# Patient Record
Sex: FEMALE | Race: WHITE | NOT HISPANIC OR LATINO | Employment: FULL TIME | URBAN - METROPOLITAN AREA
[De-identification: names, ages, dates, MRNs, and addresses within clinical notes are randomized per-mention and may not be internally consistent; named-entity substitution may affect disease eponyms.]

---

## 2021-07-22 ENCOUNTER — HOSPITAL ENCOUNTER (INPATIENT)
Facility: HOSPITAL | Age: 31
LOS: 33 days | Discharge: NURSING, REHAB OR LONG-TERM CARE | End: 2021-08-24
Attending: EMERGENCY MEDICINE | Admitting: INTERNAL MEDICINE

## 2021-07-22 ENCOUNTER — APPOINTMENT (OUTPATIENT)
Dept: GENERAL RADIOLOGY | Facility: HOSPITAL | Age: 31
End: 2021-07-22

## 2021-07-22 DIAGNOSIS — I95.2 HYPOTENSION DUE TO DRUGS: ICD-10-CM

## 2021-07-22 DIAGNOSIS — J96.01 ACUTE RESPIRATORY FAILURE WITH HYPOXIA (HCC): ICD-10-CM

## 2021-07-22 DIAGNOSIS — U07.1 ACUTE RESPIRATORY FAILURE DUE TO COVID-19 (HCC): ICD-10-CM

## 2021-07-22 DIAGNOSIS — J12.82 PNEUMONIA DUE TO COVID-19 VIRUS: ICD-10-CM

## 2021-07-22 DIAGNOSIS — R06.00 DYSPNEA, UNSPECIFIED TYPE: Primary | ICD-10-CM

## 2021-07-22 DIAGNOSIS — U07.1 PNEUMONIA DUE TO COVID-19 VIRUS: ICD-10-CM

## 2021-07-22 DIAGNOSIS — J96.00 ACUTE RESPIRATORY FAILURE DUE TO COVID-19 (HCC): ICD-10-CM

## 2021-07-22 PROBLEM — E66.813 CLASS 3 SEVERE OBESITY WITHOUT SERIOUS COMORBIDITY WITH BODY MASS INDEX (BMI) OF 50.0 TO 59.9 IN ADULT: Status: ACTIVE | Noted: 2021-07-22

## 2021-07-22 PROBLEM — E66.01 CLASS 3 SEVERE OBESITY WITHOUT SERIOUS COMORBIDITY WITH BODY MASS INDEX (BMI) OF 50.0 TO 59.9 IN ADULT (HCC): Status: ACTIVE | Noted: 2021-07-22

## 2021-07-22 LAB
ALBUMIN SERPL-MCNC: 3.6 G/DL (ref 3.5–5.2)
ALP SERPL-CCNC: 98 U/L (ref 39–117)
ALT SERPL W P-5'-P-CCNC: 51 U/L (ref 1–33)
ANION GAP SERPL CALCULATED.3IONS-SCNC: 13 MMOL/L (ref 5–15)
ARTERIAL PATENCY WRIST A: POSITIVE
AST SERPL-CCNC: 73 U/L (ref 1–32)
ATMOSPHERIC PRESS: ABNORMAL MM[HG]
B PARAPERT DNA SPEC QL NAA+PROBE: NOT DETECTED
B PERT DNA SPEC QL NAA+PROBE: NOT DETECTED
BASE EXCESS BLDA CALC-SCNC: 1.6 MMOL/L (ref 0–3)
BASOPHILS # BLD AUTO: 0 10*3/MM3 (ref 0–0.2)
BASOPHILS NFR BLD AUTO: 0.2 % (ref 0–1.5)
BDY SITE: ABNORMAL
BILIRUB CONJ SERPL-MCNC: 0.2 MG/DL (ref 0–0.3)
BILIRUB INDIRECT SERPL-MCNC: 0.3 MG/DL
BILIRUB SERPL-MCNC: 0.5 MG/DL (ref 0–1.2)
BUN SERPL-MCNC: 17 MG/DL (ref 6–20)
BUN/CREAT SERPL: 19.5 (ref 7–25)
C PNEUM DNA NPH QL NAA+NON-PROBE: NOT DETECTED
CALCIUM SPEC-SCNC: 8.5 MG/DL (ref 8.6–10.5)
CHLORIDE SERPL-SCNC: 100 MMOL/L (ref 98–107)
CO2 BLDA-SCNC: 27.2 MMOL/L (ref 22–29)
CO2 SERPL-SCNC: 23 MMOL/L (ref 22–29)
CREAT SERPL-MCNC: 0.87 MG/DL (ref 0.57–1)
CRP SERPL-MCNC: 6.33 MG/DL (ref 0–0.5)
D DIMER PPP FEU-MCNC: 1.48 MG/L (FEU) (ref 0–0.59)
D-LACTATE SERPL-SCNC: 1.3 MMOL/L (ref 0.5–2)
DEPRECATED RDW RBC AUTO: 42.4 FL (ref 37–54)
EOSINOPHIL # BLD AUTO: 0 10*3/MM3 (ref 0–0.4)
EOSINOPHIL NFR BLD AUTO: 0 % (ref 0.3–6.2)
ERYTHROCYTE [DISTWIDTH] IN BLOOD BY AUTOMATED COUNT: 14.3 % (ref 12.3–15.4)
FERRITIN SERPL-MCNC: 545.6 NG/ML (ref 13–150)
FLUAV SUBTYP SPEC NAA+PROBE: NOT DETECTED
FLUBV RNA ISLT QL NAA+PROBE: NOT DETECTED
GFR SERPL CREATININE-BSD FRML MDRD: 76 ML/MIN/1.73
GFR SERPL CREATININE-BSD FRML MDRD: 92 ML/MIN/1.73
GLUCOSE BLDC GLUCOMTR-MCNC: 131 MG/DL (ref 70–105)
GLUCOSE BLDC GLUCOMTR-MCNC: 262 MG/DL (ref 70–105)
GLUCOSE SERPL-MCNC: 157 MG/DL (ref 65–99)
HADV DNA SPEC NAA+PROBE: NOT DETECTED
HCO3 BLDA-SCNC: 26 MMOL/L (ref 21–28)
HCOV 229E RNA SPEC QL NAA+PROBE: NOT DETECTED
HCOV HKU1 RNA SPEC QL NAA+PROBE: NOT DETECTED
HCOV NL63 RNA SPEC QL NAA+PROBE: NOT DETECTED
HCOV OC43 RNA SPEC QL NAA+PROBE: NOT DETECTED
HCT VFR BLD AUTO: 41.1 % (ref 34–46.6)
HEMODILUTION: NO
HGB BLD-MCNC: 13.6 G/DL (ref 12–15.9)
HMPV RNA NPH QL NAA+NON-PROBE: NOT DETECTED
HOLD SPECIMEN: NORMAL
HPIV1 RNA SPEC QL NAA+PROBE: NOT DETECTED
HPIV2 RNA SPEC QL NAA+PROBE: NOT DETECTED
HPIV3 RNA NPH QL NAA+PROBE: NOT DETECTED
HPIV4 P GENE NPH QL NAA+PROBE: NOT DETECTED
INHALED O2 CONCENTRATION: 100 %
LDH SERPL-CCNC: 625 U/L (ref 135–214)
LYMPHOCYTES # BLD AUTO: 0.7 10*3/MM3 (ref 0.7–3.1)
LYMPHOCYTES NFR BLD AUTO: 10.7 % (ref 19.6–45.3)
M PNEUMO IGG SER IA-ACNC: NOT DETECTED
MCH RBC QN AUTO: 28.2 PG (ref 26.6–33)
MCHC RBC AUTO-ENTMCNC: 33.2 G/DL (ref 31.5–35.7)
MCV RBC AUTO: 85 FL (ref 79–97)
MODALITY: ABNORMAL
MONOCYTES # BLD AUTO: 0.1 10*3/MM3 (ref 0.1–0.9)
MONOCYTES NFR BLD AUTO: 2 % (ref 5–12)
NEUTROPHILS NFR BLD AUTO: 5.8 10*3/MM3 (ref 1.7–7)
NEUTROPHILS NFR BLD AUTO: 87.1 % (ref 42.7–76)
NRBC BLD AUTO-RTO: 0.1 /100 WBC (ref 0–0.2)
NT-PROBNP SERPL-MCNC: 40.1 PG/ML (ref 0–450)
PCO2 BLDA: 39.5 MM HG (ref 35–48)
PH BLDA: 7.43 PH UNITS (ref 7.35–7.45)
PLATELET # BLD AUTO: 172 10*3/MM3 (ref 140–450)
PMV BLD AUTO: 8.6 FL (ref 6–12)
PO2 BLDA: 44.9 MM HG (ref 83–108)
POTASSIUM SERPL-SCNC: 4 MMOL/L (ref 3.5–5.2)
PROCALCITONIN SERPL-MCNC: 0.3 NG/ML (ref 0–0.25)
PROT SERPL-MCNC: 7.1 G/DL (ref 6–8.5)
RBC # BLD AUTO: 4.84 10*6/MM3 (ref 3.77–5.28)
RHINOVIRUS RNA SPEC NAA+PROBE: NOT DETECTED
RSV RNA NPH QL NAA+NON-PROBE: NOT DETECTED
SAO2 % BLDCOA: 81.8 % (ref 94–98)
SARS-COV-2 RNA NPH QL NAA+NON-PROBE: DETECTED
SODIUM SERPL-SCNC: 136 MMOL/L (ref 136–145)
TROPONIN T SERPL-MCNC: <0.01 NG/ML (ref 0–0.03)
WBC # BLD AUTO: 6.6 10*3/MM3 (ref 3.4–10.8)

## 2021-07-22 PROCEDURE — 94660 CPAP INITIATION&MGMT: CPT

## 2021-07-22 PROCEDURE — 82803 BLOOD GASES ANY COMBINATION: CPT

## 2021-07-22 PROCEDURE — 94799 UNLISTED PULMONARY SVC/PX: CPT

## 2021-07-22 PROCEDURE — 99284 EMERGENCY DEPT VISIT MOD MDM: CPT

## 2021-07-22 PROCEDURE — 63710000001 INSULIN LISPRO (HUMAN) PER 5 UNITS: Performed by: NURSE PRACTITIONER

## 2021-07-22 PROCEDURE — 25010000002 DEXAMETHASONE PER 1 MG: Performed by: NURSE PRACTITIONER

## 2021-07-22 PROCEDURE — 85379 FIBRIN DEGRADATION QUANT: CPT | Performed by: NURSE PRACTITIONER

## 2021-07-22 PROCEDURE — 36600 WITHDRAWAL OF ARTERIAL BLOOD: CPT

## 2021-07-22 PROCEDURE — 83615 LACTATE (LD) (LDH) ENZYME: CPT | Performed by: NURSE PRACTITIONER

## 2021-07-22 PROCEDURE — 71045 X-RAY EXAM CHEST 1 VIEW: CPT

## 2021-07-22 PROCEDURE — 86140 C-REACTIVE PROTEIN: CPT | Performed by: NURSE PRACTITIONER

## 2021-07-22 PROCEDURE — 83605 ASSAY OF LACTIC ACID: CPT

## 2021-07-22 PROCEDURE — 80048 BASIC METABOLIC PNL TOTAL CA: CPT | Performed by: EMERGENCY MEDICINE

## 2021-07-22 PROCEDURE — 83880 ASSAY OF NATRIURETIC PEPTIDE: CPT | Performed by: NURSE PRACTITIONER

## 2021-07-22 PROCEDURE — 85025 COMPLETE CBC W/AUTO DIFF WBC: CPT | Performed by: EMERGENCY MEDICINE

## 2021-07-22 PROCEDURE — 94640 AIRWAY INHALATION TREATMENT: CPT

## 2021-07-22 PROCEDURE — 84484 ASSAY OF TROPONIN QUANT: CPT | Performed by: NURSE PRACTITIONER

## 2021-07-22 PROCEDURE — 0202U NFCT DS 22 TRGT SARS-COV-2: CPT | Performed by: NURSE PRACTITIONER

## 2021-07-22 PROCEDURE — 25010000002 DEXAMETHASONE SODIUM PHOSPHATE 10 MG/ML SOLUTION: Performed by: EMERGENCY MEDICINE

## 2021-07-22 PROCEDURE — 80076 HEPATIC FUNCTION PANEL: CPT | Performed by: NURSE PRACTITIONER

## 2021-07-22 PROCEDURE — 36415 COLL VENOUS BLD VENIPUNCTURE: CPT | Performed by: NURSE PRACTITIONER

## 2021-07-22 PROCEDURE — 25010000002 ENOXAPARIN PER 10 MG: Performed by: NURSE PRACTITIONER

## 2021-07-22 PROCEDURE — 82728 ASSAY OF FERRITIN: CPT | Performed by: NURSE PRACTITIONER

## 2021-07-22 PROCEDURE — 25010000002 MORPHINE PER 10 MG: Performed by: NURSE PRACTITIONER

## 2021-07-22 PROCEDURE — 84145 PROCALCITONIN (PCT): CPT | Performed by: NURSE PRACTITIONER

## 2021-07-22 PROCEDURE — 93005 ELECTROCARDIOGRAM TRACING: CPT | Performed by: EMERGENCY MEDICINE

## 2021-07-22 PROCEDURE — 82962 GLUCOSE BLOOD TEST: CPT

## 2021-07-22 RX ORDER — BUDESONIDE AND FORMOTEROL FUMARATE DIHYDRATE 160; 4.5 UG/1; UG/1
2 AEROSOL RESPIRATORY (INHALATION)
Status: DISCONTINUED | OUTPATIENT
Start: 2021-07-22 | End: 2021-07-24

## 2021-07-22 RX ORDER — ALUMINA, MAGNESIA, AND SIMETHICONE 2400; 2400; 240 MG/30ML; MG/30ML; MG/30ML
15 SUSPENSION ORAL EVERY 6 HOURS PRN
Status: DISCONTINUED | OUTPATIENT
Start: 2021-07-22 | End: 2021-08-24 | Stop reason: HOSPADM

## 2021-07-22 RX ORDER — POTASSIUM CHLORIDE 20 MEQ/1
40 TABLET, EXTENDED RELEASE ORAL AS NEEDED
Status: DISCONTINUED | OUTPATIENT
Start: 2021-07-22 | End: 2021-08-24 | Stop reason: HOSPADM

## 2021-07-22 RX ORDER — POTASSIUM CHLORIDE 7.45 MG/ML
10 INJECTION INTRAVENOUS
Status: DISCONTINUED | OUTPATIENT
Start: 2021-07-22 | End: 2021-08-24 | Stop reason: HOSPADM

## 2021-07-22 RX ORDER — INSULIN LISPRO 100 [IU]/ML
0-7 INJECTION, SOLUTION INTRAVENOUS; SUBCUTANEOUS AS NEEDED
Status: DISCONTINUED | OUTPATIENT
Start: 2021-07-22 | End: 2021-07-23

## 2021-07-22 RX ORDER — SODIUM CHLORIDE 0.9 % (FLUSH) 0.9 %
10 SYRINGE (ML) INJECTION EVERY 12 HOURS SCHEDULED
Status: DISCONTINUED | OUTPATIENT
Start: 2021-07-22 | End: 2021-08-24 | Stop reason: HOSPADM

## 2021-07-22 RX ORDER — INSULIN LISPRO 100 [IU]/ML
0-7 INJECTION, SOLUTION INTRAVENOUS; SUBCUTANEOUS EVERY 6 HOURS SCHEDULED
Status: DISCONTINUED | OUTPATIENT
Start: 2021-07-22 | End: 2021-07-23

## 2021-07-22 RX ORDER — PROMETHAZINE HYDROCHLORIDE 12.5 MG/1
12.5 TABLET ORAL EVERY 6 HOURS PRN
COMMUNITY
End: 2021-10-20

## 2021-07-22 RX ORDER — NITROGLYCERIN 0.4 MG/1
0.4 TABLET SUBLINGUAL
Status: DISCONTINUED | OUTPATIENT
Start: 2021-07-22 | End: 2021-08-24 | Stop reason: HOSPADM

## 2021-07-22 RX ORDER — ONDANSETRON 4 MG/1
4 TABLET, ORALLY DISINTEGRATING ORAL EVERY 6 HOURS PRN
COMMUNITY
End: 2021-10-20

## 2021-07-22 RX ORDER — SODIUM CHLORIDE 0.9 % (FLUSH) 0.9 %
10 SYRINGE (ML) INJECTION AS NEEDED
Status: DISCONTINUED | OUTPATIENT
Start: 2021-07-22 | End: 2021-08-24 | Stop reason: HOSPADM

## 2021-07-22 RX ORDER — MAGNESIUM SULFATE HEPTAHYDRATE 40 MG/ML
2 INJECTION, SOLUTION INTRAVENOUS AS NEEDED
Status: DISCONTINUED | OUTPATIENT
Start: 2021-07-22 | End: 2021-08-24 | Stop reason: HOSPADM

## 2021-07-22 RX ORDER — BENZONATATE 100 MG/1
100 CAPSULE ORAL 3 TIMES DAILY PRN
Status: DISCONTINUED | OUTPATIENT
Start: 2021-07-22 | End: 2021-08-24 | Stop reason: HOSPADM

## 2021-07-22 RX ORDER — DOCUSATE SODIUM 100 MG/1
100 CAPSULE, LIQUID FILLED ORAL 2 TIMES DAILY
Status: DISCONTINUED | OUTPATIENT
Start: 2021-07-22 | End: 2021-07-23 | Stop reason: SDUPTHER

## 2021-07-22 RX ORDER — DEXAMETHASONE SODIUM PHOSPHATE 10 MG/ML
10 INJECTION, SOLUTION INTRAMUSCULAR; INTRAVENOUS ONCE
Status: COMPLETED | OUTPATIENT
Start: 2021-07-22 | End: 2021-07-22

## 2021-07-22 RX ORDER — FAMOTIDINE 20 MG/1
20 TABLET, FILM COATED ORAL DAILY PRN
COMMUNITY
End: 2021-09-29

## 2021-07-22 RX ORDER — ASPIRIN 325 MG
325 TABLET, DELAYED RELEASE (ENTERIC COATED) ORAL DAILY
Status: DISCONTINUED | OUTPATIENT
Start: 2021-07-22 | End: 2021-07-23

## 2021-07-22 RX ORDER — ASCORBIC ACID 500 MG
500 TABLET ORAL DAILY
Status: DISCONTINUED | OUTPATIENT
Start: 2021-07-22 | End: 2021-07-24

## 2021-07-22 RX ORDER — FENTANYL/ROPIVACAINE/NS/PF 2-625MCG/1
15 PLASTIC BAG, INJECTION (ML) EPIDURAL AS NEEDED
Status: DISCONTINUED | OUTPATIENT
Start: 2021-07-22 | End: 2021-08-24 | Stop reason: HOSPADM

## 2021-07-22 RX ORDER — DEXMEDETOMIDINE HYDROCHLORIDE 4 UG/ML
.2-1.5 INJECTION, SOLUTION INTRAVENOUS
Status: DISCONTINUED | OUTPATIENT
Start: 2021-07-22 | End: 2021-07-23

## 2021-07-22 RX ORDER — MAGNESIUM SULFATE 1 G/100ML
1 INJECTION INTRAVENOUS AS NEEDED
Status: DISCONTINUED | OUTPATIENT
Start: 2021-07-22 | End: 2021-08-24 | Stop reason: HOSPADM

## 2021-07-22 RX ORDER — ONDANSETRON 4 MG/1
4 TABLET, FILM COATED ORAL EVERY 6 HOURS PRN
Status: DISCONTINUED | OUTPATIENT
Start: 2021-07-22 | End: 2021-08-11

## 2021-07-22 RX ORDER — ACETAMINOPHEN 500 MG
500 TABLET ORAL EVERY 6 HOURS PRN
COMMUNITY
End: 2021-10-20

## 2021-07-22 RX ORDER — DEXAMETHASONE SODIUM PHOSPHATE 4 MG/ML
6 INJECTION, SOLUTION INTRA-ARTICULAR; INTRALESIONAL; INTRAMUSCULAR; INTRAVENOUS; SOFT TISSUE EVERY 12 HOURS
Status: DISCONTINUED | OUTPATIENT
Start: 2021-07-22 | End: 2021-07-23

## 2021-07-22 RX ORDER — IBUPROFEN 200 MG
200 TABLET ORAL EVERY 6 HOURS PRN
COMMUNITY
End: 2021-08-24 | Stop reason: HOSPADM

## 2021-07-22 RX ORDER — POTASSIUM CHLORIDE 1.5 G/1.77G
40 POWDER, FOR SOLUTION ORAL AS NEEDED
Status: DISCONTINUED | OUTPATIENT
Start: 2021-07-22 | End: 2021-08-24 | Stop reason: HOSPADM

## 2021-07-22 RX ORDER — ONDANSETRON 2 MG/ML
4 INJECTION INTRAMUSCULAR; INTRAVENOUS EVERY 6 HOURS PRN
Status: DISCONTINUED | OUTPATIENT
Start: 2021-07-22 | End: 2021-08-11

## 2021-07-22 RX ORDER — ALBUTEROL SULFATE 90 UG/1
2 AEROSOL, METERED RESPIRATORY (INHALATION)
Status: DISCONTINUED | OUTPATIENT
Start: 2021-07-22 | End: 2021-07-23

## 2021-07-22 RX ORDER — MORPHINE SULFATE 4 MG/ML
2 INJECTION, SOLUTION INTRAMUSCULAR; INTRAVENOUS
Status: DISPENSED | OUTPATIENT
Start: 2021-07-22 | End: 2021-07-29

## 2021-07-22 RX ORDER — NICOTINE POLACRILEX 4 MG
15 LOZENGE BUCCAL
Status: DISCONTINUED | OUTPATIENT
Start: 2021-07-22 | End: 2021-08-24 | Stop reason: HOSPADM

## 2021-07-22 RX ORDER — ZINC SULFATE 50(220)MG
220 CAPSULE ORAL DAILY
Status: DISCONTINUED | OUTPATIENT
Start: 2021-07-22 | End: 2021-07-23

## 2021-07-22 RX ORDER — DEXTROSE MONOHYDRATE 25 G/50ML
25 INJECTION, SOLUTION INTRAVENOUS
Status: DISCONTINUED | OUTPATIENT
Start: 2021-07-22 | End: 2021-08-24 | Stop reason: HOSPADM

## 2021-07-22 RX ORDER — METHOCARBAMOL 500 MG/1
500 TABLET, FILM COATED ORAL 4 TIMES DAILY PRN
COMMUNITY
End: 2021-10-20

## 2021-07-22 RX ORDER — ACETAMINOPHEN 650 MG/1
650 SUPPOSITORY RECTAL EVERY 4 HOURS PRN
Status: DISCONTINUED | OUTPATIENT
Start: 2021-07-22 | End: 2021-08-24 | Stop reason: HOSPADM

## 2021-07-22 RX ORDER — ACETAMINOPHEN 325 MG/1
650 TABLET ORAL EVERY 4 HOURS PRN
Status: DISCONTINUED | OUTPATIENT
Start: 2021-07-22 | End: 2021-08-24 | Stop reason: HOSPADM

## 2021-07-22 RX ORDER — MELATONIN
2000 DAILY
Status: DISCONTINUED | OUTPATIENT
Start: 2021-07-22 | End: 2021-07-23

## 2021-07-22 RX ORDER — GUAIFENESIN 600 MG/1
1200 TABLET, EXTENDED RELEASE ORAL EVERY 12 HOURS SCHEDULED
Status: DISCONTINUED | OUTPATIENT
Start: 2021-07-22 | End: 2021-07-23

## 2021-07-22 RX ADMIN — Medication 10 ML: at 20:27

## 2021-07-22 RX ADMIN — GUAIFENESIN 1200 MG: 600 TABLET, EXTENDED RELEASE ORAL at 20:26

## 2021-07-22 RX ADMIN — REMDESIVIR 200 MG: 100 INJECTION, POWDER, LYOPHILIZED, FOR SOLUTION INTRAVENOUS at 17:10

## 2021-07-22 RX ADMIN — DEXAMETHASONE SODIUM PHOSPHATE 6 MG: 4 INJECTION, SOLUTION INTRAMUSCULAR; INTRAVENOUS at 20:26

## 2021-07-22 RX ADMIN — ALBUTEROL SULFATE 2 PUFF: 108 INHALANT RESPIRATORY (INHALATION) at 19:50

## 2021-07-22 RX ADMIN — Medication 600 MG: at 20:26

## 2021-07-22 RX ADMIN — ZINC SULFATE 220 MG (50 MG) CAPSULE 220 MG: CAPSULE at 20:26

## 2021-07-22 RX ADMIN — INSULIN LISPRO 4 UNITS: 100 INJECTION, SOLUTION INTRAVENOUS; SUBCUTANEOUS at 23:26

## 2021-07-22 RX ADMIN — MORPHINE SULFATE 2 MG: 4 INJECTION INTRAVENOUS at 23:35

## 2021-07-22 RX ADMIN — DEXAMETHASONE SODIUM PHOSPHATE 10 MG: 10 INJECTION, SOLUTION INTRAMUSCULAR; INTRAVENOUS at 13:26

## 2021-07-22 RX ADMIN — Medication 2000 UNITS: at 20:26

## 2021-07-22 RX ADMIN — ENOXAPARIN SODIUM 160 MG: 80 INJECTION SUBCUTANEOUS at 17:10

## 2021-07-22 RX ADMIN — BUDESONIDE AND FORMOTEROL FUMARATE DIHYDRATE 2 PUFF: 160; 4.5 AEROSOL RESPIRATORY (INHALATION) at 19:54

## 2021-07-22 RX ADMIN — DEXMEDETOMIDINE HYDROCHLORIDE 0.2 MCG/KG/HR: 100 INJECTION, SOLUTION INTRAVENOUS at 21:48

## 2021-07-22 RX ADMIN — ASPIRIN 325 MG: 325 TABLET, COATED ORAL at 23:27

## 2021-07-22 RX ADMIN — OXYCODONE HYDROCHLORIDE AND ACETAMINOPHEN 500 MG: 500 TABLET ORAL at 20:26

## 2021-07-22 RX ADMIN — MORPHINE SULFATE 2 MG: 4 INJECTION INTRAVENOUS at 20:22

## 2021-07-22 RX ADMIN — HYDROCODONE POLISTIREX AND CHLORPHENIRAMINE POLISTIREX 5 ML: 10; 8 SUSPENSION, EXTENDED RELEASE ORAL at 20:22

## 2021-07-23 ENCOUNTER — APPOINTMENT (OUTPATIENT)
Dept: GENERAL RADIOLOGY | Facility: HOSPITAL | Age: 31
End: 2021-07-23

## 2021-07-23 ENCOUNTER — APPOINTMENT (OUTPATIENT)
Dept: CARDIOLOGY | Facility: HOSPITAL | Age: 31
End: 2021-07-23

## 2021-07-23 PROBLEM — J12.82 PNEUMONIA DUE TO COVID-19 VIRUS: Status: ACTIVE | Noted: 2021-07-22

## 2021-07-23 PROBLEM — R73.9 HYPERGLYCEMIA: Status: ACTIVE | Noted: 2021-07-22

## 2021-07-23 PROBLEM — U07.1 PNEUMONIA DUE TO COVID-19 VIRUS: Status: ACTIVE | Noted: 2021-07-22

## 2021-07-23 LAB
ALBUMIN SERPL-MCNC: 3.5 G/DL (ref 3.5–5.2)
ALBUMIN/GLOB SERPL: 1 G/DL
ALP SERPL-CCNC: 93 U/L (ref 39–117)
ALT SERPL W P-5'-P-CCNC: 42 U/L (ref 1–33)
ANION GAP SERPL CALCULATED.3IONS-SCNC: 12 MMOL/L (ref 5–15)
APTT PPP: 30.2 SECONDS (ref 24–31)
APTT PPP: 67.2 SECONDS (ref 61–76.5)
ARTERIAL PATENCY WRIST A: ABNORMAL
AST SERPL-CCNC: 52 U/L (ref 1–32)
ATMOSPHERIC PRESS: ABNORMAL MM[HG]
BASE EXCESS BLDA CALC-SCNC: -1.7 MMOL/L (ref 0–3)
BASOPHILS # BLD AUTO: 0 10*3/MM3 (ref 0–0.2)
BASOPHILS NFR BLD AUTO: 0.1 % (ref 0–1.5)
BDY SITE: ABNORMAL
BILIRUB CONJ SERPL-MCNC: <0.2 MG/DL (ref 0–0.3)
BILIRUB SERPL-MCNC: 0.4 MG/DL (ref 0–1.2)
BUN SERPL-MCNC: 22 MG/DL (ref 6–20)
BUN/CREAT SERPL: 27.8 (ref 7–25)
CALCIUM SPEC-SCNC: 8.6 MG/DL (ref 8.6–10.5)
CHLORIDE SERPL-SCNC: 98 MMOL/L (ref 98–107)
CHOLEST SERPL-MCNC: 87 MG/DL (ref 0–200)
CK SERPL-CCNC: 101 U/L (ref 20–180)
CO2 BLDA-SCNC: 24.8 MMOL/L (ref 22–29)
CO2 SERPL-SCNC: 25 MMOL/L (ref 22–29)
CREAT SERPL-MCNC: 0.79 MG/DL (ref 0.57–1)
CRP SERPL-MCNC: 4.99 MG/DL (ref 0–0.5)
CRP SERPL-MCNC: 6.72 MG/DL (ref 0–0.5)
D DIMER PPP FEU-MCNC: 0.98 MG/L (FEU) (ref 0–0.59)
DEPRECATED RDW RBC AUTO: 41.6 FL (ref 37–54)
EOSINOPHIL # BLD AUTO: 0 10*3/MM3 (ref 0–0.4)
EOSINOPHIL NFR BLD AUTO: 0 % (ref 0.3–6.2)
ERYTHROCYTE [DISTWIDTH] IN BLOOD BY AUTOMATED COUNT: 14.3 % (ref 12.3–15.4)
FERRITIN SERPL-MCNC: 421.3 NG/ML (ref 13–150)
FIBRINOGEN PPP-MCNC: 650 MG/DL (ref 210–450)
GFR SERPL CREATININE-BSD FRML MDRD: 85 ML/MIN/1.73
GLOBULIN UR ELPH-MCNC: 3.4 GM/DL
GLUCOSE BLDC GLUCOMTR-MCNC: 163 MG/DL (ref 70–105)
GLUCOSE BLDC GLUCOMTR-MCNC: 199 MG/DL (ref 70–105)
GLUCOSE BLDC GLUCOMTR-MCNC: 204 MG/DL (ref 70–105)
GLUCOSE BLDC GLUCOMTR-MCNC: 205 MG/DL (ref 70–105)
GLUCOSE BLDC GLUCOMTR-MCNC: 262 MG/DL (ref 70–105)
GLUCOSE SERPL-MCNC: 197 MG/DL (ref 65–99)
HBA1C MFR BLD: 6.9 % (ref 3.5–5.6)
HCO3 BLDA-SCNC: 23.6 MMOL/L (ref 21–28)
HCT VFR BLD AUTO: 38.4 % (ref 34–46.6)
HDLC SERPL-MCNC: 19 MG/DL (ref 40–60)
HEMODILUTION: NO
HGB BLD-MCNC: 12.8 G/DL (ref 12–15.9)
INHALED O2 CONCENTRATION: 100 %
INR PPP: 1.06 (ref 0.93–1.1)
LDH SERPL-CCNC: 636 U/L (ref 135–214)
LDLC SERPL CALC-MCNC: 41 MG/DL (ref 0–100)
LDLC/HDLC SERPL: 1.89 {RATIO}
LYMPHOCYTES # BLD AUTO: 1 10*3/MM3 (ref 0.7–3.1)
LYMPHOCYTES NFR BLD AUTO: 16.2 % (ref 19.6–45.3)
MAGNESIUM SERPL-MCNC: 1.9 MG/DL (ref 1.6–2.6)
MCH RBC QN AUTO: 28 PG (ref 26.6–33)
MCHC RBC AUTO-ENTMCNC: 33.4 G/DL (ref 31.5–35.7)
MCV RBC AUTO: 83.8 FL (ref 79–97)
MODALITY: ABNORMAL
MONOCYTES # BLD AUTO: 0.2 10*3/MM3 (ref 0.1–0.9)
MONOCYTES NFR BLD AUTO: 3.2 % (ref 5–12)
NEUTROPHILS NFR BLD AUTO: 4.9 10*3/MM3 (ref 1.7–7)
NEUTROPHILS NFR BLD AUTO: 80.5 % (ref 42.7–76)
NRBC BLD AUTO-RTO: 0.1 /100 WBC (ref 0–0.2)
NT-PROBNP SERPL-MCNC: 110.3 PG/ML (ref 0–450)
PCO2 BLDA: 41 MM HG (ref 35–48)
PEEP RESPIRATORY: 13 CM[H2O]
PH BLDA: 7.37 PH UNITS (ref 7.35–7.45)
PHOSPHATE SERPL-MCNC: 2.6 MG/DL (ref 2.5–4.5)
PLATELET # BLD AUTO: 192 10*3/MM3 (ref 140–450)
PMV BLD AUTO: 9.1 FL (ref 6–12)
PO2 BLDA: 94.2 MM HG (ref 83–108)
POTASSIUM SERPL-SCNC: 4.1 MMOL/L (ref 3.5–5.2)
PROT SERPL-MCNC: 6.9 G/DL (ref 6–8.5)
PROTHROMBIN TIME: 11.7 SECONDS (ref 9.6–11.7)
QT INTERVAL: 300 MS
RBC # BLD AUTO: 4.58 10*6/MM3 (ref 3.77–5.28)
RESPIRATORY RATE: 28
SAO2 % BLDCOA: 97.1 % (ref 94–98)
SODIUM SERPL-SCNC: 135 MMOL/L (ref 136–145)
TRIGL SERPL-MCNC: 160 MG/DL (ref 0–150)
TROPONIN T SERPL-MCNC: <0.01 NG/ML (ref 0–0.03)
VENTILATOR MODE: ABNORMAL
VLDLC SERPL-MCNC: 27 MG/DL (ref 5–40)
VT ON VENT VENT: 450 ML
WBC # BLD AUTO: 6.1 10*3/MM3 (ref 3.4–10.8)

## 2021-07-23 PROCEDURE — 94799 UNLISTED PULMONARY SVC/PX: CPT

## 2021-07-23 PROCEDURE — 85025 COMPLETE CBC W/AUTO DIFF WBC: CPT | Performed by: NURSE PRACTITIONER

## 2021-07-23 PROCEDURE — 25010000002 DEXAMETHASONE PER 1 MG: Performed by: NURSE PRACTITIONER

## 2021-07-23 PROCEDURE — 85610 PROTHROMBIN TIME: CPT | Performed by: NURSE PRACTITIONER

## 2021-07-23 PROCEDURE — 71045 X-RAY EXAM CHEST 1 VIEW: CPT

## 2021-07-23 PROCEDURE — 82962 GLUCOSE BLOOD TEST: CPT

## 2021-07-23 PROCEDURE — 85730 THROMBOPLASTIN TIME PARTIAL: CPT | Performed by: NURSE PRACTITIONER

## 2021-07-23 PROCEDURE — 74018 RADEX ABDOMEN 1 VIEW: CPT

## 2021-07-23 PROCEDURE — 85384 FIBRINOGEN ACTIVITY: CPT | Performed by: NURSE PRACTITIONER

## 2021-07-23 PROCEDURE — 25010000002 MIDAZOLAM 50 MG/10ML SOLUTION: Performed by: NURSE PRACTITIONER

## 2021-07-23 PROCEDURE — 82803 BLOOD GASES ANY COMBINATION: CPT

## 2021-07-23 PROCEDURE — 03HC33Z INSERTION OF INFUSION DEVICE INTO LEFT RADIAL ARTERY, PERCUTANEOUS APPROACH: ICD-10-PCS | Performed by: INTERNAL MEDICINE

## 2021-07-23 PROCEDURE — 0BH18EZ INSERTION OF ENDOTRACHEAL AIRWAY INTO TRACHEA, VIA NATURAL OR ARTIFICIAL OPENING ENDOSCOPIC: ICD-10-PCS | Performed by: INTERNAL MEDICINE

## 2021-07-23 PROCEDURE — 25010000002 EPOPROSTENOL PER 0.5 MG: Performed by: NURSE PRACTITIONER

## 2021-07-23 PROCEDURE — C1751 CATH, INF, PER/CENT/MIDLINE: HCPCS

## 2021-07-23 PROCEDURE — 25010000002 FENTANYL CITRATE (PF) 50 MCG/ML SOLUTION: Performed by: NURSE PRACTITIONER

## 2021-07-23 PROCEDURE — 87070 CULTURE OTHR SPECIMN AEROBIC: CPT | Performed by: NURSE PRACTITIONER

## 2021-07-23 PROCEDURE — 25010000002 MIDAZOLAM PER 1 MG

## 2021-07-23 PROCEDURE — 31500 INSERT EMERGENCY AIRWAY: CPT | Performed by: NURSE PRACTITIONER

## 2021-07-23 PROCEDURE — 25010000002 MORPHINE PER 10 MG: Performed by: NURSE PRACTITIONER

## 2021-07-23 PROCEDURE — 25010000002 PROPOFOL 10 MG/ML EMULSION

## 2021-07-23 PROCEDURE — 82248 BILIRUBIN DIRECT: CPT | Performed by: NURSE PRACTITIONER

## 2021-07-23 PROCEDURE — 94002 VENT MGMT INPAT INIT DAY: CPT

## 2021-07-23 PROCEDURE — 25010000002 CEFTRIAXONE PER 250 MG: Performed by: NURSE PRACTITIONER

## 2021-07-23 PROCEDURE — 02HV33Z INSERTION OF INFUSION DEVICE INTO SUPERIOR VENA CAVA, PERCUTANEOUS APPROACH: ICD-10-PCS | Performed by: INTERNAL MEDICINE

## 2021-07-23 PROCEDURE — 25010000002 HEPARIN (PORCINE) 25000-0.45 UT/250ML-% SOLUTION: Performed by: INTERNAL MEDICINE

## 2021-07-23 PROCEDURE — 80053 COMPREHEN METABOLIC PANEL: CPT | Performed by: NURSE PRACTITIONER

## 2021-07-23 PROCEDURE — 63710000001 INSULIN LISPRO (HUMAN) PER 5 UNITS: Performed by: NURSE PRACTITIONER

## 2021-07-23 PROCEDURE — 83615 LACTATE (LD) (LDH) ENZYME: CPT | Performed by: NURSE PRACTITIONER

## 2021-07-23 PROCEDURE — 63710000001 INSULIN LISPRO (HUMAN) PER 5 UNITS: Performed by: INTERNAL MEDICINE

## 2021-07-23 PROCEDURE — 82728 ASSAY OF FERRITIN: CPT | Performed by: NURSE PRACTITIONER

## 2021-07-23 PROCEDURE — 94760 N-INVAS EAR/PLS OXIMETRY 1: CPT

## 2021-07-23 PROCEDURE — 94640 AIRWAY INHALATION TREATMENT: CPT

## 2021-07-23 PROCEDURE — 86140 C-REACTIVE PROTEIN: CPT | Performed by: NURSE PRACTITIONER

## 2021-07-23 PROCEDURE — 25010000002 AZITHROMYCIN PER 500 MG: Performed by: NURSE PRACTITIONER

## 2021-07-23 PROCEDURE — 25010000002 FENTANYL CITRATE (PF) 50 MCG/ML SOLUTION

## 2021-07-23 PROCEDURE — 25010000002 FENTANYL CITRATE (PF) 2500 MCG/50ML SOLUTION: Performed by: NURSE PRACTITIONER

## 2021-07-23 PROCEDURE — 25010000002 ENOXAPARIN PER 10 MG: Performed by: NURSE PRACTITIONER

## 2021-07-23 PROCEDURE — 82550 ASSAY OF CK (CPK): CPT | Performed by: NURSE PRACTITIONER

## 2021-07-23 PROCEDURE — 5A1955Z RESPIRATORY VENTILATION, GREATER THAN 96 CONSECUTIVE HOURS: ICD-10-PCS | Performed by: INTERNAL MEDICINE

## 2021-07-23 PROCEDURE — 83036 HEMOGLOBIN GLYCOSYLATED A1C: CPT | Performed by: NURSE PRACTITIONER

## 2021-07-23 PROCEDURE — 83880 ASSAY OF NATRIURETIC PEPTIDE: CPT | Performed by: NURSE PRACTITIONER

## 2021-07-23 PROCEDURE — 84100 ASSAY OF PHOSPHORUS: CPT | Performed by: NURSE PRACTITIONER

## 2021-07-23 PROCEDURE — 85379 FIBRIN DEGRADATION QUANT: CPT | Performed by: NURSE PRACTITIONER

## 2021-07-23 PROCEDURE — 83735 ASSAY OF MAGNESIUM: CPT | Performed by: NURSE PRACTITIONER

## 2021-07-23 PROCEDURE — 85730 THROMBOPLASTIN TIME PARTIAL: CPT | Performed by: INTERNAL MEDICINE

## 2021-07-23 PROCEDURE — 84484 ASSAY OF TROPONIN QUANT: CPT | Performed by: NURSE PRACTITIONER

## 2021-07-23 PROCEDURE — 80061 LIPID PANEL: CPT | Performed by: NURSE PRACTITIONER

## 2021-07-23 PROCEDURE — 87205 SMEAR GRAM STAIN: CPT | Performed by: NURSE PRACTITIONER

## 2021-07-23 RX ORDER — HEPARIN SODIUM 10000 [USP'U]/100ML
9.26 INJECTION, SOLUTION INTRAVENOUS
Status: DISCONTINUED | OUTPATIENT
Start: 2021-07-23 | End: 2021-07-26

## 2021-07-23 RX ORDER — VECURONIUM BROMIDE 1 MG/ML
10 INJECTION, POWDER, LYOPHILIZED, FOR SOLUTION INTRAVENOUS ONCE
Status: COMPLETED | OUTPATIENT
Start: 2021-07-23 | End: 2021-07-23

## 2021-07-23 RX ORDER — INSULIN LISPRO 100 [IU]/ML
0-14 INJECTION, SOLUTION INTRAVENOUS; SUBCUTANEOUS AS NEEDED
Status: DISCONTINUED | OUTPATIENT
Start: 2021-07-23 | End: 2021-07-25 | Stop reason: DRUGHIGH

## 2021-07-23 RX ORDER — ZINC SULFATE 50(220)MG
220 CAPSULE ORAL DAILY
Status: COMPLETED | OUTPATIENT
Start: 2021-07-24 | End: 2021-08-05

## 2021-07-23 RX ORDER — EPOPROSTENOL SODIUM 1.5 MG/1
50 INJECTION, POWDER, FOR SOLUTION INTRAVENOUS CONTINUOUS
Status: DISCONTINUED | OUTPATIENT
Start: 2021-07-23 | End: 2021-08-05

## 2021-07-23 RX ORDER — MIDAZOLAM HYDROCHLORIDE 1 MG/ML
1 INJECTION, SOLUTION INTRAVENOUS
Status: DISCONTINUED | OUTPATIENT
Start: 2021-07-23 | End: 2021-07-23

## 2021-07-23 RX ORDER — NOREPINEPHRINE BIT/0.9 % NACL 8 MG/250ML
.02-.3 INFUSION BOTTLE (ML) INTRAVENOUS
Status: DISCONTINUED | OUTPATIENT
Start: 2021-07-23 | End: 2021-07-25

## 2021-07-23 RX ORDER — ALBUTEROL SULFATE 90 UG/1
6 AEROSOL, METERED RESPIRATORY (INHALATION)
Status: DISCONTINUED | OUTPATIENT
Start: 2021-07-23 | End: 2021-07-26

## 2021-07-23 RX ORDER — ETOMIDATE 2 MG/ML
20 INJECTION INTRAVENOUS ONCE
Status: COMPLETED | OUTPATIENT
Start: 2021-07-23 | End: 2021-07-23

## 2021-07-23 RX ORDER — FAMOTIDINE 20 MG/1
20 TABLET, FILM COATED ORAL
Status: DISCONTINUED | OUTPATIENT
Start: 2021-07-23 | End: 2021-07-24

## 2021-07-23 RX ORDER — MIDAZOLAM HYDROCHLORIDE 1 MG/ML
INJECTION INTRAMUSCULAR; INTRAVENOUS
Status: COMPLETED
Start: 2021-07-23 | End: 2021-07-23

## 2021-07-23 RX ORDER — INSULIN LISPRO 100 [IU]/ML
0-14 INJECTION, SOLUTION INTRAVENOUS; SUBCUTANEOUS EVERY 6 HOURS SCHEDULED
Status: DISCONTINUED | OUTPATIENT
Start: 2021-07-23 | End: 2021-07-25 | Stop reason: DRUGHIGH

## 2021-07-23 RX ORDER — SODIUM CHLORIDE 9 MG/ML
20 INJECTION, SOLUTION INTRAVENOUS CONTINUOUS
Status: DISCONTINUED | OUTPATIENT
Start: 2021-07-23 | End: 2021-08-01

## 2021-07-23 RX ORDER — FENTANYL CITRATE 50 UG/ML
50 INJECTION, SOLUTION INTRAMUSCULAR; INTRAVENOUS ONCE
Status: COMPLETED | OUTPATIENT
Start: 2021-07-23 | End: 2021-07-23

## 2021-07-23 RX ORDER — VECURONIUM BROMIDE 1 MG/ML
INJECTION, POWDER, LYOPHILIZED, FOR SOLUTION INTRAVENOUS
Status: COMPLETED
Start: 2021-07-23 | End: 2021-07-23

## 2021-07-23 RX ORDER — MIDAZOLAM HYDROCHLORIDE 1 MG/ML
5 INJECTION INTRAMUSCULAR; INTRAVENOUS ONCE
Status: COMPLETED | OUTPATIENT
Start: 2021-07-23 | End: 2021-07-23

## 2021-07-23 RX ORDER — NOREPINEPHRINE BIT/0.9 % NACL 8 MG/250ML
INFUSION BOTTLE (ML) INTRAVENOUS
Status: COMPLETED
Start: 2021-07-23 | End: 2021-07-23

## 2021-07-23 RX ORDER — PROPOFOL 10 MG/ML
VIAL (ML) INTRAVENOUS
Status: COMPLETED
Start: 2021-07-23 | End: 2021-07-23

## 2021-07-23 RX ORDER — VECURONIUM BROMIDE 1 MG/ML
5 INJECTION, POWDER, LYOPHILIZED, FOR SOLUTION INTRAVENOUS ONCE
Status: COMPLETED | OUTPATIENT
Start: 2021-07-23 | End: 2021-07-23

## 2021-07-23 RX ORDER — ASPIRIN 81 MG/1
324 TABLET, CHEWABLE ORAL DAILY
Status: DISCONTINUED | OUTPATIENT
Start: 2021-07-24 | End: 2021-07-24

## 2021-07-23 RX ORDER — VECURONIUM BROMIDE 1 MG/ML
INJECTION, POWDER, LYOPHILIZED, FOR SOLUTION INTRAVENOUS
Status: DISPENSED
Start: 2021-07-23 | End: 2021-07-24

## 2021-07-23 RX ORDER — FENTANYL CITRATE 50 UG/ML
25 INJECTION, SOLUTION INTRAMUSCULAR; INTRAVENOUS
Status: DISCONTINUED | OUTPATIENT
Start: 2021-07-23 | End: 2021-07-23

## 2021-07-23 RX ORDER — MELATONIN
2000 DAILY
Status: DISCONTINUED | OUTPATIENT
Start: 2021-07-24 | End: 2021-08-15

## 2021-07-23 RX ORDER — FENTANYL CITRATE-0.9 % NACL/PF 10 MCG/ML
50-300 PLASTIC BAG, INJECTION (ML) INTRAVENOUS
Status: DISPENSED | OUTPATIENT
Start: 2021-07-23 | End: 2021-07-30

## 2021-07-23 RX ORDER — FENTANYL CITRATE 50 UG/ML
INJECTION, SOLUTION INTRAMUSCULAR; INTRAVENOUS
Status: COMPLETED
Start: 2021-07-23 | End: 2021-07-23

## 2021-07-23 RX ORDER — DEXAMETHASONE SODIUM PHOSPHATE 4 MG/ML
6 INJECTION, SOLUTION INTRA-ARTICULAR; INTRALESIONAL; INTRAMUSCULAR; INTRAVENOUS; SOFT TISSUE EVERY 8 HOURS
Status: DISCONTINUED | OUTPATIENT
Start: 2021-07-23 | End: 2021-07-26

## 2021-07-23 RX ORDER — MIDAZOLAM HYDROCHLORIDE 1 MG/ML
1-10 INJECTION, SOLUTION INTRAVENOUS
Status: DISCONTINUED | OUTPATIENT
Start: 2021-07-23 | End: 2021-08-09

## 2021-07-23 RX ORDER — ETOMIDATE 2 MG/ML
INJECTION INTRAVENOUS
Status: COMPLETED
Start: 2021-07-23 | End: 2021-07-23

## 2021-07-23 RX ADMIN — FAMOTIDINE 20 MG: 20 TABLET ORAL at 08:25

## 2021-07-23 RX ADMIN — INSULIN LISPRO 3 UNITS: 100 INJECTION, SOLUTION INTRAVENOUS; SUBCUTANEOUS at 17:38

## 2021-07-23 RX ADMIN — Medication 0.02 MCG/KG/MIN: at 12:25

## 2021-07-23 RX ADMIN — GUAIFENESIN 1200 MG: 600 TABLET, EXTENDED RELEASE ORAL at 21:46

## 2021-07-23 RX ADMIN — AZITHROMYCIN 500 MG: 500 INJECTION, POWDER, LYOPHILIZED, FOR SOLUTION INTRAVENOUS at 16:45

## 2021-07-23 RX ADMIN — DEXMEDETOMIDINE HYDROCHLORIDE 0.6 MCG/KG/HR: 100 INJECTION, SOLUTION INTRAVENOUS at 05:54

## 2021-07-23 RX ADMIN — REMDESIVIR 100 MG: 100 INJECTION, POWDER, LYOPHILIZED, FOR SOLUTION INTRAVENOUS at 19:50

## 2021-07-23 RX ADMIN — DEXAMETHASONE SODIUM PHOSPHATE 6 MG: 4 INJECTION, SOLUTION INTRAMUSCULAR; INTRAVENOUS at 23:47

## 2021-07-23 RX ADMIN — VECURONIUM BROMIDE 5 MG: 1 INJECTION, POWDER, LYOPHILIZED, FOR SOLUTION INTRAVENOUS at 11:25

## 2021-07-23 RX ADMIN — VECURONIUM BROMIDE 0.6 MCG/KG/MIN: 1 INJECTION, POWDER, LYOPHILIZED, FOR SOLUTION INTRAVENOUS at 12:23

## 2021-07-23 RX ADMIN — GUAIFENESIN 400 MG: 100 SOLUTION ORAL at 23:47

## 2021-07-23 RX ADMIN — GUAIFENESIN 1200 MG: 600 TABLET, EXTENDED RELEASE ORAL at 08:23

## 2021-07-23 RX ADMIN — DOCUSATE SODIUM 100 MG: 50 LIQUID ORAL at 23:47

## 2021-07-23 RX ADMIN — VECURONIUM BROMIDE 0.6 MCG/KG/MIN: 1 INJECTION, POWDER, LYOPHILIZED, FOR SOLUTION INTRAVENOUS at 20:15

## 2021-07-23 RX ADMIN — Medication 10 ML: at 22:26

## 2021-07-23 RX ADMIN — ALBUTEROL SULFATE 2 PUFF: 108 INHALANT RESPIRATORY (INHALATION) at 12:43

## 2021-07-23 RX ADMIN — ALBUTEROL SULFATE 2 PUFF: 108 INHALANT RESPIRATORY (INHALATION) at 18:29

## 2021-07-23 RX ADMIN — ALBUTEROL SULFATE 2 PUFF: 108 INHALANT RESPIRATORY (INHALATION) at 16:09

## 2021-07-23 RX ADMIN — MIDAZOLAM 10 MG/HR: 5 INJECTION INTRAMUSCULAR; INTRAVENOUS at 19:59

## 2021-07-23 RX ADMIN — Medication 600 MG: at 21:46

## 2021-07-23 RX ADMIN — HEPARIN SODIUM 13 UNITS/KG/HR: 10000 INJECTION, SOLUTION INTRAVENOUS at 18:35

## 2021-07-23 RX ADMIN — ENOXAPARIN SODIUM 160 MG: 80 INJECTION SUBCUTANEOUS at 05:55

## 2021-07-23 RX ADMIN — ALBUTEROL SULFATE 2 PUFF: 108 INHALANT RESPIRATORY (INHALATION) at 18:32

## 2021-07-23 RX ADMIN — VECURONIUM BROMIDE 5 MG: 1 INJECTION, POWDER, LYOPHILIZED, FOR SOLUTION INTRAVENOUS at 11:20

## 2021-07-23 RX ADMIN — DEXAMETHASONE SODIUM PHOSPHATE 6 MG: 4 INJECTION, SOLUTION INTRAMUSCULAR; INTRAVENOUS at 08:25

## 2021-07-23 RX ADMIN — DEXAMETHASONE SODIUM PHOSPHATE 6 MG: 4 INJECTION, SOLUTION INTRAMUSCULAR; INTRAVENOUS at 16:45

## 2021-07-23 RX ADMIN — FENTANYL CITRATE 50 MCG: 50 INJECTION, SOLUTION INTRAMUSCULAR; INTRAVENOUS at 11:10

## 2021-07-23 RX ADMIN — ETOMIDATE 20 MG: 40 INJECTION, SOLUTION INTRAVENOUS at 11:00

## 2021-07-23 RX ADMIN — BUDESONIDE AND FORMOTEROL FUMARATE DIHYDRATE 2 PUFF: 160; 4.5 AEROSOL RESPIRATORY (INHALATION) at 18:31

## 2021-07-23 RX ADMIN — EPOPROSTENOL 50 NG/KG/MIN: 1.5 INJECTION, POWDER, LYOPHILIZED, FOR SOLUTION INTRAVENOUS at 12:34

## 2021-07-23 RX ADMIN — MORPHINE SULFATE 2 MG: 4 INJECTION INTRAVENOUS at 04:25

## 2021-07-23 RX ADMIN — MIDAZOLAM 1 MG/HR: 5 INJECTION INTRAMUSCULAR; INTRAVENOUS at 11:51

## 2021-07-23 RX ADMIN — VECURONIUM BROMIDE 10 MG: 1 INJECTION, POWDER, LYOPHILIZED, FOR SOLUTION INTRAVENOUS at 12:24

## 2021-07-23 RX ADMIN — Medication 10 ML: at 08:26

## 2021-07-23 RX ADMIN — MORPHINE SULFATE 2 MG: 4 INJECTION INTRAVENOUS at 09:03

## 2021-07-23 RX ADMIN — FENTANYL CITRATE 50 MCG: 50 INJECTION, SOLUTION INTRAMUSCULAR; INTRAVENOUS at 11:15

## 2021-07-23 RX ADMIN — Medication 600 MG: at 08:22

## 2021-07-23 RX ADMIN — BUDESONIDE AND FORMOTEROL FUMARATE DIHYDRATE 2 PUFF: 160; 4.5 AEROSOL RESPIRATORY (INHALATION) at 07:55

## 2021-07-23 RX ADMIN — MINERAL OIL AND WHITE PETROLATUM: 150; 830 OINTMENT OPHTHALMIC at 16:46

## 2021-07-23 RX ADMIN — DOCUSATE SODIUM 100 MG: 100 CAPSULE ORAL at 21:46

## 2021-07-23 RX ADMIN — MIDAZOLAM 10 MG/HR: 5 INJECTION INTRAMUSCULAR; INTRAVENOUS at 16:09

## 2021-07-23 RX ADMIN — MIDAZOLAM 5 MG: 1 INJECTION INTRAMUSCULAR; INTRAVENOUS at 11:00

## 2021-07-23 RX ADMIN — MINERAL OIL AND WHITE PETROLATUM: 150; 830 OINTMENT OPHTHALMIC at 14:00

## 2021-07-23 RX ADMIN — FENTANYL CITRATE 200 MCG/HR: 0.05 INJECTION, SOLUTION INTRAMUSCULAR; INTRAVENOUS at 15:55

## 2021-07-23 RX ADMIN — FENTANYL CITRATE 200 MCG/HR: 0.05 INJECTION, SOLUTION INTRAMUSCULAR; INTRAVENOUS at 19:53

## 2021-07-23 RX ADMIN — MINERAL OIL AND WHITE PETROLATUM: 150; 830 OINTMENT OPHTHALMIC at 19:51

## 2021-07-23 RX ADMIN — MIDAZOLAM HYDROCHLORIDE 5 MG: 1 INJECTION INTRAMUSCULAR; INTRAVENOUS at 11:00

## 2021-07-23 RX ADMIN — DEXMEDETOMIDINE HYDROCHLORIDE 0.6 MCG/KG/HR: 100 INJECTION, SOLUTION INTRAVENOUS at 01:59

## 2021-07-23 RX ADMIN — ETOMIDATE 20 MG: 2 INJECTION INTRAVENOUS at 11:00

## 2021-07-23 RX ADMIN — BUDESONIDE AND FORMOTEROL FUMARATE DIHYDRATE 2 PUFF: 160; 4.5 AEROSOL RESPIRATORY (INHALATION) at 18:29

## 2021-07-23 RX ADMIN — ALBUTEROL SULFATE 2 PUFF: 108 INHALANT RESPIRATORY (INHALATION) at 07:50

## 2021-07-23 RX ADMIN — Medication 2000 UNITS: at 08:23

## 2021-07-23 RX ADMIN — PROPOFOL 50 MCG/KG/MIN: 10 INJECTION, EMULSION INTRAVENOUS at 11:00

## 2021-07-23 RX ADMIN — INSULIN LISPRO 3 UNITS: 100 INJECTION, SOLUTION INTRAVENOUS; SUBCUTANEOUS at 05:57

## 2021-07-23 RX ADMIN — DOCUSATE SODIUM 100 MG: 100 CAPSULE ORAL at 08:25

## 2021-07-23 RX ADMIN — CEFTRIAXONE SODIUM 1 G: 1 INJECTION, POWDER, FOR SOLUTION INTRAMUSCULAR; INTRAVENOUS at 16:45

## 2021-07-23 RX ADMIN — EPOPROSTENOL 50 NG/KG/MIN: 1.5 INJECTION, POWDER, LYOPHILIZED, FOR SOLUTION INTRAVENOUS at 18:29

## 2021-07-23 RX ADMIN — HEPARIN SODIUM 13 UNITS/KG/HR: 10000 INJECTION, SOLUTION INTRAVENOUS at 09:25

## 2021-07-23 RX ADMIN — INSULIN LISPRO 5 UNITS: 100 INJECTION, SOLUTION INTRAVENOUS; SUBCUTANEOUS at 23:51

## 2021-07-23 RX ADMIN — FENTANYL CITRATE 50 MCG/HR: 0.05 INJECTION, SOLUTION INTRAMUSCULAR; INTRAVENOUS at 11:50

## 2021-07-24 ENCOUNTER — APPOINTMENT (OUTPATIENT)
Dept: GENERAL RADIOLOGY | Facility: HOSPITAL | Age: 31
End: 2021-07-24

## 2021-07-24 LAB
ALBUMIN SERPL-MCNC: 3.1 G/DL (ref 3.5–5.2)
ALBUMIN/GLOB SERPL: 1 G/DL
ALP SERPL-CCNC: 77 U/L (ref 39–117)
ALT SERPL W P-5'-P-CCNC: 36 U/L (ref 1–33)
ANION GAP SERPL CALCULATED.3IONS-SCNC: 12 MMOL/L (ref 5–15)
APTT PPP: 115.7 SECONDS (ref 61–76.5)
APTT PPP: 62.3 SECONDS (ref 61–76.5)
APTT PPP: 62.9 SECONDS (ref 61–76.5)
ARTERIAL PATENCY WRIST A: ABNORMAL
AST SERPL-CCNC: 42 U/L (ref 1–32)
ATMOSPHERIC PRESS: ABNORMAL MM[HG]
BASE EXCESS BLDA CALC-SCNC: -0.5 MMOL/L (ref 0–3)
BASOPHILS # BLD AUTO: 0 10*3/MM3 (ref 0–0.2)
BASOPHILS NFR BLD AUTO: 0.1 % (ref 0–1.5)
BDY SITE: ABNORMAL
BILIRUB SERPL-MCNC: 0.4 MG/DL (ref 0–1.2)
BUN SERPL-MCNC: 21 MG/DL (ref 6–20)
BUN/CREAT SERPL: 32.3 (ref 7–25)
CALCIUM SPEC-SCNC: 8.1 MG/DL (ref 8.6–10.5)
CHLORIDE SERPL-SCNC: 102 MMOL/L (ref 98–107)
CK SERPL-CCNC: 455 U/L (ref 20–180)
CO2 BLDA-SCNC: 26.3 MMOL/L (ref 22–29)
CO2 SERPL-SCNC: 23 MMOL/L (ref 22–29)
CREAT SERPL-MCNC: 0.65 MG/DL (ref 0.57–1)
CRP SERPL-MCNC: 2.3 MG/DL (ref 0–0.5)
DEPRECATED RDW RBC AUTO: 41.6 FL (ref 37–54)
EOSINOPHIL # BLD AUTO: 0 10*3/MM3 (ref 0–0.4)
EOSINOPHIL NFR BLD AUTO: 0 % (ref 0.3–6.2)
ERYTHROCYTE [DISTWIDTH] IN BLOOD BY AUTOMATED COUNT: 14.2 % (ref 12.3–15.4)
FERRITIN SERPL-MCNC: 462.3 NG/ML (ref 13–150)
GFR SERPL CREATININE-BSD FRML MDRD: 106 ML/MIN/1.73
GLOBULIN UR ELPH-MCNC: 3.1 GM/DL
GLUCOSE BLDC GLUCOMTR-MCNC: 165 MG/DL (ref 70–105)
GLUCOSE BLDC GLUCOMTR-MCNC: 175 MG/DL (ref 70–105)
GLUCOSE BLDC GLUCOMTR-MCNC: 216 MG/DL (ref 70–105)
GLUCOSE SERPL-MCNC: 216 MG/DL (ref 65–99)
HCO3 BLDA-SCNC: 25 MMOL/L (ref 21–28)
HCT VFR BLD AUTO: 37.6 % (ref 34–46.6)
HEMODILUTION: NO
HGB BLD-MCNC: 12.5 G/DL (ref 12–15.9)
INHALED O2 CONCENTRATION: 100 %
LYMPHOCYTES # BLD AUTO: 0.9 10*3/MM3 (ref 0.7–3.1)
LYMPHOCYTES NFR BLD AUTO: 10 % (ref 19.6–45.3)
MAGNESIUM SERPL-MCNC: 2 MG/DL (ref 1.6–2.6)
MCH RBC QN AUTO: 27.6 PG (ref 26.6–33)
MCHC RBC AUTO-ENTMCNC: 33.3 G/DL (ref 31.5–35.7)
MCV RBC AUTO: 83 FL (ref 79–97)
MODALITY: ABNORMAL
MONOCYTES # BLD AUTO: 0.5 10*3/MM3 (ref 0.1–0.9)
MONOCYTES NFR BLD AUTO: 5.6 % (ref 5–12)
NEUTROPHILS NFR BLD AUTO: 7.7 10*3/MM3 (ref 1.7–7)
NEUTROPHILS NFR BLD AUTO: 84.3 % (ref 42.7–76)
NRBC BLD AUTO-RTO: 0.1 /100 WBC (ref 0–0.2)
PCO2 BLDA: 43.3 MM HG (ref 35–48)
PEEP RESPIRATORY: 13 CM[H2O]
PH BLDA: 7.37 PH UNITS (ref 7.35–7.45)
PHOSPHATE SERPL-MCNC: 2.4 MG/DL (ref 2.5–4.5)
PLATELET # BLD AUTO: 252 10*3/MM3 (ref 140–450)
PMV BLD AUTO: 8.2 FL (ref 6–12)
PO2 BLDA: 239.6 MM HG (ref 83–108)
POTASSIUM SERPL-SCNC: 4.1 MMOL/L (ref 3.5–5.2)
PROT SERPL-MCNC: 6.2 G/DL (ref 6–8.5)
RBC # BLD AUTO: 4.52 10*6/MM3 (ref 3.77–5.28)
RESPIRATORY RATE: 28
SAO2 % BLDCOA: 99.8 % (ref 94–98)
SODIUM SERPL-SCNC: 137 MMOL/L (ref 136–145)
VENTILATOR MODE: ABNORMAL
VT ON VENT VENT: 450 ML
WBC # BLD AUTO: 9.1 10*3/MM3 (ref 3.4–10.8)

## 2021-07-24 PROCEDURE — 83735 ASSAY OF MAGNESIUM: CPT | Performed by: NURSE PRACTITIONER

## 2021-07-24 PROCEDURE — 94003 VENT MGMT INPAT SUBQ DAY: CPT

## 2021-07-24 PROCEDURE — 82962 GLUCOSE BLOOD TEST: CPT

## 2021-07-24 PROCEDURE — 82728 ASSAY OF FERRITIN: CPT | Performed by: NURSE PRACTITIONER

## 2021-07-24 PROCEDURE — 85730 THROMBOPLASTIN TIME PARTIAL: CPT | Performed by: NURSE PRACTITIONER

## 2021-07-24 PROCEDURE — 25010000002 MIDAZOLAM 50 MG/10ML SOLUTION: Performed by: NURSE PRACTITIONER

## 2021-07-24 PROCEDURE — 85025 COMPLETE CBC W/AUTO DIFF WBC: CPT | Performed by: NURSE PRACTITIONER

## 2021-07-24 PROCEDURE — 94799 UNLISTED PULMONARY SVC/PX: CPT

## 2021-07-24 PROCEDURE — 86140 C-REACTIVE PROTEIN: CPT | Performed by: NURSE PRACTITIONER

## 2021-07-24 PROCEDURE — 80053 COMPREHEN METABOLIC PANEL: CPT | Performed by: NURSE PRACTITIONER

## 2021-07-24 PROCEDURE — 25010000002 HEPARIN (PORCINE) 25000-0.45 UT/250ML-% SOLUTION: Performed by: INTERNAL MEDICINE

## 2021-07-24 PROCEDURE — 25010000002 FENTANYL CITRATE (PF) 2500 MCG/50ML SOLUTION: Performed by: NURSE PRACTITIONER

## 2021-07-24 PROCEDURE — 82803 BLOOD GASES ANY COMBINATION: CPT

## 2021-07-24 PROCEDURE — 84100 ASSAY OF PHOSPHORUS: CPT | Performed by: NURSE PRACTITIONER

## 2021-07-24 PROCEDURE — 85730 THROMBOPLASTIN TIME PARTIAL: CPT | Performed by: INTERNAL MEDICINE

## 2021-07-24 PROCEDURE — 63710000001 INSULIN LISPRO (HUMAN) PER 5 UNITS: Performed by: INTERNAL MEDICINE

## 2021-07-24 PROCEDURE — 25010000002 EPOPROSTENOL PER 0.5 MG: Performed by: NURSE PRACTITIONER

## 2021-07-24 PROCEDURE — 25010000002 CEFTRIAXONE PER 250 MG: Performed by: NURSE PRACTITIONER

## 2021-07-24 PROCEDURE — 82550 ASSAY OF CK (CPK): CPT | Performed by: NURSE PRACTITIONER

## 2021-07-24 PROCEDURE — 25010000002 DEXAMETHASONE PER 1 MG: Performed by: NURSE PRACTITIONER

## 2021-07-24 RX ORDER — FAMOTIDINE 20 MG/1
20 TABLET, FILM COATED ORAL DAILY
Status: DISCONTINUED | OUTPATIENT
Start: 2021-07-25 | End: 2021-08-03

## 2021-07-24 RX ADMIN — Medication 2000 UNITS: at 11:01

## 2021-07-24 RX ADMIN — FAMOTIDINE 20 MG: 20 TABLET ORAL at 11:01

## 2021-07-24 RX ADMIN — MIDAZOLAM 10 MG/HR: 5 INJECTION INTRAMUSCULAR; INTRAVENOUS at 04:36

## 2021-07-24 RX ADMIN — MINERAL OIL AND WHITE PETROLATUM 1 EACH: 150; 830 OINTMENT OPHTHALMIC at 11:02

## 2021-07-24 RX ADMIN — INSULIN LISPRO 5 UNITS: 100 INJECTION, SOLUTION INTRAVENOUS; SUBCUTANEOUS at 05:35

## 2021-07-24 RX ADMIN — VECURONIUM BROMIDE 0.6 MCG/KG/MIN: 1 INJECTION, POWDER, LYOPHILIZED, FOR SOLUTION INTRAVENOUS at 18:22

## 2021-07-24 RX ADMIN — ALBUTEROL SULFATE 6 PUFF: 90 AEROSOL, METERED RESPIRATORY (INHALATION) at 00:17

## 2021-07-24 RX ADMIN — Medication 10 ML: at 11:02

## 2021-07-24 RX ADMIN — MIDAZOLAM 10 MG/HR: 5 INJECTION INTRAMUSCULAR; INTRAVENOUS at 11:11

## 2021-07-24 RX ADMIN — ALBUTEROL SULFATE 6 PUFF: 90 AEROSOL, METERED RESPIRATORY (INHALATION) at 23:37

## 2021-07-24 RX ADMIN — MINERAL OIL AND WHITE PETROLATUM: 150; 830 OINTMENT OPHTHALMIC at 00:36

## 2021-07-24 RX ADMIN — INSULIN LISPRO 3 UNITS: 100 INJECTION, SOLUTION INTRAVENOUS; SUBCUTANEOUS at 11:21

## 2021-07-24 RX ADMIN — ALBUTEROL SULFATE 6 PUFF: 90 AEROSOL, METERED RESPIRATORY (INHALATION) at 10:53

## 2021-07-24 RX ADMIN — EPOPROSTENOL 50 NG/KG/MIN: 1.5 INJECTION, POWDER, LYOPHILIZED, FOR SOLUTION INTRAVENOUS at 12:05

## 2021-07-24 RX ADMIN — HEPARIN SODIUM 10 UNITS/KG/HR: 10000 INJECTION, SOLUTION INTRAVENOUS at 11:02

## 2021-07-24 RX ADMIN — GUAIFENESIN 400 MG: 100 SOLUTION ORAL at 18:21

## 2021-07-24 RX ADMIN — Medication 600 MG: at 11:01

## 2021-07-24 RX ADMIN — FENTANYL CITRATE 180 MCG/HR: 0.05 INJECTION, SOLUTION INTRAMUSCULAR; INTRAVENOUS at 13:13

## 2021-07-24 RX ADMIN — ALBUTEROL SULFATE 6 PUFF: 90 AEROSOL, METERED RESPIRATORY (INHALATION) at 15:15

## 2021-07-24 RX ADMIN — MIDAZOLAM 10 MG/HR: 5 INJECTION INTRAMUSCULAR; INTRAVENOUS at 00:09

## 2021-07-24 RX ADMIN — FENTANYL CITRATE 200 MCG/HR: 0.05 INJECTION, SOLUTION INTRAMUSCULAR; INTRAVENOUS at 02:25

## 2021-07-24 RX ADMIN — DEXAMETHASONE SODIUM PHOSPHATE 6 MG: 4 INJECTION, SOLUTION INTRAMUSCULAR; INTRAVENOUS at 16:05

## 2021-07-24 RX ADMIN — BUDESONIDE AND FORMOTEROL FUMARATE DIHYDRATE 2 PUFF: 160; 4.5 AEROSOL RESPIRATORY (INHALATION) at 07:09

## 2021-07-24 RX ADMIN — OXYCODONE HYDROCHLORIDE AND ACETAMINOPHEN 500 MG: 500 TABLET ORAL at 11:01

## 2021-07-24 RX ADMIN — ALBUTEROL SULFATE 6 PUFF: 90 AEROSOL, METERED RESPIRATORY (INHALATION) at 02:20

## 2021-07-24 RX ADMIN — WATER 1 G: 1000 INJECTION, SOLUTION INTRAVENOUS at 17:29

## 2021-07-24 RX ADMIN — REMDESIVIR 100 MG: 100 INJECTION, POWDER, LYOPHILIZED, FOR SOLUTION INTRAVENOUS at 16:05

## 2021-07-24 RX ADMIN — ALBUTEROL SULFATE 6 PUFF: 90 AEROSOL, METERED RESPIRATORY (INHALATION) at 19:48

## 2021-07-24 RX ADMIN — GUAIFENESIN 400 MG: 100 SOLUTION ORAL at 11:01

## 2021-07-24 RX ADMIN — GUAIFENESIN 400 MG: 100 SOLUTION ORAL at 23:28

## 2021-07-24 RX ADMIN — MINERAL OIL AND WHITE PETROLATUM: 150; 830 OINTMENT OPHTHALMIC at 04:21

## 2021-07-24 RX ADMIN — DOCUSATE SODIUM 100 MG: 50 LIQUID ORAL at 11:01

## 2021-07-24 RX ADMIN — INSULIN LISPRO 5 UNITS: 100 INJECTION, SOLUTION INTRAVENOUS; SUBCUTANEOUS at 23:29

## 2021-07-24 RX ADMIN — MINERAL OIL AND WHITE PETROLATUM: 150; 830 OINTMENT OPHTHALMIC at 23:43

## 2021-07-24 RX ADMIN — SODIUM CHLORIDE 100 ML/HR: 9 INJECTION, SOLUTION INTRAVENOUS at 07:39

## 2021-07-24 RX ADMIN — VECURONIUM BROMIDE 0.6 MCG/KG/MIN: 1 INJECTION, POWDER, LYOPHILIZED, FOR SOLUTION INTRAVENOUS at 07:17

## 2021-07-24 RX ADMIN — ALBUTEROL SULFATE 6 PUFF: 90 AEROSOL, METERED RESPIRATORY (INHALATION) at 07:09

## 2021-07-24 RX ADMIN — FENTANYL CITRATE 200 MCG/HR: 0.05 INJECTION, SOLUTION INTRAMUSCULAR; INTRAVENOUS at 07:18

## 2021-07-24 RX ADMIN — FENTANYL CITRATE 160 MCG/HR: 0.05 INJECTION, SOLUTION INTRAMUSCULAR; INTRAVENOUS at 18:22

## 2021-07-24 RX ADMIN — INSULIN LISPRO 3 UNITS: 100 INJECTION, SOLUTION INTRAVENOUS; SUBCUTANEOUS at 17:29

## 2021-07-24 RX ADMIN — DEXAMETHASONE SODIUM PHOSPHATE 6 MG: 4 INJECTION, SOLUTION INTRAMUSCULAR; INTRAVENOUS at 11:02

## 2021-07-24 RX ADMIN — DEXAMETHASONE SODIUM PHOSPHATE 6 MG: 4 INJECTION, SOLUTION INTRAMUSCULAR; INTRAVENOUS at 23:28

## 2021-07-24 RX ADMIN — ASPIRIN 324 MG: 81 TABLET, CHEWABLE ORAL at 11:02

## 2021-07-24 RX ADMIN — EPOPROSTENOL 50 NG/KG/MIN: 1.5 INJECTION, POWDER, LYOPHILIZED, FOR SOLUTION INTRAVENOUS at 21:40

## 2021-07-24 RX ADMIN — MINERAL OIL AND WHITE PETROLATUM: 150; 830 OINTMENT OPHTHALMIC at 19:54

## 2021-07-24 RX ADMIN — ZINC SULFATE 220 MG (50 MG) CAPSULE 220 MG: CAPSULE at 11:02

## 2021-07-24 RX ADMIN — DOCUSATE SODIUM 100 MG: 50 LIQUID ORAL at 21:33

## 2021-07-24 RX ADMIN — GUAIFENESIN 400 MG: 100 SOLUTION ORAL at 05:29

## 2021-07-24 RX ADMIN — Medication 600 MG: at 21:33

## 2021-07-24 RX ADMIN — HEPARIN SODIUM 10 UNITS/KG/HR: 10000 INJECTION, SOLUTION INTRAVENOUS at 23:43

## 2021-07-24 RX ADMIN — Medication 10 ML: at 21:33

## 2021-07-24 RX ADMIN — MIDAZOLAM 8 MG/HR: 5 INJECTION INTRAMUSCULAR; INTRAVENOUS at 16:04

## 2021-07-25 ENCOUNTER — APPOINTMENT (OUTPATIENT)
Dept: GENERAL RADIOLOGY | Facility: HOSPITAL | Age: 31
End: 2021-07-25

## 2021-07-25 LAB
ALBUMIN SERPL-MCNC: 3 G/DL (ref 3.5–5.2)
ALBUMIN/GLOB SERPL: 1 G/DL
ALP SERPL-CCNC: 64 U/L (ref 39–117)
ALT SERPL W P-5'-P-CCNC: 32 U/L (ref 1–33)
ANION GAP SERPL CALCULATED.3IONS-SCNC: 10 MMOL/L (ref 5–15)
APTT PPP: 66.6 SECONDS (ref 61–76.5)
ARTERIAL PATENCY WRIST A: ABNORMAL
AST SERPL-CCNC: 46 U/L (ref 1–32)
ATMOSPHERIC PRESS: ABNORMAL MM[HG]
BACTERIA SPEC RESP CULT: NORMAL
BASE EXCESS BLDA CALC-SCNC: -0.6 MMOL/L (ref 0–3)
BASOPHILS # BLD AUTO: 0 10*3/MM3 (ref 0–0.2)
BASOPHILS NFR BLD AUTO: 0.1 % (ref 0–1.5)
BDY SITE: ABNORMAL
BILIRUB SERPL-MCNC: 0.3 MG/DL (ref 0–1.2)
BUN SERPL-MCNC: 20 MG/DL (ref 6–20)
BUN/CREAT SERPL: 29.4 (ref 7–25)
CALCIUM SPEC-SCNC: 8.4 MG/DL (ref 8.6–10.5)
CHLORIDE SERPL-SCNC: 107 MMOL/L (ref 98–107)
CK SERPL-CCNC: 1504 U/L (ref 20–180)
CO2 BLDA-SCNC: 26.9 MMOL/L (ref 22–29)
CO2 SERPL-SCNC: 24 MMOL/L (ref 22–29)
CREAT SERPL-MCNC: 0.68 MG/DL (ref 0.57–1)
CRP SERPL-MCNC: 1.27 MG/DL (ref 0–0.5)
D DIMER PPP FEU-MCNC: 0.86 MG/L (FEU) (ref 0–0.59)
DEPRECATED RDW RBC AUTO: 42.9 FL (ref 37–54)
EOSINOPHIL # BLD AUTO: 0 10*3/MM3 (ref 0–0.4)
EOSINOPHIL NFR BLD AUTO: 0 % (ref 0.3–6.2)
ERYTHROCYTE [DISTWIDTH] IN BLOOD BY AUTOMATED COUNT: 14.3 % (ref 12.3–15.4)
FERRITIN SERPL-MCNC: 321.9 NG/ML (ref 13–150)
FIBRINOGEN PPP-MCNC: 570 MG/DL (ref 210–450)
GFR SERPL CREATININE-BSD FRML MDRD: 101 ML/MIN/1.73
GLOBULIN UR ELPH-MCNC: 2.9 GM/DL
GLUCOSE BLDC GLUCOMTR-MCNC: 178 MG/DL (ref 70–105)
GLUCOSE BLDC GLUCOMTR-MCNC: 201 MG/DL (ref 70–105)
GLUCOSE BLDC GLUCOMTR-MCNC: 212 MG/DL (ref 70–105)
GLUCOSE SERPL-MCNC: 221 MG/DL (ref 65–99)
GRAM STN SPEC: NORMAL
GRAM STN SPEC: NORMAL
HCO3 BLDA-SCNC: 25.5 MMOL/L (ref 21–28)
HCT VFR BLD AUTO: 35.3 % (ref 34–46.6)
HEMODILUTION: NO
HGB BLD-MCNC: 11.7 G/DL (ref 12–15.9)
INHALED O2 CONCENTRATION: 100 %
INR PPP: 1.11 (ref 0.93–1.1)
LDH SERPL-CCNC: 426 U/L (ref 135–214)
LYMPHOCYTES # BLD AUTO: 0.5 10*3/MM3 (ref 0.7–3.1)
LYMPHOCYTES NFR BLD AUTO: 6.2 % (ref 19.6–45.3)
MAGNESIUM SERPL-MCNC: 2.1 MG/DL (ref 1.6–2.6)
MCH RBC QN AUTO: 27.8 PG (ref 26.6–33)
MCHC RBC AUTO-ENTMCNC: 33.1 G/DL (ref 31.5–35.7)
MCV RBC AUTO: 84.1 FL (ref 79–97)
MODALITY: ABNORMAL
MONOCYTES # BLD AUTO: 0.5 10*3/MM3 (ref 0.1–0.9)
MONOCYTES NFR BLD AUTO: 6.3 % (ref 5–12)
NEUTROPHILS NFR BLD AUTO: 7 10*3/MM3 (ref 1.7–7)
NEUTROPHILS NFR BLD AUTO: 87.4 % (ref 42.7–76)
NRBC BLD AUTO-RTO: 0 /100 WBC (ref 0–0.2)
NT-PROBNP SERPL-MCNC: 143.6 PG/ML (ref 0–450)
PCO2 BLDA: 46.6 MM HG (ref 35–48)
PEEP RESPIRATORY: 13 CM[H2O]
PH BLDA: 7.35 PH UNITS (ref 7.35–7.45)
PHOSPHATE SERPL-MCNC: 2.7 MG/DL (ref 2.5–4.5)
PLATELET # BLD AUTO: 247 10*3/MM3 (ref 140–450)
PMV BLD AUTO: 8.1 FL (ref 6–12)
PO2 BLDA: 85.6 MM HG (ref 83–108)
POTASSIUM SERPL-SCNC: 4.5 MMOL/L (ref 3.5–5.2)
PROT SERPL-MCNC: 5.9 G/DL (ref 6–8.5)
PROTHROMBIN TIME: 12.2 SECONDS (ref 9.6–11.7)
RBC # BLD AUTO: 4.2 10*6/MM3 (ref 3.77–5.28)
RESPIRATORY RATE: 28
SAO2 % BLDCOA: 95.8 % (ref 94–98)
SODIUM SERPL-SCNC: 141 MMOL/L (ref 136–145)
TROPONIN T SERPL-MCNC: <0.01 NG/ML (ref 0–0.03)
VENTILATOR MODE: ABNORMAL
VT ON VENT VENT: 450 ML
WBC # BLD AUTO: 8.1 10*3/MM3 (ref 3.4–10.8)

## 2021-07-25 PROCEDURE — 85730 THROMBOPLASTIN TIME PARTIAL: CPT | Performed by: INTERNAL MEDICINE

## 2021-07-25 PROCEDURE — 80053 COMPREHEN METABOLIC PANEL: CPT | Performed by: NURSE PRACTITIONER

## 2021-07-25 PROCEDURE — 83880 ASSAY OF NATRIURETIC PEPTIDE: CPT | Performed by: NURSE PRACTITIONER

## 2021-07-25 PROCEDURE — 86140 C-REACTIVE PROTEIN: CPT | Performed by: NURSE PRACTITIONER

## 2021-07-25 PROCEDURE — 94799 UNLISTED PULMONARY SVC/PX: CPT

## 2021-07-25 PROCEDURE — 83615 LACTATE (LD) (LDH) ENZYME: CPT | Performed by: NURSE PRACTITIONER

## 2021-07-25 PROCEDURE — 85384 FIBRINOGEN ACTIVITY: CPT | Performed by: NURSE PRACTITIONER

## 2021-07-25 PROCEDURE — 25010000002 DEXAMETHASONE PER 1 MG: Performed by: NURSE PRACTITIONER

## 2021-07-25 PROCEDURE — 82803 BLOOD GASES ANY COMBINATION: CPT

## 2021-07-25 PROCEDURE — 25010000002 EPOPROSTENOL PER 0.5 MG: Performed by: NURSE PRACTITIONER

## 2021-07-25 PROCEDURE — 25010000002 FENTANYL CITRATE (PF) 2500 MCG/50ML SOLUTION: Performed by: NURSE PRACTITIONER

## 2021-07-25 PROCEDURE — 25010000002 MIDAZOLAM 50 MG/10ML SOLUTION: Performed by: NURSE PRACTITIONER

## 2021-07-25 PROCEDURE — 94003 VENT MGMT INPAT SUBQ DAY: CPT

## 2021-07-25 PROCEDURE — 84484 ASSAY OF TROPONIN QUANT: CPT | Performed by: NURSE PRACTITIONER

## 2021-07-25 PROCEDURE — 82550 ASSAY OF CK (CPK): CPT | Performed by: NURSE PRACTITIONER

## 2021-07-25 PROCEDURE — 25010000002 HEPARIN (PORCINE) 25000-0.45 UT/250ML-% SOLUTION: Performed by: INTERNAL MEDICINE

## 2021-07-25 PROCEDURE — 82962 GLUCOSE BLOOD TEST: CPT

## 2021-07-25 PROCEDURE — 63710000001 INSULIN LISPRO (HUMAN) PER 5 UNITS: Performed by: INTERNAL MEDICINE

## 2021-07-25 PROCEDURE — 84100 ASSAY OF PHOSPHORUS: CPT | Performed by: NURSE PRACTITIONER

## 2021-07-25 PROCEDURE — 25010000002 PIPERACILLIN SOD-TAZOBACTAM PER 1 G: Performed by: INTERNAL MEDICINE

## 2021-07-25 PROCEDURE — 63710000001 INSULIN GLARGINE PER 5 UNITS: Performed by: NURSE PRACTITIONER

## 2021-07-25 PROCEDURE — 85610 PROTHROMBIN TIME: CPT | Performed by: NURSE PRACTITIONER

## 2021-07-25 PROCEDURE — 83735 ASSAY OF MAGNESIUM: CPT | Performed by: NURSE PRACTITIONER

## 2021-07-25 PROCEDURE — 85379 FIBRIN DEGRADATION QUANT: CPT | Performed by: NURSE PRACTITIONER

## 2021-07-25 PROCEDURE — 82728 ASSAY OF FERRITIN: CPT | Performed by: NURSE PRACTITIONER

## 2021-07-25 PROCEDURE — 85025 COMPLETE CBC W/AUTO DIFF WBC: CPT | Performed by: NURSE PRACTITIONER

## 2021-07-25 PROCEDURE — 71045 X-RAY EXAM CHEST 1 VIEW: CPT

## 2021-07-25 RX ORDER — GLYCOPYRROLATE 0.2 MG/ML
0.1 INJECTION INTRAMUSCULAR; INTRAVENOUS EVERY 8 HOURS PRN
Status: DISCONTINUED | OUTPATIENT
Start: 2021-07-25 | End: 2021-07-25

## 2021-07-25 RX ORDER — INSULIN GLARGINE 100 [IU]/ML
10 INJECTION, SOLUTION SUBCUTANEOUS EVERY 12 HOURS SCHEDULED
Status: DISCONTINUED | OUTPATIENT
Start: 2021-07-25 | End: 2021-07-26

## 2021-07-25 RX ORDER — INSULIN LISPRO 100 [IU]/ML
0-24 INJECTION, SOLUTION INTRAVENOUS; SUBCUTANEOUS EVERY 6 HOURS SCHEDULED
Status: DISCONTINUED | OUTPATIENT
Start: 2021-07-25 | End: 2021-08-12

## 2021-07-25 RX ORDER — INSULIN LISPRO 100 [IU]/ML
0-24 INJECTION, SOLUTION INTRAVENOUS; SUBCUTANEOUS AS NEEDED
Status: DISCONTINUED | OUTPATIENT
Start: 2021-07-25 | End: 2021-08-12

## 2021-07-25 RX ORDER — LABETALOL HYDROCHLORIDE 5 MG/ML
10 INJECTION, SOLUTION INTRAVENOUS EVERY 4 HOURS PRN
Status: DISCONTINUED | OUTPATIENT
Start: 2021-07-25 | End: 2021-07-29

## 2021-07-25 RX ADMIN — EPOPROSTENOL 50 NG/KG/MIN: 1.5 INJECTION, POWDER, LYOPHILIZED, FOR SOLUTION INTRAVENOUS at 14:50

## 2021-07-25 RX ADMIN — REMDESIVIR 100 MG: 100 INJECTION, POWDER, LYOPHILIZED, FOR SOLUTION INTRAVENOUS at 16:29

## 2021-07-25 RX ADMIN — INSULIN LISPRO 8 UNITS: 100 INJECTION, SOLUTION INTRAVENOUS; SUBCUTANEOUS at 12:45

## 2021-07-25 RX ADMIN — GUAIFENESIN 400 MG: 100 SOLUTION ORAL at 05:03

## 2021-07-25 RX ADMIN — VECURONIUM BROMIDE 0.5 MCG/KG/MIN: 1 INJECTION, POWDER, LYOPHILIZED, FOR SOLUTION INTRAVENOUS at 12:23

## 2021-07-25 RX ADMIN — Medication 10 ML: at 08:19

## 2021-07-25 RX ADMIN — LABETALOL 20 MG/4 ML (5 MG/ML) INTRAVENOUS SYRINGE 10 MG: at 02:19

## 2021-07-25 RX ADMIN — Medication 600 MG: at 20:45

## 2021-07-25 RX ADMIN — DEXAMETHASONE SODIUM PHOSPHATE 6 MG: 4 INJECTION, SOLUTION INTRAMUSCULAR; INTRAVENOUS at 08:19

## 2021-07-25 RX ADMIN — FENTANYL CITRATE 200 MCG/HR: 0.05 INJECTION, SOLUTION INTRAMUSCULAR; INTRAVENOUS at 15:17

## 2021-07-25 RX ADMIN — DEXAMETHASONE SODIUM PHOSPHATE 6 MG: 4 INJECTION, SOLUTION INTRAMUSCULAR; INTRAVENOUS at 23:27

## 2021-07-25 RX ADMIN — ALBUTEROL SULFATE 6 PUFF: 90 AEROSOL, METERED RESPIRATORY (INHALATION) at 11:31

## 2021-07-25 RX ADMIN — DEXAMETHASONE SODIUM PHOSPHATE 6 MG: 4 INJECTION, SOLUTION INTRAMUSCULAR; INTRAVENOUS at 15:34

## 2021-07-25 RX ADMIN — FAMOTIDINE 20 MG: 20 TABLET ORAL at 08:18

## 2021-07-25 RX ADMIN — MINERAL OIL AND WHITE PETROLATUM: 150; 830 OINTMENT OPHTHALMIC at 03:04

## 2021-07-25 RX ADMIN — MINERAL OIL AND WHITE PETROLATUM: 150; 830 OINTMENT OPHTHALMIC at 23:29

## 2021-07-25 RX ADMIN — GLYCOPYRROLATE 0.1 MG: 0.2 INJECTION, SOLUTION INTRAMUSCULAR; INTRAVITREAL at 12:27

## 2021-07-25 RX ADMIN — MIDAZOLAM 8 MG/HR: 5 INJECTION INTRAMUSCULAR; INTRAVENOUS at 15:17

## 2021-07-25 RX ADMIN — PIPERACILLIN AND TAZOBACTAM 4.5 G: 4; .5 INJECTION, POWDER, LYOPHILIZED, FOR SOLUTION INTRAVENOUS; PARENTERAL at 12:23

## 2021-07-25 RX ADMIN — INSULIN GLARGINE 10 UNITS: 100 INJECTION, SOLUTION SUBCUTANEOUS at 20:45

## 2021-07-25 RX ADMIN — Medication 2000 UNITS: at 08:18

## 2021-07-25 RX ADMIN — HEPARIN SODIUM 10 UNITS/KG/HR: 10000 INJECTION, SOLUTION INTRAVENOUS at 15:18

## 2021-07-25 RX ADMIN — INSULIN LISPRO 8 UNITS: 100 INJECTION, SOLUTION INTRAVENOUS; SUBCUTANEOUS at 18:18

## 2021-07-25 RX ADMIN — INSULIN LISPRO 5 UNITS: 100 INJECTION, SOLUTION INTRAVENOUS; SUBCUTANEOUS at 05:16

## 2021-07-25 RX ADMIN — INSULIN LISPRO 4 UNITS: 100 INJECTION, SOLUTION INTRAVENOUS; SUBCUTANEOUS at 23:27

## 2021-07-25 RX ADMIN — ZINC SULFATE 220 MG (50 MG) CAPSULE 220 MG: CAPSULE at 08:18

## 2021-07-25 RX ADMIN — MIDAZOLAM 6 MG/HR: 5 INJECTION INTRAMUSCULAR; INTRAVENOUS at 05:05

## 2021-07-25 RX ADMIN — FENTANYL CITRATE 150 MCG/HR: 0.05 INJECTION, SOLUTION INTRAMUSCULAR; INTRAVENOUS at 07:53

## 2021-07-25 RX ADMIN — LABETALOL 20 MG/4 ML (5 MG/ML) INTRAVENOUS SYRINGE 10 MG: at 12:49

## 2021-07-25 RX ADMIN — Medication 10 ML: at 20:48

## 2021-07-25 RX ADMIN — GUAIFENESIN 400 MG: 100 SOLUTION ORAL at 23:27

## 2021-07-25 RX ADMIN — ALBUTEROL SULFATE 6 PUFF: 90 AEROSOL, METERED RESPIRATORY (INHALATION) at 14:45

## 2021-07-25 RX ADMIN — ALBUTEROL SULFATE 6 PUFF: 90 AEROSOL, METERED RESPIRATORY (INHALATION) at 23:34

## 2021-07-25 RX ADMIN — FENTANYL CITRATE 160 MCG/HR: 0.05 INJECTION, SOLUTION INTRAMUSCULAR; INTRAVENOUS at 00:41

## 2021-07-25 RX ADMIN — EPOPROSTENOL 50 NG/KG/MIN: 1.5 INJECTION, POWDER, LYOPHILIZED, FOR SOLUTION INTRAVENOUS at 07:32

## 2021-07-25 RX ADMIN — FENTANYL CITRATE 200 MCG/HR: 0.05 INJECTION, SOLUTION INTRAMUSCULAR; INTRAVENOUS at 20:45

## 2021-07-25 RX ADMIN — VECURONIUM BROMIDE 0.9 MCG/KG/MIN: 1 INJECTION, POWDER, LYOPHILIZED, FOR SOLUTION INTRAVENOUS at 23:20

## 2021-07-25 RX ADMIN — INSULIN GLARGINE 10 UNITS: 100 INJECTION, SOLUTION SUBCUTANEOUS at 12:23

## 2021-07-25 RX ADMIN — METOPROLOL TARTRATE 25 MG: 25 TABLET, FILM COATED ORAL at 20:51

## 2021-07-25 RX ADMIN — ALBUTEROL SULFATE 6 PUFF: 90 AEROSOL, METERED RESPIRATORY (INHALATION) at 02:48

## 2021-07-25 RX ADMIN — DOCUSATE SODIUM 100 MG: 50 LIQUID ORAL at 20:45

## 2021-07-25 RX ADMIN — ACETAMINOPHEN 650 MG: 650 SUPPOSITORY RECTAL at 20:45

## 2021-07-25 RX ADMIN — PIPERACILLIN AND TAZOBACTAM 4.5 G: 4; .5 INJECTION, POWDER, LYOPHILIZED, FOR SOLUTION INTRAVENOUS; PARENTERAL at 18:17

## 2021-07-25 RX ADMIN — VECURONIUM BROMIDE 0.6 MCG/KG/MIN: 1 INJECTION, POWDER, LYOPHILIZED, FOR SOLUTION INTRAVENOUS at 03:08

## 2021-07-25 RX ADMIN — MINERAL OIL AND WHITE PETROLATUM: 150; 830 OINTMENT OPHTHALMIC at 20:45

## 2021-07-25 RX ADMIN — LABETALOL 20 MG/4 ML (5 MG/ML) INTRAVENOUS SYRINGE 10 MG: at 08:20

## 2021-07-25 RX ADMIN — METOPROLOL TARTRATE 25 MG: 25 TABLET, FILM COATED ORAL at 15:20

## 2021-07-25 RX ADMIN — ALBUTEROL SULFATE 6 PUFF: 90 AEROSOL, METERED RESPIRATORY (INHALATION) at 19:52

## 2021-07-25 RX ADMIN — GUAIFENESIN 400 MG: 100 SOLUTION ORAL at 12:22

## 2021-07-25 RX ADMIN — Medication 600 MG: at 08:19

## 2021-07-25 RX ADMIN — ALBUTEROL SULFATE 6 PUFF: 90 AEROSOL, METERED RESPIRATORY (INHALATION) at 07:28

## 2021-07-25 RX ADMIN — DOCUSATE SODIUM 100 MG: 50 LIQUID ORAL at 08:18

## 2021-07-25 RX ADMIN — GUAIFENESIN 400 MG: 100 SOLUTION ORAL at 18:17

## 2021-07-25 RX ADMIN — EPOPROSTENOL 50 NG/KG/MIN: 1.5 INJECTION, POWDER, LYOPHILIZED, FOR SOLUTION INTRAVENOUS at 23:50

## 2021-07-26 LAB
ALBUMIN SERPL-MCNC: 3 G/DL (ref 3.5–5.2)
ALBUMIN/GLOB SERPL: 1 G/DL
ALP SERPL-CCNC: 64 U/L (ref 39–117)
ALT SERPL W P-5'-P-CCNC: 39 U/L (ref 1–33)
ANION GAP SERPL CALCULATED.3IONS-SCNC: 6 MMOL/L (ref 5–15)
APTT PPP: 58.1 SECONDS (ref 61–76.5)
ARTERIAL PATENCY WRIST A: ABNORMAL
ARTERIAL PATENCY WRIST A: ABNORMAL
AST SERPL-CCNC: 65 U/L (ref 1–32)
ATMOSPHERIC PRESS: ABNORMAL MM[HG]
ATMOSPHERIC PRESS: ABNORMAL MM[HG]
BASE EXCESS BLDA CALC-SCNC: 2.6 MMOL/L (ref 0–3)
BASE EXCESS BLDA CALC-SCNC: 3.7 MMOL/L (ref 0–3)
BASOPHILS # BLD AUTO: 0 10*3/MM3 (ref 0–0.2)
BASOPHILS NFR BLD AUTO: 0.1 % (ref 0–1.5)
BDY SITE: ABNORMAL
BDY SITE: ABNORMAL
BILIRUB SERPL-MCNC: 0.4 MG/DL (ref 0–1.2)
BUN SERPL-MCNC: 19 MG/DL (ref 6–20)
BUN/CREAT SERPL: 30.2 (ref 7–25)
CALCIUM SPEC-SCNC: 8.5 MG/DL (ref 8.6–10.5)
CHLORIDE SERPL-SCNC: 109 MMOL/L (ref 98–107)
CK SERPL-CCNC: 1877 U/L (ref 20–180)
CO2 BLDA-SCNC: 31.6 MMOL/L (ref 22–29)
CO2 BLDA-SCNC: 32.9 MMOL/L (ref 22–29)
CO2 SERPL-SCNC: 28 MMOL/L (ref 22–29)
CREAT SERPL-MCNC: 0.63 MG/DL (ref 0.57–1)
CRP SERPL-MCNC: 0.81 MG/DL (ref 0–0.5)
DEPRECATED RDW RBC AUTO: 44.6 FL (ref 37–54)
EOSINOPHIL # BLD AUTO: 0 10*3/MM3 (ref 0–0.4)
EOSINOPHIL NFR BLD AUTO: 0 % (ref 0.3–6.2)
ERYTHROCYTE [DISTWIDTH] IN BLOOD BY AUTOMATED COUNT: 14.8 % (ref 12.3–15.4)
FERRITIN SERPL-MCNC: 298.5 NG/ML (ref 13–150)
GFR SERPL CREATININE-BSD FRML MDRD: 110 ML/MIN/1.73
GLOBULIN UR ELPH-MCNC: 3 GM/DL
GLUCOSE BLDC GLUCOMTR-MCNC: 175 MG/DL (ref 70–105)
GLUCOSE BLDC GLUCOMTR-MCNC: 188 MG/DL (ref 70–105)
GLUCOSE BLDC GLUCOMTR-MCNC: 207 MG/DL (ref 70–105)
GLUCOSE BLDC GLUCOMTR-MCNC: 219 MG/DL (ref 70–105)
GLUCOSE SERPL-MCNC: 221 MG/DL (ref 65–99)
HCO3 BLDA-SCNC: 29.8 MMOL/L (ref 21–28)
HCO3 BLDA-SCNC: 31.1 MMOL/L (ref 21–28)
HCT VFR BLD AUTO: 34.9 % (ref 34–46.6)
HEMODILUTION: NO
HEMODILUTION: NO
HGB BLD-MCNC: 11.3 G/DL (ref 12–15.9)
INHALED O2 CONCENTRATION: 70 %
INHALED O2 CONCENTRATION: 80 %
LYMPHOCYTES # BLD AUTO: 0.4 10*3/MM3 (ref 0.7–3.1)
LYMPHOCYTES NFR BLD AUTO: 4.4 % (ref 19.6–45.3)
MAGNESIUM SERPL-MCNC: 2.2 MG/DL (ref 1.6–2.6)
MCH RBC QN AUTO: 28 PG (ref 26.6–33)
MCHC RBC AUTO-ENTMCNC: 32.5 G/DL (ref 31.5–35.7)
MCV RBC AUTO: 86 FL (ref 79–97)
MODALITY: ABNORMAL
MODALITY: ABNORMAL
MONOCYTES # BLD AUTO: 0.6 10*3/MM3 (ref 0.1–0.9)
MONOCYTES NFR BLD AUTO: 6.7 % (ref 5–12)
NEUTROPHILS NFR BLD AUTO: 8 10*3/MM3 (ref 1.7–7)
NEUTROPHILS NFR BLD AUTO: 88.8 % (ref 42.7–76)
NRBC BLD AUTO-RTO: 0 /100 WBC (ref 0–0.2)
PCO2 BLDA: 57.4 MM HG (ref 35–48)
PCO2 BLDA: 60.5 MM HG (ref 35–48)
PEEP RESPIRATORY: 16 CM[H2O]
PEEP RESPIRATORY: 16 CM[H2O]
PH BLDA: 7.32 PH UNITS (ref 7.35–7.45)
PH BLDA: 7.32 PH UNITS (ref 7.35–7.45)
PHOSPHATE SERPL-MCNC: 2.8 MG/DL (ref 2.5–4.5)
PLATELET # BLD AUTO: 263 10*3/MM3 (ref 140–450)
PMV BLD AUTO: 7.9 FL (ref 6–12)
PO2 BLDA: 137.4 MM HG (ref 83–108)
PO2 BLDA: 74.7 MM HG (ref 83–108)
POTASSIUM SERPL-SCNC: 5 MMOL/L (ref 3.5–5.2)
PROT SERPL-MCNC: 6 G/DL (ref 6–8.5)
RBC # BLD AUTO: 4.06 10*6/MM3 (ref 3.77–5.28)
RESPIRATORY RATE: 28
RESPIRATORY RATE: 30
SAO2 % BLDCOA: 93 % (ref 94–98)
SAO2 % BLDCOA: 98.8 % (ref 94–98)
SODIUM SERPL-SCNC: 143 MMOL/L (ref 136–145)
VENTILATOR MODE: ABNORMAL
VENTILATOR MODE: ABNORMAL
VT ON VENT VENT: 450 ML
VT ON VENT VENT: 450 ML
WBC # BLD AUTO: 9 10*3/MM3 (ref 3.4–10.8)

## 2021-07-26 PROCEDURE — 85730 THROMBOPLASTIN TIME PARTIAL: CPT | Performed by: INTERNAL MEDICINE

## 2021-07-26 PROCEDURE — 63710000001 INSULIN GLARGINE PER 5 UNITS: Performed by: INTERNAL MEDICINE

## 2021-07-26 PROCEDURE — 94799 UNLISTED PULMONARY SVC/PX: CPT

## 2021-07-26 PROCEDURE — 82803 BLOOD GASES ANY COMBINATION: CPT

## 2021-07-26 PROCEDURE — 25010000002 PIPERACILLIN SOD-TAZOBACTAM PER 1 G: Performed by: INTERNAL MEDICINE

## 2021-07-26 PROCEDURE — 25010000002 MIDAZOLAM 50 MG/10ML SOLUTION: Performed by: NURSE PRACTITIONER

## 2021-07-26 PROCEDURE — 94003 VENT MGMT INPAT SUBQ DAY: CPT

## 2021-07-26 PROCEDURE — 25010000002 HEPARIN (PORCINE) 25000-0.45 UT/250ML-% SOLUTION: Performed by: INTERNAL MEDICINE

## 2021-07-26 PROCEDURE — 84100 ASSAY OF PHOSPHORUS: CPT | Performed by: NURSE PRACTITIONER

## 2021-07-26 PROCEDURE — 63710000001 INSULIN LISPRO (HUMAN) PER 5 UNITS: Performed by: INTERNAL MEDICINE

## 2021-07-26 PROCEDURE — 82728 ASSAY OF FERRITIN: CPT | Performed by: NURSE PRACTITIONER

## 2021-07-26 PROCEDURE — 25010000002 DEXAMETHASONE PER 1 MG: Performed by: NURSE PRACTITIONER

## 2021-07-26 PROCEDURE — 83735 ASSAY OF MAGNESIUM: CPT | Performed by: NURSE PRACTITIONER

## 2021-07-26 PROCEDURE — 86140 C-REACTIVE PROTEIN: CPT | Performed by: NURSE PRACTITIONER

## 2021-07-26 PROCEDURE — 63710000001 INSULIN GLARGINE PER 5 UNITS: Performed by: NURSE PRACTITIONER

## 2021-07-26 PROCEDURE — 25010000002 DEXAMETHASONE PER 1 MG: Performed by: INTERNAL MEDICINE

## 2021-07-26 PROCEDURE — 85025 COMPLETE CBC W/AUTO DIFF WBC: CPT | Performed by: NURSE PRACTITIONER

## 2021-07-26 PROCEDURE — 25010000002 ENOXAPARIN PER 10 MG: Performed by: INTERNAL MEDICINE

## 2021-07-26 PROCEDURE — 82962 GLUCOSE BLOOD TEST: CPT

## 2021-07-26 PROCEDURE — 80053 COMPREHEN METABOLIC PANEL: CPT | Performed by: NURSE PRACTITIONER

## 2021-07-26 PROCEDURE — 25010000002 EPOPROSTENOL PER 0.5 MG: Performed by: NURSE PRACTITIONER

## 2021-07-26 PROCEDURE — 82550 ASSAY OF CK (CPK): CPT | Performed by: NURSE PRACTITIONER

## 2021-07-26 PROCEDURE — 25010000002 FENTANYL CITRATE (PF) 2500 MCG/50ML SOLUTION: Performed by: NURSE PRACTITIONER

## 2021-07-26 RX ORDER — ACETAMINOPHEN 160 MG/5ML
650 SOLUTION ORAL EVERY 6 HOURS PRN
Status: DISCONTINUED | OUTPATIENT
Start: 2021-07-26 | End: 2021-08-24 | Stop reason: HOSPADM

## 2021-07-26 RX ORDER — DEXAMETHASONE SODIUM PHOSPHATE 4 MG/ML
6 INJECTION, SOLUTION INTRA-ARTICULAR; INTRALESIONAL; INTRAMUSCULAR; INTRAVENOUS; SOFT TISSUE EVERY 12 HOURS
Status: DISCONTINUED | OUTPATIENT
Start: 2021-07-26 | End: 2021-08-08

## 2021-07-26 RX ORDER — INSULIN GLARGINE 100 [IU]/ML
5 INJECTION, SOLUTION SUBCUTANEOUS ONCE
Status: COMPLETED | OUTPATIENT
Start: 2021-07-26 | End: 2021-07-26

## 2021-07-26 RX ORDER — POLYETHYLENE GLYCOL 3350 17 G/17G
17 POWDER, FOR SOLUTION ORAL DAILY
Status: DISCONTINUED | OUTPATIENT
Start: 2021-07-26 | End: 2021-07-29

## 2021-07-26 RX ORDER — INSULIN GLARGINE 100 [IU]/ML
15 INJECTION, SOLUTION SUBCUTANEOUS EVERY 12 HOURS SCHEDULED
Status: DISCONTINUED | OUTPATIENT
Start: 2021-07-26 | End: 2021-07-28

## 2021-07-26 RX ADMIN — INSULIN LISPRO 8 UNITS: 100 INJECTION, SOLUTION INTRAVENOUS; SUBCUTANEOUS at 05:46

## 2021-07-26 RX ADMIN — INSULIN LISPRO 8 UNITS: 100 INJECTION, SOLUTION INTRAVENOUS; SUBCUTANEOUS at 18:21

## 2021-07-26 RX ADMIN — Medication 10 ML: at 20:27

## 2021-07-26 RX ADMIN — INSULIN GLARGINE 10 UNITS: 100 INJECTION, SOLUTION SUBCUTANEOUS at 08:05

## 2021-07-26 RX ADMIN — Medication 2000 UNITS: at 08:06

## 2021-07-26 RX ADMIN — MINERAL OIL AND WHITE PETROLATUM: 150; 830 OINTMENT OPHTHALMIC at 23:47

## 2021-07-26 RX ADMIN — ALBUTEROL SULFATE 6 PUFF: 90 AEROSOL, METERED RESPIRATORY (INHALATION) at 06:40

## 2021-07-26 RX ADMIN — HEPARIN SODIUM 12 UNITS/KG/HR: 10000 INJECTION, SOLUTION INTRAVENOUS at 06:13

## 2021-07-26 RX ADMIN — INSULIN GLARGINE 5 UNITS: 100 INJECTION, SOLUTION SUBCUTANEOUS at 11:29

## 2021-07-26 RX ADMIN — REMDESIVIR 100 MG: 100 INJECTION, POWDER, LYOPHILIZED, FOR SOLUTION INTRAVENOUS at 18:51

## 2021-07-26 RX ADMIN — ZINC SULFATE 220 MG (50 MG) CAPSULE 220 MG: CAPSULE at 08:06

## 2021-07-26 RX ADMIN — MINERAL OIL AND WHITE PETROLATUM: 150; 830 OINTMENT OPHTHALMIC at 16:14

## 2021-07-26 RX ADMIN — Medication 600 MG: at 08:06

## 2021-07-26 RX ADMIN — GUAIFENESIN 400 MG: 100 SOLUTION ORAL at 18:14

## 2021-07-26 RX ADMIN — MINERAL OIL AND WHITE PETROLATUM: 150; 830 OINTMENT OPHTHALMIC at 20:27

## 2021-07-26 RX ADMIN — METOPROLOL TARTRATE 25 MG: 25 TABLET, FILM COATED ORAL at 08:06

## 2021-07-26 RX ADMIN — GLYCOPYRROLATE 0.1 MG: 0.2 INJECTION, SOLUTION INTRAMUSCULAR; INTRAVITREAL at 01:50

## 2021-07-26 RX ADMIN — VECURONIUM BROMIDE 0.9 MCG/KG/MIN: 1 INJECTION, POWDER, LYOPHILIZED, FOR SOLUTION INTRAVENOUS at 13:55

## 2021-07-26 RX ADMIN — GLYCOPYRROLATE 0.1 MG: 0.2 INJECTION, SOLUTION INTRAMUSCULAR; INTRAVITREAL at 14:09

## 2021-07-26 RX ADMIN — MINERAL OIL AND WHITE PETROLATUM: 150; 830 OINTMENT OPHTHALMIC at 03:57

## 2021-07-26 RX ADMIN — MIDAZOLAM 8 MG/HR: 5 INJECTION INTRAMUSCULAR; INTRAVENOUS at 14:10

## 2021-07-26 RX ADMIN — DOCUSATE SODIUM 100 MG: 50 LIQUID ORAL at 08:06

## 2021-07-26 RX ADMIN — MIDAZOLAM 10 MG/HR: 5 INJECTION INTRAMUSCULAR; INTRAVENOUS at 22:06

## 2021-07-26 RX ADMIN — PIPERACILLIN AND TAZOBACTAM 4.5 G: 4; .5 INJECTION, POWDER, LYOPHILIZED, FOR SOLUTION INTRAVENOUS; PARENTERAL at 18:15

## 2021-07-26 RX ADMIN — FENTANYL CITRATE 200 MCG/HR: 0.05 INJECTION, SOLUTION INTRAMUSCULAR; INTRAVENOUS at 01:49

## 2021-07-26 RX ADMIN — DOCUSATE SODIUM 100 MG: 50 LIQUID ORAL at 20:05

## 2021-07-26 RX ADMIN — EPOPROSTENOL 50 NG/KG/MIN: 1.5 INJECTION, POWDER, LYOPHILIZED, FOR SOLUTION INTRAVENOUS at 09:03

## 2021-07-26 RX ADMIN — ACETAMINOPHEN 650 MG: 650 SUPPOSITORY RECTAL at 01:53

## 2021-07-26 RX ADMIN — FENTANYL CITRATE 200 MCG/HR: 0.05 INJECTION, SOLUTION INTRAMUSCULAR; INTRAVENOUS at 20:05

## 2021-07-26 RX ADMIN — DEXAMETHASONE SODIUM PHOSPHATE 6 MG: 4 INJECTION, SOLUTION INTRAMUSCULAR; INTRAVENOUS at 20:05

## 2021-07-26 RX ADMIN — INSULIN LISPRO 4 UNITS: 100 INJECTION, SOLUTION INTRAVENOUS; SUBCUTANEOUS at 23:47

## 2021-07-26 RX ADMIN — METOPROLOL TARTRATE 25 MG: 25 TABLET, FILM COATED ORAL at 20:05

## 2021-07-26 RX ADMIN — DEXAMETHASONE SODIUM PHOSPHATE 6 MG: 4 INJECTION, SOLUTION INTRAMUSCULAR; INTRAVENOUS at 08:06

## 2021-07-26 RX ADMIN — POLYETHYLENE GLYCOL 3350 17 G: 17 POWDER, FOR SOLUTION ORAL at 11:30

## 2021-07-26 RX ADMIN — PIPERACILLIN AND TAZOBACTAM 4.5 G: 4; .5 INJECTION, POWDER, LYOPHILIZED, FOR SOLUTION INTRAVENOUS; PARENTERAL at 11:29

## 2021-07-26 RX ADMIN — FAMOTIDINE 20 MG: 20 TABLET ORAL at 08:06

## 2021-07-26 RX ADMIN — GUAIFENESIN 400 MG: 100 SOLUTION ORAL at 11:29

## 2021-07-26 RX ADMIN — MINERAL OIL AND WHITE PETROLATUM: 150; 830 OINTMENT OPHTHALMIC at 08:08

## 2021-07-26 RX ADMIN — MINERAL OIL AND WHITE PETROLATUM: 150; 830 OINTMENT OPHTHALMIC at 12:32

## 2021-07-26 RX ADMIN — ALBUTEROL SULFATE 6 PUFF: 90 AEROSOL, METERED RESPIRATORY (INHALATION) at 03:30

## 2021-07-26 RX ADMIN — FENTANYL CITRATE 200 MCG/HR: 0.05 INJECTION, SOLUTION INTRAMUSCULAR; INTRAVENOUS at 14:10

## 2021-07-26 RX ADMIN — GUAIFENESIN 400 MG: 100 SOLUTION ORAL at 23:47

## 2021-07-26 RX ADMIN — FENTANYL CITRATE 200 MCG/HR: 0.05 INJECTION, SOLUTION INTRAMUSCULAR; INTRAVENOUS at 08:07

## 2021-07-26 RX ADMIN — MIDAZOLAM 8 MG/HR: 5 INJECTION INTRAMUSCULAR; INTRAVENOUS at 08:06

## 2021-07-26 RX ADMIN — ENOXAPARIN SODIUM 40 MG: 40 INJECTION SUBCUTANEOUS at 18:14

## 2021-07-26 RX ADMIN — MIDAZOLAM 8 MG/HR: 5 INJECTION INTRAMUSCULAR; INTRAVENOUS at 01:48

## 2021-07-26 RX ADMIN — LABETALOL 20 MG/4 ML (5 MG/ML) INTRAVENOUS SYRINGE 10 MG: at 12:29

## 2021-07-26 RX ADMIN — PIPERACILLIN AND TAZOBACTAM 4.5 G: 4; .5 INJECTION, POWDER, LYOPHILIZED, FOR SOLUTION INTRAVENOUS; PARENTERAL at 01:49

## 2021-07-26 RX ADMIN — Medication 600 MG: at 20:05

## 2021-07-26 RX ADMIN — INSULIN LISPRO 4 UNITS: 100 INJECTION, SOLUTION INTRAVENOUS; SUBCUTANEOUS at 12:36

## 2021-07-26 RX ADMIN — INSULIN GLARGINE 15 UNITS: 100 INJECTION, SOLUTION SUBCUTANEOUS at 20:06

## 2021-07-26 RX ADMIN — Medication 10 ML: at 08:08

## 2021-07-26 RX ADMIN — VECURONIUM BROMIDE 0.9 MCG/KG/MIN: 1 INJECTION, POWDER, LYOPHILIZED, FOR SOLUTION INTRAVENOUS at 05:46

## 2021-07-26 RX ADMIN — GUAIFENESIN 400 MG: 100 SOLUTION ORAL at 05:46

## 2021-07-26 RX ADMIN — ACETAMINOPHEN ORAL SOLUTION 650 MG: 650 SOLUTION ORAL at 12:36

## 2021-07-26 RX ADMIN — EPOPROSTENOL 50 NG/KG/MIN: 1.5 INJECTION, POWDER, LYOPHILIZED, FOR SOLUTION INTRAVENOUS at 19:16

## 2021-07-27 ENCOUNTER — APPOINTMENT (OUTPATIENT)
Dept: CARDIOLOGY | Facility: HOSPITAL | Age: 31
End: 2021-07-27

## 2021-07-27 ENCOUNTER — APPOINTMENT (OUTPATIENT)
Dept: GENERAL RADIOLOGY | Facility: HOSPITAL | Age: 31
End: 2021-07-27

## 2021-07-27 LAB
ALBUMIN SERPL-MCNC: 2.8 G/DL (ref 3.5–5.2)
ALBUMIN/GLOB SERPL: 0.9 G/DL
ALP SERPL-CCNC: 56 U/L (ref 39–117)
ALT SERPL W P-5'-P-CCNC: 55 U/L (ref 1–33)
ANION GAP SERPL CALCULATED.3IONS-SCNC: 8 MMOL/L (ref 5–15)
APTT PPP: 22.5 SECONDS (ref 24–31)
ARTERIAL PATENCY WRIST A: ABNORMAL
AST SERPL-CCNC: 76 U/L (ref 1–32)
ATMOSPHERIC PRESS: ABNORMAL MM[HG]
BASE EXCESS BLDA CALC-SCNC: 3.6 MMOL/L (ref 0–3)
BASE EXCESS BLDA CALC-SCNC: 3.8 MMOL/L (ref 0–3)
BASE EXCESS BLDA CALC-SCNC: 3.9 MMOL/L (ref 0–3)
BASOPHILS # BLD AUTO: 0 10*3/MM3 (ref 0–0.2)
BASOPHILS NFR BLD AUTO: 0.1 % (ref 0–1.5)
BDY SITE: ABNORMAL
BILIRUB SERPL-MCNC: 0.5 MG/DL (ref 0–1.2)
BUN SERPL-MCNC: 20 MG/DL (ref 6–20)
BUN/CREAT SERPL: 37 (ref 7–25)
CA-I SERPL ISE-MCNC: 1.22 MMOL/L (ref 1.2–1.3)
CALCIUM SPEC-SCNC: 8.5 MG/DL (ref 8.6–10.5)
CHLORIDE SERPL-SCNC: 108 MMOL/L (ref 98–107)
CK SERPL-CCNC: 1242 U/L (ref 20–180)
CO2 BLDA-SCNC: 30.2 MMOL/L (ref 22–29)
CO2 BLDA-SCNC: 31.1 MMOL/L (ref 22–29)
CO2 BLDA-SCNC: 32 MMOL/L (ref 22–29)
CO2 SERPL-SCNC: 28 MMOL/L (ref 22–29)
CREAT SERPL-MCNC: 0.54 MG/DL (ref 0.57–1)
CRP SERPL-MCNC: 0.45 MG/DL (ref 0–0.5)
D DIMER PPP FEU-MCNC: 0.92 MG/L (FEU) (ref 0–0.59)
DEPRECATED RDW RBC AUTO: 44.6 FL (ref 37–54)
EOSINOPHIL # BLD AUTO: 0 10*3/MM3 (ref 0–0.4)
EOSINOPHIL NFR BLD AUTO: 0 % (ref 0.3–6.2)
ERYTHROCYTE [DISTWIDTH] IN BLOOD BY AUTOMATED COUNT: 14.7 % (ref 12.3–15.4)
FERRITIN SERPL-MCNC: 338.3 NG/ML (ref 13–150)
FIBRINOGEN PPP-MCNC: 530 MG/DL (ref 210–450)
GFR SERPL CREATININE-BSD FRML MDRD: 132 ML/MIN/1.73
GLOBULIN UR ELPH-MCNC: 3.1 GM/DL
GLUCOSE BLDC GLUCOMTR-MCNC: 189 MG/DL (ref 70–105)
GLUCOSE BLDC GLUCOMTR-MCNC: 191 MG/DL (ref 70–105)
GLUCOSE BLDC GLUCOMTR-MCNC: 202 MG/DL (ref 70–105)
GLUCOSE BLDC GLUCOMTR-MCNC: 227 MG/DL (ref 70–105)
GLUCOSE SERPL-MCNC: 215 MG/DL (ref 65–99)
HCO3 BLDA-SCNC: 28.9 MMOL/L (ref 21–28)
HCO3 BLDA-SCNC: 29.6 MMOL/L (ref 21–28)
HCO3 BLDA-SCNC: 30.3 MMOL/L (ref 21–28)
HCT VFR BLD AUTO: 33.1 % (ref 34–46.6)
HEMODILUTION: NO
HGB BLD-MCNC: 11 G/DL (ref 12–15.9)
INHALED O2 CONCENTRATION: 100 %
INHALED O2 CONCENTRATION: 70 %
INHALED O2 CONCENTRATION: 80 %
INR PPP: 1.1 (ref 0.93–1.1)
LDH SERPL-CCNC: 419 U/L (ref 135–214)
LYMPHOCYTES # BLD AUTO: 0.7 10*3/MM3 (ref 0.7–3.1)
LYMPHOCYTES NFR BLD AUTO: 8.8 % (ref 19.6–45.3)
MAGNESIUM SERPL-MCNC: 2.1 MG/DL (ref 1.6–2.6)
MCH RBC QN AUTO: 28.3 PG (ref 26.6–33)
MCHC RBC AUTO-ENTMCNC: 33.3 G/DL (ref 31.5–35.7)
MCV RBC AUTO: 85.1 FL (ref 79–97)
MODALITY: ABNORMAL
MONOCYTES # BLD AUTO: 0.4 10*3/MM3 (ref 0.1–0.9)
MONOCYTES NFR BLD AUTO: 5.8 % (ref 5–12)
NEUTROPHILS NFR BLD AUTO: 6.5 10*3/MM3 (ref 1.7–7)
NEUTROPHILS NFR BLD AUTO: 85.3 % (ref 42.7–76)
NRBC BLD AUTO-RTO: 0 /100 WBC (ref 0–0.2)
NT-PROBNP SERPL-MCNC: 147.4 PG/ML (ref 0–450)
PCO2 BLDA: 44.3 MM HG (ref 35–48)
PCO2 BLDA: 49.2 MM HG (ref 35–48)
PCO2 BLDA: 55.2 MM HG (ref 35–48)
PEEP RESPIRATORY: 16 CM[H2O]
PH BLDA: 7.35 PH UNITS (ref 7.35–7.45)
PH BLDA: 7.39 PH UNITS (ref 7.35–7.45)
PH BLDA: 7.42 PH UNITS (ref 7.35–7.45)
PHOSPHATE SERPL-MCNC: 2.6 MG/DL (ref 2.5–4.5)
PLATELET # BLD AUTO: 249 10*3/MM3 (ref 140–450)
PMV BLD AUTO: 8 FL (ref 6–12)
PO2 BLDA: 156.9 MM HG (ref 83–108)
PO2 BLDA: 57 MM HG (ref 83–108)
PO2 BLDA: 71.5 MM HG (ref 83–108)
POTASSIUM SERPL-SCNC: 4.8 MMOL/L (ref 3.5–5.2)
PROT SERPL-MCNC: 5.9 G/DL (ref 6–8.5)
PROTHROMBIN TIME: 12.1 SECONDS (ref 9.6–11.7)
RBC # BLD AUTO: 3.89 10*6/MM3 (ref 3.77–5.28)
RESPIRATORY RATE: 32
RESPIRATORY RATE: 34
RESPIRATORY RATE: 34
SAO2 % BLDCOA: 89.6 % (ref 94–98)
SAO2 % BLDCOA: 92.8 % (ref 94–98)
SAO2 % BLDCOA: 99.4 % (ref 94–98)
SODIUM SERPL-SCNC: 144 MMOL/L (ref 136–145)
TROPONIN T SERPL-MCNC: <0.01 NG/ML (ref 0–0.03)
VENTILATOR MODE: ABNORMAL
VT ON VENT VENT: 420 ML
VT ON VENT VENT: 450 ML
VT ON VENT VENT: 450 ML
WBC # BLD AUTO: 7.7 10*3/MM3 (ref 3.4–10.8)

## 2021-07-27 PROCEDURE — 84484 ASSAY OF TROPONIN QUANT: CPT | Performed by: NURSE PRACTITIONER

## 2021-07-27 PROCEDURE — 85384 FIBRINOGEN ACTIVITY: CPT | Performed by: NURSE PRACTITIONER

## 2021-07-27 PROCEDURE — 63710000001 INSULIN LISPRO (HUMAN) PER 5 UNITS: Performed by: INTERNAL MEDICINE

## 2021-07-27 PROCEDURE — 84100 ASSAY OF PHOSPHORUS: CPT | Performed by: NURSE PRACTITIONER

## 2021-07-27 PROCEDURE — 25010000002 PIPERACILLIN SOD-TAZOBACTAM PER 1 G: Performed by: INTERNAL MEDICINE

## 2021-07-27 PROCEDURE — 85379 FIBRIN DEGRADATION QUANT: CPT | Performed by: NURSE PRACTITIONER

## 2021-07-27 PROCEDURE — 86140 C-REACTIVE PROTEIN: CPT | Performed by: NURSE PRACTITIONER

## 2021-07-27 PROCEDURE — 93308 TTE F-UP OR LMTD: CPT

## 2021-07-27 PROCEDURE — 82962 GLUCOSE BLOOD TEST: CPT

## 2021-07-27 PROCEDURE — 82728 ASSAY OF FERRITIN: CPT | Performed by: NURSE PRACTITIONER

## 2021-07-27 PROCEDURE — 85025 COMPLETE CBC W/AUTO DIFF WBC: CPT | Performed by: NURSE PRACTITIONER

## 2021-07-27 PROCEDURE — 25010000002 DEXAMETHASONE PER 1 MG: Performed by: INTERNAL MEDICINE

## 2021-07-27 PROCEDURE — 83735 ASSAY OF MAGNESIUM: CPT | Performed by: NURSE PRACTITIONER

## 2021-07-27 PROCEDURE — 94799 UNLISTED PULMONARY SVC/PX: CPT

## 2021-07-27 PROCEDURE — 82330 ASSAY OF CALCIUM: CPT | Performed by: NURSE PRACTITIONER

## 2021-07-27 PROCEDURE — 25010000002 EPOPROSTENOL PER 0.5 MG: Performed by: NURSE PRACTITIONER

## 2021-07-27 PROCEDURE — 85610 PROTHROMBIN TIME: CPT | Performed by: NURSE PRACTITIONER

## 2021-07-27 PROCEDURE — 25010000002 METHYLNALTREXONE 12 MG/0.6ML SOLUTION: Performed by: INTERNAL MEDICINE

## 2021-07-27 PROCEDURE — 25010000002 MIDAZOLAM 50 MG/10ML SOLUTION: Performed by: NURSE PRACTITIONER

## 2021-07-27 PROCEDURE — 82803 BLOOD GASES ANY COMBINATION: CPT

## 2021-07-27 PROCEDURE — 83880 ASSAY OF NATRIURETIC PEPTIDE: CPT | Performed by: NURSE PRACTITIONER

## 2021-07-27 PROCEDURE — 93325 DOPPLER ECHO COLOR FLOW MAPG: CPT | Performed by: INTERNAL MEDICINE

## 2021-07-27 PROCEDURE — 63710000001 INSULIN GLARGINE PER 5 UNITS: Performed by: INTERNAL MEDICINE

## 2021-07-27 PROCEDURE — 85730 THROMBOPLASTIN TIME PARTIAL: CPT | Performed by: NURSE PRACTITIONER

## 2021-07-27 PROCEDURE — 93308 TTE F-UP OR LMTD: CPT | Performed by: INTERNAL MEDICINE

## 2021-07-27 PROCEDURE — 83615 LACTATE (LD) (LDH) ENZYME: CPT | Performed by: NURSE PRACTITIONER

## 2021-07-27 PROCEDURE — 80053 COMPREHEN METABOLIC PANEL: CPT | Performed by: NURSE PRACTITIONER

## 2021-07-27 PROCEDURE — 93325 DOPPLER ECHO COLOR FLOW MAPG: CPT

## 2021-07-27 PROCEDURE — 71045 X-RAY EXAM CHEST 1 VIEW: CPT

## 2021-07-27 PROCEDURE — 25010000002 ENOXAPARIN PER 10 MG: Performed by: INTERNAL MEDICINE

## 2021-07-27 PROCEDURE — 82550 ASSAY OF CK (CPK): CPT | Performed by: NURSE PRACTITIONER

## 2021-07-27 PROCEDURE — 25010000002 FENTANYL CITRATE (PF) 2500 MCG/50ML SOLUTION: Performed by: NURSE PRACTITIONER

## 2021-07-27 PROCEDURE — 94003 VENT MGMT INPAT SUBQ DAY: CPT

## 2021-07-27 RX ADMIN — VECURONIUM BROMIDE 0.7 MCG/KG/MIN: 1 INJECTION, POWDER, LYOPHILIZED, FOR SOLUTION INTRAVENOUS at 00:15

## 2021-07-27 RX ADMIN — INSULIN LISPRO 4 UNITS: 100 INJECTION, SOLUTION INTRAVENOUS; SUBCUTANEOUS at 17:10

## 2021-07-27 RX ADMIN — Medication 10 ML: at 08:24

## 2021-07-27 RX ADMIN — FENTANYL CITRATE 200 MCG/HR: 0.05 INJECTION, SOLUTION INTRAMUSCULAR; INTRAVENOUS at 13:56

## 2021-07-27 RX ADMIN — METOPROLOL TARTRATE 25 MG: 25 TABLET, FILM COATED ORAL at 08:08

## 2021-07-27 RX ADMIN — PIPERACILLIN AND TAZOBACTAM 4.5 G: 4; .5 INJECTION, POWDER, LYOPHILIZED, FOR SOLUTION INTRAVENOUS; PARENTERAL at 17:01

## 2021-07-27 RX ADMIN — VECURONIUM BROMIDE 0.7 MCG/KG/MIN: 1 INJECTION, POWDER, LYOPHILIZED, FOR SOLUTION INTRAVENOUS at 07:02

## 2021-07-27 RX ADMIN — ENOXAPARIN SODIUM 40 MG: 40 INJECTION SUBCUTANEOUS at 06:19

## 2021-07-27 RX ADMIN — INSULIN GLARGINE 15 UNITS: 100 INJECTION, SOLUTION SUBCUTANEOUS at 20:05

## 2021-07-27 RX ADMIN — PIPERACILLIN AND TAZOBACTAM 4.5 G: 4; .5 INJECTION, POWDER, LYOPHILIZED, FOR SOLUTION INTRAVENOUS; PARENTERAL at 01:39

## 2021-07-27 RX ADMIN — MINERAL OIL AND WHITE PETROLATUM: 150; 830 OINTMENT OPHTHALMIC at 08:25

## 2021-07-27 RX ADMIN — INSULIN LISPRO 8 UNITS: 100 INJECTION, SOLUTION INTRAVENOUS; SUBCUTANEOUS at 23:14

## 2021-07-27 RX ADMIN — INSULIN LISPRO 8 UNITS: 100 INJECTION, SOLUTION INTRAVENOUS; SUBCUTANEOUS at 12:00

## 2021-07-27 RX ADMIN — FENTANYL CITRATE 200 MCG/HR: 0.05 INJECTION, SOLUTION INTRAMUSCULAR; INTRAVENOUS at 07:02

## 2021-07-27 RX ADMIN — FENTANYL CITRATE 200 MCG/HR: 0.05 INJECTION, SOLUTION INTRAMUSCULAR; INTRAVENOUS at 01:39

## 2021-07-27 RX ADMIN — Medication 10 ML: at 20:05

## 2021-07-27 RX ADMIN — FENTANYL CITRATE 200 MCG/HR: 0.05 INJECTION, SOLUTION INTRAMUSCULAR; INTRAVENOUS at 23:05

## 2021-07-27 RX ADMIN — DEXAMETHASONE SODIUM PHOSPHATE 6 MG: 4 INJECTION, SOLUTION INTRAMUSCULAR; INTRAVENOUS at 08:08

## 2021-07-27 RX ADMIN — INSULIN GLARGINE 15 UNITS: 100 INJECTION, SOLUTION SUBCUTANEOUS at 08:08

## 2021-07-27 RX ADMIN — DEXAMETHASONE SODIUM PHOSPHATE 6 MG: 4 INJECTION, SOLUTION INTRAMUSCULAR; INTRAVENOUS at 20:05

## 2021-07-27 RX ADMIN — VECURONIUM BROMIDE 0.4 MCG/KG/MIN: 1 INJECTION, POWDER, LYOPHILIZED, FOR SOLUTION INTRAVENOUS at 13:56

## 2021-07-27 RX ADMIN — INSULIN LISPRO 4 UNITS: 100 INJECTION, SOLUTION INTRAVENOUS; SUBCUTANEOUS at 06:18

## 2021-07-27 RX ADMIN — Medication 600 MG: at 20:04

## 2021-07-27 RX ADMIN — Medication 2000 UNITS: at 08:08

## 2021-07-27 RX ADMIN — DOCUSATE SODIUM 100 MG: 50 LIQUID ORAL at 08:09

## 2021-07-27 RX ADMIN — MIDAZOLAM 8 MG/HR: 5 INJECTION INTRAMUSCULAR; INTRAVENOUS at 10:47

## 2021-07-27 RX ADMIN — EPOPROSTENOL 50 NG/KG/MIN: 1.5 INJECTION, POWDER, LYOPHILIZED, FOR SOLUTION INTRAVENOUS at 14:21

## 2021-07-27 RX ADMIN — Medication 600 MG: at 08:08

## 2021-07-27 RX ADMIN — MIDAZOLAM 8 MG/HR: 5 INJECTION INTRAMUSCULAR; INTRAVENOUS at 21:42

## 2021-07-27 RX ADMIN — FAMOTIDINE 20 MG: 20 TABLET ORAL at 08:08

## 2021-07-27 RX ADMIN — ENOXAPARIN SODIUM 40 MG: 40 INJECTION SUBCUTANEOUS at 17:01

## 2021-07-27 RX ADMIN — MINERAL OIL AND WHITE PETROLATUM: 150; 830 OINTMENT OPHTHALMIC at 12:01

## 2021-07-27 RX ADMIN — EPOPROSTENOL 50 NG/KG/MIN: 1.5 INJECTION, POWDER, LYOPHILIZED, FOR SOLUTION INTRAVENOUS at 03:49

## 2021-07-27 RX ADMIN — DOCUSATE SODIUM 100 MG: 50 LIQUID ORAL at 20:04

## 2021-07-27 RX ADMIN — ACETAMINOPHEN 650 MG: 325 TABLET, FILM COATED ORAL at 00:12

## 2021-07-27 RX ADMIN — MINERAL OIL AND WHITE PETROLATUM: 150; 830 OINTMENT OPHTHALMIC at 03:06

## 2021-07-27 RX ADMIN — VECURONIUM BROMIDE 0.8 MCG/KG/MIN: 1 INJECTION, POWDER, LYOPHILIZED, FOR SOLUTION INTRAVENOUS at 23:05

## 2021-07-27 RX ADMIN — METOPROLOL TARTRATE 25 MG: 25 TABLET, FILM COATED ORAL at 20:04

## 2021-07-27 RX ADMIN — MINERAL OIL AND WHITE PETROLATUM: 150; 830 OINTMENT OPHTHALMIC at 23:15

## 2021-07-27 RX ADMIN — ZINC SULFATE 220 MG (50 MG) CAPSULE 220 MG: CAPSULE at 08:08

## 2021-07-27 RX ADMIN — MINERAL OIL AND WHITE PETROLATUM: 150; 830 OINTMENT OPHTHALMIC at 17:02

## 2021-07-27 RX ADMIN — MINERAL OIL AND WHITE PETROLATUM: 150; 830 OINTMENT OPHTHALMIC at 20:05

## 2021-07-27 RX ADMIN — GUAIFENESIN 400 MG: 100 SOLUTION ORAL at 23:15

## 2021-07-27 RX ADMIN — PIPERACILLIN AND TAZOBACTAM 4.5 G: 4; .5 INJECTION, POWDER, LYOPHILIZED, FOR SOLUTION INTRAVENOUS; PARENTERAL at 10:34

## 2021-07-27 RX ADMIN — MIDAZOLAM 8 MG/HR: 5 INJECTION INTRAMUSCULAR; INTRAVENOUS at 15:38

## 2021-07-27 RX ADMIN — MIDAZOLAM 8 MG/HR: 5 INJECTION INTRAMUSCULAR; INTRAVENOUS at 03:05

## 2021-07-27 RX ADMIN — GUAIFENESIN 400 MG: 100 SOLUTION ORAL at 06:18

## 2021-07-27 RX ADMIN — GUAIFENESIN 400 MG: 100 SOLUTION ORAL at 12:01

## 2021-07-27 RX ADMIN — FENTANYL CITRATE 250 MCG/HR: 0.05 INJECTION, SOLUTION INTRAMUSCULAR; INTRAVENOUS at 17:13

## 2021-07-27 RX ADMIN — GUAIFENESIN 400 MG: 100 SOLUTION ORAL at 17:10

## 2021-07-27 RX ADMIN — METHYLNALTREXONE BROMIDE 12 MG: 12 INJECTION, SOLUTION SUBCUTANEOUS at 10:34

## 2021-07-27 RX ADMIN — POLYETHYLENE GLYCOL 3350 17 G: 17 POWDER, FOR SOLUTION ORAL at 08:08

## 2021-07-28 ENCOUNTER — APPOINTMENT (OUTPATIENT)
Dept: GENERAL RADIOLOGY | Facility: HOSPITAL | Age: 31
End: 2021-07-28

## 2021-07-28 LAB
ALBUMIN SERPL-MCNC: 2.8 G/DL (ref 3.5–5.2)
ALBUMIN/GLOB SERPL: 0.9 G/DL
ALP SERPL-CCNC: 55 U/L (ref 39–117)
ALT SERPL W P-5'-P-CCNC: 45 U/L (ref 1–33)
ANION GAP SERPL CALCULATED.3IONS-SCNC: 8 MMOL/L (ref 5–15)
ARTERIAL PATENCY WRIST A: ABNORMAL
AST SERPL-CCNC: 44 U/L (ref 1–32)
ATMOSPHERIC PRESS: ABNORMAL MM[HG]
BASE EXCESS BLDA CALC-SCNC: 3.9 MMOL/L (ref 0–3)
BASOPHILS # BLD AUTO: 0.1 10*3/MM3 (ref 0–0.2)
BASOPHILS NFR BLD AUTO: 0.8 % (ref 0–1.5)
BDY SITE: ABNORMAL
BILIRUB SERPL-MCNC: 0.5 MG/DL (ref 0–1.2)
BUN SERPL-MCNC: 20 MG/DL (ref 6–20)
BUN/CREAT SERPL: 54.1 (ref 7–25)
CA-I SERPL ISE-MCNC: 1.22 MMOL/L (ref 1.2–1.3)
CALCIUM SPEC-SCNC: 8.4 MG/DL (ref 8.6–10.5)
CHLORIDE SERPL-SCNC: 106 MMOL/L (ref 98–107)
CO2 BLDA-SCNC: 30 MMOL/L (ref 22–29)
CO2 SERPL-SCNC: 29 MMOL/L (ref 22–29)
CREAT SERPL-MCNC: 0.37 MG/DL (ref 0.57–1)
DEPRECATED RDW RBC AUTO: 43.3 FL (ref 37–54)
EOSINOPHIL # BLD AUTO: 0 10*3/MM3 (ref 0–0.4)
EOSINOPHIL NFR BLD AUTO: 0.2 % (ref 0.3–6.2)
ERYTHROCYTE [DISTWIDTH] IN BLOOD BY AUTOMATED COUNT: 14.4 % (ref 12.3–15.4)
GFR SERPL CREATININE-BSD FRML MDRD: >150 ML/MIN/1.73
GLOBULIN UR ELPH-MCNC: 3.2 GM/DL
GLUCOSE BLDC GLUCOMTR-MCNC: 193 MG/DL (ref 70–105)
GLUCOSE BLDC GLUCOMTR-MCNC: 195 MG/DL (ref 70–105)
GLUCOSE BLDC GLUCOMTR-MCNC: 213 MG/DL (ref 70–105)
GLUCOSE SERPL-MCNC: 229 MG/DL (ref 65–99)
HCO3 BLDA-SCNC: 28.6 MMOL/L (ref 21–28)
HCT VFR BLD AUTO: 34 % (ref 34–46.6)
HEMODILUTION: NO
HGB BLD-MCNC: 11.4 G/DL (ref 12–15.9)
INHALED O2 CONCENTRATION: 100 %
LYMPHOCYTES # BLD AUTO: 0.6 10*3/MM3 (ref 0.7–3.1)
LYMPHOCYTES NFR BLD AUTO: 7.9 % (ref 19.6–45.3)
MAGNESIUM SERPL-MCNC: 2.1 MG/DL (ref 1.6–2.6)
MCH RBC QN AUTO: 28.4 PG (ref 26.6–33)
MCHC RBC AUTO-ENTMCNC: 33.6 G/DL (ref 31.5–35.7)
MCV RBC AUTO: 84.6 FL (ref 79–97)
MODALITY: ABNORMAL
MONOCYTES # BLD AUTO: 0.3 10*3/MM3 (ref 0.1–0.9)
MONOCYTES NFR BLD AUTO: 3.9 % (ref 5–12)
NEUTROPHILS NFR BLD AUTO: 7 10*3/MM3 (ref 1.7–7)
NEUTROPHILS NFR BLD AUTO: 87.2 % (ref 42.7–76)
NRBC BLD AUTO-RTO: 0 /100 WBC (ref 0–0.2)
PCO2 BLDA: 42.9 MM HG (ref 35–48)
PEEP RESPIRATORY: 16 CM[H2O]
PH BLDA: 7.43 PH UNITS (ref 7.35–7.45)
PHOSPHATE SERPL-MCNC: 3.7 MG/DL (ref 2.5–4.5)
PLATELET # BLD AUTO: 246 10*3/MM3 (ref 140–450)
PMV BLD AUTO: 8 FL (ref 6–12)
PO2 BLDA: 84.3 MM HG (ref 83–108)
POTASSIUM SERPL-SCNC: 4.7 MMOL/L (ref 3.5–5.2)
PROT SERPL-MCNC: 6 G/DL (ref 6–8.5)
RBC # BLD AUTO: 4.02 10*6/MM3 (ref 3.77–5.28)
RESPIRATORY RATE: 34
SAO2 % BLDCOA: 96.6 % (ref 94–98)
SODIUM SERPL-SCNC: 143 MMOL/L (ref 136–145)
VENTILATOR MODE: ABNORMAL
VT ON VENT VENT: 420 ML
WBC # BLD AUTO: 8 10*3/MM3 (ref 3.4–10.8)

## 2021-07-28 PROCEDURE — 25010000002 ENOXAPARIN PER 10 MG: Performed by: INTERNAL MEDICINE

## 2021-07-28 PROCEDURE — 94799 UNLISTED PULMONARY SVC/PX: CPT

## 2021-07-28 PROCEDURE — 82962 GLUCOSE BLOOD TEST: CPT

## 2021-07-28 PROCEDURE — 82803 BLOOD GASES ANY COMBINATION: CPT

## 2021-07-28 PROCEDURE — 94003 VENT MGMT INPAT SUBQ DAY: CPT

## 2021-07-28 PROCEDURE — 25010000002 EPOPROSTENOL PER 0.5 MG: Performed by: NURSE PRACTITIONER

## 2021-07-28 PROCEDURE — 63710000001 INSULIN LISPRO (HUMAN) PER 5 UNITS: Performed by: INTERNAL MEDICINE

## 2021-07-28 PROCEDURE — 80053 COMPREHEN METABOLIC PANEL: CPT | Performed by: NURSE PRACTITIONER

## 2021-07-28 PROCEDURE — 83735 ASSAY OF MAGNESIUM: CPT | Performed by: NURSE PRACTITIONER

## 2021-07-28 PROCEDURE — 82330 ASSAY OF CALCIUM: CPT | Performed by: NURSE PRACTITIONER

## 2021-07-28 PROCEDURE — 25010000002 PIPERACILLIN SOD-TAZOBACTAM PER 1 G: Performed by: INTERNAL MEDICINE

## 2021-07-28 PROCEDURE — 25010000002 FENTANYL CITRATE (PF) 2500 MCG/50ML SOLUTION: Performed by: NURSE PRACTITIONER

## 2021-07-28 PROCEDURE — 71045 X-RAY EXAM CHEST 1 VIEW: CPT

## 2021-07-28 PROCEDURE — 63710000001 INSULIN GLARGINE PER 5 UNITS: Performed by: INTERNAL MEDICINE

## 2021-07-28 PROCEDURE — 25010000002 MIDAZOLAM 50 MG/10ML SOLUTION: Performed by: NURSE PRACTITIONER

## 2021-07-28 PROCEDURE — 84100 ASSAY OF PHOSPHORUS: CPT | Performed by: NURSE PRACTITIONER

## 2021-07-28 PROCEDURE — 25010000002 DEXAMETHASONE PER 1 MG: Performed by: INTERNAL MEDICINE

## 2021-07-28 PROCEDURE — 85025 COMPLETE CBC W/AUTO DIFF WBC: CPT | Performed by: NURSE PRACTITIONER

## 2021-07-28 RX ORDER — INSULIN GLARGINE 100 [IU]/ML
20 INJECTION, SOLUTION SUBCUTANEOUS EVERY 12 HOURS SCHEDULED
Status: DISCONTINUED | OUTPATIENT
Start: 2021-07-28 | End: 2021-07-30

## 2021-07-28 RX ADMIN — MIDAZOLAM 4 MG/HR: 5 INJECTION INTRAMUSCULAR; INTRAVENOUS at 21:34

## 2021-07-28 RX ADMIN — INSULIN LISPRO 4 UNITS: 100 INJECTION, SOLUTION INTRAVENOUS; SUBCUTANEOUS at 11:22

## 2021-07-28 RX ADMIN — PIPERACILLIN AND TAZOBACTAM 4.5 G: 4; .5 INJECTION, POWDER, LYOPHILIZED, FOR SOLUTION INTRAVENOUS; PARENTERAL at 11:13

## 2021-07-28 RX ADMIN — Medication 2000 UNITS: at 08:25

## 2021-07-28 RX ADMIN — INSULIN LISPRO 8 UNITS: 100 INJECTION, SOLUTION INTRAVENOUS; SUBCUTANEOUS at 05:51

## 2021-07-28 RX ADMIN — METOPROLOL TARTRATE 25 MG: 25 TABLET, FILM COATED ORAL at 08:25

## 2021-07-28 RX ADMIN — FENTANYL CITRATE 150 MCG/HR: 0.05 INJECTION, SOLUTION INTRAMUSCULAR; INTRAVENOUS at 06:12

## 2021-07-28 RX ADMIN — METOPROLOL TARTRATE 25 MG: 25 TABLET, FILM COATED ORAL at 21:08

## 2021-07-28 RX ADMIN — DOCUSATE SODIUM 100 MG: 50 LIQUID ORAL at 08:25

## 2021-07-28 RX ADMIN — Medication 600 MG: at 21:08

## 2021-07-28 RX ADMIN — INSULIN LISPRO 4 UNITS: 100 INJECTION, SOLUTION INTRAVENOUS; SUBCUTANEOUS at 18:01

## 2021-07-28 RX ADMIN — VECURONIUM BROMIDE 1 MCG/KG/MIN: 1 INJECTION, POWDER, LYOPHILIZED, FOR SOLUTION INTRAVENOUS at 05:47

## 2021-07-28 RX ADMIN — Medication 10 ML: at 21:09

## 2021-07-28 RX ADMIN — EPOPROSTENOL 50 NG/KG/MIN: 1.5 INJECTION, POWDER, LYOPHILIZED, FOR SOLUTION INTRAVENOUS at 19:24

## 2021-07-28 RX ADMIN — POLYETHYLENE GLYCOL 3350 17 G: 17 POWDER, FOR SOLUTION ORAL at 08:25

## 2021-07-28 RX ADMIN — Medication 10 ML: at 08:26

## 2021-07-28 RX ADMIN — LABETALOL 20 MG/4 ML (5 MG/ML) INTRAVENOUS SYRINGE 10 MG: at 20:06

## 2021-07-28 RX ADMIN — EPOPROSTENOL 50 NG/KG/MIN: 1.5 INJECTION, POWDER, LYOPHILIZED, FOR SOLUTION INTRAVENOUS at 00:02

## 2021-07-28 RX ADMIN — PIPERACILLIN AND TAZOBACTAM 4.5 G: 4; .5 INJECTION, POWDER, LYOPHILIZED, FOR SOLUTION INTRAVENOUS; PARENTERAL at 01:07

## 2021-07-28 RX ADMIN — DOCUSATE SODIUM 100 MG: 50 LIQUID ORAL at 21:08

## 2021-07-28 RX ADMIN — MINERAL OIL AND WHITE PETROLATUM: 150; 830 OINTMENT OPHTHALMIC at 21:10

## 2021-07-28 RX ADMIN — FENTANYL CITRATE 150 MCG/HR: 0.05 INJECTION, SOLUTION INTRAMUSCULAR; INTRAVENOUS at 13:25

## 2021-07-28 RX ADMIN — MINERAL OIL AND WHITE PETROLATUM: 150; 830 OINTMENT OPHTHALMIC at 14:48

## 2021-07-28 RX ADMIN — GUAIFENESIN 400 MG: 100 SOLUTION ORAL at 17:56

## 2021-07-28 RX ADMIN — MINERAL OIL AND WHITE PETROLATUM: 150; 830 OINTMENT OPHTHALMIC at 08:26

## 2021-07-28 RX ADMIN — INSULIN GLARGINE 15 UNITS: 100 INJECTION, SOLUTION SUBCUTANEOUS at 08:25

## 2021-07-28 RX ADMIN — FAMOTIDINE 20 MG: 20 TABLET ORAL at 08:25

## 2021-07-28 RX ADMIN — INSULIN GLARGINE 20 UNITS: 100 INJECTION, SOLUTION SUBCUTANEOUS at 21:08

## 2021-07-28 RX ADMIN — LABETALOL 20 MG/4 ML (5 MG/ML) INTRAVENOUS SYRINGE 10 MG: at 15:39

## 2021-07-28 RX ADMIN — MINERAL OIL AND WHITE PETROLATUM: 150; 830 OINTMENT OPHTHALMIC at 03:55

## 2021-07-28 RX ADMIN — VECURONIUM BROMIDE 0.8 MCG/KG/MIN: 1 INJECTION, POWDER, LYOPHILIZED, FOR SOLUTION INTRAVENOUS at 18:09

## 2021-07-28 RX ADMIN — GUAIFENESIN 400 MG: 100 SOLUTION ORAL at 05:50

## 2021-07-28 RX ADMIN — INSULIN LISPRO 4 UNITS: 100 INJECTION, SOLUTION INTRAVENOUS; SUBCUTANEOUS at 18:03

## 2021-07-28 RX ADMIN — DEXAMETHASONE SODIUM PHOSPHATE 6 MG: 4 INJECTION, SOLUTION INTRAMUSCULAR; INTRAVENOUS at 21:08

## 2021-07-28 RX ADMIN — MIDAZOLAM 6 MG/HR: 5 INJECTION INTRAMUSCULAR; INTRAVENOUS at 05:47

## 2021-07-28 RX ADMIN — ZINC SULFATE 220 MG (50 MG) CAPSULE 220 MG: CAPSULE at 08:25

## 2021-07-28 RX ADMIN — ENOXAPARIN SODIUM 40 MG: 40 INJECTION SUBCUTANEOUS at 05:50

## 2021-07-28 RX ADMIN — FENTANYL CITRATE 150 MCG/HR: 0.05 INJECTION, SOLUTION INTRAMUSCULAR; INTRAVENOUS at 21:08

## 2021-07-28 RX ADMIN — PIPERACILLIN AND TAZOBACTAM 4.5 G: 4; .5 INJECTION, POWDER, LYOPHILIZED, FOR SOLUTION INTRAVENOUS; PARENTERAL at 17:56

## 2021-07-28 RX ADMIN — MINERAL OIL AND WHITE PETROLATUM: 150; 830 OINTMENT OPHTHALMIC at 15:41

## 2021-07-28 RX ADMIN — DEXAMETHASONE SODIUM PHOSPHATE 6 MG: 4 INJECTION, SOLUTION INTRAMUSCULAR; INTRAVENOUS at 08:25

## 2021-07-28 RX ADMIN — GUAIFENESIN 400 MG: 100 SOLUTION ORAL at 11:22

## 2021-07-28 RX ADMIN — Medication 600 MG: at 08:25

## 2021-07-28 RX ADMIN — ENOXAPARIN SODIUM 40 MG: 40 INJECTION SUBCUTANEOUS at 17:56

## 2021-07-28 RX ADMIN — MIDAZOLAM 6 MG/HR: 5 INJECTION INTRAMUSCULAR; INTRAVENOUS at 14:47

## 2021-07-29 ENCOUNTER — APPOINTMENT (OUTPATIENT)
Dept: GENERAL RADIOLOGY | Facility: HOSPITAL | Age: 31
End: 2021-07-29

## 2021-07-29 LAB
ALBUMIN SERPL-MCNC: 2.9 G/DL (ref 3.5–5.2)
ALBUMIN/GLOB SERPL: 0.8 G/DL
ALP SERPL-CCNC: 63 U/L (ref 39–117)
ALT SERPL W P-5'-P-CCNC: 56 U/L (ref 1–33)
ANION GAP SERPL CALCULATED.3IONS-SCNC: 8 MMOL/L (ref 5–15)
APTT PPP: 23 SECONDS (ref 24–31)
ARTERIAL PATENCY WRIST A: ABNORMAL
AST SERPL-CCNC: 47 U/L (ref 1–32)
ATMOSPHERIC PRESS: ABNORMAL MM[HG]
BASE EXCESS BLDA CALC-SCNC: 7.3 MMOL/L (ref 0–3)
BASOPHILS # BLD AUTO: 0 10*3/MM3 (ref 0–0.2)
BASOPHILS NFR BLD AUTO: 0.3 % (ref 0–1.5)
BDY SITE: ABNORMAL
BILIRUB SERPL-MCNC: 0.6 MG/DL (ref 0–1.2)
BUN SERPL-MCNC: 18 MG/DL (ref 6–20)
BUN/CREAT SERPL: 43.9 (ref 7–25)
CA-I SERPL ISE-MCNC: 1.21 MMOL/L (ref 1.2–1.3)
CALCIUM SPEC-SCNC: 8.7 MG/DL (ref 8.6–10.5)
CHLORIDE SERPL-SCNC: 99 MMOL/L (ref 98–107)
CO2 BLDA-SCNC: 36.5 MMOL/L (ref 22–29)
CO2 SERPL-SCNC: 32 MMOL/L (ref 22–29)
CREAT SERPL-MCNC: 0.41 MG/DL (ref 0.57–1)
D DIMER PPP FEU-MCNC: 1.82 MG/L (FEU) (ref 0–0.59)
DEPRECATED RDW RBC AUTO: 43.8 FL (ref 37–54)
EOSINOPHIL # BLD AUTO: 0 10*3/MM3 (ref 0–0.4)
EOSINOPHIL NFR BLD AUTO: 0.3 % (ref 0.3–6.2)
ERYTHROCYTE [DISTWIDTH] IN BLOOD BY AUTOMATED COUNT: 14.4 % (ref 12.3–15.4)
FIBRINOGEN PPP-MCNC: 742 MG/DL (ref 210–450)
GFR SERPL CREATININE-BSD FRML MDRD: >150 ML/MIN/1.73
GLOBULIN UR ELPH-MCNC: 3.5 GM/DL
GLUCOSE BLDC GLUCOMTR-MCNC: 201 MG/DL (ref 70–105)
GLUCOSE BLDC GLUCOMTR-MCNC: 218 MG/DL (ref 70–105)
GLUCOSE BLDC GLUCOMTR-MCNC: 237 MG/DL (ref 70–105)
GLUCOSE BLDC GLUCOMTR-MCNC: 242 MG/DL (ref 70–105)
GLUCOSE BLDC GLUCOMTR-MCNC: 268 MG/DL (ref 70–105)
GLUCOSE BLDC GLUCOMTR-MCNC: 276 MG/DL (ref 70–105)
GLUCOSE SERPL-MCNC: 281 MG/DL (ref 65–99)
HCO3 BLDA-SCNC: 34.6 MMOL/L (ref 21–28)
HCT VFR BLD AUTO: 34 % (ref 34–46.6)
HEMODILUTION: NO
HGB BLD-MCNC: 11.2 G/DL (ref 12–15.9)
INHALED O2 CONCENTRATION: 90 %
INR PPP: 1.09 (ref 0.93–1.1)
LDH SERPL-CCNC: 367 U/L (ref 135–214)
LYMPHOCYTES # BLD AUTO: 0.4 10*3/MM3 (ref 0.7–3.1)
LYMPHOCYTES NFR BLD AUTO: 4.9 % (ref 19.6–45.3)
MAGNESIUM SERPL-MCNC: 1.9 MG/DL (ref 1.6–2.6)
MCH RBC QN AUTO: 28 PG (ref 26.6–33)
MCHC RBC AUTO-ENTMCNC: 32.9 G/DL (ref 31.5–35.7)
MCV RBC AUTO: 85.1 FL (ref 79–97)
MODALITY: ABNORMAL
MONOCYTES # BLD AUTO: 0.3 10*3/MM3 (ref 0.1–0.9)
MONOCYTES NFR BLD AUTO: 2.9 % (ref 5–12)
NEUTROPHILS NFR BLD AUTO: 8.4 10*3/MM3 (ref 1.7–7)
NEUTROPHILS NFR BLD AUTO: 91.6 % (ref 42.7–76)
NRBC BLD AUTO-RTO: 0.1 /100 WBC (ref 0–0.2)
NT-PROBNP SERPL-MCNC: 195.5 PG/ML (ref 0–450)
PCO2 BLDA: 61.4 MM HG (ref 35–48)
PEEP RESPIRATORY: 16 CM[H2O]
PH BLDA: 7.36 PH UNITS (ref 7.35–7.45)
PHOSPHATE SERPL-MCNC: 4.2 MG/DL (ref 2.5–4.5)
PLATELET # BLD AUTO: 250 10*3/MM3 (ref 140–450)
PMV BLD AUTO: 7.9 FL (ref 6–12)
PO2 BLDA: 52.4 MM HG (ref 83–108)
POTASSIUM SERPL-SCNC: 4.9 MMOL/L (ref 3.5–5.2)
PROT SERPL-MCNC: 6.4 G/DL (ref 6–8.5)
PROTHROMBIN TIME: 12 SECONDS (ref 9.6–11.7)
RBC # BLD AUTO: 3.99 10*6/MM3 (ref 3.77–5.28)
RESPIRATORY RATE: 34
SAO2 % BLDCOA: 84 % (ref 94–98)
SODIUM SERPL-SCNC: 139 MMOL/L (ref 136–145)
TROPONIN T SERPL-MCNC: <0.01 NG/ML (ref 0–0.03)
VENTILATOR MODE: ABNORMAL
VT ON VENT VENT: 420 ML
WBC # BLD AUTO: 9.1 10*3/MM3 (ref 3.4–10.8)

## 2021-07-29 PROCEDURE — 87205 SMEAR GRAM STAIN: CPT | Performed by: NURSE PRACTITIONER

## 2021-07-29 PROCEDURE — 25010000002 METHYLNALTREXONE 12 MG/0.6ML SOLUTION: Performed by: INTERNAL MEDICINE

## 2021-07-29 PROCEDURE — 83615 LACTATE (LD) (LDH) ENZYME: CPT | Performed by: NURSE PRACTITIONER

## 2021-07-29 PROCEDURE — 85379 FIBRIN DEGRADATION QUANT: CPT | Performed by: NURSE PRACTITIONER

## 2021-07-29 PROCEDURE — 71045 X-RAY EXAM CHEST 1 VIEW: CPT

## 2021-07-29 PROCEDURE — 84100 ASSAY OF PHOSPHORUS: CPT | Performed by: NURSE PRACTITIONER

## 2021-07-29 PROCEDURE — 25010000002 MIDAZOLAM 50 MG/10ML SOLUTION: Performed by: NURSE PRACTITIONER

## 2021-07-29 PROCEDURE — 25010000002 EPOPROSTENOL PER 0.5 MG: Performed by: NURSE PRACTITIONER

## 2021-07-29 PROCEDURE — 85025 COMPLETE CBC W/AUTO DIFF WBC: CPT | Performed by: NURSE PRACTITIONER

## 2021-07-29 PROCEDURE — 82803 BLOOD GASES ANY COMBINATION: CPT

## 2021-07-29 PROCEDURE — 63710000001 INSULIN GLARGINE PER 5 UNITS: Performed by: INTERNAL MEDICINE

## 2021-07-29 PROCEDURE — 25010000002 FENTANYL CITRATE (PF) 2500 MCG/50ML SOLUTION: Performed by: NURSE PRACTITIONER

## 2021-07-29 PROCEDURE — 63710000001 INSULIN LISPRO (HUMAN) PER 5 UNITS: Performed by: INTERNAL MEDICINE

## 2021-07-29 PROCEDURE — 25010000002 PIPERACILLIN SOD-TAZOBACTAM PER 1 G: Performed by: INTERNAL MEDICINE

## 2021-07-29 PROCEDURE — 83880 ASSAY OF NATRIURETIC PEPTIDE: CPT | Performed by: NURSE PRACTITIONER

## 2021-07-29 PROCEDURE — 84484 ASSAY OF TROPONIN QUANT: CPT | Performed by: NURSE PRACTITIONER

## 2021-07-29 PROCEDURE — 87040 BLOOD CULTURE FOR BACTERIA: CPT | Performed by: INTERNAL MEDICINE

## 2021-07-29 PROCEDURE — 94799 UNLISTED PULMONARY SVC/PX: CPT

## 2021-07-29 PROCEDURE — 82330 ASSAY OF CALCIUM: CPT | Performed by: NURSE PRACTITIONER

## 2021-07-29 PROCEDURE — 25010000002 DEXAMETHASONE PER 1 MG: Performed by: INTERNAL MEDICINE

## 2021-07-29 PROCEDURE — 82962 GLUCOSE BLOOD TEST: CPT

## 2021-07-29 PROCEDURE — 85384 FIBRINOGEN ACTIVITY: CPT | Performed by: NURSE PRACTITIONER

## 2021-07-29 PROCEDURE — 83735 ASSAY OF MAGNESIUM: CPT | Performed by: NURSE PRACTITIONER

## 2021-07-29 PROCEDURE — 85610 PROTHROMBIN TIME: CPT | Performed by: NURSE PRACTITIONER

## 2021-07-29 PROCEDURE — 87186 SC STD MICRODIL/AGAR DIL: CPT | Performed by: NURSE PRACTITIONER

## 2021-07-29 PROCEDURE — 85730 THROMBOPLASTIN TIME PARTIAL: CPT | Performed by: NURSE PRACTITIONER

## 2021-07-29 PROCEDURE — 25010000002 ENOXAPARIN PER 10 MG: Performed by: INTERNAL MEDICINE

## 2021-07-29 PROCEDURE — 87070 CULTURE OTHR SPECIMN AEROBIC: CPT | Performed by: NURSE PRACTITIONER

## 2021-07-29 PROCEDURE — 94003 VENT MGMT INPAT SUBQ DAY: CPT

## 2021-07-29 PROCEDURE — 80053 COMPREHEN METABOLIC PANEL: CPT | Performed by: NURSE PRACTITIONER

## 2021-07-29 PROCEDURE — 87147 CULTURE TYPE IMMUNOLOGIC: CPT | Performed by: NURSE PRACTITIONER

## 2021-07-29 RX ORDER — INSULIN LISPRO 100 [IU]/ML
5 INJECTION, SOLUTION INTRAVENOUS; SUBCUTANEOUS EVERY 6 HOURS
Status: DISCONTINUED | OUTPATIENT
Start: 2021-07-29 | End: 2021-08-02

## 2021-07-29 RX ORDER — INSULIN LISPRO 100 [IU]/ML
5 INJECTION, SOLUTION INTRAVENOUS; SUBCUTANEOUS EVERY 6 HOURS
Status: DISCONTINUED | OUTPATIENT
Start: 2021-07-29 | End: 2021-07-29

## 2021-07-29 RX ORDER — HYDRALAZINE HYDROCHLORIDE 20 MG/ML
10 INJECTION INTRAMUSCULAR; INTRAVENOUS EVERY 4 HOURS PRN
Status: DISCONTINUED | OUTPATIENT
Start: 2021-07-29 | End: 2021-08-24 | Stop reason: HOSPADM

## 2021-07-29 RX ORDER — POLYETHYLENE GLYCOL 3350 17 G/17G
17 POWDER, FOR SOLUTION ORAL EVERY 8 HOURS
Status: DISCONTINUED | OUTPATIENT
Start: 2021-07-29 | End: 2021-08-01

## 2021-07-29 RX ADMIN — VECURONIUM BROMIDE 1 MCG/KG/MIN: 1 INJECTION, POWDER, LYOPHILIZED, FOR SOLUTION INTRAVENOUS at 07:02

## 2021-07-29 RX ADMIN — MIDAZOLAM 5 MG/HR: 5 INJECTION INTRAMUSCULAR; INTRAVENOUS at 23:30

## 2021-07-29 RX ADMIN — MINERAL OIL AND WHITE PETROLATUM: 150; 830 OINTMENT OPHTHALMIC at 05:05

## 2021-07-29 RX ADMIN — Medication 10 ML: at 20:55

## 2021-07-29 RX ADMIN — INSULIN LISPRO 12 UNITS: 100 INJECTION, SOLUTION INTRAVENOUS; SUBCUTANEOUS at 01:59

## 2021-07-29 RX ADMIN — Medication 600 MG: at 08:41

## 2021-07-29 RX ADMIN — MINERAL OIL AND WHITE PETROLATUM: 150; 830 OINTMENT OPHTHALMIC at 12:15

## 2021-07-29 RX ADMIN — VECURONIUM BROMIDE 1.2 MCG/KG/MIN: 1 INJECTION, POWDER, LYOPHILIZED, FOR SOLUTION INTRAVENOUS at 23:30

## 2021-07-29 RX ADMIN — METOPROLOL TARTRATE 25 MG: 25 TABLET, FILM COATED ORAL at 14:17

## 2021-07-29 RX ADMIN — GUAIFENESIN 400 MG: 100 SOLUTION ORAL at 23:43

## 2021-07-29 RX ADMIN — INSULIN LISPRO 8 UNITS: 100 INJECTION, SOLUTION INTRAVENOUS; SUBCUTANEOUS at 23:14

## 2021-07-29 RX ADMIN — FENTANYL CITRATE 200 MCG/HR: 0.05 INJECTION, SOLUTION INTRAMUSCULAR; INTRAVENOUS at 14:01

## 2021-07-29 RX ADMIN — MIDAZOLAM 5 MG/HR: 5 INJECTION INTRAMUSCULAR; INTRAVENOUS at 16:01

## 2021-07-29 RX ADMIN — LABETALOL 20 MG/4 ML (5 MG/ML) INTRAVENOUS SYRINGE 10 MG: at 05:27

## 2021-07-29 RX ADMIN — MINERAL OIL AND WHITE PETROLATUM: 150; 830 OINTMENT OPHTHALMIC at 08:41

## 2021-07-29 RX ADMIN — ZINC SULFATE 220 MG (50 MG) CAPSULE 220 MG: CAPSULE at 08:41

## 2021-07-29 RX ADMIN — INSULIN GLARGINE 20 UNITS: 100 INJECTION, SOLUTION SUBCUTANEOUS at 20:54

## 2021-07-29 RX ADMIN — Medication 600 MG: at 20:55

## 2021-07-29 RX ADMIN — ENOXAPARIN SODIUM 40 MG: 40 INJECTION SUBCUTANEOUS at 05:05

## 2021-07-29 RX ADMIN — PIPERACILLIN AND TAZOBACTAM 4.5 G: 4; .5 INJECTION, POWDER, LYOPHILIZED, FOR SOLUTION INTRAVENOUS; PARENTERAL at 09:03

## 2021-07-29 RX ADMIN — DOCUSATE SODIUM 100 MG: 50 LIQUID ORAL at 08:41

## 2021-07-29 RX ADMIN — FENTANYL CITRATE 250 MCG/HR: 0.05 INJECTION, SOLUTION INTRAMUSCULAR; INTRAVENOUS at 23:30

## 2021-07-29 RX ADMIN — DEXAMETHASONE SODIUM PHOSPHATE 6 MG: 4 INJECTION, SOLUTION INTRAMUSCULAR; INTRAVENOUS at 08:41

## 2021-07-29 RX ADMIN — INSULIN LISPRO 8 UNITS: 100 INJECTION, SOLUTION INTRAVENOUS; SUBCUTANEOUS at 17:05

## 2021-07-29 RX ADMIN — FENTANYL CITRATE 175 MCG/HR: 0.05 INJECTION, SOLUTION INTRAMUSCULAR; INTRAVENOUS at 07:29

## 2021-07-29 RX ADMIN — ENOXAPARIN SODIUM 40 MG: 40 INJECTION SUBCUTANEOUS at 17:05

## 2021-07-29 RX ADMIN — INSULIN GLARGINE 20 UNITS: 100 INJECTION, SOLUTION SUBCUTANEOUS at 08:41

## 2021-07-29 RX ADMIN — PIPERACILLIN AND TAZOBACTAM 4.5 G: 4; .5 INJECTION, POWDER, LYOPHILIZED, FOR SOLUTION INTRAVENOUS; PARENTERAL at 01:52

## 2021-07-29 RX ADMIN — GUAIFENESIN 400 MG: 100 SOLUTION ORAL at 05:05

## 2021-07-29 RX ADMIN — INSULIN LISPRO 8 UNITS: 100 INJECTION, SOLUTION INTRAVENOUS; SUBCUTANEOUS at 11:47

## 2021-07-29 RX ADMIN — POLYETHYLENE GLYCOL 3350 17 G: 17 POWDER, FOR SOLUTION ORAL at 08:41

## 2021-07-29 RX ADMIN — INSULIN LISPRO 5 UNITS: 100 INJECTION, SOLUTION INTRAVENOUS; SUBCUTANEOUS at 23:15

## 2021-07-29 RX ADMIN — FENTANYL CITRATE 250 MCG/HR: 0.05 INJECTION, SOLUTION INTRAMUSCULAR; INTRAVENOUS at 18:59

## 2021-07-29 RX ADMIN — MINERAL OIL AND WHITE PETROLATUM: 150; 830 OINTMENT OPHTHALMIC at 20:55

## 2021-07-29 RX ADMIN — GUAIFENESIN 400 MG: 100 SOLUTION ORAL at 11:41

## 2021-07-29 RX ADMIN — INSULIN LISPRO 12 UNITS: 100 INJECTION, SOLUTION INTRAVENOUS; SUBCUTANEOUS at 05:16

## 2021-07-29 RX ADMIN — DOCUSATE SODIUM 100 MG: 50 LIQUID ORAL at 20:55

## 2021-07-29 RX ADMIN — FENTANYL CITRATE 175 MCG/HR: 0.05 INJECTION, SOLUTION INTRAMUSCULAR; INTRAVENOUS at 02:19

## 2021-07-29 RX ADMIN — MINERAL OIL AND WHITE PETROLATUM: 150; 830 OINTMENT OPHTHALMIC at 01:56

## 2021-07-29 RX ADMIN — VECURONIUM BROMIDE 1 MCG/KG/MIN: 1 INJECTION, POWDER, LYOPHILIZED, FOR SOLUTION INTRAVENOUS at 12:13

## 2021-07-29 RX ADMIN — DEXAMETHASONE SODIUM PHOSPHATE 6 MG: 4 INJECTION, SOLUTION INTRAMUSCULAR; INTRAVENOUS at 20:55

## 2021-07-29 RX ADMIN — GUAIFENESIN 400 MG: 100 SOLUTION ORAL at 01:51

## 2021-07-29 RX ADMIN — EPOPROSTENOL 50 NG/KG/MIN: 1.5 INJECTION, POWDER, LYOPHILIZED, FOR SOLUTION INTRAVENOUS at 23:31

## 2021-07-29 RX ADMIN — FAMOTIDINE 20 MG: 20 TABLET ORAL at 08:41

## 2021-07-29 RX ADMIN — MIDAZOLAM 5 MG/HR: 5 INJECTION INTRAMUSCULAR; INTRAVENOUS at 07:02

## 2021-07-29 RX ADMIN — METOPROLOL TARTRATE 25 MG: 25 TABLET, FILM COATED ORAL at 23:43

## 2021-07-29 RX ADMIN — Medication 2000 UNITS: at 08:41

## 2021-07-29 RX ADMIN — Medication 10 ML: at 08:42

## 2021-07-29 RX ADMIN — METOPROLOL TARTRATE 25 MG: 25 TABLET, FILM COATED ORAL at 08:41

## 2021-07-29 RX ADMIN — POLYETHYLENE GLYCOL 3350 17 G: 17 POWDER, FOR SOLUTION ORAL at 15:59

## 2021-07-29 RX ADMIN — EPOPROSTENOL 50 NG/KG/MIN: 1.5 INJECTION, POWDER, LYOPHILIZED, FOR SOLUTION INTRAVENOUS at 05:13

## 2021-07-29 RX ADMIN — POLYETHYLENE GLYCOL 3350 17 G: 17 POWDER, FOR SOLUTION ORAL at 23:43

## 2021-07-29 RX ADMIN — VECURONIUM BROMIDE 1 MCG/KG/MIN: 1 INJECTION, POWDER, LYOPHILIZED, FOR SOLUTION INTRAVENOUS at 01:02

## 2021-07-29 RX ADMIN — METHYLNALTREXONE BROMIDE 12 MG: 12 INJECTION, SOLUTION SUBCUTANEOUS at 11:42

## 2021-07-29 RX ADMIN — INSULIN LISPRO 5 UNITS: 100 INJECTION, SOLUTION INTRAVENOUS; SUBCUTANEOUS at 17:05

## 2021-07-29 RX ADMIN — MINERAL OIL AND WHITE PETROLATUM: 150; 830 OINTMENT OPHTHALMIC at 23:37

## 2021-07-29 RX ADMIN — EPOPROSTENOL 50 NG/KG/MIN: 1.5 INJECTION, POWDER, LYOPHILIZED, FOR SOLUTION INTRAVENOUS at 14:05

## 2021-07-29 RX ADMIN — MINERAL OIL AND WHITE PETROLATUM: 150; 830 OINTMENT OPHTHALMIC at 16:00

## 2021-07-29 RX ADMIN — PIPERACILLIN AND TAZOBACTAM 4.5 G: 4; .5 INJECTION, POWDER, LYOPHILIZED, FOR SOLUTION INTRAVENOUS; PARENTERAL at 17:05

## 2021-07-29 RX ADMIN — VECURONIUM BROMIDE 1 MCG/KG/MIN: 1 INJECTION, POWDER, LYOPHILIZED, FOR SOLUTION INTRAVENOUS at 18:58

## 2021-07-29 RX ADMIN — GUAIFENESIN 400 MG: 100 SOLUTION ORAL at 17:05

## 2021-07-29 RX ADMIN — INSULIN LISPRO 5 UNITS: 100 INJECTION, SOLUTION INTRAVENOUS; SUBCUTANEOUS at 11:42

## 2021-07-30 PROBLEM — E11.69 DIABETES MELLITUS TYPE 2 IN OBESE: Chronic | Status: ACTIVE | Noted: 2021-07-30

## 2021-07-30 PROBLEM — E66.9 DIABETES MELLITUS TYPE 2 IN OBESE: Chronic | Status: ACTIVE | Noted: 2021-07-30

## 2021-07-30 LAB
ALBUMIN SERPL-MCNC: 2.9 G/DL (ref 3.5–5.2)
ALBUMIN/GLOB SERPL: 0.7 G/DL
ALP SERPL-CCNC: 70 U/L (ref 39–117)
ALT SERPL W P-5'-P-CCNC: 70 U/L (ref 1–33)
ANION GAP SERPL CALCULATED.3IONS-SCNC: 7 MMOL/L (ref 5–15)
APTT PPP: 23.3 SECONDS (ref 61–76.5)
APTT PPP: 51.2 SECONDS (ref 61–76.5)
APTT PPP: 55.9 SECONDS (ref 61–76.5)
ARTERIAL PATENCY WRIST A: POSITIVE
AST SERPL-CCNC: 53 U/L (ref 1–32)
ATMOSPHERIC PRESS: ABNORMAL MM[HG]
BASE EXCESS BLDA CALC-SCNC: 12.7 MMOL/L (ref 0–3)
BASOPHILS # BLD AUTO: 0.1 10*3/MM3 (ref 0–0.2)
BASOPHILS NFR BLD AUTO: 0.6 % (ref 0–1.5)
BDY SITE: ABNORMAL
BILIRUB SERPL-MCNC: 0.7 MG/DL (ref 0–1.2)
BUN SERPL-MCNC: 17 MG/DL (ref 6–20)
BUN/CREAT SERPL: 50 (ref 7–25)
CA-I SERPL ISE-MCNC: 1.22 MMOL/L (ref 1.2–1.3)
CALCIUM SPEC-SCNC: 8.7 MG/DL (ref 8.6–10.5)
CHLORIDE SERPL-SCNC: 95 MMOL/L (ref 98–107)
CO2 BLDA-SCNC: 43.5 MMOL/L (ref 22–29)
CO2 SERPL-SCNC: 35 MMOL/L (ref 22–29)
CREAT SERPL-MCNC: 0.34 MG/DL (ref 0.57–1)
DEPRECATED RDW RBC AUTO: 44.2 FL (ref 37–54)
EOSINOPHIL # BLD AUTO: 0.1 10*3/MM3 (ref 0–0.4)
EOSINOPHIL NFR BLD AUTO: 0.5 % (ref 0.3–6.2)
ERYTHROCYTE [DISTWIDTH] IN BLOOD BY AUTOMATED COUNT: 14.6 % (ref 12.3–15.4)
GFR SERPL CREATININE-BSD FRML MDRD: >150 ML/MIN/1.73
GLOBULIN UR ELPH-MCNC: 4 GM/DL
GLUCOSE BLDC GLUCOMTR-MCNC: 169 MG/DL (ref 70–105)
GLUCOSE BLDC GLUCOMTR-MCNC: 179 MG/DL (ref 70–105)
GLUCOSE BLDC GLUCOMTR-MCNC: 232 MG/DL (ref 70–105)
GLUCOSE SERPL-MCNC: 278 MG/DL (ref 65–99)
HCO3 BLDA-SCNC: 41.2 MMOL/L (ref 21–28)
HCT VFR BLD AUTO: 35 % (ref 34–46.6)
HEMODILUTION: NO
HGB BLD-MCNC: 11.3 G/DL (ref 12–15.9)
INHALED O2 CONCENTRATION: 100 %
INR PPP: 1.08 (ref 0.93–1.1)
LYMPHOCYTES # BLD AUTO: 0.4 10*3/MM3 (ref 0.7–3.1)
LYMPHOCYTES NFR BLD AUTO: 3.4 % (ref 19.6–45.3)
MAGNESIUM SERPL-MCNC: 2 MG/DL (ref 1.6–2.6)
MCH RBC QN AUTO: 27.9 PG (ref 26.6–33)
MCHC RBC AUTO-ENTMCNC: 32.2 G/DL (ref 31.5–35.7)
MCV RBC AUTO: 86.5 FL (ref 79–97)
MODALITY: ABNORMAL
MONOCYTES # BLD AUTO: 0.3 10*3/MM3 (ref 0.1–0.9)
MONOCYTES NFR BLD AUTO: 3 % (ref 5–12)
NEUTROPHILS NFR BLD AUTO: 10.4 10*3/MM3 (ref 1.7–7)
NEUTROPHILS NFR BLD AUTO: 92.5 % (ref 42.7–76)
NRBC BLD AUTO-RTO: 0 /100 WBC (ref 0–0.2)
PCO2 BLDA: 74.1 MM HG (ref 35–48)
PEEP RESPIRATORY: 16 CM[H2O]
PH BLDA: 7.35 PH UNITS (ref 7.35–7.45)
PHOSPHATE SERPL-MCNC: 4.2 MG/DL (ref 2.5–4.5)
PLATELET # BLD AUTO: 283 10*3/MM3 (ref 140–450)
PMV BLD AUTO: 8.9 FL (ref 6–12)
PO2 BLDA: 70.9 MM HG (ref 83–108)
POTASSIUM SERPL-SCNC: 5.1 MMOL/L (ref 3.5–5.2)
PROT SERPL-MCNC: 6.9 G/DL (ref 6–8.5)
PROTHROMBIN TIME: 11.9 SECONDS (ref 9.6–11.7)
RBC # BLD AUTO: 4.05 10*6/MM3 (ref 3.77–5.28)
RESPIRATORY RATE: 34
SAO2 % BLDCOA: 92.1 % (ref 94–98)
SODIUM SERPL-SCNC: 137 MMOL/L (ref 136–145)
VENTILATOR MODE: ABNORMAL
VT ON VENT VENT: 420 ML
WBC # BLD AUTO: 11.3 10*3/MM3 (ref 3.4–10.8)

## 2021-07-30 PROCEDURE — 85730 THROMBOPLASTIN TIME PARTIAL: CPT | Performed by: NURSE PRACTITIONER

## 2021-07-30 PROCEDURE — 94003 VENT MGMT INPAT SUBQ DAY: CPT

## 2021-07-30 PROCEDURE — 25010000002 EPOPROSTENOL PER 0.5 MG: Performed by: NURSE PRACTITIONER

## 2021-07-30 PROCEDURE — 82803 BLOOD GASES ANY COMBINATION: CPT

## 2021-07-30 PROCEDURE — 25010000002 FENTANYL CITRATE (PF) 2500 MCG/50ML SOLUTION: Performed by: NURSE PRACTITIONER

## 2021-07-30 PROCEDURE — 25010000002 ENOXAPARIN PER 10 MG: Performed by: INTERNAL MEDICINE

## 2021-07-30 PROCEDURE — 84100 ASSAY OF PHOSPHORUS: CPT | Performed by: NURSE PRACTITIONER

## 2021-07-30 PROCEDURE — 85025 COMPLETE CBC W/AUTO DIFF WBC: CPT | Performed by: NURSE PRACTITIONER

## 2021-07-30 PROCEDURE — 94799 UNLISTED PULMONARY SVC/PX: CPT

## 2021-07-30 PROCEDURE — 25010000002 DEXAMETHASONE PER 1 MG: Performed by: INTERNAL MEDICINE

## 2021-07-30 PROCEDURE — 83735 ASSAY OF MAGNESIUM: CPT | Performed by: NURSE PRACTITIONER

## 2021-07-30 PROCEDURE — 36600 WITHDRAWAL OF ARTERIAL BLOOD: CPT

## 2021-07-30 PROCEDURE — 25010000002 FUROSEMIDE PER 20 MG: Performed by: INTERNAL MEDICINE

## 2021-07-30 PROCEDURE — 82962 GLUCOSE BLOOD TEST: CPT

## 2021-07-30 PROCEDURE — 63710000001 INSULIN GLARGINE PER 5 UNITS: Performed by: INTERNAL MEDICINE

## 2021-07-30 PROCEDURE — 80053 COMPREHEN METABOLIC PANEL: CPT | Performed by: NURSE PRACTITIONER

## 2021-07-30 PROCEDURE — 82330 ASSAY OF CALCIUM: CPT | Performed by: NURSE PRACTITIONER

## 2021-07-30 PROCEDURE — 25010000002 HEPARIN (PORCINE) 25000-0.45 UT/250ML-% SOLUTION: Performed by: INTERNAL MEDICINE

## 2021-07-30 PROCEDURE — 85730 THROMBOPLASTIN TIME PARTIAL: CPT | Performed by: INTERNAL MEDICINE

## 2021-07-30 PROCEDURE — 25010000002 MIDAZOLAM 50 MG/10ML SOLUTION: Performed by: NURSE PRACTITIONER

## 2021-07-30 PROCEDURE — 63710000001 INSULIN LISPRO (HUMAN) PER 5 UNITS: Performed by: INTERNAL MEDICINE

## 2021-07-30 PROCEDURE — 85610 PROTHROMBIN TIME: CPT | Performed by: INTERNAL MEDICINE

## 2021-07-30 PROCEDURE — 25010000002 PIPERACILLIN SOD-TAZOBACTAM PER 1 G: Performed by: INTERNAL MEDICINE

## 2021-07-30 RX ORDER — INSULIN GLARGINE 100 [IU]/ML
10 INJECTION, SOLUTION SUBCUTANEOUS ONCE
Status: COMPLETED | OUTPATIENT
Start: 2021-07-30 | End: 2021-07-30

## 2021-07-30 RX ORDER — BUDESONIDE AND FORMOTEROL FUMARATE DIHYDRATE 160; 4.5 UG/1; UG/1
2 AEROSOL RESPIRATORY (INHALATION)
Status: DISCONTINUED | OUTPATIENT
Start: 2021-07-30 | End: 2021-08-08

## 2021-07-30 RX ORDER — ASPIRIN 81 MG/1
324 TABLET, CHEWABLE ORAL DAILY
Status: DISCONTINUED | OUTPATIENT
Start: 2021-07-31 | End: 2021-08-15

## 2021-07-30 RX ORDER — INSULIN GLARGINE 100 [IU]/ML
30 INJECTION, SOLUTION SUBCUTANEOUS EVERY 12 HOURS SCHEDULED
Status: DISCONTINUED | OUTPATIENT
Start: 2021-07-30 | End: 2021-08-04

## 2021-07-30 RX ORDER — FENTANYL CITRATE-0.9 % NACL/PF 10 MCG/ML
50-300 PLASTIC BAG, INJECTION (ML) INTRAVENOUS
Status: DISCONTINUED | OUTPATIENT
Start: 2021-07-30 | End: 2021-08-01

## 2021-07-30 RX ORDER — HEPARIN SODIUM 10000 [USP'U]/100ML
8.98 INJECTION, SOLUTION INTRAVENOUS
Status: DISCONTINUED | OUTPATIENT
Start: 2021-07-30 | End: 2021-08-03

## 2021-07-30 RX ORDER — ALBUTEROL SULFATE 90 UG/1
6 AEROSOL, METERED RESPIRATORY (INHALATION)
Status: DISCONTINUED | OUTPATIENT
Start: 2021-07-30 | End: 2021-08-08

## 2021-07-30 RX ORDER — FUROSEMIDE 10 MG/ML
20 INJECTION INTRAMUSCULAR; INTRAVENOUS
Status: DISCONTINUED | OUTPATIENT
Start: 2021-07-30 | End: 2021-08-03

## 2021-07-30 RX ADMIN — ALBUTEROL SULFATE 6 PUFF: 108 INHALANT RESPIRATORY (INHALATION) at 15:26

## 2021-07-30 RX ADMIN — GUAIFENESIN 400 MG: 100 SOLUTION ORAL at 11:16

## 2021-07-30 RX ADMIN — ZINC SULFATE 220 MG (50 MG) CAPSULE 220 MG: CAPSULE at 08:23

## 2021-07-30 RX ADMIN — FENTANYL CITRATE 250 MCG/HR: 50 INJECTION, SOLUTION INTRAMUSCULAR; INTRAVENOUS at 13:41

## 2021-07-30 RX ADMIN — EPOPROSTENOL 50 NG/KG/MIN: 1.5 INJECTION, POWDER, LYOPHILIZED, FOR SOLUTION INTRAVENOUS at 09:30

## 2021-07-30 RX ADMIN — DEXAMETHASONE SODIUM PHOSPHATE 6 MG: 4 INJECTION, SOLUTION INTRAMUSCULAR; INTRAVENOUS at 08:23

## 2021-07-30 RX ADMIN — Medication 10 ML: at 21:10

## 2021-07-30 RX ADMIN — MINERAL OIL AND WHITE PETROLATUM: 150; 830 OINTMENT OPHTHALMIC at 11:24

## 2021-07-30 RX ADMIN — FENTANYL CITRATE 250 MCG/HR: 50 INJECTION, SOLUTION INTRAMUSCULAR; INTRAVENOUS at 21:23

## 2021-07-30 RX ADMIN — MINERAL OIL AND WHITE PETROLATUM: 150; 830 OINTMENT OPHTHALMIC at 15:45

## 2021-07-30 RX ADMIN — INSULIN LISPRO 4 UNITS: 100 INJECTION, SOLUTION INTRAVENOUS; SUBCUTANEOUS at 17:08

## 2021-07-30 RX ADMIN — MINERAL OIL AND WHITE PETROLATUM: 150; 830 OINTMENT OPHTHALMIC at 07:52

## 2021-07-30 RX ADMIN — POLYETHYLENE GLYCOL 3350 17 G: 17 POWDER, FOR SOLUTION ORAL at 23:56

## 2021-07-30 RX ADMIN — HEPARIN SODIUM 10.17 UNITS/KG/HR: 10000 INJECTION, SOLUTION INTRAVENOUS at 17:08

## 2021-07-30 RX ADMIN — INSULIN LISPRO 5 UNITS: 100 INJECTION, SOLUTION INTRAVENOUS; SUBCUTANEOUS at 07:22

## 2021-07-30 RX ADMIN — FENTANYL CITRATE 250 MCG/HR: 0.05 INJECTION, SOLUTION INTRAMUSCULAR; INTRAVENOUS at 03:39

## 2021-07-30 RX ADMIN — POLYETHYLENE GLYCOL 3350 17 G: 17 POWDER, FOR SOLUTION ORAL at 15:44

## 2021-07-30 RX ADMIN — FUROSEMIDE 20 MG: 10 INJECTION, SOLUTION INTRAMUSCULAR; INTRAVENOUS at 11:16

## 2021-07-30 RX ADMIN — VECURONIUM BROMIDE 1.2 MCG/KG/MIN: 1 INJECTION, POWDER, LYOPHILIZED, FOR SOLUTION INTRAVENOUS at 16:22

## 2021-07-30 RX ADMIN — Medication 2000 UNITS: at 08:23

## 2021-07-30 RX ADMIN — INSULIN LISPRO 4 UNITS: 100 INJECTION, SOLUTION INTRAVENOUS; SUBCUTANEOUS at 11:18

## 2021-07-30 RX ADMIN — METOPROLOL TARTRATE 25 MG: 25 TABLET, FILM COATED ORAL at 07:52

## 2021-07-30 RX ADMIN — FENTANYL CITRATE 250 MCG/HR: 0.05 INJECTION, SOLUTION INTRAMUSCULAR; INTRAVENOUS at 07:50

## 2021-07-30 RX ADMIN — VECURONIUM BROMIDE 1.2 MCG/KG/MIN: 1 INJECTION, POWDER, LYOPHILIZED, FOR SOLUTION INTRAVENOUS at 03:39

## 2021-07-30 RX ADMIN — INSULIN LISPRO 8 UNITS: 100 INJECTION, SOLUTION INTRAVENOUS; SUBCUTANEOUS at 07:22

## 2021-07-30 RX ADMIN — Medication 10 ML: at 08:24

## 2021-07-30 RX ADMIN — METOPROLOL TARTRATE 25 MG: 25 TABLET, FILM COATED ORAL at 23:55

## 2021-07-30 RX ADMIN — DOCUSATE SODIUM 100 MG: 50 LIQUID ORAL at 08:23

## 2021-07-30 RX ADMIN — ENOXAPARIN SODIUM 40 MG: 40 INJECTION SUBCUTANEOUS at 07:19

## 2021-07-30 RX ADMIN — VECURONIUM BROMIDE 1.2 MCG/KG/MIN: 1 INJECTION, POWDER, LYOPHILIZED, FOR SOLUTION INTRAVENOUS at 09:12

## 2021-07-30 RX ADMIN — GUAIFENESIN 400 MG: 100 SOLUTION ORAL at 17:08

## 2021-07-30 RX ADMIN — HEPARIN SODIUM 16.99 UNITS/KG/HR: 10000 INJECTION, SOLUTION INTRAVENOUS at 23:14

## 2021-07-30 RX ADMIN — PIPERACILLIN AND TAZOBACTAM 4.5 G: 4; .5 INJECTION, POWDER, LYOPHILIZED, FOR SOLUTION INTRAVENOUS; PARENTERAL at 08:23

## 2021-07-30 RX ADMIN — MIDAZOLAM 6 MG/HR: 5 INJECTION INTRAMUSCULAR; INTRAVENOUS at 07:50

## 2021-07-30 RX ADMIN — MINERAL OIL AND WHITE PETROLATUM: 150; 830 OINTMENT OPHTHALMIC at 21:26

## 2021-07-30 RX ADMIN — DEXAMETHASONE SODIUM PHOSPHATE 6 MG: 4 INJECTION, SOLUTION INTRAMUSCULAR; INTRAVENOUS at 21:11

## 2021-07-30 RX ADMIN — EPOPROSTENOL 50 NG/KG/MIN: 1.5 INJECTION, POWDER, LYOPHILIZED, FOR SOLUTION INTRAVENOUS at 17:27

## 2021-07-30 RX ADMIN — INSULIN LISPRO 5 UNITS: 100 INJECTION, SOLUTION INTRAVENOUS; SUBCUTANEOUS at 11:17

## 2021-07-30 RX ADMIN — HEPARIN SODIUM 8.98 UNITS/KG/HR: 10000 INJECTION, SOLUTION INTRAVENOUS at 11:13

## 2021-07-30 RX ADMIN — POLYETHYLENE GLYCOL 3350 17 G: 17 POWDER, FOR SOLUTION ORAL at 07:52

## 2021-07-30 RX ADMIN — ALBUTEROL SULFATE 6 PUFF: 108 INHALANT RESPIRATORY (INHALATION) at 20:06

## 2021-07-30 RX ADMIN — METOPROLOL TARTRATE 25 MG: 25 TABLET, FILM COATED ORAL at 15:44

## 2021-07-30 RX ADMIN — FENTANYL CITRATE 250 MCG/HR: 50 INJECTION, SOLUTION INTRAMUSCULAR; INTRAVENOUS at 17:29

## 2021-07-30 RX ADMIN — Medication 600 MG: at 21:15

## 2021-07-30 RX ADMIN — PIPERACILLIN AND TAZOBACTAM 4.5 G: 4; .5 INJECTION, POWDER, LYOPHILIZED, FOR SOLUTION INTRAVENOUS; PARENTERAL at 02:49

## 2021-07-30 RX ADMIN — VECURONIUM BROMIDE 1.2 MCG/KG/MIN: 1 INJECTION, POWDER, LYOPHILIZED, FOR SOLUTION INTRAVENOUS at 21:39

## 2021-07-30 RX ADMIN — INSULIN LISPRO 5 UNITS: 100 INJECTION, SOLUTION INTRAVENOUS; SUBCUTANEOUS at 17:09

## 2021-07-30 RX ADMIN — FUROSEMIDE 20 MG: 10 INJECTION, SOLUTION INTRAMUSCULAR; INTRAVENOUS at 17:11

## 2021-07-30 RX ADMIN — BUDESONIDE AND FORMOTEROL FUMARATE DIHYDRATE 2 PUFF: 160; 4.5 AEROSOL RESPIRATORY (INHALATION) at 20:06

## 2021-07-30 RX ADMIN — GUAIFENESIN 400 MG: 100 SOLUTION ORAL at 07:20

## 2021-07-30 RX ADMIN — Medication 600 MG: at 08:23

## 2021-07-30 RX ADMIN — INSULIN GLARGINE 30 UNITS: 100 INJECTION, SOLUTION SUBCUTANEOUS at 21:15

## 2021-07-30 RX ADMIN — INSULIN GLARGINE 10 UNITS: 100 INJECTION, SOLUTION SUBCUTANEOUS at 11:16

## 2021-07-30 RX ADMIN — MINERAL OIL AND WHITE PETROLATUM: 150; 830 OINTMENT OPHTHALMIC at 03:36

## 2021-07-30 RX ADMIN — GUAIFENESIN 400 MG: 100 SOLUTION ORAL at 23:55

## 2021-07-30 RX ADMIN — PIPERACILLIN AND TAZOBACTAM 4.5 G: 4; .5 INJECTION, POWDER, LYOPHILIZED, FOR SOLUTION INTRAVENOUS; PARENTERAL at 17:08

## 2021-07-30 RX ADMIN — FAMOTIDINE 20 MG: 20 TABLET ORAL at 08:23

## 2021-07-30 RX ADMIN — DOCUSATE SODIUM 100 MG: 50 LIQUID ORAL at 21:11

## 2021-07-30 RX ADMIN — MINERAL OIL AND WHITE PETROLATUM: 150; 830 OINTMENT OPHTHALMIC at 23:55

## 2021-07-30 RX ADMIN — MIDAZOLAM 6 MG/HR: 5 INJECTION INTRAMUSCULAR; INTRAVENOUS at 17:29

## 2021-07-30 RX ADMIN — INSULIN GLARGINE 20 UNITS: 100 INJECTION, SOLUTION SUBCUTANEOUS at 07:50

## 2021-07-31 LAB
ALBUMIN SERPL-MCNC: 2.8 G/DL (ref 3.5–5.2)
ALBUMIN/GLOB SERPL: 0.7 G/DL
ALP SERPL-CCNC: 70 U/L (ref 39–117)
ALT SERPL W P-5'-P-CCNC: 70 U/L (ref 1–33)
ANION GAP SERPL CALCULATED.3IONS-SCNC: 5 MMOL/L (ref 5–15)
APTT PPP: 66.5 SECONDS (ref 61–76.5)
APTT PPP: 69.5 SECONDS (ref 61–76.5)
ARTERIAL PATENCY WRIST A: POSITIVE
AST SERPL-CCNC: 49 U/L (ref 1–32)
ATMOSPHERIC PRESS: ABNORMAL MM[HG]
BASE EXCESS BLDA CALC-SCNC: -12.5 MMOL/L (ref 0–3)
BASOPHILS # BLD AUTO: 0 10*3/MM3 (ref 0–0.2)
BASOPHILS NFR BLD AUTO: 0.4 % (ref 0–1.5)
BDY SITE: ABNORMAL
BILIRUB SERPL-MCNC: 0.7 MG/DL (ref 0–1.2)
BUN SERPL-MCNC: 21 MG/DL (ref 6–20)
BUN/CREAT SERPL: 58.3 (ref 7–25)
CA-I SERPL ISE-MCNC: 1.21 MMOL/L (ref 1.2–1.3)
CALCIUM SPEC-SCNC: 8.9 MG/DL (ref 8.6–10.5)
CHLORIDE SERPL-SCNC: 95 MMOL/L (ref 98–107)
CO2 BLDA-SCNC: 13.2 MMOL/L (ref 22–29)
CO2 SERPL-SCNC: 37 MMOL/L (ref 22–29)
CREAT SERPL-MCNC: 0.36 MG/DL (ref 0.57–1)
D DIMER PPP FEU-MCNC: 1.71 MG/L (FEU) (ref 0–0.59)
DEPRECATED RDW RBC AUTO: 44.2 FL (ref 37–54)
EOSINOPHIL # BLD AUTO: 0.1 10*3/MM3 (ref 0–0.4)
EOSINOPHIL NFR BLD AUTO: 1.2 % (ref 0.3–6.2)
ERYTHROCYTE [DISTWIDTH] IN BLOOD BY AUTOMATED COUNT: 14.6 % (ref 12.3–15.4)
FIBRINOGEN PPP-MCNC: >860 MG/DL (ref 210–450)
GFR SERPL CREATININE-BSD FRML MDRD: >150 ML/MIN/1.73
GLOBULIN UR ELPH-MCNC: 3.9 GM/DL
GLUCOSE BLDC GLUCOMTR-MCNC: 201 MG/DL (ref 70–105)
GLUCOSE BLDC GLUCOMTR-MCNC: 205 MG/DL (ref 70–105)
GLUCOSE BLDC GLUCOMTR-MCNC: 208 MG/DL (ref 70–105)
GLUCOSE BLDC GLUCOMTR-MCNC: 219 MG/DL (ref 70–105)
GLUCOSE BLDC GLUCOMTR-MCNC: 221 MG/DL (ref 70–105)
GLUCOSE SERPL-MCNC: 226 MG/DL (ref 65–99)
HCO3 BLDA-SCNC: 12.5 MMOL/L (ref 21–28)
HCT VFR BLD AUTO: 33 % (ref 34–46.6)
HEMODILUTION: NO
HGB BLD-MCNC: 10.9 G/DL (ref 12–15.9)
INHALED O2 CONCENTRATION: 80 %
INR PPP: 1.12 (ref 0.93–1.1)
LDH SERPL-CCNC: 325 U/L (ref 135–214)
LYMPHOCYTES # BLD AUTO: 0.7 10*3/MM3 (ref 0.7–3.1)
LYMPHOCYTES NFR BLD AUTO: 6.4 % (ref 19.6–45.3)
MAGNESIUM SERPL-MCNC: 2.2 MG/DL (ref 1.6–2.6)
MCH RBC QN AUTO: 28.5 PG (ref 26.6–33)
MCHC RBC AUTO-ENTMCNC: 33 G/DL (ref 31.5–35.7)
MCV RBC AUTO: 86.3 FL (ref 79–97)
MODALITY: ABNORMAL
MONOCYTES # BLD AUTO: 0.4 10*3/MM3 (ref 0.1–0.9)
MONOCYTES NFR BLD AUTO: 3.4 % (ref 5–12)
NEUTROPHILS NFR BLD AUTO: 88.6 % (ref 42.7–76)
NEUTROPHILS NFR BLD AUTO: 9.2 10*3/MM3 (ref 1.7–7)
NRBC BLD AUTO-RTO: 0 /100 WBC (ref 0–0.2)
NT-PROBNP SERPL-MCNC: 221 PG/ML (ref 0–450)
PCO2 BLDA: 23.7 MM HG (ref 35–48)
PEEP RESPIRATORY: 16 CM[H2O]
PH BLDA: 7.33 PH UNITS (ref 7.35–7.45)
PHOSPHATE SERPL-MCNC: 4.2 MG/DL (ref 2.5–4.5)
PLATELET # BLD AUTO: 276 10*3/MM3 (ref 140–450)
PMV BLD AUTO: 8.5 FL (ref 6–12)
PO2 BLDA: 151.4 MM HG (ref 83–108)
POTASSIUM SERPL-SCNC: 5 MMOL/L (ref 3.5–5.2)
PROT SERPL-MCNC: 6.7 G/DL (ref 6–8.5)
PROTHROMBIN TIME: 12.3 SECONDS (ref 9.6–11.7)
RBC # BLD AUTO: 3.83 10*6/MM3 (ref 3.77–5.28)
RESPIRATORY RATE: 34
SAO2 % BLDCOA: 99.3 % (ref 94–98)
SODIUM SERPL-SCNC: 137 MMOL/L (ref 136–145)
TROPONIN T SERPL-MCNC: <0.01 NG/ML (ref 0–0.03)
VENTILATOR MODE: ABNORMAL
VT ON VENT VENT: 420 ML
WBC # BLD AUTO: 10.4 10*3/MM3 (ref 3.4–10.8)

## 2021-07-31 PROCEDURE — 25010000002 DEXAMETHASONE PER 1 MG: Performed by: INTERNAL MEDICINE

## 2021-07-31 PROCEDURE — 85025 COMPLETE CBC W/AUTO DIFF WBC: CPT | Performed by: NURSE PRACTITIONER

## 2021-07-31 PROCEDURE — 82962 GLUCOSE BLOOD TEST: CPT

## 2021-07-31 PROCEDURE — 63710000001 INSULIN GLARGINE PER 5 UNITS: Performed by: INTERNAL MEDICINE

## 2021-07-31 PROCEDURE — 84484 ASSAY OF TROPONIN QUANT: CPT | Performed by: NURSE PRACTITIONER

## 2021-07-31 PROCEDURE — 82803 BLOOD GASES ANY COMBINATION: CPT

## 2021-07-31 PROCEDURE — 63710000001 INSULIN LISPRO (HUMAN) PER 5 UNITS: Performed by: INTERNAL MEDICINE

## 2021-07-31 PROCEDURE — 94799 UNLISTED PULMONARY SVC/PX: CPT

## 2021-07-31 PROCEDURE — 25010000002 FENTANYL CITRATE (PF) 2500 MCG/50ML SOLUTION: Performed by: NURSE PRACTITIONER

## 2021-07-31 PROCEDURE — 85610 PROTHROMBIN TIME: CPT | Performed by: NURSE PRACTITIONER

## 2021-07-31 PROCEDURE — 84100 ASSAY OF PHOSPHORUS: CPT | Performed by: NURSE PRACTITIONER

## 2021-07-31 PROCEDURE — 80053 COMPREHEN METABOLIC PANEL: CPT | Performed by: NURSE PRACTITIONER

## 2021-07-31 PROCEDURE — 82330 ASSAY OF CALCIUM: CPT | Performed by: NURSE PRACTITIONER

## 2021-07-31 PROCEDURE — 86341 ISLET CELL ANTIBODY: CPT | Performed by: NURSE PRACTITIONER

## 2021-07-31 PROCEDURE — 85379 FIBRIN DEGRADATION QUANT: CPT | Performed by: NURSE PRACTITIONER

## 2021-07-31 PROCEDURE — 83735 ASSAY OF MAGNESIUM: CPT | Performed by: NURSE PRACTITIONER

## 2021-07-31 PROCEDURE — 25010000002 FUROSEMIDE PER 20 MG: Performed by: INTERNAL MEDICINE

## 2021-07-31 PROCEDURE — 83615 LACTATE (LD) (LDH) ENZYME: CPT | Performed by: NURSE PRACTITIONER

## 2021-07-31 PROCEDURE — 25010000002 PIPERACILLIN SOD-TAZOBACTAM PER 1 G: Performed by: INTERNAL MEDICINE

## 2021-07-31 PROCEDURE — 84681 ASSAY OF C-PEPTIDE: CPT | Performed by: NURSE PRACTITIONER

## 2021-07-31 PROCEDURE — 36600 WITHDRAWAL OF ARTERIAL BLOOD: CPT

## 2021-07-31 PROCEDURE — 25010000002 MIDAZOLAM 50 MG/10ML SOLUTION: Performed by: NURSE PRACTITIONER

## 2021-07-31 PROCEDURE — 85730 THROMBOPLASTIN TIME PARTIAL: CPT | Performed by: NURSE PRACTITIONER

## 2021-07-31 PROCEDURE — 85384 FIBRINOGEN ACTIVITY: CPT | Performed by: NURSE PRACTITIONER

## 2021-07-31 PROCEDURE — 25010000002 EPOPROSTENOL PER 0.5 MG: Performed by: NURSE PRACTITIONER

## 2021-07-31 PROCEDURE — 63710000001 INSULIN REGULAR HUMAN PER 5 UNITS: Performed by: INTERNAL MEDICINE

## 2021-07-31 PROCEDURE — 25010000002 HEPARIN (PORCINE) 25000-0.45 UT/250ML-% SOLUTION: Performed by: INTERNAL MEDICINE

## 2021-07-31 PROCEDURE — 83880 ASSAY OF NATRIURETIC PEPTIDE: CPT | Performed by: NURSE PRACTITIONER

## 2021-07-31 RX ORDER — METOPROLOL TARTRATE 5 MG/5ML
5 INJECTION INTRAVENOUS EVERY 6 HOURS PRN
Status: DISCONTINUED | OUTPATIENT
Start: 2021-07-31 | End: 2021-08-01

## 2021-07-31 RX ADMIN — METOPROLOL TARTRATE 25 MG: 25 TABLET, FILM COATED ORAL at 15:14

## 2021-07-31 RX ADMIN — FAMOTIDINE 20 MG: 20 TABLET ORAL at 08:21

## 2021-07-31 RX ADMIN — Medication 600 MG: at 20:47

## 2021-07-31 RX ADMIN — METOPROLOL TARTRATE 5 MG: 5 INJECTION INTRAVENOUS at 13:11

## 2021-07-31 RX ADMIN — MINERAL OIL AND WHITE PETROLATUM: 150; 830 OINTMENT OPHTHALMIC at 03:36

## 2021-07-31 RX ADMIN — POLYETHYLENE GLYCOL 3350 17 G: 17 POWDER, FOR SOLUTION ORAL at 15:14

## 2021-07-31 RX ADMIN — ALBUTEROL SULFATE 6 PUFF: 108 INHALANT RESPIRATORY (INHALATION) at 20:00

## 2021-07-31 RX ADMIN — VECURONIUM BROMIDE 1.2 MCG/KG/MIN: 1 INJECTION, POWDER, LYOPHILIZED, FOR SOLUTION INTRAVENOUS at 23:09

## 2021-07-31 RX ADMIN — Medication 2000 UNITS: at 08:20

## 2021-07-31 RX ADMIN — INSULIN LISPRO 5 UNITS: 100 INJECTION, SOLUTION INTRAVENOUS; SUBCUTANEOUS at 05:57

## 2021-07-31 RX ADMIN — Medication 10 ML: at 08:21

## 2021-07-31 RX ADMIN — EPOPROSTENOL 50 NG/KG/MIN: 1.5 INJECTION, POWDER, LYOPHILIZED, FOR SOLUTION INTRAVENOUS at 20:01

## 2021-07-31 RX ADMIN — INSULIN GLARGINE 30 UNITS: 100 INJECTION, SOLUTION SUBCUTANEOUS at 08:20

## 2021-07-31 RX ADMIN — PIPERACILLIN AND TAZOBACTAM 4.5 G: 4; .5 INJECTION, POWDER, LYOPHILIZED, FOR SOLUTION INTRAVENOUS; PARENTERAL at 09:10

## 2021-07-31 RX ADMIN — FENTANYL CITRATE 300 MCG/HR: 50 INJECTION, SOLUTION INTRAMUSCULAR; INTRAVENOUS at 12:57

## 2021-07-31 RX ADMIN — INSULIN LISPRO 8 UNITS: 100 INJECTION, SOLUTION INTRAVENOUS; SUBCUTANEOUS at 11:32

## 2021-07-31 RX ADMIN — FUROSEMIDE 20 MG: 10 INJECTION, SOLUTION INTRAMUSCULAR; INTRAVENOUS at 08:20

## 2021-07-31 RX ADMIN — INSULIN LISPRO 5 UNITS: 100 INJECTION, SOLUTION INTRAVENOUS; SUBCUTANEOUS at 11:32

## 2021-07-31 RX ADMIN — PIPERACILLIN AND TAZOBACTAM 4.5 G: 4; .5 INJECTION, POWDER, LYOPHILIZED, FOR SOLUTION INTRAVENOUS; PARENTERAL at 02:33

## 2021-07-31 RX ADMIN — HEPARIN SODIUM 19 UNITS/KG/HR: 10000 INJECTION, SOLUTION INTRAVENOUS at 05:57

## 2021-07-31 RX ADMIN — MINERAL OIL AND WHITE PETROLATUM: 150; 830 OINTMENT OPHTHALMIC at 15:23

## 2021-07-31 RX ADMIN — BUDESONIDE AND FORMOTEROL FUMARATE DIHYDRATE 2 PUFF: 160; 4.5 AEROSOL RESPIRATORY (INHALATION) at 20:00

## 2021-07-31 RX ADMIN — INSULIN LISPRO 8 UNITS: 100 INJECTION, SOLUTION INTRAVENOUS; SUBCUTANEOUS at 00:20

## 2021-07-31 RX ADMIN — GUAIFENESIN 400 MG: 100 SOLUTION ORAL at 05:03

## 2021-07-31 RX ADMIN — DOCUSATE SODIUM 100 MG: 50 LIQUID ORAL at 08:20

## 2021-07-31 RX ADMIN — FENTANYL CITRATE 300 MCG/HR: 50 INJECTION, SOLUTION INTRAMUSCULAR; INTRAVENOUS at 21:08

## 2021-07-31 RX ADMIN — MIDAZOLAM 5 MG/HR: 5 INJECTION INTRAMUSCULAR; INTRAVENOUS at 17:34

## 2021-07-31 RX ADMIN — Medication 10 ML: at 20:47

## 2021-07-31 RX ADMIN — INSULIN LISPRO 8 UNITS: 100 INJECTION, SOLUTION INTRAVENOUS; SUBCUTANEOUS at 05:57

## 2021-07-31 RX ADMIN — GUAIFENESIN 400 MG: 100 SOLUTION ORAL at 11:30

## 2021-07-31 RX ADMIN — FENTANYL CITRATE 300 MCG/HR: 50 INJECTION, SOLUTION INTRAMUSCULAR; INTRAVENOUS at 08:18

## 2021-07-31 RX ADMIN — FUROSEMIDE 20 MG: 10 INJECTION, SOLUTION INTRAMUSCULAR; INTRAVENOUS at 17:33

## 2021-07-31 RX ADMIN — INSULIN LISPRO 8 UNITS: 100 INJECTION, SOLUTION INTRAVENOUS; SUBCUTANEOUS at 23:20

## 2021-07-31 RX ADMIN — METOPROLOL TARTRATE 25 MG: 25 TABLET, FILM COATED ORAL at 08:21

## 2021-07-31 RX ADMIN — VECURONIUM BROMIDE 0.8 MCG/KG/MIN: 1 INJECTION, POWDER, LYOPHILIZED, FOR SOLUTION INTRAVENOUS at 17:41

## 2021-07-31 RX ADMIN — ALBUTEROL SULFATE 6 PUFF: 108 INHALANT RESPIRATORY (INHALATION) at 14:48

## 2021-07-31 RX ADMIN — FENTANYL CITRATE 200 MCG/HR: 50 INJECTION, SOLUTION INTRAMUSCULAR; INTRAVENOUS at 02:24

## 2021-07-31 RX ADMIN — GUAIFENESIN 400 MG: 100 SOLUTION ORAL at 17:33

## 2021-07-31 RX ADMIN — MIDAZOLAM 5 MG/HR: 5 INJECTION INTRAMUSCULAR; INTRAVENOUS at 00:30

## 2021-07-31 RX ADMIN — FENTANYL CITRATE 300 MCG/HR: 50 INJECTION, SOLUTION INTRAMUSCULAR; INTRAVENOUS at 05:57

## 2021-07-31 RX ADMIN — MIDAZOLAM 5 MG/HR: 5 INJECTION INTRAMUSCULAR; INTRAVENOUS at 08:18

## 2021-07-31 RX ADMIN — METOPROLOL TARTRATE 25 MG: 25 TABLET, FILM COATED ORAL at 23:20

## 2021-07-31 RX ADMIN — MINERAL OIL AND WHITE PETROLATUM: 150; 830 OINTMENT OPHTHALMIC at 08:21

## 2021-07-31 RX ADMIN — FENTANYL CITRATE 300 MCG/HR: 50 INJECTION, SOLUTION INTRAMUSCULAR; INTRAVENOUS at 17:42

## 2021-07-31 RX ADMIN — VECURONIUM BROMIDE 1.2 MCG/KG/MIN: 1 INJECTION, POWDER, LYOPHILIZED, FOR SOLUTION INTRAVENOUS at 03:52

## 2021-07-31 RX ADMIN — ALBUTEROL SULFATE 6 PUFF: 108 INHALANT RESPIRATORY (INHALATION) at 10:50

## 2021-07-31 RX ADMIN — PIPERACILLIN AND TAZOBACTAM 4.5 G: 4; .5 INJECTION, POWDER, LYOPHILIZED, FOR SOLUTION INTRAVENOUS; PARENTERAL at 17:33

## 2021-07-31 RX ADMIN — MINERAL OIL AND WHITE PETROLATUM: 150; 830 OINTMENT OPHTHALMIC at 11:30

## 2021-07-31 RX ADMIN — ASPIRIN 324 MG: 81 TABLET, CHEWABLE ORAL at 08:20

## 2021-07-31 RX ADMIN — VECURONIUM BROMIDE 1.2 MCG/KG/MIN: 1 INJECTION, POWDER, LYOPHILIZED, FOR SOLUTION INTRAVENOUS at 00:30

## 2021-07-31 RX ADMIN — MINERAL OIL AND WHITE PETROLATUM: 150; 830 OINTMENT OPHTHALMIC at 20:48

## 2021-07-31 RX ADMIN — ZINC SULFATE 220 MG (50 MG) CAPSULE 220 MG: CAPSULE at 08:20

## 2021-07-31 RX ADMIN — VECURONIUM BROMIDE 1.2 MCG/KG/MIN: 1 INJECTION, POWDER, LYOPHILIZED, FOR SOLUTION INTRAVENOUS at 08:44

## 2021-07-31 RX ADMIN — INSULIN GLARGINE 30 UNITS: 100 INJECTION, SOLUTION SUBCUTANEOUS at 20:47

## 2021-07-31 RX ADMIN — DEXAMETHASONE SODIUM PHOSPHATE 6 MG: 4 INJECTION, SOLUTION INTRAMUSCULAR; INTRAVENOUS at 20:47

## 2021-07-31 RX ADMIN — INSULIN LISPRO 8 UNITS: 100 INJECTION, SOLUTION INTRAVENOUS; SUBCUTANEOUS at 17:36

## 2021-07-31 RX ADMIN — HEPARIN SODIUM 19 UNITS/KG/HR: 10000 INJECTION, SOLUTION INTRAVENOUS at 11:56

## 2021-07-31 RX ADMIN — INSULIN LISPRO 5 UNITS: 100 INJECTION, SOLUTION INTRAVENOUS; SUBCUTANEOUS at 00:21

## 2021-07-31 RX ADMIN — INSULIN LISPRO 5 UNITS: 100 INJECTION, SOLUTION INTRAVENOUS; SUBCUTANEOUS at 23:20

## 2021-07-31 RX ADMIN — INSULIN HUMAN 6 UNITS: 100 INJECTION, SOLUTION PARENTERAL at 20:47

## 2021-07-31 RX ADMIN — POLYETHYLENE GLYCOL 3350 17 G: 17 POWDER, FOR SOLUTION ORAL at 08:20

## 2021-07-31 RX ADMIN — Medication 600 MG: at 08:21

## 2021-07-31 RX ADMIN — DEXAMETHASONE SODIUM PHOSPHATE 6 MG: 4 INJECTION, SOLUTION INTRAMUSCULAR; INTRAVENOUS at 08:20

## 2021-07-31 RX ADMIN — INSULIN LISPRO 5 UNITS: 100 INJECTION, SOLUTION INTRAVENOUS; SUBCUTANEOUS at 17:36

## 2021-07-31 RX ADMIN — GUAIFENESIN 400 MG: 100 SOLUTION ORAL at 23:20

## 2021-07-31 RX ADMIN — HEPARIN SODIUM 19 UNITS/KG/HR: 10000 INJECTION, SOLUTION INTRAVENOUS at 20:47

## 2021-08-01 ENCOUNTER — APPOINTMENT (OUTPATIENT)
Dept: GENERAL RADIOLOGY | Facility: HOSPITAL | Age: 31
End: 2021-08-01

## 2021-08-01 LAB
ALBUMIN SERPL-MCNC: 3 G/DL (ref 3.5–5.2)
ALBUMIN/GLOB SERPL: 0.8 G/DL
ALP SERPL-CCNC: 74 U/L (ref 39–117)
ALT SERPL W P-5'-P-CCNC: 96 U/L (ref 1–33)
ANION GAP SERPL CALCULATED.3IONS-SCNC: 5 MMOL/L (ref 5–15)
APTT PPP: 106.5 SECONDS (ref 61–76.5)
APTT PPP: 52.2 SECONDS (ref 24–31)
APTT PPP: 59 SECONDS (ref 61–76.5)
ARTERIAL PATENCY WRIST A: POSITIVE
AST SERPL-CCNC: 59 U/L (ref 1–32)
ATMOSPHERIC PRESS: ABNORMAL MM[HG]
BASE EXCESS BLDA CALC-SCNC: 13.7 MMOL/L (ref 0–3)
BASOPHILS # BLD AUTO: 0.1 10*3/MM3 (ref 0–0.2)
BASOPHILS NFR BLD AUTO: 0.4 % (ref 0–1.5)
BDY SITE: ABNORMAL
BILIRUB SERPL-MCNC: 0.5 MG/DL (ref 0–1.2)
BUN SERPL-MCNC: 22 MG/DL (ref 6–20)
BUN/CREAT SERPL: 56.4 (ref 7–25)
CA-I SERPL ISE-MCNC: 1.24 MMOL/L (ref 1.2–1.3)
CALCIUM SPEC-SCNC: 9.2 MG/DL (ref 8.6–10.5)
CHLORIDE SERPL-SCNC: 94 MMOL/L (ref 98–107)
CO2 BLDA-SCNC: 48.4 MMOL/L (ref 22–29)
CO2 SERPL-SCNC: 38 MMOL/L (ref 22–29)
CREAT SERPL-MCNC: 0.39 MG/DL (ref 0.57–1)
DEPRECATED RDW RBC AUTO: 46.4 FL (ref 37–54)
EOSINOPHIL # BLD AUTO: 0.1 10*3/MM3 (ref 0–0.4)
EOSINOPHIL NFR BLD AUTO: 0.4 % (ref 0.3–6.2)
ERYTHROCYTE [DISTWIDTH] IN BLOOD BY AUTOMATED COUNT: 15 % (ref 12.3–15.4)
GFR SERPL CREATININE-BSD FRML MDRD: >150 ML/MIN/1.73
GLOBULIN UR ELPH-MCNC: 4 GM/DL
GLUCOSE BLDC GLUCOMTR-MCNC: 197 MG/DL (ref 70–105)
GLUCOSE BLDC GLUCOMTR-MCNC: 199 MG/DL (ref 70–105)
GLUCOSE BLDC GLUCOMTR-MCNC: 201 MG/DL (ref 70–105)
GLUCOSE BLDC GLUCOMTR-MCNC: 258 MG/DL (ref 70–105)
GLUCOSE BLDC GLUCOMTR-MCNC: 271 MG/DL (ref 70–105)
GLUCOSE BLDC GLUCOMTR-MCNC: 281 MG/DL (ref 74–100)
GLUCOSE SERPL-MCNC: 248 MG/DL (ref 65–99)
HCO3 BLDA-SCNC: 45.2 MMOL/L (ref 21–28)
HCT VFR BLD AUTO: 33.8 % (ref 34–46.6)
HEMODILUTION: NO
HGB BLD-MCNC: 11 G/DL (ref 12–15.9)
INHALED O2 CONCENTRATION: 80 %
INR PPP: 1.12 (ref 0.93–1.1)
LYMPHOCYTES # BLD AUTO: 1 10*3/MM3 (ref 0.7–3.1)
LYMPHOCYTES NFR BLD AUTO: 6.5 % (ref 19.6–45.3)
MAGNESIUM SERPL-MCNC: 2.2 MG/DL (ref 1.6–2.6)
MCH RBC QN AUTO: 28.3 PG (ref 26.6–33)
MCHC RBC AUTO-ENTMCNC: 32.5 G/DL (ref 31.5–35.7)
MCV RBC AUTO: 87.1 FL (ref 79–97)
MODALITY: ABNORMAL
MONOCYTES # BLD AUTO: 0.7 10*3/MM3 (ref 0.1–0.9)
MONOCYTES NFR BLD AUTO: 4.5 % (ref 5–12)
NEUTROPHILS NFR BLD AUTO: 13 10*3/MM3 (ref 1.7–7)
NEUTROPHILS NFR BLD AUTO: 88.2 % (ref 42.7–76)
NRBC BLD AUTO-RTO: 0.2 /100 WBC (ref 0–0.2)
PCO2 BLDA: 103.6 MM HG (ref 35–48)
PEEP RESPIRATORY: 16 CM[H2O]
PH BLDA: 7.25 PH UNITS (ref 7.35–7.45)
PHOSPHATE SERPL-MCNC: 4.8 MG/DL (ref 2.5–4.5)
PLATELET # BLD AUTO: 338 10*3/MM3 (ref 140–450)
PMV BLD AUTO: 8.3 FL (ref 6–12)
PO2 BLDA: 178.1 MM HG (ref 83–108)
POTASSIUM SERPL-SCNC: 5.7 MMOL/L (ref 3.5–5.2)
PROT SERPL-MCNC: 7 G/DL (ref 6–8.5)
PROTHROMBIN TIME: 12.3 SECONDS (ref 9.6–11.7)
RBC # BLD AUTO: 3.88 10*6/MM3 (ref 3.77–5.28)
RESPIRATORY RATE: 34
SAO2 % BLDCOA: 99.2 % (ref 94–98)
SODIUM SERPL-SCNC: 137 MMOL/L (ref 136–145)
VENTILATOR MODE: ABNORMAL
VT ON VENT VENT: 420 ML
WBC # BLD AUTO: 14.7 10*3/MM3 (ref 3.4–10.8)

## 2021-08-01 PROCEDURE — 25010000002 FENTANYL CITRATE (PF) 2500 MCG/50ML SOLUTION: Performed by: NURSE PRACTITIONER

## 2021-08-01 PROCEDURE — 85730 THROMBOPLASTIN TIME PARTIAL: CPT | Performed by: INTERNAL MEDICINE

## 2021-08-01 PROCEDURE — 85025 COMPLETE CBC W/AUTO DIFF WBC: CPT | Performed by: INTERNAL MEDICINE

## 2021-08-01 PROCEDURE — 25010000002 DEXAMETHASONE PER 1 MG: Performed by: INTERNAL MEDICINE

## 2021-08-01 PROCEDURE — 94799 UNLISTED PULMONARY SVC/PX: CPT

## 2021-08-01 PROCEDURE — 84100 ASSAY OF PHOSPHORUS: CPT | Performed by: NURSE PRACTITIONER

## 2021-08-01 PROCEDURE — 82962 GLUCOSE BLOOD TEST: CPT

## 2021-08-01 PROCEDURE — 94003 VENT MGMT INPAT SUBQ DAY: CPT

## 2021-08-01 PROCEDURE — 63710000001 INSULIN LISPRO (HUMAN) PER 5 UNITS: Performed by: INTERNAL MEDICINE

## 2021-08-01 PROCEDURE — 25010000002 FUROSEMIDE PER 20 MG: Performed by: INTERNAL MEDICINE

## 2021-08-01 PROCEDURE — 36600 WITHDRAWAL OF ARTERIAL BLOOD: CPT

## 2021-08-01 PROCEDURE — 85730 THROMBOPLASTIN TIME PARTIAL: CPT | Performed by: NURSE PRACTITIONER

## 2021-08-01 PROCEDURE — 82803 BLOOD GASES ANY COMBINATION: CPT

## 2021-08-01 PROCEDURE — 25010000002 HEPARIN (PORCINE) 25000-0.45 UT/250ML-% SOLUTION: Performed by: INTERNAL MEDICINE

## 2021-08-01 PROCEDURE — 83735 ASSAY OF MAGNESIUM: CPT | Performed by: NURSE PRACTITIONER

## 2021-08-01 PROCEDURE — 25010000002 MIDAZOLAM 50 MG/10ML SOLUTION: Performed by: NURSE PRACTITIONER

## 2021-08-01 PROCEDURE — 25010000002 HYDRALAZINE PER 20 MG: Performed by: NURSE PRACTITIONER

## 2021-08-01 PROCEDURE — 85610 PROTHROMBIN TIME: CPT | Performed by: NURSE PRACTITIONER

## 2021-08-01 PROCEDURE — 25010000003 HYDROMORPHONE HCL PF 50 MG/5ML SOLUTION: Performed by: NURSE PRACTITIONER

## 2021-08-01 PROCEDURE — 25010000002 EPOPROSTENOL PER 0.5 MG: Performed by: NURSE PRACTITIONER

## 2021-08-01 PROCEDURE — 25010000002 PIPERACILLIN SOD-TAZOBACTAM PER 1 G: Performed by: INTERNAL MEDICINE

## 2021-08-01 PROCEDURE — 63710000001 INSULIN REGULAR HUMAN PER 5 UNITS: Performed by: INTERNAL MEDICINE

## 2021-08-01 PROCEDURE — 63710000001 INSULIN GLARGINE PER 5 UNITS: Performed by: INTERNAL MEDICINE

## 2021-08-01 PROCEDURE — 82330 ASSAY OF CALCIUM: CPT | Performed by: NURSE PRACTITIONER

## 2021-08-01 PROCEDURE — 71045 X-RAY EXAM CHEST 1 VIEW: CPT

## 2021-08-01 PROCEDURE — 80053 COMPREHEN METABOLIC PANEL: CPT | Performed by: NURSE PRACTITIONER

## 2021-08-01 RX ORDER — NICARDIPINE HYDROCHLORIDE 2.5 MG/ML
INJECTION INTRAVENOUS
Status: COMPLETED
Start: 2021-08-01 | End: 2021-08-01

## 2021-08-01 RX ORDER — CISATRACURIUM BESYLATE 2 MG/ML
100 INJECTION, SOLUTION INTRAVENOUS ONCE
Status: COMPLETED | OUTPATIENT
Start: 2021-08-01 | End: 2021-08-01

## 2021-08-01 RX ORDER — METOPROLOL TARTRATE 5 MG/5ML
5 INJECTION INTRAVENOUS EVERY 6 HOURS PRN
Status: DISCONTINUED | OUTPATIENT
Start: 2021-08-01 | End: 2021-08-11

## 2021-08-01 RX ORDER — METOPROLOL TARTRATE 5 MG/5ML
5 INJECTION INTRAVENOUS ONCE
Status: COMPLETED | OUTPATIENT
Start: 2021-08-01 | End: 2021-08-01

## 2021-08-01 RX ORDER — DILTIAZEM HYDROCHLORIDE 5 MG/ML
20 INJECTION INTRAVENOUS ONCE
Status: COMPLETED | OUTPATIENT
Start: 2021-08-01 | End: 2021-08-01

## 2021-08-01 RX ORDER — LINEZOLID 600 MG/1
600 TABLET, FILM COATED ORAL EVERY 12 HOURS SCHEDULED
Status: COMPLETED | OUTPATIENT
Start: 2021-08-01 | End: 2021-08-14

## 2021-08-01 RX ORDER — FENTANYL CITRATE-0.9 % NACL/PF 10 MCG/ML
50-300 PLASTIC BAG, INJECTION (ML) INTRAVENOUS
Status: DISCONTINUED | OUTPATIENT
Start: 2021-08-01 | End: 2021-08-10

## 2021-08-01 RX ORDER — NOREPINEPHRINE BIT/0.9 % NACL 8 MG/250ML
.02-.3 INFUSION BOTTLE (ML) INTRAVENOUS
Status: DISCONTINUED | OUTPATIENT
Start: 2021-08-01 | End: 2021-08-06

## 2021-08-01 RX ORDER — SODIUM CHLORIDE 9 MG/ML
INJECTION, SOLUTION INTRAVENOUS
Status: COMPLETED
Start: 2021-08-01 | End: 2021-08-01

## 2021-08-01 RX ORDER — FLUCONAZOLE 40 MG/ML
400 POWDER, FOR SUSPENSION ORAL NIGHTLY
Status: COMPLETED | OUTPATIENT
Start: 2021-08-01 | End: 2021-08-07

## 2021-08-01 RX ADMIN — ALBUTEROL SULFATE 6 PUFF: 108 INHALANT RESPIRATORY (INHALATION) at 15:55

## 2021-08-01 RX ADMIN — Medication 600 MG: at 08:10

## 2021-08-01 RX ADMIN — FLUCONAZOLE 400 MG: 40 POWDER, FOR SUSPENSION ORAL at 23:50

## 2021-08-01 RX ADMIN — GUAIFENESIN 400 MG: 100 SOLUTION ORAL at 23:50

## 2021-08-01 RX ADMIN — DILTIAZEM HYDROCHLORIDE 20 MG: 5 INJECTION INTRAVENOUS at 15:08

## 2021-08-01 RX ADMIN — MIDAZOLAM 7 MG/HR: 5 INJECTION INTRAMUSCULAR; INTRAVENOUS at 13:22

## 2021-08-01 RX ADMIN — INSULIN LISPRO 5 UNITS: 100 INJECTION, SOLUTION INTRAVENOUS; SUBCUTANEOUS at 17:05

## 2021-08-01 RX ADMIN — METOPROLOL TARTRATE 5 MG: 5 INJECTION INTRAVENOUS at 10:33

## 2021-08-01 RX ADMIN — INSULIN LISPRO 5 UNITS: 100 INJECTION, SOLUTION INTRAVENOUS; SUBCUTANEOUS at 05:45

## 2021-08-01 RX ADMIN — FENTANYL CITRATE 300 MCG/HR: 50 INJECTION, SOLUTION INTRAMUSCULAR; INTRAVENOUS at 02:48

## 2021-08-01 RX ADMIN — INSULIN HUMAN 12 UNITS: 100 INJECTION, SOLUTION PARENTERAL at 21:52

## 2021-08-01 RX ADMIN — PIPERACILLIN AND TAZOBACTAM 4.5 G: 4; .5 INJECTION, POWDER, LYOPHILIZED, FOR SOLUTION INTRAVENOUS; PARENTERAL at 02:47

## 2021-08-01 RX ADMIN — EPOPROSTENOL 50 NG/KG/MIN: 1.5 INJECTION, POWDER, LYOPHILIZED, FOR SOLUTION INTRAVENOUS at 23:16

## 2021-08-01 RX ADMIN — HYDRALAZINE HYDROCHLORIDE 10 MG: 20 INJECTION INTRAMUSCULAR; INTRAVENOUS at 10:04

## 2021-08-01 RX ADMIN — MINERAL OIL AND WHITE PETROLATUM: 150; 830 OINTMENT OPHTHALMIC at 07:36

## 2021-08-01 RX ADMIN — HEPARIN SODIUM 21 UNITS/KG/HR: 10000 INJECTION, SOLUTION INTRAVENOUS at 09:31

## 2021-08-01 RX ADMIN — VECURONIUM BROMIDE 1.2 MCG/KG/MIN: 1 INJECTION, POWDER, LYOPHILIZED, FOR SOLUTION INTRAVENOUS at 08:00

## 2021-08-01 RX ADMIN — PIPERACILLIN AND TAZOBACTAM 4.5 G: 4; .5 INJECTION, POWDER, LYOPHILIZED, FOR SOLUTION INTRAVENOUS; PARENTERAL at 09:31

## 2021-08-01 RX ADMIN — METOPROLOL TARTRATE 25 MG: 25 TABLET, FILM COATED ORAL at 23:50

## 2021-08-01 RX ADMIN — CISATRACURIUM BESYLATE 0.5 MCG/KG/MIN: 10 INJECTION INTRAVENOUS at 11:40

## 2021-08-01 RX ADMIN — DEXAMETHASONE SODIUM PHOSPHATE 6 MG: 4 INJECTION, SOLUTION INTRAMUSCULAR; INTRAVENOUS at 21:53

## 2021-08-01 RX ADMIN — MIDAZOLAM 5 MG/HR: 5 INJECTION INTRAMUSCULAR; INTRAVENOUS at 04:36

## 2021-08-01 RX ADMIN — METOPROLOL TARTRATE 25 MG: 25 TABLET, FILM COATED ORAL at 07:43

## 2021-08-01 RX ADMIN — FUROSEMIDE 20 MG: 10 INJECTION, SOLUTION INTRAMUSCULAR; INTRAVENOUS at 17:05

## 2021-08-01 RX ADMIN — SODIUM CHLORIDE 10 MG/HR: 9 INJECTION, SOLUTION INTRAVENOUS at 13:14

## 2021-08-01 RX ADMIN — INSULIN GLARGINE 30 UNITS: 100 INJECTION, SOLUTION SUBCUTANEOUS at 21:52

## 2021-08-01 RX ADMIN — Medication 10 ML: at 08:10

## 2021-08-01 RX ADMIN — LINEZOLID 600 MG: 600 TABLET ORAL at 21:55

## 2021-08-01 RX ADMIN — INSULIN LISPRO 12 UNITS: 100 INJECTION, SOLUTION INTRAVENOUS; SUBCUTANEOUS at 05:44

## 2021-08-01 RX ADMIN — ALBUTEROL SULFATE 6 PUFF: 108 INHALANT RESPIRATORY (INHALATION) at 08:22

## 2021-08-01 RX ADMIN — Medication 600 MG: at 21:53

## 2021-08-01 RX ADMIN — FENTANYL CITRATE 300 MCG/HR: 50 INJECTION, SOLUTION INTRAMUSCULAR; INTRAVENOUS at 11:20

## 2021-08-01 RX ADMIN — Medication 0.04 MCG/KG/MIN: at 19:54

## 2021-08-01 RX ADMIN — METOPROLOL TARTRATE 5 MG: 1 INJECTION, SOLUTION INTRAVENOUS at 10:49

## 2021-08-01 RX ADMIN — LINEZOLID 600 MG: 600 TABLET ORAL at 15:07

## 2021-08-01 RX ADMIN — SODIUM CHLORIDE 20 ML/HR: 9 INJECTION, SOLUTION INTRAVENOUS at 11:07

## 2021-08-01 RX ADMIN — SODIUM CHLORIDE 5 MG/HR: 9 INJECTION, SOLUTION INTRAVENOUS at 11:07

## 2021-08-01 RX ADMIN — EPOPROSTENOL 50 NG/KG/MIN: 1.5 INJECTION, POWDER, LYOPHILIZED, FOR SOLUTION INTRAVENOUS at 14:37

## 2021-08-01 RX ADMIN — HEPARIN SODIUM 18 UNITS/KG/HR: 10000 INJECTION, SOLUTION INTRAVENOUS at 19:48

## 2021-08-01 RX ADMIN — Medication 10 ML: at 21:53

## 2021-08-01 RX ADMIN — MINERAL OIL AND WHITE PETROLATUM: 150; 830 OINTMENT OPHTHALMIC at 04:00

## 2021-08-01 RX ADMIN — MINERAL OIL AND WHITE PETROLATUM: 150; 830 OINTMENT OPHTHALMIC at 23:51

## 2021-08-01 RX ADMIN — VECURONIUM BROMIDE 1.2 MCG/KG/MIN: 1 INJECTION, POWDER, LYOPHILIZED, FOR SOLUTION INTRAVENOUS at 03:57

## 2021-08-01 RX ADMIN — MIDAZOLAM 5 MG/HR: 5 INJECTION INTRAMUSCULAR; INTRAVENOUS at 09:58

## 2021-08-01 RX ADMIN — INSULIN LISPRO 5 UNITS: 100 INJECTION, SOLUTION INTRAVENOUS; SUBCUTANEOUS at 23:51

## 2021-08-01 RX ADMIN — INSULIN LISPRO 8 UNITS: 100 INJECTION, SOLUTION INTRAVENOUS; SUBCUTANEOUS at 11:20

## 2021-08-01 RX ADMIN — FENTANYL CITRATE 300 MCG/HR: 50 INJECTION, SOLUTION INTRAMUSCULAR; INTRAVENOUS at 08:00

## 2021-08-01 RX ADMIN — METOPROLOL TARTRATE 25 MG: 25 TABLET, FILM COATED ORAL at 15:07

## 2021-08-01 RX ADMIN — HEPARIN SODIUM 19 UNITS/KG/HR: 10000 INJECTION, SOLUTION INTRAVENOUS at 04:37

## 2021-08-01 RX ADMIN — METOPROLOL TARTRATE 5 MG: 5 INJECTION INTRAVENOUS at 04:45

## 2021-08-01 RX ADMIN — ALBUTEROL SULFATE 6 PUFF: 108 INHALANT RESPIRATORY (INHALATION) at 20:17

## 2021-08-01 RX ADMIN — NICARDIPINE HYDROCHLORIDE 25 MG: 25 INJECTION INTRAVENOUS at 11:06

## 2021-08-01 RX ADMIN — INSULIN GLARGINE 30 UNITS: 100 INJECTION, SOLUTION SUBCUTANEOUS at 07:42

## 2021-08-01 RX ADMIN — FUROSEMIDE 20 MG: 10 INJECTION, SOLUTION INTRAMUSCULAR; INTRAVENOUS at 08:09

## 2021-08-01 RX ADMIN — FENTANYL CITRATE 50 MCG/HR: 50 INJECTION, SOLUTION INTRAMUSCULAR; INTRAVENOUS at 21:56

## 2021-08-01 RX ADMIN — BUDESONIDE AND FORMOTEROL FUMARATE DIHYDRATE 2 PUFF: 160; 4.5 AEROSOL RESPIRATORY (INHALATION) at 20:17

## 2021-08-01 RX ADMIN — FAMOTIDINE 20 MG: 20 TABLET ORAL at 08:10

## 2021-08-01 RX ADMIN — SODIUM CHLORIDE 5 MG/HR: 900 INJECTION, SOLUTION INTRAVENOUS at 15:31

## 2021-08-01 RX ADMIN — HYDROMORPHONE HYDROCHLORIDE 1 MG/HR: 10 INJECTION, SOLUTION INTRAMUSCULAR; INTRAVENOUS; SUBCUTANEOUS at 15:08

## 2021-08-01 RX ADMIN — INSULIN HUMAN 6 UNITS: 100 INJECTION, SOLUTION PARENTERAL at 07:42

## 2021-08-01 RX ADMIN — INSULIN LISPRO 12 UNITS: 100 INJECTION, SOLUTION INTRAVENOUS; SUBCUTANEOUS at 17:05

## 2021-08-01 RX ADMIN — DEXAMETHASONE SODIUM PHOSPHATE 6 MG: 4 INJECTION, SOLUTION INTRAMUSCULAR; INTRAVENOUS at 08:09

## 2021-08-01 RX ADMIN — ASPIRIN 324 MG: 81 TABLET, CHEWABLE ORAL at 08:10

## 2021-08-01 RX ADMIN — GUAIFENESIN 400 MG: 100 SOLUTION ORAL at 17:05

## 2021-08-01 RX ADMIN — MINERAL OIL AND WHITE PETROLATUM: 150; 830 OINTMENT OPHTHALMIC at 15:30

## 2021-08-01 RX ADMIN — GUAIFENESIN 400 MG: 100 SOLUTION ORAL at 11:31

## 2021-08-01 RX ADMIN — MINERAL OIL AND WHITE PETROLATUM: 150; 830 OINTMENT OPHTHALMIC at 00:08

## 2021-08-01 RX ADMIN — BUDESONIDE AND FORMOTEROL FUMARATE DIHYDRATE 2 PUFF: 160; 4.5 AEROSOL RESPIRATORY (INHALATION) at 08:22

## 2021-08-01 RX ADMIN — ALBUTEROL SULFATE 6 PUFF: 108 INHALANT RESPIRATORY (INHALATION) at 13:00

## 2021-08-01 RX ADMIN — METOPROLOL TARTRATE 5 MG: 5 INJECTION INTRAVENOUS at 18:34

## 2021-08-01 RX ADMIN — MINERAL OIL AND WHITE PETROLATUM: 150; 830 OINTMENT OPHTHALMIC at 11:31

## 2021-08-01 RX ADMIN — FENTANYL CITRATE 300 MCG/HR: 50 INJECTION, SOLUTION INTRAMUSCULAR; INTRAVENOUS at 00:07

## 2021-08-01 RX ADMIN — CISATRACURIUM BESYLATE 16700 MCG: 2 INJECTION INTRAVENOUS at 11:36

## 2021-08-01 RX ADMIN — FENTANYL CITRATE 300 MCG/HR: 50 INJECTION, SOLUTION INTRAMUSCULAR; INTRAVENOUS at 13:23

## 2021-08-01 RX ADMIN — INSULIN LISPRO 5 UNITS: 100 INJECTION, SOLUTION INTRAVENOUS; SUBCUTANEOUS at 11:20

## 2021-08-01 RX ADMIN — MINERAL OIL AND WHITE PETROLATUM: 150; 830 OINTMENT OPHTHALMIC at 19:48

## 2021-08-01 RX ADMIN — GUAIFENESIN 400 MG: 100 SOLUTION ORAL at 05:44

## 2021-08-01 RX ADMIN — INSULIN LISPRO 8 UNITS: 100 INJECTION, SOLUTION INTRAVENOUS; SUBCUTANEOUS at 23:51

## 2021-08-01 RX ADMIN — Medication 2000 UNITS: at 08:10

## 2021-08-01 RX ADMIN — SODIUM CHLORIDE 10 MG/HR: 900 INJECTION, SOLUTION INTRAVENOUS at 19:49

## 2021-08-01 RX ADMIN — ZINC SULFATE 220 MG (50 MG) CAPSULE 220 MG: CAPSULE at 08:10

## 2021-08-01 NOTE — PLAN OF CARE
Goal Outcome Evaluation:               Patient remains on ventilator at 70%/15+. On versed, fentanyl, Norcuron, heparin gtts. On flolan. TF through NG tolerated well. FC producing well at over 1800cc all night. Fecal management system produced 300cc.     Patient still prone, and producing copious amount of secretions - blood tinged and thick/clear. Patient very swollen in face. NP aware.     Will continue to monitor.

## 2021-08-01 NOTE — PLAN OF CARE
Patient on 70%/15 peep on ventilator. On dilaudid, versed, heparin, nimbex and cardizem gtts. Flolan running. Tolerating tube feeds well. Norcuron changed to nimbex; Fentanyl changed to dilaudid; Cardene changed to cardizem to help with rate control and hypertension. Patient was turned supine at 0930 and sats have tolerated it well all day. Ptt increased significantly from 59 to 106 at 1200. Followed order protocol and held gtt for 1 hour then resumed gtt at 18u/hr. Will recheck ptt at 2100. Mother has been updated x2 over the phone. Will continue to monitor.

## 2021-08-01 NOTE — PROGRESS NOTES
PULMONARY/CRITICAL CARE PROGRESS NOTE       NAME:     Leslie Harris   AGE:     30 y.o.   SEX:  female   : 1990       MRN:       [id number]          RM: [facility] ICU      ASSESSMENT & PLAN     F/U: Acute respiratory failure secondary to COVID-19 pna    Principal Problem:    Pneumonia due to COVID-19 virus  Active Problems:    Acute respiratory failure due to COVID-19 (CMS/[facility])    Class 3 severe obesity without serious comorbidity with body mass index (BMI) of 50.0 to 59.9 in adult    Hyperglycemia, likely stress induced    Diabetes mellitus type 2 in obese (CMS/[facility])     Multidisciplinary Rounds:  -Supine  -tolerating TF  -now on Nimbex, the pt was overbreathing vent on Norcuron  -diuresing well  -dc bowel regimen, frequent loose stool  -Sputum culture+ added Xyvox  -Add Cardizem drip  -Switch to dilaudid from fentanyl  -Still on heparin drip    PLAN:  Pneumonia due to COVID-19 virus  Acute respiratory failure due to COVID-19  -Failed NIPPV and required intubation   -Retest RVP/COVID-19 to eval for other respiratory viruses. +COVID-19 only  -Monitor disease progression labs as ordered  -Vitamin D, Acetylcysteine, Zinc  -CT PE protocol could not be obtained due to severe hypoxia  -Empiric A/C with heparin drip  -Remdesivir x5 days, completed on   -Decardron, decreased dose   -Does not qualify for Actemra  -Symbicort, Albuterol HFA.  -Sputum culture-Candida albicans, Staph aureus  -discontinued Ceftriaxone, changed to Zosyn  due to fever, completed   -Add Zyvox and Diflucan today due to positive sputum culture    Diabetes mellitus, type 2, new diagnosis  -strict glycemic control recommended  -Accuchecks every 6 hours with sliding scale insulin  -Added Lantus  -Hemoglobin A1c 6.9.    Hypertension  -added beta blocker with improved control     Class 3 severe obesity without serious comorbidity with body mass index (BMI) of 50.0 to 59.9 in adult  -BMI 59.32  -Complicating aspects of  care  -Counseled on lifestyle modifications to improve overall health prior to intubation    AST/ALT remain elevated  -Significance unclear  -Continue to monitor and trend     Code Status: CPR, full interventions  VTE Prophylaxis:  Lovenox  PUD Prophylaxis: Pepcid  Nutrition: dietitian following for tube feed recommendations.Tolerating TF 40ml-will advance to goal 50ml/hr  +Bowel regimen    Right IJ central line 7/23  Left radial A-line 7/23, discontinued 8/1  Right brachial A-line placed 8/1    The patient's mother reported that the patient had latent autoimmune diabetes mellitus diagnosed recently and requested C-peptide and MAX.  It was explained to her that these tests would not accurately reflect the patient's condition as her current high acuity and metabolic state would render these numbers unpredictable and not reliable.  Recommend that this is followed up after the patient's current acuity has improved      [place] & SUBJECTIVE     Leslie Harris is a 30 y.o. female  has a past medical history of Class 3 severe obesity without serious comorbidity with body mass index (BMI) of 50.0 to 59.9 in adult (7/22/2021).   Patient presented to [facility] ED on 7/22/2021 with complaint of being recently diagnosed with COVID-19 at the [facility] 6 days prior to admission.  Patient reports that she has been having increased shortness of breath over the past couple days, and has not lost her sense of smell, but has lost her sense of taste, reports frequent, nonproductive cough, myalgias, fevers, and chills.  She denies any abdominal symptoms including nausea, vomiting, constipation, diarrhea.  Patient reports that she actually called EMS as her oxygen saturation had been lower, reading in ED upper 70s, but she did not feel that it was right, as she stated that her fingers were cold.  EMS came and reported to her that her oxygen was actually in the 80s at that time, on the lower side of the 80s,  "but for some reason patient was not transported to the ED.  Patient then presented today, and she additionally reported some generalized sore throat, with trouble swallowing some things, due to just the general pain associated with it.  She denies any actual problems with physically swallowing this food.  She reports a decreased appetite and decreased oral intake.  Patient states that there was a coworker at her facility, in which they questioned if she had been tested, as she generally did not look like she felt well, but that individual stated that they had been tested and were fine, but at her place of work, which is the [employer], there has been a small outbreak of COVID-19 infected individuals, in which a coworker of hers, is additionally hospitalized, due to this exposure as well.  Patient was not vaccinated for COVID-19.  Patient reported that she is a non-smoker, denies routine alcohol use or recreational drug use.  She reports that her only other previous medical history involved a cholecystectomy that resulted in a perforation of her small bowel, in which she was discharged home and became septic, requiring readmission and underwent an ex lap with lysis of adhesions.  She stated that this occurred in 2018.     In the ED, labs were obtained with abnormalities as follows: , calcium 8.5, ALT 51, AST 73, ferritin 545.6, CRP 6.33, procalcitonin 0.30, lactate 1.3, D-dimer 1.48.  ABG was obtained and as follows: pH 7.427, PCO2 39.5, PO2 44.9, HCO3 26.0, O2 saturation 81.8%.  This ABG was obtained while patient was on a nonrebreather.  Chest x-ray was obtained and with the following impression: \"Left greater than right interstitial and alveolar disease.  Given the patient's Covid positive status, the findings may reflect changes of pneumonia.  Patient required oxygen titration up to maximal Precision flow at 40 L/min with FiO2 of 100%.  Patient was considered to be very high risk for further " decompensation, and it was recommended for patient to be admitted to the ICU for close observation.     Pulmonary/Intensivist service was contacted for admission to ICU and further evaluation and treatment.    7/23: Patient is drowsy, but easy to arouse, proned overnight, continues on AVAPS in combination with Precision flow 40 L/min and FiO2 100%.  She denies chest pain, but does report shortness of breath and anxiety, cough.  7/24: Intubated, sedated, chemically paralyzed  7/25: intubated, sedated, chemically paralyzed. Fever overnight  7/26: Intubated, sedated, chemically paralyzed.  Proned.  Still having fevers  7/27: Intubated, sedated, chemically paralyzed.  Tolerating supine positioning.  Still febrile  7/28: Intubated, sedated, chemically paralyzed.  Supine.  7/29: Intubated, sedated, chemically paralyzed.  Still remains supine  7/30: Intubated, sedated, chemically paralyzed, proned.  7/31: Intubated, sedated.  Remains chemically paralyzed and proned.  Nebulized Flolan  8/1: Intubated, sedated, paralyzed.  Tolerating supine positioning.  Still on nebulized Flolan    REVIEW OF SYSTEMS:  Review of systems could not be obtained due to   patient sedation status. patient intubated.      MEDICATIONS     SCHEDULED MEDICATIONS:   Acetylcysteine, 600 mg, Oral, BID  albuterol sulfate HFA, 6 puff, Inhalation, 4x Daily - RT  artificial tears, , Both Eyes, Q4H  aspirin, 324 mg, Per G Tube, Daily  budesonide-formoterol, 2 puff, Inhalation, BID - RT  cholecalciferol, 2,000 Units, Nasogastric, Daily  dexamethasone, 6 mg, Intravenous, Q12H  docusate, 100 mg, Nasogastric, BID  famotidine, 20 mg, Nasogastric, Daily  furosemide, 20 mg, Intravenous, BID  guaiFENesin, 400 mg, Nasogastric, Q6H  insulin glargine, 30 Units, Subcutaneous, Q12H  insulin lispro, 0-24 Units, Subcutaneous, Q6H  insulin lispro, 5 Units, Subcutaneous, Q6H  insulin regular, 12 Units, Subcutaneous, Q12H  metoprolol tartrate, 25 mg, Oral, Q8H  polyethylene  "glycol, 17 g, Oral, Q8H  sodium chloride, 10 mL, Intravenous, Q12H  zinc sulfate, 220 mg, Nasogastric, Daily        CONTINUOUS INFUSIONS:   cisatracurium (NIMBEX) infusion, 0.5-10.2 mcg/kg/min, Last Rate: 0.5 mcg/kg/min (08/01/21 1140)  epoprostenol, 50 ng/kg/min (Ideal), Last Rate: 50 ng/kg/min (07/31/21 2001)  fentanyl 10 mcg/mL,  mcg/hr, Last Rate: 300 mcg/hr (08/01/21 1323)  heparin, 8.98 Units/kg/hr, Last Rate: 21 Units/kg/hr (08/01/21 0931)  midazolam, 1-10 mg/hr, Last Rate: 7 mg/hr (08/01/21 1322)  niCARdipine, 5-15 mg/hr, Last Rate: 10 mg/hr (08/01/21 1314)  sodium chloride, 20 mL/hr, Last Rate: 20 mL/hr (08/01/21 1107)        PRN MEDS:  •  acetaminophen  •  acetaminophen **OR** acetaminophen  •  aluminum-magnesium hydroxide-simethicone  •  benzonatate  •  cisatracurium  •  dextrose  •  dextrose  •  glucagon (human recombinant)  •  glycopyrrolate PF  •  heparin  •  heparin  •  hydrALAZINE  •  insulin lispro **AND** insulin lispro  •  magnesium sulfate **OR** magnesium sulfate in D5W 1g/100mL (PREMIX)  •  metoprolol tartrate  •  nitroglycerin  •  ondansetron **OR** ondansetron  •  potassium chloride **OR** potassium chloride **OR** potassium chloride  •  potassium phosphate infusion greater than 15 mMoles **OR** potassium phosphate infusion greater than 15 mMoles **OR** potassium phosphate **OR** sodium phosphate IVPB **OR** sodium phosphate IVPB **OR** sodium phosphate IVPB  •  [COMPLETED] Insert peripheral IV **AND** sodium chloride  •  sodium chloride    OBJECTIVE     VITAL SIGNS:  /80   Pulse (!) 122   Temp 100.2 °F (37.9 °C)   Resp (!) 36   Ht 165.1 cm (65\")   Wt (!) 167 kg (367 lb 4.6 oz)   LMP 07/22/2021   SpO2 91%   BMI 61.12 kg/m²     I/O FROM PREVIOUS 3 SHIFTS:  I/O last 3 completed shifts:  In: 5789 [I.V.:3125; Other:502; NG/GT:2162]  Out: 4900 [Urine:4600; Other:300]      I/O PREVIOUS 24 HOURS:   Intake/Output                             07/30/21 0701 - 07/31/21 0700 07/31/21 " 0701 - 08/01/21 0700 08/01/21 0701 - 08/02/21 0700     0166-6380 9943-7818 Total 2931-3779 8197-4480 Total 1311-6760 4744-8841 Total                    Intake    P.O.  --  0 0  --  0 0  --  -- --    I.V.  922  998 1920  835  1292 2127  1127  -- 1127    Other  141  190 331  81  231 312  100  -- 100    Flush/ Irrigation Intake (mL) (NG/OG Tube Nasogastric 16 Fr Left nostril) 141 190 331 81 231 312 100 -- 100    NG/GT  520  628 1148  639  895 1534  539  -- 539    Total Intake 1583 1816 3399 1555 2418 3973 1766 -- 1766       Output    Urine  1650  1250 2900  1200  2150 3350  1900  -- 1900    Other  --  -- --  --  300 300  --  -- --    Other (mL) -- -- -- -- 300 300 -- -- --    Stool  --  -- --  --  -- --  500  -- 500    Total Output 1650 1250 2900 1200 2450 3650 2400 -- 2400           NET BALANCE SINCE ADMISSION:  Net IO Since Admission: 7,777 mL [08/01/21 1426]     BOWEL MOVEMENTS:  Stool Output  Stool (mL): 500 mL (08/01/21 1314)  Stool Unmeasured Occurrence: 1 (07/31/21 1900)  Bowel Incontinence: Yes (07/31/21 1900)  Stool Amount: moderate (07/31/21 2100)       PHYSICAL EXAM:  Constitutional:  Well developed, well nourished, intubated, sedated, paralyzed  Eyes:   Pupils sluggish and equal, subconjunctival edema, sclera anicteric.  EOM not assessed  HENT:  Atraumatic, external ears normal, nose normal with left NG tube, oral ET tube. Neck-rachea midline, right IJ central line  Respiratory:  Coarse breath sounds with scattered rhonchi, bibasilar crackles, mild diffuse wheezing  Cardiovascular: Sinus tachycardia, no murmurs, no gallops, no rubs   GI:  Morbid body habitus, soft, nondistended, normal bowel sounds  :   Deferred  Musculoskeletal:   Generalized edema, no clubbing or cyanosis  Integument:  Well hydrated, no rash   Neurologic:   Intubated, sedated, paralyzed  Psychiatric:   Unable to assess      RESULTS     LABS:  Lab Results (last 24 hours)     Procedure Component Value Units Date/Time    Respiratory  Culture - Sputum, ET Suction [[id number]]  (Abnormal) Collected: 07/29/21 2038    Specimen: Sputum from ET Suction Updated: 08/01/21 1345     Respiratory Culture Scant growth (1+) Candida albicans      Scant growth (1+) Staphylococcus aureus      No Normal Respiratory Sondra     Gram Stain Few (2+) WBCs per low power field      Rare (1+) Epithelial cells per low power field      Few (2+) Budding yeast    aPTT [[id number]]  (Abnormal) Collected: 08/01/21 1222    Specimen: Blood Updated: 08/01/21 1253     .5 seconds     Blood Culture - Blood, Blood, Arterial Line [[id number]] Collected: 07/29/21 1136    Specimen: Blood, Arterial Line Updated: 08/01/21 1231     Blood Culture No growth at 3 days    Blood Culture - Blood, Blood, Central Line [[id number]] Collected: 07/29/21 1153    Specimen: Blood, Central Line Updated: 08/01/21 1231     Blood Culture No growth at 3 days    POC Glucose Once [[id number]]  (Abnormal) Collected: 08/01/21 1043    Specimen: Blood Updated: 08/01/21 1044     Glucose 199 mg/dL      Comment: Serial Number: [id number]:  [id number]       POC Glucose Once [[id number]]  (Abnormal) Collected: 08/01/21 0749    Specimen: Blood Updated: 08/01/21 0750     Glucose 201 mg/dL      Comment: Serial Number: [id number]:  [id number]       POC Glucose Once [[id number]]  (Abnormal) Collected: 08/01/21 0538    Specimen: Blood Updated: 08/01/21 0539     Glucose 258 mg/dL      Comment: Serial Number: [id number]:  [id number]       Calcium, Ionized [[id number]]  (Normal) Collected: 08/01/21 0359    Specimen: Blood Updated: 08/01/21 0512     Ionized Calcium 1.24 mmol/L     Comprehensive Metabolic Panel [[id number]]  (Abnormal) Collected: 08/01/21 0359    Specimen: Blood Updated: 08/01/21 0454     Glucose 248 mg/dL      BUN 22 mg/dL      Creatinine 0.39 mg/dL      Sodium 137 mmol/L      Potassium 5.7 mmol/L      Chloride 94 mmol/L      CO2 38.0 mmol/L      Calcium 9.2 mg/dL      Total Protein 7.0 g/dL       Albumin 3.00 g/dL      ALT (SGPT) 96 U/L      AST (SGOT) 59 U/L      Alkaline Phosphatase 74 U/L      Total Bilirubin 0.5 mg/dL      eGFR Non African Amer >150 mL/min/1.73      Globulin 4.0 gm/dL      A/G Ratio 0.8 g/dL      BUN/Creatinine Ratio 56.4     Anion Gap 5.0 mmol/L     Narrative:      GFR Normal >60  Chronic Kidney Disease <60  Kidney Failure <15      Phosphorus [[id number]]  (Abnormal) Collected: 08/01/21 0359    Specimen: Blood Updated: 08/01/21 0454     Phosphorus 4.8 mg/dL     Magnesium [[id number]]  (Normal) Collected: 08/01/21 0359    Specimen: Blood Updated: 08/01/21 0454     Magnesium 2.2 mg/dL     aPTT [[id number]]  (Abnormal) Collected: 08/01/21 0359    Specimen: Blood Updated: 08/01/21 0439     PTT 59.0 seconds     CBC & Differential [[id number]]  (Abnormal) Collected: 08/01/21 0359    Specimen: Blood Updated: 08/01/21 0429    Narrative:      The following orders were created for panel order CBC & Differential.  Procedure                               Abnormality         Status                     ---------                               -----------         ------                     CBC Auto Differential[[id number]]        Abnormal            Final result                 Please view results for these tests on the individual orders.    CBC Auto Differential [[id number]]  (Abnormal) Collected: 08/01/21 0359    Specimen: Blood Updated: 08/01/21 0429     WBC 14.70 10*3/mm3      RBC 3.88 10*6/mm3      Hemoglobin 11.0 g/dL      Hematocrit 33.8 %      MCV 87.1 fL      MCH 28.3 pg      MCHC 32.5 g/dL      RDW 15.0 %      RDW-SD 46.4 fl      MPV 8.3 fL      Platelets 338 10*3/mm3      Neutrophil % 88.2 %      Lymphocyte % 6.5 %      Monocyte % 4.5 %      Eosinophil % 0.4 %      Basophil % 0.4 %      Neutrophils, Absolute 13.00 10*3/mm3      Lymphocytes, Absolute 1.00 10*3/mm3      Monocytes, Absolute 0.70 10*3/mm3      Eosinophils, Absolute 0.10 10*3/mm3      Basophils, Absolute 0.10 10*3/mm3      nRBC 0.2  /100 WBC     Blood Gas, Arterial - [[id number]]  (Abnormal) Collected: 08/01/21 0400    Specimen: Arterial Blood Updated: 08/01/21 0409     Site Right Radial     Ovi's Test Positive     pH, Arterial 7.248 pH units      pCO2, Arterial 103.6 mm Hg      pO2, Arterial 178.1 mm Hg      HCO3, Arterial 45.2 mmol/L      Base Excess, Arterial 13.7 mmol/L      Comment: Serial Number: [id number]:  [id number]        O2 Saturation, Arterial 99.2 %      CO2 Content 48.4 mmol/L      Barometric Pressure for Blood Gas --     Comment: N/A        Modality Adult Vent     FIO2 80 %      Ventilator Mode ;AC     Set Tidal Volume 420     PEEP 16     Hemodilution No     Respiratory Rate 34    POC Glucose Once [[id number]]  (Abnormal) Collected: 08/01/21 0400    Specimen: Blood Updated: 08/01/21 0404     Glucose 281 mg/dL      Comment: Serial Number: [id number]:  [id number]       POC Glucose Once [[id number]]  (Abnormal) Collected: 07/31/21 2312    Specimen: Blood Updated: 07/31/21 2313     Glucose 205 mg/dL      Comment: Serial Number: [id number]:  [id number]              RADIOLOGY:  XR Chest 1 View    Result Date: 8/1/2021  DATE OF EXAM: 8/1/2021 11:42 AM  PROCEDURE: XR CHEST 1 VW-  INDICATIONS: Shortness of breath, Covid 19 infection, bloody pulmonary secretions.  COMPARISON: 07/29/2021.  TECHNIQUE: Single radiographic view of the chest was obtained.  FINDINGS: The heart is borderline enlarged. There is a stable endotracheal tube and right internal jugular line in place. The nasogastric tube tip projects out of the field-of-view, but below the hemidiaphragms. Aeration of both lungs has improved. There is still bilateral perihilar and some patchy basilar consolidation present felt to represent residual pneumonia from the patient's Covid 19 infection.  There is no pneumothorax or pleural effusion.       1. Improved aeration of both lungs as described. 2. Lines and support tubes appear stable. 3. Borderline cardiomegaly.  Electronically  Signed By-Adebayo Watson MD On:8/1/2021 11:56 AM This report was finalized on [id number] by  Adebayo Watson MD.      ECHOCARDIOGRAM:  None previous for comparison.     I reviewed the patient's new clinical results.    This note has been scribed by me for pulmonary attending physician.    Electronically signed by NATY Valencia, 08/01/21 at 14:26 EDT.

## 2021-08-01 NOTE — PROGRESS NOTES
"Nutrition Services    Patient Name: Leslie Harris  YOB: 1990  MRN: [id number]  Admission date: 7/22/2021      PPE Documentation        PPE Worn By Provider Did not enter room for progress note    PPE Worn By Patient  N/A     PROGRESS NOTE      Encounter Information: 8/1: Pt remains critically ill on vent support with EN. Formula is running at goal rate with good tolerance. Of note, K+ and phosphorus are higher today than on previous days - will monitor; may be isolated labs. Could transition to lower K+/phosphorus formula tomorrow if problem persists.       Labs (reviewed below): Hyperkalemia and hyperphosphatemia - new; monitor   Hyperglycemia - ongoing, TF is carbohydrate-controlled       GI Function:  FMS with good output (300cc)       Nutrition Intervention: Continue TF as ordered.        PO Diet/Supplements: NPO Diet   EN Prescription: Peptamen Intense VHP at 70 mL/hr + 20 mL/hr water flush       Intake/Output:   Intake/Output Summary (Last 24 hours) at 8/1/2021 1631  Last data filed at 8/1/2021 1622  Gross per 24 hour   Intake 4209 ml   Output 4850 ml   Net -641 ml          Height: Height: 165.1 cm (65\")   Weight: Weight: (!) 167 kg (367 lb 4.6 oz) (07/28/21 0400)   BMI: Body mass index is 61.12 kg/m².     Results from last 7 days   Lab Units 08/01/21 0359 07/31/21  0556 07/30/21  0252   SODIUM mmol/L 137 137 137   POTASSIUM mmol/L 5.7* 5.0 5.1   CHLORIDE mmol/L 94* 95* 95*   CO2 mmol/L 38.0* 37.0* 35.0*   BUN mg/dL 22* 21* 17   CREATININE mg/dL 0.39* 0.36* 0.34*   CALCIUM mg/dL 9.2 8.9 8.7   BILIRUBIN mg/dL 0.5 0.7 0.7   ALK PHOS U/L 74 70 70   ALT (SGPT) U/L 96* 70* 70*   AST (SGOT) U/L 59* 49* 53*   GLUCOSE mg/dL 248* 226* 278*     Results from last 7 days   Lab Units 08/01/21  0359 07/31/21  0556 07/31/21  0556 07/30/21  0252 07/30/21  0252   MAGNESIUM mg/dL 2.2  --  2.2  --  2.0   PHOSPHORUS mg/dL 4.8*   < > 4.2   < > 4.2   HEMOGLOBIN g/dL 11.0*   < > 10.9*   < > 11.3*   HEMATOCRIT % " 33.8*   < > 33.0*   < > 35.0    < > = values in this interval not displayed.     COVID19   Date Value Ref Range Status   07/22/2021 Detected (C) Not Detected - Ref. Range Final     Lab Results   Component Value Date    HGBA1C 6.9 (H) 07/23/2021     Vitals:    08/01/21 1622   BP:    Pulse:    Resp:    Temp: 99.8 °F (37.7 °C)   SpO2:      RD to follow up per protocol.    Electronically signed by:  Traci Schulz RD  08/01/21 16:31 EDT

## 2021-08-01 NOTE — PROCEDURES
Arterial catheter Placement  Date: 8/1/2021  Time: 1215     Indication: Hemodynamic monitoring, frequent abgs        A time-out was completed verifying correct patient, procedure, site, positioning.  The patient’s right radial artery was identified via ultrasound and the right wrist was prepped and draped in sterile fashion.  An 18-gauge needle was then introduced into the artery under ultrasound guidance at which time bright red pulsatile blood was visualized.  A guidewire was then passed smoothly through the needle however the wire would not thread into the vessel.  The needle and wire were removed intact and the procedure repeated above the initial site.  Again the wire would not thread.  After 3 unsuccessful attempts, the radial artery access was aborted.  The right brachial artery was identified via ultrasound and the right upper arm/AC space were prepped and draped in sterile fashion and an 18-gauge needle was then introduced into the artery under ultrasound guidance at which time bright red pulsatile blood was visualized. A guidewire was then passed smoothly through the needle and the needle removed.  An 18 arterial catheter was threaded over the wire smoothly using the Seldinger technique. The wire was removed and visualized to be intact.  Immediate return of bright red pulsatile blood was obtained from the catheter which was then connected to pressure tubing and a dicrotic notched waveform was viewed on the bedside monitor. The catheter was then sutured in place to the skin and a sterile dressing applied.      Estimated Blood Loss: 3 mL  The patient tolerated the procedure well and there were no complications.     Attending physician: Dr. Fajardo

## 2021-08-01 NOTE — NURSING NOTE
Patient was turned supine from prone position. Tolerated well. Sats after turning are at 95%. No new skin issues or pressure injuries noted. Face is very swollen. Will have resp therapy change trach ties to anchorfast if pt tolerates being on back. Will continue to monitor.

## 2021-08-02 LAB
ALBUMIN SERPL-MCNC: 2.9 G/DL (ref 3.5–5.2)
ALBUMIN/GLOB SERPL: 0.8 G/DL
ALP SERPL-CCNC: 66 U/L (ref 39–117)
ALT SERPL W P-5'-P-CCNC: 98 U/L (ref 1–33)
ANION GAP SERPL CALCULATED.3IONS-SCNC: 9 MMOL/L (ref 5–15)
APTT PPP: 61.9 SECONDS (ref 61–76.5)
APTT PPP: 79.3 SECONDS (ref 61–76.5)
APTT PPP: 86.7 SECONDS (ref 61–76.5)
ARTERIAL PATENCY WRIST A: ABNORMAL
ARTERIAL PATENCY WRIST A: ABNORMAL
AST SERPL-CCNC: 52 U/L (ref 1–32)
ATMOSPHERIC PRESS: ABNORMAL MM[HG]
ATMOSPHERIC PRESS: ABNORMAL MM[HG]
BACTERIA SPEC RESP CULT: ABNORMAL
BASE EXCESS BLDA CALC-SCNC: 12.3 MMOL/L (ref 0–3)
BASE EXCESS BLDA CALC-SCNC: 9.8 MMOL/L (ref 0–3)
BDY SITE: ABNORMAL
BDY SITE: ABNORMAL
BILIRUB SERPL-MCNC: 0.6 MG/DL (ref 0–1.2)
BUN SERPL-MCNC: 28 MG/DL (ref 6–20)
BUN/CREAT SERPL: 71.8 (ref 7–25)
C PEPTIDE SERPL-MCNC: 14.3 NG/ML (ref 1.1–4.4)
CA-I SERPL ISE-MCNC: 1.21 MMOL/L (ref 1.2–1.3)
CALCIUM SPEC-SCNC: 9.1 MG/DL (ref 8.6–10.5)
CHLORIDE SERPL-SCNC: 94 MMOL/L (ref 98–107)
CO2 BLDA-SCNC: 37 MMOL/L (ref 22–29)
CO2 BLDA-SCNC: 38.9 MMOL/L (ref 22–29)
CO2 SERPL-SCNC: 34 MMOL/L (ref 22–29)
CREAT SERPL-MCNC: 0.39 MG/DL (ref 0.57–1)
D DIMER PPP FEU-MCNC: 1.05 MG/L (FEU) (ref 0–0.59)
DEPRECATED RDW RBC AUTO: 44.6 FL (ref 37–54)
ERYTHROCYTE [DISTWIDTH] IN BLOOD BY AUTOMATED COUNT: 14.7 % (ref 12.3–15.4)
FIBRINOGEN PPP-MCNC: 711 MG/DL (ref 210–450)
GFR SERPL CREATININE-BSD FRML MDRD: >150 ML/MIN/1.73
GLOBULIN UR ELPH-MCNC: 3.5 GM/DL
GLUCOSE BLDC GLUCOMTR-MCNC: 189 MG/DL (ref 70–105)
GLUCOSE BLDC GLUCOMTR-MCNC: 207 MG/DL (ref 70–105)
GLUCOSE BLDC GLUCOMTR-MCNC: 222 MG/DL (ref 70–105)
GLUCOSE SERPL-MCNC: 286 MG/DL (ref 65–99)
GRAM STN SPEC: ABNORMAL
HCO3 BLDA-SCNC: 35.4 MMOL/L (ref 21–28)
HCO3 BLDA-SCNC: 37.4 MMOL/L (ref 21–28)
HCT VFR BLD AUTO: 30.5 % (ref 34–46.6)
HEMODILUTION: NO
HEMODILUTION: NO
HGB BLD-MCNC: 10 G/DL (ref 12–15.9)
INHALED O2 CONCENTRATION: 100 %
INHALED O2 CONCENTRATION: 100 %
INR PPP: 1.13 (ref 0.93–1.1)
LDH SERPL-CCNC: 248 U/L (ref 135–214)
LYMPHOCYTES # BLD MANUAL: 1.2 10*3/MM3 (ref 0.7–3.1)
LYMPHOCYTES NFR BLD MANUAL: 7 % (ref 19.6–45.3)
LYMPHOCYTES NFR BLD MANUAL: 7 % (ref 5–12)
MAGNESIUM SERPL-MCNC: 1.9 MG/DL (ref 1.6–2.6)
MCH RBC QN AUTO: 28.3 PG (ref 26.6–33)
MCHC RBC AUTO-ENTMCNC: 32.9 G/DL (ref 31.5–35.7)
MCV RBC AUTO: 86.1 FL (ref 79–97)
MODALITY: ABNORMAL
MODALITY: ABNORMAL
MONOCYTES # BLD AUTO: 1.2 10*3/MM3 (ref 0.1–0.9)
NEUTROPHILS # BLD AUTO: 14.79 10*3/MM3 (ref 1.7–7)
NEUTROPHILS NFR BLD MANUAL: 86 % (ref 42.7–76)
NT-PROBNP SERPL-MCNC: 203.5 PG/ML (ref 0–450)
PCO2 BLDA: 50.4 MM HG (ref 35–48)
PCO2 BLDA: 52.3 MM HG (ref 35–48)
PEEP RESPIRATORY: 16 CM[H2O]
PEEP RESPIRATORY: 16 CM[H2O]
PH BLDA: 7.44 PH UNITS (ref 7.35–7.45)
PH BLDA: 7.48 PH UNITS (ref 7.35–7.45)
PHOSPHATE SERPL-MCNC: 2.9 MG/DL (ref 2.5–4.5)
PLAT MORPH BLD: NORMAL
PLATELET # BLD AUTO: 302 10*3/MM3 (ref 140–450)
PMV BLD AUTO: 8.3 FL (ref 6–12)
PO2 BLDA: 112.9 MM HG (ref 83–108)
PO2 BLDA: 54.4 MM HG (ref 83–108)
POTASSIUM SERPL-SCNC: 4.7 MMOL/L (ref 3.5–5.2)
PROT SERPL-MCNC: 6.4 G/DL (ref 6–8.5)
PROTHROMBIN TIME: 12.4 SECONDS (ref 9.6–11.7)
RBC # BLD AUTO: 3.54 10*6/MM3 (ref 3.77–5.28)
RBC MORPH BLD: NORMAL
RESPIRATORY RATE: 36
RESPIRATORY RATE: 36
SAO2 % BLDCOA: 89.3 % (ref 94–98)
SAO2 % BLDCOA: 98.5 % (ref 94–98)
SCAN SLIDE: NORMAL
SODIUM SERPL-SCNC: 137 MMOL/L (ref 136–145)
TROPONIN T SERPL-MCNC: <0.01 NG/ML (ref 0–0.03)
VENTILATOR MODE: ABNORMAL
VENTILATOR MODE: ABNORMAL
VT ON VENT VENT: 420 ML
VT ON VENT VENT: 420 ML
WBC # BLD AUTO: 17.2 10*3/MM3 (ref 3.4–10.8)
WBC MORPH BLD: NORMAL

## 2021-08-02 PROCEDURE — 85730 THROMBOPLASTIN TIME PARTIAL: CPT | Performed by: INTERNAL MEDICINE

## 2021-08-02 PROCEDURE — 83735 ASSAY OF MAGNESIUM: CPT | Performed by: NURSE PRACTITIONER

## 2021-08-02 PROCEDURE — 25010000002 EPOPROSTENOL PER 0.5 MG: Performed by: NURSE PRACTITIONER

## 2021-08-02 PROCEDURE — 80053 COMPREHEN METABOLIC PANEL: CPT | Performed by: NURSE PRACTITIONER

## 2021-08-02 PROCEDURE — 25010000002 HEPARIN (PORCINE) 25000-0.45 UT/250ML-% SOLUTION: Performed by: INTERNAL MEDICINE

## 2021-08-02 PROCEDURE — 94799 UNLISTED PULMONARY SVC/PX: CPT

## 2021-08-02 PROCEDURE — 63710000001 INSULIN REGULAR HUMAN PER 5 UNITS: Performed by: INTERNAL MEDICINE

## 2021-08-02 PROCEDURE — 85384 FIBRINOGEN ACTIVITY: CPT | Performed by: NURSE PRACTITIONER

## 2021-08-02 PROCEDURE — 63710000001 INSULIN LISPRO (HUMAN) PER 5 UNITS: Performed by: INTERNAL MEDICINE

## 2021-08-02 PROCEDURE — 84100 ASSAY OF PHOSPHORUS: CPT | Performed by: NURSE PRACTITIONER

## 2021-08-02 PROCEDURE — 82803 BLOOD GASES ANY COMBINATION: CPT

## 2021-08-02 PROCEDURE — 85610 PROTHROMBIN TIME: CPT | Performed by: NURSE PRACTITIONER

## 2021-08-02 PROCEDURE — 25010000002 MAGNESIUM SULFATE IN D5W 1G/100ML (PREMIX) 1-5 GM/100ML-% SOLUTION: Performed by: NURSE PRACTITIONER

## 2021-08-02 PROCEDURE — 82330 ASSAY OF CALCIUM: CPT | Performed by: NURSE PRACTITIONER

## 2021-08-02 PROCEDURE — 25010000002 FENTANYL CITRATE (PF) 2500 MCG/50ML SOLUTION: Performed by: NURSE PRACTITIONER

## 2021-08-02 PROCEDURE — 83615 LACTATE (LD) (LDH) ENZYME: CPT | Performed by: NURSE PRACTITIONER

## 2021-08-02 PROCEDURE — 83880 ASSAY OF NATRIURETIC PEPTIDE: CPT | Performed by: NURSE PRACTITIONER

## 2021-08-02 PROCEDURE — 25010000002 MIDAZOLAM 50 MG/10ML SOLUTION: Performed by: NURSE PRACTITIONER

## 2021-08-02 PROCEDURE — 85007 BL SMEAR W/DIFF WBC COUNT: CPT | Performed by: NURSE PRACTITIONER

## 2021-08-02 PROCEDURE — 84484 ASSAY OF TROPONIN QUANT: CPT | Performed by: NURSE PRACTITIONER

## 2021-08-02 PROCEDURE — 85379 FIBRIN DEGRADATION QUANT: CPT | Performed by: NURSE PRACTITIONER

## 2021-08-02 PROCEDURE — 25010000002 FUROSEMIDE PER 20 MG: Performed by: INTERNAL MEDICINE

## 2021-08-02 PROCEDURE — 85025 COMPLETE CBC W/AUTO DIFF WBC: CPT | Performed by: NURSE PRACTITIONER

## 2021-08-02 PROCEDURE — 25010000002 DEXAMETHASONE PER 1 MG: Performed by: INTERNAL MEDICINE

## 2021-08-02 PROCEDURE — 94003 VENT MGMT INPAT SUBQ DAY: CPT

## 2021-08-02 PROCEDURE — 63710000001 INSULIN GLARGINE PER 5 UNITS: Performed by: INTERNAL MEDICINE

## 2021-08-02 PROCEDURE — 82962 GLUCOSE BLOOD TEST: CPT

## 2021-08-02 RX ORDER — INSULIN LISPRO 100 [IU]/ML
10 INJECTION, SOLUTION INTRAVENOUS; SUBCUTANEOUS EVERY 6 HOURS
Status: DISCONTINUED | OUTPATIENT
Start: 2021-08-02 | End: 2021-08-13

## 2021-08-02 RX ORDER — VECURONIUM BROMIDE 1 MG/ML
10 INJECTION, POWDER, LYOPHILIZED, FOR SOLUTION INTRAVENOUS ONCE
Status: COMPLETED | OUTPATIENT
Start: 2021-08-02 | End: 2021-08-02

## 2021-08-02 RX ADMIN — CISATRACURIUM BESYLATE 3.5 MCG/KG/MIN: 10 INJECTION INTRAVENOUS at 23:50

## 2021-08-02 RX ADMIN — LINEZOLID 600 MG: 600 TABLET ORAL at 21:15

## 2021-08-02 RX ADMIN — METOPROLOL TARTRATE 25 MG: 25 TABLET, FILM COATED ORAL at 08:44

## 2021-08-02 RX ADMIN — INSULIN HUMAN 12 UNITS: 100 INJECTION, SOLUTION PARENTERAL at 08:44

## 2021-08-02 RX ADMIN — FUROSEMIDE 20 MG: 10 INJECTION, SOLUTION INTRAMUSCULAR; INTRAVENOUS at 08:44

## 2021-08-02 RX ADMIN — FUROSEMIDE 20 MG: 10 INJECTION, SOLUTION INTRAMUSCULAR; INTRAVENOUS at 17:02

## 2021-08-02 RX ADMIN — INSULIN HUMAN 12 UNITS: 100 INJECTION, SOLUTION PARENTERAL at 21:15

## 2021-08-02 RX ADMIN — EPOPROSTENOL 50 NG/KG/MIN: 1.5 INJECTION, POWDER, LYOPHILIZED, FOR SOLUTION INTRAVENOUS at 09:00

## 2021-08-02 RX ADMIN — MINERAL OIL AND WHITE PETROLATUM: 150; 830 OINTMENT OPHTHALMIC at 15:18

## 2021-08-02 RX ADMIN — MIDAZOLAM 8 MG/HR: 5 INJECTION INTRAMUSCULAR; INTRAVENOUS at 00:33

## 2021-08-02 RX ADMIN — INSULIN LISPRO 12 UNITS: 100 INJECTION, SOLUTION INTRAVENOUS; SUBCUTANEOUS at 05:59

## 2021-08-02 RX ADMIN — FENTANYL CITRATE 200 MCG/HR: 50 INJECTION, SOLUTION INTRAMUSCULAR; INTRAVENOUS at 13:54

## 2021-08-02 RX ADMIN — INSULIN GLARGINE 30 UNITS: 100 INJECTION, SOLUTION SUBCUTANEOUS at 21:15

## 2021-08-02 RX ADMIN — FAMOTIDINE 20 MG: 20 TABLET ORAL at 08:44

## 2021-08-02 RX ADMIN — HEPARIN SODIUM 18 UNITS/KG/HR: 10000 INJECTION, SOLUTION INTRAVENOUS at 09:18

## 2021-08-02 RX ADMIN — ALBUTEROL SULFATE 6 PUFF: 108 INHALANT RESPIRATORY (INHALATION) at 18:37

## 2021-08-02 RX ADMIN — ALBUTEROL SULFATE 6 PUFF: 108 INHALANT RESPIRATORY (INHALATION) at 06:30

## 2021-08-02 RX ADMIN — MINERAL OIL AND WHITE PETROLATUM: 150; 830 OINTMENT OPHTHALMIC at 19:32

## 2021-08-02 RX ADMIN — FENTANYL CITRATE 100 MCG/HR: 50 INJECTION, SOLUTION INTRAMUSCULAR; INTRAVENOUS at 02:30

## 2021-08-02 RX ADMIN — EPOPROSTENOL 50 NG/KG/MIN: 1.5 INJECTION, POWDER, LYOPHILIZED, FOR SOLUTION INTRAVENOUS at 09:04

## 2021-08-02 RX ADMIN — INSULIN GLARGINE 30 UNITS: 100 INJECTION, SOLUTION SUBCUTANEOUS at 08:43

## 2021-08-02 RX ADMIN — BUDESONIDE AND FORMOTEROL FUMARATE DIHYDRATE 2 PUFF: 160; 4.5 AEROSOL RESPIRATORY (INHALATION) at 18:32

## 2021-08-02 RX ADMIN — BUDESONIDE AND FORMOTEROL FUMARATE DIHYDRATE 2 PUFF: 160; 4.5 AEROSOL RESPIRATORY (INHALATION) at 06:30

## 2021-08-02 RX ADMIN — Medication 600 MG: at 21:15

## 2021-08-02 RX ADMIN — INSULIN LISPRO 4 UNITS: 100 INJECTION, SOLUTION INTRAVENOUS; SUBCUTANEOUS at 17:06

## 2021-08-02 RX ADMIN — FLUCONAZOLE 400 MG: 40 POWDER, FOR SUSPENSION ORAL at 21:15

## 2021-08-02 RX ADMIN — GUAIFENESIN 400 MG: 100 SOLUTION ORAL at 12:33

## 2021-08-02 RX ADMIN — DEXAMETHASONE SODIUM PHOSPHATE 6 MG: 4 INJECTION, SOLUTION INTRAMUSCULAR; INTRAVENOUS at 08:44

## 2021-08-02 RX ADMIN — GUAIFENESIN 400 MG: 100 SOLUTION ORAL at 23:37

## 2021-08-02 RX ADMIN — SODIUM CHLORIDE 7.5 MG/HR: 900 INJECTION, SOLUTION INTRAVENOUS at 17:01

## 2021-08-02 RX ADMIN — Medication 10 ML: at 21:16

## 2021-08-02 RX ADMIN — CISATRACURIUM BESYLATE 3.5 MCG/KG/MIN: 10 INJECTION INTRAVENOUS at 09:21

## 2021-08-02 RX ADMIN — MINERAL OIL AND WHITE PETROLATUM: 150; 830 OINTMENT OPHTHALMIC at 04:30

## 2021-08-02 RX ADMIN — INSULIN LISPRO 5 UNITS: 100 INJECTION, SOLUTION INTRAVENOUS; SUBCUTANEOUS at 05:59

## 2021-08-02 RX ADMIN — Medication 600 MG: at 08:44

## 2021-08-02 RX ADMIN — INSULIN LISPRO 8 UNITS: 100 INJECTION, SOLUTION INTRAVENOUS; SUBCUTANEOUS at 12:33

## 2021-08-02 RX ADMIN — CISATRACURIUM BESYLATE 4.5 MCG/KG/MIN: 10 INJECTION INTRAVENOUS at 00:29

## 2021-08-02 RX ADMIN — CISATRACURIUM BESYLATE 4.5 MCG/KG/MIN: 10 INJECTION INTRAVENOUS at 05:01

## 2021-08-02 RX ADMIN — HEPARIN SODIUM 16 UNITS/KG/HR: 10000 INJECTION, SOLUTION INTRAVENOUS at 17:35

## 2021-08-02 RX ADMIN — ALBUTEROL SULFATE 6 PUFF: 108 INHALANT RESPIRATORY (INHALATION) at 15:25

## 2021-08-02 RX ADMIN — INSULIN LISPRO 10 UNITS: 100 INJECTION, SOLUTION INTRAVENOUS; SUBCUTANEOUS at 23:35

## 2021-08-02 RX ADMIN — Medication 2000 UNITS: at 08:44

## 2021-08-02 RX ADMIN — ZINC SULFATE 220 MG (50 MG) CAPSULE 220 MG: CAPSULE at 08:44

## 2021-08-02 RX ADMIN — INSULIN LISPRO 10 UNITS: 100 INJECTION, SOLUTION INTRAVENOUS; SUBCUTANEOUS at 12:33

## 2021-08-02 RX ADMIN — INSULIN LISPRO 7 UNITS: 100 INJECTION, SOLUTION INTRAVENOUS; SUBCUTANEOUS at 23:35

## 2021-08-02 RX ADMIN — FENTANYL CITRATE 200 MCG/HR: 50 INJECTION, SOLUTION INTRAMUSCULAR; INTRAVENOUS at 09:22

## 2021-08-02 RX ADMIN — MINERAL OIL AND WHITE PETROLATUM: 150; 830 OINTMENT OPHTHALMIC at 12:33

## 2021-08-02 RX ADMIN — MINERAL OIL AND WHITE PETROLATUM: 150; 830 OINTMENT OPHTHALMIC at 08:42

## 2021-08-02 RX ADMIN — MAGNESIUM SULFATE HEPTAHYDRATE 1 G: 1 INJECTION, SOLUTION INTRAVENOUS at 08:42

## 2021-08-02 RX ADMIN — INSULIN LISPRO 10 UNITS: 100 INJECTION, SOLUTION INTRAVENOUS; SUBCUTANEOUS at 17:06

## 2021-08-02 RX ADMIN — HEPARIN SODIUM 20 UNITS/KG/HR: 10000 INJECTION, SOLUTION INTRAVENOUS at 02:30

## 2021-08-02 RX ADMIN — EPOPROSTENOL 50 NG/KG/MIN: 1.5 INJECTION, POWDER, LYOPHILIZED, FOR SOLUTION INTRAVENOUS at 18:43

## 2021-08-02 RX ADMIN — Medication 10 ML: at 08:44

## 2021-08-02 RX ADMIN — ALBUTEROL SULFATE 6 PUFF: 108 INHALANT RESPIRATORY (INHALATION) at 11:54

## 2021-08-02 RX ADMIN — DEXAMETHASONE SODIUM PHOSPHATE 6 MG: 4 INJECTION, SOLUTION INTRAMUSCULAR; INTRAVENOUS at 21:15

## 2021-08-02 RX ADMIN — CISATRACURIUM BESYLATE 3.5 MCG/KG/MIN: 10 INJECTION INTRAVENOUS at 17:36

## 2021-08-02 RX ADMIN — FENTANYL CITRATE 200 MCG/HR: 50 INJECTION, SOLUTION INTRAMUSCULAR; INTRAVENOUS at 17:35

## 2021-08-02 RX ADMIN — LINEZOLID 600 MG: 600 TABLET ORAL at 08:44

## 2021-08-02 RX ADMIN — GUAIFENESIN 400 MG: 100 SOLUTION ORAL at 05:58

## 2021-08-02 RX ADMIN — MIDAZOLAM 8 MG/HR: 5 INJECTION INTRAMUSCULAR; INTRAVENOUS at 08:49

## 2021-08-02 RX ADMIN — ASPIRIN 324 MG: 81 TABLET, CHEWABLE ORAL at 08:43

## 2021-08-02 RX ADMIN — METOPROLOL TARTRATE 25 MG: 25 TABLET, FILM COATED ORAL at 15:18

## 2021-08-02 RX ADMIN — VECURONIUM BROMIDE 10 MG: 1 INJECTION, POWDER, LYOPHILIZED, FOR SOLUTION INTRAVENOUS at 00:25

## 2021-08-02 RX ADMIN — GUAIFENESIN 400 MG: 100 SOLUTION ORAL at 17:02

## 2021-08-02 RX ADMIN — MIDAZOLAM 8 MG/HR: 5 INJECTION INTRAMUSCULAR; INTRAVENOUS at 17:35

## 2021-08-02 NOTE — PROGRESS NOTES
PULMONARY/CRITICAL CARE PROGRESS NOTE       NAME:     Leslie Harris   AGE:     30 y.o.   SEX:  female   : 1990       MRN:       [id number]          RM: [facility] ICU      ASSESSMENT & PLAN     F/U: Acute respiratory failure secondary to COVID-19 pna    Principal Problem:    Pneumonia due to COVID-19 virus  Active Problems:    Acute respiratory failure due to COVID-19 (CMS/[facility])    Class 3 severe obesity without serious comorbidity with body mass index (BMI) of 50.0 to 59.9 in adult    Hyperglycemia, likely stress induced    Diabetes mellitus type 2 in obese (CMS/[facility])     Multidisciplinary Rounds:  -Tolerating supine position  -Intubated and sedated with fentanyl and Versed  -now on Nimbex for paralytic, the pt was overbreathing vent on Norcuron  -Labile BP, alternating cardizem and levo  -Nebulized flolan  -Remains on heparin gtt  -FMS placed due to high volume liquid stool  -Tolerating tube feeds    PLAN:  Pneumonia due to COVID-19 virus  Acute respiratory failure due to COVID-19  -Failed NIPPV and required intubation   -Retest RVP/COVID-19 to eval for other respiratory viruses. +COVID-19 only  -Monitor disease progression labs as ordered  -Vitamin D, Acetylcysteine, Zinc  -CT PE protocol could not be obtained due to severe hypoxia  -Empiric A/C with heparin drip  -Remdesivir x5 days, completed on   -Decardron, decreased dose   -Does not qualify for Actemra  -Symbicort, Albuterol HFA.  -Sputum culture-Candida albicans, Staph aureus  -discontinued Ceftriaxone, changed to Zosyn  due to fever, completed   -Add Zyvox and Diflucan added 2021 due to positive sputum culture    Diabetes mellitus, type 2, new diagnosis  -strict glycemic control recommended  -Accuchecks every 6 hours with sliding scale insulin  -Added Lantus  -Hemoglobin A1c 6.9.    Hypertension  -added beta blocker with improved control     Class 3 severe obesity without serious comorbidity with body mass index (BMI) of  50.0 to 59.9 in adult  -BMI 59.32  -Complicating aspects of care  -Counseled on lifestyle modifications to improve overall health prior to intubation    AST/ALT remain elevated  -Significance unclear  -Continue to monitor and trend     Code Status: CPR, full interventions  VTE Prophylaxis:  Lovenox  PUD Prophylaxis: Pepcid  Nutrition: dietitian following for tube feed recommendations.Tolerating TF at goal  +Bowel regimen    Right IJ central line 7/23  Left radial A-line 7/23, discontinued 8/1  Right brachial A-line placed 8/1    The patient's mother reported that the patient had latent autoimmune diabetes mellitus diagnosed recently and requested C-peptide and MAX.  It was explained to her that these tests would not accurately reflect the patient's condition as her current high acuity and metabolic state would render these numbers unpredictable and not reliable.  Recommend that this is followed up after the patient's current acuity has improved      [place] & SUBJECTIVE     Leslie Harris is a 30 y.o. female  has a past medical history of Class 3 severe obesity without serious comorbidity with body mass index (BMI) of 50.0 to 59.9 in adult (7/22/2021).   Patient presented to [facility] ED on 7/22/2021 with complaint of being recently diagnosed with COVID-19 at the [facility] department 6 days prior to admission.  Patient reports that she has been having increased shortness of breath over the past couple days, and has not lost her sense of smell, but has lost her sense of taste, reports frequent, nonproductive cough, myalgias, fevers, and chills.  She denies any abdominal symptoms including nausea, vomiting, constipation, diarrhea.  Patient reports that she actually called EMS as her oxygen saturation had been lower, reading in ED upper 70s, but she did not feel that it was right, as she stated that her fingers were cold.  EMS came and reported to her that her oxygen was actually in the 80s at that  "time, on the lower side of the 80s, but for some reason patient was not transported to the ED.  Patient then presented today, and she additionally reported some generalized sore throat, with trouble swallowing some things, due to just the general pain associated with it.  She denies any actual problems with physically swallowing this food.  She reports a decreased appetite and decreased oral intake.  Patient states that there was a coworker at her facility, in which they questioned if she had been tested, as she generally did not look like she felt well, but that individual stated that they had been tested and were fine, but at her place of work, which is the [facility], there has been a small outbreak of COVID-19 infected individuals, in which a coworker of hers, is additionally hospitalized, due to this exposure as well.  Patient was not vaccinated for COVID-19.  Patient reported that she is a non-smoker, denies routine alcohol use or recreational drug use.  She reports that her only other previous medical history involved a cholecystectomy that resulted in a perforation of her small bowel, in which she was discharged home and became septic, requiring readmission and underwent an ex lap with lysis of adhesions.  She stated that this occurred in 2018.     In the ED, labs were obtained with abnormalities as follows: , calcium 8.5, ALT 51, AST 73, ferritin 545.6, CRP 6.33, procalcitonin 0.30, lactate 1.3, D-dimer 1.48.  ABG was obtained and as follows: pH 7.427, PCO2 39.5, PO2 44.9, HCO3 26.0, O2 saturation 81.8%.  This ABG was obtained while patient was on a nonrebreather.  Chest x-ray was obtained and with the following impression: \"Left greater than right interstitial and alveolar disease.  Given the patient's Covid positive status, the findings may reflect changes of pneumonia.  Patient required oxygen titration up to maximal Precision flow at 40 L/min with FiO2 of 100%.  Patient was considered to " be very high risk for further decompensation, and it was recommended for patient to be admitted to the ICU for close observation.     Pulmonary/Intensivist service was contacted for admission to ICU and further evaluation and treatment.    7/23: Patient is drowsy, but easy to arouse, proned overnight, continues on AVAPS in combination with Precision flow 40 L/min and FiO2 100%.  She denies chest pain, but does report shortness of breath and anxiety, cough.  7/24: Intubated, sedated, chemically paralyzed  7/25: intubated, sedated, chemically paralyzed. Fever overnight  7/26: Intubated, sedated, chemically paralyzed.  Proned.  Still having fevers  7/27: Intubated, sedated, chemically paralyzed.  Tolerating supine positioning.  Still febrile  7/28: Intubated, sedated, chemically paralyzed.  Supine.  7/29: Intubated, sedated, chemically paralyzed.  Still remains supine  7/30: Intubated, sedated, chemically paralyzed, proned.  7/31: Intubated, sedated.  Remains chemically paralyzed and proned.  Nebulized Flolan  8/1: Intubated, sedated, paralyzed.  Tolerating supine positioning.  Still on nebulized Flolan  8/2: Intubated, sedated, paralyzed.  Tolerating supine positioning    REVIEW OF SYSTEMS:  Review of systems could not be obtained due to   patient sedation status. patient intubated.      MEDICATIONS     SCHEDULED MEDICATIONS:   Acetylcysteine, 600 mg, Oral, BID  albuterol sulfate HFA, 6 puff, Inhalation, 4x Daily - RT  artificial tears, , Both Eyes, Q4H  aspirin, 324 mg, Per G Tube, Daily  budesonide-formoterol, 2 puff, Inhalation, BID - RT  cholecalciferol, 2,000 Units, Nasogastric, Daily  dexamethasone, 6 mg, Intravenous, Q12H  famotidine, 20 mg, Nasogastric, Daily  fluconazole, 400 mg, Oral, Nightly  furosemide, 20 mg, Intravenous, BID  guaiFENesin, 400 mg, Nasogastric, Q6H  insulin glargine, 30 Units, Subcutaneous, Q12H  insulin lispro, 0-24 Units, Subcutaneous, Q6H  insulin lispro, 5 Units, Subcutaneous,  "Q6H  insulin regular, 12 Units, Subcutaneous, Q12H  linezolid, 600 mg, Oral, Q12H  metoprolol tartrate, 25 mg, Oral, Q8H  sodium chloride, 10 mL, Intravenous, Q12H  zinc sulfate, 220 mg, Nasogastric, Daily        CONTINUOUS INFUSIONS:   cisatracurium (NIMBEX) infusion, 0.5-10.2 mcg/kg/min, Last Rate: 3.5 mcg/kg/min (08/02/21 0921)  dilTIAZem, 5-15 mg/hr, Last Rate: 2.5 mg/hr (08/02/21 0916)  epoprostenol, 50 ng/kg/min (Ideal), Last Rate: 50 ng/kg/min (08/02/21 0904)  fentanyl 10 mcg/mL,  mcg/hr, Last Rate: 200 mcg/hr (08/02/21 0922)  heparin, 8.98 Units/kg/hr, Last Rate: 18 Units/kg/hr (08/02/21 0918)  HYDROmorphone, 0.2-3 mg/hr, Last Rate: Stopped (08/01/21 1941)  midazolam, 1-10 mg/hr, Last Rate: 8 mg/hr (08/02/21 0849)  norepinephrine, 0.02-0.3 mcg/kg/min, Last Rate: Stopped (08/02/21 0852)        PRN MEDS:    acetaminophen    acetaminophen **OR** acetaminophen    aluminum-magnesium hydroxide-simethicone    benzonatate    cisatracurium    dextrose    dextrose    [COMPLETED] dilTIAZem **FOLLOWED BY** dilTIAZem **FOLLOWED BY** [START ON 8/3/2021] dilTIAZem    glucagon (human recombinant)    glycopyrrolate PF    heparin    heparin    hydrALAZINE    insulin lispro **AND** insulin lispro    magnesium sulfate **OR** magnesium sulfate in D5W 1g/100mL (PREMIX)    metoprolol tartrate    nitroglycerin    ondansetron **OR** ondansetron    potassium chloride **OR** potassium chloride **OR** potassium chloride    potassium phosphate infusion greater than 15 mMoles **OR** potassium phosphate infusion greater than 15 mMoles **OR** potassium phosphate **OR** sodium phosphate IVPB **OR** sodium phosphate IVPB **OR** sodium phosphate IVPB    [COMPLETED] Insert peripheral IV **AND** sodium chloride    sodium chloride    OBJECTIVE     VITAL SIGNS:  /93   Pulse 105   Temp 98.6 °F (37 °C)   Resp (!) 36   Ht 165.1 cm (65\")   Wt (!) 167 kg (367 lb 4.6 oz)   LMP 07/22/2021   SpO2 100%   BMI 61.12 kg/m²     I/O FROM " PREVIOUS 3 SHIFTS:  I/O last 3 completed shifts:  In: 7076 [I.V.:3941; Other:582; NG/GT:2553]  Out: 7000 [Urine:6150; Other:300; Stool:550]      I/O PREVIOUS 24 HOURS:   Intake/Output                               07/31/21 0701 - 08/01/21 0700 08/01/21 0701 - 08/02/21 0700 08/02/21 0701 - 08/03/21 0700 0701-1900 1901-0700 Total 0186-5216 3139-7677 Total 6600-7597 7250-4784 Total                    Intake    P.O.  --  0 0  --  -- --  --  -- --    I.V.  835  1292 2127  1127  1522 2649  --  -- --    Other  81  231 312  100  251 351  --  -- --    Flush/ Irrigation Intake (mL) (NG/OG Tube Nasogastric 16 Fr Left nostril) 81 231 312 100 251 351 -- -- --    NG/GT  639  895 1534  564  1094 1658  30  -- 30    Total Intake 1555 2418 3973 1791 2867 4658 30 -- 30       Output    Urine  1200  2150 3350  1900  2100 4000  --  -- --    Other  --  300 300  --  -- --  --  -- --    Other (mL) -- 300 300 -- -- -- -- -- --    Stool  --  -- --  500  50 550  --  -- --    Total Output 1200 2450 3650 2400 2150 4550 -- -- --             NET BALANCE SINCE ADMISSION:  Net IO Since Admission: 8,549 mL [08/02/21 1056]     BOWEL MOVEMENTS:  Stool Output  Stool (mL): 0 mL (08/02/21 0623)  Stool Unmeasured Occurrence: 1 (07/31/21 1900)  Bowel Incontinence: Yes (07/31/21 1900)  Stool Amount: small (08/01/21 2000)       PHYSICAL EXAM:  Constitutional:  Well developed, well nourished, intubated, sedated, paralyzed  Eyes:   Pupils sluggish and equal, subconjunctival edema, sclera anicteric.  EOM not assessed  HENT:  Atraumatic, external ears normal, nose normal with left NG tube, oral ET tube. Neck-rachea midline, right IJ central line  Respiratory:  Coarse breath sounds with scattered rhonchi, bibasilar crackles, mild diffuse wheezing  Cardiovascular: Sinus tachycardia, no murmurs, no gallops, no rubs   GI:  Morbid body habitus, soft, nondistended, normal bowel sounds  :   Deferred  Musculoskeletal:   Generalized edema, no clubbing or  cyanosis  Integument:  Well hydrated, no rash   Neurologic:   Intubated, sedated, paralyzed  Psychiatric:   Unable to assess      RESULTS     LABS:  Lab Results (last 24 hours)       Procedure Component Value Units Date/Time    Manual Differential [[id number]]  (Abnormal) Collected: 08/02/21 0408    Specimen: Blood Updated: 08/02/21 0450     Neutrophil % 86.0 %      Lymphocyte % 7.0 %      Monocyte % 7.0 %      Neutrophils Absolute 14.79 10*3/mm3      Lymphocytes Absolute 1.20 10*3/mm3      Monocytes Absolute 1.20 10*3/mm3      RBC Morphology Normal     WBC Morphology Normal     Platelet Morphology Normal    CBC & Differential [[id number]]  (Abnormal) Collected: 08/02/21 0408    Specimen: Blood Updated: 08/02/21 0450    Narrative:      The following orders were created for panel order CBC & Differential.  Procedure                               Abnormality         Status                     ---------                               -----------         ------                     Scan Slide[[id number]]                                       Final result               CBC Auto Differential[[id number]]        Abnormal            Final result                 Please view results for these tests on the individual orders.    Scan Slide [[id number]] Collected: 08/02/21 0408    Specimen: Blood Updated: 08/02/21 0450     Scan Slide --     Comment: See Manual Differential Results       CBC Auto Differential [[id number]]  (Abnormal) Collected: 08/02/21 0408    Specimen: Blood Updated: 08/02/21 0450     WBC 17.20 10*3/mm3      RBC 3.54 10*6/mm3      Hemoglobin 10.0 g/dL      Hematocrit 30.5 %      MCV 86.1 fL      MCH 28.3 pg      MCHC 32.9 g/dL      RDW 14.7 %      RDW-SD 44.6 fl      MPV 8.3 fL      Platelets 302 10*3/mm3     Narrative:      The previously reported component NRBC is no longer being reported. Previous result was 0.1 /100 WBC (Reference Range: 0.0-0.2 /100 WBC) on 8/2/2021 at 0422 EDT.    Phosphorus [[id number]]  (Normal)  Collected: 08/02/21 0408    Specimen: Blood Updated: 08/02/21 0447     Phosphorus 2.9 mg/dL      Comment: Result checked        Comprehensive Metabolic Panel [[id number]]  (Abnormal) Collected: 08/02/21 0408    Specimen: Blood Updated: 08/02/21 0443     Glucose 286 mg/dL      BUN 28 mg/dL      Creatinine 0.39 mg/dL      Sodium 137 mmol/L      Potassium 4.7 mmol/L      Chloride 94 mmol/L      CO2 34.0 mmol/L      Calcium 9.1 mg/dL      Total Protein 6.4 g/dL      Albumin 2.90 g/dL      ALT (SGPT) 98 U/L      AST (SGOT) 52 U/L      Alkaline Phosphatase 66 U/L      Total Bilirubin 0.6 mg/dL      eGFR Non African Amer >150 mL/min/1.73      Globulin 3.5 gm/dL      A/G Ratio 0.8 g/dL      BUN/Creatinine Ratio 71.8     Anion Gap 9.0 mmol/L     Narrative:      GFR Normal >60  Chronic Kidney Disease <60  Kidney Failure <15      Lactate Dehydrogenase [[id number]]  (Abnormal) Collected: 08/02/21 0408    Specimen: Blood Updated: 08/02/21 0443      U/L     Troponin [[id number]]  (Normal) Collected: 08/02/21 0408    Specimen: Blood Updated: 08/02/21 0443     Troponin T <0.010 ng/mL     Narrative:      Troponin T Reference Range:  <= 0.03 ng/mL-   Negative for AMI  >0.03 ng/mL-     Abnormal for myocardial necrosis.  Clinicians would have to utilize clinical acumen, EKG, Troponin and serial changes to determine if it is an Acute Myocardial Infarction or myocardial injury due to an underlying chronic condition.       Results may be falsely decreased if patient taking Biotin.      Magnesium [[id number]]  (Normal) Collected: 08/02/21 0408    Specimen: Blood Updated: 08/02/21 0442     Magnesium 1.9 mg/dL     Protime-INR [[id number]]  (Abnormal) Collected: 08/02/21 0408    Specimen: Blood Updated: 08/02/21 0442     Protime 12.4 Seconds      INR 1.13    Fibrinogen [[id number]]  (Abnormal) Collected: 08/02/21 0408    Specimen: Blood Updated: 08/02/21 0442     Fibrinogen 711 mg/dL     D-dimer, Quantitative [[id number]]  (Abnormal)  Collected: 08/02/21 0408    Specimen: Blood Updated: 08/02/21 0442     D-Dimer, Quantitative 1.05 mg/L (FEU)     Narrative:      Reference Range  --------------------------------------------------------------------     < 0.50   Negative Predictive Value  0.50-0.59   Indeterminate    >= 0.60   Probable VTE             A very low percentage of patients with DVT may yield D-Dimer results   below the cut-off of 0.50 mg/L FEU.  This is known to be more   prevalent in patients with distal DVT.             Results of this test should always be interpreted in conjunction with   the patient's medical history, clinical presentation and other   findings.  Clinical diagnosis should not be based on the result of   INNOVANCE D-Dimer alone.    proBNP [[id number]]  (Normal) Collected: 08/02/21 0408    Specimen: Blood Updated: 08/02/21 0439     proBNP 203.5 pg/mL     Narrative:      Among patients with dyspnea, NT-proBNP is highly sensitive for the detection of acute congestive heart failure. In addition NT-proBNP of <300 pg/ml effectively rules out acute congestive heart failure with 99% negative predictive value.    Results may be falsely decreased if patient taking Biotin.      Calcium, Ionized [[id number]]  (Normal) Collected: 08/02/21 0408    Specimen: Blood Updated: 08/02/21 0434     Ionized Calcium 1.21 mmol/L     Blood Gas, Arterial - [[id number]]  (Abnormal) Collected: 08/02/21 0410    Specimen: Arterial Blood Updated: 08/02/21 0412     Site Arterial Line     Ovi's Test N/A     pH, Arterial 7.439 pH units      pCO2, Arterial 52.3 mm Hg      pO2, Arterial 112.9 mm Hg      HCO3, Arterial 35.4 mmol/L      Base Excess, Arterial 9.8 mmol/L      Comment: Serial Number: [id number]:  [id number]        O2 Saturation, Arterial 98.5 %      CO2 Content 37.0 mmol/L      Barometric Pressure for Blood Gas --     Comment: N/A        Modality Adult Vent     FIO2 100 %      Ventilator Mode ;AC     Set Tidal Volume 420     PEEP 16      Hemodilution No     Respiratory Rate 36    aPTT [[id number]]  (Abnormal) Collected: 08/02/21 0234    Specimen: Blood Updated: 08/02/21 0320     PTT 79.3 seconds      Comment: Result checked        Blood Gas, Arterial - [[id number]]  (Abnormal) Collected: 08/02/21 0027    Specimen: Arterial Blood Updated: 08/02/21 0030     Site Arterial Line     Ovi's Test N/A     pH, Arterial 7.478 pH units      pCO2, Arterial 50.4 mm Hg      pO2, Arterial 54.4 mm Hg      HCO3, Arterial 37.4 mmol/L      Base Excess, Arterial 12.3 mmol/L      Comment: Serial Number: [id number]:  [id number]        O2 Saturation, Arterial 89.3 %      CO2 Content 38.9 mmol/L      Barometric Pressure for Blood Gas --     Comment: N/A        Modality Adult Vent     FIO2 100 %      Ventilator Mode ;AC     Set Tidal Volume 420     PEEP 16     Hemodilution No     Respiratory Rate 36    POC Glucose Once [[id number]]  (Abnormal) Collected: 08/01/21 2347    Specimen: Blood Updated: 08/01/21 2348     Glucose 197 mg/dL      Comment: Serial Number: [id number]:  [id number]       aPTT [[id number]]  (Abnormal) Collected: 08/01/21 1946    Specimen: Blood Updated: 08/01/21 2006     PTT 52.2 seconds     Protime-INR [[id number]]  (Abnormal) Collected: 08/01/21 1946    Specimen: Blood Updated: 08/01/21 2005     Protime 12.3 Seconds      INR 1.12    POC Glucose Once [[id number]]  (Abnormal) Collected: 08/01/21 1648    Specimen: Blood Updated: 08/01/21 1649     Glucose 271 mg/dL      Comment: Serial Number: [id number]:  [id number]                RADIOLOGY:  XR Chest 1 View    Result Date: 8/1/2021  DATE OF EXAM: 8/1/2021 11:42 AM  PROCEDURE: XR CHEST 1 VW-  INDICATIONS: Shortness of breath, Covid 19 infection, bloody pulmonary secretions.  COMPARISON: 07/29/2021.  TECHNIQUE: Single radiographic view of the chest was obtained.  FINDINGS: The heart is borderline enlarged. There is a stable endotracheal tube and right internal jugular line in place. The nasogastric tube  tip projects out of the field-of-view, but below the hemidiaphragms. Aeration of both lungs has improved. There is still bilateral perihilar and some patchy basilar consolidation present felt to represent residual pneumonia from the patient's Covid 19 infection.  There is no pneumothorax or pleural effusion.       1. Improved aeration of both lungs as described. 2. Lines and support tubes appear stable. 3. Borderline cardiomegaly.  Electronically Signed By-Adebayo Watson MD On:8/1/2021 11:56 AM This report was finalized on [id number] by  Adebayo Watson MD.      ECHOCARDIOGRAM:   None previous for comparison.     I reviewed the patient's new clinical results.    This note has been scribed by me for pulmonary attending physician.    Electronically signed by NATY Valencia, 08/02/21 at 10:56 EDT.     I personally have examined  and interviewed the patient. I have reviewed the history, data, problems, assessment and plan with our NP.  Critical care time in direct medical management ( 32  ) minutes  Electronically signed by Jeff Feng MD, D,ABS, 08/02/21, 10:04 PM EDT.

## 2021-08-02 NOTE — PLAN OF CARE
Ventilator at 80%/16 peep. On nebulized flolan. On heparin, fentanyl, versed, nimbex and diltiazem gtts (Intermittently requires levophed when pressure is labile). Adequate UOP and stool output. + MRSA sputum. Will continue to monitor.

## 2021-08-02 NOTE — PROGRESS NOTES
"Nutrition Services    Patient Name: Leslie Harris  YOB: 1990  MRN: [id number]  Admission date: 7/22/2021      PPE Documentation        PPE Worn By Provider RD did not enter room for this encounter   PPE Worn By Patient  N/A     PROGRESS NOTE      Encounter Information: Tube feed check on. Peptamen Intense VHP documented at current goal 70 mL/hr. Remains intubated, on paralytic, now in supine position.       Labs (reviewed below): -Hyperkalemia improved  -elevated BUN  -low Cr  -elevated LFTs  -hyperglycemia->formula is CHO controlled, insulin regimen adjusted today       GI Function:  -residuals  -FMS in place after prolonged constipation. 550 mL stool o/p documented on 8/1       Nutrition Intervention: Continue current TF as ordered       PO Diet/Supplements: NPO Diet   EN Prescription: Peptamen Intense VHP at 70 mL/hr + 20 mL/hr water flush       Intake/Output:   Intake/Output Summary (Last 24 hours) at 8/2/2021 1016  Last data filed at 8/2/2021 0800  Gross per 24 hour   Intake 4688 ml   Output 4550 ml   Net 138 ml          Height: Height: 165.1 cm (65\")   Weight: Weight: (!) 167 kg (367 lb 4.6 oz) (07/28/21 0400)   BMI: Body mass index is 61.12 kg/m².     Results from last 7 days   Lab Units 08/02/21 0408 08/01/21 0359 07/31/21  0556   SODIUM mmol/L 137 137 137   POTASSIUM mmol/L 4.7 5.7* 5.0   CHLORIDE mmol/L 94* 94* 95*   CO2 mmol/L 34.0* 38.0* 37.0*   BUN mg/dL 28* 22* 21*   CREATININE mg/dL 0.39* 0.39* 0.36*   CALCIUM mg/dL 9.1 9.2 8.9   BILIRUBIN mg/dL 0.6 0.5 0.7   ALK PHOS U/L 66 74 70   ALT (SGPT) U/L 98* 96* 70*   AST (SGOT) U/L 52* 59* 49*   GLUCOSE mg/dL 286* 248* 226*     Results from last 7 days   Lab Units 08/02/21  0408 08/01/21 0359 08/01/21 0359 07/31/21  0556 07/31/21  0556   MAGNESIUM mg/dL 1.9  --  2.2  --  2.2   PHOSPHORUS mg/dL 2.9   < > 4.8*   < > 4.2   HEMOGLOBIN g/dL 10.0*   < > 11.0*   < > 10.9*   HEMATOCRIT % 30.5*   < > 33.8*   < > 33.0*    < > = values in this " interval not displayed.     COVID19   Date Value Ref Range Status   07/22/2021 Detected (C) Not Detected - Ref. Range Final     Lab Results   Component Value Date    HGBA1C 6.9 (H) 07/23/2021     Vitals:    08/02/21 0900   BP: 159/93   Pulse: 105   Resp:    Temp:    SpO2: 100%     RD to follow up per protocol.    Electronically signed by:  Leslie Hugo RD  08/02/21 10:16 EDT

## 2021-08-03 LAB
ALBUMIN SERPL-MCNC: 3.1 G/DL (ref 3.5–5.2)
ALBUMIN/GLOB SERPL: 0.9 G/DL
ALP SERPL-CCNC: 68 U/L (ref 39–117)
ALT SERPL W P-5'-P-CCNC: 96 U/L (ref 1–33)
AMORPH URATE CRY URNS QL MICRO: ABNORMAL /HPF
ANION GAP SERPL CALCULATED.3IONS-SCNC: 7 MMOL/L (ref 5–15)
APTT PPP: 52.3 SECONDS (ref 61–76.5)
ARTERIAL PATENCY WRIST A: ABNORMAL
AST SERPL-CCNC: 53 U/L (ref 1–32)
ATMOSPHERIC PRESS: ABNORMAL MM[HG]
BACTERIA SPEC AEROBE CULT: NORMAL
BACTERIA SPEC AEROBE CULT: NORMAL
BACTERIA UR QL AUTO: ABNORMAL /HPF
BASE EXCESS BLDA CALC-SCNC: 8.1 MMOL/L (ref 0–3)
BDY SITE: ABNORMAL
BILIRUB SERPL-MCNC: 0.6 MG/DL (ref 0–1.2)
BILIRUB UR QL STRIP: NEGATIVE
BUN SERPL-MCNC: 28 MG/DL (ref 6–20)
BUN/CREAT SERPL: 71.8 (ref 7–25)
CA-I SERPL ISE-MCNC: 1.24 MMOL/L (ref 1.2–1.3)
CALCIUM SPEC-SCNC: 9.2 MG/DL (ref 8.6–10.5)
CHLORIDE SERPL-SCNC: 96 MMOL/L (ref 98–107)
CLARITY UR: ABNORMAL
CO2 BLDA-SCNC: 36.4 MMOL/L (ref 22–29)
CO2 SERPL-SCNC: 33 MMOL/L (ref 22–29)
COLOR UR: YELLOW
CREAT SERPL-MCNC: 0.39 MG/DL (ref 0.57–1)
DEPRECATED RDW RBC AUTO: 45.1 FL (ref 37–54)
EOSINOPHIL # BLD MANUAL: 0.22 10*3/MM3 (ref 0–0.4)
EOSINOPHIL NFR BLD MANUAL: 1 % (ref 0.3–6.2)
ERYTHROCYTE [DISTWIDTH] IN BLOOD BY AUTOMATED COUNT: 14.8 % (ref 12.3–15.4)
GFR SERPL CREATININE-BSD FRML MDRD: >150 ML/MIN/1.73
GLOBULIN UR ELPH-MCNC: 3.4 GM/DL
GLUCOSE BLDC GLUCOMTR-MCNC: 232 MG/DL (ref 70–105)
GLUCOSE BLDC GLUCOMTR-MCNC: 253 MG/DL (ref 70–105)
GLUCOSE BLDC GLUCOMTR-MCNC: 261 MG/DL (ref 70–105)
GLUCOSE SERPL-MCNC: 247 MG/DL (ref 65–99)
GLUCOSE UR STRIP-MCNC: NEGATIVE MG/DL
HCO3 BLDA-SCNC: 34.6 MMOL/L (ref 21–28)
HCT VFR BLD AUTO: 28.2 % (ref 34–46.6)
HCT VFR BLD AUTO: 29.4 % (ref 34–46.6)
HEMODILUTION: NO
HGB BLD-MCNC: 9.2 G/DL (ref 12–15.9)
HGB BLD-MCNC: 9.8 G/DL (ref 12–15.9)
HGB UR QL STRIP.AUTO: ABNORMAL
HYALINE CASTS UR QL AUTO: ABNORMAL /LPF
INHALED O2 CONCENTRATION: 80 %
KETONES UR QL STRIP: NEGATIVE
LEUKOCYTE ESTERASE UR QL STRIP.AUTO: NEGATIVE
LYMPHOCYTES # BLD MANUAL: 1.96 10*3/MM3 (ref 0.7–3.1)
LYMPHOCYTES NFR BLD MANUAL: 3 % (ref 5–12)
LYMPHOCYTES NFR BLD MANUAL: 9 % (ref 19.6–45.3)
MAGNESIUM SERPL-MCNC: 2 MG/DL (ref 1.6–2.6)
MCH RBC QN AUTO: 28.9 PG (ref 26.6–33)
MCHC RBC AUTO-ENTMCNC: 33.3 G/DL (ref 31.5–35.7)
MCV RBC AUTO: 87 FL (ref 79–97)
METAMYELOCYTES NFR BLD MANUAL: 1 % (ref 0–0)
MODALITY: ABNORMAL
MONOCYTES # BLD AUTO: 0.65 10*3/MM3 (ref 0.1–0.9)
NEUTROPHILS # BLD AUTO: 18.75 10*3/MM3 (ref 1.7–7)
NEUTROPHILS NFR BLD MANUAL: 84 % (ref 42.7–76)
NEUTS BAND NFR BLD MANUAL: 2 % (ref 0–5)
NITRITE UR QL STRIP: NEGATIVE
PCO2 BLDA: 58.5 MM HG (ref 35–48)
PEEP RESPIRATORY: 16 CM[H2O]
PH BLDA: 7.38 PH UNITS (ref 7.35–7.45)
PH UR STRIP.AUTO: 7.5 [PH] (ref 5–8)
PHOSPHATE SERPL-MCNC: 4.4 MG/DL (ref 2.5–4.5)
PLAT MORPH BLD: NORMAL
PLATELET # BLD AUTO: 374 10*3/MM3 (ref 140–450)
PMV BLD AUTO: 8.6 FL (ref 6–12)
PO2 BLDA: 123.9 MM HG (ref 83–108)
POTASSIUM SERPL-SCNC: 5 MMOL/L (ref 3.5–5.2)
PROT SERPL-MCNC: 6.5 G/DL (ref 6–8.5)
PROT UR QL STRIP: NEGATIVE
RBC # BLD AUTO: 3.38 10*6/MM3 (ref 3.77–5.28)
RBC # UR: ABNORMAL /HPF
RBC MORPH BLD: NORMAL
REF LAB TEST METHOD: ABNORMAL
RESPIRATORY RATE: 36
SAO2 % BLDCOA: 98.6 % (ref 94–98)
SCAN SLIDE: NORMAL
SODIUM SERPL-SCNC: 136 MMOL/L (ref 136–145)
SP GR UR STRIP: 1.01 (ref 1–1.03)
SQUAMOUS #/AREA URNS HPF: ABNORMAL /HPF
UROBILINOGEN UR QL STRIP: ABNORMAL
VENTILATOR MODE: ABNORMAL
VT ON VENT VENT: 420 ML
WBC # BLD AUTO: 21.8 10*3/MM3 (ref 3.4–10.8)
WBC MORPH BLD: NORMAL
WBC UR QL AUTO: ABNORMAL /HPF

## 2021-08-03 PROCEDURE — 25010000002 EPOPROSTENOL PER 0.5 MG: Performed by: NURSE PRACTITIONER

## 2021-08-03 PROCEDURE — 85730 THROMBOPLASTIN TIME PARTIAL: CPT | Performed by: INTERNAL MEDICINE

## 2021-08-03 PROCEDURE — 25010000002 FUROSEMIDE PER 20 MG: Performed by: INTERNAL MEDICINE

## 2021-08-03 PROCEDURE — 82330 ASSAY OF CALCIUM: CPT | Performed by: NURSE PRACTITIONER

## 2021-08-03 PROCEDURE — 63710000001 INSULIN REGULAR HUMAN PER 5 UNITS: Performed by: INTERNAL MEDICINE

## 2021-08-03 PROCEDURE — 25010000002 FUROSEMIDE PER 20 MG: Performed by: NURSE PRACTITIONER

## 2021-08-03 PROCEDURE — P9047 ALBUMIN (HUMAN), 25%, 50ML: HCPCS | Performed by: NURSE PRACTITIONER

## 2021-08-03 PROCEDURE — 94799 UNLISTED PULMONARY SVC/PX: CPT

## 2021-08-03 PROCEDURE — 63710000001 INSULIN LISPRO (HUMAN) PER 5 UNITS: Performed by: INTERNAL MEDICINE

## 2021-08-03 PROCEDURE — 84100 ASSAY OF PHOSPHORUS: CPT | Performed by: NURSE PRACTITIONER

## 2021-08-03 PROCEDURE — 80053 COMPREHEN METABOLIC PANEL: CPT | Performed by: NURSE PRACTITIONER

## 2021-08-03 PROCEDURE — 25010000002 ALBUMIN HUMAN 25% PER 50 ML: Performed by: NURSE PRACTITIONER

## 2021-08-03 PROCEDURE — 25010000002 HEPARIN (PORCINE) 25000-0.45 UT/250ML-% SOLUTION: Performed by: INTERNAL MEDICINE

## 2021-08-03 PROCEDURE — 83735 ASSAY OF MAGNESIUM: CPT | Performed by: NURSE PRACTITIONER

## 2021-08-03 PROCEDURE — 87086 URINE CULTURE/COLONY COUNT: CPT | Performed by: NURSE PRACTITIONER

## 2021-08-03 PROCEDURE — 82962 GLUCOSE BLOOD TEST: CPT

## 2021-08-03 PROCEDURE — 85014 HEMATOCRIT: CPT | Performed by: NURSE PRACTITIONER

## 2021-08-03 PROCEDURE — 85007 BL SMEAR W/DIFF WBC COUNT: CPT | Performed by: INTERNAL MEDICINE

## 2021-08-03 PROCEDURE — 63710000001 INSULIN GLARGINE PER 5 UNITS: Performed by: INTERNAL MEDICINE

## 2021-08-03 PROCEDURE — 82803 BLOOD GASES ANY COMBINATION: CPT

## 2021-08-03 PROCEDURE — 25010000002 DEXAMETHASONE PER 1 MG: Performed by: INTERNAL MEDICINE

## 2021-08-03 PROCEDURE — 25010000002 ENOXAPARIN PER 10 MG: Performed by: INTERNAL MEDICINE

## 2021-08-03 PROCEDURE — 81001 URINALYSIS AUTO W/SCOPE: CPT | Performed by: NURSE PRACTITIONER

## 2021-08-03 PROCEDURE — 25010000002 FENTANYL CITRATE (PF) 2500 MCG/50ML SOLUTION: Performed by: NURSE PRACTITIONER

## 2021-08-03 PROCEDURE — 25010000002 MIDAZOLAM 50 MG/10ML SOLUTION: Performed by: NURSE PRACTITIONER

## 2021-08-03 PROCEDURE — 94003 VENT MGMT INPAT SUBQ DAY: CPT

## 2021-08-03 PROCEDURE — 87324 CLOSTRIDIUM AG IA: CPT | Performed by: INTERNAL MEDICINE

## 2021-08-03 PROCEDURE — 85018 HEMOGLOBIN: CPT | Performed by: NURSE PRACTITIONER

## 2021-08-03 PROCEDURE — 87449 NOS EACH ORGANISM AG IA: CPT | Performed by: INTERNAL MEDICINE

## 2021-08-03 PROCEDURE — 85025 COMPLETE CBC W/AUTO DIFF WBC: CPT | Performed by: INTERNAL MEDICINE

## 2021-08-03 RX ORDER — PANTOPRAZOLE SODIUM 40 MG/10ML
40 INJECTION, POWDER, LYOPHILIZED, FOR SOLUTION INTRAVENOUS
Status: DISCONTINUED | OUTPATIENT
Start: 2021-08-03 | End: 2021-08-12

## 2021-08-03 RX ORDER — FUROSEMIDE 10 MG/ML
20 INJECTION INTRAMUSCULAR; INTRAVENOUS EVERY 12 HOURS
Status: DISCONTINUED | OUTPATIENT
Start: 2021-08-03 | End: 2021-08-11

## 2021-08-03 RX ORDER — METOPROLOL TARTRATE 50 MG/1
50 TABLET, FILM COATED ORAL EVERY 12 HOURS SCHEDULED
Status: DISCONTINUED | OUTPATIENT
Start: 2021-08-03 | End: 2021-08-05

## 2021-08-03 RX ORDER — ALBUMIN (HUMAN) 12.5 G/50ML
25 SOLUTION INTRAVENOUS EVERY 12 HOURS
Status: DISCONTINUED | OUTPATIENT
Start: 2021-08-03 | End: 2021-08-06

## 2021-08-03 RX ADMIN — MIDAZOLAM 8 MG/HR: 5 INJECTION INTRAMUSCULAR; INTRAVENOUS at 15:50

## 2021-08-03 RX ADMIN — HEPARIN SODIUM 16 UNITS/KG/HR: 10000 INJECTION, SOLUTION INTRAVENOUS at 00:42

## 2021-08-03 RX ADMIN — MINERAL OIL AND WHITE PETROLATUM: 150; 830 OINTMENT OPHTHALMIC at 15:32

## 2021-08-03 RX ADMIN — ALBUTEROL SULFATE 6 PUFF: 108 INHALANT RESPIRATORY (INHALATION) at 15:15

## 2021-08-03 RX ADMIN — FENTANYL CITRATE 250 MCG/HR: 50 INJECTION, SOLUTION INTRAMUSCULAR; INTRAVENOUS at 09:31

## 2021-08-03 RX ADMIN — SODIUM CHLORIDE 15 MG/HR: 900 INJECTION, SOLUTION INTRAVENOUS at 02:56

## 2021-08-03 RX ADMIN — FAMOTIDINE 20 MG: 20 TABLET ORAL at 08:42

## 2021-08-03 RX ADMIN — INSULIN LISPRO 12 UNITS: 100 INJECTION, SOLUTION INTRAVENOUS; SUBCUTANEOUS at 05:21

## 2021-08-03 RX ADMIN — BUDESONIDE AND FORMOTEROL FUMARATE DIHYDRATE 2 PUFF: 160; 4.5 AEROSOL RESPIRATORY (INHALATION) at 08:02

## 2021-08-03 RX ADMIN — ZINC SULFATE 220 MG (50 MG) CAPSULE 220 MG: CAPSULE at 08:43

## 2021-08-03 RX ADMIN — ENOXAPARIN SODIUM 40 MG: 40 INJECTION SUBCUTANEOUS at 21:57

## 2021-08-03 RX ADMIN — FUROSEMIDE 20 MG: 10 INJECTION, SOLUTION INTRAMUSCULAR; INTRAVENOUS at 21:58

## 2021-08-03 RX ADMIN — ALBUTEROL SULFATE 6 PUFF: 108 INHALANT RESPIRATORY (INHALATION) at 20:06

## 2021-08-03 RX ADMIN — METOPROLOL TARTRATE 25 MG: 25 TABLET, FILM COATED ORAL at 08:42

## 2021-08-03 RX ADMIN — MINERAL OIL AND WHITE PETROLATUM: 150; 830 OINTMENT OPHTHALMIC at 00:05

## 2021-08-03 RX ADMIN — FENTANYL CITRATE 250 MCG/HR: 50 INJECTION, SOLUTION INTRAMUSCULAR; INTRAVENOUS at 05:20

## 2021-08-03 RX ADMIN — MINERAL OIL AND WHITE PETROLATUM: 150; 830 OINTMENT OPHTHALMIC at 05:20

## 2021-08-03 RX ADMIN — INSULIN GLARGINE 30 UNITS: 100 INJECTION, SOLUTION SUBCUTANEOUS at 08:52

## 2021-08-03 RX ADMIN — MIDAZOLAM 8 MG/HR: 5 INJECTION INTRAMUSCULAR; INTRAVENOUS at 08:53

## 2021-08-03 RX ADMIN — ALBUTEROL SULFATE 6 PUFF: 108 INHALANT RESPIRATORY (INHALATION) at 12:07

## 2021-08-03 RX ADMIN — ALBUMIN HUMAN 25 G: 0.25 SOLUTION INTRAVENOUS at 10:21

## 2021-08-03 RX ADMIN — Medication 10 ML: at 08:46

## 2021-08-03 RX ADMIN — ALBUTEROL SULFATE 6 PUFF: 108 INHALANT RESPIRATORY (INHALATION) at 08:02

## 2021-08-03 RX ADMIN — INSULIN LISPRO 10 UNITS: 100 INJECTION, SOLUTION INTRAVENOUS; SUBCUTANEOUS at 17:21

## 2021-08-03 RX ADMIN — CISATRACURIUM BESYLATE 3.5 MCG/KG/MIN: 10 INJECTION INTRAVENOUS at 06:24

## 2021-08-03 RX ADMIN — MINERAL OIL AND WHITE PETROLATUM: 150; 830 OINTMENT OPHTHALMIC at 20:00

## 2021-08-03 RX ADMIN — ASPIRIN 324 MG: 81 TABLET, CHEWABLE ORAL at 08:41

## 2021-08-03 RX ADMIN — ALBUMIN HUMAN 25 G: 0.25 SOLUTION INTRAVENOUS at 21:58

## 2021-08-03 RX ADMIN — BUDESONIDE AND FORMOTEROL FUMARATE DIHYDRATE 2 PUFF: 160; 4.5 AEROSOL RESPIRATORY (INHALATION) at 20:02

## 2021-08-03 RX ADMIN — FENTANYL CITRATE 250 MCG/HR: 50 INJECTION, SOLUTION INTRAMUSCULAR; INTRAVENOUS at 19:36

## 2021-08-03 RX ADMIN — FUROSEMIDE 20 MG: 10 INJECTION, SOLUTION INTRAMUSCULAR; INTRAVENOUS at 10:21

## 2021-08-03 RX ADMIN — Medication 600 MG: at 08:42

## 2021-08-03 RX ADMIN — FLUCONAZOLE 400 MG: 40 POWDER, FOR SUSPENSION ORAL at 21:57

## 2021-08-03 RX ADMIN — MIDAZOLAM 9 MG/HR: 5 INJECTION INTRAMUSCULAR; INTRAVENOUS at 00:03

## 2021-08-03 RX ADMIN — Medication 10 ML: at 21:59

## 2021-08-03 RX ADMIN — Medication 0.04 MCG/KG/MIN: at 00:42

## 2021-08-03 RX ADMIN — INSULIN GLARGINE 30 UNITS: 100 INJECTION, SOLUTION SUBCUTANEOUS at 21:58

## 2021-08-03 RX ADMIN — GUAIFENESIN 400 MG: 100 SOLUTION ORAL at 17:21

## 2021-08-03 RX ADMIN — LINEZOLID 600 MG: 600 TABLET ORAL at 21:58

## 2021-08-03 RX ADMIN — GUAIFENESIN 400 MG: 100 SOLUTION ORAL at 12:49

## 2021-08-03 RX ADMIN — FENTANYL CITRATE 250 MCG/HR: 50 INJECTION, SOLUTION INTRAMUSCULAR; INTRAVENOUS at 15:50

## 2021-08-03 RX ADMIN — GUAIFENESIN 400 MG: 100 SOLUTION ORAL at 05:20

## 2021-08-03 RX ADMIN — HEPARIN SODIUM 18 UNITS/KG/HR: 10000 INJECTION, SOLUTION INTRAVENOUS at 08:53

## 2021-08-03 RX ADMIN — MINERAL OIL AND WHITE PETROLATUM: 150; 830 OINTMENT OPHTHALMIC at 08:46

## 2021-08-03 RX ADMIN — INSULIN LISPRO 10 UNITS: 100 INJECTION, SOLUTION INTRAVENOUS; SUBCUTANEOUS at 05:20

## 2021-08-03 RX ADMIN — INSULIN LISPRO 8 UNITS: 100 INJECTION, SOLUTION INTRAVENOUS; SUBCUTANEOUS at 12:49

## 2021-08-03 RX ADMIN — INSULIN LISPRO 10 UNITS: 100 INJECTION, SOLUTION INTRAVENOUS; SUBCUTANEOUS at 12:48

## 2021-08-03 RX ADMIN — METOPROLOL TARTRATE 50 MG: 50 TABLET, FILM COATED ORAL at 21:58

## 2021-08-03 RX ADMIN — EPOPROSTENOL 50 NG/KG/MIN: 1.5 INJECTION, POWDER, LYOPHILIZED, FOR SOLUTION INTRAVENOUS at 04:31

## 2021-08-03 RX ADMIN — MIDAZOLAM 8 MG/HR: 5 INJECTION INTRAMUSCULAR; INTRAVENOUS at 20:06

## 2021-08-03 RX ADMIN — Medication 600 MG: at 21:58

## 2021-08-03 RX ADMIN — DEXAMETHASONE SODIUM PHOSPHATE 6 MG: 4 INJECTION, SOLUTION INTRAMUSCULAR; INTRAVENOUS at 08:43

## 2021-08-03 RX ADMIN — MINERAL OIL AND WHITE PETROLATUM: 150; 830 OINTMENT OPHTHALMIC at 12:49

## 2021-08-03 RX ADMIN — INSULIN HUMAN 12 UNITS: 100 INJECTION, SOLUTION PARENTERAL at 08:52

## 2021-08-03 RX ADMIN — FENTANYL CITRATE 250 MCG/HR: 50 INJECTION, SOLUTION INTRAMUSCULAR; INTRAVENOUS at 00:08

## 2021-08-03 RX ADMIN — CISATRACURIUM BESYLATE 3.5 MCG/KG/MIN: 10 INJECTION INTRAVENOUS at 19:36

## 2021-08-03 RX ADMIN — Medication 2000 UNITS: at 08:42

## 2021-08-03 RX ADMIN — INSULIN HUMAN 18 UNITS: 100 INJECTION, SOLUTION PARENTERAL at 21:58

## 2021-08-03 RX ADMIN — FUROSEMIDE 20 MG: 10 INJECTION, SOLUTION INTRAMUSCULAR; INTRAVENOUS at 08:42

## 2021-08-03 RX ADMIN — DEXAMETHASONE SODIUM PHOSPHATE 6 MG: 4 INJECTION, SOLUTION INTRAMUSCULAR; INTRAVENOUS at 21:58

## 2021-08-03 RX ADMIN — PANTOPRAZOLE SODIUM 40 MG: 40 INJECTION, POWDER, FOR SOLUTION INTRAVENOUS at 17:22

## 2021-08-03 RX ADMIN — INSULIN LISPRO 12 UNITS: 100 INJECTION, SOLUTION INTRAVENOUS; SUBCUTANEOUS at 17:21

## 2021-08-03 RX ADMIN — EPOPROSTENOL 50 NG/KG/MIN: 1.5 INJECTION, POWDER, LYOPHILIZED, FOR SOLUTION INTRAVENOUS at 14:00

## 2021-08-03 RX ADMIN — LINEZOLID 600 MG: 600 TABLET ORAL at 08:42

## 2021-08-03 NOTE — PLAN OF CARE
Goal Outcome Evaluation:  Plan of Care Reviewed With: mother           Outcome Summary: pt is tolerating vent; vec, fent, versed, are going. Twitches are 2/4 at 4. Levo is off. VSS, pt is resting comfortably, will continue to monitor

## 2021-08-03 NOTE — PROGRESS NOTES
PULMONARY/CRITICAL CARE PROGRESS NOTE       NAME:     Leslie Harris   AGE:     30 y.o.   SEX:  female   : 1990       MRN:       [id number]          RM: [facility] ICU      ASSESSMENT & PLAN     F/U: Acute respiratory failure secondary to COVID-19 pna    Principal Problem:    Pneumonia due to COVID-19 virus  Active Problems:    Acute respiratory failure due to COVID-19 (CMS/[facility])    Class 3 severe obesity without serious comorbidity with body mass index (BMI) of 50.0 to 59.9 in adult    Hyperglycemia, likely stress induced    Diabetes mellitus type 2 in obese (CMS/[facility])     Multidisciplinary Rounds:  -Blood tinged sputum  -Discontinue heparin due to blood tinged sputum  -Increase metoprolol to 50 mg Q12H  -Wean off the cardizem gtt.  -Albumin ordered.  -Currently supine.  -Continue to wean PEEP first, FiO2 at 70%  -Continues on Flolan  -Continues on Nimbex, will attempt to wean this as well.      PLAN:  Pneumonia due to COVID-19 virus  Acute respiratory failure due to COVID-19  -Failed NIPPV and required intubation   -Retest RVP/COVID-19 to eval for other respiratory viruses. +COVID-19 only  -Monitor disease progression labs as ordered  -Vitamin D, Acetylcysteine, Zinc  -CT PE protocol could not be obtained due to severe hypoxia  -Empiric A/C with heparin drip  -Remdesivir x5 days, completed on   -Decardron, decreased dose   -Does not qualify for Actemra  -Symbicort, Albuterol HFA.  -Sputum culture-Candida albicans, Staph aureus  -discontinued Ceftriaxone, changed to Zosyn  due to fever, completed   -Add Zyvox and Diflucan added 2021 due to positive sputum culture    Diabetes mellitus, type 2, new diagnosis  -strict glycemic control recommended  -Accuchecks every 6 hours with sliding scale insulin  -Added Lantus  -Hemoglobin A1c 6.9.  -The patient's mother reported that the patient had latent autoimmune diabetes mellitus diagnosed recently and requested C-peptide and MAX.  It was  explained to her that these tests would not accurately reflect the patient's condition as her current high acuity and metabolic state would render these numbers unpredictable and not reliable.  Recommend that this is followed up after the patient's current acuity has improved    Hypertension  -added beta blocker with improved control  -Adjusted on 8/3, 50 mg twice daily of metoprolol     Class 3 severe obesity without serious comorbidity with body mass index (BMI) of 50.0 to 59.9 in adult  -BMI 59.32  -Complicating aspects of care  -Counseled on lifestyle modifications to improve overall health prior to intubation    AST/ALT remain elevated  -Significance unclear  -Continue to monitor and trend     Code Status: CPR, full interventions  VTE Prophylaxis:  Lovenox  PUD Prophylaxis: Pepcid  Nutrition: dietitian following for tube feed recommendations.Tolerating TF at goal  +Bowel regimen    Right IJ central line 7/23  Right brachial A-line placed 8/1    ANNEMARIE & YONY     Leslie Harris is a 30 y.o. female  has a past medical history of Class 3 severe obesity without serious comorbidity with body mass index (BMI) of 50.0 to 59.9 in adult (7/22/2021).   Patient presented to [facility] ED on 7/22/2021 with complaint of being recently diagnosed with COVID-19 at the [facility] department 6 days prior to admission.  Patient reports that she has been having increased shortness of breath over the past couple days, and has not lost her sense of smell, but has lost her sense of taste, reports frequent, nonproductive cough, myalgias, fevers, and chills.  She denies any abdominal symptoms including nausea, vomiting, constipation, diarrhea.  Patient reports that she actually called EMS as her oxygen saturation had been lower, reading in ED upper 70s, but she did not feel that it was right, as she stated that her fingers were cold.  EMS came and reported to her that her oxygen was actually in the 80s at that  "time, on the lower side of the 80s, but for some reason patient was not transported to the ED.  Patient then presented today, and she additionally reported some generalized sore throat, with trouble swallowing some things, due to just the general pain associated with it.  She denies any actual problems with physically swallowing this food.  She reports a decreased appetite and decreased oral intake.  Patient states that there was a coworker at her facility, in which they questioned if she had been tested, as she generally did not look like she felt well, but that individual stated that they had been tested and were fine, but at her place of work, which is the [facility], there has been a small outbreak of COVID-19 infected individuals, in which a coworker of hers, is additionally hospitalized, due to this exposure as well.  Patient was not vaccinated for COVID-19.  Patient reported that she is a non-smoker, denies routine alcohol use or recreational drug use.  She reports that her only other previous medical history involved a cholecystectomy that resulted in a perforation of her small bowel, in which she was discharged home and became septic, requiring readmission and underwent an ex lap with lysis of adhesions.  She stated that this occurred in 2018.     In the ED, labs were obtained with abnormalities as follows: , calcium 8.5, ALT 51, AST 73, ferritin 545.6, CRP 6.33, procalcitonin 0.30, lactate 1.3, D-dimer 1.48.  ABG was obtained and as follows: pH 7.427, PCO2 39.5, PO2 44.9, HCO3 26.0, O2 saturation 81.8%.  This ABG was obtained while patient was on a nonrebreather.  Chest x-ray was obtained and with the following impression: \"Left greater than right interstitial and alveolar disease.  Given the patient's Covid positive status, the findings may reflect changes of pneumonia.  Patient required oxygen titration up to maximal Precision flow at 40 L/min with FiO2 of 100%.  Patient was considered to " be very high risk for further decompensation, and it was recommended for patient to be admitted to the ICU for close observation.     Pulmonary/Intensivist service was contacted for admission to ICU and further evaluation and treatment.    7/23: Patient is drowsy, but easy to arouse, proned overnight, continues on AVAPS in combination with Precision flow 40 L/min and FiO2 100%.  She denies chest pain, but does report shortness of breath and anxiety, cough.  7/24: Intubated, sedated, chemically paralyzed  7/25: intubated, sedated, chemically paralyzed. Fever overnight  7/26: Intubated, sedated, chemically paralyzed.  Proned.  Still having fevers  7/27: Intubated, sedated, chemically paralyzed.  Tolerating supine positioning.  Still febrile  7/28: Intubated, sedated, chemically paralyzed.  Supine.  7/29: Intubated, sedated, chemically paralyzed.  Still remains supine  7/30: Intubated, sedated, chemically paralyzed, proned.  7/31: Intubated, sedated.  Remains chemically paralyzed and proned.  Nebulized Flolan  8/1: Intubated, sedated, paralyzed.  Tolerating supine positioning.  Still on nebulized Flolan  8/2: Intubated, sedated, paralyzed.  Tolerating supine positioning  8/3: Supine, intubated, sedated, paralyzed.    REVIEW OF SYSTEMS:  Review of systems could not be obtained due to   patient sedation status. patient intubated.      MEDICATIONS     SCHEDULED MEDICATIONS:   Acetylcysteine, 600 mg, Oral, BID  albuterol sulfate HFA, 6 puff, Inhalation, 4x Daily - RT  artificial tears, , Both Eyes, Q4H  aspirin, 324 mg, Per G Tube, Daily  budesonide-formoterol, 2 puff, Inhalation, BID - RT  cholecalciferol, 2,000 Units, Nasogastric, Daily  dexamethasone, 6 mg, Intravenous, Q12H  famotidine, 20 mg, Nasogastric, Daily  fluconazole, 400 mg, Oral, Nightly  furosemide, 20 mg, Intravenous, BID  guaiFENesin, 400 mg, Nasogastric, Q6H  insulin glargine, 30 Units, Subcutaneous, Q12H  insulin lispro, 0-24 Units, Subcutaneous,  "Q6H  insulin lispro, 10 Units, Subcutaneous, Q6H  insulin regular, 12 Units, Subcutaneous, Q12H  linezolid, 600 mg, Oral, Q12H  metoprolol tartrate, 25 mg, Oral, Q8H  sodium chloride, 10 mL, Intravenous, Q12H  zinc sulfate, 220 mg, Nasogastric, Daily        CONTINUOUS INFUSIONS:   cisatracurium (NIMBEX) infusion, 0.5-10.2 mcg/kg/min, Last Rate: 3.5 mcg/kg/min (08/03/21 0624)  dilTIAZem, 5-15 mg/hr, Last Rate: 10 mg/hr (08/03/21 0926)  epoprostenol, 50 ng/kg/min (Ideal), Last Rate: 50 ng/kg/min (08/03/21 0431)  fentanyl 10 mcg/mL,  mcg/hr, Last Rate: 250 mcg/hr (08/03/21 0931)  midazolam, 1-10 mg/hr, Last Rate: 8 mg/hr (08/03/21 0853)  norepinephrine, 0.02-0.3 mcg/kg/min, Last Rate: 0.02 mcg/kg/min (08/03/21 0922)        PRN MEDS:    acetaminophen    acetaminophen **OR** acetaminophen    aluminum-magnesium hydroxide-simethicone    benzonatate    cisatracurium    dextrose    dextrose    [COMPLETED] dilTIAZem **FOLLOWED BY** dilTIAZem **FOLLOWED BY** dilTIAZem    glucagon (human recombinant)    glycopyrrolate PF    hydrALAZINE    insulin lispro **AND** insulin lispro    magnesium sulfate **OR** magnesium sulfate in D5W 1g/100mL (PREMIX)    metoprolol tartrate    nitroglycerin    ondansetron **OR** ondansetron    potassium chloride **OR** potassium chloride **OR** potassium chloride    potassium phosphate infusion greater than 15 mMoles **OR** potassium phosphate infusion greater than 15 mMoles **OR** potassium phosphate **OR** sodium phosphate IVPB **OR** sodium phosphate IVPB **OR** sodium phosphate IVPB    [COMPLETED] Insert peripheral IV **AND** sodium chloride    sodium chloride    OBJECTIVE     VITAL SIGNS:  /80   Pulse 92   Temp 98.9 °F (37.2 °C) (Rectal)   Resp (!) 36   Ht 165.1 cm (65\")   Wt (!) 163 kg (359 lb 12.7 oz)   LMP 07/22/2021   SpO2 96%   BMI 59.87 kg/m²     I/O FROM PREVIOUS 3 SHIFTS:  I/O last 3 completed shifts:  In: 6449 [I.V.:3690; Other:476; NG/GT:2283]  Out: 5500 " [Urine:5275; Stool:225]      I/O PREVIOUS 24 HOURS:   Intake/Output                         08/01/21 0701 - 08/02/21 0700 08/02/21 0701 - 08/03/21 0700     1038-4375 3451-2385 Total 3762-1703 5497-5798 Total                 Intake    I.V.  1127  1522 2649  741  1427 2168    Other  100  251 351  105  120 225    Flush/ Irrigation Intake (mL) (NG/OG Tube Nasogastric 16 Fr Left nostril) 100 251 351 105 120 225    NG/GT  564  1094 1658  375  814 1189    Total Intake 1791 2867 4658 1221 2361 3582       Output    Urine  1900  2100 4000  975  2200 3175    Stool  500  50 550  150  25 175    Total Output 2400 2150 4550 1125 2225 3350             NET BALANCE SINCE ADMISSION:  Net IO Since Admission: 8,751 mL [08/03/21 0936]     BOWEL MOVEMENTS:  Stool Output  Stool (mL): 0 mL (08/03/21 0539)  Stool Unmeasured Occurrence: 1 (07/31/21 1900)  Bowel Incontinence: Yes (07/31/21 1900)  Stool Amount: small (08/01/21 2000)       PHYSICAL EXAM:  Constitutional:  Well developed, well nourished, intubated, sedated, paralyzed  Eyes:   Pupils sluggish and equal, subconjunctival edema, sclera anicteric.  EOM not assessed  HENT:  Atraumatic, external ears normal, nose normal with left NG tube, oral ET tube. Neck-rachea midline, right IJ central line  Respiratory:  Coarse breath sounds with scattered rhonchi, bibasilar crackles, mild diffuse wheezing  Cardiovascular: Sinus tachycardia, no murmurs, no gallops, no rubs   GI:  Morbid body habitus, soft, nondistended, normal bowel sounds  :   Deferred, Ma catheter in place  Musculoskeletal:   Generalized edema, no clubbing or cyanosis  Integument:  Well hydrated, no rash   Neurologic:   Intubated, sedated, paralyzed  Psychiatric:   Unable to assess      RESULTS     LABS:  Lab Results (last 24 hours)       Procedure Component Value Units Date/Time    POC Glucose Once [[id number]]  (Abnormal) Collected: 08/03/21 0519    Specimen: Blood Updated: 08/03/21 0520     Glucose 253 mg/dL      Comment:  Serial Number: [id number]:  [id number]       Blood Gas, Arterial - [[id number]]  (Abnormal) Collected: 08/03/21 0424    Specimen: Arterial Blood Updated: 08/03/21 0428     Site Arterial Line     Ovi's Test N/A     pH, Arterial 7.380 pH units      pCO2, Arterial 58.5 mm Hg      pO2, Arterial 123.9 mm Hg      HCO3, Arterial 34.6 mmol/L      Base Excess, Arterial 8.1 mmol/L      Comment: Serial Number: [id number]:  [id number]        O2 Saturation, Arterial 98.6 %      CO2 Content 36.4 mmol/L      Barometric Pressure for Blood Gas --     Comment: N/A        Modality Adult Vent     FIO2 80 %      Ventilator Mode ;AC     Set Tidal Volume 420     PEEP 16     Hemodilution No     Respiratory Rate 36    Calcium, Ionized [[id number]]  (Normal) Collected: 08/03/21 0258    Specimen: Blood Updated: 08/03/21 0405     Ionized Calcium 1.24 mmol/L     Manual Differential [[id number]]  (Abnormal) Collected: 08/03/21 0258    Specimen: Blood Updated: 08/03/21 0403     Neutrophil % 84.0 %      Lymphocyte % 9.0 %      Monocyte % 3.0 %      Eosinophil % 1.0 %      Bands %  2.0 %      Metamyelocyte % 1.0 %      Neutrophils Absolute 18.75 10*3/mm3      Lymphocytes Absolute 1.96 10*3/mm3      Monocytes Absolute 0.65 10*3/mm3      Eosinophils Absolute 0.22 10*3/mm3      RBC Morphology Normal     WBC Morphology Normal     Platelet Morphology Normal    CBC & Differential [[id number]]  (Abnormal) Collected: 08/03/21 0258    Specimen: Blood Updated: 08/03/21 0403    Narrative:      The following orders were created for panel order CBC & Differential.  Procedure                               Abnormality         Status                     ---------                               -----------         ------                     Scan Slide[[id number]]                                       Final result               CBC Auto Differential[[id number]]        Abnormal            Final result                 Please view results for these tests on the  individual orders.    Scan Slide [[id number]] Collected: 08/03/21 0258    Specimen: Blood Updated: 08/03/21 0403     Scan Slide --     Comment: See Manual Differential Results       CBC Auto Differential [[id number]]  (Abnormal) Collected: 08/03/21 0258    Specimen: Blood Updated: 08/03/21 0403     WBC 21.80 10*3/mm3      RBC 3.38 10*6/mm3      Hemoglobin 9.8 g/dL      Hematocrit 29.4 %      MCV 87.0 fL      MCH 28.9 pg      MCHC 33.3 g/dL      RDW 14.8 %      RDW-SD 45.1 fl      MPV 8.6 fL      Platelets 374 10*3/mm3     Narrative:      The previously reported component NRBC is no longer being reported. Previous result was 0.0 /100 WBC (Reference Range: 0.0-0.2 /100 WBC) on 8/3/2021 at 0334 EDT.    Phosphorus [[id number]]  (Normal) Collected: 08/03/21 0258    Specimen: Blood Updated: 08/03/21 0351     Phosphorus 4.4 mg/dL     Comprehensive Metabolic Panel [[id number]]  (Abnormal) Collected: 08/03/21 0258    Specimen: Blood Updated: 08/03/21 0344     Glucose 247 mg/dL      BUN 28 mg/dL      Creatinine 0.39 mg/dL      Sodium 136 mmol/L      Potassium 5.0 mmol/L      Chloride 96 mmol/L      CO2 33.0 mmol/L      Calcium 9.2 mg/dL      Total Protein 6.5 g/dL      Albumin 3.10 g/dL      ALT (SGPT) 96 U/L      AST (SGOT) 53 U/L      Alkaline Phosphatase 68 U/L      Total Bilirubin 0.6 mg/dL      eGFR Non African Amer >150 mL/min/1.73      Globulin 3.4 gm/dL      A/G Ratio 0.9 g/dL      BUN/Creatinine Ratio 71.8     Anion Gap 7.0 mmol/L     Narrative:      GFR Normal >60  Chronic Kidney Disease <60  Kidney Failure <15      Magnesium [[id number]]  (Normal) Collected: 08/03/21 0258    Specimen: Blood Updated: 08/03/21 0344     Magnesium 2.0 mg/dL     aPTT [[id number]]  (Abnormal) Collected: 08/03/21 0258    Specimen: Blood Updated: 08/03/21 0330     PTT 52.3 seconds     POC Glucose Once [[id number]]  (Abnormal) Collected: 08/02/21 2331    Specimen: Blood Updated: 08/02/21 2332     Glucose 207 mg/dL      Comment: Serial Number:  [id number]:  [id number]       aPTT [[id number]]  (Normal) Collected: 08/02/21 2116    Specimen: Blood Updated: 08/02/21 2154     PTT 61.9 seconds     POC Glucose Once [[id number]]  (Abnormal) Collected: 08/02/21 1706    Specimen: Blood Updated: 08/02/21 1707     Glucose 189 mg/dL      Comment: Serial Number: [id number]:  [id number]       aPTT [[id number]]  (Abnormal) Collected: 08/02/21 1356    Specimen: Blood Updated: 08/02/21 1437     PTT 86.7 seconds              RADIOLOGY:  No Radiology Exams Resulted Within Past 24 Hours    ECHOCARDIOGRAM:  Results for orders placed during the hospital encounter of 07/22/21    Adult Transthoracic Echo Limited W/ Cont if Necessary Per Protocol    Interpretation Summary  · Left ventricular systolic function is normal.  · Left ventricular ejection fraction is 55 to 60%  None previous for comparison.     I reviewed the patient's new clinical results.    This note has been scribed by me for pulmonary attending physician.    Electronically signed by NATY Arroyo, 08/03/21 at 09:36 EDT.     I personally have examined  and interviewed the patient. I have reviewed the history, data, problems, assessment and plan with our NP.  Critical care time in direct medical management (  34 ) minutes  Electronically signed by Jeff Feng MD, D,ABS, 08/04/21, 8:45 AM EDT.

## 2021-08-03 NOTE — PROGRESS NOTES
"Nutrition Services    Patient Name: Leslie Harris  YOB: 1990  MRN: [id number]  Admission date: 7/22/2021    Comments:    EN Prescription: Peptamen AF at 80 mL/hr + 20 mL/hr water flush     PPE Documentation        PPE Worn By Provider RD did not enter room for this encounter   PPE Worn By Patient  -      CLINICAL NUTRITION ASSESSMENT       Reason for Assessment Follow up protocol    7/23: MST 3/TF consult      H&P:  Per H&P \"Leslie Harris is a 30 y.o. female who presented to [facility] ED on 7/22/2021 with complaint of being recently diagnosed with COVID-19 at the [facility] 6 days prior to admission.  Patient reports that she has been having increased shortness of breath over the past couple days, and has not lost her sense of smell, but has lost her sense of taste, reports frequent, nonproductive cough, myalgias, fevers, and chills\"     Past Medical History:   Diagnosis Date   • Class 3 severe obesity without serious comorbidity with body mass index (BMI) of 50.0 to 59.9 in adult 7/22/2021   • Diabetes mellitus type 2 in obese (CMS/HCC) 7/30/2021       Past Surgical History:   Procedure Laterality Date   • CHOLECYSTECTOMY     • EXPLORATORY LAPAROTOMY W/ BOWEL RESECTION  2018    Due to complication of cholecystectomy in which patient had an accidental perforation of small bowel intraoperatively.        Current Problems:   Pneumonia due to COVID-19 virus  Acute respiratory failure due to COVID-19  -Previously on Continuous AVAPs  -Intubated 7/23     Hyperglycemia, with new diagnosis of T2DM  -Hgb A1c 6.9%    HTN     Nutrition/Diet History         Narrative     8/3: Patient discussed at critical care AM rounds. Remains intubated, sedated, and paralyzed. No longer in prone position. RN did report patient with blood tinged residuals this AM, TFs were briefly held. MD okayed to resume TFs.     7/30: Patient discussed at critical care AM rounds, pt is now in the prone " "position. Continues on TF, no BM x8 days. Have been conservatively advancing d/t continuous paralytic, constipation, and now prone.    7/27: Patient discussed at critical care AM rounds. Okay to advance TF rate per discussion in rounds, still no BM yet while on bowel regimen.    7/23: Patient discussed at critical care AM rounds. Patient requiring continuous AVAPs and not able to come off to consume adequate nutrition. Tube feed consult received. At visit this AM, visually observed patient sleeping on AVAPs through glass window. At second visit patient receiving complex care from several healthcare workers. At time of documentation appears patient is now intubated, sedated on propofol, paralyzed, and on 1 pressor.      Functional Status Likely Independent of ADLs PTA given young age.      Food Allergies NKFA        Anthropometrics        Current Height, Weight Height: 165.1 cm (65\")  Weight: (!) 163 kg (359 lb 12.7 oz) (08/03/21 0542)        Admit Height, Weight     Flowsheet Rows      First Filed Value   Admission Height  165.1 cm (65\") Documented at 07/22/2021 1218   Admission Weight  (!) 145 kg (320 lb) Documented at 07/22/2021 1218             Ideal Body Weight (IBW) 56.8 kg (125 lb)    % Ideal Body Weight 287%        Usual Body Weight UTD   % Usual Body Weight UTD   Wt Change Observation Current Admission:  8/3: CBW is down slightly from last review, up still up overall. + 8.2 L per I/Os.   7/30: CBW is from 7/28, c/w recent wt trend  7/27: CBW trending up, 6.2 Liters positive  7/23: Noted two admit weights of 320 & 356 lb. ? [employer].     PTA:  No weight hx available PTA    Weight Hx    Wt Readings from Last 30 Encounters:   08/03/21 0542 (!) 163 kg (359 lb 12.7 oz)   07/28/21 0400 (!) 167 kg (367 lb 4.6 oz)   07/27/21 1215 (!) 167 kg (368 lb)   07/27/21 0600 (!) 167 kg (368 lb 2.7 oz)   07/26/21 0548 (!) 165 kg (363 lb 12.1 oz)   07/25/21 0500 (!) 167 kg (367 lb 8.1 oz)   07/24/21 0600 (!) 165 kg (363 lb " 12.1 oz)   07/22/21 1449 (!) 162 kg (356 lb 7.7 oz)   07/22/21 1218 (!) 145 kg (320 lb)        BMI kg/m2 Body mass index is 59.87 kg/m².       Labs/Medications         Pertinent Labs    Results from last 7 days   Lab Units 08/03/21  0258 08/02/21  0408 08/01/21  0359   SODIUM mmol/L 136 137 137   POTASSIUM mmol/L 5.0 4.7 5.7*   CHLORIDE mmol/L 96* 94* 94*   CO2 mmol/L 33.0* 34.0* 38.0*   BUN mg/dL 28* 28* 22*   CREATININE mg/dL 0.39* 0.39* 0.39*   CALCIUM mg/dL 9.2 9.1 9.2   BILIRUBIN mg/dL 0.6 0.6 0.5   ALK PHOS U/L 68 66 74   ALT (SGPT) U/L 96* 98* 96*   AST (SGOT) U/L 53* 52* 59*   GLUCOSE mg/dL 247* 286* 248*     Results from last 7 days   Lab Units 08/03/21  0258 08/02/21  0408 08/02/21  0408 08/01/21  0359 08/01/21  0359   MAGNESIUM mg/dL 2.0  --  1.9  --  2.2   PHOSPHORUS mg/dL 4.4   < > 2.9   < > 4.8*   HEMOGLOBIN g/dL 9.8*   < > 10.0*   < > 11.0*   HEMATOCRIT % 29.4*   < > 30.5*   < > 33.8*    < > = values in this interval not displayed.     COVID19   Date Value Ref Range Status   07/22/2021 Detected (C) Not Detected - Ref. Range Final     Lab Results   Component Value Date    HGBA1C 6.9 (H) 07/23/2021         Pertinent Medications Acetylcysteine, aspirin, Vitamin D3, Decadron, Mucinex, lantus, Admelog, zyvox, protonix, zincate, nimbex, cardizem, fentanyl, versed, levophed      Physical Findings        Overall Physical   Appearance, MSA 8/3: Patient remains very close to window/doorway, visually continues to appear well-nourished.   7/30: observed via glass window/doorway, visually appears well-nourished  7/27: observed via glass window/doorway, visually appears well-nourished  7/23: Patient laying outside of blankets/sheets. Able to clearly visualize patient. Appears well nourished.    -  Edema  2+ generalized edema  2+ face edema, periorbital  2+ tongue     Gastrointestinal Noted previous issues with constipation  Now with FMS present (+ 175 mL on 8/2)      Tubes NG     Oral/Mouth Cavity No known chewing  "or swallowing issues      Skin DTI on (L) distal nose  DTI on midline tongue  Stage II on sacral spine     Estimated/Assessed Needs    Estimated 8/3/21    Energy Requirements    Height for Calculation  Height: 165.1 cm (65\")   Weight for Calculation 56.8 kg (IBW)    Method for Estimation  30-35 kcals/kg    EST Needs (kcal/day) 8260-0700 kcals     For Comparison 24-29 kcals/kg of 83 kg (AdjIBW) per COVID LEEP study= 0532-1671 kcals        Protein Requirements    Weight for Calculation 56.8 kg (125 lb)    EST Protein Needs (g/kg) 2.5 g/kg    EST Daily Needs (g/day) 142 g /day       Fluid Requirements     Estimated Needs (mL/day) 4534-4344 mL/day  (1 mL/kcal-adjust based on hydration status)        Fluid Deficit    Current Na Level (mEq/L) -   Desired Na Level (mEq/L) -   Estimated Fluid Deficit (L)  -     Current Nutrition Orders & Evaluation of Received Nutrient/Fluid Intake       Oral Nutrition     Current PO Diet NPO Diet   Supplement None    PO Evaluation     % PO Intake 7/27 to present (8/3): 0% - NPO, on TF    7/23: 0% x 1 meal documented    -  Enteral Nutrition    Enteral Route NGT   TF Modular -   TF Delivery Method Continuous   Current Ordered TF  Peptamen Intense VHP at 70 mL/hr   Current Ordered H2O flush  20 mL/hr water flush    TF Observation  Peptamen Intense VHP documented at current goal 70 mL/hr, able to visualize correct TF/water flush through window as well.    EN Evaluation     TF Changes Changing EN regimen to better meet estimated needs     TF Residual WNL    TF Tolerance Constipation    Average EN Delivered -       Parenteral Nutrition     TPN Route -   Current Ordered TPN VOL  -   Dextrose (g/kcals)  -   Amino Acid (g/kcals) -   Lipids (mL/Concentration/FREQ)  -   MVI Frequency  -   Trace Element Frequency  -   TPN Observation  -    TPN Evaluation    Total # Days on TPN -   -  Clinical Course    Nutrition Course Details PO Diet:    7/22-7/23 Regular  7/23  to present (8/3) NPO    Nutrition " Support:  7/23: TF to be started today   Conservatively advanced d/t paralytic/instability  7/30 TF at goal rate and continues at present (8/3)      Nutritional Risk Screening        NRS-2002 Score   -       Nutrition Diagnosis         Nutrition Dx Problem 1 Inadequate oral intake r/t acute respiratory failure due to COVID-19 requiring mechanical ventilation AEB NPO diet order.       Nutrition Dx Problem 2 -       Intervention Goal         Intervention Goal(s) Patient will tolerate EN      Nutrition Intervention        RD/Tech Action Changing EN regimen given prolonged COVID-19 infection & likely entering into more catabolic phase of infection        Nutrition Prescription          Diet Prescription NPO    Supplement Prescription -   -  Enteral Prescription End Goal:     Peptamen AF at 80 mL/hr x22 hrs (assumes interruptions for ADLs) provides 2112 kcal (106% of upper end), 132 g protein (93%), and 1426 mL free H2O + 20 mL/hr water flush (x 22 hrs=440 mL)= 1866 mL total water + IVF. Free water flushes adjusted per clinical picture.         TPN Prescription -     Monitor/Evaluation        Monitor EN and tolerance, I/Os, Labs, BM, weight, skin and medication changes      Electronically signed by:  Ynes Warner RD  08/03/21 13:40 EDT

## 2021-08-03 NOTE — CONSULTS
Infectious Diseases Consult Note    Referring Provider: Sarah Fajardo MD    Reason for Consultation:     Pneumonia post Covid infection    Patient Care Team:  Provider, No Known as PCP - General    Chief complaint     Respiratory failure on the ventilator    Subjective     History of present illness:      This is a 30-year-old white female who was hospitalized [facility] on July 22, 2021. The patient was apparently sick for 8 days prior to admission and was diagnosed with COVID-19 infection 6 days prior to admission. Was reported to me that the patient did not receive COVID-19 vaccination. The patient was treated with remdesivir and received the last dose on July 26, 2021. The patient was very hypoxic and required to be intubated and currently on the ventilator on 70% FiO2 with 16 PEEP. Sputum culture from July 29, 2021 is growing MRSA. The patient was started on Zyvox 2 days ago. The patient is currently on low-dose of vasopressors. The patient had constipation was giving laxative and currently has loose bowel movements and required to have fecal management system.    Review of Systems   Review of Systems   Unable to perform ROS: Intubated       Medications  Medications Prior to Admission   Medication Sig Dispense Refill Last Dose   • acetaminophen (TYLENOL) 500 MG tablet Take 500 mg by mouth Every 6 (Six) Hours As Needed for Mild Pain .   7/22/2021 at 1000   • famotidine (PEPCID) 20 MG tablet Take 20 mg by mouth Daily As Needed for Heartburn.      • ibuprofen (ADVIL,MOTRIN) 200 MG tablet Take 200 mg by mouth Every 6 (Six) Hours As Needed for Mild Pain .   7/22/2021 at 1000   • methocarbamol (ROBAXIN) 500 MG tablet Take 500 mg by mouth 4 (Four) Times a Day As Needed for Muscle Spasms.      • ondansetron ODT (ZOFRAN-ODT) 4 MG disintegrating tablet Place 4 mg on the tongue Every 6 (Six) Hours As Needed for Nausea or Vomiting.      • promethazine (PHENERGAN) 12.5 MG tablet Take 12.5 mg by mouth Every 6 (Six)  Hours As Needed for Nausea or Vomiting.          History  Past Medical History:   Diagnosis Date   • Class 3 severe obesity without serious comorbidity with body mass index (BMI) of 50.0 to 59.9 in adult 7/22/2021   • Diabetes mellitus type 2 in obese (CMS/[facility]) 7/30/2021     Past Surgical History:   Procedure Laterality Date   • CHOLECYSTECTOMY     • EXPLORATORY LAPAROTOMY W/ BOWEL RESECTION  2018    Due to complication of cholecystectomy in which patient had an accidental perforation of small bowel intraoperatively.       Family History  Family History   Problem Relation Age of Onset   • Diabetes Mother    • No Known Problems Father        Social History   reports that she has never smoked. She has never used smokeless tobacco. Alcohol use questions deferred to the physician. Drug use questions deferred to the physician.    Allergies  Sulfa antibiotics    Objective     Vital Signs   Vital Signs (last 24 hours)       08/02 0700  -  08/03 0659 08/03 0700  -  08/03 1053   Most Recent    Temp (°F) 98 -  98.6      98.9     98.9 (37.2)    Heart Rate 71 -  127    76 -  92     92    Resp   36      36     36    BP 97/43 -  159/93      150/80     150/80    SpO2 (%) 92 -  100      96     96          Physical Exam:  Physical Exam  Constitutional:       Appearance: She is obese.      Comments: Intubated and sedated   HENT:      Head: Normocephalic and atraumatic.   Eyes:      Pupils: Pupils are equal, round, and reactive to light.   Cardiovascular:      Rate and Rhythm: Normal rate and regular rhythm.   Pulmonary:      Breath sounds: Rales present.      Comments: Diminished breathing sounds secondary to body habitus  Abdominal:      General: Abdomen is flat. Bowel sounds are normal.      Palpations: Abdomen is soft.   Musculoskeletal:      Cervical back: Neck supple.      Right lower leg: No edema.      Left lower leg: No edema.   Neurological:      Comments: Intubated and sedated         Microbiology  Microbiology Results  (last 10 days)     Procedure Component Value - Date/Time    Respiratory Culture - Sputum, ET Suction [[id number]]  (Abnormal)  (Susceptibility) Collected: 07/29/21 2038    Lab Status: Final result Specimen: Sputum from ET Suction Updated: 08/02/21 1315     Respiratory Culture Scant growth (1+) Candida albicans      Scant growth (1+) Staphylococcus aureus, MRSA     Comment: Methicillin resistant Staphylococcus aureus, Patient may be an isolation risk.         No Normal Respiratory Sondra     Gram Stain Few (2+) WBCs per low power field      Rare (1+) Epithelial cells per low power field      Few (2+) Budding yeast    Susceptibility      Staphylococcus aureus, MRSA      FITZ      Clindamycin Susceptible      Linezolid Susceptible      Oxacillin Resistant      Penicillin G Resistant      Tetracycline Susceptible      Trimethoprim + Sulfamethoxazole Susceptible      Vancomycin Susceptible               Linear View                   Blood Culture - Blood, Blood, Central Line [[id number]] Collected: 07/29/21 1153    Lab Status: Preliminary result Specimen: Blood, Central Line Updated: 08/02/21 1231     Blood Culture No growth at 4 days    Blood Culture - Blood, Blood, Arterial Line [[id number]] Collected: 07/29/21 1136    Lab Status: Preliminary result Specimen: Blood, Arterial Line Updated: 08/02/21 1231     Blood Culture No growth at 4 days          Laboratory  Results from last 7 days   Lab Units 08/03/21  0258   WBC 10*3/mm3 21.80*   HEMOGLOBIN g/dL 9.8*   HEMATOCRIT % 29.4*   PLATELETS 10*3/mm3 374     Results from last 7 days   Lab Units 08/03/21  0258   SODIUM mmol/L 136   POTASSIUM mmol/L 5.0   CHLORIDE mmol/L 96*   CO2 mmol/L 33.0*   BUN mg/dL 28*   CREATININE mg/dL 0.39*   GLUCOSE mg/dL 247*   CALCIUM mg/dL 9.2     Results from last 7 days   Lab Units 08/03/21  0258   SODIUM mmol/L 136   POTASSIUM mmol/L 5.0   CHLORIDE mmol/L 96*   CO2 mmol/L 33.0*   BUN mg/dL 28*   CREATININE mg/dL 0.39*   GLUCOSE mg/dL 247*    CALCIUM mg/dL 9.2                   Radiology  Imaging Results (Last 72 Hours)     Procedure Component Value Units Date/Time    XR Chest 1 View [[id number]] Collected: 08/01/21 1149     Updated: 08/01/21 1158    Narrative:      DATE OF EXAM:  8/1/2021 11:42 AM     PROCEDURE:  XR CHEST 1 VW-     INDICATIONS:  Shortness of breath, Covid 19 infection, bloody pulmonary secretions.      COMPARISON:  07/29/2021.     TECHNIQUE:   Single radiographic view of the chest was obtained.     FINDINGS:  The heart is borderline enlarged. There is a stable endotracheal tube  and right internal jugular line in place. The nasogastric tube tip  projects out of the field-of-view, but below the hemidiaphragms.  Aeration of both lungs has improved. There is still bilateral perihilar  and some patchy basilar consolidation present felt to represent residual  pneumonia from the patient's Covid 19 infection.  There is no  pneumothorax or pleural effusion.       Impression:         1. Improved aeration of both lungs as described.  2. Lines and support tubes appear stable.  3. Borderline cardiomegaly.     Electronically Signed By-Adebayo Watson MD On:8/1/2021 11:56 AM  This report was finalized on [id number] by  Aedbayo Watson MD.          Cardiology      Results Review:  I have reviewed all clinical data, test, lab, and imaging results.       Schedule Meds  Acetylcysteine, 600 mg, Oral, BID  albumin human, 25 g, Intravenous, Q12H   And  furosemide, 20 mg, Intravenous, Q12H  albuterol sulfate HFA, 6 puff, Inhalation, 4x Daily - RT  artificial tears, , Both Eyes, Q4H  aspirin, 324 mg, Per G Tube, Daily  budesonide-formoterol, 2 puff, Inhalation, BID - RT  cholecalciferol, 2,000 Units, Nasogastric, Daily  dexamethasone, 6 mg, Intravenous, Q12H  enoxaparin, 40 mg, Subcutaneous, Q12H  famotidine, 20 mg, Nasogastric, Daily  fluconazole, 400 mg, Oral, Nightly  guaiFENesin, 400 mg, Nasogastric, Q6H  insulin glargine, 30 Units, Subcutaneous,  Q12H  insulin lispro, 0-24 Units, Subcutaneous, Q6H  insulin lispro, 10 Units, Subcutaneous, Q6H  insulin regular, 12 Units, Subcutaneous, Q12H  linezolid, 600 mg, Oral, Q12H  metoprolol tartrate, 25 mg, Oral, Q8H  sodium chloride, 10 mL, Intravenous, Q12H  zinc sulfate, 220 mg, Nasogastric, Daily        Infusion Meds  cisatracurium (NIMBEX) infusion, 0.5-10.2 mcg/kg/min, Last Rate: 3.5 mcg/kg/min (08/03/21 0624)  dilTIAZem, 5-15 mg/hr, Last Rate: 10 mg/hr (08/03/21 0926)  epoprostenol, 50 ng/kg/min (Ideal), Last Rate: 50 ng/kg/min (08/03/21 0431)  fentanyl 10 mcg/mL,  mcg/hr, Last Rate: 250 mcg/hr (08/03/21 0931)  midazolam, 1-10 mg/hr, Last Rate: 8 mg/hr (08/03/21 0853)  norepinephrine, 0.02-0.3 mcg/kg/min, Last Rate: 0.02 mcg/kg/min (08/03/21 0922)  Pharmacy to Dose enoxaparin (LOVENOX),         PRN Meds  •  acetaminophen  •  acetaminophen **OR** acetaminophen  •  aluminum-magnesium hydroxide-simethicone  •  benzonatate  •  cisatracurium  •  dextrose  •  dextrose  •  [COMPLETED] dilTIAZem **FOLLOWED BY** dilTIAZem **FOLLOWED BY** dilTIAZem  •  glucagon (human recombinant)  •  glycopyrrolate PF  •  hydrALAZINE  •  insulin lispro **AND** insulin lispro  •  magnesium sulfate **OR** magnesium sulfate in D5W 1g/100mL (PREMIX)  •  metoprolol tartrate  •  nitroglycerin  •  ondansetron **OR** ondansetron  •  Pharmacy to Dose enoxaparin (LOVENOX)  •  potassium chloride **OR** potassium chloride **OR** potassium chloride  •  potassium phosphate infusion greater than 15 mMoles **OR** potassium phosphate infusion greater than 15 mMoles **OR** potassium phosphate **OR** sodium phosphate IVPB **OR** sodium phosphate IVPB **OR** sodium phosphate IVPB  •  [COMPLETED] Insert peripheral IV **AND** sodium chloride  •  sodium chloride      Assessment/Plan       Assessment    Severe COVID-19 infection and patient with significant comorbidities including diabetes mellitus and morbid obesity. Apparently did not receive  vaccination for COVID-19  COVID-19 screen on admission on July 22, 2021 was positive. Was reported that patient had positive COVID-19 diagnosis 6 days prior to admission  The patient had received 5 days of IV remdesivir    Severe respiratory failure on the ventilator currently on 70% FiO2 and 16 PEEP.  Chest x-ray consistent with severe COVID-19 infection and ARDS    Probable ventilator associated pneumonia versus hospitalist acquired pneumonia. Sputum culture from July 29, 2021 grew MRSA and the organism is susceptible to linezolid and vancomycin  The patient was started on linezolid on August 1, 2021    Morbid obesity    Diabetes mellitus    Reactive leukocytosis. Most likely secondary to steroids    Elevated CK, last CK was checked 3 days ago    Plan    Continue Zyvox 600 mg every 12 hours for 10 to 14 days  Monitor platelets carefully  Supportive care  Prognosis is very guarded and might be poor secondary to severe COVID-19 infection  Continue steroids for now  Send stool for C. difficile toxin and antigen    Continue COVID-19 isolation for total of 20 days from the first diagnosis  Labs in a.m. including : CBC with differential, CMP,  d-dimer, ferritin and CRP and CK    Appropriate PPE was placed on before entering the room    Neela Alvarado MD  08/03/21  10:53 EDT      Note is dictated utilizing voice recognition software/Dragon

## 2021-08-04 ENCOUNTER — APPOINTMENT (OUTPATIENT)
Dept: GENERAL RADIOLOGY | Facility: HOSPITAL | Age: 31
End: 2021-08-04

## 2021-08-04 LAB
ALBUMIN SERPL-MCNC: 3.9 G/DL (ref 3.5–5.2)
ALBUMIN/GLOB SERPL: 1.3 G/DL
ALP SERPL-CCNC: 73 U/L (ref 39–117)
ALT SERPL W P-5'-P-CCNC: 139 U/L (ref 1–33)
ANION GAP SERPL CALCULATED.3IONS-SCNC: 7 MMOL/L (ref 5–15)
APTT PPP: <20 SECONDS (ref 24–31)
ARTERIAL PATENCY WRIST A: ABNORMAL
AST SERPL-CCNC: 78 U/L (ref 1–32)
ATMOSPHERIC PRESS: ABNORMAL MM[HG]
BACTERIA SPEC AEROBE CULT: NO GROWTH
BASE EXCESS BLDA CALC-SCNC: 9.5 MMOL/L (ref 0–3)
BDY SITE: ABNORMAL
BILIRUB SERPL-MCNC: 0.7 MG/DL (ref 0–1.2)
BUN SERPL-MCNC: 28 MG/DL (ref 6–20)
BUN/CREAT SERPL: 62.2 (ref 7–25)
C DIFF GDH STL QL: NEGATIVE
CA-I SERPL ISE-MCNC: 1.27 MMOL/L (ref 1.2–1.3)
CALCIUM SPEC-SCNC: 9.3 MG/DL (ref 8.6–10.5)
CHLORIDE SERPL-SCNC: 93 MMOL/L (ref 98–107)
CK SERPL-CCNC: 84 U/L (ref 20–180)
CO2 BLDA-SCNC: 39.3 MMOL/L (ref 22–29)
CO2 SERPL-SCNC: 33 MMOL/L (ref 22–29)
CREAT SERPL-MCNC: 0.45 MG/DL (ref 0.57–1)
CRP SERPL-MCNC: 0.33 MG/DL (ref 0–0.5)
D DIMER PPP FEU-MCNC: 1.03 MG/L (FEU) (ref 0–0.59)
DEPRECATED RDW RBC AUTO: 45.9 FL (ref 37–54)
ERYTHROCYTE [DISTWIDTH] IN BLOOD BY AUTOMATED COUNT: 15 % (ref 12.3–15.4)
FERRITIN SERPL-MCNC: 234.3 NG/ML (ref 13–150)
FIBRINOGEN PPP-MCNC: 498 MG/DL (ref 210–450)
GFR SERPL CREATININE-BSD FRML MDRD: >150 ML/MIN/1.73
GLOBULIN UR ELPH-MCNC: 3 GM/DL
GLUCOSE BLDC GLUCOMTR-MCNC: 191 MG/DL (ref 70–105)
GLUCOSE BLDC GLUCOMTR-MCNC: 205 MG/DL (ref 70–105)
GLUCOSE BLDC GLUCOMTR-MCNC: 205 MG/DL (ref 70–105)
GLUCOSE BLDC GLUCOMTR-MCNC: 211 MG/DL (ref 70–105)
GLUCOSE BLDC GLUCOMTR-MCNC: 235 MG/DL (ref 70–105)
GLUCOSE SERPL-MCNC: 237 MG/DL (ref 65–99)
HCO3 BLDA-SCNC: 37.1 MMOL/L (ref 21–28)
HCT VFR BLD AUTO: 28.2 % (ref 34–46.6)
HEMODILUTION: NO
HGB BLD-MCNC: 9.2 G/DL (ref 12–15.9)
INHALED O2 CONCENTRATION: 75 %
INR PPP: 1.04 (ref 0.93–1.1)
LDH SERPL-CCNC: 286 U/L (ref 135–214)
LYMPHOCYTES # BLD MANUAL: 1.5 10*3/MM3 (ref 0.7–3.1)
LYMPHOCYTES NFR BLD MANUAL: 2 % (ref 5–12)
LYMPHOCYTES NFR BLD MANUAL: 9 % (ref 19.6–45.3)
MAGNESIUM SERPL-MCNC: 2 MG/DL (ref 1.6–2.6)
MCH RBC QN AUTO: 28.5 PG (ref 26.6–33)
MCHC RBC AUTO-ENTMCNC: 32.7 G/DL (ref 31.5–35.7)
MCV RBC AUTO: 87.1 FL (ref 79–97)
METAMYELOCYTES NFR BLD MANUAL: 1 % (ref 0–0)
MODALITY: ABNORMAL
MONOCYTES # BLD AUTO: 0.33 10*3/MM3 (ref 0.1–0.9)
NEUTROPHILS # BLD AUTO: 14.7 10*3/MM3 (ref 1.7–7)
NEUTROPHILS NFR BLD MANUAL: 60 % (ref 42.7–76)
NEUTS BAND NFR BLD MANUAL: 28 % (ref 0–5)
NT-PROBNP SERPL-MCNC: 151.4 PG/ML (ref 0–450)
PCO2 BLDA: 71.1 MM HG (ref 35–48)
PEEP RESPIRATORY: 14 CM[H2O]
PH BLDA: 7.33 PH UNITS (ref 7.35–7.45)
PHOSPHATE SERPL-MCNC: 4.9 MG/DL (ref 2.5–4.5)
PLATELET # BLD AUTO: 274 10*3/MM3 (ref 140–450)
PMV BLD AUTO: 8.2 FL (ref 6–12)
PO2 BLDA: 65.2 MM HG (ref 83–108)
POTASSIUM SERPL-SCNC: 5.3 MMOL/L (ref 3.5–5.2)
PROT SERPL-MCNC: 6.9 G/DL (ref 6–8.5)
PROTHROMBIN TIME: 11.5 SECONDS (ref 9.6–11.7)
RBC # BLD AUTO: 3.24 10*6/MM3 (ref 3.77–5.28)
RESPIRATORY RATE: 36
SAO2 % BLDCOA: 89.7 % (ref 94–98)
SCAN SLIDE: NORMAL
SMALL PLATELETS BLD QL SMEAR: ADEQUATE
SODIUM SERPL-SCNC: 133 MMOL/L (ref 136–145)
STOMATOCYTES BLD QL SMEAR: ABNORMAL
TROPONIN T SERPL-MCNC: <0.01 NG/ML (ref 0–0.03)
VENTILATOR MODE: ABNORMAL
VT ON VENT VENT: 420 ML
WBC # BLD AUTO: 16.7 10*3/MM3 (ref 3.4–10.8)
WBC MORPH BLD: NORMAL

## 2021-08-04 PROCEDURE — 25010000002 FENTANYL CITRATE (PF) 2500 MCG/50ML SOLUTION: Performed by: NURSE PRACTITIONER

## 2021-08-04 PROCEDURE — 82728 ASSAY OF FERRITIN: CPT | Performed by: INTERNAL MEDICINE

## 2021-08-04 PROCEDURE — 83880 ASSAY OF NATRIURETIC PEPTIDE: CPT | Performed by: NURSE PRACTITIONER

## 2021-08-04 PROCEDURE — 85384 FIBRINOGEN ACTIVITY: CPT | Performed by: NURSE PRACTITIONER

## 2021-08-04 PROCEDURE — 80053 COMPREHEN METABOLIC PANEL: CPT | Performed by: NURSE PRACTITIONER

## 2021-08-04 PROCEDURE — 87077 CULTURE AEROBIC IDENTIFY: CPT | Performed by: INTERNAL MEDICINE

## 2021-08-04 PROCEDURE — 94799 UNLISTED PULMONARY SVC/PX: CPT

## 2021-08-04 PROCEDURE — 84100 ASSAY OF PHOSPHORUS: CPT | Performed by: NURSE PRACTITIONER

## 2021-08-04 PROCEDURE — 94003 VENT MGMT INPAT SUBQ DAY: CPT

## 2021-08-04 PROCEDURE — 87186 SC STD MICRODIL/AGAR DIL: CPT | Performed by: INTERNAL MEDICINE

## 2021-08-04 PROCEDURE — 83735 ASSAY OF MAGNESIUM: CPT | Performed by: NURSE PRACTITIONER

## 2021-08-04 PROCEDURE — 85007 BL SMEAR W/DIFF WBC COUNT: CPT | Performed by: NURSE PRACTITIONER

## 2021-08-04 PROCEDURE — 85379 FIBRIN DEGRADATION QUANT: CPT | Performed by: NURSE PRACTITIONER

## 2021-08-04 PROCEDURE — 86140 C-REACTIVE PROTEIN: CPT | Performed by: INTERNAL MEDICINE

## 2021-08-04 PROCEDURE — 63710000001 INSULIN GLARGINE PER 5 UNITS: Performed by: INTERNAL MEDICINE

## 2021-08-04 PROCEDURE — 82550 ASSAY OF CK (CPK): CPT | Performed by: INTERNAL MEDICINE

## 2021-08-04 PROCEDURE — 82330 ASSAY OF CALCIUM: CPT | Performed by: NURSE PRACTITIONER

## 2021-08-04 PROCEDURE — 85025 COMPLETE CBC W/AUTO DIFF WBC: CPT | Performed by: NURSE PRACTITIONER

## 2021-08-04 PROCEDURE — 82962 GLUCOSE BLOOD TEST: CPT

## 2021-08-04 PROCEDURE — 71045 X-RAY EXAM CHEST 1 VIEW: CPT

## 2021-08-04 PROCEDURE — 63710000001 INSULIN REGULAR HUMAN PER 5 UNITS: Performed by: INTERNAL MEDICINE

## 2021-08-04 PROCEDURE — 63710000001 INSULIN LISPRO (HUMAN) PER 5 UNITS: Performed by: INTERNAL MEDICINE

## 2021-08-04 PROCEDURE — 87205 SMEAR GRAM STAIN: CPT | Performed by: INTERNAL MEDICINE

## 2021-08-04 PROCEDURE — 84484 ASSAY OF TROPONIN QUANT: CPT | Performed by: NURSE PRACTITIONER

## 2021-08-04 PROCEDURE — 87070 CULTURE OTHR SPECIMN AEROBIC: CPT | Performed by: INTERNAL MEDICINE

## 2021-08-04 PROCEDURE — 25010000002 DEXAMETHASONE PER 1 MG: Performed by: INTERNAL MEDICINE

## 2021-08-04 PROCEDURE — 85730 THROMBOPLASTIN TIME PARTIAL: CPT | Performed by: NURSE PRACTITIONER

## 2021-08-04 PROCEDURE — P9047 ALBUMIN (HUMAN), 25%, 50ML: HCPCS | Performed by: NURSE PRACTITIONER

## 2021-08-04 PROCEDURE — 85610 PROTHROMBIN TIME: CPT | Performed by: NURSE PRACTITIONER

## 2021-08-04 PROCEDURE — 25010000002 HYDRALAZINE PER 20 MG: Performed by: NURSE PRACTITIONER

## 2021-08-04 PROCEDURE — 83615 LACTATE (LD) (LDH) ENZYME: CPT | Performed by: NURSE PRACTITIONER

## 2021-08-04 PROCEDURE — 25010000002 ENOXAPARIN PER 10 MG: Performed by: INTERNAL MEDICINE

## 2021-08-04 PROCEDURE — 25010000002 ALBUMIN HUMAN 25% PER 50 ML: Performed by: NURSE PRACTITIONER

## 2021-08-04 PROCEDURE — 25010000002 FUROSEMIDE PER 20 MG: Performed by: NURSE PRACTITIONER

## 2021-08-04 PROCEDURE — 25010000002 MIDAZOLAM 50 MG/10ML SOLUTION: Performed by: NURSE PRACTITIONER

## 2021-08-04 PROCEDURE — 82803 BLOOD GASES ANY COMBINATION: CPT

## 2021-08-04 RX ORDER — WHEY PROTEIN ISOLATE 6 G-25/7 G
2 POWDER (GRAM) ORAL 2 TIMES DAILY
Status: DISCONTINUED | OUTPATIENT
Start: 2021-08-05 | End: 2021-08-10

## 2021-08-04 RX ORDER — INSULIN GLARGINE 100 [IU]/ML
40 INJECTION, SOLUTION SUBCUTANEOUS EVERY 12 HOURS SCHEDULED
Status: DISCONTINUED | OUTPATIENT
Start: 2021-08-04 | End: 2021-08-05

## 2021-08-04 RX ORDER — INSULIN GLARGINE 100 [IU]/ML
10 INJECTION, SOLUTION SUBCUTANEOUS ONCE
Status: COMPLETED | OUTPATIENT
Start: 2021-08-04 | End: 2021-08-04

## 2021-08-04 RX ADMIN — INSULIN GLARGINE 40 UNITS: 100 INJECTION, SOLUTION SUBCUTANEOUS at 22:15

## 2021-08-04 RX ADMIN — INSULIN HUMAN 18 UNITS: 100 INJECTION, SOLUTION PARENTERAL at 08:53

## 2021-08-04 RX ADMIN — MIDAZOLAM 8 MG/HR: 5 INJECTION INTRAMUSCULAR; INTRAVENOUS at 07:43

## 2021-08-04 RX ADMIN — INSULIN LISPRO 10 UNITS: 100 INJECTION, SOLUTION INTRAVENOUS; SUBCUTANEOUS at 05:16

## 2021-08-04 RX ADMIN — INSULIN LISPRO 8 UNITS: 100 INJECTION, SOLUTION INTRAVENOUS; SUBCUTANEOUS at 17:50

## 2021-08-04 RX ADMIN — METOPROLOL TARTRATE 50 MG: 50 TABLET, FILM COATED ORAL at 22:14

## 2021-08-04 RX ADMIN — INSULIN LISPRO 8 UNITS: 100 INJECTION, SOLUTION INTRAVENOUS; SUBCUTANEOUS at 12:10

## 2021-08-04 RX ADMIN — GLYCOPYRROLATE 0.2 MG: 0.2 INJECTION, SOLUTION INTRAMUSCULAR; INTRAVITREAL at 22:14

## 2021-08-04 RX ADMIN — MINERAL OIL AND WHITE PETROLATUM: 150; 830 OINTMENT OPHTHALMIC at 15:42

## 2021-08-04 RX ADMIN — FENTANYL CITRATE 200 MCG/HR: 50 INJECTION, SOLUTION INTRAMUSCULAR; INTRAVENOUS at 17:50

## 2021-08-04 RX ADMIN — INSULIN GLARGINE 30 UNITS: 100 INJECTION, SOLUTION SUBCUTANEOUS at 08:53

## 2021-08-04 RX ADMIN — FENTANYL CITRATE 200 MCG/HR: 50 INJECTION, SOLUTION INTRAMUSCULAR; INTRAVENOUS at 02:18

## 2021-08-04 RX ADMIN — MINERAL OIL AND WHITE PETROLATUM: 150; 830 OINTMENT OPHTHALMIC at 11:42

## 2021-08-04 RX ADMIN — Medication 2000 UNITS: at 08:52

## 2021-08-04 RX ADMIN — LINEZOLID 600 MG: 600 TABLET ORAL at 22:14

## 2021-08-04 RX ADMIN — FENTANYL CITRATE 200 MCG/HR: 50 INJECTION, SOLUTION INTRAMUSCULAR; INTRAVENOUS at 12:06

## 2021-08-04 RX ADMIN — MINERAL OIL AND WHITE PETROLATUM: 150; 830 OINTMENT OPHTHALMIC at 18:08

## 2021-08-04 RX ADMIN — ALBUTEROL SULFATE 6 PUFF: 108 INHALANT RESPIRATORY (INHALATION) at 11:45

## 2021-08-04 RX ADMIN — ENOXAPARIN SODIUM 40 MG: 40 INJECTION SUBCUTANEOUS at 08:52

## 2021-08-04 RX ADMIN — ALBUTEROL SULFATE 6 PUFF: 108 INHALANT RESPIRATORY (INHALATION) at 19:40

## 2021-08-04 RX ADMIN — MIDAZOLAM 8 MG/HR: 5 INJECTION INTRAMUSCULAR; INTRAVENOUS at 02:18

## 2021-08-04 RX ADMIN — INSULIN LISPRO 10 UNITS: 100 INJECTION, SOLUTION INTRAVENOUS; SUBCUTANEOUS at 00:27

## 2021-08-04 RX ADMIN — GUAIFENESIN 400 MG: 100 SOLUTION ORAL at 23:58

## 2021-08-04 RX ADMIN — INSULIN LISPRO 10 UNITS: 100 INJECTION, SOLUTION INTRAVENOUS; SUBCUTANEOUS at 12:10

## 2021-08-04 RX ADMIN — ALBUTEROL SULFATE 6 PUFF: 108 INHALANT RESPIRATORY (INHALATION) at 15:50

## 2021-08-04 RX ADMIN — BUDESONIDE AND FORMOTEROL FUMARATE DIHYDRATE 2 PUFF: 160; 4.5 AEROSOL RESPIRATORY (INHALATION) at 06:52

## 2021-08-04 RX ADMIN — Medication 600 MG: at 22:14

## 2021-08-04 RX ADMIN — GUAIFENESIN 400 MG: 100 SOLUTION ORAL at 05:15

## 2021-08-04 RX ADMIN — Medication 600 MG: at 08:53

## 2021-08-04 RX ADMIN — INSULIN LISPRO 10 UNITS: 100 INJECTION, SOLUTION INTRAVENOUS; SUBCUTANEOUS at 17:51

## 2021-08-04 RX ADMIN — Medication 10 ML: at 22:16

## 2021-08-04 RX ADMIN — ENOXAPARIN SODIUM 40 MG: 40 INJECTION SUBCUTANEOUS at 22:15

## 2021-08-04 RX ADMIN — GUAIFENESIN 400 MG: 100 SOLUTION ORAL at 00:27

## 2021-08-04 RX ADMIN — DEXAMETHASONE SODIUM PHOSPHATE 6 MG: 4 INJECTION, SOLUTION INTRAMUSCULAR; INTRAVENOUS at 22:14

## 2021-08-04 RX ADMIN — INSULIN LISPRO 10 UNITS: 100 INJECTION, SOLUTION INTRAVENOUS; SUBCUTANEOUS at 23:59

## 2021-08-04 RX ADMIN — INSULIN LISPRO 4 UNITS: 100 INJECTION, SOLUTION INTRAVENOUS; SUBCUTANEOUS at 00:31

## 2021-08-04 RX ADMIN — ALBUTEROL SULFATE 6 PUFF: 108 INHALANT RESPIRATORY (INHALATION) at 06:52

## 2021-08-04 RX ADMIN — INSULIN LISPRO 8 UNITS: 100 INJECTION, SOLUTION INTRAVENOUS; SUBCUTANEOUS at 23:58

## 2021-08-04 RX ADMIN — FENTANYL CITRATE 200 MCG/HR: 50 INJECTION, SOLUTION INTRAMUSCULAR; INTRAVENOUS at 22:13

## 2021-08-04 RX ADMIN — CISATRACURIUM BESYLATE 3.5 MCG/KG/MIN: 10 INJECTION INTRAVENOUS at 02:18

## 2021-08-04 RX ADMIN — ASPIRIN 324 MG: 81 TABLET, CHEWABLE ORAL at 08:52

## 2021-08-04 RX ADMIN — DEXAMETHASONE SODIUM PHOSPHATE 6 MG: 4 INJECTION, SOLUTION INTRAMUSCULAR; INTRAVENOUS at 08:53

## 2021-08-04 RX ADMIN — PANTOPRAZOLE SODIUM 40 MG: 40 INJECTION, POWDER, FOR SOLUTION INTRAVENOUS at 17:50

## 2021-08-04 RX ADMIN — FLUCONAZOLE 400 MG: 40 POWDER, FOR SUSPENSION ORAL at 22:15

## 2021-08-04 RX ADMIN — LINEZOLID 600 MG: 600 TABLET ORAL at 08:53

## 2021-08-04 RX ADMIN — Medication 10 ML: at 08:56

## 2021-08-04 RX ADMIN — FENTANYL CITRATE 200 MCG/HR: 50 INJECTION, SOLUTION INTRAMUSCULAR; INTRAVENOUS at 07:42

## 2021-08-04 RX ADMIN — MINERAL OIL AND WHITE PETROLATUM: 150; 830 OINTMENT OPHTHALMIC at 23:58

## 2021-08-04 RX ADMIN — MIDAZOLAM 8 MG/HR: 5 INJECTION INTRAMUSCULAR; INTRAVENOUS at 15:40

## 2021-08-04 RX ADMIN — INSULIN LISPRO 8 UNITS: 100 INJECTION, SOLUTION INTRAVENOUS; SUBCUTANEOUS at 05:18

## 2021-08-04 RX ADMIN — INSULIN GLARGINE 10 UNITS: 100 INJECTION, SOLUTION SUBCUTANEOUS at 12:06

## 2021-08-04 RX ADMIN — BUDESONIDE AND FORMOTEROL FUMARATE DIHYDRATE 2 PUFF: 160; 4.5 AEROSOL RESPIRATORY (INHALATION) at 19:42

## 2021-08-04 RX ADMIN — MIDAZOLAM 9 MG/HR: 5 INJECTION INTRAMUSCULAR; INTRAVENOUS at 22:13

## 2021-08-04 RX ADMIN — GUAIFENESIN 400 MG: 100 SOLUTION ORAL at 17:50

## 2021-08-04 RX ADMIN — INSULIN HUMAN 18 UNITS: 100 INJECTION, SOLUTION PARENTERAL at 22:15

## 2021-08-04 RX ADMIN — ALBUMIN HUMAN 25 G: 0.25 SOLUTION INTRAVENOUS at 22:14

## 2021-08-04 RX ADMIN — MINERAL OIL AND WHITE PETROLATUM: 150; 830 OINTMENT OPHTHALMIC at 08:56

## 2021-08-04 RX ADMIN — METOPROLOL TARTRATE 50 MG: 50 TABLET, FILM COATED ORAL at 08:53

## 2021-08-04 RX ADMIN — CISATRACURIUM BESYLATE 2.5 MCG/KG/MIN: 10 INJECTION INTRAVENOUS at 19:52

## 2021-08-04 RX ADMIN — FUROSEMIDE 20 MG: 10 INJECTION, SOLUTION INTRAMUSCULAR; INTRAVENOUS at 11:41

## 2021-08-04 RX ADMIN — FUROSEMIDE 20 MG: 10 INJECTION, SOLUTION INTRAMUSCULAR; INTRAVENOUS at 22:14

## 2021-08-04 RX ADMIN — GUAIFENESIN 400 MG: 100 SOLUTION ORAL at 11:41

## 2021-08-04 RX ADMIN — ZINC SULFATE 220 MG (50 MG) CAPSULE 220 MG: CAPSULE at 08:53

## 2021-08-04 RX ADMIN — MINERAL OIL AND WHITE PETROLATUM: 150; 830 OINTMENT OPHTHALMIC at 00:27

## 2021-08-04 RX ADMIN — MINERAL OIL AND WHITE PETROLATUM: 150; 830 OINTMENT OPHTHALMIC at 05:16

## 2021-08-04 RX ADMIN — CISATRACURIUM BESYLATE 2.5 MCG/KG/MIN: 10 INJECTION INTRAVENOUS at 09:39

## 2021-08-04 RX ADMIN — HYDRALAZINE HYDROCHLORIDE 10 MG: 20 INJECTION INTRAMUSCULAR; INTRAVENOUS at 18:02

## 2021-08-04 RX ADMIN — PANTOPRAZOLE SODIUM 40 MG: 40 INJECTION, POWDER, FOR SOLUTION INTRAVENOUS at 08:52

## 2021-08-04 RX ADMIN — ALBUMIN HUMAN 25 G: 0.25 SOLUTION INTRAVENOUS at 11:41

## 2021-08-04 NOTE — PLAN OF CARE
Problem: Adult Inpatient Plan of Care  Goal: Plan of Care Review  Outcome: Ongoing, Not Progressing  Flowsheets (Taken 8/4/2021 0600)  Progress: declining  Plan of Care Reviewed With: patient  Outcome Summary: Patient remains on high ventilator support settings. Patient tolerating sedation and parlytic well this shift. Levophed and CArdizem drips have remained off. Patient turned prone at 0100 to facility decrease in FiO2 demand. Vitals otherwise stable. Will continue to monitor closley. Patient mother inquiring about when pt. will be released from isolation and when a tracheostomy will be discussed.   Goal Outcome Evaluation:  Plan of Care Reviewed With: patient        Progress: declining  Outcome Summary: Patient remains on high ventilator support settings. Patient tolerating sedation and parlytic well this shift. Levophed and CArdizem drips have remained off. Patient turned prone at 0100 to facility decrease in FiO2 demand. Vitals otherwise stable. Will continue to monitor closley. Patient mother inquiring about when pt. will be released from isolation and when a tracheostomy will be discussed.

## 2021-08-04 NOTE — PLAN OF CARE
Goal Outcome Evaluation:      Patient remains on the ventilator now at 70% FiO2 and 13 of PEEP. Patient was placed prone this morning around 0100. Patient has remained prone the entire shift. Patient remains paralyzed and on fentanyl and versed for sedation. Keeley remains in place with good wave form. No BP support needed. Tube feeds were switched to Novas[employer] renal. Tube feeds remain running at a new goal rate of 45mL/hr with  flush every hour. Residuals have been good. Patient has remained NSR and ST with stimulation. Paralytic has been weaned down slightly while still maintaining a 4/4 TOF. Patient's FMS was removed. Ma catheter remains in place with good urine output. Patient continues to have oral and nasal secretions. Patient remains on cooling blanket to manage temperature.

## 2021-08-04 NOTE — PROGRESS NOTES
PULMONARY/CRITICAL CARE PROGRESS NOTE       NAME:     Leslie Harris   AGE:     30 y.o.   SEX:  female   : 1990       MRN:       [id number]          RM: [facility]      ASSESSMENT & PLAN     F/U: Acute respiratory failure secondary to COVID-19 pna    Principal Problem:    Pneumonia due to COVID-19 virus  Active Problems:    Acute respiratory failure due to COVID-19 (CMS/Formerly [facility])    Class 3 severe obesity without serious comorbidity with body mass index (BMI) of 50.0 to 59.9 in adult    Hyperglycemia, likely stress induced    Diabetes mellitus type 2 in obese (CMS/Formerly [facility])     Multidisciplinary Rounds:  -Remove FMS  -Lantus increased.  -Changing tube food to Renal formula due to mild hyperkalemia  -Recheck Sputum studies.  -Blood tinged sputum  -Discontinue heparin due to blood tinged sputum, restarted on Lovenox  -Wean off the cardizem gtt.  -Albumin ordered.  -Proned on 8/3/2020 1 in the evening.  -Flolan attempted to be weaned, but required reinitiation.  -Continues on Nimbex, will attempt to wean this as well.  At risk for worsening delirium with paralytic combined with steroids    PLAN:  Pneumonia due to COVID-19 virus  Acute respiratory failure due to COVID-19  MRSA Pneumonia  -Failed NIPPV and required intubation   -Retest RVP/COVID-19 to eval for other respiratory viruses. +COVID-19 only  -Monitor disease progression labs as ordered  -Vitamin D, Acetylcysteine, Zinc  -CT PE protocol could not be obtained due to severe hypoxia  -Empiric A/C with heparin drip  -Remdesivir x5 days, completed on   -Decardron, decreased dose   -Does not qualify for Actemra  -Symbicort, Albuterol HFA.  -Sputum culture-Candida albicans, Staph aureus  -discontinued Ceftriaxone, changed to Zosyn  due to fever, completed   -Add Zyvox and Diflucan added 2021 due to positive sputum culture    Diabetes mellitus, type 2, new diagnosis  -strict glycemic control recommended  -Accuchecks every 6 hours with  sliding scale insulin  -Added Lantus  -Hemoglobin A1c 6.9.  -The patient's mother reported that the patient had latent autoimmune diabetes mellitus diagnosed recently and requested C-peptide and MAX.  It was explained to her that these tests would not accurately reflect the patient's condition as her current high acuity and metabolic state would render these numbers unpredictable and not reliable.  Recommend that this is followed up after the patient's current acuity has improved    Hypertension  -added beta blocker with improved control  -Adjusted on 8/3, 50 mg twice daily of metoprolol     Class 3 severe obesity without serious comorbidity with body mass index (BMI) of 50.0 to 59.9 in adult  -BMI 59.32  -Complicating aspects of care  -Counseled on lifestyle modifications to improve overall health prior to intubation    AST/ALT remain elevated  -Significance unclear  -Continue to monitor and trend    Distal nose (left sided) pressure injury  Midline tongue pressure injury  Medial sacral pressure injury stage 2  -Nursing interventions, continue to reposition position as often as possible.  -Monitor devices to prevent additional injuries.     Code Status: CPR, full interventions  VTE Prophylaxis:  Lovenox  PUD Prophylaxis: Pepcid  Nutrition: dietitian following for tube feed recommendations.Tolerating TF at goal  +Bowel regimen    Right IJ central line 7/23  Right brachial A-line placed 8/1    [place] & YONY     Leslie Harris is a 30 y.o. female  has a past medical history of Class 3 severe obesity without serious comorbidity with body mass index (BMI) of 50.0 to 59.9 in adult (7/22/2021).   Patient presented to [facility] ED on 7/22/2021 with complaint of being recently diagnosed with COVID-19 at the [facility] department 6 days prior to admission.  Patient reports that she has been having increased shortness of breath over the past couple days, and has not lost her sense of smell, but has  lost her sense of taste, reports frequent, nonproductive cough, myalgias, fevers, and chills.  She denies any abdominal symptoms including nausea, vomiting, constipation, diarrhea.  Patient reports that she actually called EMS as her oxygen saturation had been lower, reading in ED upper 70s, but she did not feel that it was right, as she stated that her fingers were cold.  EMS came and reported to her that her oxygen was actually in the 80s at that time, on the lower side of the 80s, but for some reason patient was not transported to the ED.  Patient then presented today, and she additionally reported some generalized sore throat, with trouble swallowing some things, due to just the general pain associated with it.  She denies any actual problems with physically swallowing this food.  She reports a decreased appetite and decreased oral intake.  Patient states that there was a coworker at her facility, in which they questioned if she had been tested, as she generally did not look like she felt well, but that individual stated that they had been tested and were fine, but at her place of work, which is the [facility] [employer], there has been a small outbreak of COVID-19 infected individuals, in which a coworker of hers, is additionally hospitalized, due to this exposure as well.  Patient was not vaccinated for COVID-19.  Patient reported that she is a non-smoker, denies routine alcohol use or recreational drug use.  She reports that her only other previous medical history involved a cholecystectomy that resulted in a perforation of her small bowel, in which she was discharged home and became septic, requiring readmission and underwent an ex lap with lysis of adhesions.  She stated that this occurred in 2018.     In the ED, labs were obtained with abnormalities as follows: , calcium 8.5, ALT 51, AST 73, ferritin 545.6, CRP 6.33, procalcitonin 0.30, lactate 1.3, D-dimer 1.48.  ABG was obtained and as follows: pH  "7.427, PCO2 39.5, PO2 44.9, HCO3 26.0, O2 saturation 81.8%.  This ABG was obtained while patient was on a nonrebreather.  Chest x-ray was obtained and with the following impression: \"Left greater than right interstitial and alveolar disease.  Given the patient's Covid positive status, the findings may reflect changes of pneumonia.  Patient required oxygen titration up to maximal Precision flow at 40 L/min with FiO2 of 100%.  Patient was considered to be very high risk for further decompensation, and it was recommended for patient to be admitted to the ICU for close observation.     Pulmonary/Intensivist service was contacted for admission to ICU and further evaluation and treatment.    7/23: Patient is drowsy, but easy to arouse, proned overnight, continues on AVAPS in combination with Precision flow 40 L/min and FiO2 100%.  She denies chest pain, but does report shortness of breath and anxiety, cough.  7/24: Intubated, sedated, chemically paralyzed  7/25: intubated, sedated, chemically paralyzed. Fever overnight  7/26: Intubated, sedated, chemically paralyzed.  Proned.  Still having fevers  7/27: Intubated, sedated, chemically paralyzed.  Tolerating supine positioning.  Still febrile  7/28: Intubated, sedated, chemically paralyzed.  Supine.  7/29: Intubated, sedated, chemically paralyzed.  Still remains supine  7/30: Intubated, sedated, chemically paralyzed, proned.  7/31: Intubated, sedated.  Remains chemically paralyzed and proned.  Nebulized Flolan  8/1: Intubated, sedated, paralyzed.  Tolerating supine positioning.  Still on nebulized Flolan  8/2: Intubated, sedated, paralyzed.  Tolerating supine positioning  8/3: Supine, intubated, sedated, paralyzed.  8/4: Pronated, intubated, sedated, paralyzed.    REVIEW OF SYSTEMS:  Review of systems could not be obtained due to   patient sedation status. patient intubated.      MEDICATIONS     SCHEDULED MEDICATIONS:   Acetylcysteine, 600 mg, Oral, BID  albumin human, 25 " g, Intravenous, Q12H   And  furosemide, 20 mg, Intravenous, Q12H  albuterol sulfate HFA, 6 puff, Inhalation, 4x Daily - RT  artificial tears, , Both Eyes, Q4H  aspirin, 324 mg, Per G Tube, Daily  budesonide-formoterol, 2 puff, Inhalation, BID - RT  cholecalciferol, 2,000 Units, Nasogastric, Daily  dexamethasone, 6 mg, Intravenous, Q12H  enoxaparin, 40 mg, Subcutaneous, Q12H  fluconazole, 400 mg, Oral, Nightly  guaiFENesin, 400 mg, Nasogastric, Q6H  insulin glargine, 30 Units, Subcutaneous, Q12H  insulin lispro, 0-24 Units, Subcutaneous, Q6H  insulin lispro, 10 Units, Subcutaneous, Q6H  insulin regular, 18 Units, Subcutaneous, Q12H  linezolid, 600 mg, Oral, Q12H  metoprolol tartrate, 50 mg, Oral, Q12H  pantoprazole, 40 mg, Intravenous, BID AC  sodium chloride, 10 mL, Intravenous, Q12H  zinc sulfate, 220 mg, Nasogastric, Daily        CONTINUOUS INFUSIONS:   cisatracurium (NIMBEX) infusion, 0.5-10.2 mcg/kg/min, Last Rate: 3.5 mcg/kg/min (21)  epoprostenol, 50 ng/kg/min (Ideal), Last Rate: 50 ng/kg/min (21 1400)  fentanyl 10 mcg/mL,  mcg/hr, Last Rate: 200 mcg/hr (21 0742)  midazolam, 1-10 mg/hr, Last Rate: 8 mg/hr (21)  norepinephrine, 0.02-0.3 mcg/kg/min, Last Rate: Stopped (21 1745)  Pharmacy to Dose enoxaparin (LOVENOX),         PRN MEDS:    acetaminophen    acetaminophen **OR** acetaminophen    aluminum-magnesium hydroxide-simethicone    benzonatate    cisatracurium    dextrose    dextrose    [COMPLETED] dilTIAZem **FOLLOWED BY** [] dilTIAZem **FOLLOWED BY** dilTIAZem    glucagon (human recombinant)    glycopyrrolate PF    hydrALAZINE    insulin lispro **AND** insulin lispro    magnesium sulfate **OR** magnesium sulfate in D5W 1g/100mL (PREMIX)    metoprolol tartrate    nitroglycerin    ondansetron **OR** ondansetron    Pharmacy to Dose enoxaparin (LOVENOX)    potassium chloride **OR** potassium chloride **OR** potassium chloride    potassium phosphate infusion  "greater than 15 mMoles **OR** potassium phosphate infusion greater than 15 mMoles **OR** potassium phosphate **OR** sodium phosphate IVPB **OR** sodium phosphate IVPB **OR** sodium phosphate IVPB    [COMPLETED] Insert peripheral IV **AND** sodium chloride    sodium chloride    OBJECTIVE     VITAL SIGNS:  /67 (BP Location: Right arm, Patient Position: Lying)   Pulse 83   Temp 98.4 °F (36.9 °C) (Oral)   Resp (!) 36   Ht 165.1 cm (65\")   Wt (!) 162 kg (356 lb 0.7 oz)   LMP 07/22/2021   SpO2 96%   BMI 59.25 kg/m²     I/O FROM PREVIOUS 3 SHIFTS:  I/O last 3 completed shifts:  In: 5277 [I.V.:2681; Other:307; NG/GT:2189; IV Piggyback:100]  Out: 6225 [Urine:6100; Stool:125]      I/O PREVIOUS 24 HOURS:   Intake/Output                         08/02/21 0701 - 08/03/21 0700 08/03/21 0701 - 08/04/21 0700     8159-6177 0868-5687 Total 5600-1888 4995-9196 Total                 Intake    I.V.  741  1427 2168  741  513 1254    Other  105  120 225  86  101 187    Flush/ Irrigation Intake (mL) (NG/OG Tube Nasogastric 16 Fr Left nostril) 105 120 225 86 101 187    NG/GT  375  814 1189  450  925 1375    IV Piggyback  --  -- --  --  100 100    Total Intake 1221 2361 3582 1277 1639 2916       Output    Urine  975  2200 3175  1700  2200 3900    Stool  150  25 175  100  0 100    Total Output 1125 2225 3350 1800 2200 4000             NET BALANCE SINCE ADMISSION:  Net IO Since Admission: 7,667 mL [08/04/21 0742]     BOWEL MOVEMENTS:  Stool Output  Stool (mL): 0 mL (08/03/21 2200)  Stool Unmeasured Occurrence: 1 (07/31/21 1900)  Bowel Incontinence: Yes (07/31/21 1900)  Stool Amount: small (08/03/21 0802)       PHYSICAL EXAM:  Constitutional:  Well developed, well nourished, intubated, sedated, paralyzed, pronated  Eyes:   Pupils sluggish and equal, subconjunctival edema, sclera anicteric.  EOM not assessed  HENT:  Atraumatic, external ears normal, nose normal with left NG tube, oral ET tube. Neck-rachea midline, right IJ central " line  Respiratory:  Coarse breath sounds with scattered rhonchi, bibasilar crackles, mild diffuse wheezing  Cardiovascular: Sinus tachycardia, no murmurs, no gallops, no rubs   GI:  Pronated, unable to assess.  :   Deferred, Ma catheter in place  Musculoskeletal:   Generalized edema, no clubbing or cyanosis  Integument:  Well hydrated, no rash; left distal nose pressure injury, midline tongue pressure injury, stage 2 sacral pressure injury  Neurologic:   Intubated, sedated, paralyzed  Psychiatric:   Unable to assess    Wound 07/26/21 2200 Left distal nose Pressure Injury (Active)   Placement Date/Time: 07/26/21 2200   Present on Hospital Admission: No  Side: Left  Orientation: distal  Location: nose  Primary Wound Type: Pressure Injury  Stage, Pressure Injury : deep tissue injury      Assessments 7/26/2021 11:57 PM 8/4/2021  4:00 PM   Wound Image      Dressing Appearance open to air open to air   Closure Open to air --   Base red;non-blanchable blanchable   Periwound -- intact   Care, Wound -- pressure removed   Periwound Care -- dry periwound area maintained       No Linked orders to display       Wound 07/26/21 2200 midline tongue Pressure Injury (Active)   Placement Date/Time: 07/26/21 2200   Present on Hospital Admission: No  Orientation: midline  Location: tongue  Primary Wound Type: Pressure Injury  Stage, Pressure Injury : deep tissue injury      Assessments 7/26/2021 11:52 PM 8/4/2021  4:00 PM   Wound Image      Dressing Appearance -- open to air   Base -- pink;moist   Periwound -- intact   Care, Wound -- pressure removed   Periwound Care -- dry periwound area maintained       No Linked orders to display       Wound 07/27/21 2028 medial sacral spine Pressure Injury (Active)   Placement Date/Time: 07/27/21 2028   Present on Hospital Admission: No  Orientation: medial  Location: sacral spine  Primary Wound Type: Pressure Injury  Stage, Pressure Injury : Stage 2      Assessments 7/27/2021  8:00 PM 8/4/2021   7:46 PM   Wound Image      Dressing Appearance no drainage;open to air --   Closure Open to air --   Base pink;moist --   Periwound intact;blanchable --   Periwound Temperature warm --   Periwound Skin Turgor soft --   Edges open --   Dressing Care silicone --       No Linked orders to display   Above wounds have been reviewed.      RESULTS     LABS:  Lab Results (last 24 hours)       Procedure Component Value Units Date/Time    Manual Differential [[id number]]  (Abnormal) Collected: 08/04/21 0514    Specimen: Blood Updated: 08/04/21 0616     Neutrophil % 60.0 %      Lymphocyte % 9.0 %      Monocyte % 2.0 %      Bands %  28.0 %      Metamyelocyte % 1.0 %      Neutrophils Absolute 14.70 10*3/mm3      Lymphocytes Absolute 1.50 10*3/mm3      Monocytes Absolute 0.33 10*3/mm3      Stomatocytes Slight/1+     WBC Morphology Normal     Platelet Estimate Adequate    CBC & Differential [[id number]]  (Abnormal) Collected: 08/04/21 0514    Specimen: Blood Updated: 08/04/21 0616    Narrative:      The following orders were created for panel order CBC & Differential.  Procedure                               Abnormality         Status                     ---------                               -----------         ------                     CBC Auto Differential[[id number]]        Abnormal            Final result               Scan Slide[[id number]]                                       Final result                 Please view results for these tests on the individual orders.    Scan Slide [[id number]] Collected: 08/04/21 0514    Specimen: Blood Updated: 08/04/21 0616     Scan Slide --     Comment: See Manual Differential Results       CBC Auto Differential [[id number]]  (Abnormal) Collected: 08/04/21 0514    Specimen: Blood Updated: 08/04/21 0616     WBC 16.70 10*3/mm3      RBC 3.24 10*6/mm3      Hemoglobin 9.2 g/dL      Hematocrit 28.2 %      MCV 87.1 fL      MCH 28.5 pg      MCHC 32.7 g/dL      RDW 15.0 %      RDW-SD 45.9 fl      MPV  8.2 fL      Platelets 274 10*3/mm3     Narrative:      The previously reported component NRBC is no longer being reported. Previous result was 0.1 /100 WBC (Reference Range: 0.0-0.2 /100 WBC) on 8/4/2021 at 0529 EDT.    Ferritin [[id number]]  (Abnormal) Collected: 08/04/21 0514    Specimen: Blood Updated: 08/04/21 0606     Ferritin 234.30 ng/mL     Narrative:      Results may be falsely decreased if patient taking Biotin.      proBNP [[id number]]  (Normal) Collected: 08/04/21 0514    Specimen: Blood Updated: 08/04/21 0606     proBNP 151.4 pg/mL     Narrative:      Among patients with dyspnea, NT-proBNP is highly sensitive for the detection of acute congestive heart failure. In addition NT-proBNP of <300 pg/ml effectively rules out acute congestive heart failure with 99% negative predictive value.    Results may be falsely decreased if patient taking Biotin.      Troponin [[id number]]  (Normal) Collected: 08/04/21 0514    Specimen: Blood Updated: 08/04/21 0606     Troponin T <0.010 ng/mL     Narrative:      Troponin T Reference Range:  <= 0.03 ng/mL-   Negative for AMI  >0.03 ng/mL-     Abnormal for myocardial necrosis.  Clinicians would have to utilize clinical acumen, EKG, Troponin and serial changes to determine if it is an Acute Myocardial Infarction or myocardial injury due to an underlying chronic condition.       Results may be falsely decreased if patient taking Biotin.      Comprehensive Metabolic Panel [[id number]]  (Abnormal) Collected: 08/04/21 0514    Specimen: Blood Updated: 08/04/21 0606     Glucose 237 mg/dL      BUN 28 mg/dL      Creatinine 0.45 mg/dL      Sodium 133 mmol/L      Potassium 5.3 mmol/L      Chloride 93 mmol/L      CO2 33.0 mmol/L      Calcium 9.3 mg/dL      Total Protein 6.9 g/dL      Albumin 3.90 g/dL      ALT (SGPT) 139 U/L      AST (SGOT) 78 U/L      Alkaline Phosphatase 73 U/L      Total Bilirubin 0.7 mg/dL      eGFR Non African Amer >150 mL/min/1.73      Globulin 3.0 gm/dL      A/G  Ratio 1.3 g/dL      BUN/Creatinine Ratio 62.2     Anion Gap 7.0 mmol/L     Narrative:      GFR Normal >60  Chronic Kidney Disease <60  Kidney Failure <15      Lactate Dehydrogenase [[id number]]  (Abnormal) Collected: 08/04/21 0514    Specimen: Blood Updated: 08/04/21 0606      U/L     Phosphorus [[id number]]  (Abnormal) Collected: 08/04/21 0514    Specimen: Blood Updated: 08/04/21 0606     Phosphorus 4.9 mg/dL     Magnesium [[id number]]  (Normal) Collected: 08/04/21 0514    Specimen: Blood Updated: 08/04/21 0606     Magnesium 2.0 mg/dL     CK [[id number]]  (Normal) Collected: 08/04/21 0514    Specimen: Blood Updated: 08/04/21 0606     Creatine Kinase 84 U/L     C-reactive Protein [[id number]]  (Normal) Collected: 08/04/21 0514    Specimen: Blood Updated: 08/04/21 0606     C-Reactive Protein 0.33 mg/dL     aPTT [[id number]]  (Abnormal) Collected: 08/04/21 0514    Specimen: Blood Updated: 08/04/21 0600     PTT <20.0 seconds     Protime-INR [[id number]]  (Normal) Collected: 08/04/21 0514    Specimen: Blood Updated: 08/04/21 0558     Protime 11.5 Seconds      INR 1.04    Fibrinogen [[id number]]  (Abnormal) Collected: 08/04/21 0514    Specimen: Blood Updated: 08/04/21 0558     Fibrinogen 498 mg/dL     D-dimer, Quantitative [[id number]]  (Abnormal) Collected: 08/04/21 0514    Specimen: Blood Updated: 08/04/21 0558     D-Dimer, Quantitative 1.03 mg/L (FEU)     Narrative:      Reference Range  --------------------------------------------------------------------     < 0.50   Negative Predictive Value  0.50-0.59   Indeterminate    >= 0.60   Probable VTE             A very low percentage of patients with DVT may yield D-Dimer results   below the cut-off of 0.50 mg/L FEU.  This is known to be more   prevalent in patients with distal DVT.             Results of this test should always be interpreted in conjunction with   the patient's medical history, clinical presentation and other   findings.  Clinical diagnosis should not be  based on the result of   INNOVANCE D-Dimer alone.    Calcium, Ionized [[id number]]  (Normal) Collected: 08/04/21 0514    Specimen: Blood Updated: 08/04/21 0548     Ionized Calcium 1.27 mmol/L     Blood Gas, Arterial - [[id number]]  (Abnormal) Collected: 08/04/21 0349    Specimen: Arterial Blood Updated: 08/04/21 0526     Site Arterial Line     Ovi's Test N/A     pH, Arterial 7.326 pH units      pCO2, Arterial 71.1 mm Hg      pO2, Arterial 65.2 mm Hg      HCO3, Arterial 37.1 mmol/L      Base Excess, Arterial 9.5 mmol/L      Comment: Serial Number: [id number]:  [id number]        O2 Saturation, Arterial 89.7 %      CO2 Content 39.3 mmol/L      Barometric Pressure for Blood Gas --     Comment: N/A        Modality Adult Vent     FIO2 75 %      Ventilator Mode ;AC     Set Tidal Volume 420     PEEP 14     Hemodilution No     Respiratory Rate 36    POC Glucose Once [[id number]]  (Abnormal) Collected: 08/04/21 0517    Specimen: Blood Updated: 08/04/21 0518     Glucose 235 mg/dL      Comment: Serial Number: [id number]:  [id number]       POC Glucose Once [[id number]]  (Abnormal) Collected: 08/04/21 0030    Specimen: Blood Updated: 08/04/21 0517     Glucose 191 mg/dL      Comment: Serial Number: [id number]:  [id number]       POC Glucose Once [[id number]]  (Abnormal) Collected: 08/03/21 1703    Specimen: Blood Updated: 08/03/21 1704     Glucose 261 mg/dL      Comment: Serial Number: [id number]:  [id number]       Clostridium Difficile EIA - Stool, Per Rectum [[id number]] Collected: 08/03/21 1600    Specimen: Stool from Per Rectum Updated: 08/03/21 1610    Hemoglobin & Hematocrit, Blood [[id number]]  (Abnormal) Collected: 08/03/21 1527    Specimen: Blood Updated: 08/03/21 1535     Hemoglobin 9.2 g/dL      Hematocrit 28.2 %     Blood Culture - Blood, Blood, Arterial Line [[id number]] Collected: 07/29/21 1136    Specimen: Blood, Arterial Line Updated: 08/03/21 1231     Blood Culture No growth at 5 days    Blood Culture -  Blood, Blood, Central Line [[id number]] Collected: 07/29/21 1153    Specimen: Blood, Central Line Updated: 08/03/21 1231     Blood Culture No growth at 5 days             RADIOLOGY:  No Radiology Exams Resulted Within Past 24 Hours    ECHOCARDIOGRAM:  Results for orders placed during the hospital encounter of 07/22/21    Adult Transthoracic Echo Limited W/ Cont if Necessary Per Protocol    Interpretation Summary  · Left ventricular systolic function is normal.  · Left ventricular ejection fraction is 55 to 60%  None previous for comparison.     I reviewed the patient's new clinical results.    This note has been scribed by me for pulmonary attending physician.    Electronically signed by NATY Arroyo, 08/04/21 at 07:42 EDT.     I personally have examined  and interviewed the patient. I have reviewed the history, data, problems, assessment and plan with our NP.  Critical care time in direct medical management (  36 ) minutes  Electronically signed by Jeff Feng MD D,ABS, 08/04/21, 11:24 PM EDT.

## 2021-08-04 NOTE — PROGRESS NOTES
Infectious Diseases Progress Note      LOS: 13 days   Patient Care Team:  Provider, No Known as PCP - General    Chief Complaint: Debated and sedated    Subjective       Patient has been afebrile but is still currently on a cooling blanket.  She is intubated at 70% FiO2 and 14 of PEEP.  Patient is sedated and paralyzed but is currently on no pressor support.  She is in the prone position.      Review of Systems:   Review of Systems   Unable to perform ROS: Intubated        Objective     Vital Signs  Temp:  [98.4 °F (36.9 °C)-99.1 °F (37.3 °C)] 98.7 °F (37.1 °C)  Heart Rate:  [] 126  Resp:  [20-36] 36  BP: ()/(51-95) 140/76  Arterial Line BP: ()/() 152/82  FiO2 (%):  [70 %-100 %] 70 %    Physical Exam:  Physical Exam  Vitals and nursing note reviewed.   Constitutional:       General: She is not in acute distress.     Appearance: Normal appearance. She is well-developed. She is obese. She is ill-appearing. She is not diaphoretic.   HENT:      Head: Normocephalic and atraumatic.   Cardiovascular:      Rate and Rhythm: Normal rate and regular rhythm.      Heart sounds: Normal heart sounds, S1 normal and S2 normal. No murmur heard.     Pulmonary:      Effort: Pulmonary effort is normal. No respiratory distress.      Breath sounds: No stridor. Rales present. No wheezing.      Comments: Intubated and currently in the prone position  Chest:      Chest wall: No tenderness.   Abdominal:      General: Bowel sounds are normal. There is no distension.      Palpations: Abdomen is soft. There is no mass.      Tenderness: There is no abdominal tenderness. There is no guarding.      Comments: NG tube   Genitourinary:     Comments: Ma catheter  Musculoskeletal:         General: No swelling, tenderness or deformity.      Cervical back: Neck supple.   Skin:     General: Skin is warm and dry.      Coloration: Skin is not pale.      Findings: No bruising, erythema or rash.   Neurological:      Mental Status:  She is alert.      Cranial Nerves: No cranial nerve deficit.      Comments: Intubated, paralyzed, sedated          Results Review:    I have reviewed all clinical data, test, lab, and imaging results.     Radiology  XR Chest 1 View    Result Date: 8/4/2021   DATE OF EXAM: 8/4/2021 9:39 AM  PROCEDURE: XR CHEST 1 VW-  INDICATIONS: Shortness of breath; R06.00-Dyspnea, unspecified; U07.1-COVID-19; J12.82-Pneumonia due to Coronavirus disease 2019; J96.01-Acute respiratory failure with hypoxia; I95.2-Hypotension due to drugs; U07.1-COVID-19; J96.00-Acute respiratory failure, unspecified whether with hypoxia or hypercapnia  COMPARISON: 8/1/2021  TECHNIQUE: Portable chest radiograph.  FINDINGS:  Patient is rotated to the right. Endotracheal tube tip stable position approximately 2.6 cm above the bhavesh. There is a right IJ central venous catheter with tip at the low SVC. Multifocal bilateral airspace disease not significantly changed from 8/1/2021. No pneumothorax. No large effusion. Esophagogastric tube appears below the diaphragm, difficult to completely visualize.      1. Stable endotracheal tube and right IJ central venous catheter. 2. Multifocal pneumonia not significantly changed from 8/1/2021. 3. No pneumothorax.  Electronically Signed By-Refugio Hansen MD On:8/4/2021 10:33 AM This report was finalized on [id number] by  Refugio Hansen MD.      Cardiology    Laboratory    Results from last 7 days   Lab Units 08/04/21  0514 08/03/21  1527 08/03/21  0258 08/02/21  0408 08/01/21  0359 07/31/21  0556 07/30/21  0252 07/29/21  0506 07/29/21  0506   WBC 10*3/mm3 16.70*  --  21.80* 17.20* 14.70* 10.40 11.30*  --  9.10   HEMOGLOBIN g/dL 9.2* 9.2* 9.8* 10.0* 11.0* 10.9* 11.3*   < > 11.2*   HEMATOCRIT % 28.2* 28.2* 29.4* 30.5* 33.8* 33.0* 35.0   < > 34.0   PLATELETS 10*3/mm3 274  --  374 302 338 276 283  --  250    < > = values in this interval not displayed.     Results from last 7 days   Lab Units 08/04/21  0514 08/03/21  0258  08/02/21  0408 08/01/21  0359 07/31/21  0556 07/30/21  0252 07/29/21  0506   SODIUM mmol/L 133* 136 137 137 137 137 139   POTASSIUM mmol/L 5.3* 5.0 4.7 5.7* 5.0 5.1 4.9   CHLORIDE mmol/L 93* 96* 94* 94* 95* 95* 99   CO2 mmol/L 33.0* 33.0* 34.0* 38.0* 37.0* 35.0* 32.0*   BUN mg/dL 28* 28* 28* 22* 21* 17 18   CREATININE mg/dL 0.45* 0.39* 0.39* 0.39* 0.36* 0.34* 0.41*   GLUCOSE mg/dL 237* 247* 286* 248* 226* 278* 281*   ALBUMIN g/dL 3.90 3.10* 2.90* 3.00* 2.80* 2.90* 2.90*   BILIRUBIN mg/dL 0.7 0.6 0.6 0.5 0.7 0.7 0.6   ALK PHOS U/L 73 68 66 74 70 70 63   AST (SGOT) U/L 78* 53* 52* 59* 49* 53* 47*   ALT (SGPT) U/L 139* 96* 98* 96* 70* 70* 56*   CALCIUM mg/dL 9.3 9.2 9.1 9.2 8.9 8.7 8.7     Results from last 7 days   Lab Units 08/04/21  0514   CK TOTAL U/L 84             Microbiology   Microbiology Results (last 10 days)     Procedure Component Value - Date/Time    Respiratory Culture - Sputum, Bronchus [[id number]] Collected: 08/04/21 1150    Lab Status: Preliminary result Specimen: Sputum from Bronchus Updated: 08/04/21 1244     Gram Stain Few (2+) WBCs per low power field      No organisms seen    Clostridium Difficile EIA - Stool, Per Rectum [[id number]]  (Normal) Collected: 08/03/21 1600    Lab Status: Final result Specimen: Stool from Per Rectum Updated: 08/04/21 0927     C Diff GDH / Toxin Negative    Urine Culture - Urine, Urine, Catheter [[id number]]  (Normal) Collected: 08/03/21 1036    Lab Status: Final result Specimen: Urine, Catheter Updated: 08/04/21 1311     Urine Culture No growth    Respiratory Culture - Sputum, ET Suction [[id number]]  (Abnormal)  (Susceptibility) Collected: 07/29/21 2038    Lab Status: Final result Specimen: Sputum from ET Suction Updated: 08/02/21 1315     Respiratory Culture Scant growth (1+) Candida albicans      Scant growth (1+) Staphylococcus aureus, MRSA     Comment: Methicillin resistant Staphylococcus aureus, Patient may be an isolation risk.         No Normal Respiratory Sondra      Gram Stain Few (2+) WBCs per low power field      Rare (1+) Epithelial cells per low power field      Few (2+) Budding yeast    Susceptibility      Staphylococcus aureus, MRSA      FITZ      Clindamycin Susceptible      Linezolid Susceptible      Oxacillin Resistant      Penicillin G Resistant      Tetracycline Susceptible      Trimethoprim + Sulfamethoxazole Susceptible      Vancomycin Susceptible               Linear View                   Blood Culture - Blood, Blood, Central Line [[id number]] Collected: 07/29/21 1153    Lab Status: Final result Specimen: Blood, Central Line Updated: 08/03/21 1231     Blood Culture No growth at 5 days    Blood Culture - Blood, Blood, Arterial Line [[id number]] Collected: 07/29/21 1136    Lab Status: Final result Specimen: Blood, Arterial Line Updated: 08/03/21 1231     Blood Culture No growth at 5 days          Medication Review:       Schedule Meds  Acetylcysteine, 600 mg, Oral, BID  albumin human, 25 g, Intravenous, Q12H   And  furosemide, 20 mg, Intravenous, Q12H  albuterol sulfate HFA, 6 puff, Inhalation, 4x Daily - RT  artificial tears, , Both Eyes, Q4H  aspirin, 324 mg, Per G Tube, Daily  [START ON 8/5/2021] Beneprotein, 2 packet, Nasogastric, BID  budesonide-formoterol, 2 puff, Inhalation, BID - RT  cholecalciferol, 2,000 Units, Nasogastric, Daily  dexamethasone, 6 mg, Intravenous, Q12H  enoxaparin, 40 mg, Subcutaneous, Q12H  fluconazole, 400 mg, Oral, Nightly  guaiFENesin, 400 mg, Nasogastric, Q6H  insulin glargine, 40 Units, Subcutaneous, Q12H  insulin lispro, 0-24 Units, Subcutaneous, Q6H  insulin lispro, 10 Units, Subcutaneous, Q6H  insulin regular, 18 Units, Subcutaneous, Q12H  linezolid, 600 mg, Oral, Q12H  metoprolol tartrate, 50 mg, Oral, Q12H  pantoprazole, 40 mg, Intravenous, BID AC  sodium chloride, 10 mL, Intravenous, Q12H  zinc sulfate, 220 mg, Nasogastric, Daily        Infusion Meds  cisatracurium (NIMBEX) infusion, 0.5-10.2 mcg/kg/min, Last Rate: 2  mcg/kg/min (21 1214)  epoprostenol, 50 ng/kg/min (Ideal), Last Rate: 50 ng/kg/min (21 1400)  fentanyl 10 mcg/mL,  mcg/hr, Last Rate: 200 mcg/hr (21 1750)  midazolam, 1-10 mg/hr, Last Rate: 8 mg/hr (21 175)  norepinephrine, 0.02-0.3 mcg/kg/min, Last Rate: Stopped (21 174)  Pharmacy to Dose enoxaparin (LOVENOX),         PRN Meds  •  acetaminophen  •  acetaminophen **OR** acetaminophen  •  aluminum-magnesium hydroxide-simethicone  •  artificial tears  •  benzonatate  •  cisatracurium  •  dextrose  •  dextrose  •  [COMPLETED] dilTIAZem **FOLLOWED BY** [] dilTIAZem **FOLLOWED BY** dilTIAZem  •  glucagon (human recombinant)  •  glycopyrrolate PF  •  hydrALAZINE  •  insulin lispro **AND** insulin lispro  •  magnesium sulfate **OR** magnesium sulfate in D5W 1g/100mL (PREMIX)  •  metoprolol tartrate  •  nitroglycerin  •  ondansetron **OR** ondansetron  •  Pharmacy to Dose enoxaparin (LOVENOX)  •  potassium chloride **OR** potassium chloride **OR** potassium chloride  •  potassium phosphate infusion greater than 15 mMoles **OR** potassium phosphate infusion greater than 15 mMoles **OR** potassium phosphate **OR** sodium phosphate IVPB **OR** sodium phosphate IVPB **OR** sodium phosphate IVPB  •  [COMPLETED] Insert peripheral IV **AND** sodium chloride  •  sodium chloride        Assessment/Plan       Antimicrobial Therapy   1.  Zyvox      day  2.      Day  3.      Day  4.      Day  5.      Day      Assessment     Severe COVID-19 infection and patient with significant comorbidities including diabetes mellitus and morbid obesity. Apparently did not receive vaccination for COVID-19  COVID-19 screen on admission on 2021 was positive. Was reported that patient had positive COVID-19 diagnosis 6 days prior to admission  The patient had received 5 days of IV remdesivir     Severe respiratory failure on the ventilator currently on 70% FiO2 and 16 PEEP.  Chest x-ray consistent with  severe COVID-19 infection and ARDS     Probable ventilator associated pneumonia versus hospitalist acquired pneumonia. Sputum culture from July 29, 2021 grew MRSA and the organism is susceptible to linezolid and vancomycin  The patient was started on linezolid on August 1, 2021     Morbid obesity     Diabetes mellitus     Reactive leukocytosis. Most likely secondary to steroids  -He did screen was negative     Elevated CK, last CK was checked 3 days ago  -CK trending down     Plan     Continue Zyvox 600 mg every 12 hours for 10 to 14 days  Monitor platelets carefully  Supportive care  Prognosis is very guarded and might be poor secondary to severe COVID-19 infection  Continue steroids for now     Continue COVID-19 isolation for total of 20 days from the first diagnosis  Labs in a.m. including : CBC with differential, CMP,  d-dimer, ferritin and CRP and CK    Albina Birmingham, NATY  08/04/21  19:34 EDT

## 2021-08-04 NOTE — PROGRESS NOTES
"Nutrition Services    Patient Name: Leslie Harris  YOB: 1990  MRN: [id number]  Admission date: 7/22/2021      PPE Documentation        PPE Worn By Provider RD did not enter room for this encounter   PPE Worn By Patient  N/A     PROGRESS NOTE      Encounter Information: Tube feed check on. Peptamen AF documented at 80 mL/hr. Remains intubated, on paralytic, now back in prone positioning. Discussed in rounds, will adjust TF regimen to more K+ restricted.       Labs (reviewed below): -Hyperkalemia -> K+ increased today  -elevated BUN  -low Cr  -elevated LFTs  -hyperglycemia->formula is CHO controlled, insulin regimen adjusted today  -mild hyperphosphatemia       GI Function:  -residuals WNL  -FMS in place after prolonged constipation. 100 mL stool o/p documented on 8/1       Nutrition Intervention: Adjusting TF regimen to more K+ restricted formula       PO Diet/Supplements: NPO Diet   EN Prescription: Novasource Renal at 45 mL/hr x22 hrs (assumes interruptions for ADLs)+ 2 packets beneprotein BID provides 2080 kcal (105% of upper end of estimated needs), 114 g of protein (80%), and 713 mL free H2O + 20 mL/hr (x22 hrs = 440 mL/day) = 1153 mL free H2O, +Rx flushes, IVF. Free water flushes adjusted per clinical picture.    See RD full assessment 8/3 for estimated needs. Difficult to meet full protein needs d/t moderating amount of K+. Will monitor regimen and adjust as appropriate.         Intake/Output:   Intake/Output Summary (Last 24 hours) at 8/4/2021 0913  Last data filed at 8/4/2021 0525  Gross per 24 hour   Intake 2946 ml   Output 4000 ml   Net -1054 ml          Height: Height: 165.1 cm (65\")   Weight: Weight: (!) 162 kg (356 lb 0.7 oz) (08/04/21 0526)   BMI: Body mass index is 59.25 kg/m².     Results from last 7 days   Lab Units 08/04/21  0514 08/03/21  0258 08/02/21  0408   SODIUM mmol/L 133* 136 137   POTASSIUM mmol/L 5.3* 5.0 4.7   CHLORIDE mmol/L 93* 96* 94*   CO2 mmol/L 33.0* 33.0* 34.0* "   BUN mg/dL 28* 28* 28*   CREATININE mg/dL 0.45* 0.39* 0.39*   CALCIUM mg/dL 9.3 9.2 9.1   BILIRUBIN mg/dL 0.7 0.6 0.6   ALK PHOS U/L 73 68 66   ALT (SGPT) U/L 139* 96* 98*   AST (SGOT) U/L 78* 53* 52*   GLUCOSE mg/dL 237* 247* 286*     Results from last 7 days   Lab Units 08/04/21  0514 08/03/21  1527 08/03/21  0258 08/02/21  0408 08/02/21  0408   MAGNESIUM mg/dL 2.0  --  2.0  --  1.9   PHOSPHORUS mg/dL 4.9*  --  4.4   < > 2.9   HEMOGLOBIN g/dL 9.2*   < > 9.8*   < > 10.0*   HEMATOCRIT % 28.2*   < > 29.4*   < > 30.5*    < > = values in this interval not displayed.     COVID19   Date Value Ref Range Status   07/22/2021 Detected (C) Not Detected - Ref. Range Final     Lab Results   Component Value Date    HGBA1C 6.9 (H) 07/23/2021     Vitals:    08/04/21 0800   BP:    Pulse:    Resp:    Temp: 98.8 °F (37.1 °C)   SpO2:      RD to follow up per protocol.    Electronically signed by:  Leslie Hugo RD  08/04/21 09:13 EDT

## 2021-08-04 NOTE — SIGNIFICANT NOTE
08/04/21 0715   Transfer of Nursing Care   Nurse Report Given To Aimee JOHNSON RN   Transfer of Care Comment shift change report - skin issues and falls risk discussed

## 2021-08-05 ENCOUNTER — APPOINTMENT (OUTPATIENT)
Dept: GENERAL RADIOLOGY | Facility: HOSPITAL | Age: 31
End: 2021-08-05

## 2021-08-05 LAB
ALBUMIN SERPL-MCNC: 4.5 G/DL (ref 3.5–5.2)
ALBUMIN/GLOB SERPL: 1.5 G/DL
ALP SERPL-CCNC: 83 U/L (ref 39–117)
ALT SERPL W P-5'-P-CCNC: 173 U/L (ref 1–33)
ANION GAP SERPL CALCULATED.3IONS-SCNC: 9 MMOL/L (ref 5–15)
ARTERIAL PATENCY WRIST A: ABNORMAL
AST SERPL-CCNC: 82 U/L (ref 1–32)
ATMOSPHERIC PRESS: ABNORMAL MM[HG]
BASE EXCESS BLDA CALC-SCNC: 10.8 MMOL/L (ref 0–3)
BDY SITE: ABNORMAL
BILIRUB SERPL-MCNC: 1 MG/DL (ref 0–1.2)
BUN SERPL-MCNC: 27 MG/DL (ref 6–20)
BUN/CREAT SERPL: 64.3 (ref 7–25)
CA-I SERPL ISE-MCNC: 1.28 MMOL/L (ref 1.2–1.3)
CALCIUM SPEC-SCNC: 9.9 MG/DL (ref 8.6–10.5)
CHLORIDE SERPL-SCNC: 89 MMOL/L (ref 98–107)
CO2 BLDA-SCNC: 39.5 MMOL/L (ref 22–29)
CO2 SERPL-SCNC: 35 MMOL/L (ref 22–29)
CREAT SERPL-MCNC: 0.42 MG/DL (ref 0.57–1)
DEPRECATED RDW RBC AUTO: 46.8 FL (ref 37–54)
ERYTHROCYTE [DISTWIDTH] IN BLOOD BY AUTOMATED COUNT: 15.3 % (ref 12.3–15.4)
GAD65 AB SER IA-ACNC: <5 U/ML (ref 0–5)
GFR SERPL CREATININE-BSD FRML MDRD: >150 ML/MIN/1.73
GLOBULIN UR ELPH-MCNC: 3 GM/DL
GLUCOSE BLDC GLUCOMTR-MCNC: 203 MG/DL (ref 70–105)
GLUCOSE BLDC GLUCOMTR-MCNC: 209 MG/DL (ref 70–105)
GLUCOSE BLDC GLUCOMTR-MCNC: 247 MG/DL (ref 70–105)
GLUCOSE BLDC GLUCOMTR-MCNC: 259 MG/DL (ref 70–105)
GLUCOSE SERPL-MCNC: 209 MG/DL (ref 65–99)
HCO3 BLDA-SCNC: 37.6 MMOL/L (ref 21–28)
HCT VFR BLD AUTO: 28.7 % (ref 34–46.6)
HEMODILUTION: NO
HGB BLD-MCNC: 9.6 G/DL (ref 12–15.9)
INHALED O2 CONCENTRATION: 60 %
LYMPHOCYTES # BLD MANUAL: 2.16 10*3/MM3 (ref 0.7–3.1)
LYMPHOCYTES NFR BLD MANUAL: 11 % (ref 19.6–45.3)
LYMPHOCYTES NFR BLD MANUAL: 7 % (ref 5–12)
MAGNESIUM SERPL-MCNC: 2 MG/DL (ref 1.6–2.6)
MCH RBC QN AUTO: 29.5 PG (ref 26.6–33)
MCHC RBC AUTO-ENTMCNC: 33.4 G/DL (ref 31.5–35.7)
MCV RBC AUTO: 88.3 FL (ref 79–97)
METAMYELOCYTES NFR BLD MANUAL: 5 % (ref 0–0)
MODALITY: ABNORMAL
MONOCYTES # BLD AUTO: 1.37 10*3/MM3 (ref 0.1–0.9)
MYELOCYTES NFR BLD MANUAL: 1 % (ref 0–0)
NEUTROPHILS # BLD AUTO: 14.9 10*3/MM3 (ref 1.7–7)
NEUTROPHILS NFR BLD MANUAL: 68 % (ref 42.7–76)
NEUTS BAND NFR BLD MANUAL: 8 % (ref 0–5)
NEUTS VAC BLD QL SMEAR: ABNORMAL
PCO2 BLDA: 61.5 MM HG (ref 35–48)
PEEP RESPIRATORY: 13 CM[H2O]
PH BLDA: 7.39 PH UNITS (ref 7.35–7.45)
PHOSPHATE SERPL-MCNC: 4.3 MG/DL (ref 2.5–4.5)
PLAT MORPH BLD: NORMAL
PLATELET # BLD AUTO: 300 10*3/MM3 (ref 140–450)
PMV BLD AUTO: 8.6 FL (ref 6–12)
PO2 BLDA: 144.1 MM HG (ref 83–108)
POLYCHROMASIA BLD QL SMEAR: ABNORMAL
POTASSIUM SERPL-SCNC: 5.2 MMOL/L (ref 3.5–5.2)
PROT SERPL-MCNC: 7.5 G/DL (ref 6–8.5)
RBC # BLD AUTO: 3.25 10*6/MM3 (ref 3.77–5.28)
RESPIRATORY RATE: 36
SAO2 % BLDCOA: 99.1 % (ref 94–98)
SCAN SLIDE: NORMAL
SODIUM SERPL-SCNC: 133 MMOL/L (ref 136–145)
STOMATOCYTES BLD QL SMEAR: ABNORMAL
VENTILATOR MODE: ABNORMAL
VT ON VENT VENT: 420 ML
WBC # BLD AUTO: 19.6 10*3/MM3 (ref 3.4–10.8)

## 2021-08-05 PROCEDURE — 94799 UNLISTED PULMONARY SVC/PX: CPT

## 2021-08-05 PROCEDURE — 82803 BLOOD GASES ANY COMBINATION: CPT

## 2021-08-05 PROCEDURE — 71045 X-RAY EXAM CHEST 1 VIEW: CPT

## 2021-08-05 PROCEDURE — 82962 GLUCOSE BLOOD TEST: CPT

## 2021-08-05 PROCEDURE — 25010000002 ENOXAPARIN PER 10 MG: Performed by: INTERNAL MEDICINE

## 2021-08-05 PROCEDURE — 63710000001 INSULIN LISPRO (HUMAN) PER 5 UNITS: Performed by: INTERNAL MEDICINE

## 2021-08-05 PROCEDURE — 82330 ASSAY OF CALCIUM: CPT | Performed by: NURSE PRACTITIONER

## 2021-08-05 PROCEDURE — 85007 BL SMEAR W/DIFF WBC COUNT: CPT | Performed by: NURSE PRACTITIONER

## 2021-08-05 PROCEDURE — 80053 COMPREHEN METABOLIC PANEL: CPT | Performed by: NURSE PRACTITIONER

## 2021-08-05 PROCEDURE — 63710000001 INSULIN REGULAR HUMAN PER 5 UNITS: Performed by: INTERNAL MEDICINE

## 2021-08-05 PROCEDURE — 85025 COMPLETE CBC W/AUTO DIFF WBC: CPT | Performed by: NURSE PRACTITIONER

## 2021-08-05 PROCEDURE — 25010000002 HYDRALAZINE PER 20 MG: Performed by: NURSE PRACTITIONER

## 2021-08-05 PROCEDURE — P9047 ALBUMIN (HUMAN), 25%, 50ML: HCPCS | Performed by: NURSE PRACTITIONER

## 2021-08-05 PROCEDURE — 63710000001 INSULIN GLARGINE PER 5 UNITS: Performed by: INTERNAL MEDICINE

## 2021-08-05 PROCEDURE — 84100 ASSAY OF PHOSPHORUS: CPT | Performed by: NURSE PRACTITIONER

## 2021-08-05 PROCEDURE — 25010000002 FUROSEMIDE PER 20 MG: Performed by: NURSE PRACTITIONER

## 2021-08-05 PROCEDURE — 25010000002 ALBUMIN HUMAN 25% PER 50 ML: Performed by: NURSE PRACTITIONER

## 2021-08-05 PROCEDURE — 25010000002 DEXAMETHASONE PER 1 MG: Performed by: INTERNAL MEDICINE

## 2021-08-05 PROCEDURE — 83735 ASSAY OF MAGNESIUM: CPT | Performed by: NURSE PRACTITIONER

## 2021-08-05 PROCEDURE — 25010000002 MIDAZOLAM 50 MG/10ML SOLUTION: Performed by: NURSE PRACTITIONER

## 2021-08-05 PROCEDURE — 25010000002 FENTANYL CITRATE (PF) 2500 MCG/50ML SOLUTION: Performed by: NURSE PRACTITIONER

## 2021-08-05 RX ORDER — INSULIN GLARGINE 100 [IU]/ML
45 INJECTION, SOLUTION SUBCUTANEOUS EVERY 12 HOURS SCHEDULED
Status: DISCONTINUED | OUTPATIENT
Start: 2021-08-05 | End: 2021-08-19

## 2021-08-05 RX ORDER — METOPROLOL TARTRATE 50 MG/1
50 TABLET, FILM COATED ORAL EVERY 8 HOURS
Status: DISCONTINUED | OUTPATIENT
Start: 2021-08-05 | End: 2021-08-06

## 2021-08-05 RX ADMIN — HYDRALAZINE HYDROCHLORIDE 10 MG: 20 INJECTION INTRAMUSCULAR; INTRAVENOUS at 20:56

## 2021-08-05 RX ADMIN — FENTANYL CITRATE 200 MCG/HR: 50 INJECTION, SOLUTION INTRAMUSCULAR; INTRAVENOUS at 01:57

## 2021-08-05 RX ADMIN — PANTOPRAZOLE SODIUM 40 MG: 40 INJECTION, POWDER, FOR SOLUTION INTRAVENOUS at 16:39

## 2021-08-05 RX ADMIN — Medication 2000 UNITS: at 08:45

## 2021-08-05 RX ADMIN — MINERAL OIL AND WHITE PETROLATUM: 150; 830 OINTMENT OPHTHALMIC at 08:31

## 2021-08-05 RX ADMIN — ZINC SULFATE 220 MG (50 MG) CAPSULE 220 MG: CAPSULE at 08:44

## 2021-08-05 RX ADMIN — FUROSEMIDE 20 MG: 10 INJECTION, SOLUTION INTRAMUSCULAR; INTRAVENOUS at 23:15

## 2021-08-05 RX ADMIN — CISATRACURIUM BESYLATE 3.5 MCG/KG/MIN: 10 INJECTION INTRAVENOUS at 20:55

## 2021-08-05 RX ADMIN — BUDESONIDE AND FORMOTEROL FUMARATE DIHYDRATE 2 PUFF: 160; 4.5 AEROSOL RESPIRATORY (INHALATION) at 19:10

## 2021-08-05 RX ADMIN — LINEZOLID 600 MG: 600 TABLET ORAL at 21:14

## 2021-08-05 RX ADMIN — MINERAL OIL AND WHITE PETROLATUM: 150; 830 OINTMENT OPHTHALMIC at 11:43

## 2021-08-05 RX ADMIN — INSULIN HUMAN 18 UNITS: 100 INJECTION, SOLUTION PARENTERAL at 08:43

## 2021-08-05 RX ADMIN — ALBUTEROL SULFATE 6 PUFF: 108 INHALANT RESPIRATORY (INHALATION) at 19:10

## 2021-08-05 RX ADMIN — CISATRACURIUM BESYLATE 4 MCG/KG/MIN: 10 INJECTION INTRAVENOUS at 15:26

## 2021-08-05 RX ADMIN — MIDAZOLAM 9 MG/HR: 5 INJECTION INTRAMUSCULAR; INTRAVENOUS at 22:16

## 2021-08-05 RX ADMIN — ALBUTEROL SULFATE 6 PUFF: 108 INHALANT RESPIRATORY (INHALATION) at 15:33

## 2021-08-05 RX ADMIN — Medication 2 PACKET: at 08:49

## 2021-08-05 RX ADMIN — FLUCONAZOLE 400 MG: 40 POWDER, FOR SUSPENSION ORAL at 21:14

## 2021-08-05 RX ADMIN — MIDAZOLAM 9 MG/HR: 5 INJECTION INTRAMUSCULAR; INTRAVENOUS at 07:25

## 2021-08-05 RX ADMIN — GUAIFENESIN 400 MG: 100 SOLUTION ORAL at 18:42

## 2021-08-05 RX ADMIN — DEXAMETHASONE SODIUM PHOSPHATE 6 MG: 4 INJECTION, SOLUTION INTRAMUSCULAR; INTRAVENOUS at 21:14

## 2021-08-05 RX ADMIN — FENTANYL CITRATE 250 MCG/HR: 50 INJECTION, SOLUTION INTRAMUSCULAR; INTRAVENOUS at 22:06

## 2021-08-05 RX ADMIN — ENOXAPARIN SODIUM 40 MG: 40 INJECTION SUBCUTANEOUS at 21:14

## 2021-08-05 RX ADMIN — ALBUTEROL SULFATE 6 PUFF: 108 INHALANT RESPIRATORY (INHALATION) at 07:15

## 2021-08-05 RX ADMIN — Medication 10 ML: at 08:47

## 2021-08-05 RX ADMIN — LINEZOLID 600 MG: 600 TABLET ORAL at 08:46

## 2021-08-05 RX ADMIN — INSULIN GLARGINE 45 UNITS: 100 INJECTION, SOLUTION SUBCUTANEOUS at 20:56

## 2021-08-05 RX ADMIN — MINERAL OIL AND WHITE PETROLATUM: 150; 830 OINTMENT OPHTHALMIC at 20:54

## 2021-08-05 RX ADMIN — INSULIN HUMAN 18 UNITS: 100 INJECTION, SOLUTION PARENTERAL at 20:56

## 2021-08-05 RX ADMIN — INSULIN LISPRO 8 UNITS: 100 INJECTION, SOLUTION INTRAVENOUS; SUBCUTANEOUS at 12:04

## 2021-08-05 RX ADMIN — INSULIN LISPRO 10 UNITS: 100 INJECTION, SOLUTION INTRAVENOUS; SUBCUTANEOUS at 05:40

## 2021-08-05 RX ADMIN — INSULIN LISPRO 10 UNITS: 100 INJECTION, SOLUTION INTRAVENOUS; SUBCUTANEOUS at 12:04

## 2021-08-05 RX ADMIN — ENOXAPARIN SODIUM 40 MG: 40 INJECTION SUBCUTANEOUS at 08:44

## 2021-08-05 RX ADMIN — Medication 2 PACKET: at 21:14

## 2021-08-05 RX ADMIN — FENTANYL CITRATE 200 MCG/HR: 50 INJECTION, SOLUTION INTRAMUSCULAR; INTRAVENOUS at 07:25

## 2021-08-05 RX ADMIN — GLYCOPYRROLATE 0.2 MG: 0.2 INJECTION, SOLUTION INTRAMUSCULAR; INTRAVITREAL at 20:56

## 2021-08-05 RX ADMIN — HYDRALAZINE HYDROCHLORIDE 10 MG: 20 INJECTION INTRAMUSCULAR; INTRAVENOUS at 03:43

## 2021-08-05 RX ADMIN — METOPROLOL TARTRATE 50 MG: 50 TABLET, FILM COATED ORAL at 22:10

## 2021-08-05 RX ADMIN — ALBUMIN HUMAN 25 G: 0.25 SOLUTION INTRAVENOUS at 22:10

## 2021-08-05 RX ADMIN — MINERAL OIL AND WHITE PETROLATUM: 150; 830 OINTMENT OPHTHALMIC at 15:36

## 2021-08-05 RX ADMIN — INSULIN GLARGINE 40 UNITS: 100 INJECTION, SOLUTION SUBCUTANEOUS at 08:43

## 2021-08-05 RX ADMIN — FENTANYL CITRATE 250 MCG/HR: 50 INJECTION, SOLUTION INTRAMUSCULAR; INTRAVENOUS at 18:42

## 2021-08-05 RX ADMIN — INSULIN LISPRO 12 UNITS: 100 INJECTION, SOLUTION INTRAVENOUS; SUBCUTANEOUS at 05:40

## 2021-08-05 RX ADMIN — MINERAL OIL AND WHITE PETROLATUM: 150; 830 OINTMENT OPHTHALMIC at 03:43

## 2021-08-05 RX ADMIN — DEXAMETHASONE SODIUM PHOSPHATE 6 MG: 4 INJECTION, SOLUTION INTRAMUSCULAR; INTRAVENOUS at 08:46

## 2021-08-05 RX ADMIN — METOPROLOL TARTRATE 50 MG: 50 TABLET, FILM COATED ORAL at 16:39

## 2021-08-05 RX ADMIN — CISATRACURIUM BESYLATE 4 MCG/KG/MIN: 10 INJECTION INTRAVENOUS at 07:25

## 2021-08-05 RX ADMIN — Medication 600 MG: at 08:44

## 2021-08-05 RX ADMIN — FUROSEMIDE 20 MG: 10 INJECTION, SOLUTION INTRAMUSCULAR; INTRAVENOUS at 09:52

## 2021-08-05 RX ADMIN — ASPIRIN 324 MG: 81 TABLET, CHEWABLE ORAL at 08:44

## 2021-08-05 RX ADMIN — PANTOPRAZOLE SODIUM 40 MG: 40 INJECTION, POWDER, FOR SOLUTION INTRAVENOUS at 05:39

## 2021-08-05 RX ADMIN — ALBUMIN HUMAN 25 G: 0.25 SOLUTION INTRAVENOUS at 09:52

## 2021-08-05 RX ADMIN — CISATRACURIUM BESYLATE 4 MCG/KG/MIN: 10 INJECTION INTRAVENOUS at 01:57

## 2021-08-05 RX ADMIN — GUAIFENESIN 400 MG: 100 SOLUTION ORAL at 05:39

## 2021-08-05 RX ADMIN — INSULIN LISPRO 8 UNITS: 100 INJECTION, SOLUTION INTRAVENOUS; SUBCUTANEOUS at 18:42

## 2021-08-05 RX ADMIN — MIDAZOLAM 9 MG/HR: 5 INJECTION INTRAMUSCULAR; INTRAVENOUS at 15:26

## 2021-08-05 RX ADMIN — BUDESONIDE AND FORMOTEROL FUMARATE DIHYDRATE 2 PUFF: 160; 4.5 AEROSOL RESPIRATORY (INHALATION) at 07:15

## 2021-08-05 RX ADMIN — METOPROLOL TARTRATE 5 MG: 5 INJECTION INTRAVENOUS at 07:28

## 2021-08-05 RX ADMIN — ALBUTEROL SULFATE 6 PUFF: 108 INHALANT RESPIRATORY (INHALATION) at 10:00

## 2021-08-05 RX ADMIN — MIDAZOLAM 8 MG/HR: 5 INJECTION INTRAMUSCULAR; INTRAVENOUS at 01:57

## 2021-08-05 RX ADMIN — Medication 10 ML: at 20:54

## 2021-08-05 RX ADMIN — INSULIN LISPRO 10 UNITS: 100 INJECTION, SOLUTION INTRAVENOUS; SUBCUTANEOUS at 18:42

## 2021-08-05 RX ADMIN — FENTANYL CITRATE 250 MCG/HR: 50 INJECTION, SOLUTION INTRAMUSCULAR; INTRAVENOUS at 13:58

## 2021-08-05 RX ADMIN — GUAIFENESIN 400 MG: 100 SOLUTION ORAL at 23:15

## 2021-08-05 RX ADMIN — GUAIFENESIN 400 MG: 100 SOLUTION ORAL at 12:04

## 2021-08-05 RX ADMIN — HYDRALAZINE HYDROCHLORIDE 10 MG: 20 INJECTION INTRAMUSCULAR; INTRAVENOUS at 11:10

## 2021-08-05 RX ADMIN — Medication 600 MG: at 21:14

## 2021-08-05 NOTE — PLAN OF CARE
Goal Outcome Evaluation:           Progress: no change  Outcome Summary: Pt remains on vent overnight at 60% FiO2 and 13 peep. Pt's BIS remaining in the mid 60s to mid 70s early in the shift, so sedation was titrated up. Per discussion with NP, she questions changing Fentanyl gtt to Dilaudid gtt vs increasing Nimbex gtt to help lower BIS response. After titrating Nimbex up, BIS is lower when pt is left alone, but when stimulated, BIS will still elevate into the 70s or 80s. Pt remains prone overnight, and with morning abg, oxygenating well. Titrated FiO2 down to 40%, and plan to start titrating peep down today. No plans at this time to turn back supine. VSS. Hydralazine given once on this shift d/t hypertension. Will continue to monitor.

## 2021-08-05 NOTE — PROGRESS NOTES
"PULMONARY CRITICAL CARE Progress  NOTE      PATIENT IDENTIFICATION:  Name: Leslie Harris  MRN: [id number]  :  1990     Age: 30 y.o.  Sex: female    DATE OF Note:  2021   Referring Physician: Jeff Feng MD                  Subjective:   On vent FIO2 down to 40% PEEP 13  Still paralyzed  Sedated   Tolerating tube feed    no nausea or vomiting,   Small BM  Good urine out put cloudy   no new  skin rash or itching.      Objective:  tMax 24 hrs: Temp (24hrs), Av.1 °F (37.3 °C), Min:98.7 °F (37.1 °C), Max:99.4 °F (37.4 °C)      Vitals Ranges:   Temp:  [98.7 °F (37.1 °C)-99.4 °F (37.4 °C)] 99 °F (37.2 °C)  Heart Rate:  [] 113  Resp:  [31-36] 36  BP: ()/(51-99) 136/75  Arterial Line BP: (108-182)/() 145/77  FiO2 (%):  [40 %-70 %] 40 %    Intake and Output Last 3 Shifts:   I/O last 3 completed shifts:  In: 4535 [I.V.:1817; Other:540; NG/GT:2078; IV Piggyback:100]  Out: 7200 [Urine:7200]    Exam:  /75   Pulse 113   Temp 99 °F (37.2 °C) (Bladder)   Resp (!) 36   Ht 165.1 cm (65\")   Wt (!) 162 kg (356 lb 0.7 oz)   LMP 2021   SpO2 93%   BMI 59.25 kg/m²     General Appearance:   On vent sedated  HEENT:  Normocephalic, without obvious abnormality, Conjunctiva/corneas clear,.  Normal external ear canals, Nares normal, no drainage     Neck:  Supple, symmetrical, trachea midline. No JVD.  Lungs /Chest wall:   Bilateral basal rhonchi, respirations unlabored symmetrical wall movement.     Heart:  Regular rate and rhythm, systolic murmur PMI left sternal border  Abdomen: Soft, non-tender, no masses, no organomegaly.    Extremities: ++ edema no clubbing or Cyanosis        Medications:    Current Facility-Administered Medications:   •  acetaminophen (TYLENOL) 160 MG/5ML solution 650 mg, 650 mg, Oral, Q6H PRN, Radha Jaquez MD, 650 mg at 21 1236  •  acetaminophen (TYLENOL) tablet 650 mg, 650 mg, Oral, Q4H PRN, 650 mg at 21 0012 **OR** acetaminophen (TYLENOL) " suppository 650 mg, 650 mg, Rectal, Q4H PRN, Ryder Llanes APRN, 650 mg at 07/26/21 0153  •  Acetylcysteine capsule 600 mg, 600 mg, Oral, BID, Ryder Llanes APRN, 600 mg at 08/05/21 0844  •  albumin human 25 % IV SOLN 25 g, 25 g, Intravenous, Q12H, 25 g at 08/04/21 2214 **AND** furosemide (LASIX) injection 20 mg, 20 mg, Intravenous, Q12H, Ryder Llanes APRN, 20 mg at 08/04/21 2214  •  albuterol sulfate HFA (PROVENTIL HFA;VENTOLIN HFA;PROAIR HFA) inhaler 6 puff, 6 puff, Inhalation, 4x Daily - RT, Ryder Llanes APRN, 6 puff at 08/05/21 0715  •  aluminum-magnesium hydroxide-simethicone (MAALOX MAX) 400-400-40 MG/5ML suspension 15 mL, 15 mL, Oral, Q6H PRN, Ryder Llanes APRN  •  artificial tears ophthalmic ointment, , Both Eyes, Q4H, Ryder Llanes APRN, Given at 08/05/21 0831  •  artificial tears ophthalmic ointment, , Both Eyes, PRN, Ryder Llanes APRN  •  aspirin chewable tablet 324 mg, 324 mg, Per G Tube, Daily, Ryder Llanes APRN, 324 mg at 08/05/21 0844  •  Beneprotein packet 2 packet, 2 packet, Nasogastric, BID, Leslie Hugo RD, 2 packet at 08/05/21 0849  •  benzonatate (TESSALON) capsule 100 mg, 100 mg, Oral, TID PRN, Ryder Llanes APRRODOLFO  •  budesonide-formoterol (SYMBICORT) 160-4.5 MCG/ACT inhaler 2 puff, 2 puff, Inhalation, BID - RT, Ryder Llanes APRN, 2 puff at 08/05/21 0715  •  cholecalciferol (VITAMIN D3) tablet 2,000 Units, 2,000 Units, Nasogastric, Daily, Ryder Llanes APRN, 2,000 Units at 08/05/21 0845  •  cisatracurium (NIMBEX) bolus from bag 2 mg/mL 8.36 mg, 50 mcg/kg, Intravenous, Once PRN, Tana, NATY Lopez  •  cisatracurium besylate (NIMBEX) 200 mg in sodium chloride 0.9 % 100 mL (2 mg/mL) infusion, 0.5-10.2 mcg/kg/min, Intravenous, Titrated, Day, Jessica APRRODOLFO, Last Rate: 22.5 mL/hr at 08/05/21 0729, 4.5 mcg/kg/min at 08/05/21 0729  •  dexamethasone (DECADRON) injection 6 mg, 6 mg, Intravenous, Q12H, Radha Jaquez MD, 6 mg at 08/05/21 0846  •  dextrose (D50W) 25 g/  50mL Intravenous Solution 25 g, 25 g, Intravenous, Q15 Min PRN, Ryder Llanes APRN  •  dextrose (GLUTOSE) oral gel 15 g, 15 g, Oral, Q15 Min PRN, Ryder Llanes APRN  •  [COMPLETED] dilTIAZem (CARDIZEM) injection 20 mg, 20 mg, Intravenous, Once, 20 mg at 21 1508 **FOLLOWED BY** [] dilTIAZem (CARDIZEM) 100 mg in 100 mL NS infusion (ADV), 5-15 mg/hr, Intravenous, Titrated, Stopped at 21 1020 **FOLLOWED BY** dilTIAZem (CARDIZEM) bolus from bag 1 mg/mL 20 mg, 20 mg, Intravenous, Q30 Min PRN, Day, Jessica, APRN  •  enoxaparin (LOVENOX) syringe 40 mg, 40 mg, Subcutaneous, Q12H, Jeff Feng MD, 40 mg at 21 0844  •  epoprostenol (VELETRI) 1.5mg in 50mL NS (11113 ng/mL) nebulization, 50 ng/kg/min (Ideal), Inhalation, Continuous, Ryder Llanes APRN, Last Rate: 5.7 mL/hr at 21 1400, 50 ng/kg/min at 21 1400  •  fentaNYL 1000 mcg in 100 mL NS infusion,  mcg/hr, Intravenous, Titrated, Day, , APRN, Last Rate: 17.5 mL/hr at 21 0843, 175 mcg/hr at 21 0843  •  fluconazole (DIFLUCAN) 40 MG/ML suspension 400 mg, 400 mg, Oral, Nightly, Day, Jessica, APRN, 400 mg at 21 2215  •  glucagon (human recombinant) (GLUCAGEN DIAGNOSTIC) injection 1 mg, 1 mg, Subcutaneous, Q15 Min PRN, Ryder Llanes APRN  •  glycopyrrolate PF (ROBINUL) injection 0.2 mg, 0.2 mg, Intravenous, Q4H PRN, Marianna Navarrete APRN, 0.2 mg at 21 2214  •  guaiFENesin solution 400 mg, 400 mg, Nasogastric, Q6H, Radha Jaquez MD, 400 mg at 21 0539  •  hydrALAZINE (APRESOLINE) injection 10 mg, 10 mg, Intravenous, Q4H PRN, Ryder Llanes, NATY, 10 mg at 21 0343  •  HYDROmorphone (DILAUDID) 25 mg in 50 mL NS (0.5 mg/mL) infusion, 0.2-3 mg/hr, Intravenous, Titrated, Sheree Orantes APRN  •  insulin glargine (LANTUS, SEMGLEE) injection 40 Units, 40 Units, Subcutaneous, Q12H, Jeff Feng MD, 40 Units at 21 0843  •  insulin lispro (ADMELOG) injection 0-24 Units, 0-24 Units,  Subcutaneous, Q6H, 12 Units at 08/05/21 0540 **AND** insulin lispro (ADMELOG) injection 0-24 Units, 0-24 Units, Subcutaneous, PRN, Radha Jaquez MD  •  insulin lispro (ADMELOG) injection 10 Units, 10 Units, Subcutaneous, Q6H, Jeff Feng MD, 10 Units at 08/05/21 0540  •  insulin regular (humuLIN R,novoLIN R) injection 18 Units, 18 Units, Subcutaneous, Q12H, Jeff Feng MD, 18 Units at 08/05/21 0843  •  linezolid (ZYVOX) tablet 600 mg, 600 mg, Oral, Q12H, Sarah Fajardo MD, 600 mg at 08/05/21 0846  •  Magnesium Sulfate 2 gram infusion - Mg less than or equal to 1.5 mg/dL, 2 g, Intravenous, PRN **OR** Magnesium Sulfate 1 gram infusion - Mg 1.6-1.9 mg/dL, 1 g, Intravenous, PRN, Ryder Llanes APRN, Last Rate: 100 mL/hr at 08/02/21 0842, 1 g at 08/02/21 0842  •  metoprolol tartrate (LOPRESSOR) injection 5 mg, 5 mg, Intravenous, Q6H PRN, Jessica Fajardo APRN, 5 mg at 08/05/21 0728  •  metoprolol tartrate (LOPRESSOR) tablet 50 mg, 50 mg, Oral, Q12H, Jeff Feng MD, 50 mg at 08/04/21 2214  •  Midazolam (VERSED) 50 mg in 50mL NS infusion, 1-10 mg/hr, Intravenous, Titrated, Ryder Llanes APRN, Last Rate: 7 mL/hr at 08/05/21 0838, 7 mg/hr at 08/05/21 0838  •  nitroglycerin (NITROSTAT) SL tablet 0.4 mg, 0.4 mg, Sublingual, Q5 Min PRN, Ryder Llanes APRN  •  norepinephrine (LEVOPHED) 8 mg in 250 mL NS infusion (premix), 0.02-0.3 mcg/kg/min, Intravenous, Titrated, Marianna Navarrete APRN, Stopped at 08/03/21 1745  •  ondansetron (ZOFRAN) tablet 4 mg, 4 mg, Oral, Q6H PRN **OR** ondansetron (ZOFRAN) injection 4 mg, 4 mg, Intravenous, Q6H PRN, Ryder Llanes APRN  •  pantoprazole (PROTONIX) injection 40 mg, 40 mg, Intravenous, BID AC, Jeff Feng MD, 40 mg at 08/05/21 0539  •  Pharmacy to Dose enoxaparin (LOVENOX), , Does not apply, Continuous PRN, Jeff Feng MD  •  potassium chloride (K-DUR,KLOR-CON) CR tablet 40 mEq, 40 mEq, Oral, PRN **OR** potassium chloride (KLOR-CON) packet 40 mEq, 40 mEq, Oral,  PRN **OR** potassium chloride 10 mEq in 100 mL IVPB, 10 mEq, Intravenous, Q1H PRN, Ryder Llanes APRN  •  potassium phosphate 45 mmol in sodium chloride 0.9 % 500 mL infusion, 45 mmol, Intravenous, PRN **OR** potassium phosphate 30 mmol in sodium chloride 0.9 % 250 mL infusion, 30 mmol, Intravenous, PRN **OR** potassium phosphate 15 mmol in 0.9% sodium chloride 100 mL IVPB, 15 mmol, Intravenous, PRN **OR** sodium phosphates 45 mmol in sodium chloride 0.9 % 500 mL IVPB, 45 mmol, Intravenous, PRN **OR** sodium phosphates 30 mmol in sodium chloride 0.9 % 250 mL IVPB, 30 mmol, Intravenous, PRN **OR** sodium phosphates 15 mmol in sodium chloride 0.9 % 250 mL IVPB, 15 mmol, Intravenous, PRN, Ryder Llanes APRN  •  [COMPLETED] Insert peripheral IV, , , Once **AND** sodium chloride 0.9 % flush 10 mL, 10 mL, Intravenous, PRN, Leonardo Doss MD  •  sodium chloride 0.9 % flush 10 mL, 10 mL, Intravenous, Q12H, Ryder Llanes, APRN, 10 mL at 08/05/21 0847  •  sodium chloride 0.9 % flush 10 mL, 10 mL, Intravenous, PRN, Ryder Llanes, APRN    Data Review:  All labs (24hrs):   Recent Results (from the past 24 hour(s))   Respiratory Culture - Sputum, Bronchus    Collection Time: 08/04/21 11:50 AM    Specimen: Bronchus; Sputum   Result Value Ref Range    Gram Stain Few (2+) WBCs per low power field     Gram Stain No organisms seen    POC Glucose Once    Collection Time: 08/04/21 12:02 PM    Specimen: Blood   Result Value Ref Range    Glucose 205 (H) 70 - 105 mg/dL   POC Glucose Once    Collection Time: 08/04/21  5:39 PM    Specimen: Blood   Result Value Ref Range    Glucose 205 (H) 70 - 105 mg/dL   POC Glucose Once    Collection Time: 08/04/21 11:48 PM    Specimen: Blood   Result Value Ref Range    Glucose 211 (H) 70 - 105 mg/dL   Blood Gas, Arterial -    Collection Time: 08/05/21  3:49 AM    Specimen: Arterial Blood   Result Value Ref Range    Site Arterial Line     Ovi's Test N/A     pH, Arterial 7.395 7.350 - 7.450 pH  units    pCO2, Arterial 61.5 (H) 35.0 - 48.0 mm Hg    pO2, Arterial 144.1 (H) 83.0 - 108.0 mm Hg    HCO3, Arterial 37.6 (H) 21.0 - 28.0 mmol/L    Base Excess, Arterial 10.8 (H) 0.0 - 3.0 mmol/L    O2 Saturation, Arterial 99.1 (H) 94.0 - 98.0 %    CO2 Content 39.5 (H) 22 - 29 mmol/L    Barometric Pressure for Blood Gas      Modality Adult Vent     FIO2 60 %    Ventilator Mode ;AC     Set Tidal Volume 420     PEEP 13     Hemodilution No     Respiratory Rate 36    Magnesium    Collection Time: 08/05/21  3:55 AM    Specimen: Blood   Result Value Ref Range    Magnesium 2.0 1.6 - 2.6 mg/dL   Phosphorus    Collection Time: 08/05/21  3:55 AM    Specimen: Blood   Result Value Ref Range    Phosphorus 4.3 2.5 - 4.5 mg/dL   Comprehensive Metabolic Panel    Collection Time: 08/05/21  3:55 AM    Specimen: Blood   Result Value Ref Range    Glucose 209 (H) 65 - 99 mg/dL    BUN 27 (H) 6 - 20 mg/dL    Creatinine 0.42 (L) 0.57 - 1.00 mg/dL    Sodium 133 (L) 136 - 145 mmol/L    Potassium 5.2 3.5 - 5.2 mmol/L    Chloride 89 (L) 98 - 107 mmol/L    CO2 35.0 (H) 22.0 - 29.0 mmol/L    Calcium 9.9 8.6 - 10.5 mg/dL    Total Protein 7.5 6.0 - 8.5 g/dL    Albumin 4.50 3.50 - 5.20 g/dL    ALT (SGPT) 173 (H) 1 - 33 U/L    AST (SGOT) 82 (H) 1 - 32 U/L    Alkaline Phosphatase 83 39 - 117 U/L    Total Bilirubin 1.0 0.0 - 1.2 mg/dL    eGFR Non African Amer >150 >60 mL/min/1.73    Globulin 3.0 gm/dL    A/G Ratio 1.5 g/dL    BUN/Creatinine Ratio 64.3 (H) 7.0 - 25.0    Anion Gap 9.0 5.0 - 15.0 mmol/L   Calcium, Ionized    Collection Time: 08/05/21  3:55 AM    Specimen: Blood   Result Value Ref Range    Ionized Calcium 1.28 1.20 - 1.30 mmol/L   CBC Auto Differential    Collection Time: 08/05/21  3:55 AM    Specimen: Blood   Result Value Ref Range    WBC 19.60 (H) 3.40 - 10.80 10*3/mm3    RBC 3.25 (L) 3.77 - 5.28 10*6/mm3    Hemoglobin 9.6 (L) 12.0 - 15.9 g/dL    Hematocrit 28.7 (L) 34.0 - 46.6 %    MCV 88.3 79.0 - 97.0 fL    MCH 29.5 26.6 - 33.0 pg     MCHC 33.4 31.5 - 35.7 g/dL    RDW 15.3 12.3 - 15.4 %    RDW-SD 46.8 37.0 - 54.0 fl    MPV 8.6 6.0 - 12.0 fL    Platelets 300 140 - 450 10*3/mm3   Scan Slide    Collection Time: 08/05/21  3:55 AM    Specimen: Blood   Result Value Ref Range    Scan Slide     Manual Differential    Collection Time: 08/05/21  3:55 AM    Specimen: Blood   Result Value Ref Range    Neutrophil % 68.0 42.7 - 76.0 %    Lymphocyte % 11.0 (L) 19.6 - 45.3 %    Monocyte % 7.0 5.0 - 12.0 %    Bands %  8.0 (H) 0.0 - 5.0 %    Metamyelocyte % 5.0 (H) 0.0 - 0.0 %    Myelocyte % 1.0 (H) 0.0 - 0.0 %    Neutrophils Absolute 14.90 (H) 1.70 - 7.00 10*3/mm3    Lymphocytes Absolute 2.16 0.70 - 3.10 10*3/mm3    Monocytes Absolute 1.37 (H) 0.10 - 0.90 10*3/mm3    Polychromasia Slight/1+ None Seen    Stomatocytes Slight/1+ None Seen    Vacuolated Neutrophils Slight/1+ None Seen    Platelet Morphology Normal Normal   POC Glucose Once    Collection Time: 08/05/21  5:32 AM    Specimen: Blood   Result Value Ref Range    Glucose 259 (H) 70 - 105 mg/dL        Imaging:  XR Chest 1 View  Narrative: DATE OF EXAM:  8/5/2021 8:16 AM     PROCEDURE:  XR CHEST 1 VW-     INDICATIONS:  Shortness of breath; R06.00-Dyspnea, unspecified; U07.1-COVID-19;  J12.82-Pneumonia due to Coronavirus disease 2019; J96.01-Acute  respiratory failure with hypoxia; I95.2-Hypotension due to drugs;  U07.1-COVID-19; J96.00-Acute respiratory failure, unspecified whether  with hypoxia or hypercapnia     COMPARISON:  8/4/2021     TECHNIQUE:   Single radiographic view of the chest was obtained.     FINDINGS:  Exam is limited secondary to poor penetration and body habitus. Interval  removal of ET tube. NG tube in satisfactory position. Right IJ catheter  in satisfactory position. Persistent multifocal patchy airspace  opacities are identified, not significant changed. No evidence of  pneumothorax. Elevation the right hemidiaphragm.     Impression:    1. Patchy multifocal airspace opacities concerning  for multifocal  pneumonia.  2. Persistent elevation of the right hemidiaphragm which is more  prominent than 7/28/2021     Electronically Signed By-Girma Weinberg MD On:8/5/2021 8:50 AM  This report was finalized on [id number] by  Girma Weinberg MD.       ASSESSMENT:   Acute hypoxic respiratory failure due to COVID-19 (CMS/[facility])    Pneumonia due to COVID-19 virus   morbid obesity affecting her quality care   HTN  RODRIGUEZ    Class 3 severe obesity without serious comorbidity with body mass index (BMI) of 50.0 to 59.9 in adult    Hyperglycemia, likely stress induced    Diabetes mellitus type 2 in obese (CMS/HCC)       PLAN:  Dc nimbex  Dc cooling blanket   Wean PEEP down   Vent management  antibiotics  Bronchodilator  Inhaled corticosteroids  Electrolytes/ glycemic control  DVT and GI prophylaxis.    Total Critical care time in direct medical management ( 34  ) minutes  Jeff Feng MD. D, ABSM.     8/5/2021  09:31 EDT

## 2021-08-05 NOTE — PROGRESS NOTES
"Nutrition Services    Patient Name: Leslie Harris  YOB: 1990  MRN: [id number]  Admission date: 7/22/2021      PPE Documentation        PPE Worn By Provider RD did not enter room for this encounter   PPE Worn By Patient  N/A     PROGRESS NOTE      Encounter Information: Tube feed check on. Novasource Renal documented at current goal 45 mL/hr. Remains intubated, on paralytic, in prone positioning.        Labs (reviewed below): -Hyperkalemia -> K+ improved today  -elevated BUN  -low Cr  -elevated LFTs  -hyperglycemia->formula is CHO controlled       GI Function:  -residuals WNL  -FMS removed        Nutrition Intervention: Continue current TF regimen       PO Diet/Supplements: NPO Diet   EN Prescription: Novasource Renal at 45 mL/hr + 2 packets beneprotein BID, 20 mL/hr water flush       Intake/Output:   Intake/Output Summary (Last 24 hours) at 8/5/2021 0932  Last data filed at 8/5/2021 0544  Gross per 24 hour   Intake 2866 ml   Output 5000 ml   Net -2134 ml          Height: Height: 165.1 cm (65\")   Weight: Weight: (!) 162 kg (356 lb 0.7 oz) (08/04/21 0526)   BMI: Body mass index is 59.25 kg/m².     Results from last 7 days   Lab Units 08/05/21 0355 08/04/21 0514 08/03/21  0258   SODIUM mmol/L 133* 133* 136   POTASSIUM mmol/L 5.2 5.3* 5.0   CHLORIDE mmol/L 89* 93* 96*   CO2 mmol/L 35.0* 33.0* 33.0*   BUN mg/dL 27* 28* 28*   CREATININE mg/dL 0.42* 0.45* 0.39*   CALCIUM mg/dL 9.9 9.3 9.2   BILIRUBIN mg/dL 1.0 0.7 0.6   ALK PHOS U/L 83 73 68   ALT (SGPT) U/L 173* 139* 96*   AST (SGOT) U/L 82* 78* 53*   GLUCOSE mg/dL 209* 237* 247*     Results from last 7 days   Lab Units 08/05/21  0355 08/04/21  0514 08/04/21  0514 08/03/21  1527 08/03/21  0258   MAGNESIUM mg/dL 2.0  --  2.0  --  2.0   PHOSPHORUS mg/dL 4.3   < > 4.9*  --  4.4   HEMOGLOBIN g/dL 9.6*   < > 9.2*   < > 9.8*   HEMATOCRIT % 28.7*   < > 28.2*   < > 29.4*    < > = values in this interval not displayed.     COVID19   Date Value Ref Range Status "   07/22/2021 Detected (C) Not Detected - Ref. Range Final     Lab Results   Component Value Date    HGBA1C 6.9 (H) 07/23/2021     Vitals:    08/05/21 0715   BP:    Pulse: 113   Resp: (!) 36   Temp: 99 °F (37.2 °C)   SpO2: 93%     RD to follow up per protocol.    Electronically signed by:  Leslie Hugo RD  08/05/21 09:32 EDT

## 2021-08-05 NOTE — PROGRESS NOTES
Infectious Diseases Progress Note      LOS: 14 days   Patient Care Team:  Provider, No Known as PCP - General    Chief Complaint: Debated and sedated    Subjective       Patient has been afebrile but is still currently on a cooling blanket.  She is intubated at 40% FiO2 and 14 of PEEP.  Patient is sedated and paralyzed but is currently on no pressor support.  She is in the prone position.      Review of Systems:   Review of Systems   Unable to perform ROS: Intubated        Objective     Vital Signs  Temp:  [98.7 °F (37.1 °C)-99.4 °F (37.4 °C)] 99 °F (37.2 °C)  Heart Rate:  [] 107  Resp:  [31-36] 36  BP: (109-168)/(56-99) 136/75  Arterial Line BP: (108-182)/() 145/77  FiO2 (%):  [40 %-70 %] 40 %    Physical Exam:  Physical Exam  Vitals and nursing note reviewed.   Constitutional:       General: She is not in acute distress.     Appearance: Normal appearance. She is well-developed. She is obese. She is ill-appearing. She is not diaphoretic.   HENT:      Head: Normocephalic and atraumatic.   Cardiovascular:      Rate and Rhythm: Normal rate and regular rhythm.      Heart sounds: Normal heart sounds, S1 normal and S2 normal. No murmur heard.     Pulmonary:      Effort: Pulmonary effort is normal. No respiratory distress.      Breath sounds: No stridor. Rales present. No wheezing.      Comments: Intubated and currently in the prone position  Chest:      Chest wall: No tenderness.   Abdominal:      General: Bowel sounds are normal. There is no distension.      Palpations: Abdomen is soft. There is no mass.      Tenderness: There is no abdominal tenderness. There is no guarding.      Comments: NG tube   Genitourinary:     Comments: Ma catheter  Musculoskeletal:         General: No swelling, tenderness or deformity.      Cervical back: Neck supple.   Skin:     General: Skin is warm and dry.      Coloration: Skin is not pale.      Findings: No bruising, erythema or rash.   Neurological:      Mental Status: She  is alert.      Cranial Nerves: No cranial nerve deficit.      Comments: Intubated, paralyzed, sedated          Results Review:    I have reviewed all clinical data, test, lab, and imaging results.     Radiology  XR Chest 1 View    Result Date: 8/5/2021  DATE OF EXAM: 8/5/2021 8:16 AM  PROCEDURE: XR CHEST 1 VW-  INDICATIONS: Shortness of breath; R06.00-Dyspnea, unspecified; U07.1-COVID-19; J12.82-Pneumonia due to Coronavirus disease 2019; J96.01-Acute respiratory failure with hypoxia; I95.2-Hypotension due to drugs; U07.1-COVID-19; J96.00-Acute respiratory failure, unspecified whether with hypoxia or hypercapnia  COMPARISON: 8/4/2021  TECHNIQUE: Single radiographic view of the chest was obtained.  FINDINGS: Exam is limited secondary to poor penetration and body habitus. Interval removal of ET tube. NG tube in satisfactory position. Right IJ catheter in satisfactory position. Persistent multifocal patchy airspace opacities are identified, not significant changed. No evidence of pneumothorax. Elevation the right hemidiaphragm.       1. Patchy multifocal airspace opacities concerning for multifocal pneumonia. 2. Persistent elevation of the right hemidiaphragm which is more prominent than 7/28/2021  Electronically Signed By-Girma Weinberg MD On:8/5/2021 8:50 AM This report was finalized on [id number] by  Girma Weinberg MD.      Cardiology    Laboratory    Results from last 7 days   Lab Units 08/05/21  0355 08/04/21  0514 08/03/21  1527 08/03/21  0258 08/02/21  0408 08/01/21  0359 07/31/21  0556 07/30/21  0252 07/30/21  0252   WBC 10*3/mm3 19.60* 16.70*  --  21.80* 17.20* 14.70* 10.40  --  11.30*   HEMOGLOBIN g/dL 9.6* 9.2* 9.2* 9.8* 10.0* 11.0* 10.9*   < > 11.3*   HEMATOCRIT % 28.7* 28.2* 28.2* 29.4* 30.5* 33.8* 33.0*   < > 35.0   PLATELETS 10*3/mm3 300 274  --  374 302 338 276  --  283    < > = values in this interval not displayed.     Results from last 7 days   Lab Units 08/05/21  0355 08/04/21  0514 08/03/21  0258  08/02/21  0408 08/01/21  0359 07/31/21  0556 07/30/21  0252   SODIUM mmol/L 133* 133* 136 137 137 137 137   POTASSIUM mmol/L 5.2 5.3* 5.0 4.7 5.7* 5.0 5.1   CHLORIDE mmol/L 89* 93* 96* 94* 94* 95* 95*   CO2 mmol/L 35.0* 33.0* 33.0* 34.0* 38.0* 37.0* 35.0*   BUN mg/dL 27* 28* 28* 28* 22* 21* 17   CREATININE mg/dL 0.42* 0.45* 0.39* 0.39* 0.39* 0.36* 0.34*   GLUCOSE mg/dL 209* 237* 247* 286* 248* 226* 278*   ALBUMIN g/dL 4.50 3.90 3.10* 2.90* 3.00* 2.80* 2.90*   BILIRUBIN mg/dL 1.0 0.7 0.6 0.6 0.5 0.7 0.7   ALK PHOS U/L 83 73 68 66 74 70 70   AST (SGOT) U/L 82* 78* 53* 52* 59* 49* 53*   ALT (SGPT) U/L 173* 139* 96* 98* 96* 70* 70*   CALCIUM mg/dL 9.9 9.3 9.2 9.1 9.2 8.9 8.7     Results from last 7 days   Lab Units 08/04/21  0514   CK TOTAL U/L 84             Microbiology   Microbiology Results (last 10 days)     Procedure Component Value - Date/Time    Respiratory Culture - Sputum, Bronchus [[id number]]  (Abnormal) Collected: 08/04/21 1150    Lab Status: Preliminary result Specimen: Sputum from Bronchus Updated: 08/05/21 0931     Respiratory Culture Scant growth (1+) Gram Negative Bacilli      Rare Normal Respiratory Sondra: NO S.aureus/MRSA or Pseudomonas aeruginosa     Gram Stain Few (2+) WBCs per low power field      No organisms seen    Clostridium Difficile EIA - Stool, Per Rectum [[id number]]  (Normal) Collected: 08/03/21 1600    Lab Status: Final result Specimen: Stool from Per Rectum Updated: 08/04/21 0927     C Diff GDH / Toxin Negative    Urine Culture - Urine, Urine, Catheter [[id number]]  (Normal) Collected: 08/03/21 1036    Lab Status: Final result Specimen: Urine, Catheter Updated: 08/04/21 1311     Urine Culture No growth    Respiratory Culture - Sputum, ET Suction [[id number]]  (Abnormal)  (Susceptibility) Collected: 07/29/21 2038    Lab Status: Final result Specimen: Sputum from ET Suction Updated: 08/02/21 1315     Respiratory Culture Scant growth (1+) Candida albicans      Scant growth (1+) Staphylococcus  aureus, MRSA     Comment: Methicillin resistant Staphylococcus aureus, Patient may be an isolation risk.         No Normal Respiratory Sondra     Gram Stain Few (2+) WBCs per low power field      Rare (1+) Epithelial cells per low power field      Few (2+) Budding yeast    Susceptibility      Staphylococcus aureus, MRSA      FITZ      Clindamycin Susceptible      Linezolid Susceptible      Oxacillin Resistant      Penicillin G Resistant      Tetracycline Susceptible      Trimethoprim + Sulfamethoxazole Susceptible      Vancomycin Susceptible               Linear View                   Blood Culture - Blood, Blood, Central Line [[id number]] Collected: 07/29/21 1153    Lab Status: Final result Specimen: Blood, Central Line Updated: 08/03/21 1231     Blood Culture No growth at 5 days    Blood Culture - Blood, Blood, Arterial Line [[id number]] Collected: 07/29/21 1136    Lab Status: Final result Specimen: Blood, Arterial Line Updated: 08/03/21 1231     Blood Culture No growth at 5 days          Medication Review:       Schedule Meds  Acetylcysteine, 600 mg, Oral, BID  albumin human, 25 g, Intravenous, Q12H   And  furosemide, 20 mg, Intravenous, Q12H  albuterol sulfate HFA, 6 puff, Inhalation, 4x Daily - RT  artificial tears, , Both Eyes, Q4H  aspirin, 324 mg, Per G Tube, Daily  Beneprotein, 2 packet, Nasogastric, BID  budesonide-formoterol, 2 puff, Inhalation, BID - RT  cholecalciferol, 2,000 Units, Nasogastric, Daily  dexamethasone, 6 mg, Intravenous, Q12H  enoxaparin, 40 mg, Subcutaneous, Q12H  fluconazole, 400 mg, Oral, Nightly  guaiFENesin, 400 mg, Nasogastric, Q6H  insulin glargine, 45 Units, Subcutaneous, Q12H  insulin lispro, 0-24 Units, Subcutaneous, Q6H  insulin lispro, 10 Units, Subcutaneous, Q6H  insulin regular, 18 Units, Subcutaneous, Q12H  linezolid, 600 mg, Oral, Q12H  metoprolol tartrate, 50 mg, Oral, Q8H  pantoprazole, 40 mg, Intravenous, BID AC  sodium chloride, 10 mL, Intravenous, Q12H        Infusion  Meds  cisatracurium (NIMBEX) infusion, 0.5-10.2 mcg/kg/min, Last Rate: Stopped (21)  fentanyl 10 mcg/mL,  mcg/hr, Last Rate: 175 mcg/hr (21)  HYDROmorphone, 0.2-3 mg/hr  midazolam, 1-10 mg/hr, Last Rate: 8 mg/hr (21)  norepinephrine, 0.02-0.3 mcg/kg/min, Last Rate: Stopped (21)  Pharmacy to Dose enoxaparin (LOVENOX),         PRN Meds  •  acetaminophen  •  acetaminophen **OR** acetaminophen  •  aluminum-magnesium hydroxide-simethicone  •  artificial tears  •  benzonatate  •  cisatracurium  •  dextrose  •  dextrose  •  [COMPLETED] dilTIAZem **FOLLOWED BY** [] dilTIAZem **FOLLOWED BY** dilTIAZem  •  glucagon (human recombinant)  •  glycopyrrolate PF  •  hydrALAZINE  •  insulin lispro **AND** insulin lispro  •  magnesium sulfate **OR** magnesium sulfate in D5W 1g/100mL (PREMIX)  •  metoprolol tartrate  •  nitroglycerin  •  ondansetron **OR** ondansetron  •  Pharmacy to Dose enoxaparin (LOVENOX)  •  potassium chloride **OR** potassium chloride **OR** potassium chloride  •  potassium phosphate infusion greater than 15 mMoles **OR** potassium phosphate infusion greater than 15 mMoles **OR** potassium phosphate **OR** sodium phosphate IVPB **OR** sodium phosphate IVPB **OR** sodium phosphate IVPB  •  [COMPLETED] Insert peripheral IV **AND** sodium chloride  •  sodium chloride        Assessment/Plan       Antimicrobial Therapy   1.  Zyvox      day  2.      Day  3.      Day  4.      Day  5.      Day      Assessment     Severe COVID-19 infection and patient with significant comorbidities including diabetes mellitus and morbid obesity. Apparently did not receive vaccination for COVID-19  COVID-19 screen on admission on 2021 was positive. Was reported that patient had positive COVID-19 diagnosis 6 days prior to admission  The patient had received 5 days of IV remdesivir     Severe respiratory failure on the ventilator currently on 70% FiO2 and 16 PEEP.  Chest  x-ray consistent with severe COVID-19 infection and ARDS     Probable ventilator associated pneumonia versus hospitalist acquired pneumonia. Sputum culture from July 29, 2021 grew MRSA and the organism is susceptible to linezolid and vancomycin  The patient was started on linezolid on August 1, 2021     Morbid obesity     Diabetes mellitus     Reactive leukocytosis secondary to steroids    Elevated CK.  CK is back to normal     Plan     Continue Zyvox 600 mg every 12 hours for 10 to 14 days  Monitor platelets carefully  Supportive care  Prognosis is very guarded and might be poor secondary to severe COVID-19 infection  Continue dexamethasone for now, pulmonary service is following and monitoring     Continue COVID-19 isolation for total of 20 days from the first diagnosis  Labs in a.m. including : CBC with differential, CMP,  d-dimer, ferritin and CRP    Appropriate PPE was placed on before entering the room    Neela Alvarado MD  08/05/21  11:07 EDT     Note is dictated utilizing voice recognition software/Dragon

## 2021-08-05 NOTE — PLAN OF CARE
Goal Outcome Evaluation:      Patient remains in prone position while on the ventilator. Patient remains at 50% FiO2 and 12 of PEEP. Patient remains on fentanyl and versed for sedation. Patient also remains on Nimbex. Dr. Feng wanted to see how the patient would do without the paralytic. The paralytic was stopped. Patient soon became hypertensive, her sats dropped in the low 80s to high 70s, and patient's heart rate became qamar. Patient was not getting her volumes and rate was not controlled. RT assisted to get the patient's head and ET repositioned and patient was placed back on the paralytic. Shraddha ALFONSO was notified. Paralytic has remained on since. Sedation has been adjusted accordingly. Ma catheter remains in place with good urine output. Tube feeds remain running at goal rate with good residuals. Patient has had a low grade fever. MD said to keep cooling blanket off until patient reaches 100.5. Patient has remain sinus tach with hypertension with stimulation. Keeley remains in place with good waveform.

## 2021-08-06 ENCOUNTER — APPOINTMENT (OUTPATIENT)
Dept: GENERAL RADIOLOGY | Facility: HOSPITAL | Age: 31
End: 2021-08-06

## 2021-08-06 LAB
ALBUMIN SERPL-MCNC: 4.6 G/DL (ref 3.5–5.2)
ALBUMIN/GLOB SERPL: 1.7 G/DL
ALP SERPL-CCNC: 73 U/L (ref 39–117)
ALT SERPL W P-5'-P-CCNC: 197 U/L (ref 1–33)
ANION GAP SERPL CALCULATED.3IONS-SCNC: 7 MMOL/L (ref 5–15)
ANISOCYTOSIS BLD QL: ABNORMAL
APTT PPP: 21.7 SECONDS (ref 24–31)
ARTERIAL PATENCY WRIST A: ABNORMAL
AST SERPL-CCNC: 72 U/L (ref 1–32)
ATMOSPHERIC PRESS: ABNORMAL MM[HG]
BACTERIA SPEC RESP CULT: ABNORMAL
BACTERIA SPEC RESP CULT: ABNORMAL
BASE EXCESS BLDA CALC-SCNC: 10.7 MMOL/L (ref 0–3)
BDY SITE: ABNORMAL
BILIRUB SERPL-MCNC: 1.2 MG/DL (ref 0–1.2)
BUN SERPL-MCNC: 28 MG/DL (ref 6–20)
BUN/CREAT SERPL: 62.2 (ref 7–25)
CA-I SERPL ISE-MCNC: 1.27 MMOL/L (ref 1.2–1.3)
CALCIUM SPEC-SCNC: 9.8 MG/DL (ref 8.6–10.5)
CHLORIDE SERPL-SCNC: 90 MMOL/L (ref 98–107)
CO2 BLDA-SCNC: 40.1 MMOL/L (ref 22–29)
CO2 SERPL-SCNC: 35 MMOL/L (ref 22–29)
CREAT SERPL-MCNC: 0.45 MG/DL (ref 0.57–1)
CRP SERPL-MCNC: <0.3 MG/DL (ref 0–0.5)
D DIMER PPP FEU-MCNC: 1.13 MG/L (FEU) (ref 0–0.59)
DEPRECATED RDW RBC AUTO: 48.1 FL (ref 37–54)
ERYTHROCYTE [DISTWIDTH] IN BLOOD BY AUTOMATED COUNT: 15.7 % (ref 12.3–15.4)
FERRITIN SERPL-MCNC: 177.2 NG/ML (ref 13–150)
FIBRINOGEN PPP-MCNC: 406 MG/DL (ref 210–450)
GFR SERPL CREATININE-BSD FRML MDRD: >150 ML/MIN/1.73
GLOBULIN UR ELPH-MCNC: 2.7 GM/DL
GLUCOSE BLDC GLUCOMTR-MCNC: 181 MG/DL (ref 70–105)
GLUCOSE BLDC GLUCOMTR-MCNC: 202 MG/DL (ref 70–105)
GLUCOSE BLDC GLUCOMTR-MCNC: 212 MG/DL (ref 70–105)
GLUCOSE SERPL-MCNC: 203 MG/DL (ref 65–99)
GRAM STN SPEC: ABNORMAL
GRAM STN SPEC: ABNORMAL
HCO3 BLDA-SCNC: 38 MMOL/L (ref 21–28)
HCT VFR BLD AUTO: 27.6 % (ref 34–46.6)
HEMODILUTION: NO
HGB BLD-MCNC: 9.2 G/DL (ref 12–15.9)
INHALED O2 CONCENTRATION: 50 %
INR PPP: 1.03 (ref 0.93–1.1)
LDH SERPL-CCNC: 246 U/L (ref 135–214)
LYMPHOCYTES # BLD MANUAL: 0.68 10*3/MM3 (ref 0.7–3.1)
LYMPHOCYTES NFR BLD MANUAL: 3 % (ref 5–12)
LYMPHOCYTES NFR BLD MANUAL: 4 % (ref 19.6–45.3)
MAGNESIUM SERPL-MCNC: 1.8 MG/DL (ref 1.6–2.6)
MCH RBC QN AUTO: 29.3 PG (ref 26.6–33)
MCHC RBC AUTO-ENTMCNC: 33.3 G/DL (ref 31.5–35.7)
MCV RBC AUTO: 88 FL (ref 79–97)
MODALITY: ABNORMAL
MONOCYTES # BLD AUTO: 0.51 10*3/MM3 (ref 0.1–0.9)
MYELOCYTES NFR BLD MANUAL: 1 % (ref 0–0)
NEUTROPHILS # BLD AUTO: 15.38 10*3/MM3 (ref 1.7–7)
NEUTROPHILS NFR BLD MANUAL: 87 % (ref 42.7–76)
NEUTS BAND NFR BLD MANUAL: 4 % (ref 0–5)
NT-PROBNP SERPL-MCNC: 330.5 PG/ML (ref 0–450)
PCO2 BLDA: 66.8 MM HG (ref 35–48)
PEEP RESPIRATORY: 12 CM[H2O]
PH BLDA: 7.36 PH UNITS (ref 7.35–7.45)
PHOSPHATE SERPL-MCNC: 4.5 MG/DL (ref 2.5–4.5)
PLASMA CELL PREC NFR BLD MANUAL: 1 % (ref 0–0)
PLAT MORPH BLD: NORMAL
PLATELET # BLD AUTO: 255 10*3/MM3 (ref 140–450)
PMV BLD AUTO: 8.2 FL (ref 6–12)
PO2 BLDA: 116.9 MM HG (ref 83–108)
POLYCHROMASIA BLD QL SMEAR: ABNORMAL
POTASSIUM SERPL-SCNC: 4.8 MMOL/L (ref 3.5–5.2)
PROT SERPL-MCNC: 7.3 G/DL (ref 6–8.5)
PROTHROMBIN TIME: 11.4 SECONDS (ref 9.6–11.7)
RBC # BLD AUTO: 3.14 10*6/MM3 (ref 3.77–5.28)
RESPIRATORY RATE: 36
SAO2 % BLDCOA: 98.2 % (ref 94–98)
SCAN SLIDE: NORMAL
SODIUM SERPL-SCNC: 132 MMOL/L (ref 136–145)
TOXIC GRANULATION: ABNORMAL
TROPONIN T SERPL-MCNC: 0.04 NG/ML (ref 0–0.03)
VENTILATOR MODE: ABNORMAL
VT ON VENT VENT: 420 ML
WBC # BLD AUTO: 16.9 10*3/MM3 (ref 3.4–10.8)

## 2021-08-06 PROCEDURE — 83735 ASSAY OF MAGNESIUM: CPT | Performed by: NURSE PRACTITIONER

## 2021-08-06 PROCEDURE — 85025 COMPLETE CBC W/AUTO DIFF WBC: CPT | Performed by: NURSE PRACTITIONER

## 2021-08-06 PROCEDURE — 63710000001 INSULIN REGULAR HUMAN PER 5 UNITS: Performed by: INTERNAL MEDICINE

## 2021-08-06 PROCEDURE — 82728 ASSAY OF FERRITIN: CPT | Performed by: INTERNAL MEDICINE

## 2021-08-06 PROCEDURE — 83615 LACTATE (LD) (LDH) ENZYME: CPT | Performed by: NURSE PRACTITIONER

## 2021-08-06 PROCEDURE — 63710000001 INSULIN GLARGINE PER 5 UNITS: Performed by: INTERNAL MEDICINE

## 2021-08-06 PROCEDURE — 25010000002 FUROSEMIDE PER 20 MG: Performed by: NURSE PRACTITIONER

## 2021-08-06 PROCEDURE — 25010000002 ENOXAPARIN PER 10 MG: Performed by: INTERNAL MEDICINE

## 2021-08-06 PROCEDURE — 71045 X-RAY EXAM CHEST 1 VIEW: CPT

## 2021-08-06 PROCEDURE — 83880 ASSAY OF NATRIURETIC PEPTIDE: CPT | Performed by: NURSE PRACTITIONER

## 2021-08-06 PROCEDURE — 25010000002 MIDAZOLAM 50 MG/10ML SOLUTION: Performed by: NURSE PRACTITIONER

## 2021-08-06 PROCEDURE — 94799 UNLISTED PULMONARY SVC/PX: CPT

## 2021-08-06 PROCEDURE — 86140 C-REACTIVE PROTEIN: CPT | Performed by: INTERNAL MEDICINE

## 2021-08-06 PROCEDURE — 25010000002 FENTANYL CITRATE (PF) 2500 MCG/50ML SOLUTION: Performed by: NURSE PRACTITIONER

## 2021-08-06 PROCEDURE — 82803 BLOOD GASES ANY COMBINATION: CPT

## 2021-08-06 PROCEDURE — 25010000002 CEFTAZIDIME PER 500 MG: Performed by: INTERNAL MEDICINE

## 2021-08-06 PROCEDURE — 84484 ASSAY OF TROPONIN QUANT: CPT | Performed by: NURSE PRACTITIONER

## 2021-08-06 PROCEDURE — 94003 VENT MGMT INPAT SUBQ DAY: CPT

## 2021-08-06 PROCEDURE — 25010000002 DEXAMETHASONE PER 1 MG: Performed by: INTERNAL MEDICINE

## 2021-08-06 PROCEDURE — 85379 FIBRIN DEGRADATION QUANT: CPT | Performed by: NURSE PRACTITIONER

## 2021-08-06 PROCEDURE — 85610 PROTHROMBIN TIME: CPT | Performed by: NURSE PRACTITIONER

## 2021-08-06 PROCEDURE — 82330 ASSAY OF CALCIUM: CPT | Performed by: NURSE PRACTITIONER

## 2021-08-06 PROCEDURE — 85730 THROMBOPLASTIN TIME PARTIAL: CPT | Performed by: NURSE PRACTITIONER

## 2021-08-06 PROCEDURE — 63710000001 INSULIN LISPRO (HUMAN) PER 5 UNITS: Performed by: INTERNAL MEDICINE

## 2021-08-06 PROCEDURE — 84100 ASSAY OF PHOSPHORUS: CPT | Performed by: NURSE PRACTITIONER

## 2021-08-06 PROCEDURE — 82962 GLUCOSE BLOOD TEST: CPT

## 2021-08-06 PROCEDURE — 25010000002 HYDRALAZINE PER 20 MG: Performed by: NURSE PRACTITIONER

## 2021-08-06 PROCEDURE — 85384 FIBRINOGEN ACTIVITY: CPT | Performed by: NURSE PRACTITIONER

## 2021-08-06 PROCEDURE — 80053 COMPREHEN METABOLIC PANEL: CPT | Performed by: NURSE PRACTITIONER

## 2021-08-06 PROCEDURE — 85007 BL SMEAR W/DIFF WBC COUNT: CPT | Performed by: NURSE PRACTITIONER

## 2021-08-06 RX ORDER — METOPROLOL TARTRATE 50 MG/1
50 TABLET, FILM COATED ORAL EVERY 12 HOURS SCHEDULED
Status: DISCONTINUED | OUTPATIENT
Start: 2021-08-06 | End: 2021-08-07

## 2021-08-06 RX ADMIN — INSULIN LISPRO 4 UNITS: 100 INJECTION, SOLUTION INTRAVENOUS; SUBCUTANEOUS at 18:17

## 2021-08-06 RX ADMIN — FLUCONAZOLE 400 MG: 40 POWDER, FOR SUSPENSION ORAL at 20:52

## 2021-08-06 RX ADMIN — MINERAL OIL AND WHITE PETROLATUM: 150; 830 OINTMENT OPHTHALMIC at 04:27

## 2021-08-06 RX ADMIN — MINERAL OIL AND WHITE PETROLATUM: 150; 830 OINTMENT OPHTHALMIC at 16:00

## 2021-08-06 RX ADMIN — ALBUTEROL SULFATE 6 PUFF: 108 INHALANT RESPIRATORY (INHALATION) at 20:05

## 2021-08-06 RX ADMIN — CISATRACURIUM BESYLATE 7 MCG/KG/MIN: 10 INJECTION INTRAVENOUS at 19:25

## 2021-08-06 RX ADMIN — FENTANYL CITRATE 225 MCG/HR: 50 INJECTION, SOLUTION INTRAMUSCULAR; INTRAVENOUS at 12:45

## 2021-08-06 RX ADMIN — MINERAL OIL AND WHITE PETROLATUM: 150; 830 OINTMENT OPHTHALMIC at 00:43

## 2021-08-06 RX ADMIN — CEFTAZIDIME 2 G: 2 INJECTION, POWDER, FOR SOLUTION INTRAVENOUS at 15:37

## 2021-08-06 RX ADMIN — LINEZOLID 600 MG: 600 TABLET ORAL at 20:50

## 2021-08-06 RX ADMIN — CEFTAZIDIME 2 G: 2 INJECTION, POWDER, FOR SOLUTION INTRAVENOUS at 23:28

## 2021-08-06 RX ADMIN — CISATRACURIUM BESYLATE 7 MCG/KG/MIN: 10 INJECTION INTRAVENOUS at 23:17

## 2021-08-06 RX ADMIN — INSULIN LISPRO 10 UNITS: 100 INJECTION, SOLUTION INTRAVENOUS; SUBCUTANEOUS at 06:20

## 2021-08-06 RX ADMIN — FUROSEMIDE 20 MG: 10 INJECTION, SOLUTION INTRAMUSCULAR; INTRAVENOUS at 09:38

## 2021-08-06 RX ADMIN — Medication 2 PACKET: at 21:10

## 2021-08-06 RX ADMIN — DEXAMETHASONE SODIUM PHOSPHATE 6 MG: 4 INJECTION, SOLUTION INTRAMUSCULAR; INTRAVENOUS at 20:51

## 2021-08-06 RX ADMIN — FENTANYL CITRATE 225 MCG/HR: 50 INJECTION, SOLUTION INTRAMUSCULAR; INTRAVENOUS at 02:37

## 2021-08-06 RX ADMIN — ALBUTEROL SULFATE 6 PUFF: 108 INHALANT RESPIRATORY (INHALATION) at 07:02

## 2021-08-06 RX ADMIN — MINERAL OIL AND WHITE PETROLATUM: 150; 830 OINTMENT OPHTHALMIC at 20:49

## 2021-08-06 RX ADMIN — BUDESONIDE AND FORMOTEROL FUMARATE DIHYDRATE 2 PUFF: 160; 4.5 AEROSOL RESPIRATORY (INHALATION) at 07:02

## 2021-08-06 RX ADMIN — GUAIFENESIN 400 MG: 100 SOLUTION ORAL at 06:19

## 2021-08-06 RX ADMIN — Medication 600 MG: at 20:51

## 2021-08-06 RX ADMIN — INSULIN LISPRO 10 UNITS: 100 INJECTION, SOLUTION INTRAVENOUS; SUBCUTANEOUS at 11:23

## 2021-08-06 RX ADMIN — METOPROLOL TARTRATE 5 MG: 5 INJECTION INTRAVENOUS at 16:21

## 2021-08-06 RX ADMIN — ENOXAPARIN SODIUM 40 MG: 40 INJECTION SUBCUTANEOUS at 08:02

## 2021-08-06 RX ADMIN — INSULIN LISPRO 8 UNITS: 100 INJECTION, SOLUTION INTRAVENOUS; SUBCUTANEOUS at 00:43

## 2021-08-06 RX ADMIN — INSULIN LISPRO 10 UNITS: 100 INJECTION, SOLUTION INTRAVENOUS; SUBCUTANEOUS at 18:06

## 2021-08-06 RX ADMIN — MIDAZOLAM 10 MG/HR: 5 INJECTION INTRAMUSCULAR; INTRAVENOUS at 21:03

## 2021-08-06 RX ADMIN — METOPROLOL TARTRATE 5 MG: 5 INJECTION INTRAVENOUS at 11:23

## 2021-08-06 RX ADMIN — INSULIN LISPRO 8 UNITS: 100 INJECTION, SOLUTION INTRAVENOUS; SUBCUTANEOUS at 06:20

## 2021-08-06 RX ADMIN — INSULIN GLARGINE 45 UNITS: 100 INJECTION, SOLUTION SUBCUTANEOUS at 21:45

## 2021-08-06 RX ADMIN — Medication 2 PACKET: at 09:38

## 2021-08-06 RX ADMIN — GUAIFENESIN 400 MG: 100 SOLUTION ORAL at 18:06

## 2021-08-06 RX ADMIN — METOPROLOL TARTRATE 50 MG: 50 TABLET, FILM COATED ORAL at 06:19

## 2021-08-06 RX ADMIN — BUDESONIDE AND FORMOTEROL FUMARATE DIHYDRATE 2 PUFF: 160; 4.5 AEROSOL RESPIRATORY (INHALATION) at 20:07

## 2021-08-06 RX ADMIN — MINERAL OIL AND WHITE PETROLATUM: 150; 830 OINTMENT OPHTHALMIC at 09:39

## 2021-08-06 RX ADMIN — GUAIFENESIN 400 MG: 100 SOLUTION ORAL at 11:23

## 2021-08-06 RX ADMIN — INSULIN HUMAN 18 UNITS: 100 INJECTION, SOLUTION PARENTERAL at 08:02

## 2021-08-06 RX ADMIN — INSULIN GLARGINE 45 UNITS: 100 INJECTION, SOLUTION SUBCUTANEOUS at 08:02

## 2021-08-06 RX ADMIN — Medication 600 MG: at 08:03

## 2021-08-06 RX ADMIN — Medication 10 ML: at 20:53

## 2021-08-06 RX ADMIN — Medication 2000 UNITS: at 08:03

## 2021-08-06 RX ADMIN — PANTOPRAZOLE SODIUM 40 MG: 40 INJECTION, POWDER, FOR SOLUTION INTRAVENOUS at 18:16

## 2021-08-06 RX ADMIN — CISATRACURIUM BESYLATE 5 MCG/KG/MIN: 10 INJECTION INTRAVENOUS at 15:57

## 2021-08-06 RX ADMIN — ASPIRIN 324 MG: 81 TABLET, CHEWABLE ORAL at 08:02

## 2021-08-06 RX ADMIN — LINEZOLID 600 MG: 600 TABLET ORAL at 08:03

## 2021-08-06 RX ADMIN — INSULIN LISPRO 8 UNITS: 100 INJECTION, SOLUTION INTRAVENOUS; SUBCUTANEOUS at 11:24

## 2021-08-06 RX ADMIN — MIDAZOLAM 9 MG/HR: 5 INJECTION INTRAMUSCULAR; INTRAVENOUS at 04:27

## 2021-08-06 RX ADMIN — ALBUTEROL SULFATE 6 PUFF: 108 INHALANT RESPIRATORY (INHALATION) at 11:30

## 2021-08-06 RX ADMIN — PANTOPRAZOLE SODIUM 40 MG: 40 INJECTION, POWDER, FOR SOLUTION INTRAVENOUS at 08:01

## 2021-08-06 RX ADMIN — FUROSEMIDE 20 MG: 10 INJECTION, SOLUTION INTRAMUSCULAR; INTRAVENOUS at 21:45

## 2021-08-06 RX ADMIN — FENTANYL CITRATE 275 MCG/HR: 50 INJECTION, SOLUTION INTRAMUSCULAR; INTRAVENOUS at 23:17

## 2021-08-06 RX ADMIN — MINERAL OIL AND WHITE PETROLATUM: 150; 830 OINTMENT OPHTHALMIC at 12:00

## 2021-08-06 RX ADMIN — Medication 10 ML: at 09:45

## 2021-08-06 RX ADMIN — INSULIN HUMAN 18 UNITS: 100 INJECTION, SOLUTION PARENTERAL at 20:51

## 2021-08-06 RX ADMIN — DEXAMETHASONE SODIUM PHOSPHATE 6 MG: 4 INJECTION, SOLUTION INTRAMUSCULAR; INTRAVENOUS at 08:03

## 2021-08-06 RX ADMIN — FENTANYL CITRATE 225 MCG/HR: 50 INJECTION, SOLUTION INTRAMUSCULAR; INTRAVENOUS at 08:05

## 2021-08-06 RX ADMIN — CISATRACURIUM BESYLATE 3.5 MCG/KG/MIN: 10 INJECTION INTRAVENOUS at 02:37

## 2021-08-06 RX ADMIN — ENOXAPARIN SODIUM 40 MG: 40 INJECTION SUBCUTANEOUS at 20:50

## 2021-08-06 RX ADMIN — MIDAZOLAM 10 MG/HR: 5 INJECTION INTRAMUSCULAR; INTRAVENOUS at 14:36

## 2021-08-06 RX ADMIN — CEFTAZIDIME 2 G: 2 INJECTION, POWDER, FOR SOLUTION INTRAVENOUS at 08:01

## 2021-08-06 RX ADMIN — ALBUTEROL SULFATE 6 PUFF: 108 INHALANT RESPIRATORY (INHALATION) at 15:50

## 2021-08-06 RX ADMIN — HYDRALAZINE HYDROCHLORIDE 10 MG: 20 INJECTION INTRAMUSCULAR; INTRAVENOUS at 15:37

## 2021-08-06 RX ADMIN — MIDAZOLAM 9 MG/HR: 5 INJECTION INTRAMUSCULAR; INTRAVENOUS at 09:38

## 2021-08-06 RX ADMIN — INSULIN LISPRO 10 UNITS: 100 INJECTION, SOLUTION INTRAVENOUS; SUBCUTANEOUS at 00:43

## 2021-08-06 RX ADMIN — FENTANYL CITRATE 225 MCG/HR: 50 INJECTION, SOLUTION INTRAMUSCULAR; INTRAVENOUS at 17:36

## 2021-08-06 NOTE — CASE MANAGEMENT/SOCIAL WORK
Continued Stay Note  EMANUEL Wasserman     Patient Name: Leslie Harris  MRN: [id number]  Today's Date: 8/6/2021    Admit Date: 7/22/2021    Discharge Plan     Row Name 08/06/21 0932       Plan    Plan  D/C Plan : Pt is COVID-19 + and remains on the vent    Plan Comments  Barrier to D/C : Pt is COVID-19 + and is on the vent at 50% and 12 of peep .        Discharge Codes    No documentation.       Expected Discharge Date and Time     Expected Discharge Date Expected Discharge Time    Jul 30, 2021             Lissa Holden RN

## 2021-08-06 NOTE — PROGRESS NOTES
"Nutrition Services    Patient Name: Leslie Harris  YOB: 1990  MRN: [id number]  Admission date: 7/22/2021    Comments:    EN Prescription: Novasource Renal at 45 mL/hr + 20 mL/hr water flush, 2 packets of beneprotein BID    PPE Documentation        PPE Worn By Provider RD did not enter room for this encounter   PPE Worn By Patient  -      CLINICAL NUTRITION ASSESSMENT       Reason for Assessment Follow up protocol    7/23: MST 3/TF consult      H&P:  Per H&P \"Leslie Harris is a 30 y.o. female who presented to [facility] ED on 7/22/2021 with complaint of being recently diagnosed with COVID-19 at the [facility] 6 days prior to admission.  Patient reports that she has been having increased shortness of breath over the past couple days, and has not lost her sense of smell, but has lost her sense of taste, reports frequent, nonproductive cough, myalgias, fevers, and chills\"     Past Medical History:   Diagnosis Date   • Class 3 severe obesity without serious comorbidity with body mass index (BMI) of 50.0 to 59.9 in adult 7/22/2021   • Diabetes mellitus type 2 in obese (CMS/HCC) 7/30/2021       Past Surgical History:   Procedure Laterality Date   • CHOLECYSTECTOMY     • EXPLORATORY LAPAROTOMY W/ BOWEL RESECTION  2018    Due to complication of cholecystectomy in which patient had an accidental perforation of small bowel intraoperatively.        Current Problems:   Pneumonia due to COVID-19 virus  Acute hypoxic respiratory failure due to COVID-19  -Previously on Continuous AVAPs  -Intubated 7/23     Hyperglycemia, with new diagnosis of T2DM  -Hgb A1c 6.9%    HTN     Nutrition/Diet History         Narrative     8/6: Patient discussed during AM rounds with critical care team. Plans to move pt from prone back to supine today. D/w HAMMAD Brown TF charted at 60 mL/hr, RN reports it is at 45 mL/hr. Continue current TF regimen    8/3: Patient discussed at critical care AM rounds. Remains " "intubated, sedated, and paralyzed. No longer in prone position. RN did report patient with blood tinged residuals this AM, TFs were briefly held. MD okayed to resume TFs.     7/30: Patient discussed at critical care AM rounds, pt is now in the prone position. Continues on TF, no BM x8 days. Have been conservatively advancing d/t continuous paralytic, constipation, and now prone.    7/27: Patient discussed at critical care AM rounds. Okay to advance TF rate per discussion in rounds, still no BM yet while on bowel regimen.    7/23: Patient discussed at critical care AM rounds. Patient requiring continuous AVAPs and not able to come off to consume adequate nutrition. Tube feed consult received. At visit this AM, visually observed patient sleeping on AVAPs through glass window. At second visit patient receiving complex care from several healthcare workers. At time of documentation appears patient is now intubated, sedated on propofol, paralyzed, and on 1 pressor.      Functional Status Likely Independent of ADLs PTA given young age.      Food Allergies NKFA        Anthropometrics        Current Height, Weight Height: 165.1 cm (65\")  Weight: (!) 159 kg (351 lb 3.1 oz) (08/06/21 0600)        Admit Height, Weight     Flowsheet Rows      First Filed Value   Admission Height  165.1 cm (65\") Documented at 07/22/2021 1218   Admission Weight  (!) 145 kg (320 lb) Documented at 07/22/2021 1218             Ideal Body Weight (IBW) 56.8 kg (125 lb)    % Ideal Body Weight 281%        Usual Body Weight UTD   % Usual Body Weight UTD   Wt Change Observation Current Admission:  8/6: CBW down from last encounter but trending up from admission. 4.8 Liters positive  8/3: CBW is down slightly from last review, up still up overall. + 8.2 L per I/Os.   7/30: CBW is from 7/28, c/w recent wt trend  7/27: CBW trending up, 6.2 Liters positive  7/23: Noted two admit weights of 320 & 356 lb. ? [employer].     PTA:  No weight hx available PTA  "   Weight Hx    Wt Readings from Last 30 Encounters:   08/06/21 0600 (!) 159 kg (351 lb 3.1 oz)   08/04/21 0526 (!) 162 kg (356 lb 0.7 oz)   08/03/21 0542 (!) 163 kg (359 lb 12.7 oz)   07/28/21 0400 (!) 167 kg (367 lb 4.6 oz)   07/27/21 1215 (!) 167 kg (368 lb)   07/27/21 0600 (!) 167 kg (368 lb 2.7 oz)   07/26/21 0548 (!) 165 kg (363 lb 12.1 oz)   07/25/21 0500 (!) 167 kg (367 lb 8.1 oz)   07/24/21 0600 (!) 165 kg (363 lb 12.1 oz)   07/22/21 1449 (!) 162 kg (356 lb 7.7 oz)   07/22/21 1218 (!) 145 kg (320 lb)        BMI kg/m2 Body mass index is 58.44 kg/m².       Labs/Medications         Pertinent Labs    Results from last 7 days   Lab Units 08/06/21 0441 08/05/21 0355 08/04/21  0514   SODIUM mmol/L 132* 133* 133*   POTASSIUM mmol/L 4.8 5.2 5.3*   CHLORIDE mmol/L 90* 89* 93*   CO2 mmol/L 35.0* 35.0* 33.0*   BUN mg/dL 28* 27* 28*   CREATININE mg/dL 0.45* 0.42* 0.45*   CALCIUM mg/dL 9.8 9.9 9.3   BILIRUBIN mg/dL 1.2 1.0 0.7   ALK PHOS U/L 73 83 73   ALT (SGPT) U/L 197* 173* 139*   AST (SGOT) U/L 72* 82* 78*   GLUCOSE mg/dL 203* 209* 237*     Results from last 7 days   Lab Units 08/06/21 0441 08/05/21 0355 08/05/21  0355 08/04/21  0514 08/04/21  0514   MAGNESIUM mg/dL 1.8  --  2.0  --  2.0   PHOSPHORUS mg/dL 4.5   < > 4.3   < > 4.9*   HEMOGLOBIN g/dL 9.2*   < > 9.6*   < > 9.2*   HEMATOCRIT % 27.6*   < > 28.7*   < > 28.2*    < > = values in this interval not displayed.     COVID19   Date Value Ref Range Status   07/22/2021 Detected (C) Not Detected - Ref. Range Final     Lab Results   Component Value Date    HGBA1C 6.9 (H) 07/23/2021         Pertinent Medications Acetylcysteine, aspirin, fortaz, Vitamin D3, Decadron, diflucan, lasix, mucinex, lantus, admelog, humulin R, zyvox, protonix, nimbex, fentanyl, versed     Physical Findings        Overall Physical   Appearance, MSA 8/6: Patient remains very close to window/doorway, visually continues to appear well-nourished from what is able to be observed in prone  "position  8/3: Patient remains very close to window/doorway, visually continues to appear well-nourished.   7/30: observed via glass window/doorway, visually appears well-nourished  7/27: observed via glass window/doorway, visually appears well-nourished  7/23: Patient laying outside of blankets/sheets. Able to clearly visualize patient. Appears well nourished.    -  Edema  2+ generalized edema  2+ face edema, periorbital, tongue, hands   Gastrointestinal Noted previous issues with constipation, then had FMS. FMS removed. Last BM documented on 8/4 (x2 days ago)     Tubes NG     Oral/Mouth Cavity No known chewing or swallowing issues      Skin DTI on (L) distal nose  DTI on midline tongue  Stage II on sacral spine     Estimated/Assessed Needs    Estimated 8/3/21   *remains current 8/6/2021   Energy Requirements    Height for Calculation  Height: 165.1 cm (65\")   Weight for Calculation 56.8 kg (IBW)    Method for Estimation  30-35 kcals/kg    EST Needs (kcal/day) 4279-9611 kcals     For Comparison 24-29 kcals/kg of 83 kg (AdjIBW) per COVID LEEP study= 5512-3481 kcals        Protein Requirements    Weight for Calculation 56.8 kg (125 lb)    EST Protein Needs (g/kg) 2.5 g/kg    EST Daily Needs (g/day) 142 g /day       Fluid Requirements     Estimated Needs (mL/day) 5040-9895 mL/day  (1 mL/kcal-adjust based on hydration status)        Fluid Deficit    Current Na Level (mEq/L) -   Desired Na Level (mEq/L) -   Estimated Fluid Deficit (L)  -     Current Nutrition Orders & Evaluation of Received Nutrient/Fluid Intake       Oral Nutrition     Current PO Diet NPO Diet   Supplement None    PO Evaluation     % PO Intake 7/27 to present (8/6): 0% - NPO, on TF    7/23: 0% x 1 meal documented    -  Enteral Nutrition    Enteral Route NGT   TF Modular 2 packets beneprotein BID   TF Delivery Method Continuous   Current Ordered TF  Novasource Renal at 45 mL/hr   Current Ordered H2O flush  20 mL/hr water flush    TF Observation  TF now " corrected to above documentation in EMR   EN Evaluation     TF Changes Continue current EN regimen    TF Residual WNL    TF Tolerance No reported s/sx of intolerance    Average EN Delivered -       Parenteral Nutrition     TPN Route -   Current Ordered TPN VOL  -   Dextrose (g/kcals)  -   Amino Acid (g/kcals) -   Lipids (mL/Concentration/FREQ)  -   MVI Frequency  -   Trace Element Frequency  -   TPN Observation  -    TPN Evaluation    Total # Days on TPN -   -  Clinical Course    Nutrition Course Details PO Diet:    7/22-7/23 Regular  7/23  to present (8/6) NPO    Nutrition Support:  7/23: TF to be started today   Conservatively advanced d/t paralytic/instability  7/30 TF at goal rate and continues at present (8/6)      Nutritional Risk Screening        NRS-2002 Score   -       Nutrition Diagnosis         Nutrition Dx Problem 1 Inadequate oral intake r/t acute respiratory failure due to COVID-19 requiring mechanical ventilation AEB NPO diet order.       Nutrition Dx Problem 2 -       Intervention Goal         Intervention Goal(s) Patient will tolerate EN      Nutrition Intervention        RD/Tech Action Changing EN regimen given prolonged COVID-19 infection & likely entering into more catabolic phase of infection        Nutrition Prescription          Diet Prescription NPO    Supplement Prescription -   -  Enteral Prescription Novasource Renal at 45 mL/hr x22 hrs (assumes interruptions for ADLs)+ 2 packets beneprotein BID provides 2080 kcal (105% of upper end of estimated needs), 114 g of protein (80%), and 713 mL free H2O + 20 mL/hr (x22 hrs = 440 mL/day) = 1153 mL free H2O, +Rx flushes, IVF. Free water flushes adjusted per clinical picture.     Difficult to meet full protein needs d/t moderating amount of K+. Will monitor regimen and adjust as appropriate.       TPN Prescription -     Monitor/Evaluation        Monitor EN and tolerance, I/Os, Labs, BM, weight, skin and medication changes      Electronically signed  by:  Leslie Hugo RD  08/06/21 09:07 EDT

## 2021-08-06 NOTE — PROGRESS NOTES
Infectious Diseases Progress Note      LOS: 15 days   Patient Care Team:  Provider, No Known as PCP - General    Chief Complaint: Debated and sedated    Subjective       Patient has been afebrile but is still currently on a cooling blanket.  She is intubated at 50% FiO2 and 14 of PEEP.  Patient is sedated and paralyzed but is currently on no pressor support.  She is in the prone position.      Review of Systems:   Review of Systems   Unable to perform ROS: Intubated        Objective     Vital Signs  Temp:  [99 °F (37.2 °C)-99.7 °F (37.6 °C)] 99.7 °F (37.6 °C)  Heart Rate:  [] 126  Resp:  [31-36] 36  BP: ()/(50-85) 131/78  Arterial Line BP: ()/() 180/97  FiO2 (%):  [50 %] 50 %    Physical Exam:  Physical Exam  Vitals and nursing note reviewed.   Constitutional:       General: She is not in acute distress.     Appearance: Normal appearance. She is well-developed. She is obese. She is ill-appearing. She is not diaphoretic.   HENT:      Head: Normocephalic and atraumatic.   Cardiovascular:      Rate and Rhythm: Normal rate and regular rhythm.      Heart sounds: Normal heart sounds, S1 normal and S2 normal. No murmur heard.     Pulmonary:      Effort: Pulmonary effort is normal. No respiratory distress.      Breath sounds: No stridor. Rales present. No wheezing.      Comments: Intubated and currently in the prone position  Chest:      Chest wall: No tenderness.   Abdominal:      General: Bowel sounds are normal. There is no distension.      Palpations: Abdomen is soft. There is no mass.      Tenderness: There is no abdominal tenderness. There is no guarding.      Comments: NG tube   Genitourinary:     Comments: Ma catheter  Musculoskeletal:         General: No swelling, tenderness or deformity.      Cervical back: Neck supple.   Skin:     General: Skin is warm and dry.      Coloration: Skin is not pale.      Findings: No bruising, erythema or rash.   Neurological:      Mental Status: She is  alert.      Cranial Nerves: No cranial nerve deficit.      Comments: Intubated, paralyzed, sedated          Results Review:    I have reviewed all clinical data, test, lab, and imaging results.     Radiology  No Radiology Exams Resulted Within Past 24 Hours    Cardiology    Laboratory    Results from last 7 days   Lab Units 08/06/21  0441 08/05/21  0355 08/04/21  0514 08/03/21  1527 08/03/21  0258 08/02/21  0408 08/01/21  0359 07/31/21  0556 07/31/21  0556   WBC 10*3/mm3 16.90* 19.60* 16.70*  --  21.80* 17.20* 14.70*  --  10.40   HEMOGLOBIN g/dL 9.2* 9.6* 9.2* 9.2* 9.8* 10.0* 11.0*   < > 10.9*   HEMATOCRIT % 27.6* 28.7* 28.2* 28.2* 29.4* 30.5* 33.8*   < > 33.0*   PLATELETS 10*3/mm3 255 300 274  --  374 302 338  --  276    < > = values in this interval not displayed.     Results from last 7 days   Lab Units 08/06/21 0441 08/05/21  0355 08/04/21  0514 08/03/21  0258 08/02/21  0408 08/01/21  0359 07/31/21  0556   SODIUM mmol/L 132* 133* 133* 136 137 137 137   POTASSIUM mmol/L 4.8 5.2 5.3* 5.0 4.7 5.7* 5.0   CHLORIDE mmol/L 90* 89* 93* 96* 94* 94* 95*   CO2 mmol/L 35.0* 35.0* 33.0* 33.0* 34.0* 38.0* 37.0*   BUN mg/dL 28* 27* 28* 28* 28* 22* 21*   CREATININE mg/dL 0.45* 0.42* 0.45* 0.39* 0.39* 0.39* 0.36*   GLUCOSE mg/dL 203* 209* 237* 247* 286* 248* 226*   ALBUMIN g/dL 4.60 4.50 3.90 3.10* 2.90* 3.00* 2.80*   BILIRUBIN mg/dL 1.2 1.0 0.7 0.6 0.6 0.5 0.7   ALK PHOS U/L 73 83 73 68 66 74 70   AST (SGOT) U/L 72* 82* 78* 53* 52* 59* 49*   ALT (SGPT) U/L 197* 173* 139* 96* 98* 96* 70*   CALCIUM mg/dL 9.8 9.9 9.3 9.2 9.1 9.2 8.9     Results from last 7 days   Lab Units 08/04/21  0514   CK TOTAL U/L 84             Microbiology   Microbiology Results (last 10 days)     Procedure Component Value - Date/Time    Respiratory Culture - Sputum, Bronchus [[id number]]  (Abnormal)  (Susceptibility) Collected: 08/04/21 1150    Lab Status: Final result Specimen: Sputum from Bronchus Updated: 08/06/21 0823     Respiratory Culture Scant  growth (1+) Klebsiella pneumoniae ssp pneumoniae      Rare Normal Respiratory Sondra: NO S.aureus/MRSA or Pseudomonas aeruginosa     Gram Stain Few (2+) WBCs per low power field      No organisms seen    Susceptibility      Klebsiella pneumoniae ssp pneumoniae      FITZ      Ampicillin Resistant      Ampicillin + Sulbactam Resistant      Cefepime Susceptible      Ceftazidime Susceptible      Ceftriaxone Susceptible      Gentamicin Susceptible      Levofloxacin Intermediate      Piperacillin + Tazobactam Susceptible      Tetracycline Resistant      Trimethoprim + Sulfamethoxazole Susceptible               Linear View               Susceptibility Comments     Klebsiella pneumoniae ssp pneumoniae    Cefazolin sensitivity will not be reported for Enterobacteriaceae in non-urine isolates. If cefazolin is preferred, please call the microbiology lab to request an E-test.  With the exception of urinary-sourced infections, aminoglycosides should not be used as monotherapy.             Clostridium Difficile EIA - Stool, Per Rectum [[id number]]  (Normal) Collected: 08/03/21 1600    Lab Status: Final result Specimen: Stool from Per Rectum Updated: 08/04/21 0927     C Diff GDH / Toxin Negative    Urine Culture - Urine, Urine, Catheter [[id number]]  (Normal) Collected: 08/03/21 1036    Lab Status: Final result Specimen: Urine, Catheter Updated: 08/04/21 1311     Urine Culture No growth    Respiratory Culture - Sputum, ET Suction [[id number]]  (Abnormal)  (Susceptibility) Collected: 07/29/21 2038    Lab Status: Final result Specimen: Sputum from ET Suction Updated: 08/02/21 1315     Respiratory Culture Scant growth (1+) Candida albicans      Scant growth (1+) Staphylococcus aureus, MRSA     Comment: Methicillin resistant Staphylococcus aureus, Patient may be an isolation risk.         No Normal Respiratory Sondra     Gram Stain Few (2+) WBCs per low power field      Rare (1+) Epithelial cells per low power field      Few (2+) Budding  yeast    Susceptibility      Staphylococcus aureus, MRSA      FITZ      Clindamycin Susceptible      Linezolid Susceptible      Oxacillin Resistant      Penicillin G Resistant      Tetracycline Susceptible      Trimethoprim + Sulfamethoxazole Susceptible      Vancomycin Susceptible               Linear View                   Blood Culture - Blood, Blood, Central Line [[id number]] Collected: 07/29/21 1153    Lab Status: Final result Specimen: Blood, Central Line Updated: 08/03/21 1231     Blood Culture No growth at 5 days    Blood Culture - Blood, Blood, Arterial Line [[id number]] Collected: 07/29/21 1136    Lab Status: Final result Specimen: Blood, Arterial Line Updated: 08/03/21 1231     Blood Culture No growth at 5 days          Medication Review:       Schedule Meds  Acetylcysteine, 600 mg, Oral, BID  albuterol sulfate HFA, 6 puff, Inhalation, 4x Daily - RT  artificial tears, , Both Eyes, Q4H  aspirin, 324 mg, Per G Tube, Daily  Beneprotein, 2 packet, Nasogastric, BID  budesonide-formoterol, 2 puff, Inhalation, BID - RT  cefTAZidime, 2 g, Intravenous, Q8H  cholecalciferol, 2,000 Units, Nasogastric, Daily  dexamethasone, 6 mg, Intravenous, Q12H  enoxaparin, 40 mg, Subcutaneous, Q12H  fluconazole, 400 mg, Oral, Nightly  furosemide, 20 mg, Intravenous, Q12H  guaiFENesin, 400 mg, Nasogastric, Q6H  insulin glargine, 45 Units, Subcutaneous, Q12H  insulin lispro, 0-24 Units, Subcutaneous, Q6H  insulin lispro, 10 Units, Subcutaneous, Q6H  insulin regular, 18 Units, Subcutaneous, Q12H  linezolid, 600 mg, Oral, Q12H  metoprolol tartrate, 50 mg, Oral, Q12H  pantoprazole, 40 mg, Intravenous, BID AC  sodium chloride, 10 mL, Intravenous, Q12H        Infusion Meds  cisatracurium (NIMBEX) infusion, 0.5-10.2 mcg/kg/min, Last Rate: 5 mcg/kg/min (08/06/21 1400)  fentanyl 10 mcg/mL,  mcg/hr, Last Rate: 225 mcg/hr (08/06/21 1245)  midazolam, 1-10 mg/hr, Last Rate: 9 mg/hr (08/06/21 0938)  Pharmacy to Dose enoxaparin (LOVENOX),          PRN Meds  •  acetaminophen  •  acetaminophen **OR** acetaminophen  •  aluminum-magnesium hydroxide-simethicone  •  artificial tears  •  benzonatate  •  cisatracurium  •  dextrose  •  dextrose  •  [COMPLETED] dilTIAZem **FOLLOWED BY** [] dilTIAZem **FOLLOWED BY** dilTIAZem  •  glucagon (human recombinant)  •  glycopyrrolate PF  •  hydrALAZINE  •  insulin lispro **AND** insulin lispro  •  magnesium sulfate **OR** magnesium sulfate in D5W 1g/100mL (PREMIX)  •  metoprolol tartrate  •  nitroglycerin  •  ondansetron **OR** ondansetron  •  Pharmacy to Dose enoxaparin (LOVENOX)  •  potassium chloride **OR** potassium chloride **OR** potassium chloride  •  potassium phosphate infusion greater than 15 mMoles **OR** potassium phosphate infusion greater than 15 mMoles **OR** potassium phosphate **OR** sodium phosphate IVPB **OR** sodium phosphate IVPB **OR** sodium phosphate IVPB  •  [COMPLETED] Insert peripheral IV **AND** sodium chloride  •  sodium chloride        Assessment/Plan       Antimicrobial Therapy   1.  Zyvox      day  2.      Day  3.      Day  4.      Day  5.      Day      Assessment     Severe COVID-19 infection and patient with significant comorbidities including diabetes mellitus and morbid obesity. Apparently did not receive vaccination for COVID-19  COVID-19 screen on admission on 2021 was positive. Was reported that patient had positive COVID-19 diagnosis 6 days prior to admission  The patient had received 5 days of IV remdesivir     Severe respiratory failure on the ventilator currently on 70% FiO2 and 16 PEEP.  Chest x-ray consistent with severe COVID-19 infection and ARDS     Probable ventilator associated pneumonia versus hospitalist acquired pneumonia. Sputum culture from 2021 grew MRSA and the organism is susceptible to linezolid and vancomycin  The patient was started on linezolid on 2021  Repeat sputum culture from 2021 is growing Klebsiella  pneumoniae     Morbid obesity     Diabetes mellitus     Reactive leukocytosis secondary to steroids    Elevated CK.  CK is back to normal     Plan     Continue Zyvox 600 mg every 12 hours for 10 to 14 days and monitor platelets carefully during treatment with Zyvox  Start ceftazidime 2 g IV every 8 hours  Supportive care  Prognosis is very guarded and might be poor secondary to severe COVID-19 infection  Continue dexamethasone for now, pulmonary service is following and monitoring     Continue COVID-19 isolation for total of 20 days from the first diagnosis  Labs in a.m. including : CBC with differential, CMP,  d-dimer, ferritin and CRP    Appropriate PPE was placed on before entering the room    Neela Alvarado MD  08/06/21  14:19 EDT     Note is dictated utilizing voice recognition software/Dragon

## 2021-08-06 NOTE — PROGRESS NOTES
"PULMONARY CRITICAL CARE Progress  NOTE      PATIENT IDENTIFICATION:  Name: Leslie Harris  MRN: [id number]  :  1990     Age: 30 y.o.  Sex: female    DATE OF Note:  2021   Referring Physician: Jeff Feng MD                  Subjective:   On vent FIO2 down to 40% PEEP 13  Still paralyzed in brone position  Sedated   Tolerating tube feed    no nausea or vomiting,   Small BM  Good urine out put cloudy   no new  skin rash or itching.      Objective:  tMax 24 hrs: Temp (24hrs), Av.4 °F (37.4 °C), Min:99 °F (37.2 °C), Max:99.6 °F (37.6 °C)      Vitals Ranges:   Temp:  [99 °F (37.2 °C)-99.6 °F (37.6 °C)] 99.5 °F (37.5 °C)  Heart Rate:  [] 88  Resp:  [31-36] 36  BP: ()/(50-85) 129/72  Arterial Line BP: ()/() 158/115  FiO2 (%):  [40 %-50 %] 50 %    Intake and Output Last 3 Shifts:   I/O last 3 completed shifts:  In: 4663 [I.V.:2313; Other:708; NG/GT:1642]  Out: 6050 [Urine:6050]    Exam:  /72   Pulse 88   Temp 99.5 °F (37.5 °C) (Bladder)   Resp (!) 36   Ht 165.1 cm (65\")   Wt (!) 159 kg (351 lb 3.1 oz)   LMP 2021   SpO2 98%   BMI 58.44 kg/m²     General Appearance:   On vent sedated  HEENT:  Normocephalic, without obvious abnormality, Conjunctiva/corneas clear,.  Normal external ear canals, Nares normal, no drainage     Neck:  Supple, symmetrical, trachea midline. No JVD.  Lungs /Chest wall:   Bilateral basal rhonchi, respirations unlabored symmetrical wall movement.     Heart:  Regular rate and rhythm, systolic murmur PMI left sternal border  Abdomen: Soft, non-tender, no masses, no organomegaly.    Extremities: ++ edema no clubbing or Cyanosis        Medications:    Current Facility-Administered Medications:   •  acetaminophen (TYLENOL) 160 MG/5ML solution 650 mg, 650 mg, Oral, Q6H PRN, Radha Jaquez MD, 650 mg at 21 1236  •  acetaminophen (TYLENOL) tablet 650 mg, 650 mg, Oral, Q4H PRN, 650 mg at 21 0012 **OR** acetaminophen (TYLENOL) " suppository 650 mg, 650 mg, Rectal, Q4H PRN, Ryder Llanes APRN, 650 mg at 07/26/21 0153  •  Acetylcysteine capsule 600 mg, 600 mg, Oral, BID, Ryder Llanes APRN, 600 mg at 08/06/21 0803  •  albumin human 25 % IV SOLN 25 g, 25 g, Intravenous, Q12H, 25 g at 08/05/21 2210 **AND** furosemide (LASIX) injection 20 mg, 20 mg, Intravenous, Q12H, Ryder Llanes APRN, 20 mg at 08/05/21 2315  •  albuterol sulfate HFA (PROVENTIL HFA;VENTOLIN HFA;PROAIR HFA) inhaler 6 puff, 6 puff, Inhalation, 4x Daily - RT, Ryder Llanes APRN, 6 puff at 08/06/21 0702  •  aluminum-magnesium hydroxide-simethicone (MAALOX MAX) 400-400-40 MG/5ML suspension 15 mL, 15 mL, Oral, Q6H PRN, Ryder Llanes APRN  •  artificial tears ophthalmic ointment, , Both Eyes, Q4H, Ryder Llanes APRN, Given at 08/06/21 0427  •  artificial tears ophthalmic ointment, , Both Eyes, PRN, Ryder Llanes APRN  •  aspirin chewable tablet 324 mg, 324 mg, Per G Tube, Daily, Ryder Llanes APRN, 324 mg at 08/06/21 0802  •  Beneprotein packet 2 packet, 2 packet, Nasogastric, BID, Leslie Hugo RD, 2 packet at 08/05/21 2114  •  benzonatate (TESSALON) capsule 100 mg, 100 mg, Oral, TID PRN, Ryder Llanes APRRODOLFO  •  budesonide-formoterol (SYMBICORT) 160-4.5 MCG/ACT inhaler 2 puff, 2 puff, Inhalation, BID - RT, Ryder Llanes APRN, 2 puff at 08/06/21 0702  •  cefTAZidime (FORTAZ) 2 g in sodium chloride 0.9 % 100 mL IVPB, 2 g, Intravenous, Q8H, Neela Alvarado MD, Last Rate: 200 mL/hr at 08/06/21 0801, 2 g at 08/06/21 0801  •  cholecalciferol (VITAMIN D3) tablet 2,000 Units, 2,000 Units, Nasogastric, Daily, Ryder Llanes APRN, 2,000 Units at 08/06/21 0803  •  cisatracurium (NIMBEX) bolus from bag 2 mg/mL 8.36 mg, 50 mcg/kg, Intravenous, Once PRN, Jessica Fajardo APRN  •  cisatracurium besylate (NIMBEX) 200 mg in sodium chloride 0.9 % 100 mL (2 mg/mL) infusion, 0.5-10.2 mcg/kg/min, Intravenous, Titrated, Jessica Fajardo APRN, Last Rate: 15.03 mL/hr at 08/06/21 0438,  3 mcg/kg/min at 21 0438  •  dexamethasone (DECADRON) injection 6 mg, 6 mg, Intravenous, Q12H, Radha Jaquez MD, 6 mg at 21 0803  •  dextrose (D50W) 25 g/ 50mL Intravenous Solution 25 g, 25 g, Intravenous, Q15 Min PRN, Ryder Llanes APRN  •  dextrose (GLUTOSE) oral gel 15 g, 15 g, Oral, Q15 Min PRN, Ryder Llanes APRN  •  [COMPLETED] dilTIAZem (CARDIZEM) injection 20 mg, 20 mg, Intravenous, Once, 20 mg at 21 1508 **FOLLOWED BY** [] dilTIAZem (CARDIZEM) 100 mg in 100 mL NS infusion (ADV), 5-15 mg/hr, Intravenous, Titrated, Stopped at 21 1020 **FOLLOWED BY** dilTIAZem (CARDIZEM) bolus from bag 1 mg/mL 20 mg, 20 mg, Intravenous, Q30 Min PRN, Jessica Fajardo APRN  •  enoxaparin (LOVENOX) syringe 40 mg, 40 mg, Subcutaneous, Q12H, Jeff Feng MD, 40 mg at 21 08  •  fentaNYL 1000 mcg in 100 mL NS infusion,  mcg/hr, Intravenous, Titrated, Day, Jessica, APRN, Last Rate: 22.5 mL/hr at 21 08, 225 mcg/hr at 21 08  •  fluconazole (DIFLUCAN) 40 MG/ML suspension 400 mg, 400 mg, Oral, Nightly, Day, Jessica, APRN, 400 mg at 21  •  glucagon (human recombinant) (GLUCAGEN DIAGNOSTIC) injection 1 mg, 1 mg, Subcutaneous, Q15 Min PRN, Ryder Llanes APRN  •  glycopyrrolate PF (ROBINUL) injection 0.2 mg, 0.2 mg, Intravenous, Q4H PRN, Marianna Navarrete APRN, 0.2 mg at 21  •  guaiFENesin solution 400 mg, 400 mg, Nasogastric, Q6H, Radha Jaquez MD, 400 mg at 21 06  •  hydrALAZINE (APRESOLINE) injection 10 mg, 10 mg, Intravenous, Q4H PRN, Ryder Llanes APRN, 10 mg at 21  •  HYDROmorphone (DILAUDID) 25 mg in 50 mL NS (0.5 mg/mL) infusion, 0.2-3 mg/hr, Intravenous, Titrated, Sheree Orantes APRN  •  insulin glargine (LANTUS, SEMGLEE) injection 45 Units, 45 Units, Subcutaneous, Q12H, Jeff Feng MD, 45 Units at 21 08  •  insulin lispro (ADMELOG) injection 0-24 Units, 0-24 Units, Subcutaneous, Q6H, 8 Units at 21  0620 **AND** insulin lispro (ADMELOG) injection 0-24 Units, 0-24 Units, Subcutaneous, PRN, Radha Jaquez MD  •  insulin lispro (ADMELOG) injection 10 Units, 10 Units, Subcutaneous, Q6H, Jeff Feng MD, 10 Units at 08/06/21 0620  •  insulin regular (humuLIN R,novoLIN R) injection 18 Units, 18 Units, Subcutaneous, Q12H, Jeff Feng MD, 18 Units at 08/06/21 0802  •  linezolid (ZYVOX) tablet 600 mg, 600 mg, Oral, Q12H, Sarah Fajardo MD, 600 mg at 08/06/21 0803  •  Magnesium Sulfate 2 gram infusion - Mg less than or equal to 1.5 mg/dL, 2 g, Intravenous, PRN **OR** Magnesium Sulfate 1 gram infusion - Mg 1.6-1.9 mg/dL, 1 g, Intravenous, PRN, Ryder Llanes APRN, Last Rate: 100 mL/hr at 08/02/21 0842, 1 g at 08/02/21 0842  •  metoprolol tartrate (LOPRESSOR) injection 5 mg, 5 mg, Intravenous, Q6H PRN, Jessica Fajardo APRN, 5 mg at 08/05/21 0728  •  metoprolol tartrate (LOPRESSOR) tablet 50 mg, 50 mg, Oral, Q8H, Jeff Feng MD, 50 mg at 08/06/21 0619  •  Midazolam (VERSED) 50 mg in 50mL NS infusion, 1-10 mg/hr, Intravenous, Titrated, Ryder Llanes APRN, Last Rate: 9 mL/hr at 08/06/21 0427, 9 mg/hr at 08/06/21 0427  •  nitroglycerin (NITROSTAT) SL tablet 0.4 mg, 0.4 mg, Sublingual, Q5 Min PRN, Ryder Llanes APRN  •  norepinephrine (LEVOPHED) 8 mg in 250 mL NS infusion (premix), 0.02-0.3 mcg/kg/min, Intravenous, Titrated, Marianna Navarrete APRN, Stopped at 08/03/21 1745  •  ondansetron (ZOFRAN) tablet 4 mg, 4 mg, Oral, Q6H PRN **OR** ondansetron (ZOFRAN) injection 4 mg, 4 mg, Intravenous, Q6H PRN, Ryder Llanes APRN  •  pantoprazole (PROTONIX) injection 40 mg, 40 mg, Intravenous, BID AC, Jeff Feng MD, 40 mg at 08/06/21 0801  •  Pharmacy to Dose enoxaparin (LOVENOX), , Does not apply, Continuous PRN, Jeff Feng MD  •  potassium chloride (K-DUR,KLOR-CON) CR tablet 40 mEq, 40 mEq, Oral, PRN **OR** potassium chloride (KLOR-CON) packet 40 mEq, 40 mEq, Oral, PRN **OR** potassium chloride 10 mEq in  100 mL IVPB, 10 mEq, Intravenous, Q1H PRN, Ryder Llanes, APRN  •  potassium phosphate 45 mmol in sodium chloride 0.9 % 500 mL infusion, 45 mmol, Intravenous, PRN **OR** potassium phosphate 30 mmol in sodium chloride 0.9 % 250 mL infusion, 30 mmol, Intravenous, PRN **OR** potassium phosphate 15 mmol in 0.9% sodium chloride 100 mL IVPB, 15 mmol, Intravenous, PRN **OR** sodium phosphates 45 mmol in sodium chloride 0.9 % 500 mL IVPB, 45 mmol, Intravenous, PRN **OR** sodium phosphates 30 mmol in sodium chloride 0.9 % 250 mL IVPB, 30 mmol, Intravenous, PRN **OR** sodium phosphates 15 mmol in sodium chloride 0.9 % 250 mL IVPB, 15 mmol, Intravenous, PRN, Ryder Llanes APRN  •  [COMPLETED] Insert peripheral IV, , , Once **AND** sodium chloride 0.9 % flush 10 mL, 10 mL, Intravenous, PRN, Leonardo Doss MD  •  sodium chloride 0.9 % flush 10 mL, 10 mL, Intravenous, Q12H, Ryder Llanes APRN, 10 mL at 08/05/21 2054  •  sodium chloride 0.9 % flush 10 mL, 10 mL, Intravenous, PRN, Ryder Llanes APRN    Data Review:  All labs (24hrs):   Recent Results (from the past 24 hour(s))   POC Glucose Once    Collection Time: 08/05/21 11:48 AM    Specimen: Blood   Result Value Ref Range    Glucose 247 (H) 70 - 105 mg/dL   POC Glucose Once    Collection Time: 08/05/21  4:37 PM    Specimen: Blood   Result Value Ref Range    Glucose 203 (H) 70 - 105 mg/dL   POC Glucose Once    Collection Time: 08/05/21 11:20 PM    Specimen: Blood   Result Value Ref Range    Glucose 209 (H) 70 - 105 mg/dL   Magnesium    Collection Time: 08/06/21  4:41 AM    Specimen: Blood   Result Value Ref Range    Magnesium 1.8 1.6 - 2.6 mg/dL   Phosphorus    Collection Time: 08/06/21  4:41 AM    Specimen: Blood   Result Value Ref Range    Phosphorus 4.5 2.5 - 4.5 mg/dL   Comprehensive Metabolic Panel    Collection Time: 08/06/21  4:41 AM    Specimen: Blood   Result Value Ref Range    Glucose 203 (H) 65 - 99 mg/dL    BUN 28 (H) 6 - 20 mg/dL    Creatinine 0.45 (L)  0.57 - 1.00 mg/dL    Sodium 132 (L) 136 - 145 mmol/L    Potassium 4.8 3.5 - 5.2 mmol/L    Chloride 90 (L) 98 - 107 mmol/L    CO2 35.0 (H) 22.0 - 29.0 mmol/L    Calcium 9.8 8.6 - 10.5 mg/dL    Total Protein 7.3 6.0 - 8.5 g/dL    Albumin 4.60 3.50 - 5.20 g/dL    ALT (SGPT) 197 (H) 1 - 33 U/L    AST (SGOT) 72 (H) 1 - 32 U/L    Alkaline Phosphatase 73 39 - 117 U/L    Total Bilirubin 1.2 0.0 - 1.2 mg/dL    eGFR Non African Amer >150 >60 mL/min/1.73    Globulin 2.7 gm/dL    A/G Ratio 1.7 g/dL    BUN/Creatinine Ratio 62.2 (H) 7.0 - 25.0    Anion Gap 7.0 5.0 - 15.0 mmol/L   Lactate Dehydrogenase    Collection Time: 08/06/21  4:41 AM    Specimen: Blood   Result Value Ref Range     (H) 135 - 214 U/L   D-dimer, Quantitative    Collection Time: 08/06/21  4:41 AM    Specimen: Blood   Result Value Ref Range    D-Dimer, Quantitative 1.13 (H) 0.00 - 0.59 mg/L (FEU)   Fibrinogen    Collection Time: 08/06/21  4:41 AM    Specimen: Blood   Result Value Ref Range    Fibrinogen 406 210 - 450 mg/dL   Protime-INR    Collection Time: 08/06/21  4:41 AM    Specimen: Blood   Result Value Ref Range    Protime 11.4 9.6 - 11.7 Seconds    INR 1.03 0.93 - 1.10   aPTT    Collection Time: 08/06/21  4:41 AM    Specimen: Blood   Result Value Ref Range    PTT 21.7 (L) 24.0 - 31.0 seconds   proBNP    Collection Time: 08/06/21  4:41 AM    Specimen: Blood   Result Value Ref Range    proBNP 330.5 0.0 - 450.0 pg/mL   Troponin    Collection Time: 08/06/21  4:41 AM    Specimen: Blood   Result Value Ref Range    Troponin T 0.037 (C) 0.000 - 0.030 ng/mL   Calcium, Ionized    Collection Time: 08/06/21  4:41 AM    Specimen: Blood   Result Value Ref Range    Ionized Calcium 1.27 1.20 - 1.30 mmol/L   Ferritin    Collection Time: 08/06/21  4:41 AM    Specimen: Blood   Result Value Ref Range    Ferritin 177.20 (H) 13.00 - 150.00 ng/mL   C-reactive Protein    Collection Time: 08/06/21  4:41 AM    Specimen: Blood   Result Value Ref Range    C-Reactive Protein  <0.30 0.00 - 0.50 mg/dL   CBC Auto Differential    Collection Time: 08/06/21  4:41 AM    Specimen: Blood   Result Value Ref Range    WBC 16.90 (H) 3.40 - 10.80 10*3/mm3    RBC 3.14 (L) 3.77 - 5.28 10*6/mm3    Hemoglobin 9.2 (L) 12.0 - 15.9 g/dL    Hematocrit 27.6 (L) 34.0 - 46.6 %    MCV 88.0 79.0 - 97.0 fL    MCH 29.3 26.6 - 33.0 pg    MCHC 33.3 31.5 - 35.7 g/dL    RDW 15.7 (H) 12.3 - 15.4 %    RDW-SD 48.1 37.0 - 54.0 fl    MPV 8.2 6.0 - 12.0 fL    Platelets 255 140 - 450 10*3/mm3   Scan Slide    Collection Time: 08/06/21  4:41 AM    Specimen: Blood   Result Value Ref Range    Scan Slide     Manual Differential    Collection Time: 08/06/21  4:41 AM    Specimen: Blood   Result Value Ref Range    Neutrophil % 87.0 (H) 42.7 - 76.0 %    Lymphocyte % 4.0 (L) 19.6 - 45.3 %    Monocyte % 3.0 (L) 5.0 - 12.0 %    Bands %  4.0 0.0 - 5.0 %    Myelocyte % 1.0 (H) 0.0 - 0.0 %    Plasma Cells % 1.0 (H) 0.0 - 0.0 %    Neutrophils Absolute 15.38 (H) 1.70 - 7.00 10*3/mm3    Lymphocytes Absolute 0.68 (L) 0.70 - 3.10 10*3/mm3    Monocytes Absolute 0.51 0.10 - 0.90 10*3/mm3    Anisocytosis Slight/1+ None Seen    Polychromasia Slight/1+ None Seen    Toxic Granulation Slight/1+ None Seen    Platelet Morphology Normal Normal   Blood Gas, Arterial -    Collection Time: 08/06/21  5:06 AM    Specimen: Arterial Blood   Result Value Ref Range    Site Arterial Line     Ovi's Test N/A     pH, Arterial 7.364 7.350 - 7.450 pH units    pCO2, Arterial 66.8 (H) 35.0 - 48.0 mm Hg    pO2, Arterial 116.9 (H) 83.0 - 108.0 mm Hg    HCO3, Arterial 38.0 (H) 21.0 - 28.0 mmol/L    Base Excess, Arterial 10.7 (H) 0.0 - 3.0 mmol/L    O2 Saturation, Arterial 98.2 (H) 94.0 - 98.0 %    CO2 Content 40.1 (H) 22 - 29 mmol/L    Barometric Pressure for Blood Gas      Modality Adult Vent     FIO2 50 %    Ventilator Mode ;AC     Set Tidal Volume 420     PEEP 12     Hemodilution No     Respiratory Rate 36    POC Glucose Once    Collection Time: 08/06/21  6:18 AM     Specimen: Blood   Result Value Ref Range    Glucose 202 (H) 70 - 105 mg/dL        Imaging:  XR Chest 1 View  Narrative: DATE OF EXAM:  8/5/2021 8:16 AM     PROCEDURE:  XR CHEST 1 VW-     INDICATIONS:  Shortness of breath; R06.00-Dyspnea, unspecified; U07.1-COVID-19;  J12.82-Pneumonia due to Coronavirus disease 2019; J96.01-Acute  respiratory failure with hypoxia; I95.2-Hypotension due to drugs;  U07.1-COVID-19; J96.00-Acute respiratory failure, unspecified whether  with hypoxia or hypercapnia     COMPARISON:  8/4/2021     TECHNIQUE:   Single radiographic view of the chest was obtained.     FINDINGS:  Exam is limited secondary to poor penetration and body habitus. Interval  removal of ET tube. NG tube in satisfactory position. Right IJ catheter  in satisfactory position. Persistent multifocal patchy airspace  opacities are identified, not significant changed. No evidence of  pneumothorax. Elevation the right hemidiaphragm.     Impression:    1. Patchy multifocal airspace opacities concerning for multifocal  pneumonia.  2. Persistent elevation of the right hemidiaphragm which is more  prominent than 7/28/2021     Electronically Signed By-Girma Weinberg MD On:8/5/2021 8:50 AM  This report was finalized on [id number] by  Girma Weinberg MD.       ASSESSMENT:   Acute hypoxic respiratory failure due to COVID-19 (CMS/HCC)    Pneumonia due to COVID-19 virus   morbid obesity affecting her quality care   HTN  RODRIGUEZ    Class 3 severe obesity without serious comorbidity with body mass index (BMI) of 50.0 to 59.9 in adult    Hyperglycemia, likely stress induced    Diabetes mellitus type 2 in obese (CMS/HCC)       PLAN:  Change lopressors to BID  Dc central line   Try to wean nimbex   Wean PEEP down   Vent management   antibiotics  Bronchodilator  Inhaled corticosteroids  Electrolytes/ glycemic control  DVT and GI prophylaxis.    Total Critical care time in direct medical management ( 32  ) minutes  Jeff Feng MD. D, ABSM.      8/6/2021  09:06 EDT

## 2021-08-06 NOTE — PLAN OF CARE
Goal Outcome Evaluation:  Pt remains on vent fio2 50%/peep 11. Pt remains tachycardic throughout shift; PRN's utilized. Fentanyl, versed gtts remain for sedation. Nimbex remains for paralytic. Pt flipped supine @ 1400 and maintaining adequate oxygenation.   1620 accessory muscle use for ventilation, BIS 70's-80's; Nimbex adjusted per MAR.  Cooling blanket added. Central line removed per order.

## 2021-08-07 LAB
ALBUMIN SERPL-MCNC: 4.1 G/DL (ref 3.5–5.2)
ALBUMIN/GLOB SERPL: 1.6 G/DL
ALP SERPL-CCNC: 82 U/L (ref 39–117)
ALT SERPL W P-5'-P-CCNC: 158 U/L (ref 1–33)
ANION GAP SERPL CALCULATED.3IONS-SCNC: 10 MMOL/L (ref 5–15)
ANISOCYTOSIS BLD QL: ABNORMAL
ARTERIAL PATENCY WRIST A: ABNORMAL
AST SERPL-CCNC: 47 U/L (ref 1–32)
ATMOSPHERIC PRESS: ABNORMAL MM[HG]
BACTERIA UR QL AUTO: ABNORMAL /HPF
BASE EXCESS BLDA CALC-SCNC: 7 MMOL/L (ref 0–3)
BDY SITE: ABNORMAL
BILIRUB CONJ SERPL-MCNC: 0.2 MG/DL (ref 0–0.3)
BILIRUB SERPL-MCNC: 0.9 MG/DL (ref 0–1.2)
BILIRUB UR QL STRIP: NEGATIVE
BUN SERPL-MCNC: 56 MG/DL (ref 6–20)
BUN/CREAT SERPL: 65.9 (ref 7–25)
CA-I SERPL ISE-MCNC: 1.33 MMOL/L (ref 1.2–1.3)
CALCIUM SPEC-SCNC: 10.1 MG/DL (ref 8.6–10.5)
CHLORIDE SERPL-SCNC: 95 MMOL/L (ref 98–107)
CLARITY UR: ABNORMAL
CO2 BLDA-SCNC: 35.4 MMOL/L (ref 22–29)
CO2 SERPL-SCNC: 32 MMOL/L (ref 22–29)
COLOR UR: ABNORMAL
CREAT SERPL-MCNC: 0.85 MG/DL (ref 0.57–1)
CRP SERPL-MCNC: 1.3 MG/DL (ref 0–0.5)
D DIMER PPP FEU-MCNC: 1.5 MG/L (FEU) (ref 0–0.59)
DEPRECATED RDW RBC AUTO: 50.8 FL (ref 37–54)
ERYTHROCYTE [DISTWIDTH] IN BLOOD BY AUTOMATED COUNT: 16.5 % (ref 12.3–15.4)
FERRITIN SERPL-MCNC: 153.4 NG/ML (ref 13–150)
GFR SERPL CREATININE-BSD FRML MDRD: 78 ML/MIN/1.73
GIANT PLATELETS: ABNORMAL
GLOBULIN UR ELPH-MCNC: 2.5 GM/DL
GLUCOSE BLDC GLUCOMTR-MCNC: 181 MG/DL (ref 70–105)
GLUCOSE BLDC GLUCOMTR-MCNC: 211 MG/DL (ref 70–105)
GLUCOSE BLDC GLUCOMTR-MCNC: 213 MG/DL (ref 70–105)
GLUCOSE BLDC GLUCOMTR-MCNC: 236 MG/DL (ref 70–105)
GLUCOSE SERPL-MCNC: 209 MG/DL (ref 65–99)
GLUCOSE UR STRIP-MCNC: NEGATIVE MG/DL
HCO3 BLDA-SCNC: 33.7 MMOL/L (ref 21–28)
HCT VFR BLD AUTO: 29.5 % (ref 34–46.6)
HEMODILUTION: NO
HGB BLD-MCNC: 9.9 G/DL (ref 12–15.9)
HGB UR QL STRIP.AUTO: ABNORMAL
HYALINE CASTS UR QL AUTO: ABNORMAL /LPF
INHALED O2 CONCENTRATION: 50 %
KETONES UR QL STRIP: ABNORMAL
LEUKOCYTE ESTERASE UR QL STRIP.AUTO: ABNORMAL
LYMPHOCYTES # BLD MANUAL: 1.41 10*3/MM3 (ref 0.7–3.1)
LYMPHOCYTES NFR BLD MANUAL: 4 % (ref 5–12)
LYMPHOCYTES NFR BLD MANUAL: 7 % (ref 19.6–45.3)
MAGNESIUM SERPL-MCNC: 2.2 MG/DL (ref 1.6–2.6)
MCH RBC QN AUTO: 29.5 PG (ref 26.6–33)
MCHC RBC AUTO-ENTMCNC: 33.4 G/DL (ref 31.5–35.7)
MCV RBC AUTO: 88.5 FL (ref 79–97)
MODALITY: ABNORMAL
MONOCYTES # BLD AUTO: 0.81 10*3/MM3 (ref 0.1–0.9)
NEUTROPHILS # BLD AUTO: 17.98 10*3/MM3 (ref 1.7–7)
NEUTROPHILS NFR BLD MANUAL: 82 % (ref 42.7–76)
NEUTS BAND NFR BLD MANUAL: 7 % (ref 0–5)
NEUTS VAC BLD QL SMEAR: ABNORMAL
NITRITE UR QL STRIP: NEGATIVE
PCO2 BLDA: 58.2 MM HG (ref 35–48)
PEEP RESPIRATORY: 11 CM[H2O]
PH BLDA: 7.37 PH UNITS (ref 7.35–7.45)
PH UR STRIP.AUTO: 6 [PH] (ref 5–8)
PHOSPHATE SERPL-MCNC: 7.3 MG/DL (ref 2.5–4.5)
PLATELET # BLD AUTO: 231 10*3/MM3 (ref 140–450)
PMV BLD AUTO: 8.5 FL (ref 6–12)
PO2 BLDA: 94.5 MM HG (ref 83–108)
POTASSIUM SERPL-SCNC: 4.7 MMOL/L (ref 3.5–5.2)
PROT SERPL-MCNC: 6.6 G/DL (ref 6–8.5)
PROT UR QL STRIP: ABNORMAL
RBC # BLD AUTO: 3.34 10*6/MM3 (ref 3.77–5.28)
RBC # UR: ABNORMAL /HPF
REF LAB TEST METHOD: ABNORMAL
RESPIRATORY RATE: 36
SAO2 % BLDCOA: 96.9 % (ref 94–98)
SCAN SLIDE: NORMAL
SODIUM SERPL-SCNC: 137 MMOL/L (ref 136–145)
SP GR UR STRIP: 1.01 (ref 1–1.03)
SQUAMOUS #/AREA URNS HPF: ABNORMAL /HPF
UROBILINOGEN UR QL STRIP: ABNORMAL
VENTILATOR MODE: ABNORMAL
VT ON VENT VENT: 420 ML
WBC # BLD AUTO: 20.2 10*3/MM3 (ref 3.4–10.8)
WBC UR QL AUTO: ABNORMAL /HPF

## 2021-08-07 PROCEDURE — 63710000001 INSULIN LISPRO (HUMAN) PER 5 UNITS: Performed by: INTERNAL MEDICINE

## 2021-08-07 PROCEDURE — 87077 CULTURE AEROBIC IDENTIFY: CPT | Performed by: STUDENT IN AN ORGANIZED HEALTH CARE EDUCATION/TRAINING PROGRAM

## 2021-08-07 PROCEDURE — 94799 UNLISTED PULMONARY SVC/PX: CPT

## 2021-08-07 PROCEDURE — 25010000002 MIDAZOLAM 50 MG/10ML SOLUTION: Performed by: NURSE PRACTITIONER

## 2021-08-07 PROCEDURE — 25010000002 FENTANYL CITRATE (PF) 2500 MCG/50ML SOLUTION: Performed by: NURSE PRACTITIONER

## 2021-08-07 PROCEDURE — 63710000001 INSULIN REGULAR HUMAN PER 5 UNITS: Performed by: NURSE PRACTITIONER

## 2021-08-07 PROCEDURE — 82330 ASSAY OF CALCIUM: CPT | Performed by: NURSE PRACTITIONER

## 2021-08-07 PROCEDURE — 86140 C-REACTIVE PROTEIN: CPT | Performed by: INTERNAL MEDICINE

## 2021-08-07 PROCEDURE — 82803 BLOOD GASES ANY COMBINATION: CPT

## 2021-08-07 PROCEDURE — 85025 COMPLETE CBC W/AUTO DIFF WBC: CPT | Performed by: NURSE PRACTITIONER

## 2021-08-07 PROCEDURE — 25010000002 DEXAMETHASONE PER 1 MG: Performed by: INTERNAL MEDICINE

## 2021-08-07 PROCEDURE — 80053 COMPREHEN METABOLIC PANEL: CPT | Performed by: NURSE PRACTITIONER

## 2021-08-07 PROCEDURE — 94003 VENT MGMT INPAT SUBQ DAY: CPT

## 2021-08-07 PROCEDURE — 85007 BL SMEAR W/DIFF WBC COUNT: CPT | Performed by: NURSE PRACTITIONER

## 2021-08-07 PROCEDURE — 25010000002 CEFTRIAXONE PER 250 MG: Performed by: INTERNAL MEDICINE

## 2021-08-07 PROCEDURE — 81001 URINALYSIS AUTO W/SCOPE: CPT | Performed by: STUDENT IN AN ORGANIZED HEALTH CARE EDUCATION/TRAINING PROGRAM

## 2021-08-07 PROCEDURE — 87186 SC STD MICRODIL/AGAR DIL: CPT | Performed by: STUDENT IN AN ORGANIZED HEALTH CARE EDUCATION/TRAINING PROGRAM

## 2021-08-07 PROCEDURE — 85379 FIBRIN DEGRADATION QUANT: CPT | Performed by: INTERNAL MEDICINE

## 2021-08-07 PROCEDURE — 63710000001 INSULIN REGULAR HUMAN PER 5 UNITS: Performed by: INTERNAL MEDICINE

## 2021-08-07 PROCEDURE — 25010000002 CEFTAZIDIME PER 500 MG: Performed by: INTERNAL MEDICINE

## 2021-08-07 PROCEDURE — 82728 ASSAY OF FERRITIN: CPT | Performed by: INTERNAL MEDICINE

## 2021-08-07 PROCEDURE — 82248 BILIRUBIN DIRECT: CPT | Performed by: NURSE PRACTITIONER

## 2021-08-07 PROCEDURE — 25010000002 ENOXAPARIN PER 10 MG: Performed by: INTERNAL MEDICINE

## 2021-08-07 PROCEDURE — 84100 ASSAY OF PHOSPHORUS: CPT | Performed by: NURSE PRACTITIONER

## 2021-08-07 PROCEDURE — 63710000001 INSULIN GLARGINE PER 5 UNITS: Performed by: INTERNAL MEDICINE

## 2021-08-07 PROCEDURE — 83735 ASSAY OF MAGNESIUM: CPT | Performed by: NURSE PRACTITIONER

## 2021-08-07 PROCEDURE — 87086 URINE CULTURE/COLONY COUNT: CPT | Performed by: STUDENT IN AN ORGANIZED HEALTH CARE EDUCATION/TRAINING PROGRAM

## 2021-08-07 PROCEDURE — 25010000002 HYDRALAZINE PER 20 MG: Performed by: NURSE PRACTITIONER

## 2021-08-07 PROCEDURE — 82962 GLUCOSE BLOOD TEST: CPT

## 2021-08-07 PROCEDURE — 25010000002 FUROSEMIDE PER 20 MG: Performed by: NURSE PRACTITIONER

## 2021-08-07 PROCEDURE — 94760 N-INVAS EAR/PLS OXIMETRY 1: CPT

## 2021-08-07 RX ORDER — METOPROLOL TARTRATE 50 MG/1
50 TABLET, FILM COATED ORAL 3 TIMES DAILY
Status: DISCONTINUED | OUTPATIENT
Start: 2021-08-07 | End: 2021-08-08

## 2021-08-07 RX ORDER — FENTANYL CITRATE-0.9 % NACL/PF 10 MCG/ML
50-300 PLASTIC BAG, INJECTION (ML) INTRAVENOUS
Status: DISCONTINUED | OUTPATIENT
Start: 2021-08-07 | End: 2021-08-07

## 2021-08-07 RX ORDER — NOREPINEPHRINE BIT/0.9 % NACL 8 MG/250ML
.02-.5 INFUSION BOTTLE (ML) INTRAVENOUS
Status: DISCONTINUED | OUTPATIENT
Start: 2021-08-07 | End: 2021-08-09

## 2021-08-07 RX ADMIN — FENTANYL CITRATE 275 MCG/HR: 50 INJECTION, SOLUTION INTRAMUSCULAR; INTRAVENOUS at 02:22

## 2021-08-07 RX ADMIN — CISATRACURIUM BESYLATE 5 MCG/KG/MIN: 10 INJECTION INTRAVENOUS at 17:25

## 2021-08-07 RX ADMIN — INSULIN HUMAN 18 UNITS: 100 INJECTION, SOLUTION PARENTERAL at 07:41

## 2021-08-07 RX ADMIN — MINERAL OIL AND WHITE PETROLATUM: 150; 830 OINTMENT OPHTHALMIC at 11:12

## 2021-08-07 RX ADMIN — FENTANYL CITRATE 225 MCG/HR: 50 INJECTION, SOLUTION INTRAMUSCULAR; INTRAVENOUS at 15:51

## 2021-08-07 RX ADMIN — DEXAMETHASONE SODIUM PHOSPHATE 6 MG: 4 INJECTION, SOLUTION INTRAMUSCULAR; INTRAVENOUS at 20:26

## 2021-08-07 RX ADMIN — Medication 0.02 MCG/KG/MIN: at 20:17

## 2021-08-07 RX ADMIN — INSULIN GLARGINE 45 UNITS: 100 INJECTION, SOLUTION SUBCUTANEOUS at 07:41

## 2021-08-07 RX ADMIN — ALBUTEROL SULFATE 6 PUFF: 108 INHALANT RESPIRATORY (INHALATION) at 12:10

## 2021-08-07 RX ADMIN — BUDESONIDE AND FORMOTEROL FUMARATE DIHYDRATE 2 PUFF: 160; 4.5 AEROSOL RESPIRATORY (INHALATION) at 07:37

## 2021-08-07 RX ADMIN — INSULIN LISPRO 4 UNITS: 100 INJECTION, SOLUTION INTRAVENOUS; SUBCUTANEOUS at 17:42

## 2021-08-07 RX ADMIN — INSULIN LISPRO 8 UNITS: 100 INJECTION, SOLUTION INTRAVENOUS; SUBCUTANEOUS at 11:12

## 2021-08-07 RX ADMIN — MIDAZOLAM 10 MG/HR: 5 INJECTION INTRAMUSCULAR; INTRAVENOUS at 01:24

## 2021-08-07 RX ADMIN — METOPROLOL TARTRATE 50 MG: 50 TABLET, FILM COATED ORAL at 20:27

## 2021-08-07 RX ADMIN — CISATRACURIUM BESYLATE 6 MCG/KG/MIN: 10 INJECTION INTRAVENOUS at 13:58

## 2021-08-07 RX ADMIN — MINERAL OIL AND WHITE PETROLATUM: 150; 830 OINTMENT OPHTHALMIC at 20:27

## 2021-08-07 RX ADMIN — Medication 10 ML: at 09:01

## 2021-08-07 RX ADMIN — MINERAL OIL AND WHITE PETROLATUM: 150; 830 OINTMENT OPHTHALMIC at 15:51

## 2021-08-07 RX ADMIN — FENTANYL CITRATE 250 MCG/HR: 50 INJECTION, SOLUTION INTRAMUSCULAR; INTRAVENOUS at 20:31

## 2021-08-07 RX ADMIN — Medication 2000 UNITS: at 07:42

## 2021-08-07 RX ADMIN — CISATRACURIUM BESYLATE 8 MCG/KG/MIN: 10 INJECTION INTRAVENOUS at 08:10

## 2021-08-07 RX ADMIN — ALBUTEROL SULFATE 6 PUFF: 108 INHALANT RESPIRATORY (INHALATION) at 15:25

## 2021-08-07 RX ADMIN — CEFTRIAXONE SODIUM 2 G: 2 INJECTION, POWDER, FOR SOLUTION INTRAMUSCULAR; INTRAVENOUS at 11:12

## 2021-08-07 RX ADMIN — LINEZOLID 600 MG: 600 TABLET ORAL at 07:42

## 2021-08-07 RX ADMIN — Medication 2 PACKET: at 09:16

## 2021-08-07 RX ADMIN — Medication 600 MG: at 07:42

## 2021-08-07 RX ADMIN — CISATRACURIUM BESYLATE 8 MCG/KG/MIN: 10 INJECTION INTRAVENOUS at 11:07

## 2021-08-07 RX ADMIN — GUAIFENESIN 400 MG: 100 SOLUTION ORAL at 05:29

## 2021-08-07 RX ADMIN — PANTOPRAZOLE SODIUM 40 MG: 40 INJECTION, POWDER, FOR SOLUTION INTRAVENOUS at 17:44

## 2021-08-07 RX ADMIN — Medication 600 MG: at 20:27

## 2021-08-07 RX ADMIN — PANTOPRAZOLE SODIUM 40 MG: 40 INJECTION, POWDER, FOR SOLUTION INTRAVENOUS at 07:40

## 2021-08-07 RX ADMIN — MINERAL OIL AND WHITE PETROLATUM: 150; 830 OINTMENT OPHTHALMIC at 07:44

## 2021-08-07 RX ADMIN — ENOXAPARIN SODIUM 40 MG: 40 INJECTION SUBCUTANEOUS at 07:41

## 2021-08-07 RX ADMIN — ENOXAPARIN SODIUM 40 MG: 40 INJECTION SUBCUTANEOUS at 20:26

## 2021-08-07 RX ADMIN — INSULIN LISPRO 10 UNITS: 100 INJECTION, SOLUTION INTRAVENOUS; SUBCUTANEOUS at 05:29

## 2021-08-07 RX ADMIN — INSULIN LISPRO 8 UNITS: 100 INJECTION, SOLUTION INTRAVENOUS; SUBCUTANEOUS at 05:29

## 2021-08-07 RX ADMIN — MIDAZOLAM 10 MG/HR: 5 INJECTION INTRAMUSCULAR; INTRAVENOUS at 05:58

## 2021-08-07 RX ADMIN — FLUCONAZOLE 400 MG: 40 POWDER, FOR SUSPENSION ORAL at 20:27

## 2021-08-07 RX ADMIN — ALBUTEROL SULFATE 6 PUFF: 108 INHALANT RESPIRATORY (INHALATION) at 07:37

## 2021-08-07 RX ADMIN — MINERAL OIL AND WHITE PETROLATUM: 150; 830 OINTMENT OPHTHALMIC at 00:06

## 2021-08-07 RX ADMIN — METOPROLOL TARTRATE 5 MG: 5 INJECTION INTRAVENOUS at 13:14

## 2021-08-07 RX ADMIN — CISATRACURIUM BESYLATE 8 MCG/KG/MIN: 10 INJECTION INTRAVENOUS at 04:09

## 2021-08-07 RX ADMIN — METOPROLOL TARTRATE 50 MG: 50 TABLET, FILM COATED ORAL at 15:09

## 2021-08-07 RX ADMIN — ALBUTEROL SULFATE 6 PUFF: 108 INHALANT RESPIRATORY (INHALATION) at 18:58

## 2021-08-07 RX ADMIN — MIDAZOLAM 10 MG/HR: 5 INJECTION INTRAMUSCULAR; INTRAVENOUS at 20:31

## 2021-08-07 RX ADMIN — CISATRACURIUM BESYLATE 9 MCG/KG/MIN: 10 INJECTION INTRAVENOUS at 01:25

## 2021-08-07 RX ADMIN — ASPIRIN 324 MG: 81 TABLET, CHEWABLE ORAL at 07:42

## 2021-08-07 RX ADMIN — HYDRALAZINE HYDROCHLORIDE 10 MG: 20 INJECTION INTRAMUSCULAR; INTRAVENOUS at 00:51

## 2021-08-07 RX ADMIN — INSULIN LISPRO 10 UNITS: 100 INJECTION, SOLUTION INTRAVENOUS; SUBCUTANEOUS at 11:12

## 2021-08-07 RX ADMIN — GUAIFENESIN 400 MG: 100 SOLUTION ORAL at 11:11

## 2021-08-07 RX ADMIN — INSULIN HUMAN 20 UNITS: 100 INJECTION, SOLUTION PARENTERAL at 21:54

## 2021-08-07 RX ADMIN — DEXAMETHASONE SODIUM PHOSPHATE 6 MG: 4 INJECTION, SOLUTION INTRAMUSCULAR; INTRAVENOUS at 07:40

## 2021-08-07 RX ADMIN — MINERAL OIL AND WHITE PETROLATUM: 150; 830 OINTMENT OPHTHALMIC at 03:00

## 2021-08-07 RX ADMIN — FUROSEMIDE 20 MG: 10 INJECTION, SOLUTION INTRAMUSCULAR; INTRAVENOUS at 21:54

## 2021-08-07 RX ADMIN — MIDAZOLAM 8 MG/HR: 5 INJECTION INTRAMUSCULAR; INTRAVENOUS at 11:10

## 2021-08-07 RX ADMIN — CEFTAZIDIME 2 G: 2 INJECTION, POWDER, FOR SOLUTION INTRAVENOUS at 07:39

## 2021-08-07 RX ADMIN — INSULIN LISPRO 10 UNITS: 100 INJECTION, SOLUTION INTRAVENOUS; SUBCUTANEOUS at 17:42

## 2021-08-07 RX ADMIN — Medication 10 ML: at 20:28

## 2021-08-07 RX ADMIN — INSULIN LISPRO 10 UNITS: 100 INJECTION, SOLUTION INTRAVENOUS; SUBCUTANEOUS at 00:21

## 2021-08-07 RX ADMIN — INSULIN GLARGINE 45 UNITS: 100 INJECTION, SOLUTION SUBCUTANEOUS at 21:54

## 2021-08-07 RX ADMIN — GUAIFENESIN 400 MG: 100 SOLUTION ORAL at 17:44

## 2021-08-07 RX ADMIN — LINEZOLID 600 MG: 600 TABLET ORAL at 20:27

## 2021-08-07 RX ADMIN — BUDESONIDE AND FORMOTEROL FUMARATE DIHYDRATE 2 PUFF: 160; 4.5 AEROSOL RESPIRATORY (INHALATION) at 19:01

## 2021-08-07 RX ADMIN — CISATRACURIUM BESYLATE 5 MCG/KG/MIN: 10 INJECTION INTRAVENOUS at 20:31

## 2021-08-07 RX ADMIN — FENTANYL CITRATE 275 MCG/HR: 50 INJECTION, SOLUTION INTRAMUSCULAR; INTRAVENOUS at 06:27

## 2021-08-07 RX ADMIN — METOPROLOL TARTRATE 50 MG: 50 TABLET, FILM COATED ORAL at 07:42

## 2021-08-07 RX ADMIN — INSULIN LISPRO 8 UNITS: 100 INJECTION, SOLUTION INTRAVENOUS; SUBCUTANEOUS at 00:20

## 2021-08-07 RX ADMIN — FENTANYL CITRATE 225 MCG/HR: 50 INJECTION, SOLUTION INTRAMUSCULAR; INTRAVENOUS at 11:08

## 2021-08-07 RX ADMIN — GUAIFENESIN 400 MG: 100 SOLUTION ORAL at 00:06

## 2021-08-07 RX ADMIN — MIDAZOLAM 8 MG/HR: 5 INJECTION INTRAMUSCULAR; INTRAVENOUS at 17:25

## 2021-08-07 NOTE — PLAN OF CARE
Goal Outcome Evaluation:  Pt remains on vent fio2 50%/peep 11 for most of shift. Pt remains on fentanyl and versed gtts for sedation. Nimbex gtt remains for paralytic; orders to titrate down throughout shift.  Urine output decreased midshift; forbes irrigated 1800cc out.  @ 1800 pt spo2 in mid 80's; orders to increase fio2 to 60%. Peep increased to 12. Peep can be increased to 14 if needed before increasing paralytic per Jessica Day, NP.  @ 1830 pt began stacking breaths on vent without de-satting. VSS at this time, will continue to monitor.

## 2021-08-07 NOTE — PROGRESS NOTES
"Nutrition Services    Patient Name: Leslie Harris  YOB: 1990  MRN: [id number]  Admission date: 7/22/2021      PPE Documentation        PPE Worn By Provider RD did not enter room for this encounter   PPE Worn By Patient  N/A     PROGRESS NOTE      Encounter Information: Tube feed check on. Novasource Renal documented at current goal 45 mL/hr. Remains intubated, on paralytic.        Labs (reviewed below): -elevated BUN  -elevated LFTs  -hyperglycemia->formula is CHO controlled  -hyperphosphatemia->formula is Phos restricted        GI Function:  -residuals WNL  -last BM documented on 8/6        Nutrition Intervention: Continue current TF regimen       PO Diet/Supplements: NPO Diet   EN Prescription: Novasource Renal at 45 mL/hr + 2 packets beneprotein BID, 20 mL/hr water flush       Intake/Output:   Intake/Output Summary (Last 24 hours) at 8/7/2021 1025  Last data filed at 8/7/2021 0800  Gross per 24 hour   Intake 3367 ml   Output 3150 ml   Net 217 ml          Height: Height: 165.1 cm (65\")   Weight: Weight: (!) 155 kg (342 lb 9.5 oz) (08/07/21 0500)   BMI: Body mass index is 57.01 kg/m².     Results from last 7 days   Lab Units 08/07/21 0507 08/06/21 0441 08/05/21  0355   SODIUM mmol/L 137 132* 133*   POTASSIUM mmol/L 4.7 4.8 5.2   CHLORIDE mmol/L 95* 90* 89*   CO2 mmol/L 32.0* 35.0* 35.0*   BUN mg/dL 56* 28* 27*   CREATININE mg/dL 0.85 0.45* 0.42*   CALCIUM mg/dL 10.1 9.8 9.9   BILIRUBIN mg/dL 0.9 1.2 1.0   ALK PHOS U/L 82 73 83   ALT (SGPT) U/L 158* 197* 173*   AST (SGOT) U/L 47* 72* 82*   GLUCOSE mg/dL 209* 203* 209*     Results from last 7 days   Lab Units 08/07/21  0507 08/06/21  0441 08/06/21  0441 08/05/21  0355 08/05/21  0355   MAGNESIUM mg/dL 2.2  --  1.8  --  2.0   PHOSPHORUS mg/dL 7.3*   < > 4.5   < > 4.3   HEMOGLOBIN g/dL 9.9*   < > 9.2*   < > 9.6*   HEMATOCRIT % 29.5*   < > 27.6*   < > 28.7*    < > = values in this interval not displayed.     COVID19   Date Value Ref Range Status "   07/22/2021 Detected (C) Not Detected - Ref. Range Final     Lab Results   Component Value Date    HGBA1C 6.9 (H) 07/23/2021     Vitals:    08/07/21 0900   BP: 98/46   Pulse: 90   Resp:    Temp:    SpO2: 98%     RD to follow up per protocol.    Electronically signed by:  Ynes Warner RD  08/07/21 10:25 EDT

## 2021-08-07 NOTE — PROGRESS NOTES
"PULMONARY CRITICAL CARE Progress  NOTE      PATIENT IDENTIFICATION:  Name: Leslie Harris  MRN: [id number]  :  1990     Age: 30 y.o.  Sex: female    DATE OF Note:  2021   Referring Physician: Jeff Feng MD                  Subjective:   On vent FIO2 down to 50% PEEP 11  Still paralyzed with Nimbex  Sedated with versed and fentanyl  Supine position  Minimal secretions  Hematuria overnight, likely cath trauma  Increase insulin      Objective:  tMax 24 hrs: Temp (24hrs), Av.4 °F (37.4 °C), Min:98.4 °F (36.9 °C), Max:100.4 °F (38 °C)      Vitals Ranges:   Temp:  [98.4 °F (36.9 °C)-100.4 °F (38 °C)] 98.9 °F (37.2 °C)  Heart Rate:  [] 90  Resp:  [36] 36  BP: ()/(45-91) 98/46  Arterial Line BP: ()/() 82/50  FiO2 (%):  [50 %] 50 %    Intake and Output Last 3 Shifts:   I/O last 3 completed shifts:  In: 5291 [I.V.:2760; Other:743; NG/GT:1788]  Out: 3500 [Urine:3500]    Exam:  BP 98/46   Pulse 90   Temp 98.9 °F (37.2 °C) (Bladder)   Resp (!) 36   Ht 165.1 cm (65\")   Wt (!) 155 kg (342 lb 9.5 oz)   LMP 2021   SpO2 98%   BMI 57.01 kg/m²     General Appearance:   On vent sedated  HEENT:  Normocephalic, without obvious abnormality, Conjunctiva/corneas clear.  Normal external ear canals, Nares normal, no drainage. OETT     Neck:  Supple, symmetrical, trachea midline. No JVD.  Lungs /Chest wall:   Coarse scattered rhonchi, diminished bases, mild wheezing, respirations unlabored symmetrical wall movement.     Heart: Sinus rhythm to sinus tachycardia, no murmur, no rub or gallop  Abdomen: Soft, non-distended, morbid body habitus, active bowel sounds    Extremities: ++ Generalized edema, no clubbing or Cyanosis        Medications:    Current Facility-Administered Medications:   •  acetaminophen (TYLENOL) 160 MG/5ML solution 650 mg, 650 mg, Oral, Q6H PRN, Radha Jaquez MD, 650 mg at 21 1236  •  acetaminophen (TYLENOL) tablet 650 mg, 650 mg, Oral, Q4H PRN, 650 mg at " 07/27/21 0012 **OR** acetaminophen (TYLENOL) suppository 650 mg, 650 mg, Rectal, Q4H PRN, Ryder Llanes APRN, 650 mg at 07/26/21 0153  •  Acetylcysteine capsule 600 mg, 600 mg, Oral, BID, Ryder Llanes APRN, 600 mg at 08/07/21 0742  •  albuterol sulfate HFA (PROVENTIL HFA;VENTOLIN HFA;PROAIR HFA) inhaler 6 puff, 6 puff, Inhalation, 4x Daily - RT, Ryder Llanes APRN, 6 puff at 08/07/21 0737  •  aluminum-magnesium hydroxide-simethicone (MAALOX MAX) 400-400-40 MG/5ML suspension 15 mL, 15 mL, Oral, Q6H PRN, Ryder Llanes APRN  •  artificial tears ophthalmic ointment, , Both Eyes, Q4H, Ryedr Llanes APRN, Given at 08/07/21 0744  •  artificial tears ophthalmic ointment, , Both Eyes, PRN, Ryder Llanes APRRODOLFO  •  aspirin chewable tablet 324 mg, 324 mg, Per G Tube, Daily, Ryder Llanes APRN, 324 mg at 08/07/21 0742  •  Beneprotein packet 2 packet, 2 packet, Nasogastric, BID, Leslie Hugo, RD, 2 packet at 08/07/21 0916  •  benzonatate (TESSALON) capsule 100 mg, 100 mg, Oral, TID PRN, Ryder Llanes APRN  •  budesonide-formoterol (SYMBICORT) 160-4.5 MCG/ACT inhaler 2 puff, 2 puff, Inhalation, BID - RT, Ryder Llanes APRN, 2 puff at 08/07/21 0737  •  cefTAZidime (FORTAZ) 2 g in sodium chloride 0.9 % 100 mL IVPB, 2 g, Intravenous, Q8H, Neela Alvarado MD, Last Rate: 200 mL/hr at 08/07/21 0739, 2 g at 08/07/21 0739  •  cholecalciferol (VITAMIN D3) tablet 2,000 Units, 2,000 Units, Nasogastric, Daily, Ryder Llanes APRN, 2,000 Units at 08/07/21 0742  •  cisatracurium (NIMBEX) bolus from bag 2 mg/mL 8.36 mg, 50 mcg/kg, Intravenous, Once PRN, Day, NATY Lopez  •  cisatracurium besylate (NIMBEX) 200 mg in sodium chloride 0.9 % 100 mL (2 mg/mL) infusion, 0.5-10.2 mcg/kg/min, Intravenous, Titrated, Day, NATY Lopez, Last Rate: 40.1 mL/hr at 08/07/21 0810, 8 mcg/kg/min at 08/07/21 0810  •  dexamethasone (DECADRON) injection 6 mg, 6 mg, Intravenous, Q12H, Radha Jaquez MD, 6 mg at 08/07/21 0740  •  dextrose  (D50W) 25 g/ 50mL Intravenous Solution 25 g, 25 g, Intravenous, Q15 Min PRN, Ryder Llanes APRN  •  dextrose (GLUTOSE) oral gel 15 g, 15 g, Oral, Q15 Min PRN, Ryder Llanes APRRODOLFO  •  [COMPLETED] dilTIAZem (CARDIZEM) injection 20 mg, 20 mg, Intravenous, Once, 20 mg at 21 1508 **FOLLOWED BY** [] dilTIAZem (CARDIZEM) 100 mg in 100 mL NS infusion (ADV), 5-15 mg/hr, Intravenous, Titrated, Stopped at 21 1020 **FOLLOWED BY** dilTIAZem (CARDIZEM) bolus from bag 1 mg/mL 20 mg, 20 mg, Intravenous, Q30 Min PRN, Jessica Fajardo, APRN  •  enoxaparin (LOVENOX) syringe 40 mg, 40 mg, Subcutaneous, Q12H, Jeff Feng MD, 40 mg at 21 0741  •  fentaNYL 1000 mcg in 100 mL NS infusion,  mcg/hr, Intravenous, Titrated, Day, Jessica, APRN, Last Rate: 22.5 mL/hr at 21 0930, 225 mcg/hr at 21 0930  •  fluconazole (DIFLUCAN) 40 MG/ML suspension 400 mg, 400 mg, Oral, Nightly, Day, Jessica, APRN, 400 mg at 21  •  [DISCONTINUED] albumin human 25 % IV SOLN 25 g, 25 g, Intravenous, Q12H, 25 g at 21 2210 **AND** furosemide (LASIX) injection 20 mg, 20 mg, Intravenous, Q12H, Ryder Llanes APRN, 20 mg at 21  •  glucagon (human recombinant) (GLUCAGEN DIAGNOSTIC) injection 1 mg, 1 mg, Subcutaneous, Q15 Min PRN, Ryder Llanes APRRODOLFO  •  glycopyrrolate PF (ROBINUL) injection 0.2 mg, 0.2 mg, Intravenous, Q4H PRN, Marianna Navarrete APRN, 0.2 mg at 21  •  guaiFENesin solution 400 mg, 400 mg, Nasogastric, Q6H, Radha Jaquez MD, 400 mg at 21 0529  •  hydrALAZINE (APRESOLINE) injection 10 mg, 10 mg, Intravenous, Q4H PRN, Ryder Llanes, APRN, 10 mg at 21 0051  •  insulin glargine (LANTUS, SEMGLEE) injection 45 Units, 45 Units, Subcutaneous, Q12H, Jeff Feng MD, 45 Units at 21 0741  •  insulin lispro (ADMELOG) injection 0-24 Units, 0-24 Units, Subcutaneous, Q6H, 8 Units at 21 0529 **AND** insulin lispro (ADMELOG) injection 0-24 Units, 0-24 Units,  Subcutaneous, PRN, Radha Jaquez MD  •  insulin lispro (ADMELOG) injection 10 Units, 10 Units, Subcutaneous, Q6H, Jeff Feng MD, 10 Units at 08/07/21 0529  •  insulin regular (humuLIN R,novoLIN R) injection 18 Units, 18 Units, Subcutaneous, Q12H, Jeff Feng MD, 18 Units at 08/07/21 0741  •  linezolid (ZYVOX) tablet 600 mg, 600 mg, Oral, Q12H, Sarah Fajardo MD, 600 mg at 08/07/21 0742  •  Magnesium Sulfate 2 gram infusion - Mg less than or equal to 1.5 mg/dL, 2 g, Intravenous, PRN **OR** Magnesium Sulfate 1 gram infusion - Mg 1.6-1.9 mg/dL, 1 g, Intravenous, PRN, Ryder Llanes APRN, Last Rate: 100 mL/hr at 08/02/21 0842, 1 g at 08/02/21 0842  •  metoprolol tartrate (LOPRESSOR) injection 5 mg, 5 mg, Intravenous, Q6H PRN, Jessica Fajardo, APRN, 5 mg at 08/06/21 1621  •  metoprolol tartrate (LOPRESSOR) tablet 50 mg, 50 mg, Oral, Q12H, Jeff Feng MD, 50 mg at 08/07/21 0742  •  Midazolam (VERSED) 50 mg in 50mL NS infusion, 1-10 mg/hr, Intravenous, Titrated, Ryder Llanes APRN, Last Rate: 8 mL/hr at 08/07/21 0930, 8 mg/hr at 08/07/21 0930  •  nitroglycerin (NITROSTAT) SL tablet 0.4 mg, 0.4 mg, Sublingual, Q5 Min PRN, Ryder Llanes APRN  •  ondansetron (ZOFRAN) tablet 4 mg, 4 mg, Oral, Q6H PRN **OR** ondansetron (ZOFRAN) injection 4 mg, 4 mg, Intravenous, Q6H PRN, Ryder Llanes, APRN  •  pantoprazole (PROTONIX) injection 40 mg, 40 mg, Intravenous, BID AC, Jeff Feng MD, 40 mg at 08/07/21 0740  •  Pharmacy to Dose enoxaparin (LOVENOX), , Does not apply, Continuous PRN, Jeff Feng MD  •  potassium chloride (K-DUR,KLOR-CON) CR tablet 40 mEq, 40 mEq, Oral, PRN **OR** potassium chloride (KLOR-CON) packet 40 mEq, 40 mEq, Oral, PRN **OR** potassium chloride 10 mEq in 100 mL IVPB, 10 mEq, Intravenous, Q1H PRN, Ryder Llanes, APRRODOLFO  •  potassium phosphate 45 mmol in sodium chloride 0.9 % 500 mL infusion, 45 mmol, Intravenous, PRN **OR** potassium phosphate 30 mmol in sodium chloride 0.9 % 250 mL  infusion, 30 mmol, Intravenous, PRN **OR** potassium phosphate 15 mmol in 0.9% sodium chloride 100 mL IVPB, 15 mmol, Intravenous, PRN **OR** sodium phosphates 45 mmol in sodium chloride 0.9 % 500 mL IVPB, 45 mmol, Intravenous, PRN **OR** sodium phosphates 30 mmol in sodium chloride 0.9 % 250 mL IVPB, 30 mmol, Intravenous, PRN **OR** sodium phosphates 15 mmol in sodium chloride 0.9 % 250 mL IVPB, 15 mmol, Intravenous, PRN, Ryder Llanes, APRN  •  [COMPLETED] Insert peripheral IV, , , Once **AND** sodium chloride 0.9 % flush 10 mL, 10 mL, Intravenous, PRN, Leonardo Doss MD  •  sodium chloride 0.9 % flush 10 mL, 10 mL, Intravenous, Q12H, Ryder Llanes, APRN, 10 mL at 08/07/21 0901  •  sodium chloride 0.9 % flush 10 mL, 10 mL, Intravenous, PRN, Ryder Llanes, APRN    Data Review:  All labs (24hrs):   Recent Results (from the past 24 hour(s))   POC Glucose Once    Collection Time: 08/06/21 11:14 AM    Specimen: Blood   Result Value Ref Range    Glucose 212 (H) 70 - 105 mg/dL   POC Glucose Once    Collection Time: 08/06/21  6:13 PM    Specimen: Blood   Result Value Ref Range    Glucose 181 (H) 70 - 105 mg/dL   POC Glucose Once    Collection Time: 08/07/21 12:05 AM    Specimen: Blood   Result Value Ref Range    Glucose 213 (H) 70 - 105 mg/dL   Urinalysis With Culture If Indicated - Urine, Catheter    Collection Time: 08/07/21  3:51 AM    Specimen: Urine, Catheter   Result Value Ref Range    Color, UA Red (A) Yellow, Straw    Appearance, UA Cloudy (A) Clear    pH, UA 6.0 5.0 - 8.0    Specific Gravity, UA 1.013 1.005 - 1.030    Glucose, UA Negative Negative    Ketones, UA Trace (A) Negative    Bilirubin, UA Negative Negative    Blood, UA Large (3+) (A) Negative    Protein,  mg/dL (2+) (A) Negative    Leuk Esterase, UA Moderate (2+) (A) Negative    Nitrite, UA Negative Negative    Urobilinogen, UA 1.0 E.U./dL 0.2 - 1.0 E.U./dL   Urinalysis, Microscopic Only - Urine, Catheter    Collection Time: 08/07/21  3:51 AM     Specimen: Urine, Catheter   Result Value Ref Range    RBC, UA Too Numerous to Count (A) None Seen /HPF    WBC, UA 31-50 (A) None Seen /HPF    Bacteria, UA 1+ (A) None Seen /HPF    Squamous Epithelial Cells, UA 0-2 None Seen, 0-2 /HPF    Hyaline Casts, UA None Seen None Seen /LPF    Methodology Manual Light Microscopy    Magnesium    Collection Time: 08/07/21  5:07 AM    Specimen: Blood   Result Value Ref Range    Magnesium 2.2 1.6 - 2.6 mg/dL   Phosphorus    Collection Time: 08/07/21  5:07 AM    Specimen: Blood   Result Value Ref Range    Phosphorus 7.3 (H) 2.5 - 4.5 mg/dL   Comprehensive Metabolic Panel    Collection Time: 08/07/21  5:07 AM    Specimen: Blood   Result Value Ref Range    Glucose 209 (H) 65 - 99 mg/dL    BUN 56 (H) 6 - 20 mg/dL    Creatinine 0.85 0.57 - 1.00 mg/dL    Sodium 137 136 - 145 mmol/L    Potassium 4.7 3.5 - 5.2 mmol/L    Chloride 95 (L) 98 - 107 mmol/L    CO2 32.0 (H) 22.0 - 29.0 mmol/L    Calcium 10.1 8.6 - 10.5 mg/dL    Total Protein 6.6 6.0 - 8.5 g/dL    Albumin 4.10 3.50 - 5.20 g/dL    ALT (SGPT) 158 (H) 1 - 33 U/L    AST (SGOT) 47 (H) 1 - 32 U/L    Alkaline Phosphatase 82 39 - 117 U/L    Total Bilirubin 0.9 0.0 - 1.2 mg/dL    eGFR Non African Amer 78 >60 mL/min/1.73    Globulin 2.5 gm/dL    A/G Ratio 1.6 g/dL    BUN/Creatinine Ratio 65.9 (H) 7.0 - 25.0    Anion Gap 10.0 5.0 - 15.0 mmol/L   Calcium, Ionized    Collection Time: 08/07/21  5:07 AM    Specimen: Blood   Result Value Ref Range    Ionized Calcium 1.33 (H) 1.20 - 1.30 mmol/L   Ferritin    Collection Time: 08/07/21  5:07 AM    Specimen: Blood   Result Value Ref Range    Ferritin 153.40 (H) 13.00 - 150.00 ng/mL   C-reactive Protein    Collection Time: 08/07/21  5:07 AM    Specimen: Blood   Result Value Ref Range    C-Reactive Protein 1.30 (H) 0.00 - 0.50 mg/dL   D-dimer, Quantitative    Collection Time: 08/07/21  5:07 AM    Specimen: Blood   Result Value Ref Range    D-Dimer, Quantitative 1.50 (H) 0.00 - 0.59 mg/L (FEU)   CBC  Auto Differential    Collection Time: 08/07/21  5:07 AM    Specimen: Blood   Result Value Ref Range    WBC 20.20 (H) 3.40 - 10.80 10*3/mm3    RBC 3.34 (L) 3.77 - 5.28 10*6/mm3    Hemoglobin 9.9 (L) 12.0 - 15.9 g/dL    Hematocrit 29.5 (L) 34.0 - 46.6 %    MCV 88.5 79.0 - 97.0 fL    MCH 29.5 26.6 - 33.0 pg    MCHC 33.4 31.5 - 35.7 g/dL    RDW 16.5 (H) 12.3 - 15.4 %    RDW-SD 50.8 37.0 - 54.0 fl    MPV 8.5 6.0 - 12.0 fL    Platelets 231 140 - 450 10*3/mm3   Scan Slide    Collection Time: 08/07/21  5:07 AM    Specimen: Blood   Result Value Ref Range    Scan Slide     Manual Differential    Collection Time: 08/07/21  5:07 AM    Specimen: Blood   Result Value Ref Range    Neutrophil % 82.0 (H) 42.7 - 76.0 %    Lymphocyte % 7.0 (L) 19.6 - 45.3 %    Monocyte % 4.0 (L) 5.0 - 12.0 %    Bands %  7.0 (H) 0.0 - 5.0 %    Neutrophils Absolute 17.98 (H) 1.70 - 7.00 10*3/mm3    Lymphocytes Absolute 1.41 0.70 - 3.10 10*3/mm3    Monocytes Absolute 0.81 0.10 - 0.90 10*3/mm3    Anisocytosis Slight/1+ None Seen    Vacuolated Neutrophils Slight/1+ None Seen    Giant Platelets Slight/1+ None Seen   Blood Gas, Arterial -    Collection Time: 08/07/21  5:18 AM    Specimen: Arterial Blood   Result Value Ref Range    Site Arterial Line     Ovi's Test N/A     pH, Arterial 7.371 7.350 - 7.450 pH units    pCO2, Arterial 58.2 (H) 35.0 - 48.0 mm Hg    pO2, Arterial 94.5 83.0 - 108.0 mm Hg    HCO3, Arterial 33.7 (H) 21.0 - 28.0 mmol/L    Base Excess, Arterial 7.0 (H) 0.0 - 3.0 mmol/L    O2 Saturation, Arterial 96.9 94.0 - 98.0 %    CO2 Content 35.4 (H) 22 - 29 mmol/L    Barometric Pressure for Blood Gas      Modality Adult Vent     FIO2 50 %    Ventilator Mode ;AC     Set Tidal Volume 420     PEEP 11     Hemodilution No     Respiratory Rate 36    POC Glucose Once    Collection Time: 08/07/21  5:21 AM    Specimen: Blood   Result Value Ref Range    Glucose 211 (H) 70 - 105 mg/dL        Imaging:  XR Chest 1 View  Narrative: EXAMINATION: XR CHEST 1  VW-     DATE OF EXAM: 8/6/2021 5:07 PM     INDICATION: ETT placement; pt flipped supine from prone position;  R06.00-Dyspnea, unspecified; U07.1-COVID-19; J12.82-Pneumonia due to  Coronavirus disease 2019; J96.01-Acute respiratory failure with hypoxia;  I95.2-Hypotension due to drugs; U07.1-COVID-19; J96.00-Acute respiratory  failure, unspecified whether with hypoxia or hypercapnia.     COMPARISON: Chest radiograph dated 08/05/2021     TECHNIQUE: Portable AP view of the chest was obtained.     FINDINGS:  The endotracheal tube tip is 4.2 cm above the bhavesh. There is a gastric  suction tube which terminates below the left hemidiaphragm. There is  cardiomegaly. There are diffuse bilateral vague opacities in both lungs.  This appears similar to the prior examination. The right IJ central line  has been removed.     Impression: 1. Endotracheal tube and gastric suction tube in expected position.  2. No change in the diffuse bilateral airspace opacities.     Electronically Signed By-Ishaan Caceres MD On:8/6/2021 5:26 PM  This report was finalized on [id number] by  Ishaan Caceres MD.       ASSESSMENT:   Acute hypoxic respiratory failure due to COVID-19 (CMS/HCC)    Pneumonia due to COVID-19 virus   morbid obesity affecting her quality care   HTN  RODRIGUEZ    Class 3 severe obesity without serious comorbidity with body mass index (BMI) of 50.0 to 59.9 in adult    Hyperglycemia, likely stress induced    Diabetes mellitus type 2 in obese (CMS/HCC)       PLAN:  Changed metoprolol to twice daily    Ventilator management  ABGs, chest x-rays, vent settings reviewed  Bronchodilator nebs  Inhaled corticosteroids  Continues to have high ventilator setting requirement  Nimbex for paralytics to enhance oxygenation    Continue antibiotics    Electrolytes management  Glycemic control with sliding scale insulin, scheduled Humulin R, Lantus  DVT prophylaxis with Lovenox  DVT prophylaxis with PPI    Central line discontinued, PIV.      8/7/2021  10:31 EDT     I personally have examined  and interviewed the patient. I have reviewed the history, data, problems, assessment and plan with our NP.  Critical care time in direct medical management (  33 ) minutes  Electronically signed by Jeff Feng MD, D,[facility], 08/07/21, 11:51 PM EDT.

## 2021-08-07 NOTE — PLAN OF CARE
Goal Outcome Evaluation:              Outcome Summary: Patient is on the vent at 50% FiO2 and a peep of 11. Patients BIS was in the upper 60's so sedation was titrated up. Hydralazine given once this shift d/t hypertension. UA was sent down d/t red tinged urine.

## 2021-08-07 NOTE — PROGRESS NOTES
Infectious Diseases Progress Note      LOS: 16 days   Patient Care Team:  Provider, No Known as PCP - General    Chief Complaint: Debated and sedated    Subjective       Patient has been afebrile but is still currently on a cooling blanket.  She is intubated at 50% FiO2 and 11 of PEEP.  Patient is sedated and paralyzed but is currently on no pressor support.  She is in the prone position.      Review of Systems:   Review of Systems   Unable to perform ROS: Intubated        Objective     Vital Signs  Temp:  [98.4 °F (36.9 °C)-100.4 °F (38 °C)] 100.1 °F (37.8 °C)  Heart Rate:  [] 85  Resp:  [36] 36  BP: ()/(45-91) 100/47  Arterial Line BP: ()/() 88/51  FiO2 (%):  [50 %] 50 %    Physical Exam:  Physical Exam  Vitals and nursing note reviewed.   Constitutional:       General: She is not in acute distress.     Appearance: Normal appearance. She is well-developed. She is obese. She is ill-appearing. She is not diaphoretic.   HENT:      Head: Normocephalic and atraumatic.   Cardiovascular:      Rate and Rhythm: Normal rate and regular rhythm.      Heart sounds: Normal heart sounds, S1 normal and S2 normal. No murmur heard.     Pulmonary:      Effort: Pulmonary effort is normal. No respiratory distress.      Breath sounds: No stridor. Rales present. No wheezing.      Comments: Intubated and currently in the prone position  Chest:      Chest wall: No tenderness.   Abdominal:      General: Bowel sounds are normal. There is no distension.      Palpations: Abdomen is soft. There is no mass.      Tenderness: There is no abdominal tenderness. There is no guarding.      Comments: NG tube   Genitourinary:     Comments: Ma catheter  Musculoskeletal:         General: No swelling, tenderness or deformity.      Cervical back: Neck supple.   Skin:     General: Skin is warm and dry.      Coloration: Skin is not pale.      Findings: No bruising, erythema or rash.   Neurological:      Mental Status: She is alert.       Cranial Nerves: No cranial nerve deficit.      Comments: Intubated, paralyzed, sedated          Results Review:    I have reviewed all clinical data, test, lab, and imaging results.     Radiology  XR Chest 1 View    Result Date: 8/6/2021  EXAMINATION: XR CHEST 1 VW-  DATE OF EXAM: 8/6/2021 5:07 PM  INDICATION: ETT placement; pt flipped supine from prone position; R06.00-Dyspnea, unspecified; U07.1-COVID-19; J12.82-Pneumonia due to Coronavirus disease 2019; J96.01-Acute respiratory failure with hypoxia; I95.2-Hypotension due to drugs; U07.1-COVID-19; J96.00-Acute respiratory failure, unspecified whether with hypoxia or hypercapnia.  COMPARISON: Chest radiograph dated 08/05/2021  TECHNIQUE: Portable AP view of the chest was obtained.  FINDINGS: The endotracheal tube tip is 4.2 cm above the bhavesh. There is a gastric suction tube which terminates below the left hemidiaphragm. There is cardiomegaly. There are diffuse bilateral vague opacities in both lungs. This appears similar to the prior examination. The right IJ central line has been removed.      1. Endotracheal tube and gastric suction tube in expected position. 2. No change in the diffuse bilateral airspace opacities.  Electronically Signed By-Ishaan Caceres MD On:8/6/2021 5:26 PM This report was finalized on [id number] by  Ishaan Caceres MD.      Cardiology    Laboratory    Results from last 7 days   Lab Units 08/07/21  0507 08/06/21  0441 08/05/21  0355 08/04/21  0514 08/03/21  1527 08/03/21  0258 08/02/21  0408 08/01/21  0359 08/01/21  0359   WBC 10*3/mm3 20.20* 16.90* 19.60* 16.70*  --  21.80* 17.20*  --  14.70*   HEMOGLOBIN g/dL 9.9* 9.2* 9.6* 9.2* 9.2* 9.8* 10.0*   < > 11.0*   HEMATOCRIT % 29.5* 27.6* 28.7* 28.2* 28.2* 29.4* 30.5*   < > 33.8*   PLATELETS 10*3/mm3 231 255 300 274  --  374 302  --  338    < > = values in this interval not displayed.     Results from last 7 days   Lab Units 08/07/21  0507 08/06/21  0441 08/05/21  0355  08/04/21  0514 08/03/21  0258 08/02/21  0408 08/01/21  0359   SODIUM mmol/L 137 132* 133* 133* 136 137 137   POTASSIUM mmol/L 4.7 4.8 5.2 5.3* 5.0 4.7 5.7*   CHLORIDE mmol/L 95* 90* 89* 93* 96* 94* 94*   CO2 mmol/L 32.0* 35.0* 35.0* 33.0* 33.0* 34.0* 38.0*   BUN mg/dL 56* 28* 27* 28* 28* 28* 22*   CREATININE mg/dL 0.85 0.45* 0.42* 0.45* 0.39* 0.39* 0.39*   GLUCOSE mg/dL 209* 203* 209* 237* 247* 286* 248*   ALBUMIN g/dL 4.10 4.60 4.50 3.90 3.10* 2.90* 3.00*   BILIRUBIN mg/dL 0.9 1.2 1.0 0.7 0.6 0.6 0.5   ALK PHOS U/L 82 73 83 73 68 66 74   AST (SGOT) U/L 47* 72* 82* 78* 53* 52* 59*   ALT (SGPT) U/L 158* 197* 173* 139* 96* 98* 96*   CALCIUM mg/dL 10.1 9.8 9.9 9.3 9.2 9.1 9.2     Results from last 7 days   Lab Units 08/04/21  0514   CK TOTAL U/L 84             Microbiology   Microbiology Results (last 10 days)     Procedure Component Value - Date/Time    Respiratory Culture - Sputum, Bronchus [[id number]]  (Abnormal)  (Susceptibility) Collected: 08/04/21 1150    Lab Status: Final result Specimen: Sputum from Bronchus Updated: 08/06/21 0823     Respiratory Culture Scant growth (1+) Klebsiella pneumoniae ssp pneumoniae      Rare Normal Respiratory Sondra: NO S.aureus/MRSA or Pseudomonas aeruginosa     Gram Stain Few (2+) WBCs per low power field      No organisms seen    Susceptibility      Klebsiella pneumoniae ssp pneumoniae      FITZ      Ampicillin Resistant      Ampicillin + Sulbactam Resistant      Cefepime Susceptible      Ceftazidime Susceptible      Ceftriaxone Susceptible      Gentamicin Susceptible      Levofloxacin Intermediate      Piperacillin + Tazobactam Susceptible      Tetracycline Resistant      Trimethoprim + Sulfamethoxazole Susceptible               Linear View               Susceptibility Comments     Klebsiella pneumoniae ssp pneumoniae    Cefazolin sensitivity will not be reported for Enterobacteriaceae in non-urine isolates. If cefazolin is preferred, please call the microbiology lab to request an  E-test.  With the exception of urinary-sourced infections, aminoglycosides should not be used as monotherapy.             Clostridium Difficile EIA - Stool, Per Rectum [[id number]]  (Normal) Collected: 08/03/21 1600    Lab Status: Final result Specimen: Stool from Per Rectum Updated: 08/04/21 0927     C Diff GDH / Toxin Negative    Urine Culture - Urine, Urine, Catheter [[id number]]  (Normal) Collected: 08/03/21 1036    Lab Status: Final result Specimen: Urine, Catheter Updated: 08/04/21 1311     Urine Culture No growth    Respiratory Culture - Sputum, ET Suction [[id number]]  (Abnormal)  (Susceptibility) Collected: 07/29/21 2038    Lab Status: Final result Specimen: Sputum from ET Suction Updated: 08/02/21 1315     Respiratory Culture Scant growth (1+) Candida albicans      Scant growth (1+) Staphylococcus aureus, MRSA     Comment: Methicillin resistant Staphylococcus aureus, Patient may be an isolation risk.         No Normal Respiratory Sondra     Gram Stain Few (2+) WBCs per low power field      Rare (1+) Epithelial cells per low power field      Few (2+) Budding yeast    Susceptibility      Staphylococcus aureus, MRSA      FITZ      Clindamycin Susceptible      Linezolid Susceptible      Oxacillin Resistant      Penicillin G Resistant      Tetracycline Susceptible      Trimethoprim + Sulfamethoxazole Susceptible      Vancomycin Susceptible               Linear View                   Blood Culture - Blood, Blood, Central Line [[id number]] Collected: 07/29/21 1153    Lab Status: Final result Specimen: Blood, Central Line Updated: 08/03/21 1231     Blood Culture No growth at 5 days    Blood Culture - Blood, Blood, Arterial Line [[id number]] Collected: 07/29/21 1136    Lab Status: Final result Specimen: Blood, Arterial Line Updated: 08/03/21 1231     Blood Culture No growth at 5 days          Medication Review:       Schedule Meds  Acetylcysteine, 600 mg, Oral, BID  albuterol sulfate HFA, 6 puff, Inhalation, 4x Daily -  RT  artificial tears, , Both Eyes, Q4H  aspirin, 324 mg, Per G Tube, Daily  Beneprotein, 2 packet, Nasogastric, BID  budesonide-formoterol, 2 puff, Inhalation, BID - RT  cefTRIAXone, 2 g, Intravenous, Q24H  cholecalciferol, 2,000 Units, Nasogastric, Daily  dexamethasone, 6 mg, Intravenous, Q12H  enoxaparin, 40 mg, Subcutaneous, Q12H  fluconazole, 400 mg, Oral, Nightly  furosemide, 20 mg, Intravenous, Q12H  guaiFENesin, 400 mg, Nasogastric, Q6H  insulin glargine, 45 Units, Subcutaneous, Q12H  insulin lispro, 0-24 Units, Subcutaneous, Q6H  insulin lispro, 10 Units, Subcutaneous, Q6H  insulin regular, 20 Units, Subcutaneous, Q12H  linezolid, 600 mg, Oral, Q12H  metoprolol tartrate, 50 mg, Oral, TID  pantoprazole, 40 mg, Intravenous, BID AC  sodium chloride, 10 mL, Intravenous, Q12H        Infusion Meds  cisatracurium (NIMBEX) infusion, 0.5-10.2 mcg/kg/min, Last Rate: 5 mcg/kg/min (21 1624)  fentanyl 10 mcg/mL,  mcg/hr, Last Rate: 225 mcg/hr (21 1551)  midazolam, 1-10 mg/hr, Last Rate: 8 mg/hr (21 1110)  Pharmacy to Dose enoxaparin (LOVENOX),         PRN Meds  •  acetaminophen  •  acetaminophen **OR** acetaminophen  •  aluminum-magnesium hydroxide-simethicone  •  artificial tears  •  benzonatate  •  cisatracurium  •  dextrose  •  dextrose  •  [COMPLETED] dilTIAZem **FOLLOWED BY** [] dilTIAZem **FOLLOWED BY** dilTIAZem  •  glucagon (human recombinant)  •  glycopyrrolate PF  •  hydrALAZINE  •  insulin lispro **AND** insulin lispro  •  magnesium sulfate **OR** magnesium sulfate in D5W 1g/100mL (PREMIX)  •  metoprolol tartrate  •  nitroglycerin  •  ondansetron **OR** ondansetron  •  Pharmacy to Dose enoxaparin (LOVENOX)  •  potassium chloride **OR** potassium chloride **OR** potassium chloride  •  potassium phosphate infusion greater than 15 mMoles **OR** potassium phosphate infusion greater than 15 mMoles **OR** potassium phosphate **OR** sodium phosphate IVPB **OR** sodium phosphate IVPB  **OR** sodium phosphate IVPB  •  [COMPLETED] Insert peripheral IV **AND** sodium chloride  •  sodium chloride        Assessment/Plan       Antimicrobial Therapy   1.  Zyvox      day  2.  Rocephin      day  3.      Day  4.      Day  5.      Day      Assessment     Severe COVID-19 infection and patient with significant comorbidities including diabetes mellitus and morbid obesity. Apparently did not receive vaccination for COVID-19  COVID-19 screen on admission on July 22, 2021 was positive. Was reported that patient had positive COVID-19 diagnosis 6 days prior to admission  The patient had received 5 days of IV remdesivir     Severe respiratory failure on the ventilator currently on 50% FiO2 and 11 PEEP.  Chest x-ray consistent with severe COVID-19 infection and ARDS     Probable ventilator associated pneumonia versus hospitalist acquired pneumonia. Sputum culture from July 29, 2021 grew MRSA and the organism is susceptible to linezolid and vancomycin  The patient was started on linezolid on August 1, 2021  Repeat sputum culture from August 1, 2021 is growing Klebsiella pneumoniae     Morbid obesity     Diabetes mellitus     Reactive leukocytosis secondary to steroids    Elevated CK.  CK is back to normal         Plan     Continue Zyvox 600 mg every 12 hours for 10 to 14 days and monitor platelets carefully during treatment with Zyvox  Discontinue ceftazidime   Start Rocephin 2 g every 24 hours  Waiting on urine culture  Supportive care  Prognosis is very guarded and might be poor secondary to severe COVID-19 infection  Continue dexamethasone for now, pulmonary service is following and monitoring  Case discussed with patient's RN     Continue COVID-19 isolation for total of 20 days from the first diagnosis  Labs in a.m. including : CBC with differential, CMP,  d-dimer, ferritin and CRP        NATY Pearce  08/07/21  16:50 EDT     Note is dictated utilizing voice recognition software/Dragon

## 2021-08-08 ENCOUNTER — APPOINTMENT (OUTPATIENT)
Dept: GENERAL RADIOLOGY | Facility: HOSPITAL | Age: 31
End: 2021-08-08

## 2021-08-08 LAB
ALBUMIN SERPL-MCNC: 4 G/DL (ref 3.5–5.2)
ALBUMIN/GLOB SERPL: 1.4 G/DL
ALP SERPL-CCNC: 85 U/L (ref 39–117)
ALT SERPL W P-5'-P-CCNC: 133 U/L (ref 1–33)
ANION GAP SERPL CALCULATED.3IONS-SCNC: 10 MMOL/L (ref 5–15)
APTT PPP: <20 SECONDS (ref 24–31)
ARTERIAL PATENCY WRIST A: POSITIVE
AST SERPL-CCNC: 39 U/L (ref 1–32)
ATMOSPHERIC PRESS: ABNORMAL MM[HG]
B PARAPERT DNA SPEC QL NAA+PROBE: NOT DETECTED
B PERT DNA SPEC QL NAA+PROBE: NOT DETECTED
BASE EXCESS BLDA CALC-SCNC: 7.4 MMOL/L (ref 0–3)
BASOPHILS # BLD AUTO: 0 10*3/MM3 (ref 0–0.2)
BASOPHILS NFR BLD AUTO: 0.2 % (ref 0–1.5)
BDY SITE: ABNORMAL
BILIRUB CONJ SERPL-MCNC: 0.2 MG/DL (ref 0–0.3)
BILIRUB SERPL-MCNC: 0.8 MG/DL (ref 0–1.2)
BUN SERPL-MCNC: 55 MG/DL (ref 6–20)
BUN/CREAT SERPL: 82.1 (ref 7–25)
C PNEUM DNA NPH QL NAA+NON-PROBE: NOT DETECTED
CA-I SERPL ISE-MCNC: 1.37 MMOL/L (ref 1.2–1.3)
CALCIUM SPEC-SCNC: 10.2 MG/DL (ref 8.6–10.5)
CHLORIDE SERPL-SCNC: 98 MMOL/L (ref 98–107)
CO2 BLDA-SCNC: 35.5 MMOL/L (ref 22–29)
CO2 SERPL-SCNC: 32 MMOL/L (ref 22–29)
CREAT SERPL-MCNC: 0.67 MG/DL (ref 0.57–1)
D DIMER PPP FEU-MCNC: 1.42 MG/L (FEU) (ref 0–0.59)
DEPRECATED RDW RBC AUTO: 52.1 FL (ref 37–54)
EOSINOPHIL # BLD AUTO: 0 10*3/MM3 (ref 0–0.4)
EOSINOPHIL NFR BLD AUTO: 0.1 % (ref 0.3–6.2)
ERYTHROCYTE [DISTWIDTH] IN BLOOD BY AUTOMATED COUNT: 16.8 % (ref 12.3–15.4)
FIBRINOGEN PPP-MCNC: 577 MG/DL (ref 210–450)
FLUAV SUBTYP SPEC NAA+PROBE: NOT DETECTED
FLUBV RNA ISLT QL NAA+PROBE: NOT DETECTED
GFR SERPL CREATININE-BSD FRML MDRD: 103 ML/MIN/1.73
GLOBULIN UR ELPH-MCNC: 2.8 GM/DL
GLUCOSE BLDC GLUCOMTR-MCNC: 146 MG/DL (ref 70–105)
GLUCOSE BLDC GLUCOMTR-MCNC: 154 MG/DL (ref 70–105)
GLUCOSE BLDC GLUCOMTR-MCNC: 207 MG/DL (ref 70–105)
GLUCOSE BLDC GLUCOMTR-MCNC: 274 MG/DL (ref 70–105)
GLUCOSE SERPL-MCNC: 192 MG/DL (ref 65–99)
HADV DNA SPEC NAA+PROBE: NOT DETECTED
HCO3 BLDA-SCNC: 33.8 MMOL/L (ref 21–28)
HCOV 229E RNA SPEC QL NAA+PROBE: NOT DETECTED
HCOV HKU1 RNA SPEC QL NAA+PROBE: NOT DETECTED
HCOV NL63 RNA SPEC QL NAA+PROBE: NOT DETECTED
HCOV OC43 RNA SPEC QL NAA+PROBE: NOT DETECTED
HCT VFR BLD AUTO: 30.5 % (ref 34–46.6)
HEMODILUTION: NO
HGB BLD-MCNC: 9.9 G/DL (ref 12–15.9)
HMPV RNA NPH QL NAA+NON-PROBE: NOT DETECTED
HPIV1 RNA SPEC QL NAA+PROBE: NOT DETECTED
HPIV2 RNA SPEC QL NAA+PROBE: NOT DETECTED
HPIV3 RNA NPH QL NAA+PROBE: NOT DETECTED
HPIV4 P GENE NPH QL NAA+PROBE: NOT DETECTED
INHALED O2 CONCENTRATION: 60 %
INR PPP: 1.01 (ref 0.93–1.1)
LDH SERPL-CCNC: 230 U/L (ref 135–214)
LYMPHOCYTES # BLD AUTO: 1 10*3/MM3 (ref 0.7–3.1)
LYMPHOCYTES NFR BLD AUTO: 5.9 % (ref 19.6–45.3)
M PNEUMO IGG SER IA-ACNC: NOT DETECTED
MAGNESIUM SERPL-MCNC: 1.9 MG/DL (ref 1.6–2.6)
MCH RBC QN AUTO: 29.3 PG (ref 26.6–33)
MCHC RBC AUTO-ENTMCNC: 32.7 G/DL (ref 31.5–35.7)
MCV RBC AUTO: 89.8 FL (ref 79–97)
MODALITY: ABNORMAL
MONOCYTES # BLD AUTO: 0.6 10*3/MM3 (ref 0.1–0.9)
MONOCYTES NFR BLD AUTO: 3.7 % (ref 5–12)
NEUTROPHILS NFR BLD AUTO: 14.5 10*3/MM3 (ref 1.7–7)
NEUTROPHILS NFR BLD AUTO: 90.1 % (ref 42.7–76)
NRBC BLD AUTO-RTO: 0 /100 WBC (ref 0–0.2)
NT-PROBNP SERPL-MCNC: 94.8 PG/ML (ref 0–450)
PCO2 BLDA: 56.5 MM HG (ref 35–48)
PEEP RESPIRATORY: 12 CM[H2O]
PH BLDA: 7.38 PH UNITS (ref 7.35–7.45)
PHOSPHATE SERPL-MCNC: 4.5 MG/DL (ref 2.5–4.5)
PLATELET # BLD AUTO: 234 10*3/MM3 (ref 140–450)
PMV BLD AUTO: 8.8 FL (ref 6–12)
PO2 BLDA: 86 MM HG (ref 83–108)
POTASSIUM SERPL-SCNC: 4.6 MMOL/L (ref 3.5–5.2)
PROT SERPL-MCNC: 6.8 G/DL (ref 6–8.5)
PROTHROMBIN TIME: 11.2 SECONDS (ref 9.6–11.7)
RBC # BLD AUTO: 3.39 10*6/MM3 (ref 3.77–5.28)
RESPIRATORY RATE: 36
RHINOVIRUS RNA SPEC NAA+PROBE: NOT DETECTED
RSV RNA NPH QL NAA+NON-PROBE: NOT DETECTED
SAO2 % BLDCOA: 96.1 % (ref 94–98)
SODIUM SERPL-SCNC: 140 MMOL/L (ref 136–145)
TROPONIN T SERPL-MCNC: <0.01 NG/ML (ref 0–0.03)
VENTILATOR MODE: ABNORMAL
VT ON VENT VENT: 420 ML
WBC # BLD AUTO: 16.1 10*3/MM3 (ref 3.4–10.8)

## 2021-08-08 PROCEDURE — 0B938ZZ DRAINAGE OF RIGHT MAIN BRONCHUS, VIA NATURAL OR ARTIFICIAL OPENING ENDOSCOPIC: ICD-10-PCS | Performed by: INTERNAL MEDICINE

## 2021-08-08 PROCEDURE — 85610 PROTHROMBIN TIME: CPT | Performed by: NURSE PRACTITIONER

## 2021-08-08 PROCEDURE — 25010000002 ENOXAPARIN PER 10 MG: Performed by: INTERNAL MEDICINE

## 2021-08-08 PROCEDURE — 82962 GLUCOSE BLOOD TEST: CPT

## 2021-08-08 PROCEDURE — 87633 RESP VIRUS 12-25 TARGETS: CPT | Performed by: INTERNAL MEDICINE

## 2021-08-08 PROCEDURE — 94003 VENT MGMT INPAT SUBQ DAY: CPT

## 2021-08-08 PROCEDURE — 87305 ASPERGILLUS AG IA: CPT | Performed by: INTERNAL MEDICINE

## 2021-08-08 PROCEDURE — 0B978ZZ DRAINAGE OF LEFT MAIN BRONCHUS, VIA NATURAL OR ARTIFICIAL OPENING ENDOSCOPIC: ICD-10-PCS | Performed by: INTERNAL MEDICINE

## 2021-08-08 PROCEDURE — 36600 WITHDRAWAL OF ARTERIAL BLOOD: CPT

## 2021-08-08 PROCEDURE — 87385 HISTOPLASMA CAPSUL AG IA: CPT | Performed by: INTERNAL MEDICINE

## 2021-08-08 PROCEDURE — 87206 SMEAR FLUORESCENT/ACID STAI: CPT | Performed by: INTERNAL MEDICINE

## 2021-08-08 PROCEDURE — 83615 LACTATE (LD) (LDH) ENZYME: CPT | Performed by: NURSE PRACTITIONER

## 2021-08-08 PROCEDURE — 63710000001 INSULIN GLARGINE PER 5 UNITS: Performed by: INTERNAL MEDICINE

## 2021-08-08 PROCEDURE — 94799 UNLISTED PULMONARY SVC/PX: CPT

## 2021-08-08 PROCEDURE — 83880 ASSAY OF NATRIURETIC PEPTIDE: CPT | Performed by: NURSE PRACTITIONER

## 2021-08-08 PROCEDURE — 25010000002 CEFTRIAXONE PER 250 MG: Performed by: INTERNAL MEDICINE

## 2021-08-08 PROCEDURE — 85379 FIBRIN DEGRADATION QUANT: CPT | Performed by: NURSE PRACTITIONER

## 2021-08-08 PROCEDURE — 82330 ASSAY OF CALCIUM: CPT | Performed by: NURSE PRACTITIONER

## 2021-08-08 PROCEDURE — 25010000002 DEXAMETHASONE PER 1 MG: Performed by: INTERNAL MEDICINE

## 2021-08-08 PROCEDURE — 80053 COMPREHEN METABOLIC PANEL: CPT | Performed by: NURSE PRACTITIONER

## 2021-08-08 PROCEDURE — 87496 CYTOMEG DNA AMP PROBE: CPT | Performed by: INTERNAL MEDICINE

## 2021-08-08 PROCEDURE — 82248 BILIRUBIN DIRECT: CPT | Performed by: NURSE PRACTITIONER

## 2021-08-08 PROCEDURE — 87116 MYCOBACTERIA CULTURE: CPT | Performed by: INTERNAL MEDICINE

## 2021-08-08 PROCEDURE — 84100 ASSAY OF PHOSPHORUS: CPT | Performed by: NURSE PRACTITIONER

## 2021-08-08 PROCEDURE — 85384 FIBRINOGEN ACTIVITY: CPT | Performed by: NURSE PRACTITIONER

## 2021-08-08 PROCEDURE — 36415 COLL VENOUS BLD VENIPUNCTURE: CPT | Performed by: NURSE PRACTITIONER

## 2021-08-08 PROCEDURE — 71045 X-RAY EXAM CHEST 1 VIEW: CPT

## 2021-08-08 PROCEDURE — 84484 ASSAY OF TROPONIN QUANT: CPT | Performed by: NURSE PRACTITIONER

## 2021-08-08 PROCEDURE — 85730 THROMBOPLASTIN TIME PARTIAL: CPT | Performed by: NURSE PRACTITIONER

## 2021-08-08 PROCEDURE — 25010000002 FUROSEMIDE PER 20 MG: Performed by: NURSE PRACTITIONER

## 2021-08-08 PROCEDURE — 82803 BLOOD GASES ANY COMBINATION: CPT

## 2021-08-08 PROCEDURE — 25010000002 FENTANYL CITRATE (PF) 2500 MCG/50ML SOLUTION: Performed by: NURSE PRACTITIONER

## 2021-08-08 PROCEDURE — 63710000001 INSULIN LISPRO (HUMAN) PER 5 UNITS: Performed by: INTERNAL MEDICINE

## 2021-08-08 PROCEDURE — 87070 CULTURE OTHR SPECIMN AEROBIC: CPT | Performed by: INTERNAL MEDICINE

## 2021-08-08 PROCEDURE — 87102 FUNGUS ISOLATION CULTURE: CPT | Performed by: INTERNAL MEDICINE

## 2021-08-08 PROCEDURE — 63710000001 INSULIN REGULAR HUMAN PER 5 UNITS: Performed by: NURSE PRACTITIONER

## 2021-08-08 PROCEDURE — 83735 ASSAY OF MAGNESIUM: CPT | Performed by: NURSE PRACTITIONER

## 2021-08-08 PROCEDURE — 87205 SMEAR GRAM STAIN: CPT | Performed by: INTERNAL MEDICINE

## 2021-08-08 PROCEDURE — 25010000002 MIDAZOLAM 50 MG/10ML SOLUTION: Performed by: NURSE PRACTITIONER

## 2021-08-08 PROCEDURE — 87798 DETECT AGENT NOS DNA AMP: CPT | Performed by: INTERNAL MEDICINE

## 2021-08-08 PROCEDURE — 85025 COMPLETE CBC W/AUTO DIFF WBC: CPT | Performed by: NURSE PRACTITIONER

## 2021-08-08 RX ORDER — ACETYLCYSTEINE 200 MG/ML
600 SOLUTION ORAL; RESPIRATORY (INHALATION) ONCE
Status: COMPLETED | OUTPATIENT
Start: 2021-08-08 | End: 2021-08-08

## 2021-08-08 RX ORDER — LIDOCAINE HYDROCHLORIDE 10 MG/ML
INJECTION, SOLUTION EPIDURAL; INFILTRATION; INTRACAUDAL; PERINEURAL AS NEEDED
Status: DISCONTINUED | OUTPATIENT
Start: 2021-08-08 | End: 2021-08-24 | Stop reason: HOSPADM

## 2021-08-08 RX ORDER — IBUPROFEN 400 MG/1
800 TABLET ORAL ONCE
Status: COMPLETED | OUTPATIENT
Start: 2021-08-08 | End: 2021-08-08

## 2021-08-08 RX ORDER — LIDOCAINE 50 MG/G
OINTMENT TOPICAL
Status: DISPENSED
Start: 2021-08-08 | End: 2021-08-08

## 2021-08-08 RX ORDER — DEXAMETHASONE SODIUM PHOSPHATE 4 MG/ML
6 INJECTION, SOLUTION INTRA-ARTICULAR; INTRALESIONAL; INTRAMUSCULAR; INTRAVENOUS; SOFT TISSUE DAILY
Status: DISCONTINUED | OUTPATIENT
Start: 2021-08-09 | End: 2021-08-11

## 2021-08-08 RX ORDER — METOPROLOL TARTRATE 50 MG/1
50 TABLET, FILM COATED ORAL EVERY 12 HOURS SCHEDULED
Status: DISCONTINUED | OUTPATIENT
Start: 2021-08-08 | End: 2021-08-11

## 2021-08-08 RX ORDER — IPRATROPIUM BROMIDE AND ALBUTEROL SULFATE 2.5; .5 MG/3ML; MG/3ML
3 SOLUTION RESPIRATORY (INHALATION)
Status: DISCONTINUED | OUTPATIENT
Start: 2021-08-08 | End: 2021-08-24

## 2021-08-08 RX ORDER — LIDOCAINE 50 MG/G
OINTMENT TOPICAL AS NEEDED
Status: DISCONTINUED | OUTPATIENT
Start: 2021-08-08 | End: 2021-08-24 | Stop reason: HOSPADM

## 2021-08-08 RX ORDER — DEXMEDETOMIDINE HYDROCHLORIDE 4 UG/ML
.2-1.5 INJECTION, SOLUTION INTRAVENOUS
Status: DISCONTINUED | OUTPATIENT
Start: 2021-08-08 | End: 2021-08-11

## 2021-08-08 RX ORDER — LIDOCAINE HYDROCHLORIDE 10 MG/ML
INJECTION, SOLUTION EPIDURAL; INFILTRATION; INTRACAUDAL; PERINEURAL
Status: DISPENSED
Start: 2021-08-08 | End: 2021-08-08

## 2021-08-08 RX ORDER — ACETYLCYSTEINE 200 MG/ML
2 SOLUTION ORAL; RESPIRATORY (INHALATION)
Status: DISCONTINUED | OUTPATIENT
Start: 2021-08-08 | End: 2021-08-10

## 2021-08-08 RX ORDER — ACETYLCYSTEINE 200 MG/ML
2 SOLUTION ORAL; RESPIRATORY (INHALATION)
Status: DISCONTINUED | OUTPATIENT
Start: 2021-08-08 | End: 2021-08-08

## 2021-08-08 RX ORDER — BUDESONIDE 0.5 MG/2ML
0.5 INHALANT ORAL
Status: DISCONTINUED | OUTPATIENT
Start: 2021-08-08 | End: 2021-08-24

## 2021-08-08 RX ADMIN — IPRATROPIUM BROMIDE AND ALBUTEROL SULFATE 3 ML: 2.5; .5 SOLUTION RESPIRATORY (INHALATION) at 19:22

## 2021-08-08 RX ADMIN — ENOXAPARIN SODIUM 40 MG: 40 INJECTION SUBCUTANEOUS at 08:06

## 2021-08-08 RX ADMIN — Medication 2 PACKET: at 00:27

## 2021-08-08 RX ADMIN — FENTANYL CITRATE 200 MCG/HR: 50 INJECTION, SOLUTION INTRAMUSCULAR; INTRAVENOUS at 23:24

## 2021-08-08 RX ADMIN — LINEZOLID 600 MG: 600 TABLET ORAL at 08:07

## 2021-08-08 RX ADMIN — MIDAZOLAM 10 MG/HR: 5 INJECTION INTRAMUSCULAR; INTRAVENOUS at 00:50

## 2021-08-08 RX ADMIN — DEXMEDETOMIDINE HYDROCHLORIDE 1 MCG/KG/HR: 100 INJECTION, SOLUTION INTRAVENOUS at 19:39

## 2021-08-08 RX ADMIN — INSULIN GLARGINE 45 UNITS: 100 INJECTION, SOLUTION SUBCUTANEOUS at 20:01

## 2021-08-08 RX ADMIN — FENTANYL CITRATE 250 MCG/HR: 50 INJECTION, SOLUTION INTRAMUSCULAR; INTRAVENOUS at 14:54

## 2021-08-08 RX ADMIN — MIDAZOLAM 10 MG/HR: 5 INJECTION INTRAMUSCULAR; INTRAVENOUS at 04:56

## 2021-08-08 RX ADMIN — PANTOPRAZOLE SODIUM 40 MG: 40 INJECTION, POWDER, FOR SOLUTION INTRAVENOUS at 08:07

## 2021-08-08 RX ADMIN — MIDAZOLAM 2 MG/HR: 5 INJECTION INTRAMUSCULAR; INTRAVENOUS at 23:14

## 2021-08-08 RX ADMIN — IPRATROPIUM BROMIDE AND ALBUTEROL SULFATE 3 ML: 2.5; .5 SOLUTION RESPIRATORY (INHALATION) at 14:53

## 2021-08-08 RX ADMIN — ACETYLCYSTEINE 2 ML: 200 SOLUTION ORAL; RESPIRATORY (INHALATION) at 12:00

## 2021-08-08 RX ADMIN — ALBUTEROL SULFATE 6 PUFF: 108 INHALANT RESPIRATORY (INHALATION) at 07:20

## 2021-08-08 RX ADMIN — FUROSEMIDE 20 MG: 10 INJECTION, SOLUTION INTRAMUSCULAR; INTRAVENOUS at 21:35

## 2021-08-08 RX ADMIN — DEXMEDETOMIDINE HYDROCHLORIDE 1 MCG/KG/HR: 100 INJECTION, SOLUTION INTRAVENOUS at 23:14

## 2021-08-08 RX ADMIN — Medication 2 PACKET: at 21:21

## 2021-08-08 RX ADMIN — INSULIN LISPRO 10 UNITS: 100 INJECTION, SOLUTION INTRAVENOUS; SUBCUTANEOUS at 12:23

## 2021-08-08 RX ADMIN — FENTANYL CITRATE 250 MCG/HR: 50 INJECTION, SOLUTION INTRAMUSCULAR; INTRAVENOUS at 00:50

## 2021-08-08 RX ADMIN — Medication 600 MG: at 08:08

## 2021-08-08 RX ADMIN — METOPROLOL TARTRATE 50 MG: 50 TABLET, FILM COATED ORAL at 08:07

## 2021-08-08 RX ADMIN — ASPIRIN 324 MG: 81 TABLET, CHEWABLE ORAL at 08:07

## 2021-08-08 RX ADMIN — Medication 10 ML: at 20:02

## 2021-08-08 RX ADMIN — GUAIFENESIN 400 MG: 100 SOLUTION ORAL at 12:23

## 2021-08-08 RX ADMIN — ACETAMINOPHEN ORAL SOLUTION 650 MG: 650 SOLUTION ORAL at 18:08

## 2021-08-08 RX ADMIN — Medication 2000 UNITS: at 08:06

## 2021-08-08 RX ADMIN — ENOXAPARIN SODIUM 40 MG: 40 INJECTION SUBCUTANEOUS at 20:01

## 2021-08-08 RX ADMIN — LINEZOLID 600 MG: 600 TABLET ORAL at 20:01

## 2021-08-08 RX ADMIN — CISATRACURIUM BESYLATE 2 MCG/KG/MIN: 10 INJECTION INTRAVENOUS at 03:11

## 2021-08-08 RX ADMIN — METOPROLOL TARTRATE 50 MG: 50 TABLET, FILM COATED ORAL at 20:01

## 2021-08-08 RX ADMIN — PANTOPRAZOLE SODIUM 40 MG: 40 INJECTION, POWDER, FOR SOLUTION INTRAVENOUS at 18:08

## 2021-08-08 RX ADMIN — INSULIN HUMAN 20 UNITS: 100 INJECTION, SOLUTION PARENTERAL at 09:30

## 2021-08-08 RX ADMIN — MIDAZOLAM 10 MG/HR: 5 INJECTION INTRAMUSCULAR; INTRAVENOUS at 10:48

## 2021-08-08 RX ADMIN — MINERAL OIL AND WHITE PETROLATUM: 150; 830 OINTMENT OPHTHALMIC at 15:55

## 2021-08-08 RX ADMIN — FENTANYL CITRATE 200 MCG/HR: 50 INJECTION, SOLUTION INTRAMUSCULAR; INTRAVENOUS at 04:56

## 2021-08-08 RX ADMIN — MINERAL OIL AND WHITE PETROLATUM: 150; 830 OINTMENT OPHTHALMIC at 00:28

## 2021-08-08 RX ADMIN — IPRATROPIUM BROMIDE AND ALBUTEROL SULFATE 3 ML: 2.5; .5 SOLUTION RESPIRATORY (INHALATION) at 12:00

## 2021-08-08 RX ADMIN — INSULIN LISPRO 10 UNITS: 100 INJECTION, SOLUTION INTRAVENOUS; SUBCUTANEOUS at 23:14

## 2021-08-08 RX ADMIN — INSULIN LISPRO 8 UNITS: 100 INJECTION, SOLUTION INTRAVENOUS; SUBCUTANEOUS at 12:23

## 2021-08-08 RX ADMIN — GUAIFENESIN 400 MG: 100 SOLUTION ORAL at 06:28

## 2021-08-08 RX ADMIN — MINERAL OIL AND WHITE PETROLATUM: 150; 830 OINTMENT OPHTHALMIC at 20:01

## 2021-08-08 RX ADMIN — INSULIN LISPRO 4 UNITS: 100 INJECTION, SOLUTION INTRAVENOUS; SUBCUTANEOUS at 06:28

## 2021-08-08 RX ADMIN — CEFTRIAXONE SODIUM 2 G: 2 INJECTION, POWDER, FOR SOLUTION INTRAMUSCULAR; INTRAVENOUS at 10:38

## 2021-08-08 RX ADMIN — INSULIN LISPRO 10 UNITS: 100 INJECTION, SOLUTION INTRAVENOUS; SUBCUTANEOUS at 18:08

## 2021-08-08 RX ADMIN — FUROSEMIDE 20 MG: 10 INJECTION, SOLUTION INTRAMUSCULAR; INTRAVENOUS at 10:38

## 2021-08-08 RX ADMIN — INSULIN GLARGINE 45 UNITS: 100 INJECTION, SOLUTION SUBCUTANEOUS at 09:30

## 2021-08-08 RX ADMIN — BUDESONIDE 0.5 MG: 0.5 SUSPENSION RESPIRATORY (INHALATION) at 19:22

## 2021-08-08 RX ADMIN — ACETYLCYSTEINE 600 MG: 200 SOLUTION ORAL; RESPIRATORY (INHALATION) at 10:37

## 2021-08-08 RX ADMIN — GUAIFENESIN 400 MG: 100 SOLUTION ORAL at 18:08

## 2021-08-08 RX ADMIN — Medication 2 PACKET: at 08:08

## 2021-08-08 RX ADMIN — DEXMEDETOMIDINE HYDROCHLORIDE 1 MCG/KG/HR: 100 INJECTION, SOLUTION INTRAVENOUS at 21:21

## 2021-08-08 RX ADMIN — GUAIFENESIN 400 MG: 100 SOLUTION ORAL at 00:27

## 2021-08-08 RX ADMIN — MINERAL OIL AND WHITE PETROLATUM: 150; 830 OINTMENT OPHTHALMIC at 08:07

## 2021-08-08 RX ADMIN — Medication 600 MG: at 20:01

## 2021-08-08 RX ADMIN — GUAIFENESIN 400 MG: 100 SOLUTION ORAL at 23:14

## 2021-08-08 RX ADMIN — MINERAL OIL AND WHITE PETROLATUM: 150; 830 OINTMENT OPHTHALMIC at 12:23

## 2021-08-08 RX ADMIN — INSULIN LISPRO 12 UNITS: 100 INJECTION, SOLUTION INTRAVENOUS; SUBCUTANEOUS at 00:28

## 2021-08-08 RX ADMIN — MIDAZOLAM 10 MG/HR: 5 INJECTION INTRAMUSCULAR; INTRAVENOUS at 18:20

## 2021-08-08 RX ADMIN — Medication 10 ML: at 08:07

## 2021-08-08 RX ADMIN — INSULIN LISPRO 10 UNITS: 100 INJECTION, SOLUTION INTRAVENOUS; SUBCUTANEOUS at 06:29

## 2021-08-08 RX ADMIN — INSULIN LISPRO 4 UNITS: 100 INJECTION, SOLUTION INTRAVENOUS; SUBCUTANEOUS at 18:08

## 2021-08-08 RX ADMIN — ACETYLCYSTEINE 2 ML: 200 SOLUTION ORAL; RESPIRATORY (INHALATION) at 19:22

## 2021-08-08 RX ADMIN — BUDESONIDE AND FORMOTEROL FUMARATE DIHYDRATE 2 PUFF: 160; 4.5 AEROSOL RESPIRATORY (INHALATION) at 07:20

## 2021-08-08 RX ADMIN — FENTANYL CITRATE 250 MCG/HR: 50 INJECTION, SOLUTION INTRAMUSCULAR; INTRAVENOUS at 10:51

## 2021-08-08 RX ADMIN — INSULIN LISPRO 10 UNITS: 100 INJECTION, SOLUTION INTRAVENOUS; SUBCUTANEOUS at 00:28

## 2021-08-08 RX ADMIN — FENTANYL CITRATE 250 MCG/HR: 50 INJECTION, SOLUTION INTRAMUSCULAR; INTRAVENOUS at 19:42

## 2021-08-08 RX ADMIN — DEXAMETHASONE SODIUM PHOSPHATE 6 MG: 4 INJECTION, SOLUTION INTRAMUSCULAR; INTRAVENOUS at 08:07

## 2021-08-08 RX ADMIN — IBUPROFEN 800 MG: 400 TABLET ORAL at 21:24

## 2021-08-08 RX ADMIN — MINERAL OIL AND WHITE PETROLATUM: 150; 830 OINTMENT OPHTHALMIC at 03:11

## 2021-08-08 NOTE — PROGRESS NOTES
"PULMONARY CRITICAL CARE Progress  NOTE      PATIENT IDENTIFICATION:  Name: Leslie Harris  MRN: [id number]  :  1990     Age: 30 y.o.  Sex: female    DATE OF Note:  2021   Referring Physician: Jeff Feng MD                  Subjective:   Increased secretions, increased peak pressures  On vent,  FIO2 improved slightly to 60% PEEP 11  Still paralyzed with Nimbex-will try to wean off today  Sedated with versed and fentanyl  Supine position  Tolerating tube feeds at goal    *Will plan for bronch today for therapeutic aspiration of secretions    Objective:  tMax 24 hrs: Temp (24hrs), Av.9 °F (37.7 °C), Min:98.8 °F (37.1 °C), Max:100.7 °F (38.2 °C)    Vitals Ranges:   Temp:  [98.8 °F (37.1 °C)-100.7 °F (38.2 °C)] 98.8 °F (37.1 °C)  Heart Rate:  [] 91  Resp:  [36] 36  BP: ()/(38-87) 128/70  Arterial Line BP: (82-88)/(50-51) 88/51  FiO2 (%):  [50 %-60 %] 60 %    Intake and Output Last 3 Shifts:   I/O last 3 completed shifts:  In: 4902.5 [I.V.:2629.5; Other:707; NG/GT:1566]  Out: 6830 [Urine:6830]    Exam:  /70   Pulse 91   Temp 98.8 °F (37.1 °C)   Resp (!) 36   Ht 165.1 cm (65\")   Wt (!) 151 kg (333 lb 12.4 oz)   LMP 2021   SpO2 96%   BMI 55.54 kg/m²     General Appearance:   On vent sedated and paralyzed  HEENT:  Normocephalic, without obvious abnormality, Conjunctiva/corneas clear.  Normal external ear canals, Nares normal, no drainage. OETT. LNGT     Neck:  Supple, symmetrical, trachea midline. No JVD.  Lungs /Chest wall:   Coarse scattered rhonchi, diminished bases, mild wheezing, respirations unlabored symmetrical wall movement.     Heart: Sinus rhythm to sinus tachycardia, no murmur, no rub or gallop  Abdomen: Soft, non-distended, morbid body habitus, active bowel sounds    Extremities: ++ Generalized edema, no clubbing or Cyanosis        Medications:    Current Facility-Administered Medications:   •  acetaminophen (TYLENOL) 160 MG/5ML solution 650 mg, 650 mg, " Oral, Q6H PRN, Radha Jaquez MD, 650 mg at 07/26/21 1236  •  acetaminophen (TYLENOL) tablet 650 mg, 650 mg, Oral, Q4H PRN, 650 mg at 07/27/21 0012 **OR** acetaminophen (TYLENOL) suppository 650 mg, 650 mg, Rectal, Q4H PRN, Ryder Llanes APRN, 650 mg at 07/26/21 0153  •  Acetylcysteine capsule 600 mg, 600 mg, Oral, BID, Ryder Llanes APRN, 600 mg at 08/08/21 0808  •  albuterol sulfate HFA (PROVENTIL HFA;VENTOLIN HFA;PROAIR HFA) inhaler 6 puff, 6 puff, Inhalation, 4x Daily - RT, Ryder Llanes APRN, 6 puff at 08/08/21 0720  •  aluminum-magnesium hydroxide-simethicone (MAALOX MAX) 400-400-40 MG/5ML suspension 15 mL, 15 mL, Oral, Q6H PRN, Ryder Llanes APRN  •  artificial tears ophthalmic ointment, , Both Eyes, Q4H, Ryder Llanes APRN, Given at 08/08/21 0807  •  artificial tears ophthalmic ointment, , Both Eyes, PRN, Ryder Llanes APRN  •  aspirin chewable tablet 324 mg, 324 mg, Per G Tube, Daily, Ryder Llanes APRN, 324 mg at 08/08/21 0807  •  Beneprotein packet 2 packet, 2 packet, Nasogastric, BID, Leslie Hugo RD, 2 packet at 08/08/21 0808  •  benzonatate (TESSALON) capsule 100 mg, 100 mg, Oral, TID PRN, Ryder Llanes APRRODOLFO  •  budesonide-formoterol (SYMBICORT) 160-4.5 MCG/ACT inhaler 2 puff, 2 puff, Inhalation, BID - RT, Ryder Llanes APRN, 2 puff at 08/08/21 0720  •  cefTRIAXone (ROCEPHIN) 2 g in sodium chloride 0.9 % 100 mL IVPB, 2 g, Intravenous, Q24H, Neela Alvarado MD, Last Rate: 200 mL/hr at 08/07/21 1112, 2 g at 08/07/21 1112  •  cholecalciferol (VITAMIN D3) tablet 2,000 Units, 2,000 Units, Nasogastric, Daily, Ryder Llanes APRN, 2,000 Units at 08/08/21 0806  •  cisatracurium (NIMBEX) bolus from bag 2 mg/mL 8.36 mg, 50 mcg/kg, Intravenous, Once PRN, Jessica Fajardo APRN  •  cisatracurium besylate (NIMBEX) 200 mg in sodium chloride 0.9 % 100 mL (2 mg/mL) infusion, 0.5-10.2 mcg/kg/min, Intravenous, Titrated, Jessica Fajardo APRN, Last Rate: 10.02 mL/hr at 08/08/21 0843, 2 mcg/kg/min at  21 0843  •  dexamethasone (DECADRON) injection 6 mg, 6 mg, Intravenous, Q12H, Radha Jaquez MD, 6 mg at 21 0807  •  dextrose (D50W) 25 g/ 50mL Intravenous Solution 25 g, 25 g, Intravenous, Q15 Min PRN, Ryder Llanes APRN  •  dextrose (GLUTOSE) oral gel 15 g, 15 g, Oral, Q15 Min PRN, Ryder Llanes APRN  •  [COMPLETED] dilTIAZem (CARDIZEM) injection 20 mg, 20 mg, Intravenous, Once, 20 mg at 21 1508 **FOLLOWED BY** [] dilTIAZem (CARDIZEM) 100 mg in 100 mL NS infusion (ADV), 5-15 mg/hr, Intravenous, Titrated, Stopped at 21 1020 **FOLLOWED BY** dilTIAZem (CARDIZEM) bolus from bag 1 mg/mL 20 mg, 20 mg, Intravenous, Q30 Min PRN, Jessica Fajardo APRN  •  enoxaparin (LOVENOX) syringe 40 mg, 40 mg, Subcutaneous, Q12H, Jeff Feng MD, 40 mg at 21 0806  •  fentaNYL 1000 mcg in 100 mL NS infusion,  mcg/hr, Intravenous, Titrated, Jessica Fajardo APRN, Last Rate: 25 mL/hr at 21 0722, 250 mcg/hr at 21 0722  •  [DISCONTINUED] albumin human 25 % IV SOLN 25 g, 25 g, Intravenous, Q12H, 25 g at 21 2210 **AND** furosemide (LASIX) injection 20 mg, 20 mg, Intravenous, Q12H, Ryder Llanse APRN, 20 mg at 21 2154  •  glucagon (human recombinant) (GLUCAGEN DIAGNOSTIC) injection 1 mg, 1 mg, Subcutaneous, Q15 Min PRN, Ryder Llanes APRN  •  glycopyrrolate PF (ROBINUL) injection 0.2 mg, 0.2 mg, Intravenous, Q4H PRN, Marianna Navarrete APRN, 0.2 mg at 21  •  guaiFENesin solution 400 mg, 400 mg, Nasogastric, Q6H, Radha Jaquez MD, 400 mg at 21 0628  •  hydrALAZINE (APRESOLINE) injection 10 mg, 10 mg, Intravenous, Q4H PRN, Ryder Llanes, APRN, 10 mg at 21 0051  •  insulin glargine (LANTUS, SEMGLEE) injection 45 Units, 45 Units, Subcutaneous, Q12H, Jeff Feng MD, 45 Units at 21 2154  •  insulin lispro (ADMELOG) injection 0-24 Units, 0-24 Units, Subcutaneous, Q6H, 4 Units at 21 0628 **AND** insulin lispro (ADMELOG) injection 0-24  Units, 0-24 Units, Subcutaneous, PRN, Radha Jaquez MD  •  insulin lispro (ADMELOG) injection 10 Units, 10 Units, Subcutaneous, Q6H, Jeff Feng MD, 10 Units at 08/08/21 0629  •  insulin regular (humuLIN R,novoLIN R) injection 20 Units, 20 Units, Subcutaneous, Q12H, Day, Jessica, APRN, 20 Units at 08/07/21 2154  •  linezolid (ZYVOX) tablet 600 mg, 600 mg, Oral, Q12H, Neela Alvarado MD, 600 mg at 08/08/21 0807  •  Magnesium Sulfate 2 gram infusion - Mg less than or equal to 1.5 mg/dL, 2 g, Intravenous, PRN **OR** Magnesium Sulfate 1 gram infusion - Mg 1.6-1.9 mg/dL, 1 g, Intravenous, PRN, Ryder Llanes APRN, Last Rate: 100 mL/hr at 08/02/21 0842, 1 g at 08/02/21 0842  •  metoprolol tartrate (LOPRESSOR) injection 5 mg, 5 mg, Intravenous, Q6H PRN, Tana, Jessica, APRN, 5 mg at 08/07/21 1314  •  metoprolol tartrate (LOPRESSOR) tablet 50 mg, 50 mg, Oral, TID, Jeff Feng MD, 50 mg at 08/08/21 0807  •  Midazolam (VERSED) 50 mg in 50mL NS infusion, 1-10 mg/hr, Intravenous, Titrated, Ryder Llanes APRN, Last Rate: 10 mL/hr at 08/08/21 0456, 10 mg/hr at 08/08/21 0456  •  nitroglycerin (NITROSTAT) SL tablet 0.4 mg, 0.4 mg, Sublingual, Q5 Min PRN, Ryder Llanes APRN  •  norepinephrine (LEVOPHED) 8 mg in 250 mL NS infusion (premix), 0.02-0.5 mcg/kg/min, Intravenous, Titrated, Sheree Orantes APRN, Stopped at 08/08/21 0830  •  ondansetron (ZOFRAN) tablet 4 mg, 4 mg, Oral, Q6H PRN **OR** ondansetron (ZOFRAN) injection 4 mg, 4 mg, Intravenous, Q6H PRN, Ryder Llanes APRN  •  pantoprazole (PROTONIX) injection 40 mg, 40 mg, Intravenous, BID AC, Jeff Feng MD, 40 mg at 08/08/21 0807  •  Pharmacy to Dose enoxaparin (LOVENOX), , Does not apply, Continuous PRN, Jeff Feng MD  •  potassium chloride (K-DUR,KLOR-CON) CR tablet 40 mEq, 40 mEq, Oral, PRN **OR** potassium chloride (KLOR-CON) packet 40 mEq, 40 mEq, Oral, PRN **OR** potassium chloride 10 mEq in 100 mL IVPB, 10 mEq, Intravenous, Q1H PRN, Love, Ryder  NATY JACKSON  •  potassium phosphate 45 mmol in sodium chloride 0.9 % 500 mL infusion, 45 mmol, Intravenous, PRN **OR** potassium phosphate 30 mmol in sodium chloride 0.9 % 250 mL infusion, 30 mmol, Intravenous, PRN **OR** potassium phosphate 15 mmol in 0.9% sodium chloride 100 mL IVPB, 15 mmol, Intravenous, PRN **OR** sodium phosphates 45 mmol in sodium chloride 0.9 % 500 mL IVPB, 45 mmol, Intravenous, PRN **OR** sodium phosphates 30 mmol in sodium chloride 0.9 % 250 mL IVPB, 30 mmol, Intravenous, PRN **OR** sodium phosphates 15 mmol in sodium chloride 0.9 % 250 mL IVPB, 15 mmol, Intravenous, PRN, Ryder Llanes APRN  •  [COMPLETED] Insert peripheral IV, , , Once **AND** sodium chloride 0.9 % flush 10 mL, 10 mL, Intravenous, PRN, Leonardo Doss MD  •  sodium chloride 0.9 % flush 10 mL, 10 mL, Intravenous, Q12H, Ryder Llanes APRN, 10 mL at 08/08/21 0807  •  sodium chloride 0.9 % flush 10 mL, 10 mL, Intravenous, PRN, Ryder Llanes APRN    Data Review:  All labs (24hrs):   Recent Results (from the past 24 hour(s))   POC Glucose Once    Collection Time: 08/07/21 11:04 AM    Specimen: Blood   Result Value Ref Range    Glucose 236 (H) 70 - 105 mg/dL   POC Glucose Once    Collection Time: 08/07/21  5:37 PM    Specimen: Blood   Result Value Ref Range    Glucose 181 (H) 70 - 105 mg/dL   POC Glucose Once    Collection Time: 08/08/21 12:20 AM    Specimen: Blood   Result Value Ref Range    Glucose 274 (H) 70 - 105 mg/dL   Magnesium    Collection Time: 08/08/21  5:05 AM    Specimen: Blood   Result Value Ref Range    Magnesium 1.9 1.6 - 2.6 mg/dL   Phosphorus    Collection Time: 08/08/21  5:05 AM    Specimen: Blood   Result Value Ref Range    Phosphorus 4.5 2.5 - 4.5 mg/dL   Comprehensive Metabolic Panel    Collection Time: 08/08/21  5:05 AM    Specimen: Blood   Result Value Ref Range    Glucose 192 (H) 65 - 99 mg/dL    BUN 55 (H) 6 - 20 mg/dL    Creatinine 0.67 0.57 - 1.00 mg/dL    Sodium 140 136 - 145 mmol/L    Potassium  4.6 3.5 - 5.2 mmol/L    Chloride 98 98 - 107 mmol/L    CO2 32.0 (H) 22.0 - 29.0 mmol/L    Calcium 10.2 8.6 - 10.5 mg/dL    Total Protein 6.8 6.0 - 8.5 g/dL    Albumin 4.00 3.50 - 5.20 g/dL    ALT (SGPT) 133 (H) 1 - 33 U/L    AST (SGOT) 39 (H) 1 - 32 U/L    Alkaline Phosphatase 85 39 - 117 U/L    Total Bilirubin 0.8 0.0 - 1.2 mg/dL    eGFR Non African Amer 103 >60 mL/min/1.73    Globulin 2.8 gm/dL    A/G Ratio 1.4 g/dL    BUN/Creatinine Ratio 82.1 (H) 7.0 - 25.0    Anion Gap 10.0 5.0 - 15.0 mmol/L   Lactate Dehydrogenase    Collection Time: 08/08/21  5:05 AM    Specimen: Blood   Result Value Ref Range     (H) 135 - 214 U/L   proBNP    Collection Time: 08/08/21  5:05 AM    Specimen: Blood   Result Value Ref Range    proBNP 94.8 0.0 - 450.0 pg/mL   Troponin    Collection Time: 08/08/21  5:05 AM    Specimen: Blood   Result Value Ref Range    Troponin T <0.010 0.000 - 0.030 ng/mL   Calcium, Ionized    Collection Time: 08/08/21  5:05 AM    Specimen: Blood   Result Value Ref Range    Ionized Calcium 1.37 (H) 1.20 - 1.30 mmol/L   CBC Auto Differential    Collection Time: 08/08/21  5:05 AM    Specimen: Blood   Result Value Ref Range    WBC 16.10 (H) 3.40 - 10.80 10*3/mm3    RBC 3.39 (L) 3.77 - 5.28 10*6/mm3    Hemoglobin 9.9 (L) 12.0 - 15.9 g/dL    Hematocrit 30.5 (L) 34.0 - 46.6 %    MCV 89.8 79.0 - 97.0 fL    MCH 29.3 26.6 - 33.0 pg    MCHC 32.7 31.5 - 35.7 g/dL    RDW 16.8 (H) 12.3 - 15.4 %    RDW-SD 52.1 37.0 - 54.0 fl    MPV 8.8 6.0 - 12.0 fL    Platelets 234 140 - 450 10*3/mm3    Neutrophil % 90.1 (H) 42.7 - 76.0 %    Lymphocyte % 5.9 (L) 19.6 - 45.3 %    Monocyte % 3.7 (L) 5.0 - 12.0 %    Eosinophil % 0.1 (L) 0.3 - 6.2 %    Basophil % 0.2 0.0 - 1.5 %    Neutrophils, Absolute 14.50 (H) 1.70 - 7.00 10*3/mm3    Lymphocytes, Absolute 1.00 0.70 - 3.10 10*3/mm3    Monocytes, Absolute 0.60 0.10 - 0.90 10*3/mm3    Eosinophils, Absolute 0.00 0.00 - 0.40 10*3/mm3    Basophils, Absolute 0.00 0.00 - 0.20 10*3/mm3    nRBC  0.0 0.0 - 0.2 /100 WBC   Bilirubin, Direct    Collection Time: 08/08/21  5:05 AM    Specimen: Blood   Result Value Ref Range    Bilirubin, Direct 0.2 0.0 - 0.3 mg/dL   Blood Gas, Arterial -    Collection Time: 08/08/21  5:05 AM    Specimen: Arterial Blood   Result Value Ref Range    Site Right Radial     Ovi's Test Positive     pH, Arterial 7.385 7.350 - 7.450 pH units    pCO2, Arterial 56.5 (H) 35.0 - 48.0 mm Hg    pO2, Arterial 86.0 83.0 - 108.0 mm Hg    HCO3, Arterial 33.8 (H) 21.0 - 28.0 mmol/L    Base Excess, Arterial 7.4 (H) 0.0 - 3.0 mmol/L    O2 Saturation, Arterial 96.1 94.0 - 98.0 %    CO2 Content 35.5 (H) 22 - 29 mmol/L    Barometric Pressure for Blood Gas      Modality Adult Vent     FIO2 60 %    Ventilator Mode ;AC     Set Tidal Volume 420     PEEP 12     Hemodilution No     Respiratory Rate 36    D-dimer, Quantitative    Collection Time: 08/08/21  5:06 AM    Specimen: Blood   Result Value Ref Range    D-Dimer, Quantitative 1.42 (H) 0.00 - 0.59 mg/L (FEU)   Fibrinogen    Collection Time: 08/08/21  5:06 AM    Specimen: Blood   Result Value Ref Range    Fibrinogen 577 (H) 210 - 450 mg/dL   Protime-INR    Collection Time: 08/08/21  5:06 AM    Specimen: Blood   Result Value Ref Range    Protime 11.2 9.6 - 11.7 Seconds    INR 1.01 0.93 - 1.10   aPTT    Collection Time: 08/08/21  5:06 AM    Specimen: Blood   Result Value Ref Range    PTT <20.0 (L) 24.0 - 31.0 seconds        Imaging:  XR Chest 1 View  Narrative: DATE OF EXAM:  8/8/2021 8:19 AM     PROCEDURE:  XR CHEST 1 VW-     INDICATIONS:  Shortness of breath; R06.00-Dyspnea, unspecified; U07.1-COVID-19;  J12.82-Pneumonia due to Coronavirus disease 2019; J96.01-Acute  respiratory failure with hypoxia; I95.2-Hypotension due to drugs;  U07.1-COVID-19; J96.00-Acute respiratory failure, unspecified whether  with hypoxia or hypercapnia  COVID 19 positive.     COMPARISON:  Chest radiograph 08/06/2021     TECHNIQUE:   Single radiographic view of the chest was  obtained.     FINDINGS:  The endotracheal tube tip terminates 3.2 cm from the bhavesh.  Esophagogastric tube is past GE junction. There is slight improvement of  the airspace opacity left lung. There is some persistent airspace  opacity in the infrahilar right lung. There are low lung volumes. No  pneumothorax or pleural effusion.     Impression: Slight improvement in the left lung airspace opacity. Low lung volumes.  Right infrahilar airspace opacity persists. Support devices have  appropriate position.     Electronically Signed By-Opal Massey MD On:8/8/2021 8:36 AM  This report was finalized on [id number] by  Opal Massey MD.       ASSESSMENT:   Acute hypoxic respiratory failure due to COVID-19 (CMS/HCC)    Pneumonia due to COVID-19 virus   morbid obesity affecting her quality care   HTN  RODRIGUEZ    Class 3 severe obesity without serious comorbidity with body mass index (BMI) of 50.0 to 59.9 in adult    Hyperglycemia, likely stress induced    Diabetes mellitus type 2 in obese (CMS/HCC)     Assessment and Plan:  Pneumonia due to COVID-19 virus  Acute respiratory failure due to COVID-19  VAP with Klebsiella pna and MRSA  -Intubation 7/23  -Ventilator management  -ABGs, chest x-rays, vent settings reviewed  -Monitor disease progression labs as ordered  -CT PE protocol could not be obtained due to severe hypoxia and body habitus  -Empiric A/C with Lovenox  -Remdesivir x5 days, completed on 7/26  -Bronchodilator nebs  -Inhaled corticosteroids  -Scheduled diuresis  -Decreased Dexamethasone to daily  -Nimbex for paralytics to enhance oxygenation  -Continue abx and antifungal per ID  -Bronchoscopy 8/8     Diabetes mellitus, type 2, new diagnosis  -strict glycemic control   -Accuchecks every 6 hours with sliding scale insulin  -Added Lantus, Hum R and Lispro  -Hemoglobin A1c 6.9.     Hypertension  -beta blocker and diuresis with improved control     Class 3 severe obesity without serious comorbidity with body mass index  (BMI) of 50.0 to 59.9 in adult  -BMI 59.32  -Complicating aspects of care  -Counseled on lifestyle modifications to improve overall health prior to intubation     Code Status: CPR, full interventions  VTE Prophylaxis:  Lovenox  PUD Prophylaxis:PPI  Nutrition: dietitian following for tube feed recommendations.Tolerating TF at goal  +Bowel movements      8/8/2021  08:47 EDT     I personally have examined  and interviewed the patient. I have reviewed the history, data, problems, assessment and plan with our NP.  Critical care time in direct medical management (  34 ) minutes  Electronically signed by Jeff Feng MD D,ABSM, 08/08/21, 7:57 PM EDT.

## 2021-08-08 NOTE — PLAN OF CARE
Goal Outcome Evaluation:         Paralytic has been off since 1330 today. Patient has responded well. Nods appropriately as well as follows commands. Tube feeds are at goal and have been tolerated well. Patient still remains on fentanyl and versed drips for sedation. Patient remains febrile. Patient was bronched and specimen was sent down.

## 2021-08-08 NOTE — PROGRESS NOTES
"Nutrition Services    Patient Name: Leslie Harris  YOB: 1990  MRN: [id number]  Admission date: 7/22/2021      PPE Documentation        PPE Worn By Provider RD did not enter room for this encounter   PPE Worn By Patient  N/A     PROGRESS NOTE      Encounter Information: Tube feed check on. Novasource Renal documented at current goal 45 mL/hr. Remains intubated, on paralytic.        Labs (reviewed below): -elevated BUN  -elevated LFTs  -hyperglycemia->formula is CHO controlled       GI Function:  -residuals WNL  -last BM documented on 8/6        Nutrition Intervention: Continue current TF regimen       PO Diet/Supplements: NPO Diet   EN Prescription: Novasource Renal at 45 mL/hr + 2 packets beneprotein BID, 20 mL/hr water flush       Intake/Output:   Intake/Output Summary (Last 24 hours) at 8/8/2021 1041  Last data filed at 8/8/2021 0600  Gross per 24 hour   Intake 3125.5 ml   Output 4280 ml   Net -1154.5 ml          Height: Height: 165.1 cm (65\")   Weight: Weight: (!) 151 kg (333 lb 12.4 oz) (08/08/21 0100)   BMI: Body mass index is 55.54 kg/m².     Results from last 7 days   Lab Units 08/08/21  0505 08/07/21  0507 08/06/21 0441   SODIUM mmol/L 140 137 132*   POTASSIUM mmol/L 4.6 4.7 4.8   CHLORIDE mmol/L 98 95* 90*   CO2 mmol/L 32.0* 32.0* 35.0*   BUN mg/dL 55* 56* 28*   CREATININE mg/dL 0.67 0.85 0.45*   CALCIUM mg/dL 10.2 10.1 9.8   BILIRUBIN mg/dL 0.8 0.9 1.2   ALK PHOS U/L 85 82 73   ALT (SGPT) U/L 133* 158* 197*   AST (SGOT) U/L 39* 47* 72*   GLUCOSE mg/dL 192* 209* 203*     Results from last 7 days   Lab Units 08/08/21  0505 08/07/21  0507 08/07/21  0507 08/06/21  0441 08/06/21  0441   MAGNESIUM mg/dL 1.9  --  2.2  --  1.8   PHOSPHORUS mg/dL 4.5   < > 7.3*   < > 4.5   HEMOGLOBIN g/dL 9.9*   < > 9.9*   < > 9.2*   HEMATOCRIT % 30.5*   < > 29.5*   < > 27.6*    < > = values in this interval not displayed.     COVID19   Date Value Ref Range Status   07/22/2021 Detected (C) Not Detected - Ref. " Range Final     Lab Results   Component Value Date    HGBA1C 6.9 (H) 07/23/2021     Vitals:    08/08/21 0752   BP:    Pulse:    Resp:    Temp: 98.8 °F (37.1 °C)   SpO2:      RD to follow up per protocol.    Electronically signed by:  Ynes Warner RD  08/08/21 10:41 EDT

## 2021-08-08 NOTE — PROGRESS NOTES
Infectious Diseases Progress Note      LOS: 17 days   Patient Care Team:  Provider, Ana Known as PCP - General    Chief Complaint: Intubated and sedated    Subjective       The patient had no fever during the last 24 hours.  She is intubated at 60% FiO2 and 11 of PEEP.  The patient is requiring no vasopressors.      Review of Systems:   Review of Systems   Unable to perform ROS: Intubated        Objective     Vital Signs  Temp:  [98.8 °F (37.1 °C)-100.7 °F (38.2 °C)] 99.3 °F (37.4 °C)  Heart Rate:  [] 88  Resp:  [34-36] 34  BP: ()/(38-95) 94/43  FiO2 (%):  [50 %-60 %] 60 %    Physical Exam:  Physical Exam  Vitals and nursing note reviewed.   Constitutional:       General: She is not in acute distress.     Appearance: Normal appearance. She is well-developed. She is obese. She is ill-appearing. She is not diaphoretic.   HENT:      Head: Normocephalic and atraumatic.   Cardiovascular:      Rate and Rhythm: Normal rate and regular rhythm.      Heart sounds: Normal heart sounds, S1 normal and S2 normal. No murmur heard.     Pulmonary:      Effort: Pulmonary effort is normal. No respiratory distress.      Breath sounds: No stridor. Rales present. No wheezing.      Comments: Intubated and currently in the prone position  Chest:      Chest wall: No tenderness.   Abdominal:      General: Bowel sounds are normal. There is no distension.      Palpations: Abdomen is soft. There is no mass.      Tenderness: There is no abdominal tenderness. There is no guarding.      Comments: NG tube   Genitourinary:     Comments: Ma catheter  Musculoskeletal:         General: No swelling, tenderness or deformity.      Cervical back: Neck supple.   Skin:     General: Skin is warm and dry.      Coloration: Skin is not pale.      Findings: No bruising, erythema or rash.   Neurological:      Mental Status: She is alert.      Cranial Nerves: No cranial nerve deficit.      Comments: Intubated, paralyzed, sedated          Results  Review:    I have reviewed all clinical data, test, lab, and imaging results.     Radiology  XR Chest 1 View    Result Date: 8/8/2021  DATE OF EXAM: 8/8/2021 8:19 AM  PROCEDURE: XR CHEST 1 VW-  INDICATIONS: Shortness of breath; R06.00-Dyspnea, unspecified; U07.1-COVID-19; J12.82-Pneumonia due to Coronavirus disease 2019; J96.01-Acute respiratory failure with hypoxia; I95.2-Hypotension due to drugs; U07.1-COVID-19; J96.00-Acute respiratory failure, unspecified whether with hypoxia or hypercapnia  COVID 19 positive.  COMPARISON: Chest radiograph 08/06/2021  TECHNIQUE: Single radiographic view of the chest was obtained.  FINDINGS: The endotracheal tube tip terminates 3.2 cm from the bhavesh. Esophagogastric tube is past GE junction. There is slight improvement of the airspace opacity left lung. There is some persistent airspace opacity in the infrahilar right lung. There are low lung volumes. No pneumothorax or pleural effusion.      Slight improvement in the left lung airspace opacity. Low lung volumes. Right infrahilar airspace opacity persists. Support devices have appropriate position.  Electronically Signed By-Opal Massey MD On:8/8/2021 8:36 AM This report was finalized on [id number] by  Opal Massey MD.      Cardiology    Laboratory    Results from last 7 days   Lab Units 08/08/21  0505 08/07/21  0507 08/06/21  0441 08/05/21  0355 08/04/21  0514 08/03/21  1527 08/03/21  0258 08/02/21  0408 08/02/21  0408   WBC 10*3/mm3 16.10* 20.20* 16.90* 19.60* 16.70*  --  21.80*  --  17.20*   HEMOGLOBIN g/dL 9.9* 9.9* 9.2* 9.6* 9.2* 9.2* 9.8*   < > 10.0*   HEMATOCRIT % 30.5* 29.5* 27.6* 28.7* 28.2* 28.2* 29.4*   < > 30.5*   PLATELETS 10*3/mm3 234 231 255 300 274  --  374  --  302    < > = values in this interval not displayed.     Results from last 7 days   Lab Units 08/08/21  0505 08/07/21  0507 08/06/21  0441 08/05/21  0355 08/04/21  0514 08/03/21  0258 08/02/21  0408   SODIUM mmol/L 140 137 132* 133* 133* 136 137    POTASSIUM mmol/L 4.6 4.7 4.8 5.2 5.3* 5.0 4.7   CHLORIDE mmol/L 98 95* 90* 89* 93* 96* 94*   CO2 mmol/L 32.0* 32.0* 35.0* 35.0* 33.0* 33.0* 34.0*   BUN mg/dL 55* 56* 28* 27* 28* 28* 28*   CREATININE mg/dL 0.67 0.85 0.45* 0.42* 0.45* 0.39* 0.39*   GLUCOSE mg/dL 192* 209* 203* 209* 237* 247* 286*   ALBUMIN g/dL 4.00 4.10 4.60 4.50 3.90 3.10* 2.90*   BILIRUBIN mg/dL 0.8 0.9 1.2 1.0 0.7 0.6 0.6   ALK PHOS U/L 85 82 73 83 73 68 66   AST (SGOT) U/L 39* 47* 72* 82* 78* 53* 52*   ALT (SGPT) U/L 133* 158* 197* 173* 139* 96* 98*   CALCIUM mg/dL 10.2 10.1 9.8 9.9 9.3 9.2 9.1     Results from last 7 days   Lab Units 08/04/21  0514   CK TOTAL U/L 84             Microbiology   Microbiology Results (last 10 days)     Procedure Component Value - Date/Time    Respiratory Culture - Wash, Bronchus [[id number]] Collected: 08/08/21 0944    Lab Status: Preliminary result Specimen: Wash from Bronchus Updated: 08/08/21 1305     Gram Stain Many (4+) WBCs per low power field      Rare (1+) Epithelial cells per low power field      Few (2+) Mixed bacterial morphotypes seen on Gram Stain    Respiratory Panel, PCR (WITHOUT COVID) - Wash, Bronchus [[id number]]  (Normal) Collected: 08/08/21 0944    Lab Status: Final result Specimen: Wash from Bronchus Updated: 08/08/21 1127     ADENOVIRUS, PCR Not Detected     Coronavirus 229E Not Detected     Coronavirus HKU1 Not Detected     Coronavirus NL63 Not Detected     Coronavirus OC43 Not Detected     Human Metapneumovirus Not Detected     Human Rhinovirus/Enterovirus Not Detected     Influenza B PCR Not Detected     Parainfluenza Virus 1 Not Detected     Parainfluenza Virus 2 Not Detected     Parainfluenza Virus 3 Not Detected     Parainfluenza Virus 4 Not Detected     Bordetella pertussis pcr Not Detected     Chlamydophila pneumoniae PCR Not Detected     Mycoplasma pneumo by PCR Not Detected     Influenza A PCR Not Detected     RSV, PCR Not Detected     Bordetella parapertussis PCR Not Detected     Narrative:      The coronavirus on the RVP is NOT COVID-19 and is NOT indicative of infection with COVID-19.    In the setting of a positive respiratory panel with a viral infection PLUS a negative procalcitonin without other underlying concern for bacterial infection, consider observing off antibiotics or discontinuation of antibiotics and continue supportive care. If the respiratory panel is positive for atypical bacterial infection (Bordetella pertussis, Chlamydophila pneumoniae, or Mycoplasma pneumoniae), consider antibiotic de-escalation to target atypical bacterial infection.    Urine Culture - Urine, Urine, Catheter [[id number]]  (Abnormal) Collected: 08/07/21 0351    Lab Status: Preliminary result Specimen: Urine, Catheter Updated: 08/08/21 0912     Urine Culture >100,000 CFU/mL Gram Negative Bacilli    Respiratory Culture - Sputum, Bronchus [[id number]]  (Abnormal)  (Susceptibility) Collected: 08/04/21 1150    Lab Status: Final result Specimen: Sputum from Bronchus Updated: 08/06/21 0823     Respiratory Culture Scant growth (1+) Klebsiella pneumoniae ssp pneumoniae      Rare Normal Respiratory Sondra: NO S.aureus/MRSA or Pseudomonas aeruginosa     Gram Stain Few (2+) WBCs per low power field      No organisms seen    Susceptibility      Klebsiella pneumoniae ssp pneumoniae      FITZ      Ampicillin Resistant      Ampicillin + Sulbactam Resistant      Cefepime Susceptible      Ceftazidime Susceptible      Ceftriaxone Susceptible      Gentamicin Susceptible      Levofloxacin Intermediate      Piperacillin + Tazobactam Susceptible      Tetracycline Resistant      Trimethoprim + Sulfamethoxazole Susceptible               Linear View               Susceptibility Comments     Klebsiella pneumoniae ssp pneumoniae    Cefazolin sensitivity will not be reported for Enterobacteriaceae in non-urine isolates. If cefazolin is preferred, please call the microbiology lab to request an E-test.  With the exception of  urinary-sourced infections, aminoglycosides should not be used as monotherapy.             Clostridium Difficile EIA - Stool, Per Rectum [[id number]]  (Normal) Collected: 08/03/21 1600    Lab Status: Final result Specimen: Stool from Per Rectum Updated: 08/04/21 0927     C Diff GDH / Toxin Negative    Urine Culture - Urine, Urine, Catheter [[id number]]  (Normal) Collected: 08/03/21 1036    Lab Status: Final result Specimen: Urine, Catheter Updated: 08/04/21 1311     Urine Culture No growth    Respiratory Culture - Sputum, ET Suction [[id number]]  (Abnormal)  (Susceptibility) Collected: 07/29/21 2038    Lab Status: Final result Specimen: Sputum from ET Suction Updated: 08/02/21 1315     Respiratory Culture Scant growth (1+) Candida albicans      Scant growth (1+) Staphylococcus aureus, MRSA     Comment: Methicillin resistant Staphylococcus aureus, Patient may be an isolation risk.         No Normal Respiratory Sondra     Gram Stain Few (2+) WBCs per low power field      Rare (1+) Epithelial cells per low power field      Few (2+) Budding yeast    Susceptibility      Staphylococcus aureus, MRSA      FITZ      Clindamycin Susceptible      Linezolid Susceptible      Oxacillin Resistant      Penicillin G Resistant      Tetracycline Susceptible      Trimethoprim + Sulfamethoxazole Susceptible      Vancomycin Susceptible               Linear View                         Medication Review:       Schedule Meds  acetylcysteine, 2 mL, Nebulization, BID - RT  Acetylcysteine, 600 mg, Oral, BID  artificial tears, , Both Eyes, Q4H  aspirin, 324 mg, Per G Tube, Daily  atropine sulfate, , ,   Beneprotein, 2 packet, Nasogastric, BID  budesonide, 0.5 mg, Nebulization, BID - RT  cefTRIAXone, 2 g, Intravenous, Q24H  cholecalciferol, 2,000 Units, Nasogastric, Daily  [START ON 8/9/2021] dexamethasone, 6 mg, Intravenous, Daily  enoxaparin, 40 mg, Subcutaneous, Q12H  furosemide, 20 mg, Intravenous, Q12H  guaiFENesin, 400 mg, Nasogastric,  Q6H  insulin glargine, 45 Units, Subcutaneous, Q12H  insulin lispro, 0-24 Units, Subcutaneous, Q6H  insulin lispro, 10 Units, Subcutaneous, Q6H  [START ON 2021] insulin regular, 21 Units, Subcutaneous, Daily  ipratropium-albuterol, 3 mL, Nebulization, 4x Daily - RT  lidocaine, , ,   lidocaine PF 1%, , ,   linezolid, 600 mg, Oral, Q12H  metoprolol tartrate, 50 mg, Oral, TID  pantoprazole, 40 mg, Intravenous, BID AC  sodium chloride, 10 mL, Intravenous, Q12H        Infusion Meds  cisatracurium (NIMBEX) infusion, 0.5-10.2 mcg/kg/min, Last Rate: Stopped (21 1338)  fentanyl 10 mcg/mL,  mcg/hr, Last Rate: 250 mcg/hr (21 1051)  midazolam, 1-10 mg/hr, Last Rate: 10 mg/hr (21 1048)  norepinephrine, 0.02-0.5 mcg/kg/min, Last Rate: Stopped (21 0830)  Pharmacy to Dose enoxaparin (LOVENOX),         PRN Meds  •  acetaminophen  •  acetaminophen **OR** acetaminophen  •  aluminum-magnesium hydroxide-simethicone  •  artificial tears  •  benzonatate  •  cisatracurium  •  dextrose  •  dextrose  •  [COMPLETED] dilTIAZem **FOLLOWED BY** [] dilTIAZem **FOLLOWED BY** dilTIAZem  •  glucagon (human recombinant)  •  glycopyrrolate PF  •  hydrALAZINE  •  insulin lispro **AND** insulin lispro  •  lidocaine  •  lidocaine PF 1%  •  magnesium sulfate **OR** magnesium sulfate in D5W 1g/100mL (PREMIX)  •  metoprolol tartrate  •  nitroglycerin  •  ondansetron **OR** ondansetron  •  Pharmacy to Dose enoxaparin (LOVENOX)  •  potassium chloride **OR** potassium chloride **OR** potassium chloride  •  potassium phosphate infusion greater than 15 mMoles **OR** potassium phosphate infusion greater than 15 mMoles **OR** potassium phosphate **OR** sodium phosphate IVPB **OR** sodium phosphate IVPB **OR** sodium phosphate IVPB  •  [COMPLETED] Insert peripheral IV **AND** sodium chloride  •  sodium chloride        Assessment/Plan       Antimicrobial Therapy   1.  Zyvox      day  2.   Rocephin      day  3.      Day  4.      Day  5.      Day      Assessment     Severe COVID-19 infection and patient with significant comorbidities including diabetes mellitus and morbid obesity. Apparently did not receive vaccination for COVID-19  COVID-19 screen on admission on July 22, 2021 was positive. Was reported that patient had positive COVID-19 diagnosis 6 days prior to admission  The patient had received 5 days of IV remdesivir     Severe respiratory failure on the ventilator currently on 50% FiO2 and 11 PEEP.  Chest x-ray consistent with severe COVID-19 infection and ARDS     Probable ventilator associated pneumonia versus hospitalist acquired pneumonia. Sputum culture from July 29, 2021 grew MRSA and the organism is susceptible to linezolid and vancomycin  The patient was started on linezolid on August 1, 2021  Repeat sputum culture from August 1, 2021 is growing Klebsiella pneumoniae     Morbid obesity     Diabetes mellitus     Reactive leukocytosis secondary to steroids    Elevated CK.  CK is back to normal    UTI, urine culture from August 7, 2021 is growing gram-negative bacilli         Plan     Continue Zyvox 600 mg every 12 hours for 10 to 14 days and monitor platelets carefully during treatment with Zyvox  Continue Rocephin 2 g every 24 hours  Waiting on urine culture  Supportive care  Prognosis is very guarded and might be poor secondary to severe COVID-19 infection  Continue dexamethasone for now, pulmonary service is following and monitoring  Case discussed with patient's RN     Continue COVID-19 isolation for total of 20 days from the first diagnosis  Labs in a.m. including : CBC with differential, CMP,  d-dimer, ferritin and CRP        Neela Alvarado MD  08/08/21  13:49 EDT     Note is dictated utilizing voice recognition software/Dragon

## 2021-08-08 NOTE — PLAN OF CARE
Goal Outcome Evaluation:      Nimbex gtt @ 1, remains on fentanyl and versed gtt's too. Catheter was irrigated small clog, was able to drain bladder. VVS

## 2021-08-08 NOTE — PROCEDURES
Bronchoscopy Procedure Note    Name: Leslie Harris   Age:  30 y.o.   Sex: female  :  1990  MRN: [id number]  Time of procedure: 19:59 EDT      Date of Operation: 2021     Pre-op Diagnosis: acute hypoxic resp failure with PNA developing hypoxia    Post-op Diagnosis: same    Surgeon: Jeff Feng    Anesthesia: Conscious Sedation    Operation: Flexible fiberoptic bronchoscopy, diagnostic bronchoscope  Findings: mucus plugs    Specimen: Lung  wash     Estimated Blood Loss: less than 10 cc    Drains: none    Complications: None    Indications and History:  The patient is a 30 y.o. with Acute resp failure with MRSA PNA developing hypoxia.  The risks, benefits, complications, treatment options and expected outcomes were discussed with the patient and informed consent was obtained. The possibilities of reaction to medication, pulmonary aspiration, pneumothorax, bleeding, respiratory failure and failure to diagnose a condition and creating a complication requiring transfusion or operation were discussed with the patient who freely signed the consent.      Description of Procedure:  After the induction of topical nasopharyngeal anesthesia, the bronchoscope was passed through the endotracheal tube .  Careful inspection of the tracheal lumen was accomplished.     An entire bronchial exam was performed including the right and left bronchi with the following findings:     Endobronchial findings: Multiple segment Mucus plugs thick brown color suctioned washed applied Mucomysit   to different segment   Trachea: Normal  Glo: Normal shape  Right upper lobe bronchus: nicely open no lesions  Right midlelobe bronchus:nicely open no lesions  Right lower lobe bronchus: nicely open no lesions  Left main bronchus: nicely open no lesions  Left upper lobe bronchus: nicely open no lesions  Left lower lobe bronchus: nicely open no lesions    The Patient tolerated the procedure well.       MILDRED Dorman,  ABSM  8/8/2021  19:59 EDT

## 2021-08-09 ENCOUNTER — APPOINTMENT (OUTPATIENT)
Dept: CARDIOLOGY | Facility: HOSPITAL | Age: 31
End: 2021-08-09

## 2021-08-09 ENCOUNTER — APPOINTMENT (OUTPATIENT)
Dept: GENERAL RADIOLOGY | Facility: HOSPITAL | Age: 31
End: 2021-08-09

## 2021-08-09 LAB
ALBUMIN SERPL-MCNC: 3.8 G/DL (ref 3.5–5.2)
ALBUMIN/GLOB SERPL: 1.4 G/DL
ALP SERPL-CCNC: 83 U/L (ref 39–117)
ALT SERPL W P-5'-P-CCNC: 180 U/L (ref 1–33)
ANION GAP SERPL CALCULATED.3IONS-SCNC: 7 MMOL/L (ref 5–15)
ANISOCYTOSIS BLD QL: ABNORMAL
ARTERIAL PATENCY WRIST A: POSITIVE
AST SERPL-CCNC: 64 U/L (ref 1–32)
ATMOSPHERIC PRESS: ABNORMAL MM[HG]
BACTERIA SPEC AEROBE CULT: ABNORMAL
BASE EXCESS BLDA CALC-SCNC: 9.3 MMOL/L (ref 0–3)
BDY SITE: ABNORMAL
BH CV LOWER VASCULAR LEFT COMMON FEMORAL AUGMENT: NORMAL
BH CV LOWER VASCULAR LEFT COMMON FEMORAL COMPETENT: NORMAL
BH CV LOWER VASCULAR LEFT COMMON FEMORAL COMPRESS: NORMAL
BH CV LOWER VASCULAR LEFT COMMON FEMORAL PHASIC: NORMAL
BH CV LOWER VASCULAR LEFT COMMON FEMORAL SPONT: NORMAL
BH CV LOWER VASCULAR LEFT DISTAL FEMORAL COMPRESS: NORMAL
BH CV LOWER VASCULAR LEFT GREATER SAPH AK COMPRESS: NORMAL
BH CV LOWER VASCULAR LEFT MID FEMORAL AUGMENT: NORMAL
BH CV LOWER VASCULAR LEFT MID FEMORAL COMPETENT: NORMAL
BH CV LOWER VASCULAR LEFT MID FEMORAL COMPRESS: NORMAL
BH CV LOWER VASCULAR LEFT MID FEMORAL PHASIC: NORMAL
BH CV LOWER VASCULAR LEFT MID FEMORAL SPONT: NORMAL
BH CV LOWER VASCULAR LEFT POPLITEAL AUGMENT: NORMAL
BH CV LOWER VASCULAR LEFT POPLITEAL COMPETENT: NORMAL
BH CV LOWER VASCULAR LEFT POPLITEAL COMPRESS: NORMAL
BH CV LOWER VASCULAR LEFT POPLITEAL PHASIC: NORMAL
BH CV LOWER VASCULAR LEFT POPLITEAL SPONT: NORMAL
BH CV LOWER VASCULAR LEFT PROXIMAL FEMORAL COMPRESS: NORMAL
BH CV LOWER VASCULAR LEFT SAPHENOFEMORAL JUNCTION COMPRESS: NORMAL
BH CV LOWER VASCULAR RIGHT COMMON FEMORAL AUGMENT: NORMAL
BH CV LOWER VASCULAR RIGHT COMMON FEMORAL COMPETENT: NORMAL
BH CV LOWER VASCULAR RIGHT COMMON FEMORAL COMPRESS: NORMAL
BH CV LOWER VASCULAR RIGHT COMMON FEMORAL PHASIC: NORMAL
BH CV LOWER VASCULAR RIGHT COMMON FEMORAL SPONT: NORMAL
BH CV LOWER VASCULAR RIGHT DISTAL FEMORAL COMPRESS: NORMAL
BH CV LOWER VASCULAR RIGHT GASTRONEMIUS COMPRESS: NORMAL
BH CV LOWER VASCULAR RIGHT GREATER SAPH AK COMPRESS: NORMAL
BH CV LOWER VASCULAR RIGHT MID FEMORAL AUGMENT: NORMAL
BH CV LOWER VASCULAR RIGHT MID FEMORAL COMPETENT: NORMAL
BH CV LOWER VASCULAR RIGHT MID FEMORAL COMPRESS: NORMAL
BH CV LOWER VASCULAR RIGHT MID FEMORAL PHASIC: NORMAL
BH CV LOWER VASCULAR RIGHT MID FEMORAL SPONT: NORMAL
BH CV LOWER VASCULAR RIGHT POPLITEAL AUGMENT: NORMAL
BH CV LOWER VASCULAR RIGHT POPLITEAL COMPETENT: NORMAL
BH CV LOWER VASCULAR RIGHT POPLITEAL COMPRESS: NORMAL
BH CV LOWER VASCULAR RIGHT POPLITEAL PHASIC: NORMAL
BH CV LOWER VASCULAR RIGHT POPLITEAL SPONT: NORMAL
BH CV LOWER VASCULAR RIGHT PROXIMAL FEMORAL COMPRESS: NORMAL
BH CV LOWER VASCULAR RIGHT SAPHENOFEMORAL JUNCTION COMPRESS: NORMAL
BH CV UPPER VENOUS LEFT AXILLARY AUGMENT: NORMAL
BH CV UPPER VENOUS LEFT AXILLARY COMPETENT: NORMAL
BH CV UPPER VENOUS LEFT AXILLARY COMPRESS: NORMAL
BH CV UPPER VENOUS LEFT AXILLARY PHASIC: NORMAL
BH CV UPPER VENOUS LEFT AXILLARY SPONT: NORMAL
BH CV UPPER VENOUS LEFT BASILIC UPPER COMPRESS: NORMAL
BH CV UPPER VENOUS LEFT BRACHIAL COMPRESS: NORMAL
BH CV UPPER VENOUS LEFT CEPHALIC UPPER COMPRESS: NORMAL
BH CV UPPER VENOUS LEFT INTERNAL JUGULAR AUGMENT: NORMAL
BH CV UPPER VENOUS LEFT INTERNAL JUGULAR COMPETENT: NORMAL
BH CV UPPER VENOUS LEFT INTERNAL JUGULAR COMPRESS: NORMAL
BH CV UPPER VENOUS LEFT INTERNAL JUGULAR PHASIC: NORMAL
BH CV UPPER VENOUS LEFT INTERNAL JUGULAR SPONT: NORMAL
BH CV UPPER VENOUS LEFT RADIAL COMPRESS: NORMAL
BH CV UPPER VENOUS LEFT SUBCLAVIAN AUGMENT: NORMAL
BH CV UPPER VENOUS LEFT SUBCLAVIAN COMPETENT: NORMAL
BH CV UPPER VENOUS LEFT SUBCLAVIAN COMPRESS: NORMAL
BH CV UPPER VENOUS LEFT SUBCLAVIAN PHASIC: NORMAL
BH CV UPPER VENOUS LEFT SUBCLAVIAN SPONT: NORMAL
BH CV UPPER VENOUS LEFT ULNAR COMPRESS: NORMAL
BH CV UPPER VENOUS RIGHT AXILLARY AUGMENT: NORMAL
BH CV UPPER VENOUS RIGHT AXILLARY COMPETENT: NORMAL
BH CV UPPER VENOUS RIGHT AXILLARY COMPRESS: NORMAL
BH CV UPPER VENOUS RIGHT AXILLARY PHASIC: NORMAL
BH CV UPPER VENOUS RIGHT AXILLARY SPONT: NORMAL
BH CV UPPER VENOUS RIGHT BASILIC UPPER COMPRESS: NORMAL
BH CV UPPER VENOUS RIGHT BRACHIAL COMPRESS: NORMAL
BH CV UPPER VENOUS RIGHT CEPHALIC UPPER COMPRESS: NORMAL
BH CV UPPER VENOUS RIGHT INTERNAL JUGULAR AUGMENT: NORMAL
BH CV UPPER VENOUS RIGHT INTERNAL JUGULAR COMPETENT: NORMAL
BH CV UPPER VENOUS RIGHT INTERNAL JUGULAR COMPRESS: NORMAL
BH CV UPPER VENOUS RIGHT INTERNAL JUGULAR PHASIC: NORMAL
BH CV UPPER VENOUS RIGHT INTERNAL JUGULAR SPONT: NORMAL
BH CV UPPER VENOUS RIGHT RADIAL COMPRESS: NORMAL
BH CV UPPER VENOUS RIGHT SUBCLAVIAN AUGMENT: NORMAL
BH CV UPPER VENOUS RIGHT SUBCLAVIAN COMPETENT: NORMAL
BH CV UPPER VENOUS RIGHT SUBCLAVIAN COMPRESS: NORMAL
BH CV UPPER VENOUS RIGHT SUBCLAVIAN PHASIC: NORMAL
BH CV UPPER VENOUS RIGHT SUBCLAVIAN SPONT: NORMAL
BH CV UPPER VENOUS RIGHT ULNAR COMPRESS: NORMAL
BILIRUB CONJ SERPL-MCNC: 0.2 MG/DL (ref 0–0.3)
BILIRUB SERPL-MCNC: 0.9 MG/DL (ref 0–1.2)
BUN SERPL-MCNC: 54 MG/DL (ref 6–20)
BUN/CREAT SERPL: 88.5 (ref 7–25)
CA-I SERPL ISE-MCNC: 1.35 MMOL/L (ref 1.2–1.3)
CALCIUM SPEC-SCNC: 10.1 MG/DL (ref 8.6–10.5)
CHLORIDE SERPL-SCNC: 101 MMOL/L (ref 98–107)
CO2 BLDA-SCNC: 37 MMOL/L (ref 22–29)
CO2 SERPL-SCNC: 35 MMOL/L (ref 22–29)
CREAT SERPL-MCNC: 0.61 MG/DL (ref 0.57–1)
CRP SERPL-MCNC: 0.64 MG/DL (ref 0–0.5)
D DIMER PPP FEU-MCNC: 1.22 MG/L (FEU) (ref 0–0.59)
DEPRECATED RDW RBC AUTO: 51.2 FL (ref 37–54)
ERYTHROCYTE [DISTWIDTH] IN BLOOD BY AUTOMATED COUNT: 16.4 % (ref 12.3–15.4)
FERRITIN SERPL-MCNC: 182.6 NG/ML (ref 13–150)
GFR SERPL CREATININE-BSD FRML MDRD: 114 ML/MIN/1.73
GLOBULIN UR ELPH-MCNC: 2.7 GM/DL
GLUCOSE BLDC GLUCOMTR-MCNC: 109 MG/DL (ref 70–105)
GLUCOSE BLDC GLUCOMTR-MCNC: 126 MG/DL (ref 70–105)
GLUCOSE BLDC GLUCOMTR-MCNC: 131 MG/DL (ref 70–105)
GLUCOSE BLDC GLUCOMTR-MCNC: 252 MG/DL (ref 70–105)
GLUCOSE SERPL-MCNC: 138 MG/DL (ref 65–99)
HCO3 BLDA-SCNC: 35.2 MMOL/L (ref 21–28)
HCT VFR BLD AUTO: 26.2 % (ref 34–46.6)
HEMODILUTION: NO
HGB BLD-MCNC: 8.6 G/DL (ref 12–15.9)
INHALED O2 CONCENTRATION: 50 %
LYMPHOCYTES # BLD MANUAL: 1.41 10*3/MM3 (ref 0.7–3.1)
LYMPHOCYTES NFR BLD MANUAL: 14 % (ref 19.6–45.3)
LYMPHOCYTES NFR BLD MANUAL: 4 % (ref 5–12)
MAGNESIUM SERPL-MCNC: 1.7 MG/DL (ref 1.6–2.6)
MAXIMAL PREDICTED HEART RATE: 189 BPM
MAXIMAL PREDICTED HEART RATE: 189 BPM
MCH RBC QN AUTO: 28.8 PG (ref 26.6–33)
MCHC RBC AUTO-ENTMCNC: 32.7 G/DL (ref 31.5–35.7)
MCV RBC AUTO: 88.1 FL (ref 79–97)
MODALITY: ABNORMAL
MONOCYTES # BLD AUTO: 0.4 10*3/MM3 (ref 0.1–0.9)
NEUTROPHILS # BLD AUTO: 8.28 10*3/MM3 (ref 1.7–7)
NEUTROPHILS NFR BLD MANUAL: 79 % (ref 42.7–76)
NEUTS BAND NFR BLD MANUAL: 3 % (ref 0–5)
PCO2 BLDA: 55.7 MM HG (ref 35–48)
PEEP RESPIRATORY: 6 CM[H2O]
PH BLDA: 7.41 PH UNITS (ref 7.35–7.45)
PHOSPHATE SERPL-MCNC: 2.9 MG/DL (ref 2.5–4.5)
PLAT MORPH BLD: NORMAL
PLATELET # BLD AUTO: 172 10*3/MM3 (ref 140–450)
PMV BLD AUTO: 9 FL (ref 6–12)
PO2 BLDA: 85.5 MM HG (ref 83–108)
POTASSIUM SERPL-SCNC: 3.8 MMOL/L (ref 3.5–5.2)
PROT SERPL-MCNC: 6.5 G/DL (ref 6–8.5)
RBC # BLD AUTO: 2.97 10*6/MM3 (ref 3.77–5.28)
RESPIRATORY RATE: 34
SAO2 % BLDCOA: 96.3 % (ref 94–98)
SCAN SLIDE: NORMAL
SODIUM SERPL-SCNC: 143 MMOL/L (ref 136–145)
STRESS TARGET HR: 161 BPM
STRESS TARGET HR: 161 BPM
VENTILATOR MODE: ABNORMAL
VT ON VENT VENT: 420 ML
WBC # BLD AUTO: 10.1 10*3/MM3 (ref 3.4–10.8)
WBC MORPH BLD: NORMAL
WHOLE BLOOD HOLD SPECIMEN: NORMAL

## 2021-08-09 PROCEDURE — 25010000002 ENOXAPARIN PER 10 MG: Performed by: INTERNAL MEDICINE

## 2021-08-09 PROCEDURE — 87040 BLOOD CULTURE FOR BACTERIA: CPT | Performed by: STUDENT IN AN ORGANIZED HEALTH CARE EDUCATION/TRAINING PROGRAM

## 2021-08-09 PROCEDURE — 25010000002 DEXAMETHASONE PER 1 MG: Performed by: NURSE PRACTITIONER

## 2021-08-09 PROCEDURE — 63710000001 INSULIN LISPRO (HUMAN) PER 5 UNITS: Performed by: INTERNAL MEDICINE

## 2021-08-09 PROCEDURE — 82330 ASSAY OF CALCIUM: CPT | Performed by: NURSE PRACTITIONER

## 2021-08-09 PROCEDURE — 83735 ASSAY OF MAGNESIUM: CPT | Performed by: NURSE PRACTITIONER

## 2021-08-09 PROCEDURE — 94799 UNLISTED PULMONARY SVC/PX: CPT

## 2021-08-09 PROCEDURE — 93970 EXTREMITY STUDY: CPT

## 2021-08-09 PROCEDURE — 82803 BLOOD GASES ANY COMBINATION: CPT

## 2021-08-09 PROCEDURE — 36600 WITHDRAWAL OF ARTERIAL BLOOD: CPT

## 2021-08-09 PROCEDURE — 80053 COMPREHEN METABOLIC PANEL: CPT | Performed by: NURSE PRACTITIONER

## 2021-08-09 PROCEDURE — 82728 ASSAY OF FERRITIN: CPT | Performed by: INTERNAL MEDICINE

## 2021-08-09 PROCEDURE — 85379 FIBRIN DEGRADATION QUANT: CPT | Performed by: INTERNAL MEDICINE

## 2021-08-09 PROCEDURE — 25010000002 CEFTRIAXONE PER 250 MG: Performed by: INTERNAL MEDICINE

## 2021-08-09 PROCEDURE — 86140 C-REACTIVE PROTEIN: CPT | Performed by: INTERNAL MEDICINE

## 2021-08-09 PROCEDURE — 84100 ASSAY OF PHOSPHORUS: CPT | Performed by: NURSE PRACTITIONER

## 2021-08-09 PROCEDURE — 63710000001 INSULIN REGULAR HUMAN PER 5 UNITS: Performed by: NURSE PRACTITIONER

## 2021-08-09 PROCEDURE — 82962 GLUCOSE BLOOD TEST: CPT

## 2021-08-09 PROCEDURE — 63710000001 INSULIN GLARGINE PER 5 UNITS: Performed by: INTERNAL MEDICINE

## 2021-08-09 PROCEDURE — 85025 COMPLETE CBC W/AUTO DIFF WBC: CPT | Performed by: NURSE PRACTITIONER

## 2021-08-09 PROCEDURE — 82248 BILIRUBIN DIRECT: CPT | Performed by: NURSE PRACTITIONER

## 2021-08-09 PROCEDURE — 85007 BL SMEAR W/DIFF WBC COUNT: CPT | Performed by: NURSE PRACTITIONER

## 2021-08-09 PROCEDURE — 71045 X-RAY EXAM CHEST 1 VIEW: CPT

## 2021-08-09 PROCEDURE — 25010000002 FENTANYL CITRATE (PF) 2500 MCG/50ML SOLUTION: Performed by: NURSE PRACTITIONER

## 2021-08-09 PROCEDURE — 25010000002 FUROSEMIDE PER 20 MG: Performed by: NURSE PRACTITIONER

## 2021-08-09 PROCEDURE — 36415 COLL VENOUS BLD VENIPUNCTURE: CPT | Performed by: NURSE PRACTITIONER

## 2021-08-09 PROCEDURE — 94003 VENT MGMT INPAT SUBQ DAY: CPT

## 2021-08-09 RX ORDER — OXYCODONE HYDROCHLORIDE 5 MG/1
10 TABLET ORAL EVERY 4 HOURS PRN
Status: DISPENSED | OUTPATIENT
Start: 2021-08-09 | End: 2021-08-16

## 2021-08-09 RX ADMIN — ASPIRIN 324 MG: 81 TABLET, CHEWABLE ORAL at 08:28

## 2021-08-09 RX ADMIN — INSULIN LISPRO 10 UNITS: 100 INJECTION, SOLUTION INTRAVENOUS; SUBCUTANEOUS at 23:51

## 2021-08-09 RX ADMIN — INSULIN LISPRO 12 UNITS: 100 INJECTION, SOLUTION INTRAVENOUS; SUBCUTANEOUS at 12:39

## 2021-08-09 RX ADMIN — PANTOPRAZOLE SODIUM 40 MG: 40 INJECTION, POWDER, FOR SOLUTION INTRAVENOUS at 07:41

## 2021-08-09 RX ADMIN — FENTANYL CITRATE 250 MCG/HR: 50 INJECTION, SOLUTION INTRAMUSCULAR; INTRAVENOUS at 04:35

## 2021-08-09 RX ADMIN — DEXMEDETOMIDINE HYDROCHLORIDE 0.8 MCG/KG/HR: 100 INJECTION, SOLUTION INTRAVENOUS at 04:25

## 2021-08-09 RX ADMIN — BUDESONIDE 0.5 MG: 0.5 SUSPENSION RESPIRATORY (INHALATION) at 18:51

## 2021-08-09 RX ADMIN — GUAIFENESIN 400 MG: 100 SOLUTION ORAL at 23:51

## 2021-08-09 RX ADMIN — IPRATROPIUM BROMIDE AND ALBUTEROL SULFATE 3 ML: 2.5; .5 SOLUTION RESPIRATORY (INHALATION) at 07:10

## 2021-08-09 RX ADMIN — DEXMEDETOMIDINE HYDROCHLORIDE 0.8 MCG/KG/HR: 100 INJECTION, SOLUTION INTRAVENOUS at 15:26

## 2021-08-09 RX ADMIN — OXYCODONE HYDROCHLORIDE 10 MG: 5 TABLET ORAL at 11:33

## 2021-08-09 RX ADMIN — FUROSEMIDE 20 MG: 10 INJECTION, SOLUTION INTRAMUSCULAR; INTRAVENOUS at 11:22

## 2021-08-09 RX ADMIN — Medication 2000 UNITS: at 08:29

## 2021-08-09 RX ADMIN — INSULIN LISPRO 10 UNITS: 100 INJECTION, SOLUTION INTRAVENOUS; SUBCUTANEOUS at 18:04

## 2021-08-09 RX ADMIN — INSULIN GLARGINE 45 UNITS: 100 INJECTION, SOLUTION SUBCUTANEOUS at 08:30

## 2021-08-09 RX ADMIN — PANTOPRAZOLE SODIUM 40 MG: 40 INJECTION, POWDER, FOR SOLUTION INTRAVENOUS at 18:04

## 2021-08-09 RX ADMIN — DEXAMETHASONE SODIUM PHOSPHATE 6 MG: 4 INJECTION, SOLUTION INTRAMUSCULAR; INTRAVENOUS at 08:29

## 2021-08-09 RX ADMIN — FENTANYL CITRATE 250 MCG/HR: 50 INJECTION, SOLUTION INTRAMUSCULAR; INTRAVENOUS at 07:45

## 2021-08-09 RX ADMIN — IPRATROPIUM BROMIDE AND ALBUTEROL SULFATE 3 ML: 2.5; .5 SOLUTION RESPIRATORY (INHALATION) at 12:00

## 2021-08-09 RX ADMIN — Medication 2 PACKET: at 20:57

## 2021-08-09 RX ADMIN — INSULIN LISPRO 10 UNITS: 100 INJECTION, SOLUTION INTRAVENOUS; SUBCUTANEOUS at 05:46

## 2021-08-09 RX ADMIN — DEXMEDETOMIDINE HYDROCHLORIDE 0.6 MCG/KG/HR: 100 INJECTION, SOLUTION INTRAVENOUS at 21:58

## 2021-08-09 RX ADMIN — OXYCODONE HYDROCHLORIDE 10 MG: 5 TABLET ORAL at 21:58

## 2021-08-09 RX ADMIN — GUAIFENESIN 400 MG: 100 SOLUTION ORAL at 18:04

## 2021-08-09 RX ADMIN — CEFTRIAXONE SODIUM 2 G: 2 INJECTION, POWDER, FOR SOLUTION INTRAMUSCULAR; INTRAVENOUS at 11:22

## 2021-08-09 RX ADMIN — INSULIN GLARGINE 45 UNITS: 100 INJECTION, SOLUTION SUBCUTANEOUS at 20:57

## 2021-08-09 RX ADMIN — LINEZOLID 600 MG: 600 TABLET ORAL at 08:28

## 2021-08-09 RX ADMIN — DEXMEDETOMIDINE HYDROCHLORIDE 0.8 MCG/KG/HR: 100 INJECTION, SOLUTION INTRAVENOUS at 12:49

## 2021-08-09 RX ADMIN — ENOXAPARIN SODIUM 40 MG: 40 INJECTION SUBCUTANEOUS at 20:57

## 2021-08-09 RX ADMIN — Medication 600 MG: at 08:28

## 2021-08-09 RX ADMIN — GUAIFENESIN 400 MG: 100 SOLUTION ORAL at 11:23

## 2021-08-09 RX ADMIN — DEXMEDETOMIDINE HYDROCHLORIDE 0.8 MCG/KG/HR: 100 INJECTION, SOLUTION INTRAVENOUS at 02:45

## 2021-08-09 RX ADMIN — IPRATROPIUM BROMIDE AND ALBUTEROL SULFATE 3 ML: 2.5; .5 SOLUTION RESPIRATORY (INHALATION) at 15:29

## 2021-08-09 RX ADMIN — INSULIN LISPRO 10 UNITS: 100 INJECTION, SOLUTION INTRAVENOUS; SUBCUTANEOUS at 12:39

## 2021-08-09 RX ADMIN — ACETAMINOPHEN ORAL SOLUTION 650 MG: 650 SOLUTION ORAL at 08:28

## 2021-08-09 RX ADMIN — Medication 2 PACKET: at 11:22

## 2021-08-09 RX ADMIN — Medication 10 ML: at 20:58

## 2021-08-09 RX ADMIN — BUDESONIDE 0.5 MG: 0.5 SUSPENSION RESPIRATORY (INHALATION) at 07:10

## 2021-08-09 RX ADMIN — OXYCODONE HYDROCHLORIDE 10 MG: 5 TABLET ORAL at 18:09

## 2021-08-09 RX ADMIN — INSULIN HUMAN 21 UNITS: 100 INJECTION, SOLUTION PARENTERAL at 08:29

## 2021-08-09 RX ADMIN — ACETYLCYSTEINE 2 ML: 200 SOLUTION ORAL; RESPIRATORY (INHALATION) at 07:10

## 2021-08-09 RX ADMIN — FUROSEMIDE 20 MG: 10 INJECTION, SOLUTION INTRAMUSCULAR; INTRAVENOUS at 21:39

## 2021-08-09 RX ADMIN — FENTANYL CITRATE 50 MCG/HR: 50 INJECTION, SOLUTION INTRAMUSCULAR; INTRAVENOUS at 13:40

## 2021-08-09 RX ADMIN — LINEZOLID 600 MG: 600 TABLET ORAL at 20:57

## 2021-08-09 RX ADMIN — ACETYLCYSTEINE 2 ML: 200 SOLUTION ORAL; RESPIRATORY (INHALATION) at 18:57

## 2021-08-09 RX ADMIN — DEXMEDETOMIDINE HYDROCHLORIDE 0.8 MCG/KG/HR: 100 INJECTION, SOLUTION INTRAVENOUS at 11:22

## 2021-08-09 RX ADMIN — Medication 10 ML: at 08:30

## 2021-08-09 RX ADMIN — DEXMEDETOMIDINE HYDROCHLORIDE 0.8 MCG/KG/HR: 100 INJECTION, SOLUTION INTRAVENOUS at 18:39

## 2021-08-09 RX ADMIN — DEXMEDETOMIDINE HYDROCHLORIDE 0.5 MCG/KG/HR: 100 INJECTION, SOLUTION INTRAVENOUS at 23:51

## 2021-08-09 RX ADMIN — METOPROLOL TARTRATE 50 MG: 50 TABLET, FILM COATED ORAL at 08:29

## 2021-08-09 RX ADMIN — METOPROLOL TARTRATE 50 MG: 50 TABLET, FILM COATED ORAL at 20:57

## 2021-08-09 RX ADMIN — ENOXAPARIN SODIUM 40 MG: 40 INJECTION SUBCUTANEOUS at 08:28

## 2021-08-09 RX ADMIN — IPRATROPIUM BROMIDE AND ALBUTEROL SULFATE 3 ML: 2.5; .5 SOLUTION RESPIRATORY (INHALATION) at 18:45

## 2021-08-09 RX ADMIN — Medication 600 MG: at 20:58

## 2021-08-09 RX ADMIN — ACETAMINOPHEN ORAL SOLUTION 649.6 MG: 650 SOLUTION ORAL at 00:46

## 2021-08-09 RX ADMIN — GUAIFENESIN 400 MG: 100 SOLUTION ORAL at 05:46

## 2021-08-09 NOTE — PROGRESS NOTES
Infectious Diseases Progress Note      LOS: 18 days   Patient Care Team:  Lesia, Ana Known as PCP - General    Chief Complaint: Intubated and sedated    Subjective       The patient started running high-grade fever.  She is intubated at 50% FiO2 and 11 of PEEP.  The patient is requiring no vasopressors.      Review of Systems:   Review of Systems   Unable to perform ROS: Intubated        Objective     Vital Signs  Temp:  [99.3 °F (37.4 °C)-102 °F (38.9 °C)] 100.2 °F (37.9 °C)  Heart Rate:  [] 75  Resp:  [34-35] 34  BP: ()/(36-70) 121/62  FiO2 (%):  [50 %-60 %] 50 %    Physical Exam:  Physical Exam  Vitals and nursing note reviewed.   Constitutional:       General: She is not in acute distress.     Appearance: Normal appearance. She is well-developed. She is obese. She is ill-appearing. She is not diaphoretic.   HENT:      Head: Normocephalic and atraumatic.   Cardiovascular:      Rate and Rhythm: Normal rate and regular rhythm.      Heart sounds: Normal heart sounds, S1 normal and S2 normal. No murmur heard.     Pulmonary:      Effort: Pulmonary effort is normal. No respiratory distress.      Breath sounds: No stridor. Rales present. No wheezing.      Comments: Intubated and currently in the prone position  Chest:      Chest wall: No tenderness.   Abdominal:      General: Bowel sounds are normal. There is no distension.      Palpations: Abdomen is soft. There is no mass.      Tenderness: There is no abdominal tenderness. There is no guarding.      Comments: NG tube   Genitourinary:     Comments: Ma catheter  Musculoskeletal:         General: No swelling, tenderness or deformity.      Cervical back: Neck supple.   Skin:     General: Skin is warm and dry.      Coloration: Skin is not pale.      Findings: No bruising, erythema or rash.   Neurological:      Mental Status: She is alert.      Cranial Nerves: No cranial nerve deficit.      Comments: Intubated, paralyzed, sedated          Results Review:     I have reviewed all clinical data, test, lab, and imaging results.     Radiology  XR Chest 1 View    Result Date: 8/9/2021  DATE OF EXAM: 8/9/2021 5:44 AM  PROCEDURE: XR CHEST 1 VW-  INDICATIONS: Shortness of breath; R06.00-Dyspnea, unspecified; U07.1-COVID-19; J12.82-Pneumonia due to Coronavirus disease 2019; J96.01-Acute respiratory failure with hypoxia; I95.2-Hypotension due to drugs; U07.1-COVID-19; J96.00-Acute respiratory failure, unspecified whether with hypoxia or hypercapnia   COMPARISON: AP portable chest 8/8/2021.  TECHNIQUE: Single radiographic view of the chest was obtained.  FINDINGS: The endotracheal tube remains in satisfactory position in the mid to upper thoracic trachea, the tip located about 4.5 cm above the level of the bhavesh. NG tube extends below the diaphragm with the tip not included in the field-of-view. There is no visible pneumothorax.  Ill-defined interstitial and alveolar disease changes are scattered within both lungs, and, along for slight differences in imaging technique, is not thought to be significantly changed compared to one day prior. No pleural effusion or pneumothorax is identified.       1. Interstitial and alveolar disease changes diffusely within both lungs, in keeping with this patient history of Covid pneumonia. The findings are not thought to be significantly changed compared 1 day prior. 2. Support tubes appear similarly positioned. 3. No visible pneumothorax.  Electronically Signed By-Gisela Kimble MD On:8/9/2021 8:22 AM This report was finalized on [id number] by  Gisela Kimble MD.      Cardiology    Laboratory    Results from last 7 days   Lab Units 08/09/21  0518 08/08/21  0505 08/07/21  0507 08/06/21  0441 08/05/21  0355 08/04/21  0514 08/03/21  1527 08/03/21  0258 08/03/21  0258   WBC 10*3/mm3 10.10 16.10* 20.20* 16.90* 19.60* 16.70*  --   --  21.80*   HEMOGLOBIN g/dL 8.6* 9.9* 9.9* 9.2* 9.6* 9.2* 9.2*   < > 9.8*   HEMATOCRIT % 26.2* 30.5* 29.5* 27.6*  28.7* 28.2* 28.2*   < > 29.4*   PLATELETS 10*3/mm3 172 234 231 255 300 274  --   --  374    < > = values in this interval not displayed.     Results from last 7 days   Lab Units 08/09/21  0753 08/08/21  0505 08/07/21  0507 08/06/21  0441 08/05/21  0355 08/04/21  0514 08/03/21  0258   SODIUM mmol/L 143 140 137 132* 133* 133* 136   POTASSIUM mmol/L 3.8 4.6 4.7 4.8 5.2 5.3* 5.0   CHLORIDE mmol/L 101 98 95* 90* 89* 93* 96*   CO2 mmol/L 35.0* 32.0* 32.0* 35.0* 35.0* 33.0* 33.0*   BUN mg/dL 54* 55* 56* 28* 27* 28* 28*   CREATININE mg/dL 0.61 0.67 0.85 0.45* 0.42* 0.45* 0.39*   GLUCOSE mg/dL 138* 192* 209* 203* 209* 237* 247*   ALBUMIN g/dL 3.80 4.00 4.10 4.60 4.50 3.90 3.10*   BILIRUBIN mg/dL 0.9 0.8 0.9 1.2 1.0 0.7 0.6   ALK PHOS U/L 83 85 82 73 83 73 68   AST (SGOT) U/L 64* 39* 47* 72* 82* 78* 53*   ALT (SGPT) U/L 180* 133* 158* 197* 173* 139* 96*   CALCIUM mg/dL 10.1 10.2 10.1 9.8 9.9 9.3 9.2     Results from last 7 days   Lab Units 08/04/21  0514   CK TOTAL U/L 84             Microbiology   Microbiology Results (last 10 days)     Procedure Component Value - Date/Time    AFB Culture - Wash, Bronchus [[id number]] Collected: 08/08/21 0944    Lab Status: Preliminary result Specimen: Wash from Bronchus Updated: 08/09/21 1016     AFB Stain No acid fast bacilli seen    Respiratory Culture - Wash, Bronchus [[id number]] Collected: 08/08/21 0944    Lab Status: Preliminary result Specimen: Wash from Bronchus Updated: 08/09/21 0936     Respiratory Culture Scant growth (1+) Normal Respiratory Sondra: NO S.aureus/MRSA or Pseudomonas aeruginosa     Gram Stain Many (4+) WBCs per low power field      Rare (1+) Epithelial cells per low power field      Few (2+) Mixed bacterial morphotypes seen on Gram Stain    Respiratory Panel, PCR (WITHOUT COVID) - Wash, Bronchus [[id number]]  (Normal) Collected: 08/08/21 0944    Lab Status: Final result Specimen: Wash from Bronchus Updated: 08/08/21 1127     ADENOVIRUS, PCR Not Detected     Coronavirus  229E Not Detected     Coronavirus HKU1 Not Detected     Coronavirus NL63 Not Detected     Coronavirus OC43 Not Detected     Human Metapneumovirus Not Detected     Human Rhinovirus/Enterovirus Not Detected     Influenza B PCR Not Detected     Parainfluenza Virus 1 Not Detected     Parainfluenza Virus 2 Not Detected     Parainfluenza Virus 3 Not Detected     Parainfluenza Virus 4 Not Detected     Bordetella pertussis pcr Not Detected     Chlamydophila pneumoniae PCR Not Detected     Mycoplasma pneumo by PCR Not Detected     Influenza A PCR Not Detected     RSV, PCR Not Detected     Bordetella parapertussis PCR Not Detected    Narrative:      The coronavirus on the RVP is NOT COVID-19 and is NOT indicative of infection with COVID-19.    In the setting of a positive respiratory panel with a viral infection PLUS a negative procalcitonin without other underlying concern for bacterial infection, consider observing off antibiotics or discontinuation of antibiotics and continue supportive care. If the respiratory panel is positive for atypical bacterial infection (Bordetella pertussis, Chlamydophila pneumoniae, or Mycoplasma pneumoniae), consider antibiotic de-escalation to target atypical bacterial infection.    Urine Culture - Urine, Urine, Catheter [[id number]]  (Abnormal)  (Susceptibility) Collected: 08/07/21 0351    Lab Status: Final result Specimen: Urine, Catheter Updated: 08/09/21 1037     Urine Culture >100,000 CFU/mL Escherichia coli    Susceptibility      Escherichia coli      FITZ      Ampicillin Resistant      Ampicillin + Sulbactam Intermediate      Cefazolin Susceptible      Cefepime Susceptible      Ceftazidime Susceptible      Ceftriaxone Susceptible      Gentamicin Susceptible      Levofloxacin Susceptible      Nitrofurantoin Susceptible      Piperacillin + Tazobactam Susceptible      Tetracycline Susceptible      Trimethoprim + Sulfamethoxazole Susceptible               Linear View                    Respiratory Culture - Sputum, Bronchus [[id number]]  (Abnormal)  (Susceptibility) Collected: 08/04/21 1150    Lab Status: Final result Specimen: Sputum from Bronchus Updated: 08/06/21 0823     Respiratory Culture Scant growth (1+) Klebsiella pneumoniae ssp pneumoniae      Rare Normal Respiratory Sondra: NO S.aureus/MRSA or Pseudomonas aeruginosa     Gram Stain Few (2+) WBCs per low power field      No organisms seen    Susceptibility      Klebsiella pneumoniae ssp pneumoniae      FITZ      Ampicillin Resistant      Ampicillin + Sulbactam Resistant      Cefepime Susceptible      Ceftazidime Susceptible      Ceftriaxone Susceptible      Gentamicin Susceptible      Levofloxacin Intermediate      Piperacillin + Tazobactam Susceptible      Tetracycline Resistant      Trimethoprim + Sulfamethoxazole Susceptible               Linear View               Susceptibility Comments     Klebsiella pneumoniae ssp pneumoniae    Cefazolin sensitivity will not be reported for Enterobacteriaceae in non-urine isolates. If cefazolin is preferred, please call the microbiology lab to request an E-test.  With the exception of urinary-sourced infections, aminoglycosides should not be used as monotherapy.             Clostridium Difficile EIA - Stool, Per Rectum [[id number]]  (Normal) Collected: 08/03/21 1600    Lab Status: Final result Specimen: Stool from Per Rectum Updated: 08/04/21 0927     C Diff GDH / Toxin Negative    Urine Culture - Urine, Urine, Catheter [[id number]]  (Normal) Collected: 08/03/21 1036    Lab Status: Final result Specimen: Urine, Catheter Updated: 08/04/21 1311     Urine Culture No growth          Medication Review:       Schedule Meds  acetylcysteine, 2 mL, Nebulization, BID - RT  Acetylcysteine, 600 mg, Oral, BID  aspirin, 324 mg, Per G Tube, Daily  Beneprotein, 2 packet, Nasogastric, BID  budesonide, 0.5 mg, Nebulization, BID - RT  cefTRIAXone, 2 g, Intravenous, Q24H  cholecalciferol, 2,000 Units, Nasogastric,  Daily  dexamethasone, 6 mg, Intravenous, Daily  enoxaparin, 40 mg, Subcutaneous, Q12H  furosemide, 20 mg, Intravenous, Q12H  guaiFENesin, 400 mg, Nasogastric, Q6H  insulin glargine, 45 Units, Subcutaneous, Q12H  insulin lispro, 0-24 Units, Subcutaneous, Q6H  insulin lispro, 10 Units, Subcutaneous, Q6H  insulin regular, 21 Units, Subcutaneous, Daily  ipratropium-albuterol, 3 mL, Nebulization, 4x Daily - RT  linezolid, 600 mg, Oral, Q12H  metoprolol tartrate, 50 mg, Oral, Q12H  pantoprazole, 40 mg, Intravenous, BID AC  sodium chloride, 10 mL, Intravenous, Q12H        Infusion Meds  dexmedetomidine, 0.2-1.5 mcg/kg/hr, Last Rate: 0.8 mcg/kg/hr (21 1122)  fentanyl 10 mcg/mL,  mcg/hr, Last Rate: 150 mcg/hr (21 1115)  Pharmacy to Dose enoxaparin (LOVENOX),         PRN Meds  •  acetaminophen  •  acetaminophen **OR** acetaminophen  •  aluminum-magnesium hydroxide-simethicone  •  artificial tears  •  artificial tears  •  benzonatate  •  dextrose  •  dextrose  •  [COMPLETED] dilTIAZem **FOLLOWED BY** [] dilTIAZem **FOLLOWED BY** dilTIAZem  •  glucagon (human recombinant)  •  glycopyrrolate PF  •  hydrALAZINE  •  insulin lispro **AND** insulin lispro  •  lidocaine  •  lidocaine PF 1%  •  magnesium sulfate **OR** magnesium sulfate in D5W 1g/100mL (PREMIX)  •  metoprolol tartrate  •  nitroglycerin  •  ondansetron **OR** ondansetron  •  oxyCODONE  •  Pharmacy to Dose enoxaparin (LOVENOX)  •  potassium chloride **OR** potassium chloride **OR** potassium chloride  •  potassium phosphate infusion greater than 15 mMoles **OR** potassium phosphate infusion greater than 15 mMoles **OR** potassium phosphate **OR** sodium phosphate IVPB **OR** sodium phosphate IVPB **OR** sodium phosphate IVPB  •  [COMPLETED] Insert peripheral IV **AND** sodium chloride  •  sodium chloride        Assessment/Plan       Antimicrobial Therapy   1.  Zyvox      day  2.   Rocephin      day  3.      Day  4.      Day  5.      Day      Assessment     Severe COVID-19 infection and patient with significant comorbidities including diabetes mellitus and morbid obesity. Apparently did not receive vaccination for COVID-19  COVID-19 screen on admission on July 22, 2021 was positive. Was reported that patient had positive COVID-19 diagnosis 6 days prior to admission  The patient had received 5 days of IV remdesivir     Severe respiratory failure on the ventilator currently on 50% FiO2 and 11 PEEP.  Chest x-ray consistent with severe COVID-19 infection and ARDS     Probable ventilator associated pneumonia versus hospitalist acquired pneumonia. Sputum culture from July 29, 2021 grew MRSA and the organism is susceptible to linezolid and vancomycin  The patient was started on linezolid on August 1, 2021  Repeat sputum culture from August 1, 2021 is growing Klebsiella pneumoniae     Morbid obesity     Diabetes mellitus     Reactive leukocytosis secondary to steroids    Elevated CK.  CK is back to normal    UTI, urine culture from August 7, 2021 is growing relatively susceptible strain of E. coli         Plan     Continue Zyvox 600 mg every 12 hours for 10 to 14 days and monitor platelets carefully during treatment with Zyvox  Continue Rocephin 2 g every 24 hours  Supportive care  Prognosis is very guarded secondary to severe COVID-19 infection  Continue dexamethasone for now, pulmonary service is following and monitoring  Case discussed with patient's RN  Discontinue isolation for COVID-19 infection since patient first diagnosis was more than 20 days ago (first diagnosis was made on July 15, 2021)  Labs in a.m. including : CBC with differential, CMP,  d-dimer, ferritin and CRP  Request venous Doppler of the upper and lower extremities to rule out DVT  2 sets of blood cultures        Neela Alvarado MD  08/09/21  11:46 EDT     Note is dictated utilizing voice recognition software/Dragon

## 2021-08-09 NOTE — PROGRESS NOTES
"Nutrition Services    Patient Name: Leslie Harris  YOB: 1990  MRN: [id number]  Admission date: 7/22/2021      PPE Documentation        PPE Worn By Provider RD did not enter room for this encounter   PPE Worn By Patient  N/A     PROGRESS NOTE      Encounter Information: Tube feed check on. Novasource Renal documented at current goal 45 mL/hr. Remains intubated, off paralytic, in supine positioning.        Labs (reviewed below): -elevated BUN  -elevated LFTs       GI Function:  -residuals WNL  -last BM documented on 8/6 (x3 days ago)       Nutrition Intervention: Continue current TF regimen       PO Diet/Supplements: NPO Diet   EN Prescription: Novasource Renal at 45 mL/hr + 2 packets beneprotein BID, 20 mL/hr water flush       Intake/Output:   Intake/Output Summary (Last 24 hours) at 8/9/2021 0921  Last data filed at 8/9/2021 0800  Gross per 24 hour   Intake 2928 ml   Output 3500 ml   Net -572 ml          Height: Height: 165.1 cm (65\")   Weight: Weight: (!) 151 kg (333 lb 12.4 oz) (08/08/21 0100)   BMI: Body mass index is 55.54 kg/m².     Results from last 7 days   Lab Units 08/09/21  0753 08/08/21  0505 08/07/21  0507   SODIUM mmol/L 143 140 137   POTASSIUM mmol/L 3.8 4.6 4.7   CHLORIDE mmol/L 101 98 95*   CO2 mmol/L 35.0* 32.0* 32.0*   BUN mg/dL 54* 55* 56*   CREATININE mg/dL 0.61 0.67 0.85   CALCIUM mg/dL 10.1 10.2 10.1   BILIRUBIN mg/dL 0.9 0.8 0.9   ALK PHOS U/L 83 85 82   ALT (SGPT) U/L 180* 133* 158*   AST (SGOT) U/L 64* 39* 47*   GLUCOSE mg/dL 138* 192* 209*     Results from last 7 days   Lab Units 08/09/21  0753 08/09/21  0518 08/08/21  0505 08/08/21  0505 08/07/21  0507 08/07/21  0507   MAGNESIUM mg/dL 1.7  --   --  1.9  --  2.2   PHOSPHORUS mg/dL 2.9  --    < > 4.5   < > 7.3*   HEMOGLOBIN g/dL  --  8.6*   < > 9.9*   < > 9.9*   HEMATOCRIT %  --  26.2*   < > 30.5*   < > 29.5*    < > = values in this interval not displayed.     COVID19   Date Value Ref Range Status   07/22/2021 Detected (C) " Not Detected - Ref. Range Final     Lab Results   Component Value Date    HGBA1C 6.9 (H) 07/23/2021     Vitals:    08/09/21 0742   BP:    Pulse:    Resp:    Temp: (!) 101.5 °F (38.6 °C)   SpO2:      RD to follow up per protocol.    Electronically signed by:  Leslie Hugo RD  08/09/21 09:21 EDT

## 2021-08-09 NOTE — PLAN OF CARE
Goal Outcome Evaluation:   Pt is currently at 6 of peep and 50 oxygen. Precedex running at 0.8, fentanyl at 250, versed is off. Pt following commands, responds to voice, nods appropriately.

## 2021-08-09 NOTE — PROGRESS NOTES
"PULMONARY CRITICAL CARE Progress  NOTE      PATIENT IDENTIFICATION:  Name: Leslie Harris  MRN: [id number]  :  1990     Age: 30 y.o.  Sex: female    DATE OF Note:  2021   Referring Physician: Jeff Feng MD                  Subjective:   Intubated and lightly sedated  Paralytics off  Now waking up and following some commands  Ventilator requirement improving  Tolerating tube feeds at goal  Hemodynamically stable  Start pressure support trials    Objective:  tMax 24 hrs: Temp (24hrs), Av.3 °F (38.5 °C), Min:99.3 °F (37.4 °C), Max:102 °F (38.9 °C)    Vitals Ranges:   Temp:  [99.3 °F (37.4 °C)-102 °F (38.9 °C)] 101.5 °F (38.6 °C)  Heart Rate:  [] 75  Resp:  [34-35] 34  BP: ()/(36-70) 121/62  FiO2 (%):  [50 %-60 %] 50 %    Intake and Output Last 3 Shifts:   I/O last 3 completed shifts:  In: 4311.5 [I.V.:2209.5; Other:666; NG/GT:1436]  Out: 5930 [Urine:5930]    Exam:  /62   Pulse 75   Temp (!) 101.5 °F (38.6 °C)   Resp (!) 34   Ht 165.1 cm (65\")   Wt (!) 151 kg (333 lb 12.4 oz)   LMP 2021   SpO2 97%   BMI 55.54 kg/m²     General Appearance:   Intubated and lightly sedated.  Awakens and follows commands  HEENT:  Normocephalic, without obvious abnormality, Conjunctiva/corneas clear.  Normal external ear canals, Nares normal, no drainage. OETT.  DHT  Neck:  Supple, symmetrical, trachea midline. No JVD.  Lungs /Chest wall:   Coarse scattered rhonchi, diminished bases, mild wheezing at times, respirations unlabored symmetrical wall movement.     Heart: Sinus rhythm to sinus tachycardia, no murmur, no rub or gallop  Abdomen: Soft, non-distended, morbid body habitus, active bowel sounds.  No rebound or guarding  Extremities: ++ Generalized edema, no clubbing or Cyanosis        Medications:    Current Facility-Administered Medications:   •  acetaminophen (TYLENOL) 160 MG/5ML solution 650 mg, 650 mg, Oral, Q6H PRN, Radha Jaquez MD, 650 mg at 21 0828  •  " acetaminophen (TYLENOL) tablet 650 mg, 650 mg, Oral, Q4H PRN, 650 mg at 07/27/21 0012 **OR** acetaminophen (TYLENOL) suppository 650 mg, 650 mg, Rectal, Q4H PRN, Ryder Llanes APRN, 650 mg at 07/26/21 0153  •  acetylcysteine (MUCOMYST) 20 % nebulizer solution 2 mL, 2 mL, Nebulization, BID - RT, Jeff Feng MD, 2 mL at 08/09/21 0710  •  Acetylcysteine capsule 600 mg, 600 mg, Oral, BID, Ryder Llanes APRN, 600 mg at 08/09/21 0828  •  aluminum-magnesium hydroxide-simethicone (MAALOX MAX) 400-400-40 MG/5ML suspension 15 mL, 15 mL, Oral, Q6H PRN, Ryder Llanes APRN  •  artificial tears ophthalmic ointment, , Both Eyes, PRN, Ryder Llanes APRN, Given at 08/08/21 2001  •  artificial tears ophthalmic ointment, , Both Eyes, PRN, Jeff Feng MD  •  aspirin chewable tablet 324 mg, 324 mg, Per G Tube, Daily, Ryder Llanes APRN, 324 mg at 08/09/21 0828  •  Beneprotein packet 2 packet, 2 packet, Nasogastric, BID, Leslie Hugo, KATIE, 2 packet at 08/08/21 2121  •  benzonatate (TESSALON) capsule 100 mg, 100 mg, Oral, TID PRN, Ryder Llanes APRN  •  budesonide (PULMICORT) nebulizer solution 0.5 mg, 0.5 mg, Nebulization, BID - RT, Jeff Feng MD, 0.5 mg at 08/09/21 0710  •  cefTRIAXone (ROCEPHIN) 2 g in sodium chloride 0.9 % 100 mL IVPB, 2 g, Intravenous, Q24H, Neela Alvarado MD, Last Rate: 200 mL/hr at 08/08/21 1038, 2 g at 08/08/21 1038  •  cholecalciferol (VITAMIN D3) tablet 2,000 Units, 2,000 Units, Nasogastric, Daily, Ryder Llanes APRN, 2,000 Units at 08/09/21 0829  •  cisatracurium (NIMBEX) bolus from bag 2 mg/mL 8.36 mg, 50 mcg/kg, Intravenous, Once PRN, Day, Dawn, APRN  •  cisatracurium besylate (NIMBEX) 200 mg in sodium chloride 0.9 % 100 mL (2 mg/mL) infusion, 0.5-10.2 mcg/kg/min, Intravenous, Titrated, Day, Dawn, APRN, Stopped at 08/08/21 1338  •  dexamethasone (DECADRON) injection 6 mg, 6 mg, Intravenous, Daily, Day, Dawn, APRN, 6 mg at 08/09/21 0829  •  dexmedetomidine (PRECEDEX) 400 mcg  in 100 mL NS infusion, 0.2-1.5 mcg/kg/hr, Intravenous, Titrated, Day, , APRN, Last Rate: 30.2 mL/hr at 21 0425, 0.8 mcg/kg/hr at 21 0425  •  dextrose (D50W) 25 g/ 50mL Intravenous Solution 25 g, 25 g, Intravenous, Q15 Min PRN, Ryder Llanes, APRN  •  dextrose (GLUTOSE) oral gel 15 g, 15 g, Oral, Q15 Min PRN, Ryder Llanes APRN  •  [COMPLETED] dilTIAZem (CARDIZEM) injection 20 mg, 20 mg, Intravenous, Once, 20 mg at 21 1508 **FOLLOWED BY** [] dilTIAZem (CARDIZEM) 100 mg in 100 mL NS infusion (ADV), 5-15 mg/hr, Intravenous, Titrated, Stopped at 21 1020 **FOLLOWED BY** dilTIAZem (CARDIZEM) bolus from bag 1 mg/mL 20 mg, 20 mg, Intravenous, Q30 Min PRN, Tana, Jessica, APRN  •  enoxaparin (LOVENOX) syringe 40 mg, 40 mg, Subcutaneous, Q12H, Jeff Feng MD, 40 mg at 21 0828  •  fentaNYL 1000 mcg in 100 mL NS infusion,  mcg/hr, Intravenous, Titrated, Day, Jessica, APRN, Last Rate: 25 mL/hr at 21 0745, 250 mcg/hr at 21 0745  •  [DISCONTINUED] albumin human 25 % IV SOLN 25 g, 25 g, Intravenous, Q12H, 25 g at 21 2210 **AND** furosemide (LASIX) injection 20 mg, 20 mg, Intravenous, Q12H, Ryder Llanes APRN, 20 mg at 21 2135  •  glucagon (human recombinant) (GLUCAGEN DIAGNOSTIC) injection 1 mg, 1 mg, Subcutaneous, Q15 Min PRN, Ryder Llanes APRN  •  glycopyrrolate PF (ROBINUL) injection 0.2 mg, 0.2 mg, Intravenous, Q4H PRN, Marianna Navarrete APRRODOLFO, 0.2 mg at 21  •  guaiFENesin solution 400 mg, 400 mg, Nasogastric, Q6H, Radha Jaquez MD, 400 mg at 21 0546  •  hydrALAZINE (APRESOLINE) injection 10 mg, 10 mg, Intravenous, Q4H PRN, Ryder Llanes, APRN, 10 mg at 21 0051  •  insulin glargine (LANTUS, SEMGLEE) injection 45 Units, 45 Units, Subcutaneous, Q12H, Jeff Feng MD, 45 Units at 21 0830  •  insulin lispro (ADMELOG) injection 0-24 Units, 0-24 Units, Subcutaneous, Q6H, 4 Units at 21 1808 **AND** insulin  lispro (ADMELOG) injection 0-24 Units, 0-24 Units, Subcutaneous, PRN, Radha Jaquez MD  •  insulin lispro (ADMELOG) injection 10 Units, 10 Units, Subcutaneous, Q6H, Jeff Feng MD, 10 Units at 08/09/21 0546  •  insulin regular (humuLIN R,novoLIN R) injection 21 Units, 21 Units, Subcutaneous, Daily, Day, Jessica, APRN, 21 Units at 08/09/21 0829  •  ipratropium-albuterol (DUO-NEB) nebulizer solution 3 mL, 3 mL, Nebulization, 4x Daily - RT, Jeff Feng MD, 3 mL at 08/09/21 0710  •  lidocaine (XYLOCAINE) 5 % ointment, , , PRN, Jeff Feng MD, 1 application at 08/08/21 0910  •  lidocaine PF 1% (XYLOCAINE) injection, , , PRN, Jeff Feng MD, 5 mL at 08/08/21 0911  •  linezolid (ZYVOX) tablet 600 mg, 600 mg, Oral, Q12H, Neela Alvarado MD, 600 mg at 08/09/21 0828  •  Magnesium Sulfate 2 gram infusion - Mg less than or equal to 1.5 mg/dL, 2 g, Intravenous, PRN **OR** Magnesium Sulfate 1 gram infusion - Mg 1.6-1.9 mg/dL, 1 g, Intravenous, PRN, LoveRyder APRN, Last Rate: 100 mL/hr at 08/02/21 0842, 1 g at 08/02/21 0842  •  metoprolol tartrate (LOPRESSOR) injection 5 mg, 5 mg, Intravenous, Q6H PRN, Day, Jessica, APRN, 5 mg at 08/07/21 1314  •  metoprolol tartrate (LOPRESSOR) tablet 50 mg, 50 mg, Oral, Q12H, Jeff Feng MD, 50 mg at 08/09/21 0829  •  Midazolam (VERSED) 50 mg in 50mL NS infusion, 1-10 mg/hr, Intravenous, Titrated, Ryder Llanes APRN, Stopped at 08/08/21 2331  •  nitroglycerin (NITROSTAT) SL tablet 0.4 mg, 0.4 mg, Sublingual, Q5 Min PRN, Ryder Llanes APRN  •  norepinephrine (LEVOPHED) 8 mg in 250 mL NS infusion (premix), 0.02-0.5 mcg/kg/min, Intravenous, Titrated, Sheree Orantes APRN, Stopped at 08/08/21 0830  •  ondansetron (ZOFRAN) tablet 4 mg, 4 mg, Oral, Q6H PRN **OR** ondansetron (ZOFRAN) injection 4 mg, 4 mg, Intravenous, Q6H PRN, Ryder Llanes APRN  •  pantoprazole (PROTONIX) injection 40 mg, 40 mg, Intravenous, BID TIAN, Jeff Feng MD, 40 mg at 08/09/21 0741  •   Pharmacy to Dose enoxaparin (LOVENOX), , Does not apply, Continuous PRN, Jeff Feng MD  •  potassium chloride (K-DUR,KLOR-CON) CR tablet 40 mEq, 40 mEq, Oral, PRN **OR** potassium chloride (KLOR-CON) packet 40 mEq, 40 mEq, Oral, PRN **OR** potassium chloride 10 mEq in 100 mL IVPB, 10 mEq, Intravenous, Q1H PRN, Ryder Llanes E, APRN  •  potassium phosphate 45 mmol in sodium chloride 0.9 % 500 mL infusion, 45 mmol, Intravenous, PRN **OR** potassium phosphate 30 mmol in sodium chloride 0.9 % 250 mL infusion, 30 mmol, Intravenous, PRN **OR** potassium phosphate 15 mmol in 0.9% sodium chloride 100 mL IVPB, 15 mmol, Intravenous, PRN **OR** sodium phosphates 45 mmol in sodium chloride 0.9 % 500 mL IVPB, 45 mmol, Intravenous, PRN **OR** sodium phosphates 30 mmol in sodium chloride 0.9 % 250 mL IVPB, 30 mmol, Intravenous, PRN **OR** sodium phosphates 15 mmol in sodium chloride 0.9 % 250 mL IVPB, 15 mmol, Intravenous, PRN, Love, Ryder E, APRN  •  [COMPLETED] Insert peripheral IV, , , Once **AND** sodium chloride 0.9 % flush 10 mL, 10 mL, Intravenous, PRN, Leonardo Doss MD  •  sodium chloride 0.9 % flush 10 mL, 10 mL, Intravenous, Q12H, Shraddha, Ryder E, APRN, 10 mL at 08/09/21 0830  •  sodium chloride 0.9 % flush 10 mL, 10 mL, Intravenous, PRN, Love, Ryder E, APRN    Data Review:  All labs (24hrs):   Recent Results (from the past 24 hour(s))   Respiratory Culture - Wash, Bronchus    Collection Time: 08/08/21  9:44 AM    Specimen: Bronchus; Wash   Result Value Ref Range    Gram Stain Many (4+) WBCs per low power field     Gram Stain Rare (1+) Epithelial cells per low power field     Gram Stain       Few (2+) Mixed bacterial morphotypes seen on Gram Stain   Respiratory Panel, PCR (WITHOUT COVID) - Wash, Bronchus    Collection Time: 08/08/21  9:44 AM    Specimen: Bronchus; Wash   Result Value Ref Range    ADENOVIRUS, PCR Not Detected Not Detected    Coronavirus 229E Not Detected Not Detected    Coronavirus HKU1 Not  Detected Not Detected    Coronavirus NL63 Not Detected Not Detected    Coronavirus OC43 Not Detected Not Detected    Human Metapneumovirus Not Detected Not Detected    Human Rhinovirus/Enterovirus Not Detected Not Detected    Influenza B PCR Not Detected Not Detected    Parainfluenza Virus 1 Not Detected Not Detected    Parainfluenza Virus 2 Not Detected Not Detected    Parainfluenza Virus 3 Not Detected Not Detected    Parainfluenza Virus 4 Not Detected Not Detected    Bordetella pertussis pcr Not Detected Not Detected    Chlamydophila pneumoniae PCR Not Detected Not Detected    Mycoplasma pneumo by PCR Not Detected Not Detected    Influenza A PCR Not Detected Not Detected    RSV, PCR Not Detected Not Detected    Bordetella parapertussis PCR Not Detected Not Detected   POC Glucose Once    Collection Time: 08/08/21 12:22 PM    Specimen: Blood   Result Value Ref Range    Glucose 207 (H) 70 - 105 mg/dL   POC Glucose Once    Collection Time: 08/08/21  6:06 PM    Specimen: Blood   Result Value Ref Range    Glucose 154 (H) 70 - 105 mg/dL   POC Glucose Once    Collection Time: 08/08/21 11:22 PM    Specimen: Blood   Result Value Ref Range    Glucose 146 (H) 70 - 105 mg/dL   Blood Gas, Arterial -    Collection Time: 08/09/21  4:54 AM    Specimen: Arterial Blood   Result Value Ref Range    Site Right Radial     Ovi's Test Positive     pH, Arterial 7.409 7.350 - 7.450 pH units    pCO2, Arterial 55.7 (H) 35.0 - 48.0 mm Hg    pO2, Arterial 85.5 83.0 - 108.0 mm Hg    HCO3, Arterial 35.2 (H) 21.0 - 28.0 mmol/L    Base Excess, Arterial 9.3 (H) 0.0 - 3.0 mmol/L    O2 Saturation, Arterial 96.3 94.0 - 98.0 %    CO2 Content 37.0 (H) 22 - 29 mmol/L    Barometric Pressure for Blood Gas      Modality Adult Vent     FIO2 50 %    Ventilator Mode ;AC     Set Tidal Volume 420     PEEP 6     Hemodilution No     Respiratory Rate 34    D-dimer, Quantitative    Collection Time: 08/09/21  5:18 AM    Specimen: Blood   Result Value Ref Range     D-Dimer, Quantitative 1.22 (H) 0.00 - 0.59 mg/L (FEU)   CBC Auto Differential    Collection Time: 08/09/21  5:18 AM    Specimen: Blood   Result Value Ref Range    WBC 10.10 3.40 - 10.80 10*3/mm3    RBC 2.97 (L) 3.77 - 5.28 10*6/mm3    Hemoglobin 8.6 (L) 12.0 - 15.9 g/dL    Hematocrit 26.2 (L) 34.0 - 46.6 %    MCV 88.1 79.0 - 97.0 fL    MCH 28.8 26.6 - 33.0 pg    MCHC 32.7 31.5 - 35.7 g/dL    RDW 16.4 (H) 12.3 - 15.4 %    RDW-SD 51.2 37.0 - 54.0 fl    MPV 9.0 6.0 - 12.0 fL    Platelets 172 140 - 450 10*3/mm3   Scan Slide    Collection Time: 08/09/21  5:18 AM    Specimen: Blood   Result Value Ref Range    Scan Slide     Manual Differential    Collection Time: 08/09/21  5:18 AM    Specimen: Blood   Result Value Ref Range    Neutrophil % 79.0 (H) 42.7 - 76.0 %    Lymphocyte % 14.0 (L) 19.6 - 45.3 %    Monocyte % 4.0 (L) 5.0 - 12.0 %    Bands %  3.0 0.0 - 5.0 %    Neutrophils Absolute 8.28 (H) 1.70 - 7.00 10*3/mm3    Lymphocytes Absolute 1.41 0.70 - 3.10 10*3/mm3    Monocytes Absolute 0.40 0.10 - 0.90 10*3/mm3    Anisocytosis Slight/1+ None Seen    WBC Morphology Normal Normal    Platelet Morphology Normal Normal   POC Glucose Once    Collection Time: 08/09/21  5:45 AM    Specimen: Blood   Result Value Ref Range    Glucose 126 (H) 70 - 105 mg/dL   Magnesium    Collection Time: 08/09/21  7:53 AM    Specimen: Blood   Result Value Ref Range    Magnesium 1.7 1.6 - 2.6 mg/dL   Phosphorus    Collection Time: 08/09/21  7:53 AM    Specimen: Blood   Result Value Ref Range    Phosphorus 2.9 2.5 - 4.5 mg/dL   Comprehensive Metabolic Panel    Collection Time: 08/09/21  7:53 AM    Specimen: Blood   Result Value Ref Range    Glucose 138 (H) 65 - 99 mg/dL    BUN 54 (H) 6 - 20 mg/dL    Creatinine 0.61 0.57 - 1.00 mg/dL    Sodium 143 136 - 145 mmol/L    Potassium 3.8 3.5 - 5.2 mmol/L    Chloride 101 98 - 107 mmol/L    CO2 35.0 (H) 22.0 - 29.0 mmol/L    Calcium 10.1 8.6 - 10.5 mg/dL    Total Protein 6.5 6.0 - 8.5 g/dL    Albumin 3.80 3.50  - 5.20 g/dL    ALT (SGPT) 180 (H) 1 - 33 U/L    AST (SGOT) 64 (H) 1 - 32 U/L    Alkaline Phosphatase 83 39 - 117 U/L    Total Bilirubin 0.9 0.0 - 1.2 mg/dL    eGFR Non African Amer 114 >60 mL/min/1.73    Globulin 2.7 gm/dL    A/G Ratio 1.4 g/dL    BUN/Creatinine Ratio 88.5 (H) 7.0 - 25.0    Anion Gap 7.0 5.0 - 15.0 mmol/L   Calcium, Ionized    Collection Time: 08/09/21  7:53 AM    Specimen: Blood   Result Value Ref Range    Ionized Calcium 1.35 (H) 1.20 - 1.30 mmol/L   Ferritin    Collection Time: 08/09/21  7:53 AM    Specimen: Blood   Result Value Ref Range    Ferritin 182.60 (H) 13.00 - 150.00 ng/mL   Bilirubin, Direct    Collection Time: 08/09/21  7:53 AM    Specimen: Blood   Result Value Ref Range    Bilirubin, Direct 0.2 0.0 - 0.3 mg/dL   Lavender Top    Collection Time: 08/09/21  7:53 AM   Result Value Ref Range    Extra Tube hold for add-on         Imaging:  XR Chest 1 View  Narrative: DATE OF EXAM:  8/9/2021 5:44 AM     PROCEDURE:  XR CHEST 1 VW-     INDICATIONS:  Shortness of breath; R06.00-Dyspnea, unspecified; U07.1-COVID-19;  J12.82-Pneumonia due to Coronavirus disease 2019; J96.01-Acute  respiratory failure with hypoxia; I95.2-Hypotension due to drugs;  U07.1-COVID-19; J96.00-Acute respiratory failure, unspecified whether  with hypoxia or hypercapnia       COMPARISON:  AP portable chest 8/8/2021.     TECHNIQUE:   Single radiographic view of the chest was obtained.     FINDINGS:  The endotracheal tube remains in satisfactory position in the mid to  upper thoracic trachea, the tip located about 4.5 cm above the level of  the bhavesh. NG tube extends below the diaphragm with the tip not  included in the field-of-view. There is no visible pneumothorax.     Ill-defined interstitial and alveolar disease changes are scattered  within both lungs, and, along for slight differences in imaging  technique, is not thought to be significantly changed compared to one  day prior. No pleural effusion or pneumothorax is  identified.     Impression:    1. Interstitial and alveolar disease changes diffusely within both  lungs, in keeping with this patient history of Covid pneumonia. The  findings are not thought to be significantly changed compared 1 day  prior.  2. Support tubes appear similarly positioned.  3. No visible pneumothorax.     Electronically Signed By-Gisela Kimble MD On:8/9/2021 8:22 AM  This report was finalized on [id number] by  Gisela Kimble MD.       ASSESSMENT:   Acute hypoxic respiratory failure due to COVID-19 (CMS/[facility])    Pneumonia due to COVID-19 virus   morbid obesity affecting her quality care   HTN  RODRIGUEZ    Class 3 severe obesity without serious comorbidity with body mass index (BMI) of 50.0 to 59.9 in adult    Hyperglycemia, likely stress induced    Diabetes mellitus type 2 in obese (CMS/[facility])     Assessment and Plan:  Pneumonia due to COVID-19 virus  Acute respiratory failure due to COVID-19  VAP with Klebsiella pna and MRSA  -Intubation 7/23  -Ventilator management  -ABGs, chest x-rays, vent settings reviewed  -Ventilator needs improving, will start pressure support trials 8/9/21  -Pressure support 15/5 trial with 50% FiO2  -Monitor disease progression labs as ordered  -CT PE protocol could not be obtained due to severe hypoxia and body habitus  -Empiric A/C with Lovenox  -Remdesivir x5 days, completed on 7/26  -Bronchodilator nebs  -Inhaled corticosteroids  -Scheduled diuresis  -Decreased Dexamethasone to daily  -Paralytic weaned off  -Continue abx and antifungal per ID  -Bronchoscopy 8/8    *Add oxycodone as needed per feeding tube to facilitate weaning off narcotics since the patient has been sedated for an extended period of time     Diabetes mellitus, type 2, new diagnosis  -strict glycemic control   -Accuchecks every 6 hours with sliding scale insulin  -Added Lantus, Hum R and Lispro  -Hemoglobin A1c 6.9.    E. coli UTI  -DC Ma catheter  -Antibiotics per ID    Essential  hypertension  -Diurese  -Add as needed hydralazine  -Scheduled Norvasc  -Decrease use of beta-blockers due to transient bradycardia     Class 3 severe obesity without serious comorbidity with body mass index (BMI) of 50.0 to 59.9 in adult  -BMI 59.32  -Complicating aspects of care  -Counseled on lifestyle modifications to improve overall health prior to intubation     Code Status: CPR, full interventions  VTE Prophylaxis:  Lovenox  PUD Prophylaxis:PPI  Nutrition: dietitian following for tube feed recommendations.Tolerating TF at goal  Last BM 3 days ago

## 2021-08-09 NOTE — PLAN OF CARE
Goal Outcome Evaluation:              Outcome Summary: patient tolerated vent settings, tolerated CPAP trial for one hour. Patient has stayed awake and alert for whole dayshift. Urinary catheter removed, external catheter applied. Patient has tolerated decreased dose of fentanyl drip and PRN pain meds down NG tube.

## 2021-08-09 NOTE — CASE MANAGEMENT/SOCIAL WORK
Continued Stay Note  EMANUEL Wasserman     Patient Name: Leslie Harris  MRN: [id number]  Today's Date: 8/9/2021    Admit Date: 7/22/2021    Discharge Plan     Row Name 08/09/21 0853       Plan    Plan  D/C Plan : Pt is COVID-19 + and remains on the vent at 50% and 6 of peep . Pt is on multiple gtt's        Discharge Codes    No documentation.       Expected Discharge Date and Time     Expected Discharge Date Expected Discharge Time    Jul 30, 2021             Lissa Holden RN

## 2021-08-10 LAB
ALBUMIN SERPL-MCNC: 3.7 G/DL (ref 3.5–5.2)
ALBUMIN/GLOB SERPL: 1.3 G/DL
ALP SERPL-CCNC: 84 U/L (ref 39–117)
ALT SERPL W P-5'-P-CCNC: 160 U/L (ref 1–33)
ANION GAP SERPL CALCULATED.3IONS-SCNC: 9 MMOL/L (ref 5–15)
APTT PPP: <20 SECONDS (ref 24–31)
ARTERIAL PATENCY WRIST A: POSITIVE
AST SERPL-CCNC: 61 U/L (ref 1–32)
ATMOSPHERIC PRESS: ABNORMAL MM[HG]
BACTERIA SPEC RESP CULT: NORMAL
BASE EXCESS BLDA CALC-SCNC: 8.8 MMOL/L (ref 0–3)
BASOPHILS # BLD AUTO: 0.1 10*3/MM3 (ref 0–0.2)
BASOPHILS NFR BLD AUTO: 0.9 % (ref 0–1.5)
BDY SITE: ABNORMAL
BILIRUB CONJ SERPL-MCNC: 0.2 MG/DL (ref 0–0.3)
BILIRUB SERPL-MCNC: 0.8 MG/DL (ref 0–1.2)
BUN SERPL-MCNC: 45 MG/DL (ref 6–20)
BUN/CREAT SERPL: 107.1 (ref 7–25)
CA-I SERPL ISE-MCNC: 1.31 MMOL/L (ref 1.2–1.3)
CALCIUM SPEC-SCNC: 9.9 MG/DL (ref 8.6–10.5)
CHLORIDE SERPL-SCNC: 99 MMOL/L (ref 98–107)
CO2 BLDA-SCNC: 34.5 MMOL/L (ref 22–29)
CO2 SERPL-SCNC: 34 MMOL/L (ref 22–29)
CREAT SERPL-MCNC: 0.42 MG/DL (ref 0.57–1)
CRP SERPL-MCNC: 0.35 MG/DL (ref 0–0.5)
D DIMER PPP FEU-MCNC: 1.47 MG/L (FEU) (ref 0–0.59)
DEPRECATED RDW RBC AUTO: 52.5 FL (ref 37–54)
EOSINOPHIL # BLD AUTO: 0.1 10*3/MM3 (ref 0–0.4)
EOSINOPHIL NFR BLD AUTO: 1.2 % (ref 0.3–6.2)
ERYTHROCYTE [DISTWIDTH] IN BLOOD BY AUTOMATED COUNT: 16.9 % (ref 12.3–15.4)
FERRITIN SERPL-MCNC: 210.8 NG/ML (ref 13–150)
FIBRINOGEN PPP-MCNC: 532 MG/DL (ref 210–450)
GFR SERPL CREATININE-BSD FRML MDRD: >150 ML/MIN/1.73
GLOBULIN UR ELPH-MCNC: 2.9 GM/DL
GLUCOSE BLDC GLUCOMTR-MCNC: 111 MG/DL (ref 70–105)
GLUCOSE BLDC GLUCOMTR-MCNC: 127 MG/DL (ref 70–105)
GLUCOSE BLDC GLUCOMTR-MCNC: 145 MG/DL (ref 70–105)
GLUCOSE BLDC GLUCOMTR-MCNC: 181 MG/DL (ref 74–100)
GLUCOSE BLDC GLUCOMTR-MCNC: 97 MG/DL (ref 70–105)
GLUCOSE SERPL-MCNC: 125 MG/DL (ref 65–99)
GRAM STN SPEC: NORMAL
HCO3 BLDA-SCNC: 33.1 MMOL/L (ref 21–28)
HCT VFR BLD AUTO: 28.7 % (ref 34–46.6)
HEMODILUTION: NO
HGB BLD-MCNC: 9.6 G/DL (ref 12–15.9)
INHALED O2 CONCENTRATION: 50 %
INR PPP: 1.03 (ref 0.93–1.1)
LDH SERPL-CCNC: 230 U/L (ref 135–214)
LYMPHOCYTES # BLD AUTO: 1.9 10*3/MM3 (ref 0.7–3.1)
LYMPHOCYTES NFR BLD AUTO: 22.7 % (ref 19.6–45.3)
MAGNESIUM SERPL-MCNC: 1.7 MG/DL (ref 1.6–2.6)
MCH RBC QN AUTO: 30 PG (ref 26.6–33)
MCHC RBC AUTO-ENTMCNC: 33.5 G/DL (ref 31.5–35.7)
MCV RBC AUTO: 89.4 FL (ref 79–97)
MODALITY: ABNORMAL
MONOCYTES # BLD AUTO: 0.5 10*3/MM3 (ref 0.1–0.9)
MONOCYTES NFR BLD AUTO: 6 % (ref 5–12)
NEUTROPHILS NFR BLD AUTO: 5.7 10*3/MM3 (ref 1.7–7)
NEUTROPHILS NFR BLD AUTO: 69.2 % (ref 42.7–76)
NRBC BLD AUTO-RTO: 0.1 /100 WBC (ref 0–0.2)
NT-PROBNP SERPL-MCNC: 39.5 PG/ML (ref 0–450)
PCO2 BLDA: 44.5 MM HG (ref 35–48)
PEEP RESPIRATORY: 5 CM[H2O]
PH BLDA: 7.48 PH UNITS (ref 7.35–7.45)
PHOSPHATE SERPL-MCNC: 3.8 MG/DL (ref 2.5–4.5)
PLATELET # BLD AUTO: 166 10*3/MM3 (ref 140–450)
PMV BLD AUTO: 9.1 FL (ref 6–12)
PO2 BLDA: 106.8 MM HG (ref 83–108)
POTASSIUM SERPL-SCNC: 3.5 MMOL/L (ref 3.5–5.2)
POTASSIUM SERPL-SCNC: 4.3 MMOL/L (ref 3.5–5.2)
PROT SERPL-MCNC: 6.6 G/DL (ref 6–8.5)
PROTHROMBIN TIME: 11.4 SECONDS (ref 9.6–11.7)
RBC # BLD AUTO: 3.21 10*6/MM3 (ref 3.77–5.28)
RESPIRATORY RATE: 34
SAO2 % BLDCOA: 98.5 % (ref 94–98)
SODIUM SERPL-SCNC: 142 MMOL/L (ref 136–145)
TROPONIN T SERPL-MCNC: 0.02 NG/ML (ref 0–0.03)
VENTILATOR MODE: ABNORMAL
VT ON VENT VENT: 420 ML
WBC # BLD AUTO: 8.2 10*3/MM3 (ref 3.4–10.8)

## 2021-08-10 PROCEDURE — 25010000002 ENOXAPARIN PER 10 MG: Performed by: INTERNAL MEDICINE

## 2021-08-10 PROCEDURE — 82962 GLUCOSE BLOOD TEST: CPT

## 2021-08-10 PROCEDURE — 84132 ASSAY OF SERUM POTASSIUM: CPT | Performed by: INTERNAL MEDICINE

## 2021-08-10 PROCEDURE — 85384 FIBRINOGEN ACTIVITY: CPT | Performed by: NURSE PRACTITIONER

## 2021-08-10 PROCEDURE — 94799 UNLISTED PULMONARY SVC/PX: CPT

## 2021-08-10 PROCEDURE — 82248 BILIRUBIN DIRECT: CPT | Performed by: NURSE PRACTITIONER

## 2021-08-10 PROCEDURE — 36600 WITHDRAWAL OF ARTERIAL BLOOD: CPT

## 2021-08-10 PROCEDURE — 80053 COMPREHEN METABOLIC PANEL: CPT | Performed by: NURSE PRACTITIONER

## 2021-08-10 PROCEDURE — 83735 ASSAY OF MAGNESIUM: CPT | Performed by: NURSE PRACTITIONER

## 2021-08-10 PROCEDURE — 84100 ASSAY OF PHOSPHORUS: CPT | Performed by: NURSE PRACTITIONER

## 2021-08-10 PROCEDURE — 85610 PROTHROMBIN TIME: CPT | Performed by: NURSE PRACTITIONER

## 2021-08-10 PROCEDURE — 83880 ASSAY OF NATRIURETIC PEPTIDE: CPT | Performed by: NURSE PRACTITIONER

## 2021-08-10 PROCEDURE — 25010000002 MAGNESIUM SULFATE 2 GM/50ML SOLUTION: Performed by: NURSE PRACTITIONER

## 2021-08-10 PROCEDURE — 63710000001 INSULIN REGULAR HUMAN PER 5 UNITS: Performed by: NURSE PRACTITIONER

## 2021-08-10 PROCEDURE — 82728 ASSAY OF FERRITIN: CPT | Performed by: INTERNAL MEDICINE

## 2021-08-10 PROCEDURE — 63710000001 INSULIN GLARGINE PER 5 UNITS: Performed by: INTERNAL MEDICINE

## 2021-08-10 PROCEDURE — 82803 BLOOD GASES ANY COMBINATION: CPT

## 2021-08-10 PROCEDURE — 85025 COMPLETE CBC W/AUTO DIFF WBC: CPT | Performed by: NURSE PRACTITIONER

## 2021-08-10 PROCEDURE — 36415 COLL VENOUS BLD VENIPUNCTURE: CPT | Performed by: NURSE PRACTITIONER

## 2021-08-10 PROCEDURE — 85730 THROMBOPLASTIN TIME PARTIAL: CPT | Performed by: NURSE PRACTITIONER

## 2021-08-10 PROCEDURE — 86140 C-REACTIVE PROTEIN: CPT | Performed by: INTERNAL MEDICINE

## 2021-08-10 PROCEDURE — 83615 LACTATE (LD) (LDH) ENZYME: CPT | Performed by: NURSE PRACTITIONER

## 2021-08-10 PROCEDURE — 82330 ASSAY OF CALCIUM: CPT | Performed by: NURSE PRACTITIONER

## 2021-08-10 PROCEDURE — 84484 ASSAY OF TROPONIN QUANT: CPT | Performed by: NURSE PRACTITIONER

## 2021-08-10 PROCEDURE — 25010000002 CEFTRIAXONE PER 250 MG: Performed by: INTERNAL MEDICINE

## 2021-08-10 PROCEDURE — 63710000001 INSULIN LISPRO (HUMAN) PER 5 UNITS: Performed by: INTERNAL MEDICINE

## 2021-08-10 PROCEDURE — 85379 FIBRIN DEGRADATION QUANT: CPT | Performed by: NURSE PRACTITIONER

## 2021-08-10 PROCEDURE — 25010000002 FUROSEMIDE PER 20 MG: Performed by: NURSE PRACTITIONER

## 2021-08-10 PROCEDURE — 25010000002 DEXAMETHASONE PER 1 MG: Performed by: NURSE PRACTITIONER

## 2021-08-10 RX ORDER — RISPERIDONE 0.5 MG/1
0.5 TABLET, ORALLY DISINTEGRATING ORAL EVERY 12 HOURS SCHEDULED
Status: DISCONTINUED | OUTPATIENT
Start: 2021-08-10 | End: 2021-08-11

## 2021-08-10 RX ADMIN — INSULIN HUMAN 6 UNITS: 100 INJECTION, SOLUTION PARENTERAL at 08:08

## 2021-08-10 RX ADMIN — METOPROLOL TARTRATE 50 MG: 50 TABLET, FILM COATED ORAL at 08:04

## 2021-08-10 RX ADMIN — IPRATROPIUM BROMIDE AND ALBUTEROL SULFATE 3 ML: 2.5; .5 SOLUTION RESPIRATORY (INHALATION) at 06:50

## 2021-08-10 RX ADMIN — ACETYLCYSTEINE 2 ML: 200 SOLUTION ORAL; RESPIRATORY (INHALATION) at 06:50

## 2021-08-10 RX ADMIN — LINEZOLID 600 MG: 600 TABLET ORAL at 08:05

## 2021-08-10 RX ADMIN — Medication 2000 UNITS: at 08:04

## 2021-08-10 RX ADMIN — Medication 10 ML: at 08:10

## 2021-08-10 RX ADMIN — CEFTRIAXONE SODIUM 2 G: 2 INJECTION, POWDER, FOR SOLUTION INTRAMUSCULAR; INTRAVENOUS at 11:44

## 2021-08-10 RX ADMIN — POTASSIUM CHLORIDE 40 MEQ: 1.5 POWDER, FOR SOLUTION ORAL at 15:27

## 2021-08-10 RX ADMIN — LINEZOLID 600 MG: 600 TABLET ORAL at 21:14

## 2021-08-10 RX ADMIN — OXYCODONE HYDROCHLORIDE 10 MG: 5 TABLET ORAL at 02:27

## 2021-08-10 RX ADMIN — GUAIFENESIN 400 MG: 100 SOLUTION ORAL at 05:25

## 2021-08-10 RX ADMIN — PANTOPRAZOLE SODIUM 40 MG: 40 INJECTION, POWDER, FOR SOLUTION INTRAVENOUS at 18:32

## 2021-08-10 RX ADMIN — Medication 2 PACKET: at 08:07

## 2021-08-10 RX ADMIN — POTASSIUM CHLORIDE 40 MEQ: 1.5 POWDER, FOR SOLUTION ORAL at 11:42

## 2021-08-10 RX ADMIN — BUDESONIDE 0.5 MG: 0.5 SUSPENSION RESPIRATORY (INHALATION) at 06:50

## 2021-08-10 RX ADMIN — METOPROLOL TARTRATE 50 MG: 50 TABLET, FILM COATED ORAL at 21:14

## 2021-08-10 RX ADMIN — INSULIN LISPRO 10 UNITS: 100 INJECTION, SOLUTION INTRAVENOUS; SUBCUTANEOUS at 05:25

## 2021-08-10 RX ADMIN — DEXAMETHASONE SODIUM PHOSPHATE 6 MG: 4 INJECTION, SOLUTION INTRAMUSCULAR; INTRAVENOUS at 08:04

## 2021-08-10 RX ADMIN — ASPIRIN 324 MG: 81 TABLET, CHEWABLE ORAL at 08:04

## 2021-08-10 RX ADMIN — OXYCODONE HYDROCHLORIDE 5 MG: 5 TABLET ORAL at 15:26

## 2021-08-10 RX ADMIN — INSULIN LISPRO 10 UNITS: 100 INJECTION, SOLUTION INTRAVENOUS; SUBCUTANEOUS at 11:46

## 2021-08-10 RX ADMIN — Medication 600 MG: at 21:14

## 2021-08-10 RX ADMIN — INSULIN GLARGINE 45 UNITS: 100 INJECTION, SOLUTION SUBCUTANEOUS at 21:15

## 2021-08-10 RX ADMIN — IPRATROPIUM BROMIDE AND ALBUTEROL SULFATE 3 ML: 2.5; .5 SOLUTION RESPIRATORY (INHALATION) at 19:40

## 2021-08-10 RX ADMIN — Medication 600 MG: at 08:04

## 2021-08-10 RX ADMIN — GUAIFENESIN 400 MG: 100 SOLUTION ORAL at 18:32

## 2021-08-10 RX ADMIN — ACETAMINOPHEN 650 MG: 325 TABLET, FILM COATED ORAL at 15:26

## 2021-08-10 RX ADMIN — GUAIFENESIN 400 MG: 100 SOLUTION ORAL at 11:43

## 2021-08-10 RX ADMIN — INSULIN LISPRO 10 UNITS: 100 INJECTION, SOLUTION INTRAVENOUS; SUBCUTANEOUS at 23:46

## 2021-08-10 RX ADMIN — IPRATROPIUM BROMIDE AND ALBUTEROL SULFATE 3 ML: 2.5; .5 SOLUTION RESPIRATORY (INHALATION) at 11:05

## 2021-08-10 RX ADMIN — INSULIN GLARGINE 45 UNITS: 100 INJECTION, SOLUTION SUBCUTANEOUS at 08:12

## 2021-08-10 RX ADMIN — ENOXAPARIN SODIUM 40 MG: 40 INJECTION SUBCUTANEOUS at 08:04

## 2021-08-10 RX ADMIN — PANTOPRAZOLE SODIUM 40 MG: 40 INJECTION, POWDER, FOR SOLUTION INTRAVENOUS at 08:04

## 2021-08-10 RX ADMIN — OXYCODONE HYDROCHLORIDE 10 MG: 5 TABLET ORAL at 21:14

## 2021-08-10 RX ADMIN — BUDESONIDE 0.5 MG: 0.5 SUSPENSION RESPIRATORY (INHALATION) at 19:47

## 2021-08-10 RX ADMIN — FUROSEMIDE 20 MG: 10 INJECTION, SOLUTION INTRAMUSCULAR; INTRAVENOUS at 11:44

## 2021-08-10 RX ADMIN — GUAIFENESIN 400 MG: 100 SOLUTION ORAL at 23:46

## 2021-08-10 RX ADMIN — DEXMEDETOMIDINE HYDROCHLORIDE 0.3 MCG/KG/HR: 100 INJECTION, SOLUTION INTRAVENOUS at 11:42

## 2021-08-10 RX ADMIN — MAGNESIUM SULFATE HEPTAHYDRATE 2 G: 2 INJECTION, SOLUTION INTRAVENOUS at 11:43

## 2021-08-10 RX ADMIN — RISPERIDONE 0.5 MG: 0.5 TABLET, ORALLY DISINTEGRATING ORAL at 21:14

## 2021-08-10 RX ADMIN — Medication 10 ML: at 21:15

## 2021-08-10 RX ADMIN — FUROSEMIDE 20 MG: 10 INJECTION, SOLUTION INTRAMUSCULAR; INTRAVENOUS at 22:10

## 2021-08-10 RX ADMIN — ENOXAPARIN SODIUM 40 MG: 40 INJECTION SUBCUTANEOUS at 21:14

## 2021-08-10 NOTE — PLAN OF CARE
Goal Outcome Evaluation:               Extubated at 947 today to 5L HF. Increased to 6L HF. Tube feed adjusted per dietitian recommendation. Pt is A&O to self and place. Pt is hallucinating. OT and PT ordered.

## 2021-08-10 NOTE — PROGRESS NOTES
"Nutrition Services    Patient Name: Leslie Harris  YOB: 1990  MRN: [id number]  Admission date: 7/22/2021    Comments:    EN Prescription: Impact Peptide 1.5 at 65 mL/hr + 20 mL/hr water flush    PPE Documentation        PPE Worn By Provider RD did not enter room for this encounter   PPE Worn By Patient  -      CLINICAL NUTRITION ASSESSMENT       Reason for Assessment Follow up protocol    7/23: MST 3/TF consult      H&P:  Per H&P \"Leslie Harris is a 30 y.o. female who presented to [facility] ED on 7/22/2021 with complaint of being recently diagnosed with COVID-19 at the [facility] 6 days prior to admission.  Patient reports that she has been having increased shortness of breath over the past couple days, and has not lost her sense of smell, but has lost her sense of taste, reports frequent, nonproductive cough, myalgias, fevers, and chills\"     Past Medical History:   Diagnosis Date   • Class 3 severe obesity without serious comorbidity with body mass index (BMI) of 50.0 to 59.9 in adult 7/22/2021   • Diabetes mellitus type 2 in obese (CMS/HCC) 7/30/2021       Past Surgical History:   Procedure Laterality Date   • BRONCHOSCOPY N/A 8/8/2021    Procedure: BRONCHOSCOPY with bilateral bronchial washing;  Surgeon: Jeff Feng MD;  Location: [facility];  Service: Pulmonary;  Laterality: N/A;  pre: respiratory failure, covid-19 pneumonia  post: respiratory failure, COVID-19 pneumonia   • CHOLECYSTECTOMY     • EXPLORATORY LAPAROTOMY W/ BOWEL RESECTION  2018    Due to complication of cholecystectomy in which patient had an accidental perforation of small bowel intraoperatively.        Current Problems:   Pneumonia due to COVID-19 virus  Acute hypoxic respiratory failure due to COVID-19  -Previously on Continuous AVAPs  -Intubated 7/23   -extubated 8/10    Hyperglycemia, with new diagnosis of T2DM  -Hgb A1c 6.9%    HTN     Nutrition/Diet History         Narrative     " "8/10: Patient discussed during AM rounds with critical care team, patient extubated during rounds. Plans to keep NGT in place as pt likely very weak from paralytics and steroids. RD attempted bedside visit however curtain drawn and multiple clinicians in room caring for patient. As K+ has remained WNL to low, will adjust TF to non K+ restricted and immune-modulating to promote wound healing    8/6: Patient discussed during AM rounds with critical care team. Plans to move pt from prone back to supine today. D/w HAMMAD Brown, TF charted at 60 mL/hr, RN reports it is at 45 mL/hr. Continue current TF regimen    8/3: Patient discussed at critical care AM rounds. Remains intubated, sedated, and paralyzed. No longer in prone position. RN did report patient with blood tinged residuals this AM, TFs were briefly held. MD okayed to resume TFs.     7/30: Patient discussed at critical care AM rounds, pt is now in the prone position. Continues on TF, no BM x8 days. Have been conservatively advancing d/t continuous paralytic, constipation, and now prone.    7/27: Patient discussed at critical care AM rounds. Okay to advance TF rate per discussion in rounds, still no BM yet while on bowel regimen.    7/23: Patient discussed at critical care AM rounds. Patient requiring continuous AVAPs and not able to come off to consume adequate nutrition. Tube feed consult received. At visit this AM, visually observed patient sleeping on AVAPs through glass window. At second visit patient receiving complex care from several healthcare workers. At time of documentation appears patient is now intubated, sedated on propofol, paralyzed, and on 1 pressor.      Functional Status Likely Independent of ADLs PTA given young age.      Food Allergies NKFA        Anthropometrics        Current Height, Weight Height: 165.1 cm (65\")  Weight: (!) 151 kg (333 lb 12.4 oz) (08/08/21 0100)        Admit Height, Weight     Flowsheet Rows      First Filed Value " "  Admission Height  165.1 cm (65\") Documented at 07/22/2021 1218   Admission Weight  (!) 145 kg (320 lb) Documented at 07/22/2021 1218             Ideal Body Weight (IBW) 56.8 kg (125 lb)    % Ideal Body Weight 266%        Usual Body Weight UTD   % Usual Body Weight UTD   Wt Change Observation Current Admission:  8/10: CBW trending down from last encounter but remains up for admission. Now negative 2.1 Liters since 7/27 8/6: CBW down from last encounter but trending up from admission. 4.8 Liters positive  8/3: CBW is down slightly from last review, up still up overall. + 8.2 L per I/Os.   7/30: CBW is from 7/28, c/w recent wt trend  7/27: CBW trending up, 6.2 Liters positive  7/23: Noted two admit weights of 320 & 356 lb. ? [employer].     PTA:  No weight hx available PTA    Weight Hx    Wt Readings from Last 30 Encounters:   08/08/21 0100 (!) 151 kg (333 lb 12.4 oz)   08/07/21 0500 (!) 155 kg (342 lb 9.5 oz)   08/06/21 0600 (!) 159 kg (351 lb 3.1 oz)   08/04/21 0526 (!) 162 kg (356 lb 0.7 oz)   08/03/21 0542 (!) 163 kg (359 lb 12.7 oz)   07/28/21 0400 (!) 167 kg (367 lb 4.6 oz)   07/27/21 1215 (!) 167 kg (368 lb)   07/27/21 0600 (!) 167 kg (368 lb 2.7 oz)   07/26/21 0548 (!) 165 kg (363 lb 12.1 oz)   07/25/21 0500 (!) 167 kg (367 lb 8.1 oz)   07/24/21 0600 (!) 165 kg (363 lb 12.1 oz)   07/22/21 1449 (!) 162 kg (356 lb 7.7 oz)   07/22/21 1218 (!) 145 kg (320 lb)        BMI kg/m2 Body mass index is 55.54 kg/m².       Labs/Medications         Pertinent Labs    Results from last 7 days   Lab Units 08/10/21  0652 08/09/21  0753 08/08/21  0505   SODIUM mmol/L 142 143 140   POTASSIUM mmol/L 3.5 3.8 4.6   CHLORIDE mmol/L 99 101 98   CO2 mmol/L 34.0* 35.0* 32.0*   BUN mg/dL 45* 54* 55*   CREATININE mg/dL 0.42* 0.61 0.67   CALCIUM mg/dL 9.9 10.1 10.2   BILIRUBIN mg/dL 0.8 0.9 0.8   ALK PHOS U/L 84 83 85   ALT (SGPT) U/L 160* 180* 133*   AST (SGOT) U/L 61* 64* 39*   GLUCOSE mg/dL 125* 138* 192*     Results from last 7 days " "  Lab Units 08/10/21  0652 08/10/21  0411 08/09/21  0753 08/09/21  0753 08/09/21  0518 08/08/21  0505   MAGNESIUM mg/dL 1.7  --   --  1.7  --  1.9   PHOSPHORUS mg/dL 3.8  --    < > 2.9  --  4.5   HEMOGLOBIN g/dL  --  9.6*  --   --    < > 9.9*   HEMATOCRIT %  --  28.7*  --   --    < > 30.5*    < > = values in this interval not displayed.     COVID19   Date Value Ref Range Status   07/22/2021 Detected (C) Not Detected - Ref. Range Final     Lab Results   Component Value Date    HGBA1C 6.9 (H) 07/23/2021         Pertinent Medications Acetylcysteine, aspirin, rocephin, Vitamin D3, Decadron, lasix, mucinex, lantus, admelog, humulin R, zyvox, protonix, precedex, magnesium sulfate, roxicodone, K CL     Physical Findings        Overall Physical   Appearance, MSA 8/10: Unable to assess this date    8/6: Patient remains very close to window/doorway, visually continues to appear well-nourished from what is able to be observed in prone position  8/3: Patient remains very close to window/doorway, visually continues to appear well-nourished.   7/30: observed via glass window/doorway, visually appears well-nourished  7/27: observed via glass window/doorway, visually appears well-nourished  7/23: Patient laying outside of blankets/sheets. Able to clearly visualize patient. Appears well nourished.    -  Edema  2+ generalized edema  2+ face edema, BL hands  1+ periorbital, tongue   Gastrointestinal Last BM documented on 8/10   Tubes NG     Oral/Mouth Cavity No known chewing or swallowing issues      Skin DTI on (L) distal nose  DTI on midline tongue  Stage II on sacral spine  Stage I on (R) cheek  Right throat abrasion     Estimated/Assessed Needs    Estimated 8/3/21   *remains current 8/10/2021   Energy Requirements    Height for Calculation  Height: 165.1 cm (65\")   Weight for Calculation 56.8 kg (IBW)    Method for Estimation  30-35 kcals/kg    EST Needs (kcal/day) 3240-2731 kcals     For Comparison 24-29 kcals/kg of 83 kg " (AdjIBW) per COVID LEEP study= 6007-1501 kcals        Protein Requirements    Weight for Calculation 56.8 kg (125 lb)    EST Protein Needs (g/kg) 2.5 g/kg    EST Daily Needs (g/day) 142 g /day       Fluid Requirements     Estimated Needs (mL/day) 3327-3876 mL/day  (1 mL/kcal-adjust based on hydration status)        Fluid Deficit    Current Na Level (mEq/L) -   Desired Na Level (mEq/L) -   Estimated Fluid Deficit (L)  -     Current Nutrition Orders & Evaluation of Received Nutrient/Fluid Intake       Oral Nutrition     Current PO Diet NPO Diet   Supplement None    PO Evaluation     % PO Intake 7/27 to present (8/10): 0% - NPO, on TF    7/23: 0% x 1 meal documented    -  Enteral Nutrition    Enteral Route NGT   TF Modular 2 packets beneprotein BID   TF Delivery Method Continuous   Current Ordered TF  Novasource Renal at 45 mL/hr   Current Ordered H2O flush  20 mL/hr water flush    TF Observation  TF previously documented per above prior to on hold for extubation   EN Evaluation     TF Changes Continue current EN regimen    TF Residual WNL    TF Tolerance No reported s/sx of intolerance    Average EN Delivered -       Parenteral Nutrition     TPN Route -   Current Ordered TPN VOL  -   Dextrose (g/kcals)  -   Amino Acid (g/kcals) -   Lipids (mL/Concentration/FREQ)  -   MVI Frequency  -   Trace Element Frequency  -   TPN Observation  -    TPN Evaluation    Total # Days on TPN -   -  Clinical Course    Nutrition Course Details PO Diet:    7/22-7/23 Regular  7/23  to present (8/9) NPO    Nutrition Support:  7/23: TF to be started today   Conservatively advanced d/t paralytic/instability  7/30 TF at goal rate and continues at present (8/10)      Nutritional Risk Screening        NRS-2002 Score   -       Nutrition Diagnosis         Nutrition Dx Problem 1 Inadequate oral intake r/t acute respiratory failure due to COVID-19 requiring mechanical ventilation AEB NPO diet order.       Nutrition Dx Problem 2 -       Intervention  Goal         Intervention Goal(s) Patient will tolerate EN      Nutrition Intervention        RD/Tech Action Changing TF to meet estimated needs w/o significant K+ restriction and to promote wound healing     Nutrition Prescription          Diet Prescription NPO    Supplement Prescription -   -  Enteral Prescription Impact Peptide 1.5 at mL/hr x22 hrs (assumes interruptions for ADLs) provides 2145 kcal (108% of upper end of estimated needs), 134 g of protein (94%), and 1101 mL free H2O + 20 mL/hr (x22 hrs = 440 mL/day) = 1541 mL free H2O, +Rx flushes, IVF. Free water flushes adjusted per clinical picture.          TPN Prescription -     Monitor/Evaluation        Monitor EN and tolerance, I/Os, Labs, BM, weight, skin and medication changes      Electronically signed by:  Leslie Hugo RD  08/10/21 09:46 EDT

## 2021-08-10 NOTE — PROGRESS NOTES
PULMONARY/CRITICAL CARE PROGRESS NOTE       NAME:     Leslie Harris   AGE:     30 y.o.   SEX:  female   : 1990       MRN:       [id number]          RM: [facility] ICU      ASSESSMENT & PLAN     F/U: Acute respiratory failure secondary to COVID-19 pna    Principal Problem:    Pneumonia due to COVID-19 virus  Active Problems:    Acute respiratory failure due to COVID-19 (CMS/[facility])    Class 3 severe obesity without serious comorbidity with body mass index (BMI) of 50.0 to 59.9 in adult    Hyperglycemia, likely stress induced    Diabetes mellitus type 2 in obese (CMS/[facility])     Multidisciplinary Rounds:  -Likely extubate this AM  -Concern for muscle weakness due to paralytics and steroids, will likely need intermittent BiPAP  -Electrolytes replacement per protocol.  -Watch for signs of delirium    PLAN:  Pneumonia due to COVID-19 virus  Acute respiratory failure due to COVID-19  -Failed NIPPV and required intubation   -Retest RVP/COVID-19 to eval for other respiratory viruses. +COVID-19 only  -Monitor disease progression labs as ordered  -Vitamin D, Acetylcysteine, Zinc  -CT PE protocol could not be obtained due to severe hypoxia  -Empiric A/C with heparin drip  -Remdesivir x5 days, completed on   -Decardron, decreased dose   -Does not qualify for Actemra  -Symbicort, Albuterol HFA.  -Sputum culture-Candida albicans, Staph aureus  -discontinued Ceftriaxone, changed to Zosyn  due to fever, completed   -Add Zyvox and Diflucan added 2021 due to positive sputum culture  -Continued diuresis with IV Lasix  -ID consulted, managing antibiotics: Current antibiotics of Rocephin and Zyvox    Diabetes mellitus, type 2, new diagnosis  -strict glycemic control recommended  -Accuchecks every 6 hours with sliding scale insulin  -Added Lantus  -Hemoglobin A1c 6.9.    Hypertension  -added beta blocker with improved control  -Beta-blocker maintaining adequate blood pressure control     Class 3 severe  obesity without serious comorbidity with body mass index (BMI) of 50.0 to 59.9 in adult  -BMI 59.32  -Complicating aspects of care  -Counseled on lifestyle modifications to improve overall health prior to intubation    AST/ALT remain elevated  -Significance unclear  -Continue to monitor and trend     Code Status: CPR, full interventions  VTE Prophylaxis:  Lovenox  PUD Prophylaxis: Pepcid  Nutrition: dietitian following for tube feed recommendations.Tolerating TF at goal  +Bowel regimen    Right IJ central line 7/23, discontinued  Left radial A-line 7/23, discontinued 8/1  Right brachial A-line placed 8/1, discontinued    The patient's mother reported that the patient had latent autoimmune diabetes mellitus diagnosed recently and requested C-peptide and MAX.  It was explained to her that these tests would not accurately reflect the patient's condition as her current high acuity and metabolic state would render these numbers unpredictable and not reliable.  Recommend that this is followed up after the patient's current acuity has improved      [place] & SUBJECTIVE     Leslie Harris is a 30 y.o. female  has a past medical history of Class 3 severe obesity without serious comorbidity with body mass index (BMI) of 50.0 to 59.9 in adult (7/22/2021).   Patient presented to [facility] ED on 7/22/2021 with complaint of being recently diagnosed with COVID-19 at the [facility] department 6 days prior to admission.  Patient reports that she has been having increased shortness of breath over the past couple days, and has not lost her sense of smell, but has lost her sense of taste, reports frequent, nonproductive cough, myalgias, fevers, and chills.  She denies any abdominal symptoms including nausea, vomiting, constipation, diarrhea.  Patient reports that she actually called EMS as her oxygen saturation had been lower, reading in ED upper 70s, but she did not feel that it was right, as she stated that her  "fingers were cold.  EMS came and reported to her that her oxygen was actually in the 80s at that time, on the lower side of the 80s, but for some reason patient was not transported to the ED.  Patient then presented today, and she additionally reported some generalized sore throat, with trouble swallowing some things, due to just the general pain associated with it.  She denies any actual problems with physically swallowing this food.  She reports a decreased appetite and decreased oral intake.  Patient states that there was a coworker at her facility, in which they questioned if she had been tested, as she generally did not look like she felt well, but that individual stated that they had been tested and were fine, but at her place of work, which is the [employer], there has been a small outbreak of COVID-19 infected individuals, in which a coworker of hers, is additionally hospitalized, due to this exposure as well.  Patient was not vaccinated for COVID-19.  Patient reported that she is a non-smoker, denies routine alcohol use or recreational drug use.  She reports that her only other previous medical history involved a cholecystectomy that resulted in a perforation of her small bowel, in which she was discharged home and became septic, requiring readmission and underwent an ex lap with lysis of adhesions.  She stated that this occurred in 2018.     In the ED, labs were obtained with abnormalities as follows: , calcium 8.5, ALT 51, AST 73, ferritin 545.6, CRP 6.33, procalcitonin 0.30, lactate 1.3, D-dimer 1.48.  ABG was obtained and as follows: pH 7.427, PCO2 39.5, PO2 44.9, HCO3 26.0, O2 saturation 81.8%.  This ABG was obtained while patient was on a nonrebreather.  Chest x-ray was obtained and with the following impression: \"Left greater than right interstitial and alveolar disease.  Given the patient's Covid positive status, the findings may reflect changes of pneumonia.  Patient required oxygen " titration up to maximal Precision flow at 40 L/min with FiO2 of 100%.  Patient was considered to be very high risk for further decompensation, and it was recommended for patient to be admitted to the ICU for close observation.     Pulmonary/Intensivist service was contacted for admission to ICU and further evaluation and treatment.    7/23: Patient is drowsy, but easy to arouse, proned overnight, continues on AVAPS in combination with Precision flow 40 L/min and FiO2 100%.  She denies chest pain, but does report shortness of breath and anxiety, cough.  7/24: Intubated, sedated, chemically paralyzed  7/25: intubated, sedated, chemically paralyzed. Fever overnight  7/26: Intubated, sedated, chemically paralyzed.  Proned.  Still having fevers  7/27: Intubated, sedated, chemically paralyzed.  Tolerating supine positioning.  Still febrile  7/28: Intubated, sedated, chemically paralyzed.  Supine.  7/29: Intubated, sedated, chemically paralyzed.  Still remains supine  7/30: Intubated, sedated, chemically paralyzed, proned.  7/31: Intubated, sedated.  Remains chemically paralyzed and proned.  Nebulized Flolan  8/1: Intubated, sedated, paralyzed.  Tolerating supine positioning.  Still on nebulized Flolan  8/2: Intubated, sedated, paralyzed.  Tolerating supine positioning  8/10: Intubated, following commands, lightly sedated, tolerating supine    REVIEW OF SYSTEMS:  Review of systems could not be obtained due to   patient sedation status. patient intubated.      MEDICATIONS     SCHEDULED MEDICATIONS:   acetylcysteine, 2 mL, Nebulization, BID - RT  Acetylcysteine, 600 mg, Oral, BID  aspirin, 324 mg, Per G Tube, Daily  Beneprotein, 2 packet, Nasogastric, BID  budesonide, 0.5 mg, Nebulization, BID - RT  cefTRIAXone, 2 g, Intravenous, Q24H  cholecalciferol, 2,000 Units, Nasogastric, Daily  dexamethasone, 6 mg, Intravenous, Daily  enoxaparin, 40 mg, Subcutaneous, Q12H  furosemide, 20 mg, Intravenous, Q12H  guaiFENesin, 400 mg,  "Nasogastric, Q6H  insulin glargine, 45 Units, Subcutaneous, Q12H  insulin lispro, 0-24 Units, Subcutaneous, Q6H  insulin lispro, 10 Units, Subcutaneous, Q6H  insulin regular, 21 Units, Subcutaneous, Daily  ipratropium-albuterol, 3 mL, Nebulization, 4x Daily - RT  linezolid, 600 mg, Oral, Q12H  metoprolol tartrate, 50 mg, Oral, Q12H  pantoprazole, 40 mg, Intravenous, BID AC  sodium chloride, 10 mL, Intravenous, Q12H        CONTINUOUS INFUSIONS:   dexmedetomidine, 0.2-1.5 mcg/kg/hr, Last Rate: 0.5 mcg/kg/hr (21 235)  fentanyl 10 mcg/mL,  mcg/hr, Last Rate: Stopped (08/10/21 012)  Pharmacy to Dose enoxaparin (LOVENOX),         PRN MEDS:  •  acetaminophen  •  acetaminophen **OR** acetaminophen  •  aluminum-magnesium hydroxide-simethicone  •  artificial tears  •  artificial tears  •  benzonatate  •  dextrose  •  dextrose  •  [COMPLETED] dilTIAZem **FOLLOWED BY** [] dilTIAZem **FOLLOWED BY** dilTIAZem  •  glucagon (human recombinant)  •  glycopyrrolate PF  •  hydrALAZINE  •  insulin lispro **AND** insulin lispro  •  lidocaine  •  lidocaine PF 1%  •  magnesium sulfate **OR** magnesium sulfate in D5W 1g/100mL (PREMIX)  •  metoprolol tartrate  •  nitroglycerin  •  ondansetron **OR** ondansetron  •  oxyCODONE  •  Pharmacy to Dose enoxaparin (LOVENOX)  •  potassium chloride **OR** potassium chloride **OR** potassium chloride  •  potassium phosphate infusion greater than 15 mMoles **OR** potassium phosphate infusion greater than 15 mMoles **OR** potassium phosphate **OR** sodium phosphate IVPB **OR** sodium phosphate IVPB **OR** sodium phosphate IVPB  •  [COMPLETED] Insert peripheral IV **AND** sodium chloride  •  sodium chloride    OBJECTIVE     VITAL SIGNS:  /56   Pulse 73   Temp 99.7 °F (37.6 °C) (Oral)   Resp (!) 37   Ht 165.1 cm (65\")   Wt (!) 151 kg (333 lb 12.4 oz)   LMP 2021   SpO2 99%   BMI 55.54 kg/m²     I/O FROM PREVIOUS 3 SHIFTS:  I/O last 3 completed shifts:  In: 4251 " [I.V.:1991; Other:816; NG/GT:1444]  Out: 3521 [Urine:3520; Stool:1]      I/O PREVIOUS 24 HOURS:   Intake/Output                       08/08/21 0701 - 08/09/21 0700 08/09/21 0701 - 08/10/21 0700     6975-0657 8405-7811 Total 6717-7649 0273-7165 Total                 Intake    I.V.  571  815 1386  727  449 1176    Other  161  283 444  332  201 533    Flush/ Irrigation Intake (mL) (NG/OG Tube Nasogastric 16 Fr Left nostril) 161 283 444 332 201 533    NG/GT  391  557 948  414  473 887    Total Intake 1123 1655 2778 1473 1123 2596       Output    Urine  2000  1500 3500  1800  220 2020    Stool  --  -- --  1  -- 1    Total Output 2000 1500 3500 9745 311 9860           NET BALANCE SINCE ADMISSION:  Net IO Since Admission: 4,098.5 mL [08/10/21 0931]     BOWEL MOVEMENTS:  Stool Output  Stool (mL): 1 mL (08/09/21 1430)  Stool Unmeasured Occurrence: 1 (08/10/21 0550)  Bowel Incontinence: Yes (08/10/21 0550)  Stool Amount: moderate (08/10/21 0550)       PHYSICAL EXAM:  Constitutional:  Well developed, well nourished, intubated, lightly sedated  Eyes:   PERRL, subconjunctival edema, sclera anicteric.  EOMI  HENT:  Atraumatic, external ears normal, nose normal with left NG tube, oral ET tube. Neck-trachea midline  Respiratory:  Diminished bibasilar breath sounds, without rhonchi, occasional bibasilar crackle noted, respirations unlabored without accessory muscle use  Cardiovascular: Normal rate normal rhythm, no murmurs, no gallops, no rubs   GI:  Morbid body habitus, soft, nondistended, normal bowel sounds  :   Deferred  Musculoskeletal:   Generalized edema 2+ and nonpitting, no clubbing or cyanosis  Integument:  Well hydrated, no rash   Neurologic:   Intubated, lightly sedated, no focal deficits noted  Psychiatric:   Calm, cooperative      RESULTS     LABS:  Lab Results (last 24 hours)     Procedure Component Value Units Date/Time    Blood Culture - Blood, Arm, Right [[id number]] Collected: 08/09/21 0758    Specimen: Blood  from Arm, Right Updated: 08/10/21 0815     Blood Culture No growth at 24 hours    Blood Culture - Blood, Arm, Left [[id number]] Collected: 08/09/21 0753    Specimen: Blood from Arm, Left Updated: 08/10/21 0815     Blood Culture No growth at 24 hours    Ferritin [[id number]]  (Abnormal) Collected: 08/10/21 0652    Specimen: Blood Updated: 08/10/21 0804     Ferritin 210.80 ng/mL     Narrative:      Results may be falsely decreased if patient taking Biotin.      proBNP [[id number]]  (Normal) Collected: 08/10/21 0652    Specimen: Blood Updated: 08/10/21 0804     proBNP 39.5 pg/mL     Narrative:      Among patients with dyspnea, NT-proBNP is highly sensitive for the detection of acute congestive heart failure. In addition NT-proBNP of <300 pg/ml effectively rules out acute congestive heart failure with 99% negative predictive value.    Results may be falsely decreased if patient taking Biotin.      Troponin [[id number]]  (Normal) Collected: 08/10/21 0652    Specimen: Blood Updated: 08/10/21 0804     Troponin T 0.019 ng/mL     Narrative:      Troponin T Reference Range:  <= 0.03 ng/mL-   Negative for AMI  >0.03 ng/mL-     Abnormal for myocardial necrosis.  Clinicians would have to utilize clinical acumen, EKG, Troponin and serial changes to determine if it is an Acute Myocardial Infarction or myocardial injury due to an underlying chronic condition.       Results may be falsely decreased if patient taking Biotin.      Phosphorus [[id number]]  (Normal) Collected: 08/10/21 0652    Specimen: Blood Updated: 08/10/21 0800     Phosphorus 3.8 mg/dL     Comprehensive Metabolic Panel [[id number]]  (Abnormal) Collected: 08/10/21 0652    Specimen: Blood Updated: 08/10/21 0759     Glucose 125 mg/dL      BUN 45 mg/dL      Creatinine 0.42 mg/dL      Sodium 142 mmol/L      Potassium 3.5 mmol/L      Chloride 99 mmol/L      CO2 34.0 mmol/L      Calcium 9.9 mg/dL      Total Protein 6.6 g/dL      Albumin 3.70 g/dL      ALT (SGPT) 160 U/L      AST  (SGOT) 61 U/L      Alkaline Phosphatase 84 U/L      Total Bilirubin 0.8 mg/dL      eGFR Non African Amer >150 mL/min/1.73      Globulin 2.9 gm/dL      A/G Ratio 1.3 g/dL      BUN/Creatinine Ratio 107.1     Anion Gap 9.0 mmol/L     Narrative:      GFR Normal >60  Chronic Kidney Disease <60  Kidney Failure <15      Bilirubin, Direct [[id number]]  (Normal) Collected: 08/10/21 0652    Specimen: Blood Updated: 08/10/21 0759     Bilirubin, Direct 0.2 mg/dL     Magnesium [[id number]]  (Normal) Collected: 08/10/21 0652    Specimen: Blood Updated: 08/10/21 0759     Magnesium 1.7 mg/dL     Calcium, Ionized [[id number]]  (Abnormal) Collected: 08/10/21 0652    Specimen: Blood Updated: 08/10/21 0744     Ionized Calcium 1.31 mmol/L     Lactate Dehydrogenase [[id number]] Collected: 08/10/21 0652    Specimen: Blood Updated: 08/10/21 0731    C-reactive Protein [[id number]] Collected: 08/10/21 0652    Specimen: Blood Updated: 08/10/21 0731    aPTT [[id number]]  (Abnormal) Collected: 08/10/21 0411    Specimen: Blood Updated: 08/10/21 0537     PTT <20.0 seconds     Protime-INR [[id number]]  (Normal) Collected: 08/10/21 0411    Specimen: Blood Updated: 08/10/21 0537     Protime 11.4 Seconds      INR 1.03    Fibrinogen [[id number]]  (Abnormal) Collected: 08/10/21 0411    Specimen: Blood Updated: 08/10/21 0537     Fibrinogen 532 mg/dL     D-dimer, Quantitative [[id number]]  (Abnormal) Collected: 08/10/21 0411    Specimen: Blood Updated: 08/10/21 0537     D-Dimer, Quantitative 1.47 mg/L (FEU)     Narrative:      Reference Range  --------------------------------------------------------------------     < 0.50   Negative Predictive Value  0.50-0.59   Indeterminate    >= 0.60   Probable VTE             A very low percentage of patients with DVT may yield D-Dimer results   below the cut-off of 0.50 mg/L FEU.  This is known to be more   prevalent in patients with distal DVT.             Results of this test should always be interpreted in conjunction  with   the patient's medical history, clinical presentation and other   findings.  Clinical diagnosis should not be based on the result of   INNOVANCE D-Dimer alone.    POC Glucose Once [[id number]]  (Abnormal) Collected: 08/10/21 0522    Specimen: Blood Updated: 08/10/21 0522     Glucose 145 mg/dL      Comment: Serial Number: [id number]:  [id number]       CBC & Differential [[id number]]  (Abnormal) Collected: 08/10/21 0411    Specimen: Blood Updated: 08/10/21 0435    Narrative:      The following orders were created for panel order CBC & Differential.  Procedure                               Abnormality         Status                     ---------                               -----------         ------                     CBC Auto Differential[[id number]]        Abnormal            Final result                 Please view results for these tests on the individual orders.    CBC Auto Differential [[id number]]  (Abnormal) Collected: 08/10/21 0411    Specimen: Blood Updated: 08/10/21 0435     WBC 8.20 10*3/mm3      RBC 3.21 10*6/mm3      Hemoglobin 9.6 g/dL      Hematocrit 28.7 %      MCV 89.4 fL      MCH 30.0 pg      MCHC 33.5 g/dL      RDW 16.9 %      RDW-SD 52.5 fl      MPV 9.1 fL      Platelets 166 10*3/mm3      Neutrophil % 69.2 %      Lymphocyte % 22.7 %      Monocyte % 6.0 %      Eosinophil % 1.2 %      Basophil % 0.9 %      Neutrophils, Absolute 5.70 10*3/mm3      Lymphocytes, Absolute 1.90 10*3/mm3      Monocytes, Absolute 0.50 10*3/mm3      Eosinophils, Absolute 0.10 10*3/mm3      Basophils, Absolute 0.10 10*3/mm3      nRBC 0.1 /100 WBC     POC Glucose Once [[id number]]  (Abnormal) Collected: 08/10/21 0326    Specimen: Blood Updated: 08/10/21 0330     Glucose 181 mg/dL      Comment: Serial Number: [id number]:  [id number]       Blood Gas, Arterial - [[id number]]  (Abnormal) Collected: 08/10/21 0326    Specimen: Arterial Blood Updated: 08/10/21 0330     Site Right Radial     Ovi's Test Positive     pH,  Arterial 7.480 pH units      pCO2, Arterial 44.5 mm Hg      pO2, Arterial 106.8 mm Hg      HCO3, Arterial 33.1 mmol/L      Base Excess, Arterial 8.8 mmol/L      Comment: Serial Number: [id number]:  [id number]        O2 Saturation, Arterial 98.5 %      CO2 Content 34.5 mmol/L      Barometric Pressure for Blood Gas --     Comment: N/A        Modality Adult Vent     FIO2 50 %      Ventilator Mode ;AC     Set Tidal Volume 420     PEEP 5     Hemodilution No     Respiratory Rate 34    POC Glucose Once [[id number]]  (Abnormal) Collected: 08/09/21 2353    Specimen: Blood Updated: 08/09/21 2354     Glucose 131 mg/dL      Comment: Serial Number: [id number]:  [id number]       POC Glucose Once [[id number]]  (Abnormal) Collected: 08/09/21 1753    Specimen: Blood Updated: 08/09/21 1755     Glucose 109 mg/dL      Comment: Serial Number: [id number]:  [id number]              RADIOLOGY:  Duplex Venous Upper Extremity - Bilateral CAR    Result Date: 8/9/2021  · Normal bilateral upper extremity venous duplex scan. The forearms were not scanned.      Duplex Venous Lower Extremity - Bilateral CAR    Result Date: 8/9/2021  · Normal bilateral lower extremity venous duplex scan without evidence of deep vein thrombosis in the visualized veins. However, this was a limited study and did not include full interrogation of all distal veins.        ECHOCARDIOGRAM:  Results for orders placed during the hospital encounter of 07/22/21    Adult Transthoracic Echo Limited W/ Cont if Necessary Per Protocol    Interpretation Summary  · Left ventricular systolic function is normal.  · Left ventricular ejection fraction is 55 to 60%  None previous for comparison.     I reviewed the patient's new clinical results.    This note has been scribed by me for pulmonary attending physician.    Electronically signed by NATY Arroyo, 08/10/21 at 09:31 EDT.

## 2021-08-10 NOTE — CASE MANAGEMENT/SOCIAL WORK
Continued Stay Note  EMANUEL Wasserman     Patient Name: Leslie Harris  MRN: [id number]  Today's Date: 8/10/2021    Admit Date: 7/22/2021    Discharge Plan     Row Name 08/10/21 1044       Plan    Plan  D/C Plan : Pt is COVID-19 + and is out of precautions and has been extubated and is on 7l of o2 . will get PT /OT to eval in the am        Discharge Codes    No documentation.       Expected Discharge Date and Time     Expected Discharge Date Expected Discharge Time    Jul 30, 2021             Lissa Holden RN

## 2021-08-10 NOTE — PROGRESS NOTES
Infectious Diseases Progress Note      LOS: 19 days   Patient Care Team:  Provider, No Known as PCP - General    Chief Complaint: Intubated and sedated    Subjective       Patient had no high-grade fever during the last 24 hours.  The patient was extubated earlier today currently on 7 L of oxygen.  The patient is hemodynamically stable      Review of Systems:   Review of Systems   Respiratory: Positive for shortness of breath.         Objective     Vital Signs  Temp:  [99 °F (37.2 °C)-100.3 °F (37.9 °C)] 99.6 °F (37.6 °C)  Heart Rate:  [57-81] 78  Resp:  [28-37] 28  BP: ()/(48-83) 144/80  FiO2 (%):  [40 %-50 %] 40 %    Physical Exam:  Physical Exam  Vitals and nursing note reviewed.   Constitutional:       General: She is not in acute distress.     Appearance: Normal appearance. She is well-developed. She is obese. She is ill-appearing. She is not diaphoretic.   HENT:      Head: Normocephalic and atraumatic.   Eyes:      Pupils: Pupils are equal, round, and reactive to light.   Cardiovascular:      Rate and Rhythm: Normal rate and regular rhythm.      Heart sounds: Normal heart sounds, S1 normal and S2 normal. No murmur heard.     Pulmonary:      Effort: Pulmonary effort is normal. No respiratory distress.      Breath sounds: No stridor. Rales present. No wheezing.   Chest:      Chest wall: No tenderness.   Abdominal:      General: Bowel sounds are normal. There is no distension.      Palpations: Abdomen is soft. There is no mass.      Tenderness: There is no abdominal tenderness. There is no guarding or rebound.      Comments: NG tube   Genitourinary:     Comments: Ma catheter  Musculoskeletal:         General: No swelling, tenderness or deformity. Normal range of motion.      Cervical back: Normal range of motion and neck supple.   Skin:     General: Skin is warm and dry.      Coloration: Skin is not pale.      Findings: No bruising, erythema or rash.   Neurological:      Mental Status: She is alert and  oriented to person, place, and time.      Cranial Nerves: No cranial nerve deficit.          Results Review:    I have reviewed all clinical data, test, lab, and imaging results.     Radiology  Duplex Venous Upper Extremity - Bilateral CAR    Result Date: 8/9/2021  · Normal bilateral upper extremity venous duplex scan. The forearms were not scanned.      Duplex Venous Lower Extremity - Bilateral CAR    Result Date: 8/9/2021  · Normal bilateral lower extremity venous duplex scan without evidence of deep vein thrombosis in the visualized veins. However, this was a limited study and did not include full interrogation of all distal veins.        Cardiology    Laboratory    Results from last 7 days   Lab Units 08/10/21  0411 08/09/21  0518 08/08/21  0505 08/07/21  0507 08/06/21  0441 08/05/21  0355 08/04/21  0514   WBC 10*3/mm3 8.20 10.10 16.10* 20.20* 16.90* 19.60* 16.70*   HEMOGLOBIN g/dL 9.6* 8.6* 9.9* 9.9* 9.2* 9.6* 9.2*   HEMATOCRIT % 28.7* 26.2* 30.5* 29.5* 27.6* 28.7* 28.2*   PLATELETS 10*3/mm3 166 172 234 231 255 300 274     Results from last 7 days   Lab Units 08/10/21  0652 08/09/21  0753 08/08/21  0505 08/07/21  0507 08/06/21  0441 08/05/21  0355 08/04/21  0514   SODIUM mmol/L 142 143 140 137 132* 133* 133*   POTASSIUM mmol/L 3.5 3.8 4.6 4.7 4.8 5.2 5.3*   CHLORIDE mmol/L 99 101 98 95* 90* 89* 93*   CO2 mmol/L 34.0* 35.0* 32.0* 32.0* 35.0* 35.0* 33.0*   BUN mg/dL 45* 54* 55* 56* 28* 27* 28*   CREATININE mg/dL 0.42* 0.61 0.67 0.85 0.45* 0.42* 0.45*   GLUCOSE mg/dL 125* 138* 192* 209* 203* 209* 237*   ALBUMIN g/dL 3.70 3.80 4.00 4.10 4.60 4.50 3.90   BILIRUBIN mg/dL 0.8 0.9 0.8 0.9 1.2 1.0 0.7   ALK PHOS U/L 84 83 85 82 73 83 73   AST (SGOT) U/L 61* 64* 39* 47* 72* 82* 78*   ALT (SGPT) U/L 160* 180* 133* 158* 197* 173* 139*   CALCIUM mg/dL 9.9 10.1 10.2 10.1 9.8 9.9 9.3     Results from last 7 days   Lab Units 08/04/21  0514   CK TOTAL U/L 84             Microbiology   Microbiology Results (last 10 days)      Procedure Component Value - Date/Time    Blood Culture - Blood, Arm, Right [[id number]] Collected: 08/09/21 0758    Lab Status: Preliminary result Specimen: Blood from Arm, Right Updated: 08/10/21 0815     Blood Culture No growth at 24 hours    Blood Culture - Blood, Arm, Left [[id number]] Collected: 08/09/21 0753    Lab Status: Preliminary result Specimen: Blood from Arm, Left Updated: 08/10/21 0815     Blood Culture No growth at 24 hours    AFB Culture - Wash, Bronchus [[id number]] Collected: 08/08/21 0944    Lab Status: Preliminary result Specimen: Wash from Bronchus Updated: 08/09/21 1016     AFB Stain No acid fast bacilli seen    Respiratory Culture - Wash, Bronchus [[id number]] Collected: 08/08/21 0944    Lab Status: Preliminary result Specimen: Wash from Bronchus Updated: 08/09/21 0936     Respiratory Culture Scant growth (1+) Normal Respiratory Sondra: NO S.aureus/MRSA or Pseudomonas aeruginosa     Gram Stain Many (4+) WBCs per low power field      Rare (1+) Epithelial cells per low power field      Few (2+) Mixed bacterial morphotypes seen on Gram Stain    Respiratory Panel, PCR (WITHOUT COVID) - Wash, Bronchus [[id number]]  (Normal) Collected: 08/08/21 0944    Lab Status: Final result Specimen: Wash from Bronchus Updated: 08/08/21 1127     ADENOVIRUS, PCR Not Detected     Coronavirus 229E Not Detected     Coronavirus HKU1 Not Detected     Coronavirus NL63 Not Detected     Coronavirus OC43 Not Detected     Human Metapneumovirus Not Detected     Human Rhinovirus/Enterovirus Not Detected     Influenza B PCR Not Detected     Parainfluenza Virus 1 Not Detected     Parainfluenza Virus 2 Not Detected     Parainfluenza Virus 3 Not Detected     Parainfluenza Virus 4 Not Detected     Bordetella pertussis pcr Not Detected     Chlamydophila pneumoniae PCR Not Detected     Mycoplasma pneumo by PCR Not Detected     Influenza A PCR Not Detected     RSV, PCR Not Detected     Bordetella parapertussis PCR Not Detected     Narrative:      The coronavirus on the RVP is NOT COVID-19 and is NOT indicative of infection with COVID-19.    In the setting of a positive respiratory panel with a viral infection PLUS a negative procalcitonin without other underlying concern for bacterial infection, consider observing off antibiotics or discontinuation of antibiotics and continue supportive care. If the respiratory panel is positive for atypical bacterial infection (Bordetella pertussis, Chlamydophila pneumoniae, or Mycoplasma pneumoniae), consider antibiotic de-escalation to target atypical bacterial infection.    Urine Culture - Urine, Urine, Catheter [[id number]]  (Abnormal)  (Susceptibility) Collected: 08/07/21 0351    Lab Status: Final result Specimen: Urine, Catheter Updated: 08/09/21 1037     Urine Culture >100,000 CFU/mL Escherichia coli    Susceptibility      Escherichia coli      FITZ      Ampicillin Resistant      Ampicillin + Sulbactam Intermediate      Cefazolin Susceptible      Cefepime Susceptible      Ceftazidime Susceptible      Ceftriaxone Susceptible      Gentamicin Susceptible      Levofloxacin Susceptible      Nitrofurantoin Susceptible      Piperacillin + Tazobactam Susceptible      Tetracycline Susceptible      Trimethoprim + Sulfamethoxazole Susceptible               Linear View                   Respiratory Culture - Sputum, Bronchus [[id number]]  (Abnormal)  (Susceptibility) Collected: 08/04/21 1150    Lab Status: Final result Specimen: Sputum from Bronchus Updated: 08/06/21 0823     Respiratory Culture Scant growth (1+) Klebsiella pneumoniae ssp pneumoniae      Rare Normal Respiratory Sondra: NO S.aureus/MRSA or Pseudomonas aeruginosa     Gram Stain Few (2+) WBCs per low power field      No organisms seen    Susceptibility      Klebsiella pneumoniae ssp pneumoniae      FITZ      Ampicillin Resistant      Ampicillin + Sulbactam Resistant      Cefepime Susceptible      Ceftazidime Susceptible      Ceftriaxone Susceptible       Gentamicin Susceptible      Levofloxacin Intermediate      Piperacillin + Tazobactam Susceptible      Tetracycline Resistant      Trimethoprim + Sulfamethoxazole Susceptible               Linear View               Susceptibility Comments     Klebsiella pneumoniae ssp pneumoniae    Cefazolin sensitivity will not be reported for Enterobacteriaceae in non-urine isolates. If cefazolin is preferred, please call the microbiology lab to request an E-test.  With the exception of urinary-sourced infections, aminoglycosides should not be used as monotherapy.             Clostridium Difficile EIA - Stool, Per Rectum [[id number]]  (Normal) Collected: 08/03/21 1600    Lab Status: Final result Specimen: Stool from Per Rectum Updated: 08/04/21 0927     C Diff GDH / Toxin Negative    Urine Culture - Urine, Urine, Catheter [[id number]]  (Normal) Collected: 08/03/21 1036    Lab Status: Final result Specimen: Urine, Catheter Updated: 08/04/21 1311     Urine Culture No growth          Medication Review:       Schedule Meds  Acetylcysteine, 600 mg, Oral, BID  aspirin, 324 mg, Per G Tube, Daily  Beneprotein, 2 packet, Nasogastric, BID  budesonide, 0.5 mg, Nebulization, BID - RT  cefTRIAXone, 2 g, Intravenous, Q24H  cholecalciferol, 2,000 Units, Nasogastric, Daily  dexamethasone, 6 mg, Intravenous, Daily  enoxaparin, 40 mg, Subcutaneous, Q12H  furosemide, 20 mg, Intravenous, Q12H  guaiFENesin, 400 mg, Nasogastric, Q6H  insulin glargine, 45 Units, Subcutaneous, Q12H  insulin lispro, 0-24 Units, Subcutaneous, Q6H  insulin lispro, 10 Units, Subcutaneous, Q6H  insulin regular, 21 Units, Subcutaneous, Daily  ipratropium-albuterol, 3 mL, Nebulization, 4x Daily - RT  linezolid, 600 mg, Oral, Q12H  metoprolol tartrate, 50 mg, Oral, Q12H  pantoprazole, 40 mg, Intravenous, BID AC  sodium chloride, 10 mL, Intravenous, Q12H        Infusion Meds  dexmedetomidine, 0.2-1.5 mcg/kg/hr, Last Rate: 0.5 mcg/kg/hr (08/09/21 9701)  fentanyl 10 mcg/mL,   mcg/hr, Last Rate: Stopped (08/10/21 0121)  Pharmacy to Dose enoxaparin (LOVENOX),         PRN Meds  •  acetaminophen  •  acetaminophen **OR** acetaminophen  •  aluminum-magnesium hydroxide-simethicone  •  artificial tears  •  artificial tears  •  benzonatate  •  dextrose  •  dextrose  •  [COMPLETED] dilTIAZem **FOLLOWED BY** [] dilTIAZem **FOLLOWED BY** dilTIAZem  •  glucagon (human recombinant)  •  glycopyrrolate PF  •  hydrALAZINE  •  insulin lispro **AND** insulin lispro  •  lidocaine  •  lidocaine PF 1%  •  magnesium sulfate **OR** magnesium sulfate in D5W 1g/100mL (PREMIX)  •  metoprolol tartrate  •  nitroglycerin  •  ondansetron **OR** ondansetron  •  oxyCODONE  •  Pharmacy to Dose enoxaparin (LOVENOX)  •  potassium chloride **OR** potassium chloride **OR** potassium chloride  •  potassium phosphate infusion greater than 15 mMoles **OR** potassium phosphate infusion greater than 15 mMoles **OR** potassium phosphate **OR** sodium phosphate IVPB **OR** sodium phosphate IVPB **OR** sodium phosphate IVPB  •  [COMPLETED] Insert peripheral IV **AND** sodium chloride  •  sodium chloride        Assessment/Plan       Antimicrobial Therapy   1.  Zyvox      day  2.  Rocephin      day  3.      Day  4.      Day  5.      Day      Assessment     Severe COVID-19 infection and patient with significant comorbidities including diabetes mellitus and morbid obesity. Apparently did not receive vaccination for COVID-19  COVID-19 screen on admission on 2021 was positive. Was reported that patient had positive COVID-19 diagnosis 6 days prior to admission  The patient had received 5 days of IV remdesivir     Severe respiratory failure and patient was intubated for long duration.  The patient was extubated on August 10, 2021     Probable ventilator associated pneumonia versus hospitalist acquired pneumonia. Sputum culture from 2021 grew MRSA and the organism is susceptible to linezolid and vancomycin  The  patient was started on linezolid on August 1, 2021  Repeat sputum culture from August 1, 2021 is growing Klebsiella pneumoniae     Morbid obesity     Diabetes mellitus     Reactive leukocytosis secondary to steroids    Elevated CK.  CK is back to normal    UTI, urine culture from August 7, 2021 is growing relatively susceptible strain of E. coli         Plan     Continue Zyvox 600 mg every 12 hours for 10 to 14 days and monitor platelets carefully during treatment with Zyvox  Continue Rocephin 2 g every 24 hours  Supportive care  No need for further COVID-19 isolation  Labs in a.m. including : CBC with differential, CMP,  d-dimer, ferritin and CRP  Case was discussed with the patient's father at the bedside        Neela Alvarado MD  08/10/21  11:28 EDT     Note is dictated utilizing voice recognition software/Dragon

## 2021-08-10 NOTE — PLAN OF CARE
Goal Outcome Evaluation:    Fentanyl is off, Precedex is running at 0.5. ABG was good this morning. Bowel movement this morning. Pt following commands, and nodding appropriately. VVS.

## 2021-08-11 ENCOUNTER — APPOINTMENT (OUTPATIENT)
Dept: GENERAL RADIOLOGY | Facility: HOSPITAL | Age: 31
End: 2021-08-11

## 2021-08-11 LAB
ALBUMIN SERPL-MCNC: 3.8 G/DL (ref 3.5–5.2)
ALBUMIN/GLOB SERPL: 1.3 G/DL
ALP SERPL-CCNC: 88 U/L (ref 39–117)
ALT SERPL W P-5'-P-CCNC: 133 U/L (ref 1–33)
ANION GAP SERPL CALCULATED.3IONS-SCNC: 12 MMOL/L (ref 5–15)
AST SERPL-CCNC: 53 U/L (ref 1–32)
BASOPHILS # BLD AUTO: 0.1 10*3/MM3 (ref 0–0.2)
BASOPHILS NFR BLD AUTO: 0.6 % (ref 0–1.5)
BILIRUB CONJ SERPL-MCNC: 0.2 MG/DL (ref 0–0.3)
BILIRUB SERPL-MCNC: 0.9 MG/DL (ref 0–1.2)
BUN SERPL-MCNC: 33 MG/DL (ref 6–20)
BUN/CREAT SERPL: 73.3 (ref 7–25)
CA-I SERPL ISE-MCNC: 1.29 MMOL/L (ref 1.2–1.3)
CALCIUM SPEC-SCNC: 9.7 MG/DL (ref 8.6–10.5)
CHLORIDE SERPL-SCNC: 102 MMOL/L (ref 98–107)
CO2 SERPL-SCNC: 29 MMOL/L (ref 22–29)
CREAT SERPL-MCNC: 0.45 MG/DL (ref 0.57–1)
CRP SERPL-MCNC: <0.3 MG/DL (ref 0–0.5)
D DIMER PPP FEU-MCNC: 1.59 MG/L (FEU) (ref 0–0.59)
DEPRECATED RDW RBC AUTO: 52.5 FL (ref 37–54)
EOSINOPHIL # BLD AUTO: 0.1 10*3/MM3 (ref 0–0.4)
EOSINOPHIL NFR BLD AUTO: 1 % (ref 0.3–6.2)
ERYTHROCYTE [DISTWIDTH] IN BLOOD BY AUTOMATED COUNT: 16.9 % (ref 12.3–15.4)
FERRITIN SERPL-MCNC: 171.6 NG/ML (ref 13–150)
GALACTOMANNAN AG SPEC IA-ACNC: 0.12 INDEX (ref 0–0.49)
GFR SERPL CREATININE-BSD FRML MDRD: >150 ML/MIN/1.73
GLOBULIN UR ELPH-MCNC: 3 GM/DL
GLUCOSE BLDC GLUCOMTR-MCNC: 129 MG/DL (ref 70–105)
GLUCOSE BLDC GLUCOMTR-MCNC: 131 MG/DL (ref 70–105)
GLUCOSE BLDC GLUCOMTR-MCNC: 163 MG/DL (ref 70–105)
GLUCOSE BLDC GLUCOMTR-MCNC: 95 MG/DL (ref 70–105)
GLUCOSE SERPL-MCNC: 190 MG/DL (ref 65–99)
HCT VFR BLD AUTO: 32.8 % (ref 34–46.6)
HGB BLD-MCNC: 10.5 G/DL (ref 12–15.9)
LYMPHOCYTES # BLD AUTO: 1 10*3/MM3 (ref 0.7–3.1)
LYMPHOCYTES NFR BLD AUTO: 9.2 % (ref 19.6–45.3)
MAGNESIUM SERPL-MCNC: 1.6 MG/DL (ref 1.6–2.6)
MCH RBC QN AUTO: 28.6 PG (ref 26.6–33)
MCHC RBC AUTO-ENTMCNC: 32.1 G/DL (ref 31.5–35.7)
MCV RBC AUTO: 89 FL (ref 79–97)
MONOCYTES # BLD AUTO: 0.4 10*3/MM3 (ref 0.1–0.9)
MONOCYTES NFR BLD AUTO: 3.5 % (ref 5–12)
NEUTROPHILS NFR BLD AUTO: 85.7 % (ref 42.7–76)
NEUTROPHILS NFR BLD AUTO: 9.5 10*3/MM3 (ref 1.7–7)
NRBC BLD AUTO-RTO: 0 /100 WBC (ref 0–0.2)
PHOSPHATE SERPL-MCNC: 4.5 MG/DL (ref 2.5–4.5)
PLATELET # BLD AUTO: 260 10*3/MM3 (ref 140–450)
PMV BLD AUTO: 8.2 FL (ref 6–12)
POTASSIUM SERPL-SCNC: 3.9 MMOL/L (ref 3.5–5.2)
PROT SERPL-MCNC: 6.8 G/DL (ref 6–8.5)
RBC # BLD AUTO: 3.69 10*6/MM3 (ref 3.77–5.28)
SODIUM SERPL-SCNC: 143 MMOL/L (ref 136–145)
WBC # BLD AUTO: 11 10*3/MM3 (ref 3.4–10.8)

## 2021-08-11 PROCEDURE — 74230 X-RAY XM SWLNG FUNCJ C+: CPT

## 2021-08-11 PROCEDURE — 25010000002 ENOXAPARIN PER 10 MG: Performed by: INTERNAL MEDICINE

## 2021-08-11 PROCEDURE — 63710000001 INSULIN LISPRO (HUMAN) PER 5 UNITS: Performed by: INTERNAL MEDICINE

## 2021-08-11 PROCEDURE — 80053 COMPREHEN METABOLIC PANEL: CPT | Performed by: INTERNAL MEDICINE

## 2021-08-11 PROCEDURE — 84100 ASSAY OF PHOSPHORUS: CPT | Performed by: NURSE PRACTITIONER

## 2021-08-11 PROCEDURE — 82962 GLUCOSE BLOOD TEST: CPT

## 2021-08-11 PROCEDURE — 86140 C-REACTIVE PROTEIN: CPT | Performed by: INTERNAL MEDICINE

## 2021-08-11 PROCEDURE — 94799 UNLISTED PULMONARY SVC/PX: CPT

## 2021-08-11 PROCEDURE — 25010000002 DEXAMETHASONE PER 1 MG: Performed by: NURSE PRACTITIONER

## 2021-08-11 PROCEDURE — 25010000002 ONDANSETRON PER 1 MG: Performed by: NURSE PRACTITIONER

## 2021-08-11 PROCEDURE — 82330 ASSAY OF CALCIUM: CPT | Performed by: NURSE PRACTITIONER

## 2021-08-11 PROCEDURE — 97112 NEUROMUSCULAR REEDUCATION: CPT

## 2021-08-11 PROCEDURE — 36415 COLL VENOUS BLD VENIPUNCTURE: CPT | Performed by: NURSE PRACTITIONER

## 2021-08-11 PROCEDURE — 85025 COMPLETE CBC W/AUTO DIFF WBC: CPT | Performed by: INTERNAL MEDICINE

## 2021-08-11 PROCEDURE — 92610 EVALUATE SWALLOWING FUNCTION: CPT

## 2021-08-11 PROCEDURE — 25010000002 CEFTRIAXONE PER 250 MG: Performed by: INTERNAL MEDICINE

## 2021-08-11 PROCEDURE — 63710000001 INSULIN REGULAR HUMAN PER 5 UNITS: Performed by: NURSE PRACTITIONER

## 2021-08-11 PROCEDURE — 85379 FIBRIN DEGRADATION QUANT: CPT | Performed by: INTERNAL MEDICINE

## 2021-08-11 PROCEDURE — 82728 ASSAY OF FERRITIN: CPT | Performed by: INTERNAL MEDICINE

## 2021-08-11 PROCEDURE — 82248 BILIRUBIN DIRECT: CPT | Performed by: NURSE PRACTITIONER

## 2021-08-11 PROCEDURE — 97167 OT EVAL HIGH COMPLEX 60 MIN: CPT

## 2021-08-11 PROCEDURE — 92611 MOTION FLUOROSCOPY/SWALLOW: CPT

## 2021-08-11 PROCEDURE — 97162 PT EVAL MOD COMPLEX 30 MIN: CPT

## 2021-08-11 PROCEDURE — 63710000001 INSULIN GLARGINE PER 5 UNITS: Performed by: INTERNAL MEDICINE

## 2021-08-11 PROCEDURE — 97535 SELF CARE MNGMENT TRAINING: CPT

## 2021-08-11 PROCEDURE — 83735 ASSAY OF MAGNESIUM: CPT | Performed by: NURSE PRACTITIONER

## 2021-08-11 RX ORDER — HYDROCORTISONE 10 MG/1
20 TABLET ORAL 2 TIMES DAILY WITH MEALS
Status: DISCONTINUED | OUTPATIENT
Start: 2021-08-11 | End: 2021-08-18

## 2021-08-11 RX ORDER — VECURONIUM BROMIDE 1 MG/ML
INJECTION, POWDER, LYOPHILIZED, FOR SOLUTION INTRAVENOUS
Status: DISPENSED
Start: 2021-08-11 | End: 2021-08-12

## 2021-08-11 RX ORDER — ONDANSETRON 4 MG/1
4 TABLET, FILM COATED ORAL EVERY 6 HOURS PRN
Status: DISCONTINUED | OUTPATIENT
Start: 2021-08-11 | End: 2021-08-11

## 2021-08-11 RX ORDER — ONDANSETRON 4 MG/1
2 TABLET, FILM COATED ORAL EVERY 4 HOURS PRN
Status: DISCONTINUED | OUTPATIENT
Start: 2021-08-11 | End: 2021-08-24 | Stop reason: HOSPADM

## 2021-08-11 RX ORDER — CHOLECALCIFEROL (VITAMIN D3) 125 MCG
10 CAPSULE ORAL NIGHTLY
Status: DISCONTINUED | OUTPATIENT
Start: 2021-08-11 | End: 2021-08-24 | Stop reason: HOSPADM

## 2021-08-11 RX ORDER — FUROSEMIDE 20 MG/1
20 TABLET ORAL DAILY
Status: DISCONTINUED | OUTPATIENT
Start: 2021-08-11 | End: 2021-08-24 | Stop reason: HOSPADM

## 2021-08-11 RX ORDER — RISPERIDONE 0.5 MG/1
0.5 TABLET, ORALLY DISINTEGRATING ORAL NIGHTLY
Status: DISCONTINUED | OUTPATIENT
Start: 2021-08-11 | End: 2021-08-24 | Stop reason: HOSPADM

## 2021-08-11 RX ORDER — ONDANSETRON 2 MG/ML
2 INJECTION INTRAMUSCULAR; INTRAVENOUS EVERY 4 HOURS PRN
Status: DISCONTINUED | OUTPATIENT
Start: 2021-08-11 | End: 2021-08-11

## 2021-08-11 RX ORDER — ONDANSETRON 2 MG/ML
2 INJECTION INTRAMUSCULAR; INTRAVENOUS EVERY 4 HOURS PRN
Status: DISCONTINUED | OUTPATIENT
Start: 2021-08-11 | End: 2021-08-24 | Stop reason: HOSPADM

## 2021-08-11 RX ADMIN — ASPIRIN 324 MG: 81 TABLET, CHEWABLE ORAL at 08:39

## 2021-08-11 RX ADMIN — Medication 600 MG: at 08:41

## 2021-08-11 RX ADMIN — INSULIN LISPRO 10 UNITS: 100 INJECTION, SOLUTION INTRAVENOUS; SUBCUTANEOUS at 13:10

## 2021-08-11 RX ADMIN — BUDESONIDE 0.5 MG: 0.5 SUSPENSION RESPIRATORY (INHALATION) at 08:11

## 2021-08-11 RX ADMIN — Medication 10 ML: at 20:24

## 2021-08-11 RX ADMIN — GUAIFENESIN 400 MG: 100 SOLUTION ORAL at 05:42

## 2021-08-11 RX ADMIN — INSULIN LISPRO 4 UNITS: 100 INJECTION, SOLUTION INTRAVENOUS; SUBCUTANEOUS at 13:11

## 2021-08-11 RX ADMIN — GUAIFENESIN 400 MG: 100 SOLUTION ORAL at 23:34

## 2021-08-11 RX ADMIN — GUAIFENESIN 400 MG: 100 SOLUTION ORAL at 18:34

## 2021-08-11 RX ADMIN — CEFTRIAXONE SODIUM 2 G: 2 INJECTION, POWDER, FOR SOLUTION INTRAMUSCULAR; INTRAVENOUS at 13:11

## 2021-08-11 RX ADMIN — INSULIN GLARGINE 45 UNITS: 100 INJECTION, SOLUTION SUBCUTANEOUS at 20:24

## 2021-08-11 RX ADMIN — PANTOPRAZOLE SODIUM 40 MG: 40 INJECTION, POWDER, FOR SOLUTION INTRAVENOUS at 18:34

## 2021-08-11 RX ADMIN — DEXAMETHASONE SODIUM PHOSPHATE 6 MG: 4 INJECTION, SOLUTION INTRAMUSCULAR; INTRAVENOUS at 08:39

## 2021-08-11 RX ADMIN — ENOXAPARIN SODIUM 40 MG: 40 INJECTION SUBCUTANEOUS at 20:24

## 2021-08-11 RX ADMIN — LINEZOLID 600 MG: 600 TABLET ORAL at 08:40

## 2021-08-11 RX ADMIN — FUROSEMIDE 20 MG: 20 TABLET ORAL at 09:39

## 2021-08-11 RX ADMIN — RISPERIDONE 0.5 MG: 0.5 TABLET, ORALLY DISINTEGRATING ORAL at 08:40

## 2021-08-11 RX ADMIN — RISPERIDONE 0.5 MG: 0.5 TABLET, ORALLY DISINTEGRATING ORAL at 20:23

## 2021-08-11 RX ADMIN — BUDESONIDE 0.5 MG: 0.5 SUSPENSION RESPIRATORY (INHALATION) at 20:25

## 2021-08-11 RX ADMIN — Medication 10 ML: at 08:40

## 2021-08-11 RX ADMIN — METOPROLOL TARTRATE 50 MG: 50 TABLET, FILM COATED ORAL at 08:40

## 2021-08-11 RX ADMIN — HYDROCORTISONE 20 MG: 10 TABLET ORAL at 18:34

## 2021-08-11 RX ADMIN — IPRATROPIUM BROMIDE AND ALBUTEROL SULFATE 3 ML: 2.5; .5 SOLUTION RESPIRATORY (INHALATION) at 11:59

## 2021-08-11 RX ADMIN — LINEZOLID 600 MG: 600 TABLET ORAL at 20:23

## 2021-08-11 RX ADMIN — Medication 2000 UNITS: at 08:39

## 2021-08-11 RX ADMIN — PANTOPRAZOLE SODIUM 40 MG: 40 INJECTION, POWDER, FOR SOLUTION INTRAVENOUS at 08:40

## 2021-08-11 RX ADMIN — GUAIFENESIN 400 MG: 100 SOLUTION ORAL at 13:10

## 2021-08-11 RX ADMIN — INSULIN LISPRO 10 UNITS: 100 INJECTION, SOLUTION INTRAVENOUS; SUBCUTANEOUS at 18:34

## 2021-08-11 RX ADMIN — Medication 10 MG: at 20:23

## 2021-08-11 RX ADMIN — OXYCODONE HYDROCHLORIDE 10 MG: 5 TABLET ORAL at 23:34

## 2021-08-11 RX ADMIN — OXYCODONE HYDROCHLORIDE 10 MG: 5 TABLET ORAL at 05:42

## 2021-08-11 RX ADMIN — IPRATROPIUM BROMIDE AND ALBUTEROL SULFATE 3 ML: 2.5; .5 SOLUTION RESPIRATORY (INHALATION) at 20:20

## 2021-08-11 RX ADMIN — IPRATROPIUM BROMIDE AND ALBUTEROL SULFATE 3 ML: 2.5; .5 SOLUTION RESPIRATORY (INHALATION) at 08:05

## 2021-08-11 RX ADMIN — Medication 600 MG: at 20:23

## 2021-08-11 RX ADMIN — ONDANSETRON 4 MG: 2 INJECTION INTRAMUSCULAR; INTRAVENOUS at 02:06

## 2021-08-11 RX ADMIN — ENOXAPARIN SODIUM 40 MG: 40 INJECTION SUBCUTANEOUS at 08:39

## 2021-08-11 RX ADMIN — INSULIN LISPRO 10 UNITS: 100 INJECTION, SOLUTION INTRAVENOUS; SUBCUTANEOUS at 05:43

## 2021-08-11 RX ADMIN — INSULIN LISPRO 10 UNITS: 100 INJECTION, SOLUTION INTRAVENOUS; SUBCUTANEOUS at 23:34

## 2021-08-11 RX ADMIN — OXYCODONE HYDROCHLORIDE 10 MG: 5 TABLET ORAL at 02:06

## 2021-08-11 RX ADMIN — HYDROCORTISONE 20 MG: 10 TABLET ORAL at 09:39

## 2021-08-11 RX ADMIN — METOPROLOL TARTRATE 25 MG: 25 TABLET, FILM COATED ORAL at 20:24

## 2021-08-11 RX ADMIN — INSULIN GLARGINE 45 UNITS: 100 INJECTION, SOLUTION SUBCUTANEOUS at 08:42

## 2021-08-11 RX ADMIN — INSULIN HUMAN 6 UNITS: 100 INJECTION, SOLUTION PARENTERAL at 08:42

## 2021-08-11 NOTE — PLAN OF CARE
Goal Outcome Evaluation:  Plan of Care Reviewed With: patient           Outcome Summary: Pt is 31 yo female admitted for SOA, acute resp failure, PNA, Covid (+).  Pt intubated due to acute resp failure on 7/23, s/p bronch on 8/8, and extubated 8/10.  Pt reports at baseline she lives with mother and brother with Asperger Syndrome. 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 Patient demos siginficant weakness date, with 2/5 BUE strength in elbows and grasp and 1/5 in shoulders. Questionable RTC injury, new onset. Sits EOB 10 minutes with mod-max A. Posterior and lateral leans noted, and patient has nystagmus upon return to supine position. Max A x2 for rolling side<>side for bathing and sheet changes. Far below stated baseline, and will require IP rehab at discharge.

## 2021-08-11 NOTE — MBS/VFSS/FEES
Acute Care - Speech Language Pathology   Swallow Initial Evaluation [facility]     Patient Name: Leslie Harris  : 1990  MRN: [id number]  Today's Date: 2021               Admit Date: 2021    Visit Dx:     ICD-10-CM ICD-9-CM   1. Dyspnea, unspecified type  R06.00 786.09   2. Pneumonia due to COVID-19 virus  U07.1 480.8    J12.82 079.89   3. Acute respiratory failure with hypoxia (CMS/HCC)  J96.01 518.81   4. Hypotension due to drugs  I95.2 458.8     E947.9   5. Acute respiratory failure due to COVID-19 (CMS/HCC)  U07.1 518.81    J96.00 079.89     Patient Active Problem List   Diagnosis   • Acute respiratory failure due to COVID-19 (CMS/HCC)   • Class 3 severe obesity without serious comorbidity with body mass index (BMI) of 50.0 to 59.9 in adult   • Pneumonia due to COVID-19 virus   • Hyperglycemia, likely stress induced   • Diabetes mellitus type 2 in obese (CMS/HCC)     Past Medical History:   Diagnosis Date   • Class 3 severe obesity without serious comorbidity with body mass index (BMI) of 50.0 to 59.9 in adult 2021   • Diabetes mellitus type 2 in obese (CMS/HCC) 2021     Past Surgical History:   Procedure Laterality Date   • BRONCHOSCOPY N/A 2021    Procedure: BRONCHOSCOPY with bilateral bronchial washing;  Surgeon: Jeff Feng MD;  Location: [facility];  Service: Pulmonary;  Laterality: N/A;  pre: respiratory failure, covid-19 pneumonia  post: respiratory failure, COVID-19 pneumonia   • CHOLECYSTECTOMY     • EXPLORATORY LAPAROTOMY W/ BOWEL RESECTION      Due to complication of cholecystectomy in which patient had an accidental perforation of small bowel intraoperatively.       SLP Recommendation and Plan  SLP Swallowing Diagnosis: mild, oral dysphagia, functional pharyngeal phase  SLP Diet Recommendation: ground, thin liquids  Recommended Precautions and Strategies: upright posture during/after eating, small bites of food and sips of liquid, assist with feeding  SLP  Rec. for Method of Medication Administration: meds whole, with thin liquids, with pudding or applesauce     Monitor for Signs of Aspiration: yes, notify SLP if any concerns, cough     Swallow Criteria for Skilled Therapeutic Interventions Met: demonstrates skilled criteria     Rehab Potential/Prognosis, Swallowing: good, to achieve stated therapy goals  Therapy Frequency (Swallow): PRN  Predicted Duration Therapy Intervention (Days): until discharge     Daily Summary of Progress (SLP): progress toward functional goals is good                             SWALLOW EVALUATION (last 72 hours)      SLP Adult Swallow Evaluation     Row Name 08/11/21 1500       Rehab Evaluation    Document Type  evaluation  -MM    Subjective Information  complains of;weakness;fatigue  -MM    Patient Observations  alert;cooperative;agree to therapy  -MM    Patient/Family/Caregiver Comments/Observations  Patient unable to move her arms to feed herself  -MM    Patient Effort  good  -MM       General Information    Patient Profile Reviewed  yes  -MM    Pertinent History Of Current Problem  Pt is a 31 yo female who presented to [facility] ED on 7/22/2021 with complaint of being recently diagnosed with COVID-19 at the [facility] 6 days prior to admission.  Patient reports that she has been having increased shortness of breath over the past couple days, and has not lost her sense of smell, but has lost her sense of taste, reports frequent, nonproductive cough, myalgias, fevers, and chills. Pt intubated from 7/23-8/10 @ 0948  -MM    Current Method of Nutrition  nasogastric feedings  -MM    Prior Level of Function-Swallowing  no diet consistency restrictions  -MM    Plans/Goals Discussed with  patient  -MM    Barriers to Rehab  medically complex  -MM       Pain    Additional Documentation  --       Pain Scale: FACES Pre/Post-Treatment    Pain: FACES Scale, Pretreatment  --    Posttreatment Pain Rating  --       Oral  Motor Structure and Function    Dentition Assessment  natural, present and adequate  -MM    Secretion Management  WNL/WFL  -MM    Volitional Swallow  WFL  -MM    Volitional Cough  WFL  -MM       Oral Musculature and Cranial Nerve Assessment    Oral Motor General Assessment  generalized oral motor weakness  -MM       General Eating/Swallowing Observations    Respiratory Support Currently in Use  nasal cannula 7L  -MM    Eating/Swallowing Skills  fed by SLP  -MM       Respiratory    Respiratory Status  WFL  -MM       Clinical Swallow Eval    Clinical Swallow Evaluation Summary  --       MBS/VFSS    Utensils Used  spoon;straw  -MM    Consistencies Trialed  chopped;pureed;thin liquids  -MM       MBS/VFSS Interpretation    Oral Prep Phase  WFL  -MM    Oral Transit Phase  impaired  -MM    Oral Residue  WFL  -MM    VFSS Summary  THIN:  Patient given thin barium by spoon 2x and by straw 4x.  Patient holds bolus in oral cavity for 2-3 seconds before initiating a swallow.  No premature spillage, penetration or aspiration appreciated with this consistency.      PUREE:  Patient given two trials of applesauce mixed with barium.  Patient exhibits base of tongue spillage of bolus head however swallow initiation is timely.  No penetration or aspiration appreciated.  Mild residue remains in the valleculae.      PEACHES:  Patient given peaches mixed with barium 2x.  Spillage of juice reaches the pyriform sinus while she continues to prepare the bolus.  Mastication is slow but functional.  No penetration or aspiration noted.  Mild pharyngeal residue remains in the valleculae however this clears with a liquid wash.      OVERALL:  Patient exhibits reduced laryngeal elevation and hyoid excursion however it does not appear to be negatively impacting swallow function at this time.    -MM       Oral Transit Phase    Impaired Oral Transit Phase  premature spillage of liquids into pharynx  -MM    Premature Spillage of Liquids into Pharynx   mixed consistency  -MM       Initiation of Pharyngeal Swallow    Initiation of Pharyngeal Swallow  WFL  -MM    Pharyngeal Phase  functional pharyngeal phase of swallowing  -MM       Clinical Impression    Daily Summary of Progress (SLP)  progress toward functional goals is good  -MM    SLP Swallowing Diagnosis  mild;oral dysphagia;functional pharyngeal phase  -MM    Functional Impact  risk of aspiration/pneumonia  -MM    Rehab Potential/Prognosis, Swallowing  good, to achieve stated therapy goals  -MM    Swallow Criteria for Skilled Therapeutic Interventions Met  demonstrates skilled criteria  -MM       Recommendations    Therapy Frequency (Swallow)  PRN  -MM    Predicted Duration Therapy Intervention (Days)  until discharge  -MM    SLP Diet Recommendation  ground;thin liquids  -MM    Recommended Diagnostics  --    Recommended Precautions and Strategies  upright posture during/after eating;small bites of food and sips of liquid;assist with feeding  -MM    Oral Care Recommendations  Oral Care BID/PRN  -MM    SLP Rec. for Method of Medication Administration  meds whole;with thin liquids;with pudding or applesauce  -MM    Monitor for Signs of Aspiration  yes;notify SLP if any concerns;cough  -MM       Swallow Goals (SLP)    Oral Nutrition/Hydration Goal Selection (SLP)  oral nutrition/hydration, SLP goal 1;oral nutrition/hydration, SLP goal (free text);oral nutrition/hydration, SLP goal 2  -MM       Oral Nutrition/Hydration Goal 1 (SLP)    Oral Nutrition/Hydration Goal 1, SLP  Pt will participate in VFSS to objectively assess swallow function and safety and to determine safest/least restrictive diet.  -MM    Time Frame (Oral Nutrition/Hydration Goal 1, SLP)  1 day  -MM    Barriers (Oral Nutrition/Hydration Goal 1, SLP)  see above note  -MM    Progress/Outcomes (Oral Nutrition/Hydration Goal 1, SLP)  goal met  -MM       Oral Nutrition/Hydration Goal 2 (SLP)    Oral Nutrition/Hydration Goal 2, SLP  Patient will be seen  at a meal within 24-48 hours to assess tolerance of current diet with further recommendations to be given as indicated  -MM    Time Frame (Oral Nutrition/Hydration Goal 2, SLP)  2 days  -MM       Oral Nutrition/Hydration Goal (SLP)    Oral Nutrition/Hydration Goal, SLP  Patient will tolerate safest and least restrictive diet with no complications from aspiration  -MM    Time Frame (Oral Nutrition/Hydration Goal, SLP)  by discharge  -MM      User Key  (r) = Recorded By, (t) = Taken By, (c) = Cosigned By    Initials Name Effective Dates    Agnieszka Jang SLP 06/16/21 -     Ne Scott SLP 06/16/21 -           EDUCATION  The patient has been educated in the following areas:   Modified Diet Instruction.       SLP GOALS     Row Name 08/11/21 1500       Oral Nutrition/Hydration Goal 1 (SLP)    Oral Nutrition/Hydration Goal 1, SLP  Pt will participate in VFSS to objectively assess swallow function and safety and to determine safest/least restrictive diet.  -MM    Time Frame (Oral Nutrition/Hydration Goal 1, SLP)  1 day  -MM    Barriers (Oral Nutrition/Hydration Goal 1, SLP)  see above note  -MM    Progress/Outcomes (Oral Nutrition/Hydration Goal 1, SLP)  goal met  -MM       Oral Nutrition/Hydration Goal 2 (SLP)    Oral Nutrition/Hydration Goal 2, SLP  Patient will be seen at a meal within 24-48 hours to assess tolerance of current diet with further recommendations to be given as indicated  -MM    Time Frame (Oral Nutrition/Hydration Goal 2, SLP)  2 days  -MM       Oral Nutrition/Hydration Goal (SLP)    Oral Nutrition/Hydration Goal, SLP  Patient will tolerate safest and least restrictive diet with no complications from aspiration  -MM    Time Frame (Oral Nutrition/Hydration Goal, SLP)  by discharge  -MM      User Key  (r) = Recorded By, (t) = Taken By, (c) = Cosigned By    Initials Name Provider Type    Agnieszka Jang, SLP Speech and Language Pathologist    Ne Scott, SLP Speech and Language  Pathologist             Time Calculation:       Therapy Charges for Today     Code Description Service Date Service Provider Modifiers Qty    [id number] HC ST MOTION FLUORO EVAL SWALLOW 5 8/11/2021 Agnieszka Diamond, SLP GN 1               Agnieszka Diamond, SLP  8/11/2021

## 2021-08-11 NOTE — CASE MANAGEMENT/SOCIAL WORK
Continued Stay Note  EMANUEL Wasserman     Patient Name: Leslie Harris  MRN: [id number]  Today's Date: 8/11/2021    Admit Date: 7/22/2021    Discharge Plan     Row Name 08/11/21 1436       Plan    Plan  D/C Plan : New referral to [facility] , No PASRR required will need a precert , 2nd choice is [facility]    Provided Post Acute Provider List?  Yes    Post Acute Provider List  Inpatient Rehab    Delivered To  Patient;Support Person    Support Person  MOM    Method of Delivery  In person    Patient/Family in Agreement with Plan  yes    Plan Comments  Pt is on 7l of o2 , Pt has been listed medicaid pending but pt now that she is off the [facility] she has [employer] , copy of the card made and given to registration . Dafne from [facility]  also notified of ins.        Discharge Codes    No documentation.       Expected Discharge Date and Time     Expected Discharge Date Expected Discharge Time    Jul 30, 2021         Met with patient in room wearing PPE: mask, face shield/goggles,       Maintained distance greater than six feet and spent less than 15 minutes in the room.        Lissa Holden RN

## 2021-08-11 NOTE — PROGRESS NOTES
"Nutrition Services    Patient Name: Leslie Harris  YOB: 1990  MRN: [id number]  Admission date: 7/22/2021      PPE Documentation        PPE Worn By Provider RD did not enter room for this encounter   PPE Worn By Patient  N/A     PROGRESS NOTE      Encounter Information: Tube feed check on. Impact Peptide 1.5 documented at 65 mL/hr. Remains extubated. Per discussion in rounds, ST to be consulted to start working with pt, very weak and will likely need cont'd EN.       Labs (reviewed below): No labs drawn from today       GI Function:  -residuals WNL  -last BM documented on 8/10       Nutrition Intervention: Continue current TF regimen       PO Diet/Supplements: NPO Diet   EN Prescription: Impact Peptide 1.5 at 65 mL/hr + 20 mL/hr water flush       Intake/Output:   Intake/Output Summary (Last 24 hours) at 8/11/2021 0905  Last data filed at 8/11/2021 0400  Gross per 24 hour   Intake 1546 ml   Output 3950 ml   Net -2404 ml          Height: Height: 165.1 cm (65\")   Weight: Weight: (!) 151 kg (333 lb 12.4 oz) (08/08/21 0100)   BMI: Body mass index is 55.54 kg/m².     Results from last 7 days   Lab Units 08/10/21  2033 08/10/21  0652 08/09/21  0753 08/08/21  0505 08/08/21  0505   SODIUM mmol/L  --  142 143  --  140   POTASSIUM mmol/L 4.3 3.5 3.8   < > 4.6   CHLORIDE mmol/L  --  99 101  --  98   CO2 mmol/L  --  34.0* 35.0*  --  32.0*   BUN mg/dL  --  45* 54*  --  55*   CREATININE mg/dL  --  0.42* 0.61  --  0.67   CALCIUM mg/dL  --  9.9 10.1  --  10.2   BILIRUBIN mg/dL  --  0.8 0.9  --  0.8   ALK PHOS U/L  --  84 83  --  85   ALT (SGPT) U/L  --  160* 180*  --  133*   AST (SGOT) U/L  --  61* 64*  --  39*   GLUCOSE mg/dL  --  125* 138*  --  192*    < > = values in this interval not displayed.     Results from last 7 days   Lab Units 08/10/21  0652 08/10/21  0411 08/09/21  0753 08/09/21  0753 08/09/21  0518 08/08/21  0505   MAGNESIUM mg/dL 1.7  --   --  1.7  --  1.9   PHOSPHORUS mg/dL 3.8  --    < > 2.9  --  4.5 "   HEMOGLOBIN g/dL  --  9.6*  --   --    < > 9.9*   HEMATOCRIT %  --  28.7*  --   --    < > 30.5*    < > = values in this interval not displayed.     COVID19   Date Value Ref Range Status   07/22/2021 Detected (C) Not Detected - Ref. Range Final     Lab Results   Component Value Date    HGBA1C 6.9 (H) 07/23/2021     Vitals:    08/11/21 0811   BP:    Pulse: 90   Resp: 20   Temp:    SpO2: 97%     RD to follow up per protocol.    Electronically signed by:  Leslie Hugo RD  08/11/21 09:05 EDT

## 2021-08-11 NOTE — PLAN OF CARE
Goal Outcome Evaluation:      Pt participated in clinical swallow evaluation s/p COVID and prolonged intubation. Pt with hoarse vocal quality however 100% intelligible. Pt able to follow all commands and participated in conversation appropriately. Pt currently has NGT with TFs. RN reports she had just completed oral care on pt. Oral mech appears WFL. Pt accepted trials of ice chips and thins via spoon, cup, and straw. Pt able to hold cup with assistance but unable to bring cup to oral cavity independently 2/2 weakness. Oral phase appears functional for trials given. Pt with mildly wet vocal quality after consecutive sips of thins via straw. However no further overt s/s of aspiration or changes in vitals observed.     Due prolonged vent, REC: continue NPO with alternate means of meds and nutrition, VFSS (planned for later this afternoon), and ice chips with assistance/supervision. Discussed recs with pt and RN who both verbalized understanding and agreement.

## 2021-08-11 NOTE — PROGRESS NOTES
PULMONARY/CRITICAL CARE PROGRESS NOTE       NAME:     Leslie Harris   AGE:     30 y.o.   SEX:  female   : 1990       MRN:       [id number]          RM: [facility] ICU      ASSESSMENT & PLAN     F/U: Acute respiratory failure secondary to COVID-19 pna    Principal Problem:    Pneumonia due to COVID-19 virus  Active Problems:    Acute respiratory failure due to COVID-19 (CMS/[facility])    Class 3 severe obesity without serious comorbidity with body mass index (BMI) of 50.0 to 59.9 in adult    Hyperglycemia, likely stress induced    Diabetes mellitus type 2 in obese (CMS/[facility])     Multidisciplinary Rounds:  -Extubated 8/10/21  -CIP, critical illness polyneuropathy muscle weakness due to paralytics and steroids, will likely need, Rehab intermittent BiPAP  -Electrolytes replacement per protocol.  - delirium, d/w mother and patient, with circadian rhythm disorder, change ripseridal to QHS, add Melatonin  decreas Lopressor to 25 mg bid  DC decadron, start hydrocortisn 20 mg po bid    Assessment:  Pneumonia due to COVID-19 virus  Acute respiratory failure due to COVID-19  -Failed NIPPV and required intubation   -Retest RVP/COVID-19 to eval for other respiratory viruses. +COVID-19 only  -Monitor disease progression labs as ordered  -Vitamin D, Acetylcysteine, Zinc  -CT PE protocol could not be obtained due to severe hypoxia  -Empiric A/C with heparin drip  -Remdesivir x5 days, completed on   -Decardron, decreased dose   -Does not qualify for Actemra  -Symbicort, Albuterol HFA.  -Sputum culture-Candida albicans, Staph aureus  -discontinued Ceftriaxone, changed to Zosyn  due to fever, completed   -Add Zyvox and Diflucan added 2021 due to positive sputum culture  -Continued diuresis with IV Lasix  -ID consulted, managing antibiotics: Current antibiotics of Rocephin and Zyvox    Diabetes mellitus, type 2, new diagnosis  -strict glycemic control recommended  -Accuchecks every 6 hours with sliding  scale insulin  -Added Lantus  -Hemoglobin A1c 6.9.    Hypertension  -added beta blocker with improved control  -Beta-blocker maintaining adequate blood pressure control     Class 3 severe obesity without serious comorbidity with body mass index (BMI) of 50.0 to 59.9 in adult  -BMI 59.32  -Complicating aspects of care  -Counseled on lifestyle modifications to improve overall health prior to intubation    AST/ALT remain elevated  -Significance unclear  -Continue to monitor and trend     Code Status: CPR, full interventions  VTE Prophylaxis:  Lovenox  PUD Prophylaxis: Pepcid  Nutrition: dietitian following for tube feed recommendations.Tolerating TF at goal  +Bowel regimen    Right IJ central line 7/23, discontinued  Left radial A-line 7/23, discontinued 8/1  Right brachial A-line placed 8/1, discontinued    The patient's mother reported that the patient had latent autoimmune diabetes mellitus diagnosed recently and requested C-peptide and MAX.  It was explained to her that these tests would not accurately reflect the patient's condition as her current high acuity and metabolic state would render these numbers unpredictable and not reliable.  Recommend that this is followed up after the patient's current acuity has improved      [place] & SUBJECTIVE     Leslie Harris is a 30 y.o. female  has a past medical history of Class 3 severe obesity without serious comorbidity with body mass index (BMI) of 50.0 to 59.9 in adult (7/22/2021).   Patient presented to [facility] ED on 7/22/2021 with complaint of being recently diagnosed with COVID-19 at the [facility] department 6 days prior to admission.  Patient reports that she has been having increased shortness of breath over the past couple days, and has not lost her sense of smell, but has lost her sense of taste, reports frequent, nonproductive cough, myalgias, fevers, and chills.  She denies any abdominal symptoms including nausea, vomiting, constipation,  diarrhea.  Patient reports that she actually called EMS as her oxygen saturation had been lower, reading in ED upper 70s, but she did not feel that it was right, as she stated that her fingers were cold.  EMS came and reported to her that her oxygen was actually in the 80s at that time, on the lower side of the 80s, but for some reason patient was not transported to the ED.  Patient then presented today, and she additionally reported some generalized sore throat, with trouble swallowing some things, due to just the general pain associated with it.  She denies any actual problems with physically swallowing this food.  She reports a decreased appetite and decreased oral intake.  Patient states that there was a coworker at her facility, in which they questioned if she had been tested, as she generally did not look like she felt well, but that individual stated that they had been tested and were fine, but at her place of work, which is the [facility], there has been a small outbreak of COVID-19 infected individuals, in which a coworker of hers, is additionally hospitalized, due to this exposure as well.  Patient was not vaccinated for COVID-19.  Patient reported that she is a non-smoker, denies routine alcohol use or recreational drug use.  She reports that her only other previous medical history involved a cholecystectomy that resulted in a perforation of her small bowel, in which she was discharged home and became septic, requiring readmission and underwent an ex lap with lysis of adhesions.  She stated that this occurred in 2018.     In the ED, labs were obtained with abnormalities as follows: , calcium 8.5, ALT 51, AST 73, ferritin 545.6, CRP 6.33, procalcitonin 0.30, lactate 1.3, D-dimer 1.48.  ABG was obtained and as follows: pH 7.427, PCO2 39.5, PO2 44.9, HCO3 26.0, O2 saturation 81.8%.  This ABG was obtained while patient was on a nonrebreather.  Chest x-ray was obtained and with the following  "impression: \"Left greater than right interstitial and alveolar disease.  Given the patient's Covid positive status, the findings may reflect changes of pneumonia.  Patient required oxygen titration up to maximal Precision flow at 40 L/min with FiO2 of 100%.  Patient was considered to be very high risk for further decompensation, and it was recommended for patient to be admitted to the ICU for close observation.     Pulmonary/Intensivist service was contacted for admission to ICU and further evaluation and treatment.    7/23: Patient is drowsy, but easy to arouse, proned overnight, continues on AVAPS in combination with Precision flow 40 L/min and FiO2 100%.  She denies chest pain, but does report shortness of breath and anxiety, cough.  7/24: Intubated, sedated, chemically paralyzed  7/25: intubated, sedated, chemically paralyzed. Fever overnight  7/26: Intubated, sedated, chemically paralyzed.  Proned.  Still having fevers  7/27: Intubated, sedated, chemically paralyzed.  Tolerating supine positioning.  Still febrile  7/28: Intubated, sedated, chemically paralyzed.  Supine.  7/29: Intubated, sedated, chemically paralyzed.  Still remains supine  7/30: Intubated, sedated, chemically paralyzed, proned.  7/31: Intubated, sedated.  Remains chemically paralyzed and proned.  Nebulized Flolan  8/1: Intubated, sedated, paralyzed.  Tolerating supine positioning.  Still on nebulized Flolan  8/2: Intubated, sedated, paralyzed.  Tolerating supine positioning  8/10: Intubated, following commands, lightly sedated, tolerating supine    REVIEW OF SYSTEMS:  Review of systems could not be obtained due to   patient sedation status. patient intubated.      MEDICATIONS     SCHEDULED MEDICATIONS:   Acetylcysteine, 600 mg, Oral, BID  aspirin, 324 mg, Per G Tube, Daily  budesonide, 0.5 mg, Nebulization, BID - RT  cefTRIAXone, 2 g, Intravenous, Q24H  cholecalciferol, 2,000 Units, Nasogastric, Daily  dexamethasone, 6 mg, Intravenous, " "Daily  enoxaparin, 40 mg, Subcutaneous, Q12H  furosemide, 20 mg, Intravenous, Q12H  guaiFENesin, 400 mg, Nasogastric, Q6H  insulin glargine, 45 Units, Subcutaneous, Q12H  insulin lispro, 0-24 Units, Subcutaneous, Q6H  insulin lispro, 10 Units, Subcutaneous, Q6H  insulin regular, 21 Units, Subcutaneous, Daily  ipratropium-albuterol, 3 mL, Nebulization, 4x Daily - RT  linezolid, 600 mg, Oral, Q12H  metoprolol tartrate, 50 mg, Oral, Q12H  pantoprazole, 40 mg, Intravenous, BID AC  risperiDONE, 0.5 mg, Oral, Q12H  sodium chloride, 10 mL, Intravenous, Q12H        CONTINUOUS INFUSIONS:   dexmedetomidine, 0.2-1.5 mcg/kg/hr, Last Rate: Stopped (08/10/21 1800)  Pharmacy to Dose enoxaparin (LOVENOX),         PRN MEDS:  •  acetaminophen  •  acetaminophen **OR** acetaminophen  •  aluminum-magnesium hydroxide-simethicone  •  artificial tears  •  artificial tears  •  benzonatate  •  dextrose  •  dextrose  •  [COMPLETED] dilTIAZem **FOLLOWED BY** [] dilTIAZem **FOLLOWED BY** dilTIAZem  •  glucagon (human recombinant)  •  glycopyrrolate PF  •  hydrALAZINE  •  insulin lispro **AND** insulin lispro  •  lidocaine  •  lidocaine PF 1%  •  magnesium sulfate **OR** magnesium sulfate in D5W 1g/100mL (PREMIX)  •  metoprolol tartrate  •  nitroglycerin  •  ondansetron **OR** ondansetron  •  oxyCODONE  •  Pharmacy to Dose enoxaparin (LOVENOX)  •  potassium chloride **OR** potassium chloride **OR** potassium chloride  •  potassium phosphate infusion greater than 15 mMoles **OR** potassium phosphate infusion greater than 15 mMoles **OR** potassium phosphate **OR** sodium phosphate IVPB **OR** sodium phosphate IVPB **OR** sodium phosphate IVPB  •  [COMPLETED] Insert peripheral IV **AND** sodium chloride  •  sodium chloride    OBJECTIVE     VITAL SIGNS:  /67   Pulse 90   Temp 98.4 °F (36.9 °C) (Oral)   Resp 20   Ht 165.1 cm (65\")   Wt (!) 151 kg (333 lb 12.4 oz)   LMP 2021   SpO2 97%   BMI 55.54 kg/m²     I/O FROM " PREVIOUS 3 SHIFTS:  I/O last 3 completed shifts:  In: 2669 [I.V.:678; Other:544; NG/GT:1447]  Out: 4170 [Urine:4170]      I/O PREVIOUS 24 HOURS:   Intake/Output                       08/09/21 0701 - 08/10/21 0700 08/10/21 0701 - 08/11/21 0700     4308-5232 9623-1757 Total 4135-9310 7317-8990 Total                 Intake    I.V.  727  449 1176  198  31 229    Other  332  201 533  101  242 343    Flush/ Irrigation Intake (mL) (NG/OG Tube Nasogastric 16 Fr Left nostril) 332 201 533 101 242 343    NG/GT  414  473 887  253  721 974    Total Intake 1473 1123 2596        Output    Urine  1800  220 2020  1600  2350 3950    Stool  1  -- 1  --  -- --    Total Output 1478 746 8413 1600 2350 3950           NET BALANCE SINCE ADMISSION:  Net IO Since Admission: 1,694.5 mL [08/11/21 0901]     BOWEL MOVEMENTS:  Stool Output  Stool (mL): 1 mL (08/09/21 1430)  Stool Unmeasured Occurrence: 1 (08/10/21 1700)  Bowel Incontinence: Yes (08/10/21 1700)  Stool Amount: large (08/10/21 1700)       PHYSICAL EXAM:  Constitutional:  Well developed, well nourished, intubated, lightly sedated  Eyes:   PERRL, subconjunctival edema, sclera anicteric.  EOMI  HENT:  Atraumatic, external ears normal, nose normal with left NG tube, oral ET tube. Neck-trachea midline  Respiratory:  Diminished bibasilar breath sounds, without rhonchi, occasional bibasilar crackle noted, respirations unlabored without accessory muscle use  Cardiovascular: Normal rate normal rhythm, no murmurs, no gallops, no rubs   GI:  Morbid body habitus, soft, nondistended, normal bowel sounds  :   Deferred  Musculoskeletal:   Generalized edema 2+ and nonpitting, no clubbing or cyanosis  Integument:  Well hydrated, no rash   Neurologic:   Intubated, lightly sedated, no focal deficits noted  Psychiatric:   Calm, cooperative      RESULTS     LABS:  Lab Results (last 24 hours)     Procedure Component Value Units Date/Time    Blood Culture - Blood, Arm, Right [[id number]]  Collected: 08/09/21 0758    Specimen: Blood from Arm, Right Updated: 08/11/21 0815     Blood Culture No growth at 2 days    Blood Culture - Blood, Arm, Left [[id number]] Collected: 08/09/21 0753    Specimen: Blood from Arm, Left Updated: 08/11/21 0815     Blood Culture No growth at 2 days    Fungus Culture - Wash, Bronchus [[id number]]  (Abnormal) Collected: 08/08/21 0944    Specimen: Wash from Bronchus Updated: 08/11/21 0745     Fungus Culture Candida species    POC Glucose Once [[id number]]  (Abnormal) Collected: 08/11/21 0527    Specimen: Blood Updated: 08/11/21 0528     Glucose 129 mg/dL      Comment: Serial Number: [id number]:  [id number]       POC Glucose Once [[id number]]  (Abnormal) Collected: 08/10/21 2334    Specimen: Blood Updated: 08/10/21 2335     Glucose 111 mg/dL      Comment: Serial Number: [id number]:  [id number]       Potassium [[id number]]  (Normal) Collected: 08/10/21 2033    Specimen: Blood Updated: 08/10/21 2052     Potassium 4.3 mmol/L      Comment: Slight hemolysis detected by analyzer. Results may be affected.       POC Glucose Once [[id number]]  (Normal) Collected: 08/10/21 1816    Specimen: Blood Updated: 08/10/21 1817     Glucose 97 mg/dL      Comment: Serial Number: [id number]:  [id number]              RADIOLOGY:  No Radiology Exams Resulted Within Past 24 Hours    ECHOCARDIOGRAM:  Results for orders placed during the hospital encounter of 07/22/21    Adult Transthoracic Echo Limited W/ Cont if Necessary Per Protocol    Interpretation Summary  · Left ventricular systolic function is normal.  · Left ventricular ejection fraction is 55 to 60%  None previous for comparison.     I reviewed the patient's new clinical results.    This note has been scribed by me for pulmonary attending physician.    Electronically signed by Sarah Fajardo MD, 08/11/21 at 09:01 EDT.

## 2021-08-11 NOTE — PROGRESS NOTES
Infectious Diseases Progress Note      LOS: 20 days   Patient Care Team:  Lesia, Ana Known as PCP - General    Chief Complaint: Shortness of breath    Subjective       Patient had no high-grade fever during the last 24 hours.  The patient is currently on 7 L of oxygen via nasal cannula.  The patient is awake and alert      Review of Systems:   Review of Systems   Constitutional: Positive for fatigue.   HENT: Negative.    Eyes: Negative.    Respiratory: Positive for shortness of breath.    Cardiovascular: Negative.    Gastrointestinal: Negative.    Genitourinary: Negative.    Musculoskeletal: Negative.    Skin: Negative.    Neurological: Negative.    Hematological: Negative.    Psychiatric/Behavioral: Negative.         Objective     Vital Signs  Temp:  [97.9 °F (36.6 °C)-98.9 °F (37.2 °C)] 98.1 °F (36.7 °C)  Heart Rate:  [] 70  Resp:  [18-20] 18  BP: (108-131)/(56-78) 117/59    Physical Exam:  Physical Exam  Vitals and nursing note reviewed.   Constitutional:       General: She is not in acute distress.     Appearance: Normal appearance. She is well-developed. She is obese. She is ill-appearing. She is not diaphoretic.   HENT:      Head: Normocephalic and atraumatic.   Eyes:      Pupils: Pupils are equal, round, and reactive to light.   Cardiovascular:      Rate and Rhythm: Normal rate and regular rhythm.      Heart sounds: Normal heart sounds, S1 normal and S2 normal. No murmur heard.     Pulmonary:      Effort: Pulmonary effort is normal. No respiratory distress.      Breath sounds: No stridor. Rales present. No wheezing.   Chest:      Chest wall: No tenderness.   Abdominal:      General: Bowel sounds are normal. There is no distension.      Palpations: Abdomen is soft. There is no mass.      Tenderness: There is no abdominal tenderness. There is no guarding or rebound.      Comments: NG tube   Genitourinary:     Comments: Ma catheter  Musculoskeletal:         General: No swelling, tenderness or  deformity. Normal range of motion.      Cervical back: Normal range of motion and neck supple.   Skin:     General: Skin is warm and dry.      Coloration: Skin is not pale.      Findings: No bruising, erythema or rash.   Neurological:      Mental Status: She is alert and oriented to person, place, and time.      Cranial Nerves: No cranial nerve deficit.          Results Review:    I have reviewed all clinical data, test, lab, and imaging results.     Radiology  No Radiology Exams Resulted Within Past 24 Hours    Cardiology    Laboratory    Results from last 7 days   Lab Units 08/11/21  1221 08/10/21  0411 08/09/21  0518 08/08/21  0505 08/07/21  0507 08/06/21  0441 08/05/21  0355   WBC 10*3/mm3 11.00* 8.20 10.10 16.10* 20.20* 16.90* 19.60*   HEMOGLOBIN g/dL 10.5* 9.6* 8.6* 9.9* 9.9* 9.2* 9.6*   HEMATOCRIT % 32.8* 28.7* 26.2* 30.5* 29.5* 27.6* 28.7*   PLATELETS 10*3/mm3 260 166 172 234 231 255 300     Results from last 7 days   Lab Units 08/10/21  2033 08/10/21  0652 08/09/21  0753 08/08/21  0505 08/07/21  0507 08/06/21  0441 08/05/21  0355   SODIUM mmol/L  --  142 143 140 137 132* 133*   POTASSIUM mmol/L 4.3 3.5 3.8 4.6 4.7 4.8 5.2   CHLORIDE mmol/L  --  99 101 98 95* 90* 89*   CO2 mmol/L  --  34.0* 35.0* 32.0* 32.0* 35.0* 35.0*   BUN mg/dL  --  45* 54* 55* 56* 28* 27*   CREATININE mg/dL  --  0.42* 0.61 0.67 0.85 0.45* 0.42*   GLUCOSE mg/dL  --  125* 138* 192* 209* 203* 209*   ALBUMIN g/dL  --  3.70 3.80 4.00 4.10 4.60 4.50   BILIRUBIN mg/dL  --  0.8 0.9 0.8 0.9 1.2 1.0   ALK PHOS U/L  --  84 83 85 82 73 83   AST (SGOT) U/L  --  61* 64* 39* 47* 72* 82*   ALT (SGPT) U/L  --  160* 180* 133* 158* 197* 173*   CALCIUM mg/dL  --  9.9 10.1 10.2 10.1 9.8 9.9                 Microbiology   Microbiology Results (last 10 days)     Procedure Component Value - Date/Time    Blood Culture - Blood, Arm, Right [[id number]] Collected: 08/09/21 0758    Lab Status: Preliminary result Specimen: Blood from Arm, Right Updated: 08/11/21 0833      Blood Culture No growth at 2 days    Blood Culture - Blood, Arm, Left [[id number]] Collected: 08/09/21 0753    Lab Status: Preliminary result Specimen: Blood from Arm, Left Updated: 08/11/21 0815     Blood Culture No growth at 2 days    Fungus Culture - Wash, Bronchus [[id number]]  (Abnormal) Collected: 08/08/21 0944    Lab Status: Preliminary result Specimen: Wash from Bronchus Updated: 08/11/21 0745     Fungus Culture Candida species    AFB Culture - Wash, Bronchus [[id number]] Collected: 08/08/21 0944    Lab Status: Preliminary result Specimen: Wash from Bronchus Updated: 08/09/21 1016     AFB Stain No acid fast bacilli seen    Respiratory Culture - Wash, Bronchus [[id number]] Collected: 08/08/21 0944    Lab Status: Final result Specimen: Wash from Bronchus Updated: 08/10/21 1209     Respiratory Culture Scant growth (1+) Normal Respiratory Sondra: NO S.aureus/MRSA or Pseudomonas aeruginosa     Gram Stain Many (4+) WBCs per low power field      Rare (1+) Epithelial cells per low power field      Few (2+) Mixed bacterial morphotypes seen on Gram Stain    Respiratory Panel, PCR (WITHOUT COVID) - Wash, Bronchus [[id number]]  (Normal) Collected: 08/08/21 0944    Lab Status: Final result Specimen: Wash from Bronchus Updated: 08/08/21 1127     ADENOVIRUS, PCR Not Detected     Coronavirus 229E Not Detected     Coronavirus HKU1 Not Detected     Coronavirus NL63 Not Detected     Coronavirus OC43 Not Detected     Human Metapneumovirus Not Detected     Human Rhinovirus/Enterovirus Not Detected     Influenza B PCR Not Detected     Parainfluenza Virus 1 Not Detected     Parainfluenza Virus 2 Not Detected     Parainfluenza Virus 3 Not Detected     Parainfluenza Virus 4 Not Detected     Bordetella pertussis pcr Not Detected     Chlamydophila pneumoniae PCR Not Detected     Mycoplasma pneumo by PCR Not Detected     Influenza A PCR Not Detected     RSV, PCR Not Detected     Bordetella parapertussis PCR Not Detected    Narrative:       The coronavirus on the RVP is NOT COVID-19 and is NOT indicative of infection with COVID-19.    In the setting of a positive respiratory panel with a viral infection PLUS a negative procalcitonin without other underlying concern for bacterial infection, consider observing off antibiotics or discontinuation of antibiotics and continue supportive care. If the respiratory panel is positive for atypical bacterial infection (Bordetella pertussis, Chlamydophila pneumoniae, or Mycoplasma pneumoniae), consider antibiotic de-escalation to target atypical bacterial infection.    Urine Culture - Urine, Urine, Catheter [[id number]]  (Abnormal)  (Susceptibility) Collected: 08/07/21 0351    Lab Status: Final result Specimen: Urine, Catheter Updated: 08/09/21 1037     Urine Culture >100,000 CFU/mL Escherichia coli    Susceptibility      Escherichia coli      FITZ      Ampicillin Resistant      Ampicillin + Sulbactam Intermediate      Cefazolin Susceptible      Cefepime Susceptible      Ceftazidime Susceptible      Ceftriaxone Susceptible      Gentamicin Susceptible      Levofloxacin Susceptible      Nitrofurantoin Susceptible      Piperacillin + Tazobactam Susceptible      Tetracycline Susceptible      Trimethoprim + Sulfamethoxazole Susceptible               Linear View                   Respiratory Culture - Sputum, Bronchus [[id number]]  (Abnormal)  (Susceptibility) Collected: 08/04/21 1150    Lab Status: Final result Specimen: Sputum from Bronchus Updated: 08/06/21 0823     Respiratory Culture Scant growth (1+) Klebsiella pneumoniae ssp pneumoniae      Rare Normal Respiratory Sondra: NO S.aureus/MRSA or Pseudomonas aeruginosa     Gram Stain Few (2+) WBCs per low power field      No organisms seen    Susceptibility      Klebsiella pneumoniae ssp pneumoniae      FITZ      Ampicillin Resistant      Ampicillin + Sulbactam Resistant      Cefepime Susceptible      Ceftazidime Susceptible      Ceftriaxone Susceptible      Gentamicin  Susceptible      Levofloxacin Intermediate      Piperacillin + Tazobactam Susceptible      Tetracycline Resistant      Trimethoprim + Sulfamethoxazole Susceptible               Linear View               Susceptibility Comments     Klebsiella pneumoniae ssp pneumoniae    Cefazolin sensitivity will not be reported for Enterobacteriaceae in non-urine isolates. If cefazolin is preferred, please call the microbiology lab to request an E-test.  With the exception of urinary-sourced infections, aminoglycosides should not be used as monotherapy.             Clostridium Difficile EIA - Stool, Per Rectum [[id number]]  (Normal) Collected: 08/03/21 1600    Lab Status: Final result Specimen: Stool from Per Rectum Updated: 08/04/21 0927     C Diff GDH / Toxin Negative    Urine Culture - Urine, Urine, Catheter [[id number]]  (Normal) Collected: 08/03/21 1036    Lab Status: Final result Specimen: Urine, Catheter Updated: 08/04/21 1311     Urine Culture No growth          Medication Review:       Schedule Meds  Acetylcysteine, 600 mg, Oral, BID  aspirin, 324 mg, Per G Tube, Daily  budesonide, 0.5 mg, Nebulization, BID - RT  cefTRIAXone, 2 g, Intravenous, Q24H  cholecalciferol, 2,000 Units, Nasogastric, Daily  enoxaparin, 40 mg, Subcutaneous, Q12H  furosemide, 20 mg, Oral, Daily  guaiFENesin, 400 mg, Nasogastric, Q6H  hydrocortisone, 20 mg, Oral, BID With Meals  insulin glargine, 45 Units, Subcutaneous, Q12H  insulin lispro, 0-24 Units, Subcutaneous, Q6H  insulin lispro, 10 Units, Subcutaneous, Q6H  ipratropium-albuterol, 3 mL, Nebulization, 4x Daily - RT  linezolid, 600 mg, Oral, Q12H  melatonin, 10 mg, Oral, Nightly  metoprolol tartrate, 25 mg, Oral, Q12H  pantoprazole, 40 mg, Intravenous, BID AC  risperiDONE, 0.5 mg, Oral, Nightly  sodium chloride, 10 mL, Intravenous, Q12H        Infusion Meds  Pharmacy to Dose enoxaparin (LOVENOX),         PRN Meds  •  acetaminophen  •  acetaminophen **OR** acetaminophen  •  aluminum-magnesium  hydroxide-simethicone  •  artificial tears  •  artificial tears  •  benzonatate  •  dextrose  •  dextrose  •  [COMPLETED] dilTIAZem **FOLLOWED BY** [] dilTIAZem **FOLLOWED BY** dilTIAZem  •  glucagon (human recombinant)  •  hydrALAZINE  •  insulin lispro **AND** insulin lispro  •  lidocaine  •  lidocaine PF 1%  •  magnesium sulfate **OR** magnesium sulfate in D5W 1g/100mL (PREMIX)  •  nitroglycerin  •  ondansetron **OR** ondansetron  •  oxyCODONE  •  Pharmacy to Dose enoxaparin (LOVENOX)  •  potassium chloride **OR** potassium chloride **OR** potassium chloride  •  potassium phosphate infusion greater than 15 mMoles **OR** potassium phosphate infusion greater than 15 mMoles **OR** potassium phosphate **OR** sodium phosphate IVPB **OR** sodium phosphate IVPB **OR** sodium phosphate IVPB  •  [COMPLETED] Insert peripheral IV **AND** sodium chloride  •  sodium chloride        Assessment/Plan       Antimicrobial Therapy   1.  Zyvox      day  2.  Rocephin      day  3.      Day  4.      Day  5.      Day      Assessment     Severe COVID-19 infection and patient with significant comorbidities including diabetes mellitus and morbid obesity. Apparently did not receive vaccination for COVID-19  COVID-19 screen on admission on 2021 was positive. Was reported that patient had positive COVID-19 diagnosis 6 days prior to admission  The patient had received 5 days of IV remdesivir     Severe respiratory failure and patient was intubated for long duration.  The patient was extubated on August 10, 2021     Probable ventilator associated pneumonia versus hospitalist acquired pneumonia. Sputum culture from 2021 grew MRSA and the organism is susceptible to linezolid and vancomycin  The patient was started on linezolid on 2021  Repeat sputum culture from 2021 is growing Klebsiella pneumoniae     Morbid obesity     Diabetes mellitus     Reactive leukocytosis secondary to steroids.   Improved    UTI, urine culture from August 7, 2021 is growing relatively susceptible strain of E. coli         Plan     Continue Zyvox 600 mg every 12 hours for a total of 14 days and monitor platelets carefully during treatment with Zyvox  Continue Rocephin 2 g every 24 hours for a total of 7 to 10 days  Supportive care  No need for further COVID-19 isolation  Labs in a.m. including : CBC with differential, CMP,  d-dimer, ferritin and CRP  Case was discussed with the patient and her mother at the bedside        Neela Alvarado MD  08/11/21  12:34 EDT     Note is dictated utilizing voice recognition software/Dragon

## 2021-08-11 NOTE — PLAN OF CARE
Goal Outcome Evaluation:  Plan of Care Reviewed With: patient           Outcome Summary: VFSS Completed this date with trials of thin barium by spoon and straw, applesauce, and peaches.  Patient exhibits premature spillage of juice from the peach trial.  Mastication is slow but functional.  Swallow initiation is timely for all consistencies given with no penetration or aspiration appreciated.  Mild pharyngeal residue remains in the valleculae following applesauce and peach trial however this clears with a liquid wash.  Recommend patient initiate a mechanical soft and thin liquid diet.  ST will continue to follow and provide further recommendations as indicated.

## 2021-08-11 NOTE — DISCHARGE PLACEMENT REQUEST
"Leslie Harris (30 y.o. Female)     Date of Birth Social Security Number Address Home Phone MRN    [id number]  [id number] [address] [id number]    [facility] Mar[facility] Status          None Unknown       Admission Date Admission Type Admitting Provider Attending Provider Department, Room/Bed    7/22/21 Emergency Jeff Feng MD Draw, Azmi, MD [facility] INTENSIVE CARE UNIT, 2308/1    Discharge Date Discharge Disposition Discharge Destination                       Attending Provider: Sarah Fajardo MD    Allergies: Sulfa Antibiotics    Isolation: Contact   Infection: MRSA (08/02/21), COVID (History) (08/09/21)   Code Status: CPR    Ht: 165.1 cm (65\")   Wt: 151 kg (333 lb 12.4 oz)    Admission Cmt: None   Principal Problem: Pneumonia due to COVID-19 virus [U07.1,J12.82]                 Active Insurance as of 7/22/2021     Primary Coverage     Payor Plan Insurance Group Employer/Plan Group    [facility] [id number]     Payor Plan Address Payor Plan Phone Number Payor Plan Fax Number Effective Dates    [address] [address]   2/1/2020 - None Entered    [facility] [id number]       Subscriber Name Subscriber Birth Date Member ID       LESLIE HARRIS 1990 [id number]           Secondary Coverage     Payor Plan Insurance Group Employer/Plan Group    MEDICA[place]      Payor Plan Address Payor Plan Phone Number Payor Plan Fax Number Effective Dates       7/22/2021 - None Entered    Subscriber Name Subscriber Birth Date Member ID       LESLIE HARRIS 1990 [id number]                 Emergency Contacts      (Rel.) Home Phone Work Phone Mobile Phone    Shruthi Cristina (Mother) -- -- [phone]              "

## 2021-08-11 NOTE — THERAPY EVALUATION
Patient Name: Leslie Harris  : 1990    MRN: [id number]                              Today's Date: 2021       Admit Date: 2021    Visit Dx:     ICD-10-CM ICD-9-CM   1. Dyspnea, unspecified type  R06.00 786.09   2. Pneumonia due to COVID-19 virus  U07.1 480.8    J12.82 079.89   3. Acute respiratory failure with hypoxia (CMS/[facility])  J96.01 518.81   4. Hypotension due to drugs  I95.2 458.8     E947.9   5. Acute respiratory failure due to COVID-19 (CMS/HCC)  U07.1 518.81    J96.00 079.89     Patient Active Problem List   Diagnosis   • Acute respiratory failure due to COVID-19 (CMS/HCC)   • Class 3 severe obesity without serious comorbidity with body mass index (BMI) of 50.0 to 59.9 in adult   • Pneumonia due to COVID-19 virus   • Hyperglycemia, likely stress induced   • Diabetes mellitus type 2 in obese (CMS/HCC)     Past Medical History:   Diagnosis Date   • Class 3 severe obesity without serious comorbidity with body mass index (BMI) of 50.0 to 59.9 in adult 2021   • Diabetes mellitus type 2 in obese (CMS/HCC) 2021     Past Surgical History:   Procedure Laterality Date   • BRONCHOSCOPY N/A 2021    Procedure: BRONCHOSCOPY with bilateral bronchial washing;  Surgeon: Jeff Feng MD;  Location: [facility];  Service: Pulmonary;  Laterality: N/A;  pre: respiratory failure, covid-19 pneumonia  post: respiratory failure, COVID-19 pneumonia   • CHOLECYSTECTOMY     • EXPLORATORY LAPAROTOMY W/ BOWEL RESECTION  2018    Due to complication of cholecystectomy in which patient had an accidental perforation of small bowel intraoperatively.     General Information     Row Name 21 1304          Physical Therapy Time and Intention    Document Type  evaluation  -HC     Mode of Treatment  physical therapy  -HC     Row Name 21 1525          General Information    Patient Profile Reviewed  yes  -HC     Prior Level of Function  independent: working full time  -HC     Existing  Precautions/Restrictions  oxygen therapy device and L/min;fall  -     Row Name 08/11/21 1304          Living Environment    Lives With  parent(s);other relative(s) resides with mother and two other family members  -     Row Name 08/11/21 1304          Home Main Entrance    Number of Stairs, Main Entrance  one  -     Row Name 08/11/21 1304          Cognition    Orientation Status (Cognition)  oriented x 3  -     Row Name 08/11/21 1304          Safety Issues, Functional Mobility    Safety Issues Affecting Function (Mobility)  insight into deficits/self-awareness;safety precaution awareness  -     Impairments Affecting Function (Mobility)  balance;postural/trunk control;coordination;endurance/activity tolerance;shortness of breath;strength;motor control;range of motion (ROM)  -       User Key  (r) = Recorded By, (t) = Taken By, (c) = Cosigned By    Initials Name Provider Type    HC Adeline Payne, PT Physical Therapist        Mobility     Row Name 08/11/21 1305          Bed Mobility    Bed Mobility  supine-sit;sit-supine;rolling right;rolling left  -     Rolling Left [place] (Bed Mobility)  2 person assist;maximum assist (25% patient effort)  -     Rolling Right [place] (Bed Mobility)  2 person assist;dependent (less than 25% patient effort)  -     Supine-Sit [place] (Bed Mobility)  2 person assist;dependent (less than 25% patient effort)  -     Sit-Supine [place] (Bed Mobility)  2 person assist;dependent (less than 25% patient effort)  -     Row Name 08/11/21 1305          Bed-Chair Transfer    Bed-Chair [place] (Transfers)  unable to assess  -     Row Name 08/11/21 1305          Sit-Stand Transfer    Sit-Stand [place] (Transfers)  unable to assess  -     Row Name 08/11/21 1305          Gait/Stairs (Locomotion)    [place] Level (Gait)  unable to assess  -       User Key  (r) = Recorded By, (t) = Taken By, (c) = Cosigned By    Initials Name Provider Type     HC Adeline Payne, PT Physical Therapist        Obj/Interventions     Row Name 08/11/21 1305          Range of Motion Comprehensive    Comment, General Range of Motion  PROM BLES WFL  -HC     Row Name 08/11/21 1305          Strength Comprehensive (MMT)    Comment, General Manual Muscle Testing (MMT) Assessment  BLEs 2-/5  -HC     Row Name 08/11/21 1305          Balance    Balance Assessment  sitting static balance;sitting dynamic balance  -HC     Static Sitting Balance  severe impairment  -HC     Dynamic Sitting Balance  severe impairment  -HC     Comment, Balance  Pt able to sit EOB x10 minutes with modA-maxA for sitting balance due to global weakness  -       User Key  (r) = Recorded By, (t) = Taken By, (c) = Cosigned By    Initials Name Provider Type    HC Adeline Payne, PT Physical Therapist        Goals/Plan     Row Name 08/11/21 1308          Bed Mobility Goal 1 (PT)    Activity/Assistive Device (Bed Mobility Goal 1, PT)  bed mobility activities, all  -HC     [place] Level/Cues Needed (Bed Mobility Goal 1, PT)  moderate assist (50-74% patient effort)  -HC     Time Frame (Bed Mobility Goal 1, PT)  2 weeks  -     Row Name 08/11/21 1308          Transfer Goal 1 (PT)    Activity/Assistive Device (Transfer Goal 1, PT)  transfers, all  -HC     [place] Level/Cues Needed (Transfer Goal 1, PT)  2 person assist;moderate assist (50-74% patient effort)  -HC     Time Frame (Transfer Goal 1, PT)  2 weeks  -     Row Name 08/11/21 1308          Patient Education Goal (PT)    Activity (Patient Education Goal, PT)  Pt will be able to sit EOB x10 minutes with CGA to participate in daily activities.  -HC     Time Frame (Patient Education Goal, PT)  2 weeks  -       User Key  (r) = Recorded By, (t) = Taken By, (c) = Cosigned By    Initials Name Provider Type    HC Adeline Payne, PT Physical Therapist        Clinical Impression     Row Name 08/11/21 1306          Pain    Additional Documentation   Pain Scale: FACES Pre/Post-Treatment (Group)  -HC     Row Name 08/11/21 1306          Pain Scale: FACES Pre/Post-Treatment    Pain: FACES Scale, Pretreatment  0-->no hurt  -HC     Posttreatment Pain Rating  0-->no hurt  -HC     Row Name 08/11/21 1306          Plan of Care Review    Outcome Summary  Pt is 29 yo female admitted for SOA, acute resp failure, PNA, Covid (+).  Pt intubated due to acute resp failure on 7/23, s/p bronch on 8/8, and extubated 8/10.  PMH: obesity.  -HC     Row Name 08/11/21 1306          Therapy Assessment/Plan (PT)    Patient/Family Therapy Goals Statement (PT)  increase strength  -HC     Rehab Potential (PT)  fair, will monitor progress closely  -     Criteria for Skilled Interventions Met (PT)  yes;skilled treatment is necessary  -     Predicted Duration of Therapy Intervention (PT)  until d/c  -HC     Row Name 08/11/21 1306          Vital Signs    Pre Systolic BP Rehab  117  -HC     Pre Treatment Diastolic BP  61  -HC     Intra Systolic BP Rehab  131  -HC     Intra Treatment Diastolic BP  108  -HC     Post Systolic BP Rehab  120  -HC     Post Treatment Diastolic BP  78  -HC     Posttreatment Heart Rate (beats/min)  79  -HC     Intra SpO2 (%)  88  -HC     O2 Delivery Intra Treatment  hi-flow 7L  -HC     Post SpO2 (%)  95  -HC     O2 Delivery Post Treatment  hi-flow 7L  -HC     Pre Patient Position  Supine  -HC     Intra Patient Position  Sitting  -HC     Post Patient Position  Supine  -HC     Row Name 08/11/21 1306          Positioning and Restraints    Pre-Treatment Position  in bed  -HC     Post Treatment Position  bed  -HC     In Bed  notified nsg;call light within reach;encouraged to call for assist;exit alarm on  -HC       User Key  (r) = Recorded By, (t) = Taken By, (c) = Cosigned By    Initials Name Provider Type    HC Adeline Payne, PT Physical Therapist        Outcome Measures     Row Name 08/11/21 1308          How much help from another person do you currently need...     Turning from your back to your side while in flat bed without using bedrails?  2  -HC     Moving from lying on back to sitting on the side of a flat bed without bedrails?  1  -HC     Moving to and from a bed to a chair (including a wheelchair)?  1  -HC     Standing up from a chair using your arms (e.g., wheelchair, bedside chair)?  1  -HC     Climbing 3-5 steps with a railing?  1  -HC     To walk in hospital room?  1  -     AM-PAC 6 Clicks Score (PT)  7  -     Row Name 08/11/21 1308          Modified [place] Scale    Modified [place] Scale  5 - Severe disability.  Bedridden, incontinent, and requiring constant nursing care and attention.  -     Row Name 08/11/21 1308          Functional Assessment    Outcome Measure Options  AM-PAC 6 Clicks Basic Mobility (PT);Modified [place]  -       User Key  (r) = Recorded By, (t) = Taken By, (c) = Cosigned By    Initials Name Provider Type     Adeline Payne, PT Physical Therapist                       Physical Therapy Education                 Title: PT OT SLP Therapies (In Progress)     Topic: Physical Therapy (In Progress)     Point: Mobility training (In Progress)     Learning Progress Summary           Patient Acceptance, E, NR by  at 8/11/2021 1310                   Point: Precautions (In Progress)     Learning Progress Summary           Patient Acceptance, E, NR by  at 8/11/2021 1310                               User Key     Initials Effective Dates Name Provider Type Discipline     06/16/21 -  Adeline Payne, PT Physical Therapist PT              PT Recommendation and Plan  Planned Therapy Interventions (PT): balance training, bed mobility training, gait training, neuromuscular re-education, ROM (range of motion), patient/family education, postural re-education, transfer training, strengthening, motor coordination training  Plan of Care Reviewed With: patient  Outcome Summary: Pt is 29 yo female admitted for SOA, acute resp failure, PNA, Covid (+).   Pt intubated due to acute resp failure on 7/23, s/p bronch on 8/8, and extubated 8/10.  PMH: obesity.  Pt presents with global weakness requiring depAx2 for supine <-> sit and maxAx2 for rolling side <-> side.  Pt requiring modA-maxA to sit EOB x10 minutes and fatigues due to weakness.  Pt far from functional mobility baseline and will need extensive IP rehab to address deficits.  PT will follow 3x/week while at [facility].  Attempt transfer to chair when appropriate.     Time Calculation:   PT Charges     Row Name 08/11/21 1312             Time Calculation    Start Time  0946  -      Stop Time  1015  -      Time Calculation (min)  29 min  -      PT Received On  08/11/21  -      PT - Next Appointment  08/13/21  -      PT Goal Re-Cert Due Date  08/25/21  -         Time Calculation- PT    Total Timed Code Minutes- PT  15 minute(s)  -        User Key  (r) = Recorded By, (t) = Taken By, (c) = Cosigned By    Initials Name Provider Type     Adeline Payne, PT Physical Therapist        Therapy Charges for Today     Code Description Service Date Service Provider Modifiers Qty    [id number]  PT EVAL MOD COMPLEXITY 3 8/11/2021 Adeline Payne, PT GP 1    [id number]  PT NEUROMUSC RE EDUCATION EA 15 MIN 8/11/2021 Adeline Payne, PT GP 1          PT G-Codes  Outcome Measure Options: AM-PAC 6 Clicks Basic Mobility (PT), [place]  AM-PAC 6 Clicks Score (PT): 7  Modified Jhonny Scale: 5 - Severe disability.  Bedridden, incontinent, and requiring constant nursing care and attention.    Adeline Payne, PT  8/11/2021

## 2021-08-11 NOTE — THERAPY EVALUATION
Patient Name: Leslie Harris  : 1990    MRN: [id number]                              Today's Date: 2021       Admit Date: 2021    Visit Dx:     ICD-10-CM ICD-9-CM   1. Dyspnea, unspecified type  R06.00 786.09   2. Pneumonia due to COVID-19 virus  U07.1 480.8    J12.82 079.89   3. Acute respiratory failure with hypoxia (CMS/[facility])  J96.01 518.81   4. Hypotension due to drugs  I95.2 458.8     E947.9   5. Acute respiratory failure due to COVID-19 (CMS/HCC)  U07.1 518.81    J96.00 079.89     Patient Active Problem List   Diagnosis   • Acute respiratory failure due to COVID-19 (CMS/HCC)   • Class 3 severe obesity without serious comorbidity with body mass index (BMI) of 50.0 to 59.9 in adult   • Pneumonia due to COVID-19 virus   • Hyperglycemia, likely stress induced   • Diabetes mellitus type 2 in obese (CMS/HCC)     Past Medical History:   Diagnosis Date   • Class 3 severe obesity without serious comorbidity with body mass index (BMI) of 50.0 to 59.9 in adult 2021   • Diabetes mellitus type 2 in obese (CMS/HCC) 2021     Past Surgical History:   Procedure Laterality Date   • BRONCHOSCOPY N/A 2021    Procedure: BRONCHOSCOPY with bilateral bronchial washing;  Surgeon: Jeff Feng MD;  Location: [facility];  Service: Pulmonary;  Laterality: N/A;  pre: respiratory failure, covid-19 pneumonia  post: respiratory failure, COVID-19 pneumonia   • CHOLECYSTECTOMY     • EXPLORATORY LAPAROTOMY W/ BOWEL RESECTION  2018    Due to complication of cholecystectomy in which patient had an accidental perforation of small bowel intraoperatively.     General Information     Row Name 21 1323          OT Time and Intention    Document Type  evaluation  -ES     Mode of Treatment  occupational therapy  -ES     Row Name 21 1323          General Information    Patient Profile Reviewed  yes  -ES     Prior Level of Function  independent:;ADL's;driving  -ES     Existing Precautions/Restrictions   oxygen therapy device and L/min;fall  -ES     Row Name 08/11/21 1323          Occupational Profile    Reason for Services/Referral (Occupational Profile)  Pt is 31 yo female admitted for SOA, acute resp failure, PNA, Covid (+).  Pt intubated due to acute resp failure on 7/23, s/p bronch on 8/8, and extubated 8/10.  Pt reports at baseline she lives with mother and brother with Asperger Syndrome. She is independent, driving, and works 2 full-time jobs to [employer] family.  -ES     Row Name 08/11/21 1323          Living Environment    Lives With  parent(s);sibling(s)  -ES     Row Name 08/11/21 1323          Cognition    Orientation Status (Cognition)  oriented x 3  -ES     Row Name 08/11/21 1323          Safety Issues, Functional Mobility    Safety Issues Affecting Function (Mobility)  insight into deficits/self-awareness;safety precautions follow-through/compliance;safety precaution awareness;awareness of need for assistance;positioning of assistive device  -ES     Impairments Affecting Function (Mobility)  balance;postural/trunk control;coordination;endurance/activity tolerance;shortness of breath;strength;motor control;range of motion (ROM)  -ES       User Key  (r) = Recorded By, (t) = Taken By, (c) = Cosigned By    Initials Name Provider Type    Dafne Villareal OT Occupational Therapist          Mobility/ADL's     Row Name 08/11/21 1326          Bed Mobility    Bed Mobility  supine-sit;sit-supine;rolling right;rolling left  -ES     Rolling Left [place] (Bed Mobility)  2 person assist;maximum assist (25% patient effort)  -ES     Rolling Right [place] (Bed Mobility)  2 person assist;dependent (less than 25% patient effort)  -ES     Supine-Sit [place] (Bed Mobility)  2 person assist;dependent (less than 25% patient effort)  -ES     Sit-Supine [place] (Bed Mobility)  2 person assist;dependent (less than 25% patient effort)  -ES     Row Name 08/11/21 1326          Transfers    Bed-Chair  [place] (Transfers)  unable to assess  -ES     Sit-Stand [place] (Transfers)  unable to assess  -ES     Row Name 08/11/21 1326          Activities of Daily Living    BADL Assessment/Intervention  upper body dressing;lower body dressing;toileting  -ES     Row Name 08/11/21 1326          Upper Body Dressing Assessment/Training    [place] Level (Upper Body Dressing)  dependent (less than 25% patient effort)  -ES     Row Name 08/11/21 1326          Lower Body Dressing Assessment/Training    [place] Level (Lower Body Dressing)  dependent (less than 25% patient effort)  -ES     Row Name 08/11/21 1326          Toileting Assessment/Training    [place] Level (Toileting)  dependent (less than 25% patient effort)  -ES       User Key  (r) = Recorded By, (t) = Taken By, (c) = Cosigned By    Initials Name Provider Type    Dafne Villareal OT Occupational Therapist        Obj/Interventions     [facility] Name 08/11/21 1328          Sensory Assessment (Somatosensory)    Sensory Assessment (Somatosensory)  sensation intact  -ES     Row Name 08/11/21 1328          Vision Assessment/Intervention    Visual Impairment/Limitations  WFL significant nystagmus noted in rolling  -ES     Row Name 08/11/21 1328          Range of Motion Comprehensive    General Range of Motion  bilateral upper extremity ROM WNL  -ES     Comment, General Range of Motion  BUE PROM WFL  -ES     Row Name 08/11/21 1328          Strength Comprehensive (MMT)    Comment, General Manual Muscle Testing (MMT) Assessment  BUE 2/5 elbows/wrist/hand. B shoulder weakness, with only trace movements. Questionable rotator cuff injury to LUE with difficulty initiating movement.  -ES     Row Name 08/11/21 1328          Balance    Balance Assessment  sitting static balance;sitting dynamic balance  -ES     Static Sitting Balance  severe impairment  -ES     Dynamic Sitting Balance  severe impairment  -ES     Balance Interventions   sitting;standing;static;dynamic  -ES     Comment, Balance  EOB 10 minuts with Mod - Max for maintaining seated position.  -ES       User Key  (r) = Recorded By, (t) = Taken By, (c) = Cosigned By    Initials Name Provider Type    Dafne Villareal OT Occupational Therapist        Goals/Plan     Row Name 08/11/21 1330          Transfer Goal 1 (OT)    Activity/Assistive Device (Transfer Goal 1, OT)  sit-to-stand/stand-to-sit  -ES     [place] Level/Cues Needed (Transfer Goal 1, OT)  maximum assist (25-49% patient effort)  -ES     Time Frame (Transfer Goal 1, OT)  2 weeks  -ES     Row Name 08/11/21 1333          Self-Feeding Goal 1 (OT)    Activity/Device (Self-Feeding Goal 1, OT)  self-feeding skills, all  -ES     [place] Level/Cues Needed (Self-Feeding Goal 1, OT)  moderate assist (50-74% patient effort)  -ES     Time Frame (Self-Feeding Goal 1, OT)  2 weeks  -ES     Row Name 08/11/21 1330          Strength Goal 1 (OT)    Strength Goal 1 (OT)  BUE elbows 3/5  -ES     Time Frame (Strength Goal 1, OT)  2 weeks  -ES     Row Name 08/11/21 1333          Therapy Assessment/Plan (OT)    Planned Therapy Interventions (OT)  activity tolerance training;BADL retraining;functional balance retraining;manual therapy/joint mobilization;neuromuscular control/coordination retraining;occupation/activity based interventions;passive ROM/stretching;patient/caregiver education/training;ROM/therapeutic exercise;strengthening exercise;transfer/mobility retraining  -ES       User Key  (r) = Recorded By, (t) = Taken By, (c) = Cosigned By    Initials Name Provider Type    Dafne Villareal OT Occupational Therapist        Clinical Impression     Row Name 08/11/21 5134          Pain Scale: FACES Pre/Post-Treatment    Pain: FACES Scale, Pretreatment  0-->no hurt  -ES     Posttreatment Pain Rating  0-->no hurt  -ES     Row Name 08/11/21 5743          Plan of Care Review    Plan of Care Reviewed With  patient  -ES     Outcome  Summary  Pt is 29 yo female admitted for SOA, acute resp failure, PNA, Covid (+).  Pt intubated due to acute resp failure on 7/23, s/p bronch on 8/8, and extubated 8/10.  Pt reports at baseline she lives with mother and brother with Asperger Syndrome. 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 Patient demos siginficant weakness date, with 2/5 BUE strength in elbows and grasp and 1/5 in shoulders. Questionable RTC injury, new onset. Sits EOB 10 minutes with mod-max A. Posterior and lateral leans noted, and patient has nystagmus upon return to supine position. Max A x2 for rolling side<>side for bathing and sheet changes. Far below stated baseline, and will require IP rehab at discharge.  -ES     Row Name 08/11/21 1339          Therapy Assessment/Plan (OT)    Rehab Potential (OT)  fair, will monitor progress closely  -ES     Criteria for Skilled Therapeutic Interventions Met (OT)  yes  -ES     Therapy Frequency (OT)  5 times/wk  -ES     Row Name 08/11/21 1335          Therapy Plan Review/Discharge Plan (OT)    Anticipated Discharge Disposition (OT)  inpatient rehabilitation facility  -ES     Row Name 08/11/21 4028          Vital Signs    Pre Systolic BP Rehab  117  -ES     Pre Treatment Diastolic BP  61  -ES     Intra Systolic BP Rehab  131  -ES     Intra Treatment Diastolic BP  108  -ES     Post Systolic BP Rehab  120  -ES     Post Treatment Diastolic BP  78  -ES     Pre SpO2 (%)  94  -ES     O2 Delivery Pre Treatment  hi-flow  -ES     Intra SpO2 (%)  88  -ES     O2 Delivery Intra Treatment  hi-flow 7L  -ES     Post SpO2 (%)  95  -ES     O2 Delivery Post Treatment  hi-flow  -ES     Pre Patient Position  Supine  -ES     Intra Patient Position  Sitting  -ES     Post Patient Position  Supine  -ES     Row Name 08/11/21 1835          Positioning and Restraints    Pre-Treatment Position  in bed  -ES     Post Treatment Position  bed  -ES     In Bed  notified nsg;call light within  reach;encouraged to call for assist;exit alarm on  -ES       User Key  (r) = Recorded By, (t) = Taken By, (c) = Cosigned By    Initials Name Provider Type    Dafne Villareal OT Occupational Therapist        Outcome Measures     Row Name 08/11/21 1340          How much help from another is currently needed...    Putting on and taking off regular lower body clothing?  1  -ES     Bathing (including washing, rinsing, and drying)  1  -ES     Toileting (which includes using toilet bed pan or urinal)  1  -ES     Putting on and taking off regular upper body clothing  1  -ES     Taking care of personal grooming (such as brushing teeth)  2  -ES     Eating meals  1  -ES     AM-PAC 6 Clicks Score (OT)  7  -ES     Row Name 08/11/21 1308          How much help from another person do you currently need...    Turning from your back to your side while in flat bed without using bedrails?  2  -HC     Moving from lying on back to sitting on the side of a flat bed without bedrails?  1  -HC     Moving to and from a bed to a chair (including a wheelchair)?  1  -HC     Standing up from a chair using your arms (e.g., wheelchair, bedside chair)?  1  -HC     Climbing 3-5 steps with a railing?  1  -HC     To walk in hospital room?  1  -HC     AM-PAC 6 Clicks Score (PT)  7  -HC     Row Name 08/11/21 1308          Modified Guthrie Scale    Modified Jhonny Scale  5 - Severe disability.  Bedridden, incontinent, and requiring constant nursing care and attention.  -     Row Name 08/11/21 1340 08/11/21 1308       Functional Assessment    Outcome Measure Options  AM-PAC 6 Clicks Daily Activity (OT)  -ES  AM-PAC 6 Clicks Basic Mobility (PT);Modified Guthrie  -      User Key  (r) = Recorded By, (t) = Taken By, (c) = Cosigned By    Initials Name Provider Type    Dafne Villareal OT Occupational Therapist    HC Adeline Payne, PT Physical Therapist          Occupational Therapy Education                 Title: PT OT SLP Therapies (In  Progress)     Topic: Occupational Therapy (In Progress)     Point: ADL training (In Progress)     Description:   Instruct learner(s) on proper safety adaptation and remediation techniques during self care or transfers.   Instruct in proper use of assistive devices.              Learning Progress Summary           Patient Acceptance, E,TB, NR by ES at 8/11/2021 1342                   Point: Home exercise program (Not Started)     Description:   Instruct learner(s) on appropriate technique for monitoring, assisting and/or progressing therapeutic exercises/activities.              Learner Progress:  Not documented in this visit.          Point: Precautions (Not Started)     Description:   Instruct learner(s) on prescribed precautions during self-care and functional transfers.              Learner Progress:  Not documented in this visit.          Point: Body mechanics (In Progress)     Description:   Instruct learner(s) on proper positioning and spine alignment during self-care, functional mobility activities and/or exercises.              Learning Progress Summary           Patient Acceptance, E,TB, NR by ES at 8/11/2021 1342                               User Key     Initials Effective Dates Name Provider Type Discipline     06/16/21 -  Dafne Lott OT Occupational Therapist OT              OT Recommendation and Plan  Planned Therapy Interventions (OT): activity tolerance training, BADL retraining, functional balance retraining, manual therapy/joint mobilization, neuromuscular control/coordination retraining, occupation/activity based interventions, passive ROM/stretching, patient/caregiver education/training, ROM/therapeutic exercise, strengthening exercise, transfer/mobility retraining  Therapy Frequency (OT): 5 times/wk  Plan of Care Review  Plan of Care Reviewed With: patient  Outcome Summary: Pt is 31 yo female admitted for SOA, acute resp failure, PNA, Covid (+).  Pt intubated due to acute resp failure  on 7/23, s/p bronch on 8/8, and extubated 8/10.  Pt reports at baseline she lives with mother and brother with Asperger Syndrome. 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 Patient demos siginficant weakness date, with 2/5 BUE strength in elbows and grasp and 1/5 in shoulders. Questionable RTC injury, new onset. Sits EOB 10 minutes with mod-max A. Posterior and lateral leans noted, and patient has nystagmus upon return to supine position. Max A x2 for rolling side<>side for bathing and sheet changes. Far below stated baseline, and will require IP rehab at discharge.     Time Calculation:     Therapy Charges for Today     Code Description Service Date Service Provider Modifiers Qty    41640893304 HC OT SELF CARE/MGMT/TRAIN EA 15 MIN 8/11/2021 Dafne Lott OT GO 1    [id number]  OT EVAL HIGH COMPLEXITY 4 8/11/2021 Dafne Lott OT GO 1               Dafne Lott OT  8/11/2021

## 2021-08-11 NOTE — PLAN OF CARE
Goal Outcome Evaluation:            Tolerating 5L HF. Passed video swallow, awaiting appropriate orders to be placed by speech therapy. TF continued to provide nutrition. Steroids were decreased today. Pt experiencing some delirium that has improved since going to Xray for Video swallow.

## 2021-08-11 NOTE — THERAPY EVALUATION
Acute Care - Speech Language Pathology   Swallow Initial Evaluation [facility]     Patient Name: Leslie Harris  : 1990  MRN: [id number]  Today's Date: 2021               Admit Date: 2021    Visit Dx:     ICD-10-CM ICD-9-CM   1. Dyspnea, unspecified type  R06.00 786.09   2. Pneumonia due to COVID-19 virus  U07.1 480.8    J12.82 079.89   3. Acute respiratory failure with hypoxia (CMS/HCC)  J96.01 518.81   4. Hypotension due to drugs  I95.2 458.8     E947.9   5. Acute respiratory failure due to COVID-19 (CMS/HCC)  U07.1 518.81    J96.00 079.89     Patient Active Problem List   Diagnosis   • Acute respiratory failure due to COVID-19 (CMS/HCC)   • Class 3 severe obesity without serious comorbidity with body mass index (BMI) of 50.0 to 59.9 in adult   • Pneumonia due to COVID-19 virus   • Hyperglycemia, likely stress induced   • Diabetes mellitus type 2 in obese (CMS/HCC)     Past Medical History:   Diagnosis Date   • Class 3 severe obesity without serious comorbidity with body mass index (BMI) of 50.0 to 59.9 in adult 2021   • Diabetes mellitus type 2 in obese (CMS/HCC) 2021     Past Surgical History:   Procedure Laterality Date   • BRONCHOSCOPY N/A 2021    Procedure: BRONCHOSCOPY with bilateral bronchial washing;  Surgeon: Jeff Feng MD;  Location: [facility];  Service: Pulmonary;  Laterality: N/A;  pre: respiratory failure, covid-19 pneumonia  post: respiratory failure, COVID-19 pneumonia   • CHOLECYSTECTOMY     • EXPLORATORY LAPAROTOMY W/ BOWEL RESECTION      Due to complication of cholecystectomy in which patient had an accidental perforation of small bowel intraoperatively.       SLP Recommendation and Plan  SLP Swallowing Diagnosis: R/O pharyngeal dysphagia  SLP Diet Recommendation: NPO     SLP Rec. for Method of Medication Administration: meds via alternate route        Recommended Diagnostics: VFSS (MBS)  Swallow Criteria for Skilled Therapeutic Interventions Met:  demonstrates skilled criteria     Rehab Potential/Prognosis, Swallowing: good, to achieve stated therapy goals  Therapy Frequency (Swallow): PRN  Predicted Duration Therapy Intervention (Days): until discharge                                   SWALLOW EVALUATION (last 72 hours)      SLP Adult Swallow Evaluation     Row Name 08/11/21 1200          Document Type  evaluation  -    Subjective Information  no complaints  -MF    Patient Observations  alert;cooperative;agree to therapy  -MF    Patient Effort  good  -MF          Patient Profile Reviewed  yes  -MF    Pertinent History Of Current Problem  Pt is a 29 yo female who presented to [facility] ED on 7/22/2021 with complaint of being recently diagnosed with COVID-19 at the [facility] 6 days prior to admission.  Patient reports that she has been having increased shortness of breath over the past couple days, and has not lost her sense of smell, but has lost her sense of taste, reports frequent, nonproductive cough, myalgias, fevers, and chills. Pt intubated from 7/23-8/10 @ 0948.   -MF    Current Method of Nutrition  nasogastric feedings  -    Prior Level of Function-Swallowing  no diet consistency restrictions  -    Plans/Goals Discussed with  patient  -MF          Additional Documentation  Pain Scale: FACES Pre/Post-Treatment (Group)  -          Pain: FACES Scale, Pretreatment  0-->no hurt  -    Posttreatment Pain Rating  0-->no hurt  -          Clinical Swallow Evaluation Summary  Pt participated in clinical swallow evaluation s/p COVID and prolonged intubation. Pt with hoarse vocal quality however 100% intelligible. Pt able to follow all commands and participated in conversation appropriately. Pt currently has NGT with TFs. RN reports she had just completed oral care on pt. Oral mech appears WFL. Pt accepted trials of ice chips and thins via spoon, cup, and straw. Pt able to hold cup with assistance but unable to bring cup  to oral cavity independently 2/2 weakness. Oral phase appears functional for trials given. Pt with mildly wet vocal quality after consecutive sips of thins via straw. However no further overt s/s of aspiration or changes in vitals observed.     Due prolonged vent, REC: continue NPO with alternate means of meds and nutrition, VFSS (planned for later this afternoon), and ice chips with assistance/supervision. Discussed recs with pt and RN who both verbalized understanding and agreement.   -          SLP Swallowing Diagnosis  R/O pharyngeal dysphagia  -    Functional Impact  risk of aspiration/pneumonia  -    Rehab Potential/Prognosis, Swallowing  good, to achieve stated therapy goals  -    Swallow Criteria for Skilled Therapeutic Interventions Met  demonstrates skilled criteria  -          Therapy Frequency (Swallow)  PRN  -    Predicted Duration Therapy Intervention (Days)  until discharge  -    SLP Diet Recommendation  NPO  -    Recommended Diagnostics  VFSS (MBS)  -    Oral Care Recommendations  Oral Care BID/PRN  -    SLP Rec. for Method of Medication Administration  meds via alternate route  -          Oral Nutrition/Hydration Goal Selection (SLP)  oral nutrition/hydration, SLP goal 1  -          Oral Nutrition/Hydration Goal 1, SLP  Pt will participate in VFSS to objectively assess swallow function and safety and to determine safest/least restrictive diet.  -    Time Frame (Oral Nutrition/Hydration Goal 1, SLP)  1 day  -      User Key  (r) = Recorded By, (t) = Taken By, (c) = Cosigned By    Initials Name Effective Dates    Ne Scott SLP 06/16/21 -           EDUCATION  The patient has been educated in the following areas:   Dysphagia (Swallowing Impairment) NPO rationale.       SLP GOALS     Row Name 08/11/21 1200             Oral Nutrition/Hydration Goal 1 (SLP)    Oral Nutrition/Hydration Goal 1, SLP  Pt will participate in VFSS to objectively assess swallow function and  safety and to determine safest/least restrictive diet.  -GENA      Time Frame (Oral Nutrition/Hydration Goal 1, SLP)  1 day  -MF        User Key  (r) = Recorded By, (t) = Taken By, (c) = Cosigned By    Initials Name Provider Type    Ne Scott SLP Speech and Language Pathologist             Time Calculation:       Therapy Charges for Today     Code Description Service Date Service Provider Modifiers Qty    [id number] HC ST EVAL ORAL PHARYNG SWALLOW 4 8/11/2021 Ne Romero SLP GN 1               VIVIAN Boyle  8/11/2021

## 2021-08-11 NOTE — PLAN OF CARE
Problem: Adult Inpatient Plan of Care  Goal: Plan of Care Review  Outcome: Ongoing, Progressing  Flowsheets  Taken 8/11/2021 1310  Plan of Care Reviewed With: patient  Outcome Summary: Pt is 29 yo female admitted for SOA, acute resp failure, PNA, Covid (+).  Pt intubated due to acute resp failure on 7/23, s/p bronch on 8/8, and extubated 8/10.  PMH: obesity.  Pt presents with global weakness requiring depAx2 for supine <-> sit and maxAx2 for rolling side <-> side.  Pt requiring modA-maxA to sit EOB x10 minutes and fatigues due to weakness.  Pt far from functional mobility baseline and will need extensive IP rehab to address deficits.  PT will follow 3x/week while at [facility].  Attempt transfer to chair when appropriate.

## 2021-08-12 PROBLEM — E66.813 CLASS 3 SEVERE OBESITY WITHOUT SERIOUS COMORBIDITY WITH BODY MASS INDEX (BMI) OF 50.0 TO 59.9 IN ADULT: Chronic | Status: ACTIVE | Noted: 2021-07-22

## 2021-08-12 PROBLEM — R41.0 DELIRIUM, ACUTE: Status: ACTIVE | Noted: 2021-08-12

## 2021-08-12 PROBLEM — R13.10 DYSPHAGIA: Status: ACTIVE | Noted: 2021-08-10

## 2021-08-12 PROBLEM — J15.212 MRSA PNEUMONIA: Status: ACTIVE | Noted: 2021-07-29

## 2021-08-12 PROBLEM — J15.0 KLEBSIELLA PNEUMONIAE PNEUMONIA: Status: ACTIVE | Noted: 2021-08-04

## 2021-08-12 PROBLEM — N39.0 E. COLI UTI (URINARY TRACT INFECTION): Status: ACTIVE | Noted: 2021-08-07

## 2021-08-12 PROBLEM — G62.81 CRITICAL ILLNESS POLYNEUROPATHY: Status: ACTIVE | Noted: 2021-08-12

## 2021-08-12 PROBLEM — I10 HYPERTENSION: Status: ACTIVE | Noted: 2021-07-22

## 2021-08-12 PROBLEM — E66.01 CLASS 3 SEVERE OBESITY WITHOUT SERIOUS COMORBIDITY WITH BODY MASS INDEX (BMI) OF 50.0 TO 59.9 IN ADULT: Chronic | Status: ACTIVE | Noted: 2021-07-22

## 2021-08-12 PROBLEM — B96.20 E. COLI UTI (URINARY TRACT INFECTION): Status: ACTIVE | Noted: 2021-08-07

## 2021-08-12 LAB
ALBUMIN SERPL-MCNC: 3.8 G/DL (ref 3.5–5.2)
ALBUMIN/GLOB SERPL: 1.3 G/DL
ALP SERPL-CCNC: 90 U/L (ref 39–117)
ALT SERPL W P-5'-P-CCNC: 114 U/L (ref 1–33)
ANION GAP SERPL CALCULATED.3IONS-SCNC: 10 MMOL/L (ref 5–15)
APTT PPP: <20 SECONDS (ref 24–31)
AST SERPL-CCNC: 40 U/L (ref 1–32)
BASOPHILS # BLD AUTO: 0.1 10*3/MM3 (ref 0–0.2)
BASOPHILS NFR BLD AUTO: 0.8 % (ref 0–1.5)
BILIRUB CONJ SERPL-MCNC: 0.2 MG/DL (ref 0–0.3)
BILIRUB SERPL-MCNC: 1 MG/DL (ref 0–1.2)
BUN SERPL-MCNC: 32 MG/DL (ref 6–20)
BUN/CREAT SERPL: 86.5 (ref 7–25)
CA-I SERPL ISE-MCNC: 1.32 MMOL/L (ref 1.2–1.3)
CALCIUM SPEC-SCNC: 9.5 MG/DL (ref 8.6–10.5)
CHLORIDE SERPL-SCNC: 104 MMOL/L (ref 98–107)
CMV DNA SPEC QL NAA+PROBE: NEGATIVE
CO2 SERPL-SCNC: 29 MMOL/L (ref 22–29)
CREAT SERPL-MCNC: 0.37 MG/DL (ref 0.57–1)
CRP SERPL-MCNC: 0.38 MG/DL (ref 0–0.5)
D DIMER PPP FEU-MCNC: 1.69 MG/L (FEU) (ref 0–0.59)
DEPRECATED RDW RBC AUTO: 52.9 FL (ref 37–54)
EOSINOPHIL # BLD AUTO: 0.2 10*3/MM3 (ref 0–0.4)
EOSINOPHIL NFR BLD AUTO: 1.9 % (ref 0.3–6.2)
ERYTHROCYTE [DISTWIDTH] IN BLOOD BY AUTOMATED COUNT: 16.9 % (ref 12.3–15.4)
FERRITIN SERPL-MCNC: 133.1 NG/ML (ref 13–150)
FIBRINOGEN PPP-MCNC: 638 MG/DL (ref 210–450)
GFR SERPL CREATININE-BSD FRML MDRD: >150 ML/MIN/1.73
GLOBULIN UR ELPH-MCNC: 2.9 GM/DL
GLUCOSE BLDC GLUCOMTR-MCNC: 146 MG/DL (ref 70–105)
GLUCOSE BLDC GLUCOMTR-MCNC: 192 MG/DL (ref 70–105)
GLUCOSE BLDC GLUCOMTR-MCNC: 92 MG/DL (ref 70–105)
GLUCOSE SERPL-MCNC: 127 MG/DL (ref 65–99)
HCT VFR BLD AUTO: 34.8 % (ref 34–46.6)
HGB BLD-MCNC: 11.1 G/DL (ref 12–15.9)
INR PPP: 1.07 (ref 0.93–1.1)
INTERPRETATION: NEGATIVE
LDH SERPL-CCNC: 207 U/L (ref 135–214)
LYMPHOCYTES # BLD AUTO: 2.4 10*3/MM3 (ref 0.7–3.1)
LYMPHOCYTES NFR BLD AUTO: 21 % (ref 19.6–45.3)
MAGNESIUM SERPL-MCNC: 1.7 MG/DL (ref 1.6–2.6)
MCH RBC QN AUTO: 28.7 PG (ref 26.6–33)
MCHC RBC AUTO-ENTMCNC: 31.8 G/DL (ref 31.5–35.7)
MCV RBC AUTO: 90.3 FL (ref 79–97)
MONOCYTES # BLD AUTO: 0.4 10*3/MM3 (ref 0.1–0.9)
MONOCYTES NFR BLD AUTO: 3.7 % (ref 5–12)
NEUTROPHILS NFR BLD AUTO: 72.6 % (ref 42.7–76)
NEUTROPHILS NFR BLD AUTO: 8.3 10*3/MM3 (ref 1.7–7)
NRBC BLD AUTO-RTO: 0.1 /100 WBC (ref 0–0.2)
NT-PROBNP SERPL-MCNC: 148 PG/ML (ref 0–450)
PHOSPHATE SERPL-MCNC: 4.1 MG/DL (ref 2.5–4.5)
PLATELET # BLD AUTO: 272 10*3/MM3 (ref 140–450)
PMV BLD AUTO: 8.7 FL (ref 6–12)
POTASSIUM SERPL-SCNC: 4.5 MMOL/L (ref 3.5–5.2)
PROT SERPL-MCNC: 6.7 G/DL (ref 6–8.5)
PROTHROMBIN TIME: 11.8 SECONDS (ref 9.6–11.7)
RBC # BLD AUTO: 3.85 10*6/MM3 (ref 3.77–5.28)
RESULT: NORMAL NG/ML
SODIUM SERPL-SCNC: 143 MMOL/L (ref 136–145)
SPECIMEN SOURCE: NORMAL
TROPONIN T SERPL-MCNC: <0.01 NG/ML (ref 0–0.03)
WBC # BLD AUTO: 11.5 10*3/MM3 (ref 3.4–10.8)

## 2021-08-12 PROCEDURE — 83735 ASSAY OF MAGNESIUM: CPT | Performed by: NURSE PRACTITIONER

## 2021-08-12 PROCEDURE — 63710000001 INSULIN LISPRO (HUMAN) PER 5 UNITS: Performed by: NURSE PRACTITIONER

## 2021-08-12 PROCEDURE — 83880 ASSAY OF NATRIURETIC PEPTIDE: CPT | Performed by: NURSE PRACTITIONER

## 2021-08-12 PROCEDURE — 25010000002 ONDANSETRON PER 1 MG: Performed by: NURSE PRACTITIONER

## 2021-08-12 PROCEDURE — 85610 PROTHROMBIN TIME: CPT | Performed by: NURSE PRACTITIONER

## 2021-08-12 PROCEDURE — 25010000002 ENOXAPARIN PER 10 MG: Performed by: INTERNAL MEDICINE

## 2021-08-12 PROCEDURE — 25010000002 CEFTRIAXONE PER 250 MG: Performed by: INTERNAL MEDICINE

## 2021-08-12 PROCEDURE — 99223 1ST HOSP IP/OBS HIGH 75: CPT | Performed by: INTERNAL MEDICINE

## 2021-08-12 PROCEDURE — 85730 THROMBOPLASTIN TIME PARTIAL: CPT | Performed by: NURSE PRACTITIONER

## 2021-08-12 PROCEDURE — 82962 GLUCOSE BLOOD TEST: CPT

## 2021-08-12 PROCEDURE — 82330 ASSAY OF CALCIUM: CPT | Performed by: NURSE PRACTITIONER

## 2021-08-12 PROCEDURE — 92526 ORAL FUNCTION THERAPY: CPT

## 2021-08-12 PROCEDURE — 63710000001 INSULIN GLARGINE PER 5 UNITS: Performed by: INTERNAL MEDICINE

## 2021-08-12 PROCEDURE — 83615 LACTATE (LD) (LDH) ENZYME: CPT | Performed by: NURSE PRACTITIONER

## 2021-08-12 PROCEDURE — 82728 ASSAY OF FERRITIN: CPT | Performed by: INTERNAL MEDICINE

## 2021-08-12 PROCEDURE — 94799 UNLISTED PULMONARY SVC/PX: CPT

## 2021-08-12 PROCEDURE — 84100 ASSAY OF PHOSPHORUS: CPT | Performed by: NURSE PRACTITIONER

## 2021-08-12 PROCEDURE — 80053 COMPREHEN METABOLIC PANEL: CPT | Performed by: INTERNAL MEDICINE

## 2021-08-12 PROCEDURE — 85025 COMPLETE CBC W/AUTO DIFF WBC: CPT | Performed by: NURSE PRACTITIONER

## 2021-08-12 PROCEDURE — 97530 THERAPEUTIC ACTIVITIES: CPT

## 2021-08-12 PROCEDURE — 84484 ASSAY OF TROPONIN QUANT: CPT | Performed by: NURSE PRACTITIONER

## 2021-08-12 PROCEDURE — 97110 THERAPEUTIC EXERCISES: CPT

## 2021-08-12 PROCEDURE — 85384 FIBRINOGEN ACTIVITY: CPT | Performed by: NURSE PRACTITIONER

## 2021-08-12 PROCEDURE — 82248 BILIRUBIN DIRECT: CPT | Performed by: NURSE PRACTITIONER

## 2021-08-12 PROCEDURE — 85379 FIBRIN DEGRADATION QUANT: CPT | Performed by: NURSE PRACTITIONER

## 2021-08-12 PROCEDURE — 86140 C-REACTIVE PROTEIN: CPT | Performed by: INTERNAL MEDICINE

## 2021-08-12 PROCEDURE — 63710000001 INSULIN LISPRO (HUMAN) PER 5 UNITS: Performed by: INTERNAL MEDICINE

## 2021-08-12 RX ORDER — HYDROXYZINE HYDROCHLORIDE 25 MG/1
25 TABLET, FILM COATED ORAL 3 TIMES DAILY PRN
Status: DISCONTINUED | OUTPATIENT
Start: 2021-08-12 | End: 2021-08-24 | Stop reason: HOSPADM

## 2021-08-12 RX ORDER — INSULIN LISPRO 100 [IU]/ML
0-14 INJECTION, SOLUTION INTRAVENOUS; SUBCUTANEOUS EVERY 6 HOURS SCHEDULED
Status: DISCONTINUED | OUTPATIENT
Start: 2021-08-12 | End: 2021-08-12

## 2021-08-12 RX ORDER — INSULIN LISPRO 100 [IU]/ML
0-14 INJECTION, SOLUTION INTRAVENOUS; SUBCUTANEOUS AS NEEDED
Status: DISCONTINUED | OUTPATIENT
Start: 2021-08-12 | End: 2021-08-18

## 2021-08-12 RX ORDER — INSULIN LISPRO 100 [IU]/ML
0-14 INJECTION, SOLUTION INTRAVENOUS; SUBCUTANEOUS AS NEEDED
Status: DISCONTINUED | OUTPATIENT
Start: 2021-08-12 | End: 2021-08-12

## 2021-08-12 RX ORDER — LANSOPRAZOLE
30 KIT
Status: DISCONTINUED | OUTPATIENT
Start: 2021-08-12 | End: 2021-08-15

## 2021-08-12 RX ORDER — INSULIN LISPRO 100 [IU]/ML
0-14 INJECTION, SOLUTION INTRAVENOUS; SUBCUTANEOUS
Status: DISCONTINUED | OUTPATIENT
Start: 2021-08-12 | End: 2021-08-18

## 2021-08-12 RX ADMIN — METOPROLOL TARTRATE 25 MG: 25 TABLET, FILM COATED ORAL at 09:41

## 2021-08-12 RX ADMIN — LINEZOLID 600 MG: 600 TABLET ORAL at 22:38

## 2021-08-12 RX ADMIN — INSULIN GLARGINE 45 UNITS: 100 INJECTION, SOLUTION SUBCUTANEOUS at 09:46

## 2021-08-12 RX ADMIN — CEFTRIAXONE SODIUM 2 G: 2 INJECTION, POWDER, FOR SOLUTION INTRAMUSCULAR; INTRAVENOUS at 11:52

## 2021-08-12 RX ADMIN — Medication 600 MG: at 22:38

## 2021-08-12 RX ADMIN — HYDROCORTISONE 20 MG: 10 TABLET ORAL at 09:41

## 2021-08-12 RX ADMIN — LINEZOLID 600 MG: 600 TABLET ORAL at 09:41

## 2021-08-12 RX ADMIN — METOPROLOL TARTRATE 25 MG: 25 TABLET, FILM COATED ORAL at 22:37

## 2021-08-12 RX ADMIN — Medication 10 MG: at 22:38

## 2021-08-12 RX ADMIN — INSULIN LISPRO 10 UNITS: 100 INJECTION, SOLUTION INTRAVENOUS; SUBCUTANEOUS at 07:16

## 2021-08-12 RX ADMIN — IPRATROPIUM BROMIDE AND ALBUTEROL SULFATE 3 ML: 2.5; .5 SOLUTION RESPIRATORY (INHALATION) at 07:32

## 2021-08-12 RX ADMIN — FUROSEMIDE 20 MG: 20 TABLET ORAL at 09:41

## 2021-08-12 RX ADMIN — BUDESONIDE 0.5 MG: 0.5 SUSPENSION RESPIRATORY (INHALATION) at 19:20

## 2021-08-12 RX ADMIN — INSULIN LISPRO 3 UNITS: 100 INJECTION, SOLUTION INTRAVENOUS; SUBCUTANEOUS at 14:16

## 2021-08-12 RX ADMIN — Medication 600 MG: at 09:40

## 2021-08-12 RX ADMIN — GUAIFENESIN 400 MG: 100 SOLUTION ORAL at 07:17

## 2021-08-12 RX ADMIN — ENOXAPARIN SODIUM 40 MG: 40 INJECTION SUBCUTANEOUS at 22:37

## 2021-08-12 RX ADMIN — HYDROCORTISONE 20 MG: 10 TABLET ORAL at 17:28

## 2021-08-12 RX ADMIN — GUAIFENESIN 400 MG: 100 SOLUTION ORAL at 17:28

## 2021-08-12 RX ADMIN — ENOXAPARIN SODIUM 40 MG: 40 INJECTION SUBCUTANEOUS at 09:40

## 2021-08-12 RX ADMIN — Medication 2000 UNITS: at 09:41

## 2021-08-12 RX ADMIN — ASPIRIN 324 MG: 81 TABLET, CHEWABLE ORAL at 09:40

## 2021-08-12 RX ADMIN — RISPERIDONE 0.5 MG: 0.5 TABLET, ORALLY DISINTEGRATING ORAL at 22:38

## 2021-08-12 RX ADMIN — ONDANSETRON 2 MG: 2 INJECTION INTRAMUSCULAR; INTRAVENOUS at 09:40

## 2021-08-12 RX ADMIN — INSULIN GLARGINE 45 UNITS: 100 INJECTION, SOLUTION SUBCUTANEOUS at 22:39

## 2021-08-12 RX ADMIN — GUAIFENESIN 400 MG: 100 SOLUTION ORAL at 11:52

## 2021-08-12 RX ADMIN — INSULIN LISPRO 10 UNITS: 100 INJECTION, SOLUTION INTRAVENOUS; SUBCUTANEOUS at 14:15

## 2021-08-12 RX ADMIN — IPRATROPIUM BROMIDE AND ALBUTEROL SULFATE 3 ML: 2.5; .5 SOLUTION RESPIRATORY (INHALATION) at 19:20

## 2021-08-12 RX ADMIN — PANTOPRAZOLE SODIUM 40 MG: 40 INJECTION, POWDER, FOR SOLUTION INTRAVENOUS at 09:40

## 2021-08-12 RX ADMIN — LANSOPRAZOLE 30 MG: KIT at 17:28

## 2021-08-12 RX ADMIN — IPRATROPIUM BROMIDE AND ALBUTEROL SULFATE 3 ML: 2.5; .5 SOLUTION RESPIRATORY (INHALATION) at 11:07

## 2021-08-12 RX ADMIN — Medication 10 ML: at 22:38

## 2021-08-12 RX ADMIN — Medication 10 ML: at 09:41

## 2021-08-12 RX ADMIN — BUDESONIDE 0.5 MG: 0.5 SUSPENSION RESPIRATORY (INHALATION) at 07:32

## 2021-08-12 NOTE — PLAN OF CARE
Goal Outcome Evaluation:            Updated pt on plan of care, medications and viral disease processes.

## 2021-08-12 NOTE — PROGRESS NOTES
Daily Progress Note        Pneumonia due to COVID-19 virus    Acute respiratory failure due to COVID-19 (CMS/Formerly [facility])    Class 3 severe obesity without serious comorbidity with body mass index (BMI) of 50.0 to 59.9 in adult    Diabetes mellitus type 2 in obese (CMS/Formerly [facility])    MRSA pneumonia (CMS/Formerly [facility])    Klebsiella pneumoniae pneumonia (CMS/Formerly [facility])    E. coli UTI (urinary tract infection)    Hypertension    Critical illness polyneuropathy (CMS/Formerly [facility])    Delirium, acute    Dysphagia      Assessment    Pneumonia due to COVID-19  Acute Respiratory Failure  -Failed NIPPV and required intubation 7/23    Pneumonia  -8/1/21 sputum culture growing Klebsiella pneumoniae    UTI-E.Coli (ID following)    Elevated Liver Enzymes  Hypertension  Diabetes Mellitus  Obesity    ECHO 7/24/21  EF 55-60%    Plan    Antibiotic-Zyvox-monitor platelets/Rocephin  Mucinex  Diuretic-Lasix  Cortef 20 mg BID  Bronchodilator/Inhaled corticosteroid  DVT prophylaxis-Lovenox  Titrate oxygen  Glycemic control  Encourage OOB and elevate HOB  PT/OT/speech    Completed Remdesivr 7/26/21         LOS: 21 days     Subjective         Objective     Vital signs for last 24 hours:  Vitals:    08/12/21 0735 08/12/21 1103 08/12/21 1107 08/12/21 1110   BP:  121/66     BP Location:       Patient Position:       Pulse: 102 94 97 95   Resp: 20 18 18 18   Temp:  97.9 °F (36.6 °C)     TempSrc:  Oral     SpO2:  96% 97%    Weight:       Height:           Intake/Output last 3 shifts:  I/O last 3 completed shifts:  In: 1763 [I.V.:31; Other:1011; NG/GT:721]  Out: 4400 [Urine:4400]  Intake/Output this shift:  No intake/output data recorded.      Radiology  Imaging Results (Last 24 Hours)     ** No results found for the last 24 hours. **          Labs:  Results from last 7 days   Lab Units 08/12/21  0331   WBC 10*3/mm3 11.50*   HEMOGLOBIN g/dL 11.1*   HEMATOCRIT % 34.8   PLATELETS 10*3/mm3 272     Results from last 7 days   Lab Units 08/12/21  0331   SODIUM mmol/L 143   POTASSIUM mmol/L  4.5   CHLORIDE mmol/L 104   CO2 mmol/L 29.0   BUN mg/dL 32*   CREATININE mg/dL 0.37*   CALCIUM mg/dL 9.5   BILIRUBIN mg/dL 1.0   ALK PHOS U/L 90   ALT (SGPT) U/L 114*   AST (SGOT) U/L 40*   GLUCOSE mg/dL 127*     Results from last 7 days   Lab Units 08/10/21  0326   PH, ARTERIAL pH units 7.480*   PO2 ART mm Hg 106.8   PCO2, ARTERIAL mm Hg 44.5   HCO3 ART mmol/L 33.1*     Results from last 7 days   Lab Units 08/12/21  0331 08/11/21  1221 08/10/21  0652   ALBUMIN g/dL 3.80 3.80 3.70     Results from last 7 days   Lab Units 08/12/21  0331 08/10/21  0652 08/08/21  0505   TROPONIN T ng/mL <0.010 0.019 <0.010         Results from last 7 days   Lab Units 08/12/21  0331   MAGNESIUM mg/dL 1.7     Results from last 7 days   Lab Units 08/12/21  0331 08/10/21  0411 08/08/21  0506   INR  1.07 1.03 1.01   APTT seconds <20.0* <20.0* <20.0*               Meds:   SCHEDULE  Acetylcysteine, 600 mg, Oral, BID  aspirin, 324 mg, Per G Tube, Daily  Barium Sulfate, 1 teaspoon(s), Oral, Once in imaging  barium sulfate, 50 mL, Oral, Once in imaging  barium sulfate, 50 mL, Oral, Once in imaging  budesonide, 0.5 mg, Nebulization, BID - RT  cefTRIAXone, 2 g, Intravenous, Q24H  cholecalciferol, 2,000 Units, Nasogastric, Daily  enoxaparin, 40 mg, Subcutaneous, Q12H  furosemide, 20 mg, Oral, Daily  guaiFENesin, 400 mg, Nasogastric, Q6H  hydrocortisone, 20 mg, Oral, BID With Meals  insulin glargine, 45 Units, Subcutaneous, Q12H  insulin lispro, 0-14 Units, Subcutaneous, 4x Daily With Meals & Nightly  insulin lispro, 10 Units, Subcutaneous, Q6H  ipratropium-albuterol, 3 mL, Nebulization, 4x Daily - RT  lansoprazole, 30 mg, Nasogastric, BID AC  linezolid, 600 mg, Oral, Q12H  melatonin, 10 mg, Oral, Nightly  metoprolol tartrate, 25 mg, Oral, Q12H  risperiDONE, 0.5 mg, Oral, Nightly  sodium chloride, 10 mL, Intravenous, Q12H      Infusions  Pharmacy to Dose enoxaparin (LOVENOX),       PRNs  •  acetaminophen  •  acetaminophen **OR** acetaminophen  •   aluminum-magnesium hydroxide-simethicone  •  artificial tears  •  artificial tears  •  benzonatate  •  dextrose  •  dextrose  •  glucagon (human recombinant)  •  hydrALAZINE  •  hydrOXYzine  •  insulin lispro **AND** insulin lispro  •  lidocaine  •  lidocaine PF 1%  •  magnesium sulfate **OR** magnesium sulfate in D5W 1g/100mL (PREMIX)  •  nitroglycerin  •  ondansetron **OR** ondansetron  •  oxyCODONE  •  Pharmacy to Dose enoxaparin (LOVENOX)  •  potassium chloride **OR** potassium chloride **OR** potassium chloride  •  potassium phosphate infusion greater than 15 mMoles **OR** potassium phosphate infusion greater than 15 mMoles **OR** potassium phosphate **OR** sodium phosphate IVPB **OR** sodium phosphate IVPB **OR** sodium phosphate IVPB  •  [COMPLETED] Insert peripheral IV **AND** sodium chloride  •  sodium chloride    Physical Exam:  Physical Exam  Vitals reviewed.   Constitutional:       Appearance: She is obese. She is ill-appearing.   Pulmonary:      Breath sounds: Decreased breath sounds, rhonchi and rales present.   Musculoskeletal:      Right lower leg: Edema present.      Left lower leg: Edema present.   Skin:     General: Skin is warm and dry.   Neurological:      Mental Status: She is alert and oriented to person, place, and time.         ROS  Review of Systems   Constitutional: Positive for activity change, appetite change and fatigue.   Respiratory: Positive for cough and shortness of breath.    Cardiovascular: Positive for leg swelling.   Neurological: Positive for weakness.         I have reviewed the patients new clinical results    Electronically signed by NATY Evans

## 2021-08-12 NOTE — PLAN OF CARE
Goal Outcome Evaluation:    Assessment: Leslie Harris is eager to begin activity and movement. She was able to participate well in exercises in sitting at eob. She is far below her functional baseline, which is independent mobility. She presents with functional mobility impairments which indicate the need for skilled intervention. Tolerating session today without incident. Will continue to follow and progress as tolerated.     Plan/Recommendations:   Pt would benefit from Inpatient Rehabilitation placement at discharge from facility and requires no DME at discharge.   Pt desires Inpatient Rehabilitation placement at discharge. Pt cooperative; agreeable to therapeutic recommendations and plan of care.

## 2021-08-12 NOTE — NURSING NOTE
Consult called to hospitalists at this time, spoke with [place].  Assigned to Dr. Blane Quintanilla.

## 2021-08-12 NOTE — CASE MANAGEMENT/SOCIAL WORK
Continued Stay Note  EMANUEL Wasserman     Patient Name: Leslie Harris  MRN: [id number]  Today's Date: 8/12/2021    Admit Date: 7/22/2021    Discharge Plan     Row Name 08/12/21 1304       Plan    Plan  anticipate DC to inpt rehab- [facility] referral sent 8/12- PENDING. Will need precert. No PASRR needed.    Plan Comments   received notification that patients insurance is OON with [facility] and the patient would have to pay 70%, Patient would like to try [facility]. Referral sent to [facility] and liaison notified. DC barriers: on 5L, TF, iv abx        Expected Discharge Date and Time     Expected Discharge Date Expected Discharge Time    Jul 30, 2021         Phone communication or documentation only - no physical contact with patient or family.      Amelie Harmon RN

## 2021-08-12 NOTE — THERAPY TREATMENT NOTE
Subjective: Pt agreeable to therapeutic plan of care. Pt states she feels good to be able to move again.     Objective:     Bed mobility - Max-A and Assist x 2; had pt roll toward R side initially, and she was able to assist at least 25%.  Then pushed to sit from there w/ max asst x 2.  Sat eob x 10 min. Was able to balance in sitting eob w/ UE support x 10 seconds only. Pt became very anxious w/ this exercise, despite PT assuring pt that she was only inches away from PT's hands. HR ayanna to 130 when anxious but dropped back down to low 100's when given more support.   Transfers - N/A or Not attempted.  Ambulation - 0 feet N/A or Not attempted.     Exercise: in supported sitting at eob - elbow flexion x 10, laq x 10 (A/AROM) for all.  Pt able to complete ex w/ 50% assist.     Pain: 0 VAS; c/o global soreness but denies pain  Education: Provided education on importance of mobility and skilled verbal / tactile cueing throughout intervention.     Assessment: Leslie Harris is eager to begin activity and movement. She was able to participate well in exercises in sitting at eob. She is far below her functional baseline, which is independent mobility. She presents with functional mobility impairments which indicate the need for skilled intervention. Tolerating session today without incident. Will continue to follow and progress as tolerated.     Plan/Recommendations:   Pt would benefit from Inpatient Rehabilitation placement at discharge from facility and requires no DME at discharge.   Pt desires Inpatient Rehabilitation placement at discharge. Pt cooperative; agreeable to therapeutic recommendations and plan of care.     Basic Mobility 6-click:  Rollin = Total, A lot = 2, A little = 3; 4 = None  Supine>Sit:   1 = Total, A lot = 2, A little = 3; 4 = None   Sit>Stand with arms:  1 = Total, A lot = 2, A little = 3; 4 = None  Bed>Chair:   1 = Total, A lot = 2, A little = 3; 4 = None  Ambulate in room:  1 = Total,  A lot = 2, A little = 3; 4 = None  3-5 Steps with railin = Total, A lot = 2, A little = 3; 4 = None  Score: 6    Modified [place]: N/A = No pre-op stroke/TIA    Post-Tx Position: Supine with HOB Elevated, Alarms activated and Call light and personal items within reach; father in room; BUEs and heels elevated from bed.   PPE: gloves, surgical mask, eyewear protection

## 2021-08-12 NOTE — CONSULTS
"Nutrition Services    Patient Name: Leslie Harris  YOB: 1990  MRN: [id number]  Admission date: 7/22/2021      PPE Documentation        PPE Worn By Provider RD did not enter room for this encounter   PPE Worn By Patient  N/A     PROGRESS NOTE      Encounter Information: Tube feed check on. Impact Peptide 1.5 documented at 65 mL/hr. Remains extubated and transferred out of ICU to PCU. Now on mechanical soft diet, spoke with nursing late this afternoon who reports patient eating 50% of breakfast/lunch. Patient still significantly weak and not able to lift arms to feed herself. TF orders were inadvertently dc'd. TF consult received. Spoke with nursing about changing TF order to nocturnal to promote PO intakes during the day and optimize nutrition during this crucial time of recovery.        Labs (reviewed below): Elevated BUN  Low Cr  Elevated LFTs       GI Function:  -residuals WNL  -last BM documented on 8/10       Nutrition Intervention: Changing EN to nocturnal tube feeds to promote PO intakes during the day        PO Diet/Supplements: Diet Texture; Mechanical Ground   EN Prescription: Impact Peptide 1.5 at 75 mL/hr + 20 mL/hr water flush x 12 hrs at night which provides 1350 kcals (68%), 84 g protein (60%), and 933 mL total water (693 mL free water from TF +240 mL flushes)        Intake/Output:   Intake/Output Summary (Last 24 hours) at 8/12/2021 1617  Last data filed at 8/12/2021 0400  Gross per 24 hour   Intake 769 ml   Output 800 ml   Net -31 ml          Height: Height: 167.6 cm (66\")   Weight: Weight: (!) 153 kg (336 lb 13.8 oz) (08/12/21 0450)   BMI: Body mass index is 54.37 kg/m².     Results from last 7 days   Lab Units 08/12/21  0331 08/11/21  1221 08/10/21  2033 08/10/21  0652 08/10/21  0652   SODIUM mmol/L 143 143  --   --  142   POTASSIUM mmol/L 4.5 3.9 4.3   < > 3.5   CHLORIDE mmol/L 104 102  --   --  99   CO2 mmol/L 29.0 29.0  --   --  34.0*   BUN mg/dL 32* 33*  --   --  45* "   CREATININE mg/dL 0.37* 0.45*  --   --  0.42*   CALCIUM mg/dL 9.5 9.7  --   --  9.9   BILIRUBIN mg/dL 1.0 0.9  --   --  0.8   ALK PHOS U/L 90 88  --   --  84   ALT (SGPT) U/L 114* 133*  --   --  160*   AST (SGOT) U/L 40* 53*  --   --  61*   GLUCOSE mg/dL 127* 190*  --   --  125*    < > = values in this interval not displayed.     Results from last 7 days   Lab Units 08/12/21  0331 08/11/21  1221 08/11/21  1221 08/10/21  0652 08/10/21  0652   MAGNESIUM mg/dL 1.7  --  1.6  --  1.7   PHOSPHORUS mg/dL 4.1   < > 4.5   < > 3.8   HEMOGLOBIN g/dL 11.1*   < > 10.5*   < >  --    HEMATOCRIT % 34.8   < > 32.8*   < >  --     < > = values in this interval not displayed.     COVID19   Date Value Ref Range Status   07/22/2021 Detected (C) Not Detected - Ref. Range Final     Lab Results   Component Value Date    HGBA1C 6.9 (H) 07/23/2021     Vitals:    08/12/21 1515   BP: 131/81   Pulse: 99   Resp: 18   Temp: 97.8 °F (36.6 °C)   SpO2: 94%     RD to follow up per protocol.    Electronically signed by:  Ynes Warner RD  08/12/21 16:17 EDT

## 2021-08-12 NOTE — CONSULTS
[facility] Medicine Services - Consult Note    Patient Name: Leslie Harris  : 1990  MRN: [id number]  Primary Care Physician:  Provider, No Known  Referring Physician: No Known Provider  Date of admission: 2021    Inpatient Hospitalist Consult  Consult performed by: Juan Arguelles MD  Consult ordered by: Sheree Orantes APRN          Subjective      Reason for Consult/ Chief Complaint:  Medical management / shortness of breath    History of Present Illness: Leslie Harris is a 30 y.o. morbidly obese female who was diagnosed with COVID-19 on 2021 at the [facility]. She had not been vaccinated for Covid. She presented to the [facility] ED on 2021 complaining of shortness of breath. Workup in the ER revealed acute respiratory failure with hypoxia, pneumonia due to COVID-19, hypertension, and hyperglycemia. The patient was placed on Precision Flow with 100% FiO2 and admitted to the ICU for close monitoring and further treatment. The patient was started on Decadron and Remdesivir.    21:  Patient transitioned AVAPS with Precision Flow with 100% FiO2, but continued to have hypoxia and was intubated.  Right IJ central line inserted.  Dietitian consulted for tube feeds.    21:  Remains intubated and sedated. Chemically paralyzed due to worsening hypoxemia. On Flolan nebulized.  Prone position.  Started on empiric coverage with ceftriaxone.  Tolerating tube feeds.      21:  Remains intubated, sedated, and chemically paralyzed.  Antibiotic changed to Zosyn.  Remains prone and on Flolan.  Tolerating tube feeds.        21:  Remains intubated, sedated, and chemically paralyzed.  Patient placed in supine position.  Remains on Flolan.  Tolerating tube feeds.      21:  Remains intubated, sedated, and chemically paralyzed.  Patient tolerating supine position.  Remains on Flolan.  Tolerating tube feeds.       07/28/21:  Remains intubated, sedated, and chemically paralyzed.  Patient tolerating supine position.  Remains on Flolan.  Tolerating tube feeds.      07/29/21:  Remains intubated, sedated, and chemically paralyzed.  Sputum culture grew MRSA.  Patient tolerating supine position.  Remains on Flolan; failed wean attempt.  Tolerating tube feeds.      07/30/21:  Remains intubated, sedated, and chemically paralyzed.  Patient returned to prone position for 16 hours.  Remains on Flolan.  Transitioned to heparin drip.  Tolerating tube feeds.      07/31/21:  Remains intubated, sedated, and chemically paralyzed.  Remains on Flolan.  Diuresis started.  On heparin drip.  Tolerating tube feeds.      08/01/21:  Remains intubated, sedated, and chemically paralyzed.  A-line inserted.  Tolerating supine position.  Remains on heparin drip.  Diuresing well.  Remains on Flolan.  Cardizem drip started for hypertension and tachycardia.  Left radial a-line discontinued.  Right brachial a-line inserted.   Tolerating tube feeds.      08/02/21:  Remains intubated, sedated, and chemically paralyzed. Tolerating supine position.  Remains on Flolan.  Remains on heparin drip.  Labile BP, alternating Cardizem and Levophed.  Fecal management system placed due to high volume liquid stool.  Tolerating tube feeds.      08/03/21:  Remains intubated, sedated, and chemically paralyzed.  Infectious disease consulted for antibiotic management.  Heparin drip discontinued due to blood-tinged sputum.  Weaning Cardizem drip.  Remains on Flolan. Switched to prone position.  Tolerating tube feeds.      08/04/21:  Remains intubated, sedated, and chemically paralyzed.  Sputum culture grew Klebsiella pneumoniae.  FMS removed.  Lovenox restarted.  Weaning Cardizem drip.  Attempted to wean Flolan, but had to be reinitiated.  Tolerating tube feeds.      08/05/21:  Remains intubated, sedated, and chemically paralyzed.  Tolerating tube feeds.      08/06/21:  Remains  intubated, sedated, and chemically paralyzed.  In prone position.  Tolerating tube feeds.      08/07/21:  Remains intubated, sedated, and chemically paralyzed.  Tolerating supine position.  Urine culture grew E. coli.  Tolerating tube feeds.      08/08/21:  Remains intubated, sedated, and chemically paralyzed.  Status post bronchoscopy with multiple mucus plugs removed.  Paralytic weaned off.  Tolerating tube feeds.      08/09/21:  Remains intubated and sedated.  Tolerating tube feeds.      08/10/21:  Patient extubated.  Tolerating tube feeds.      08/11/21:  Remains extubated.  Diagnosed with critical illness polyneuropathy muscle weakness due to paralytics and steroids.  Patient with acute delirium.  Tolerating tube feeds.  SLP consulted for video swallow.    08/12/21:  The patient was downgraded to PCU and the Hospitalist team was consulted for medical management.  Patient started on mechanical soft diet overnight.  She is complaining of some shortness of breath, but maintaining oxygen saturation on 5 liters per high flow nasal cannula.  She reports fatigue and generalized weakness.           Review of Systems   Constitutional: Positive for malaise/fatigue.   Respiratory: Positive for shortness of breath.    Neurological: Positive for weakness.   All other systems reviewed and are negative.       Personal History     Past Medical History:   Diagnosis Date   • Acute respiratory failure due to COVID-19 (CMS/[facility]) 7/22/2021   • Class 3 severe obesity without serious comorbidity with body mass index (BMI) of 50.0 to 59.9 in adult    • COVID-19 07/2021   • Critical illness polyneuropathy (CMS/[facility]) 8/12/2021   • Diabetes mellitus type 2 in obese (CMS/[facility])    • E. coli UTI (urinary tract infection) 8/7/2021   • Hypertension 7/22/2021   • Klebsiella pneumoniae pneumonia (CMS/[facility]) 8/4/2021   • MRSA pneumonia (CMS/[facility]) 7/29/2021   • Pneumonia due to COVID-19 virus 7/22/2021       Past Surgical History:   Procedure  Laterality Date   • BRONCHOSCOPY N/A 8/8/2021    Procedure: BRONCHOSCOPY with bilateral bronchial washing;  Surgeon: Jeff Feng MD;  Location: [facility] ENDOSCOPY;  Service: Pulmonary;  Laterality: N/A;  pre: respiratory failure, covid-19 pneumonia  post: respiratory failure, COVID-19 pneumonia   • CHOLECYSTECTOMY     • EXPLORATORY LAPAROTOMY W/ BOWEL RESECTION  2018    Due to complication of cholecystectomy in which patient had an accidental perforation of small bowel intraoperatively.       Family History: family history includes Diabetes in her mother; No Known Problems in her father. Otherwise pertinent FHx was reviewed and not pertinent to current issue.    Social History:  reports that she has never smoked. She has never used smokeless tobacco. Alcohol use questions deferred to the physician. Drug use questions deferred to the physician.    Home Medications:   acetaminophen, famotidine, ibuprofen, methocarbamol, ondansetron ODT, and promethazine    Allergies:  Allergies   Allergen Reactions   • Sulfa Antibiotics Hives         Objective      Vitals:  Temp:  [97.9 °F (36.6 °C)-99.3 °F (37.4 °C)] 99.3 °F (37.4 °C)  Heart Rate:  [] 102  Resp:  [18-20] 20  BP: ()/() 142/80  Flow (L/min):  [5] 5    Physical Exam  Vitals reviewed.   Constitutional:       Appearance: She is morbidly obese.   HENT:      Head: Normocephalic.   Eyes:      Extraocular Movements: Extraocular movements intact.      Conjunctiva/sclera: Conjunctivae normal.      Pupils: Pupils are equal, round, and reactive to light.   Cardiovascular:      Rate and Rhythm: Regular rhythm. Tachycardia present.      Pulses: Normal pulses.      Heart sounds: Normal heart sounds.   Pulmonary:      Effort: Pulmonary effort is normal.      Breath sounds: Examination of the right-lower field reveals decreased breath sounds. Examination of the left-lower field reveals decreased breath sounds. Decreased breath sounds and rhonchi present.       Comments: O2 @ 5 L per HFNC  Abdominal:      General: Bowel sounds are normal. There is no distension.      Palpations: Abdomen is soft.      Tenderness: There is no abdominal tenderness.   Musculoskeletal:      Cervical back: Normal range of motion.      Right lower leg: No edema.      Left lower leg: No edema.   Skin:     General: Skin is warm and dry.      Capillary Refill: Capillary refill takes less than 2 seconds.   Neurological:      Mental Status: She is alert.      Motor: Weakness present.      Comments: Oriented to person and place   Psychiatric:         Mood and Affect: Mood normal.          Result Review    Result Review:  I have personally reviewed the results from the time of this admission to 8/12/2021 09:27 EDT and agree with these findings:  [x]  Laboratory  [x]  Microbiology  [x]  Radiology  [x]  EKG/Telemetry   []  Cardiology/Vascular   []  Pathology  []  Old records  [x]  Other:  Documentation from H&P, consults, and progress notes        Assessment/Plan        Active Hospital Problems:  Active Hospital Problems    Diagnosis    • **Pneumonia due to COVID-19 virus    • Critical illness polyneuropathy (CMS/HCC)    • Delirium, acute    • Dysphagia    • E. coli UTI (urinary tract infection)    • Klebsiella pneumoniae pneumonia (CMS/[facility])    • Diabetes mellitus type 2 in obese (CMS/[facility])    • MRSA pneumonia (CMS/[facility])    • Acute respiratory failure due to COVID-19 (CMS/[facility])    • Class 3 severe obesity without serious comorbidity with body mass index (BMI) of 50.0 to 59.9 in adult    • Hypertension      Plan:     Pneumonia due to COVID-19 virus  Acute respiratory failure due to COVID-19   -intubated 07/23/21  -extubated 08/10/21  -currently on O2 @ 5 L per HFNC  -titrate O2 to keep sat >92%  -pulmonology following   -completed 5 days of Remdesivir  -off Decadron, now on hydrocortisone  -on Mucinex, Pulmicort, and nebs  -VTE prophylaxis with Lovenox and aspirin     Diabetes mellitus type 2 in obese, new  onset  -A1c 6.9%  -accu checks AC&HS with SSI  -continue Lantus     MRSA pneumonia  Klebsiella pneumoniae pneumonia  -ID following  -on Rocephin and Zyvox   -on Mucomyst     E. coli UTI (urinary tract infection)  -on Rocephin     Hypertension  -on metoprolol and Lasix     Critical illness polyneuropathy   -PT/OT following  -needs inpatient rehab at discharge     Delirium, acute  -improving  -on risperidone   -continue to monitor     Dysphagia   -secondary to recent intubation and paralysis  -SLP following  -started on mechanical soft diet 08/11/21    Class 3 severe obesity without serious comorbidity with body mass index (BMI) of 50.0 to 59.9 in adult  -BMI 54.37 on admission  -encourage lifestyle modifications       This patient has been examined wearing appropriate Personal Protective Equipment . 08/12/21      Signature: Electronically signed by NATY Monson, 08/12/21, 11:14 AM EDT.      Hospitalist Physician Assessment/Plan    History:*  Patient seen    She is being fed by her father at the time of my evaluation today.  She had been on 6 L nasal cannula in addition to her feeding tube in place for continue tube feeding  She had been complaining of right arm pain and bruising at the site of IV access.  Notes and imaging data reviewed    Exam  Lungs are shows diminished air entry bilaterally without adventitious sounds  S1-S2 heard no extra sounds or murmurs  Trace lower extremity edema  No focal neurologic deficit    Medical Decision Making:*  Patient is out of isolation at this time per ID recommendation  We'll continue antibiotics and supportive care        Attending Physician Attestation    For this patient encounter, I have reviewed the mid-level provider documentation, medical decision making and treatment plan, and I have personally spent time with the patient.  All procedures were done by me, and/or all procedures were performed by the mid-level under my supervision.

## 2021-08-12 NOTE — THERAPY TREATMENT NOTE
Acute Care - Speech Language Pathology Treatment Note    [facility]     Patient Name: Leslie Harris  : 1990  MRN: [id number]    Today's Date: 2021                   Admit Date: 2021       Visit Dx:      ICD-10-CM ICD-9-CM   1. Dyspnea, unspecified type  R06.00 786.09   2. Pneumonia due to COVID-19 virus  U07.1 480.8    J12.82 079.89   3. Acute respiratory failure with hypoxia (CMS/[facility])  J96.01 518.81   4. Hypotension due to drugs  I95.2 458.8     E947.9   5. Acute respiratory failure due to COVID-19 (CMS/[facility])  U07.1 518.81    J96.00 079.89       Patient Active Problem List   Diagnosis   • Acute respiratory failure due to COVID-19 (CMS/[facility])   • Class 3 severe obesity without serious comorbidity with body mass index (BMI) of 50.0 to 59.9 in adult   • Pneumonia due to COVID-19 virus   • Diabetes mellitus type 2 in obese (CMS/[facility])   • MRSA pneumonia (CMS/[facility])   • Klebsiella pneumoniae pneumonia (CMS/[facility])   • E. coli UTI (urinary tract infection)   • Hypertension   • Critical illness polyneuropathy (CMS/[facility])   • Delirium, acute   • Dysphagia       Past Medical History:   Diagnosis Date   • Acute respiratory failure due to COVID-19 (CMS/[facility]) 2021   • Class 3 severe obesity without serious comorbidity with body mass index (BMI) of 50.0 to 59.9 in adult    • COVID-19 2021   • Critical illness polyneuropathy (CMS/HCC) 2021   • Diabetes mellitus type 2 in obese (CMS/[facility])    • E. coli UTI (urinary tract infection) 2021   • Hypertension 2021   • Klebsiella pneumoniae pneumonia (CMS/[facility]) 2021   • MRSA pneumonia (CMS/[facility]) 2021   • Pneumonia due to COVID-19 virus 2021       Past Surgical History:   Procedure Laterality Date   • BRONCHOSCOPY N/A 2021    Procedure: BRONCHOSCOPY with bilateral bronchial washing;  Surgeon: Jeff Feng MD;  Location: [facility];  Service: Pulmonary;  Laterality: N/A;  pre: respiratory failure, covid-19 pneumonia  post: respiratory  failure, COVID-19 pneumonia   • CHOLECYSTECTOMY     • EXPLORATORY LAPAROTOMY W/ BOWEL RESECTION  2018    Due to complication of cholecystectomy in which patient had an accidental perforation of small bowel intraoperatively.       SLP Recommendation and Plan        Patient assessed with her breakfast tray.  She required assistance to be pulled higher up in the bed.  Once upright, patients family member fed her as she is still not able to move her arms enough for self feeding.  Patient still has NG tube in place.  Hopeful to removal today pending nutritional intake.  Patient seen eating eggs, pears and drinking applesauce.  Mastication is functional for all solids on tray.  No oral residue noted.  No overt s/s of aspiration.  Recommend continue current diet.  ST will continue to follow and provide trials of harder solids at next session.              EDUCATION    The patient has been educated in the following areas:       Modified Diet Instruction.            SLP GOALS     Row Name 08/12/21 1100          Oral Nutrition/Hydration Goal 2 (SLP)    Oral Nutrition/Hydration Goal 2, SLP  Patient will be seen at a meal within 24-48 hours to assess tolerance of current diet with further recommendations to be given as indicated  -MM    Time Frame (Oral Nutrition/Hydration Goal 2, SLP)  2 days  -MM    Barriers (Oral Nutrition/Hydration Goal 2, SLP)  Patient seen this date with her breakfast tray.  See above note  -MM    Progress/Outcomes (Oral Nutrition/Hydration Goal 2, SLP)  goal ongoing  -MM       Oral Nutrition/Hydration Goal (SLP)    Oral Nutrition/Hydration Goal, SLP  Patient will tolerate safest and least restrictive diet with no complications from aspiration  -MM    Time Frame (Oral Nutrition/Hydration Goal, SLP)  by discharge  -MM    Barriers (Oral Nutrition/Hydration Goal, SLP)  Patient is tolerating current diet well  -MM    Progress/Outcomes (Oral Nutrition/Hydration Goal, SLP)  goal ongoing  -MM      User Key  (r)  = Recorded By, (t) = Taken By, (c) = Cosigned By    Initials Name Provider Type    Agnieszka Jang, SLP Speech and Language Pathologist    Ne Scott, VIVIAN Speech and Language Pathologist                      Time Calculation:             Therapy Charges for Today     Code Description Service Date Service Provider Modifiers Qty    [id number] HC ST MOTION FLUORO EVAL SWALLOW 5 8/11/2021 Agnieszka Diamond, SLP GN 1                           VIVIAN Zimmer  8/12/2021

## 2021-08-12 NOTE — DISCHARGE PLACEMENT REQUEST
"Leslie Harris (30 y.o. Female)     Date of Birth Social Security Number Address Home Phone MRN    1990  [address] [address] [phone] [id number]    Congregational Marital Status          None Unknown       Admission Date Admission Type Admitting Provider Attending Provider Department, Room/Bed    7/22/21 Emergency Draw, MD Blane Smith George Fayez Labib Youssief, MD [facility], 2110/1    Discharge Date Discharge Disposition Discharge Destination                       Attending Provider: Juan Arguelles MD    Allergies: Sulfa Antibiotics    Isolation: Contact   Infection: MRSA (08/02/21), COVID (History) (08/09/21)   Code Status: CPR    Ht: 167.6 cm (66\")   Wt: 153 kg (336 lb 13.8 oz)    Admission Cmt: None   Principal Problem: Pneumonia due to COVID-19 virus [U07.1,J12.82]                 Active Insurance as of 7/22/2021     Primary Coverage     Payor Plan Insurance Group Employer/Plan Group    [facility] [id number]     Payor Plan Address Payor Plan Phone Number Payor Plan Fax Number Effective Dates    [address] [address]   2/1/2020 - None Entered    [facility] [id number]       Subscriber Name Subscriber Birth Date Member ID       LESLIE HARRIS 1990 [id number]           Secondary Coverage     Payor Plan Insurance Group Employer/Plan Group    MEDICA[place]      Payor Plan Address Payor Plan Phone Number Payor Plan Fax Number Effective Dates       7/22/2021 - None Entered    Subscriber Name Subscriber Birth Date Member ID       LESLIE HARRIS 1990 [id number]                 Emergency Contacts      (Rel.) Home Phone Work Phone Mobile Phone    Shruthi Cristina (Mother) -- -- [phone]              "

## 2021-08-12 NOTE — PROGRESS NOTES
Infectious Diseases Progress Note      LOS: 21 days   Patient Care Team:  Lesia, Ana Known as PCP - General    Chief Complaint: Shortness of breath    Subjective       Patient had no high-grade fever during the last 24 hours.  The patient is currently on 4 L of oxygen via nasal cannula.  The patient is awake and alert.  Her main complaint is weakness      Review of Systems:   Review of Systems   Constitutional: Positive for fatigue.   HENT: Negative.    Eyes: Negative.    Respiratory: Positive for shortness of breath.    Cardiovascular: Negative.    Gastrointestinal: Negative.    Genitourinary: Negative.    Musculoskeletal: Negative.    Skin: Negative.    Neurological: Positive for weakness.   Hematological: Negative.    Psychiatric/Behavioral: Negative.         Objective     Vital Signs  Temp:  [97.9 °F (36.6 °C)-99.3 °F (37.4 °C)] 97.9 °F (36.6 °C)  Heart Rate:  [] 95  Resp:  [18-20] 18  BP: ()/(57-86) 121/66    Physical Exam:  Physical Exam  Vitals and nursing note reviewed.   Constitutional:       General: She is not in acute distress.     Appearance: Normal appearance. She is well-developed. She is obese. She is not diaphoretic.   HENT:      Head: Normocephalic and atraumatic.   Eyes:      Pupils: Pupils are equal, round, and reactive to light.   Cardiovascular:      Rate and Rhythm: Normal rate and regular rhythm.      Heart sounds: Normal heart sounds, S1 normal and S2 normal. No murmur heard.     Pulmonary:      Effort: Pulmonary effort is normal. No respiratory distress.      Breath sounds: No stridor. Rales present. No wheezing.   Chest:      Chest wall: No tenderness.   Abdominal:      General: Bowel sounds are normal. There is no distension.      Palpations: Abdomen is soft. There is no mass.      Tenderness: There is no abdominal tenderness. There is no guarding or rebound.      Comments: NG tube   Genitourinary:     Comments: External catheter  Musculoskeletal:         General: No  swelling, tenderness or deformity. Normal range of motion.      Cervical back: Normal range of motion and neck supple.   Skin:     General: Skin is warm and dry.      Coloration: Skin is not pale.      Findings: No bruising, erythema or rash.   Neurological:      Mental Status: She is alert and oriented to person, place, and time.      Cranial Nerves: No cranial nerve deficit.          Results Review:    I have reviewed all clinical data, test, lab, and imaging results.     Radiology  No Radiology Exams Resulted Within Past 24 Hours    Cardiology    Laboratory    Results from last 7 days   Lab Units 08/12/21  0331 08/11/21  1221 08/10/21  0411 08/09/21  0518 08/08/21  0505 08/07/21  0507 08/06/21  0441   WBC 10*3/mm3 11.50* 11.00* 8.20 10.10 16.10* 20.20* 16.90*   HEMOGLOBIN g/dL 11.1* 10.5* 9.6* 8.6* 9.9* 9.9* 9.2*   HEMATOCRIT % 34.8 32.8* 28.7* 26.2* 30.5* 29.5* 27.6*   PLATELETS 10*3/mm3 272 260 166 172 234 231 255     Results from last 7 days   Lab Units 08/12/21  0331 08/11/21  1221 08/10/21  2033 08/10/21  0652 08/09/21  0753 08/08/21  0505 08/07/21  0507 08/06/21  0441 08/06/21  0441   SODIUM mmol/L 143 143  --  142 143 140 137  --  132*   POTASSIUM mmol/L 4.5 3.9 4.3 3.5 3.8 4.6 4.7   < > 4.8   CHLORIDE mmol/L 104 102  --  99 101 98 95*  --  90*   CO2 mmol/L 29.0 29.0  --  34.0* 35.0* 32.0* 32.0*  --  35.0*   BUN mg/dL 32* 33*  --  45* 54* 55* 56*  --  28*   CREATININE mg/dL 0.37* 0.45*  --  0.42* 0.61 0.67 0.85  --  0.45*   GLUCOSE mg/dL 127* 190*  --  125* 138* 192* 209*  --  203*   ALBUMIN g/dL 3.80 3.80  --  3.70 3.80 4.00 4.10  --  4.60   BILIRUBIN mg/dL 1.0 0.9  --  0.8 0.9 0.8 0.9  --  1.2   ALK PHOS U/L 90 88  --  84 83 85 82  --  73   AST (SGOT) U/L 40* 53*  --  61* 64* 39* 47*  --  72*   ALT (SGPT) U/L 114* 133*  --  160* 180* 133* 158*  --  197*   CALCIUM mg/dL 9.5 9.7  --  9.9 10.1 10.2 10.1  --  9.8    < > = values in this interval not displayed.                 Microbiology   Microbiology  Results (last 10 days)     Procedure Component Value - Date/Time    Blood Culture - Blood, Arm, Right [[id number]] Collected: 08/09/21 0758    Lab Status: Preliminary result Specimen: Blood from Arm, Right Updated: 08/12/21 0815     Blood Culture No growth at 3 days    Blood Culture - Blood, Arm, Left [[id number]] Collected: 08/09/21 0753    Lab Status: Preliminary result Specimen: Blood from Arm, Left Updated: 08/12/21 0815     Blood Culture No growth at 3 days    Fungus Culture - Wash, Bronchus [[id number]]  (Abnormal) Collected: 08/08/21 0944    Lab Status: Preliminary result Specimen: Wash from Bronchus Updated: 08/11/21 0745     Fungus Culture Candida species    AFB Culture - Wash, Bronchus [[id number]] Collected: 08/08/21 0944    Lab Status: Preliminary result Specimen: Wash from Bronchus Updated: 08/09/21 1016     AFB Stain No acid fast bacilli seen    Respiratory Culture - Wash, Bronchus [[id number]] Collected: 08/08/21 0944    Lab Status: Final result Specimen: Wash from Bronchus Updated: 08/10/21 1209     Respiratory Culture Scant growth (1+) Normal Respiratory Sondra: NO S.aureus/MRSA or Pseudomonas aeruginosa     Gram Stain Many (4+) WBCs per low power field      Rare (1+) Epithelial cells per low power field      Few (2+) Mixed bacterial morphotypes seen on Gram Stain    Respiratory Panel, PCR (WITHOUT COVID) - Wash, Bronchus [[id number]]  (Normal) Collected: 08/08/21 0944    Lab Status: Final result Specimen: Wash from Bronchus Updated: 08/08/21 1127     ADENOVIRUS, PCR Not Detected     Coronavirus 229E Not Detected     Coronavirus HKU1 Not Detected     Coronavirus NL63 Not Detected     Coronavirus OC43 Not Detected     Human Metapneumovirus Not Detected     Human Rhinovirus/Enterovirus Not Detected     Influenza B PCR Not Detected     Parainfluenza Virus 1 Not Detected     Parainfluenza Virus 2 Not Detected     Parainfluenza Virus 3 Not Detected     Parainfluenza Virus 4 Not Detected     Bordetella pertussis  "pcr Not Detected     Chlamydophila pneumoniae PCR Not Detected     Mycoplasma pneumo by PCR Not Detected     Influenza A PCR Not Detected     RSV, PCR Not Detected     Bordetella parapertussis PCR Not Detected    Narrative:      The coronavirus on the RVP is NOT COVID-19 and is NOT indicative of infection with COVID-19.    In the setting of a positive respiratory panel with a viral infection PLUS a negative procalcitonin without other underlying concern for bacterial infection, consider observing off antibiotics or discontinuation of antibiotics and continue supportive care. If the respiratory panel is positive for atypical bacterial infection (Bordetella pertussis, Chlamydophila pneumoniae, or Mycoplasma pneumoniae), consider antibiotic de-escalation to target atypical bacterial infection.    Histoplasma Antigen, CSF or BAL - Wash, Bronchus [[id number]] Collected: 08/08/21 0944    Lab Status: Final result Specimen: Wash from Bronchus Updated: 08/12/21 0919     Result None Detected ng/mL      Comment:                                           None Detected        Interpretation Negative     Comment: Positive results reported in ng/mL from 0.20 ng/mL to 20.00 ng/mL  Positive results above 20.00 ng/mL are reported as \"Above the  Limit of Quantification\"       Narrative:      Performed at:  [facility]  [address] [place] [address] [place]  [id number]  : Nicolás Garduno MD, Phone:  [phone]    Urine Culture - Urine, Urine, Catheter [[id number]]  (Abnormal)  (Susceptibility) Collected: 08/07/21 0351    Lab Status: Final result Specimen: Urine, Catheter Updated: 08/09/21 1037     Urine Culture >100,000 CFU/mL Escherichia coli    Susceptibility      Escherichia coli      FITZ      Ampicillin Resistant      Ampicillin + Sulbactam Intermediate      Cefazolin Susceptible      Cefepime Susceptible      Ceftazidime Susceptible      Ceftriaxone Susceptible      Gentamicin Susceptible      Levofloxacin " Susceptible      Nitrofurantoin Susceptible      Piperacillin + Tazobactam Susceptible      Tetracycline Susceptible      Trimethoprim + Sulfamethoxazole Susceptible               Linear View                   Respiratory Culture - Sputum, Bronchus [[id number]]  (Abnormal)  (Susceptibility) Collected: 08/04/21 1150    Lab Status: Final result Specimen: Sputum from Bronchus Updated: 08/06/21 0823     Respiratory Culture Scant growth (1+) Klebsiella pneumoniae ssp pneumoniae      Rare Normal Respiratory Sondra: NO S.aureus/MRSA or Pseudomonas aeruginosa     Gram Stain Few (2+) WBCs per low power field      No organisms seen    Susceptibility      Klebsiella pneumoniae ssp pneumoniae      FITZ      Ampicillin Resistant      Ampicillin + Sulbactam Resistant      Cefepime Susceptible      Ceftazidime Susceptible      Ceftriaxone Susceptible      Gentamicin Susceptible      Levofloxacin Intermediate      Piperacillin + Tazobactam Susceptible      Tetracycline Resistant      Trimethoprim + Sulfamethoxazole Susceptible               Linear View               Susceptibility Comments     Klebsiella pneumoniae ssp pneumoniae    Cefazolin sensitivity will not be reported for Enterobacteriaceae in non-urine isolates. If cefazolin is preferred, please call the microbiology lab to request an E-test.  With the exception of urinary-sourced infections, aminoglycosides should not be used as monotherapy.             Clostridium Difficile EIA - Stool, Per Rectum [[id number]]  (Normal) Collected: 08/03/21 1600    Lab Status: Final result Specimen: Stool from Per Rectum Updated: 08/04/21 0927     C Diff GDH / Toxin Negative    Urine Culture - Urine, Urine, Catheter [[id number]]  (Normal) Collected: 08/03/21 1036    Lab Status: Final result Specimen: Urine, Catheter Updated: 08/04/21 1311     Urine Culture No growth          Medication Review:       Schedule Meds  Acetylcysteine, 600 mg, Oral, BID  aspirin, 324 mg, Per G Tube, Daily  Barium  Sulfate, 1 teaspoon(s), Oral, Once in imaging  barium sulfate, 50 mL, Oral, Once in imaging  barium sulfate, 50 mL, Oral, Once in imaging  budesonide, 0.5 mg, Nebulization, BID - RT  cefTRIAXone, 2 g, Intravenous, Q24H  cholecalciferol, 2,000 Units, Nasogastric, Daily  enoxaparin, 40 mg, Subcutaneous, Q12H  furosemide, 20 mg, Oral, Daily  guaiFENesin, 400 mg, Nasogastric, Q6H  hydrocortisone, 20 mg, Oral, BID With Meals  insulin glargine, 45 Units, Subcutaneous, Q12H  insulin lispro, 0-14 Units, Subcutaneous, 4x Daily With Meals & Nightly  insulin lispro, 10 Units, Subcutaneous, Q6H  ipratropium-albuterol, 3 mL, Nebulization, 4x Daily - RT  lansoprazole, 30 mg, Nasogastric, BID AC  linezolid, 600 mg, Oral, Q12H  melatonin, 10 mg, Oral, Nightly  metoprolol tartrate, 25 mg, Oral, Q12H  risperiDONE, 0.5 mg, Oral, Nightly  sodium chloride, 10 mL, Intravenous, Q12H        Infusion Meds  Pharmacy to Dose enoxaparin (LOVENOX),         PRN Meds  •  acetaminophen  •  acetaminophen **OR** acetaminophen  •  aluminum-magnesium hydroxide-simethicone  •  artificial tears  •  artificial tears  •  benzonatate  •  dextrose  •  dextrose  •  glucagon (human recombinant)  •  hydrALAZINE  •  hydrOXYzine  •  insulin lispro **AND** insulin lispro  •  lidocaine  •  lidocaine PF 1%  •  magnesium sulfate **OR** magnesium sulfate in D5W 1g/100mL (PREMIX)  •  nitroglycerin  •  ondansetron **OR** ondansetron  •  oxyCODONE  •  Pharmacy to Dose enoxaparin (LOVENOX)  •  potassium chloride **OR** potassium chloride **OR** potassium chloride  •  potassium phosphate infusion greater than 15 mMoles **OR** potassium phosphate infusion greater than 15 mMoles **OR** potassium phosphate **OR** sodium phosphate IVPB **OR** sodium phosphate IVPB **OR** sodium phosphate IVPB  •  [COMPLETED] Insert peripheral IV **AND** sodium chloride  •  sodium chloride        Assessment/Plan       Antimicrobial Therapy   1.  Zyvox      day  2.   Rocephin      day  3.      Day  4.      Day  5.      Day      Assessment     Severe COVID-19 infection and patient with significant comorbidities including diabetes mellitus and morbid obesity. Apparently did not receive vaccination for COVID-19  COVID-19 screen on admission on July 22, 2021 was positive. Was reported that patient had positive COVID-19 diagnosis 6 days prior to admission  The patient had received 5 days of IV remdesivir     Severe respiratory failure and patient was intubated for long duration.  The patient was extubated on August 10, 2021  -Patient is now on 4 L of oxygen by nasal cannula     Probable ventilator associated pneumonia versus hospitalist acquired pneumonia. Sputum culture from July 29, 2021 grew MRSA and the organism is susceptible to linezolid and vancomycin  The patient was started on linezolid on August 1, 2021  Repeat sputum culture from August 1, 2021 is growing Klebsiella pneumoniae     Morbid obesity     Diabetes mellitus     Reactive leukocytosis secondary to steroids.  Improved    UTI, urine culture from August 7, 2021 is growing relatively susceptible strain of E. coli         Plan     Continue Zyvox 600 mg every 12 hours for a total of 14 days and monitor platelets carefully during treatment with Zyvox  Continue Rocephin 2 g every 24 hours for a total of 7 to 10 days  Supportive care  No need for further COVID-19 isolation  Labs in a.m. including : CBC with differential, CMP,  d-dimer, ferritin and CRP  Case was discussed with the patient and her father at the bedside        NATY Pearce  08/12/21  14:42 EDT     Note is dictated utilizing voice recognition software/Dragon

## 2021-08-13 LAB
ALBUMIN SERPL-MCNC: 3.5 G/DL (ref 3.5–5.2)
ALBUMIN/GLOB SERPL: 1.1 G/DL
ALP SERPL-CCNC: 93 U/L (ref 39–117)
ALT SERPL W P-5'-P-CCNC: 109 U/L (ref 1–33)
ANION GAP SERPL CALCULATED.3IONS-SCNC: 11 MMOL/L (ref 5–15)
AST SERPL-CCNC: 39 U/L (ref 1–32)
BASOPHILS # BLD AUTO: 0.1 10*3/MM3 (ref 0–0.2)
BASOPHILS NFR BLD AUTO: 0.6 % (ref 0–1.5)
BILIRUB SERPL-MCNC: 0.8 MG/DL (ref 0–1.2)
BUN SERPL-MCNC: 24 MG/DL (ref 6–20)
BUN/CREAT SERPL: 96 (ref 7–25)
CA-I SERPL ISE-MCNC: 1.3 MMOL/L (ref 1.2–1.3)
CALCIUM SPEC-SCNC: 9.1 MG/DL (ref 8.6–10.5)
CHLORIDE SERPL-SCNC: 98 MMOL/L (ref 98–107)
CO2 SERPL-SCNC: 27 MMOL/L (ref 22–29)
CREAT SERPL-MCNC: 0.25 MG/DL (ref 0.57–1)
DEPRECATED RDW RBC AUTO: 52.9 FL (ref 37–54)
EOSINOPHIL # BLD AUTO: 0.3 10*3/MM3 (ref 0–0.4)
EOSINOPHIL NFR BLD AUTO: 2.2 % (ref 0.3–6.2)
ERYTHROCYTE [DISTWIDTH] IN BLOOD BY AUTOMATED COUNT: 17.1 % (ref 12.3–15.4)
GFR SERPL CREATININE-BSD FRML MDRD: >150 ML/MIN/1.73
GLOBULIN UR ELPH-MCNC: 3.2 GM/DL
GLUCOSE BLDC GLUCOMTR-MCNC: 114 MG/DL (ref 70–105)
GLUCOSE BLDC GLUCOMTR-MCNC: 138 MG/DL (ref 70–105)
GLUCOSE BLDC GLUCOMTR-MCNC: 151 MG/DL (ref 70–105)
GLUCOSE BLDC GLUCOMTR-MCNC: 90 MG/DL (ref 70–105)
GLUCOSE SERPL-MCNC: 188 MG/DL (ref 65–99)
HCT VFR BLD AUTO: 32.9 % (ref 34–46.6)
HGB BLD-MCNC: 10.9 G/DL (ref 12–15.9)
LYMPHOCYTES # BLD AUTO: 2.1 10*3/MM3 (ref 0.7–3.1)
LYMPHOCYTES NFR BLD AUTO: 17 % (ref 19.6–45.3)
MAGNESIUM SERPL-MCNC: 1.6 MG/DL (ref 1.6–2.6)
MCH RBC QN AUTO: 29.9 PG (ref 26.6–33)
MCHC RBC AUTO-ENTMCNC: 33 G/DL (ref 31.5–35.7)
MCV RBC AUTO: 90.6 FL (ref 79–97)
MONOCYTES # BLD AUTO: 0.4 10*3/MM3 (ref 0.1–0.9)
MONOCYTES NFR BLD AUTO: 3.6 % (ref 5–12)
NEUTROPHILS NFR BLD AUTO: 76.6 % (ref 42.7–76)
NEUTROPHILS NFR BLD AUTO: 9.3 10*3/MM3 (ref 1.7–7)
NRBC BLD AUTO-RTO: 0.1 /100 WBC (ref 0–0.2)
P JIROVECII DNA # SPEC NAA+PROBE: NEGATIVE {COPIES}/ML
PLATELET # BLD AUTO: 291 10*3/MM3 (ref 140–450)
PMV BLD AUTO: 9.2 FL (ref 6–12)
POTASSIUM SERPL-SCNC: 4.4 MMOL/L (ref 3.5–5.2)
PROT SERPL-MCNC: 6.7 G/DL (ref 6–8.5)
RBC # BLD AUTO: 3.63 10*6/MM3 (ref 3.77–5.28)
SODIUM SERPL-SCNC: 136 MMOL/L (ref 136–145)
SPECIMEN SOURCE: NORMAL
WBC # BLD AUTO: 12.2 10*3/MM3 (ref 3.4–10.8)

## 2021-08-13 PROCEDURE — 99232 SBSQ HOSP IP/OBS MODERATE 35: CPT | Performed by: INTERNAL MEDICINE

## 2021-08-13 PROCEDURE — 36415 COLL VENOUS BLD VENIPUNCTURE: CPT | Performed by: INTERNAL MEDICINE

## 2021-08-13 PROCEDURE — 94799 UNLISTED PULMONARY SVC/PX: CPT

## 2021-08-13 PROCEDURE — 63710000001 INSULIN GLARGINE PER 5 UNITS: Performed by: INTERNAL MEDICINE

## 2021-08-13 PROCEDURE — 25010000002 CEFTRIAXONE PER 250 MG: Performed by: INTERNAL MEDICINE

## 2021-08-13 PROCEDURE — 63710000001 INSULIN LISPRO (HUMAN) PER 5 UNITS: Performed by: INTERNAL MEDICINE

## 2021-08-13 PROCEDURE — 83735 ASSAY OF MAGNESIUM: CPT | Performed by: NURSE PRACTITIONER

## 2021-08-13 PROCEDURE — 85025 COMPLETE CBC W/AUTO DIFF WBC: CPT | Performed by: NURSE PRACTITIONER

## 2021-08-13 PROCEDURE — 82330 ASSAY OF CALCIUM: CPT | Performed by: NURSE PRACTITIONER

## 2021-08-13 PROCEDURE — 82962 GLUCOSE BLOOD TEST: CPT

## 2021-08-13 PROCEDURE — 97110 THERAPEUTIC EXERCISES: CPT

## 2021-08-13 PROCEDURE — 92526 ORAL FUNCTION THERAPY: CPT

## 2021-08-13 PROCEDURE — 25010000002 ENOXAPARIN PER 10 MG: Performed by: INTERNAL MEDICINE

## 2021-08-13 PROCEDURE — 80053 COMPREHEN METABOLIC PANEL: CPT | Performed by: INTERNAL MEDICINE

## 2021-08-13 RX ORDER — INSULIN LISPRO 100 [IU]/ML
10 INJECTION, SOLUTION INTRAVENOUS; SUBCUTANEOUS
Status: DISCONTINUED | OUTPATIENT
Start: 2021-08-13 | End: 2021-08-18

## 2021-08-13 RX ORDER — METHOCARBAMOL 500 MG/1
500 TABLET, FILM COATED ORAL 4 TIMES DAILY PRN
Status: DISCONTINUED | OUTPATIENT
Start: 2021-08-13 | End: 2021-08-24 | Stop reason: HOSPADM

## 2021-08-13 RX ADMIN — BUDESONIDE 0.5 MG: 0.5 SUSPENSION RESPIRATORY (INHALATION) at 07:18

## 2021-08-13 RX ADMIN — BUDESONIDE 0.5 MG: 0.5 SUSPENSION RESPIRATORY (INHALATION) at 20:05

## 2021-08-13 RX ADMIN — Medication 2000 UNITS: at 10:08

## 2021-08-13 RX ADMIN — Medication 10 ML: at 21:17

## 2021-08-13 RX ADMIN — GUAIFENESIN 400 MG: 100 SOLUTION ORAL at 06:03

## 2021-08-13 RX ADMIN — LANSOPRAZOLE 30 MG: KIT at 10:10

## 2021-08-13 RX ADMIN — HYDROCORTISONE 20 MG: 10 TABLET ORAL at 19:46

## 2021-08-13 RX ADMIN — INSULIN LISPRO 10 UNITS: 100 INJECTION, SOLUTION INTRAVENOUS; SUBCUTANEOUS at 13:05

## 2021-08-13 RX ADMIN — METOPROLOL TARTRATE 25 MG: 25 TABLET, FILM COATED ORAL at 21:14

## 2021-08-13 RX ADMIN — INSULIN LISPRO 10 UNITS: 100 INJECTION, SOLUTION INTRAVENOUS; SUBCUTANEOUS at 19:46

## 2021-08-13 RX ADMIN — GUAIFENESIN 400 MG: 100 SOLUTION ORAL at 19:45

## 2021-08-13 RX ADMIN — INSULIN LISPRO 10 UNITS: 100 INJECTION, SOLUTION INTRAVENOUS; SUBCUTANEOUS at 06:03

## 2021-08-13 RX ADMIN — GUAIFENESIN 400 MG: 100 SOLUTION ORAL at 13:05

## 2021-08-13 RX ADMIN — IPRATROPIUM BROMIDE AND ALBUTEROL SULFATE 3 ML: 2.5; .5 SOLUTION RESPIRATORY (INHALATION) at 11:29

## 2021-08-13 RX ADMIN — LINEZOLID 600 MG: 600 TABLET ORAL at 21:13

## 2021-08-13 RX ADMIN — INSULIN LISPRO 10 UNITS: 100 INJECTION, SOLUTION INTRAVENOUS; SUBCUTANEOUS at 00:53

## 2021-08-13 RX ADMIN — INSULIN GLARGINE 45 UNITS: 100 INJECTION, SOLUTION SUBCUTANEOUS at 22:45

## 2021-08-13 RX ADMIN — IPRATROPIUM BROMIDE AND ALBUTEROL SULFATE 3 ML: 2.5; .5 SOLUTION RESPIRATORY (INHALATION) at 16:08

## 2021-08-13 RX ADMIN — LINEZOLID 600 MG: 600 TABLET ORAL at 10:08

## 2021-08-13 RX ADMIN — CEFTRIAXONE SODIUM 2 G: 2 INJECTION, POWDER, FOR SOLUTION INTRAMUSCULAR; INTRAVENOUS at 13:06

## 2021-08-13 RX ADMIN — INSULIN GLARGINE 45 UNITS: 100 INJECTION, SOLUTION SUBCUTANEOUS at 10:15

## 2021-08-13 RX ADMIN — GUAIFENESIN 400 MG: 100 SOLUTION ORAL at 00:53

## 2021-08-13 RX ADMIN — LANSOPRAZOLE 30 MG: KIT at 19:46

## 2021-08-13 RX ADMIN — FUROSEMIDE 20 MG: 20 TABLET ORAL at 10:09

## 2021-08-13 RX ADMIN — ASPIRIN 324 MG: 81 TABLET, CHEWABLE ORAL at 10:09

## 2021-08-13 RX ADMIN — METOPROLOL TARTRATE 25 MG: 25 TABLET, FILM COATED ORAL at 10:09

## 2021-08-13 RX ADMIN — Medication 10 ML: at 10:10

## 2021-08-13 RX ADMIN — RISPERIDONE 0.5 MG: 0.5 TABLET, ORALLY DISINTEGRATING ORAL at 21:14

## 2021-08-13 RX ADMIN — Medication 600 MG: at 10:08

## 2021-08-13 RX ADMIN — Medication 10 MG: at 21:13

## 2021-08-13 RX ADMIN — IPRATROPIUM BROMIDE AND ALBUTEROL SULFATE 3 ML: 2.5; .5 SOLUTION RESPIRATORY (INHALATION) at 20:05

## 2021-08-13 RX ADMIN — HYDROCORTISONE 20 MG: 10 TABLET ORAL at 10:09

## 2021-08-13 RX ADMIN — Medication 600 MG: at 21:13

## 2021-08-13 RX ADMIN — ENOXAPARIN SODIUM 40 MG: 40 INJECTION SUBCUTANEOUS at 21:13

## 2021-08-13 RX ADMIN — IPRATROPIUM BROMIDE AND ALBUTEROL SULFATE 3 ML: 2.5; .5 SOLUTION RESPIRATORY (INHALATION) at 07:18

## 2021-08-13 RX ADMIN — ENOXAPARIN SODIUM 40 MG: 40 INJECTION SUBCUTANEOUS at 10:15

## 2021-08-13 NOTE — PLAN OF CARE
Goal Outcome Evaluation:      Assessment: Leslie Harris is extremely motivated toward recovery. She is already showing some increased strength. PT instructed pt and her mom in exercises to be done and demonstrated as well, w/ teach-back used. She presents with functional mobility impairments which indicate the need for skilled intervention. Tolerating session today without incident. Will continue to follow and progress as tolerated. Will increase freq of rx.      Plan/Recommendations:   Pt would benefit from Inpatient Rehabilitation placement at discharge from facility and requires no DME at discharge.   Pt desires Inpatient Rehabilitation placement at discharge. Pt cooperative; agreeable to therapeutic recommendations and plan of care.

## 2021-08-13 NOTE — PROGRESS NOTES
"PULMONARY CRITICAL CARE Progress  NOTE      PATIENT IDENTIFICATION:  Name: Leslie Harris  MRN: [id number]  :  1990     Age: 30 y.o.  Sex: female    DATE OF Note:  2021   Referring Physician: Sarah Fajardo MD                  Subjective:    Feeling better, no SOB,  On 5 L HF O2, no chest or abd pain pain  No bowel or bladder issues, Tolerating nocturnal feeds and po diet-eating 50-75 % meals, No new complaints     Objective:  tMax 24 hrs: Temp (24hrs), Av.1 °F (36.7 °C), Min:97.6 °F (36.4 °C), Max:99.7 °F (37.6 °C)      Vitals Ranges:   Temp:  [97.6 °F (36.4 °C)-99.7 °F (37.6 °C)] 97.7 °F (36.5 °C)  Heart Rate:  [] 105  Resp:  [18-28] 22  BP: (119-146)/(60-93) 131/72    Intake and Output Last 3 Shifts:   I/O last 3 completed shifts:  In: 1369 [P.O.:600; Other:769]  Out: 2800 [Urine:2800]    Exam:  /72 (BP Location: Left arm, Patient Position: Lying)   Pulse 105   Temp 97.7 °F (36.5 °C) (Oral)   Resp 22   Ht 167.6 cm (66\")   Wt (!) 152 kg (335 lb 15.7 oz)   LMP 2021   SpO2 98%   BMI 54.23 kg/m²     General Appearance:  Alert   HEENT:  Normocephalic, without obvious abnormality, Conjunctiva/corneas clear,.  Normal external ear canals, Nares normal, no drainage     Neck:  Supple, symmetrical, trachea midline. No JVD.  Lungs /Chest wall:   Bilateral basal rhonchi,  symmetrical wall movement.     Heart:  Regular rate and rhythm, systolic murmur PMI left sternal border  Abdomen: Soft, non-tender, no masses, no organomegaly.    Extremities: Trace edema no clubbing or Cyanosis        Medications:    Current Facility-Administered Medications:   •  acetaminophen (TYLENOL) 160 MG/5ML solution 650 mg, 650 mg, Oral, Q6H PRN, Radha Jaquez MD, 650 mg at 08/09/21 0828  •  acetaminophen (TYLENOL) tablet 650 mg, 650 mg, Oral, Q4H PRN, 650 mg at 08/10/21 1526 **OR** acetaminophen (TYLENOL) suppository 650 mg, 650 mg, Rectal, Q4H PRN, Ryder Llanes APRN, 650 mg at 21 0153  •  " Acetylcysteine capsule 600 mg, 600 mg, Oral, BID, Ryder Llanes APRN, 600 mg at 08/12/21 2238  •  aluminum-magnesium hydroxide-simethicone (MAALOX MAX) 400-400-40 MG/5ML suspension 15 mL, 15 mL, Oral, Q6H PRN, Ryder Llanes APRN  •  artificial tears ophthalmic ointment, , Both Eyes, PRN, Ryder Llanes APRN, Given at 08/08/21 2001  •  artificial tears ophthalmic ointment, , Both Eyes, PRN, Jeff Feng MD  •  aspirin chewable tablet 324 mg, 324 mg, Per G Tube, Daily, Ryder Llanes APRN, 324 mg at 08/12/21 0940  •  Barium Sulfate 60 % cream 1 teaspoon(s), 1 teaspoon(s), Oral, Once in imaging, Sarah Fajardo MD  •  barium sulfate oral suspension 50 mL, 50 mL, Oral, Once in imaging, Sarah Fajardo MD  •  barium sulfate oral suspension 50 mL, 50 mL, Oral, Once in imaging, Sarah Fajardo MD  •  benzonatate (TESSALON) capsule 100 mg, 100 mg, Oral, TID PRN, Ryder Llanes APRN  •  budesonide (PULMICORT) nebulizer solution 0.5 mg, 0.5 mg, Nebulization, BID - RT, Jeff Feng MD, 0.5 mg at 08/13/21 0718  •  cefTRIAXone (ROCEPHIN) 2 g in sodium chloride 0.9 % 100 mL IVPB, 2 g, Intravenous, Q24H, Neela Alvarado MD, Stopped at 08/12/21 1252  •  cholecalciferol (VITAMIN D3) tablet 2,000 Units, 2,000 Units, Nasogastric, Daily, Ryder Llanes APRN, 2,000 Units at 08/12/21 0941  •  dextrose (D50W) 25 g/ 50mL Intravenous Solution 25 g, 25 g, Intravenous, Q15 Min PRN, Ryder Llanes APRRODOLFO  •  dextrose (GLUTOSE) oral gel 15 g, 15 g, Oral, Q15 Min PRN, Ryder Llanes APRRODOLFO  •  enoxaparin (LOVENOX) syringe 40 mg, 40 mg, Subcutaneous, Q12H, Jeff Feng MD, 40 mg at 08/12/21 2237  •  furosemide (LASIX) tablet 20 mg, 20 mg, Oral, Daily, Sarah Fajardo MD, 20 mg at 08/12/21 0941  •  glucagon (human recombinant) (GLUCAGEN DIAGNOSTIC) injection 1 mg, 1 mg, Subcutaneous, Q15 Min PRN, Ryder Llanes APRN  •  guaiFENesin solution 400 mg, 400 mg, Nasogastric, Q6H, Radha Jaquez MD, 400 mg at 08/13/21 0603  •  hydrALAZINE  (APRESOLINE) injection 10 mg, 10 mg, Intravenous, Q4H PRN, Ryder Llanes, APRN, 10 mg at 08/07/21 0051  •  hydrocortisone (CORTEF) tablet 20 mg, 20 mg, Oral, BID With Meals, Sarah Fajardo MD, 20 mg at 08/12/21 1728  •  hydrOXYzine (ATARAX) tablet 25 mg, 25 mg, Oral, TID PRN, Sheree Orantes APRN  •  insulin glargine (LANTUS, SEMGLEE) injection 45 Units, 45 Units, Subcutaneous, Q12H, Jeff Feng MD, 45 Units at 08/12/21 2239  •  insulin lispro (ADMELOG) injection 0-14 Units, 0-14 Units, Subcutaneous, 4x Daily With Meals & Nightly, 3 Units at 08/12/21 1416 **AND** insulin lispro (ADMELOG) injection 0-14 Units, 0-14 Units, Subcutaneous, PRN, Socorro Talbert, NATY  •  insulin lispro (ADMELOG) injection 10 Units, 10 Units, Subcutaneous, Q6H, Jeff Feng MD, 10 Units at 08/13/21 0603  •  ipratropium-albuterol (DUO-NEB) nebulizer solution 3 mL, 3 mL, Nebulization, 4x Daily - RT, Jeff Feng MD, 3 mL at 08/13/21 0718  •  lansoprazole (FIRST) oral suspension 30 mg, 30 mg, Nasogastric, BID AC, Blane, Juan Valdes MD, 30 mg at 08/12/21 1728  •  lidocaine (XYLOCAINE) 5 % ointment, , , PRN, Jeff Feng MD, 1 application at 08/08/21 0910  •  lidocaine PF 1% (XYLOCAINE) injection, , , PRN, Jeff Feng MD, 5 mL at 08/08/21 0911  •  linezolid (ZYVOX) tablet 600 mg, 600 mg, Oral, Q12H, Neela Alvarado MD, 600 mg at 08/12/21 2238  •  Magnesium Sulfate 2 gram infusion - Mg less than or equal to 1.5 mg/dL, 2 g, Intravenous, PRN, Last Rate: 25 mL/hr at 08/10/21 1143, 2 g at 08/10/21 1143 **OR** Magnesium Sulfate 1 gram infusion - Mg 1.6-1.9 mg/dL, 1 g, Intravenous, PRN, Ryder Llanes, APRN, Last Rate: 100 mL/hr at 08/02/21 0842, 1 g at 08/02/21 0842  •  melatonin tablet 10 mg, 10 mg, Oral, Nightly, Sarah Fajardo MD, 10 mg at 08/12/21 2238  •  metoprolol tartrate (LOPRESSOR) tablet 25 mg, 25 mg, Oral, Q12H, Sarah Fajardo MD, 25 mg at 08/12/21 2237  •  nitroglycerin (NITROSTAT) SL tablet 0.4 mg, 0.4 mg,  Sublingual, Q5 Min PRN, Ryder Llanes APRN  •  ondansetron (ZOFRAN) tablet 2 mg, 2 mg, Oral, Q4H PRN **OR** ondansetron (ZOFRAN) injection 2 mg, 2 mg, Intravenous, Q4H PRN, Marianna Navarrete APRN, 2 mg at 08/12/21 0940  •  oxyCODONE (ROXICODONE) immediate release tablet 10 mg, 10 mg, Oral, Q4H PRN, Sarah Fajardo MD, 10 mg at 08/11/21 2334  •  Pharmacy to Dose enoxaparin (LOVENOX), , Does not apply, Continuous PRN, Jeff Feng MD  •  potassium chloride (K-DUR,KLOR-CON) CR tablet 40 mEq, 40 mEq, Oral, PRN **OR** potassium chloride (KLOR-CON) packet 40 mEq, 40 mEq, Oral, PRN, 40 mEq at 08/10/21 1527 **OR** potassium chloride 10 mEq in 100 mL IVPB, 10 mEq, Intravenous, Q1H PRN, Ryder Llanes APRN  •  potassium phosphate 45 mmol in sodium chloride 0.9 % 500 mL infusion, 45 mmol, Intravenous, PRN **OR** potassium phosphate 30 mmol in sodium chloride 0.9 % 250 mL infusion, 30 mmol, Intravenous, PRN **OR** potassium phosphate 15 mmol in 0.9% sodium chloride 100 mL IVPB, 15 mmol, Intravenous, PRN **OR** sodium phosphates 45 mmol in sodium chloride 0.9 % 500 mL IVPB, 45 mmol, Intravenous, PRN **OR** sodium phosphates 30 mmol in sodium chloride 0.9 % 250 mL IVPB, 30 mmol, Intravenous, PRN **OR** sodium phosphates 15 mmol in sodium chloride 0.9 % 250 mL IVPB, 15 mmol, Intravenous, PRN, Ryder Llanes APRN  •  risperiDONE (risperDAL M-TABS) disintegrating tablet 0.5 mg, 0.5 mg, Oral, Nightly, Sarah Fajardo MD, 0.5 mg at 08/12/21 2238  •  [COMPLETED] Insert peripheral IV, , , Once **AND** sodium chloride 0.9 % flush 10 mL, 10 mL, Intravenous, PRRODOLFO, Leonardo Doss MD  •  sodium chloride 0.9 % flush 10 mL, 10 mL, Intravenous, Q12H, Ryder Llanes APRN, 10 mL at 08/12/21 2238  •  sodium chloride 0.9 % flush 10 mL, 10 mL, Intravenous, PRN, Ryder Llanes APRN    Data Review:  All labs (24hrs):   Recent Results (from the past 24 hour(s))   POC Glucose Once    Collection Time: 08/12/21  1:12 PM    Specimen: Blood    Result Value Ref Range    Glucose 192 (H) 70 - 105 mg/dL   POC Glucose Once    Collection Time: 08/12/21  4:55 PM    Specimen: Blood   Result Value Ref Range    Glucose 92 70 - 105 mg/dL   Magnesium    Collection Time: 08/13/21  4:11 AM    Specimen: Blood   Result Value Ref Range    Magnesium 1.6 1.6 - 2.6 mg/dL   Calcium, Ionized    Collection Time: 08/13/21  4:11 AM    Specimen: Blood   Result Value Ref Range    Ionized Calcium 1.30 1.20 - 1.30 mmol/L   Comprehensive Metabolic Panel    Collection Time: 08/13/21  4:11 AM    Specimen: Blood   Result Value Ref Range    Glucose 188 (H) 65 - 99 mg/dL    BUN 24 (H) 6 - 20 mg/dL    Creatinine 0.25 (L) 0.57 - 1.00 mg/dL    Sodium 136 136 - 145 mmol/L    Potassium 4.4 3.5 - 5.2 mmol/L    Chloride 98 98 - 107 mmol/L    CO2 27.0 22.0 - 29.0 mmol/L    Calcium 9.1 8.6 - 10.5 mg/dL    Total Protein 6.7 6.0 - 8.5 g/dL    Albumin 3.50 3.50 - 5.20 g/dL    ALT (SGPT) 109 (H) 1 - 33 U/L    AST (SGOT) 39 (H) 1 - 32 U/L    Alkaline Phosphatase 93 39 - 117 U/L    Total Bilirubin 0.8 0.0 - 1.2 mg/dL    eGFR Non African Amer >150 >60 mL/min/1.73    Globulin 3.2 gm/dL    A/G Ratio 1.1 g/dL    BUN/Creatinine Ratio 96.0 (H) 7.0 - 25.0    Anion Gap 11.0 5.0 - 15.0 mmol/L   CBC Auto Differential    Collection Time: 08/13/21  4:11 AM    Specimen: Blood   Result Value Ref Range    WBC 12.20 (H) 3.40 - 10.80 10*3/mm3    RBC 3.63 (L) 3.77 - 5.28 10*6/mm3    Hemoglobin 10.9 (L) 12.0 - 15.9 g/dL    Hematocrit 32.9 (L) 34.0 - 46.6 %    MCV 90.6 79.0 - 97.0 fL    MCH 29.9 26.6 - 33.0 pg    MCHC 33.0 31.5 - 35.7 g/dL    RDW 17.1 (H) 12.3 - 15.4 %    RDW-SD 52.9 37.0 - 54.0 fl    MPV 9.2 6.0 - 12.0 fL    Platelets 291 140 - 450 10*3/mm3    Neutrophil % 76.6 (H) 42.7 - 76.0 %    Lymphocyte % 17.0 (L) 19.6 - 45.3 %    Monocyte % 3.6 (L) 5.0 - 12.0 %    Eosinophil % 2.2 0.3 - 6.2 %    Basophil % 0.6 0.0 - 1.5 %    Neutrophils, Absolute 9.30 (H) 1.70 - 7.00 10*3/mm3    Lymphocytes, Absolute 2.10 0.70 -  3.10 10*3/mm3    Monocytes, Absolute 0.40 0.10 - 0.90 10*3/mm3    Eosinophils, Absolute 0.30 0.00 - 0.40 10*3/mm3    Basophils, Absolute 0.10 0.00 - 0.20 10*3/mm3    nRBC 0.1 0.0 - 0.2 /100 WBC        Imaging:  Duplex Venous Lower Extremity - Bilateral CAR  · Normal bilateral lower extremity venous duplex scan without evidence of   deep vein thrombosis in the visualized veins. However, this was a limited   study and did not include full interrogation of all distal veins.     Duplex Venous Upper Extremity - Bilateral CAR  · Normal bilateral upper extremity venous duplex scan. The forearms were   not scanned.     XR Chest 1 View  Narrative: DATE OF EXAM:  8/9/2021 5:44 AM     PROCEDURE:  XR CHEST 1 VW-     INDICATIONS:  Shortness of breath; R06.00-Dyspnea, unspecified; U07.1-COVID-19;  J12.82-Pneumonia due to Coronavirus disease 2019; J96.01-Acute  respiratory failure with hypoxia; I95.2-Hypotension due to drugs;  U07.1-COVID-19; J96.00-Acute respiratory failure, unspecified whether  with hypoxia or hypercapnia       COMPARISON:  AP portable chest 8/8/2021.     TECHNIQUE:   Single radiographic view of the chest was obtained.     FINDINGS:  The endotracheal tube remains in satisfactory position in the mid to  upper thoracic trachea, the tip located about 4.5 cm above the level of  the bhavesh. NG tube extends below the diaphragm with the tip not  included in the field-of-view. There is no visible pneumothorax.     Ill-defined interstitial and alveolar disease changes are scattered  within both lungs, and, along for slight differences in imaging  technique, is not thought to be significantly changed compared to one  day prior. No pleural effusion or pneumothorax is identified.     Impression:    1. Interstitial and alveolar disease changes diffusely within both  lungs, in keeping with this patient history of Covid pneumonia. The  findings are not thought to be significantly changed compared 1 day  prior.  2. Support  tubes appear similarly positioned.  3. No visible pneumothorax.     Electronically Signed By-Gisela Kimble MD On:8/9/2021 8:22 AM  This report was finalized on [id number] by  Gisela Kimble MD.       ASSESSMENT:  Acute respiratory failure   Pneumonia -MRSA/ Klebsiella pneumoniae  COVID-19   Morbid obesity     DM II   E. coli UTI   HTN  Polyneuropathy   Delirium, acute  Dysphagia     PLAN:  Continue tube feeds and po meals as tolerated   Antibiotics  Bronchodilator  Inhaled corticosteroids  Mucinex/Cortef/Mucomyst tablets  Titrate O2 as tolerated  Electrolytes/ glycemic control  PT/OT/ST  DVT and GI prophylaxis    Discussed with Dr Ping Walker, APRN   8/13/2021  09:56 EDT     I personally have examined  and interviewed the patient. I have reviewed the history, data, problems, assessment and plan with our NP.  Critical care time in direct medical management (   ) minutes  Electronically signed by Jeff Feng MD, D,ABS, 08/13/21, 8:55 PM EDT.

## 2021-08-13 NOTE — PROGRESS NOTES
Infectious Diseases Progress Note      LOS: 22 days   Patient Care Team:  Lesia, Ana Known as PCP - General    Chief Complaint: Shortness of breath    Subjective       Patient had no high-grade fever during the last 24 hours.  The patient is currently on 4 L of oxygen via nasal cannula.  The patient is awake and alert.  Her main complaint is weakness      Review of Systems:   Review of Systems   Constitutional: Positive for fatigue.   HENT: Negative.    Eyes: Negative.    Respiratory: Positive for shortness of breath.    Cardiovascular: Negative.    Gastrointestinal: Negative.    Genitourinary: Negative.    Musculoskeletal: Negative.    Skin: Negative.    Neurological: Positive for weakness.   Hematological: Negative.    Psychiatric/Behavioral: Negative.         Objective     Vital Signs  Temp:  [97.4 °F (36.3 °C)-99.7 °F (37.6 °C)] 97.4 °F (36.3 °C)  Heart Rate:  [] 85  Resp:  [18-28] 18  BP: (119-146)/(60-93) 138/76    Physical Exam:  Physical Exam  Vitals and nursing note reviewed.   Constitutional:       General: She is not in acute distress.     Appearance: Normal appearance. She is well-developed. She is obese. She is not diaphoretic.   HENT:      Head: Normocephalic and atraumatic.   Eyes:      Pupils: Pupils are equal, round, and reactive to light.   Cardiovascular:      Rate and Rhythm: Normal rate and regular rhythm.      Heart sounds: Normal heart sounds, S1 normal and S2 normal. No murmur heard.     Pulmonary:      Effort: Pulmonary effort is normal. No respiratory distress.      Breath sounds: No stridor. Rales present. No wheezing.   Chest:      Chest wall: No tenderness.   Abdominal:      General: Bowel sounds are normal. There is no distension.      Palpations: Abdomen is soft. There is no mass.      Tenderness: There is no abdominal tenderness. There is no guarding or rebound.      Comments: NG tube   Genitourinary:     Comments: External catheter  Musculoskeletal:         General: No  swelling, tenderness or deformity. Normal range of motion.      Cervical back: Normal range of motion and neck supple.   Skin:     General: Skin is warm and dry.      Coloration: Skin is not pale.      Findings: No bruising, erythema or rash.   Neurological:      Mental Status: She is alert and oriented to person, place, and time.      Cranial Nerves: No cranial nerve deficit.          Results Review:    I have reviewed all clinical data, test, lab, and imaging results.     Radiology  No Radiology Exams Resulted Within Past 24 Hours    Cardiology    Laboratory    Results from last 7 days   Lab Units 08/13/21 0411 08/12/21 0331 08/11/21  1221 08/10/21  0411 08/09/21  0518 08/08/21  0505 08/07/21  0507   WBC 10*3/mm3 12.20* 11.50* 11.00* 8.20 10.10 16.10* 20.20*   HEMOGLOBIN g/dL 10.9* 11.1* 10.5* 9.6* 8.6* 9.9* 9.9*   HEMATOCRIT % 32.9* 34.8 32.8* 28.7* 26.2* 30.5* 29.5*   PLATELETS 10*3/mm3 291 272 260 166 172 234 231     Results from last 7 days   Lab Units 08/13/21 0411 08/12/21 0331 08/11/21  1221 08/10/21  2033 08/10/21  0652 08/09/21  0753 08/08/21  0505 08/07/21  0507 08/07/21  0507   SODIUM mmol/L 136 143 143  --  142 143 140  --  137   POTASSIUM mmol/L 4.4 4.5 3.9 4.3 3.5 3.8 4.6   < > 4.7   CHLORIDE mmol/L 98 104 102  --  99 101 98  --  95*   CO2 mmol/L 27.0 29.0 29.0  --  34.0* 35.0* 32.0*  --  32.0*   BUN mg/dL 24* 32* 33*  --  45* 54* 55*  --  56*   CREATININE mg/dL 0.25* 0.37* 0.45*  --  0.42* 0.61 0.67  --  0.85   GLUCOSE mg/dL 188* 127* 190*  --  125* 138* 192*  --  209*   ALBUMIN g/dL 3.50 3.80 3.80  --  3.70 3.80 4.00  --  4.10   BILIRUBIN mg/dL 0.8 1.0 0.9  --  0.8 0.9 0.8  --  0.9   ALK PHOS U/L 93 90 88  --  84 83 85  --  82   AST (SGOT) U/L 39* 40* 53*  --  61* 64* 39*  --  47*   ALT (SGPT) U/L 109* 114* 133*  --  160* 180* 133*  --  158*   CALCIUM mg/dL 9.1 9.5 9.7  --  9.9 10.1 10.2  --  10.1    < > = values in this interval not displayed.                 Microbiology   Microbiology  Results (last 10 days)     Procedure Component Value - Date/Time    Blood Culture - Blood, Arm, Right [[id number]] Collected: 08/09/21 0758    Lab Status: Preliminary result Specimen: Blood from Arm, Right Updated: 08/13/21 0815     Blood Culture No growth at 4 days    Blood Culture - Blood, Arm, Left [[id number]] Collected: 08/09/21 0753    Lab Status: Preliminary result Specimen: Blood from Arm, Left Updated: 08/13/21 0815     Blood Culture No growth at 4 days    Fungus Culture - Wash, Bronchus [[id number]]  (Abnormal) Collected: 08/08/21 0944    Lab Status: Preliminary result Specimen: Wash from Bronchus Updated: 08/11/21 0745     Fungus Culture Candida species    AFB Culture - Wash, Bronchus [[id number]] Collected: 08/08/21 0944    Lab Status: Preliminary result Specimen: Wash from Bronchus Updated: 08/13/21 1030     AFB Culture No AFB isolated at less than 1 week     AFB Stain No acid fast bacilli seen    Respiratory Culture - Wash, Bronchus [[id number]] Collected: 08/08/21 0944    Lab Status: Final result Specimen: Wash from Bronchus Updated: 08/10/21 1209     Respiratory Culture Scant growth (1+) Normal Respiratory Sondra: NO S.aureus/MRSA or Pseudomonas aeruginosa     Gram Stain Many (4+) WBCs per low power field      Rare (1+) Epithelial cells per low power field      Few (2+) Mixed bacterial morphotypes seen on Gram Stain    Respiratory Panel, PCR (WITHOUT COVID) - Wash, Bronchus [[id number]]  (Normal) Collected: 08/08/21 0944    Lab Status: Final result Specimen: Wash from Bronchus Updated: 08/08/21 1127     ADENOVIRUS, PCR Not Detected     Coronavirus 229E Not Detected     Coronavirus HKU1 Not Detected     Coronavirus NL63 Not Detected     Coronavirus OC43 Not Detected     Human Metapneumovirus Not Detected     Human Rhinovirus/Enterovirus Not Detected     Influenza B PCR Not Detected     Parainfluenza Virus 1 Not Detected     Parainfluenza Virus 2 Not Detected     Parainfluenza Virus 3 Not Detected      "Parainfluenza Virus 4 Not Detected     Bordetella pertussis pcr Not Detected     Chlamydophila pneumoniae PCR Not Detected     Mycoplasma pneumo by PCR Not Detected     Influenza A PCR Not Detected     RSV, PCR Not Detected     Bordetella parapertussis PCR Not Detected    Narrative:      The coronavirus on the RVP is NOT COVID-19 and is NOT indicative of infection with COVID-19.    In the setting of a positive respiratory panel with a viral infection PLUS a negative procalcitonin without other underlying concern for bacterial infection, consider observing off antibiotics or discontinuation of antibiotics and continue supportive care. If the respiratory panel is positive for atypical bacterial infection (Bordetella pertussis, Chlamydophila pneumoniae, or Mycoplasma pneumoniae), consider antibiotic de-escalation to target atypical bacterial infection.    Histoplasma Antigen, CSF or BAL - Wash, Bronchus [[id number]] Collected: 08/08/21 0944    Lab Status: Final result Specimen: Wash from Bronchus Updated: 08/12/21 0919     Result None Detected ng/mL      Comment:                                           None Detected        Interpretation Negative     Comment: Positive results reported in ng/mL from 0.20 ng/mL to 20.00 ng/mL  Positive results above 20.00 ng/mL are reported as \"Above the  Limit of Quantification\"       Narrative:      Performed at:  [address] [place] [address] stickK  [address] [place] [address] [place]  [id number]  : Nicolás Garduno MD, Phone:  [phone]    Cytomegalovirus (CMV) By PCR - Wash, Bronchus [[id number]] Collected: 08/08/21 0944    Lab Status: Final result Specimen: Wash from Bronchus Updated: 08/12/21 7101     Specimen Source Comment     Comment: BRONCH WASH        Cytomegalovirus PCR Negative     Comment: -------------------ADDITIONAL INFORMATION-------------------  This test was developed and its performance characteristics  determined by [facility] in a manner consistent with " CLIA  requirements. This test has not been cleared or approved by  the U.S. Food and Drug Administration.       Narrative:      Performed at:  01 - [facility] [address]  [id number]  : Dominik Lanza , Phone:  [phone]    Pneumocystis PCR - Wash, Bronchus [[id number]] Collected: 08/08/21 0944    Lab Status: Final result Specimen: Wash from Bronchus Updated: 08/13/21 1113     Pneumocystis jiroveci DNA Negative     Comment: -------------------ADDITIONAL INFORMATION-------------------  This test was developed and its performance characteristics  determined by [facility] in a manner consistent with CLIA  requirements. This test has not been cleared or approved by  the U.S. Food and Drug Administration.  REFERENCE RANGE: Not Applicable        Specimen Source Comment     Comment: BRONCH WASH       Narrative:      Performed at:  [address] - [address]  [id number]  : Dominik Lanza , Phone:  [phone]    Urine Culture - Urine, Urine, Catheter [[id number]]  (Abnormal)  (Susceptibility) Collected: 08/07/21 0351    Lab Status: Final result Specimen: Urine, Catheter Updated: 08/09/21 1037     Urine Culture >100,000 CFU/mL Escherichia coli    Susceptibility      Escherichia coli      FITZ      Ampicillin Resistant      Ampicillin + Sulbactam Intermediate      Cefazolin Susceptible      Cefepime Susceptible      Ceftazidime Susceptible      Ceftriaxone Susceptible      Gentamicin Susceptible      Levofloxacin Susceptible      Nitrofurantoin Susceptible      Piperacillin + Tazobactam Susceptible      Tetracycline Susceptible      Trimethoprim + Sulfamethoxazole Susceptible               Linear View                   Respiratory Culture - Sputum, Bronchus [[id number]]  (Abnormal)  (Susceptibility) Collected: 08/04/21 1150    Lab Status: Final result Specimen: Sputum from Bronchus Updated: 08/06/21 0823     Respiratory  Culture Scant growth (1+) Klebsiella pneumoniae ssp pneumoniae      Rare Normal Respiratory Sondra: NO S.aureus/MRSA or Pseudomonas aeruginosa     Gram Stain Few (2+) WBCs per low power field      No organisms seen    Susceptibility      Klebsiella pneumoniae ssp pneumoniae      FITZ      Ampicillin Resistant      Ampicillin + Sulbactam Resistant      Cefepime Susceptible      Ceftazidime Susceptible      Ceftriaxone Susceptible      Gentamicin Susceptible      Levofloxacin Intermediate      Piperacillin + Tazobactam Susceptible      Tetracycline Resistant      Trimethoprim + Sulfamethoxazole Susceptible               Linear View               Susceptibility Comments     Klebsiella pneumoniae ssp pneumoniae    Cefazolin sensitivity will not be reported for Enterobacteriaceae in non-urine isolates. If cefazolin is preferred, please call the microbiology lab to request an E-test.  With the exception of urinary-sourced infections, aminoglycosides should not be used as monotherapy.             Clostridium Difficile EIA - Stool, Per Rectum [[id number]]  (Normal) Collected: 08/03/21 1600    Lab Status: Final result Specimen: Stool from Per Rectum Updated: 08/04/21 0927     C Diff GDH / Toxin Negative          Medication Review:       Schedule Meds  Acetylcysteine, 600 mg, Oral, BID  aspirin, 324 mg, Per G Tube, Daily  Barium Sulfate, 1 teaspoon(s), Oral, Once in imaging  barium sulfate, 50 mL, Oral, Once in imaging  barium sulfate, 50 mL, Oral, Once in imaging  budesonide, 0.5 mg, Nebulization, BID - RT  cefTRIAXone, 2 g, Intravenous, Q24H  cholecalciferol, 2,000 Units, Nasogastric, Daily  enoxaparin, 40 mg, Subcutaneous, Q12H  furosemide, 20 mg, Oral, Daily  guaiFENesin, 400 mg, Nasogastric, Q6H  hydrocortisone, 20 mg, Oral, BID With Meals  insulin glargine, 45 Units, Subcutaneous, Q12H  insulin lispro, 0-14 Units, Subcutaneous, 4x Daily With Meals & Nightly  insulin lispro, 10 Units, Subcutaneous, TID With  Meals  ipratropium-albuterol, 3 mL, Nebulization, 4x Daily - RT  lansoprazole, 30 mg, Nasogastric, BID AC  linezolid, 600 mg, Oral, Q12H  melatonin, 10 mg, Oral, Nightly  metoprolol tartrate, 25 mg, Oral, Q12H  risperiDONE, 0.5 mg, Oral, Nightly  sodium chloride, 10 mL, Intravenous, Q12H        Infusion Meds  Pharmacy to Dose enoxaparin (LOVENOX),         PRN Meds  •  acetaminophen  •  acetaminophen **OR** acetaminophen  •  aluminum-magnesium hydroxide-simethicone  •  artificial tears  •  artificial tears  •  benzonatate  •  dextrose  •  dextrose  •  glucagon (human recombinant)  •  hydrALAZINE  •  hydrOXYzine  •  insulin lispro **AND** insulin lispro  •  lidocaine  •  lidocaine PF 1%  •  magnesium sulfate **OR** magnesium sulfate in D5W 1g/100mL (PREMIX)  •  methocarbamol  •  nitroglycerin  •  ondansetron **OR** ondansetron  •  oxyCODONE  •  Pharmacy to Dose enoxaparin (LOVENOX)  •  potassium chloride **OR** potassium chloride **OR** potassium chloride  •  potassium phosphate infusion greater than 15 mMoles **OR** potassium phosphate infusion greater than 15 mMoles **OR** potassium phosphate **OR** sodium phosphate IVPB **OR** sodium phosphate IVPB **OR** sodium phosphate IVPB  •  [COMPLETED] Insert peripheral IV **AND** sodium chloride  •  sodium chloride        Assessment/Plan       Antimicrobial Therapy   1.  Zyvox      day  2.  Rocephin      day  3.      Day  4.      Day  5.      Day      Assessment     Severe COVID-19 infection and patient with significant comorbidities including diabetes mellitus and morbid obesity. Apparently did not receive vaccination for COVID-19  COVID-19 screen on admission on July 22, 2021 was positive. Was reported that patient had positive COVID-19 diagnosis 6 days prior to admission  The patient had received 5 days of IV remdesivir     Severe respiratory failure and patient was intubated for long duration.  The patient was extubated on August 10, 2021  -Patient is now on 4 L of  oxygen by nasal cannula     Probable ventilator associated pneumonia versus hospitalist acquired pneumonia. Sputum culture from July 29, 2021 grew MRSA and the organism is susceptible to linezolid and vancomycin  The patient was started on linezolid on August 1, 2021  Repeat sputum culture from August 1, 2021 is growing Klebsiella pneumoniae     Morbid obesity     Diabetes mellitus     Reactive leukocytosis secondary to steroids.  Improved    UTI, urine culture from August 7, 2021 is growing relatively susceptible strain of E. coli         Plan     Continue Zyvox 600 mg every 12 hours for a total of 14 days and monitor platelets carefully during treatment with Zyvox  Continue Rocephin 2 g every 24 hours for a total of 7 to 10 days  Supportive care  No need for further COVID-19 isolation  Case was discussed with the patient and her father at the bedside        Neela Alvarado MD  08/13/21  15:02 EDT     Note is dictated utilizing voice recognition software/Dragon

## 2021-08-13 NOTE — PROGRESS NOTES
[facility] Medicine Services Daily Progress Note    Patient Name: Leslie Harris  : 1990  MRN: [id number]  Primary Care Physician:  Provider, No Known  Date of admission: 2021      Subjective      Chief Complaint:   Right arm pain    History of Present Illness: Leslie Harris is a 30 y.o. morbidly obese female who was diagnosed with COVID-19 on 2021 at the [facility]. She had not been vaccinated for Covid. She presented to the [facility] ED on 2021 complaining of shortness of breath. Workup in the ER revealed acute respiratory failure with hypoxia, pneumonia due to COVID-19, hypertension, and hyperglycemia. The patient was placed on Precision Flow with 100% FiO2 and admitted to the ICU for close monitoring and further treatment. The patient was started on Decadron and Remdesivir.     21:  Patient transitioned AVAPS with Precision Flow with 100% FiO2, but continued to have hypoxia and was intubated.  Right IJ central line inserted.  Dietitian consulted for tube feeds.     21:  Remains intubated and sedated. Chemically paralyzed due to worsening hypoxemia. On Flolan nebulized.  Prone position.  Started on empiric coverage with ceftriaxone.  Tolerating tube feeds.       21:  Remains intubated, sedated, and chemically paralyzed.  Antibiotic changed to Zosyn.  Remains prone and on Flolan.  Tolerating tube feeds.         21:  Remains intubated, sedated, and chemically paralyzed.  Patient placed in supine position.  Remains on Flolan.  Tolerating tube feeds.       21:  Remains intubated, sedated, and chemically paralyzed.  Patient tolerating supine position.  Remains on Flolan.  Tolerating tube feeds.       21:  Remains intubated, sedated, and chemically paralyzed.  Patient tolerating supine position.  Remains on Flolan.  Tolerating tube feeds.       21:  Remains intubated, sedated, and chemically  paralyzed.  Sputum culture grew MRSA.  Patient tolerating supine position.  Remains on Flolan; failed wean attempt.  Tolerating tube feeds.       07/30/21:  Remains intubated, sedated, and chemically paralyzed.  Patient returned to prone position for 16 hours.  Remains on Flolan.  Transitioned to heparin drip.  Tolerating tube feeds.       07/31/21:  Remains intubated, sedated, and chemically paralyzed.  Remains on Flolan.  Diuresis started.  On heparin drip.  Tolerating tube feeds.       08/01/21:  Remains intubated, sedated, and chemically paralyzed.  A-line inserted.  Tolerating supine position.  Remains on heparin drip.  Diuresing well.  Remains on Flolan.  Cardizem drip started for hypertension and tachycardia.  Left radial a-line discontinued.  Right brachial a-line inserted.   Tolerating tube feeds.       08/02/21:  Remains intubated, sedated, and chemically paralyzed. Tolerating supine position.  Remains on Flolan.  Remains on heparin drip.  Labile BP, alternating Cardizem and Levophed.  Fecal management system placed due to high volume liquid stool.  Tolerating tube feeds.       08/03/21:  Remains intubated, sedated, and chemically paralyzed.  Infectious disease consulted for antibiotic management.  Heparin drip discontinued due to blood-tinged sputum.  Weaning Cardizem drip.  Remains on Flolan. Switched to prone position.  Tolerating tube feeds.       08/04/21:  Remains intubated, sedated, and chemically paralyzed.  Sputum culture grew Klebsiella pneumoniae.  FMS removed.  Lovenox restarted.  Weaning Cardizem drip.  Attempted to wean Flolan, but had to be reinitiated.  Tolerating tube feeds.       08/05/21:  Remains intubated, sedated, and chemically paralyzed.  Tolerating tube feeds.       08/06/21:  Remains intubated, sedated, and chemically paralyzed.  In prone position.  Tolerating tube feeds.       08/07/21:  Remains intubated, sedated, and chemically paralyzed.  Tolerating supine position.  Urine  culture grew E. coli.  Tolerating tube feeds.       08/08/21:  Remains intubated, sedated, and chemically paralyzed.  Status post bronchoscopy with multiple mucus plugs removed.  Paralytic weaned off.  Tolerating tube feeds.       08/09/21:  Remains intubated and sedated.  Tolerating tube feeds.       08/10/21:  Patient extubated.  Tolerating tube feeds.       08/11/21:  Remains extubated.  Diagnosed with critical illness polyneuropathy muscle weakness due to paralytics and steroids.  Patient with acute delirium.  Tolerating tube feeds.  SLP consulted for video swallow.     08/12/21:  The patient was downgraded to PCU and the Hospitalist team was consulted for medical management.  Patient started on mechanical soft diet overnight.  She is complaining of some shortness of breath, but maintaining oxygen saturation on 5 liters per high flow nasal cannula.  She reports fatigue and generalized weakness.        8/13    Patient's blood glucose very acceptable  Patient had been comfortable overnight.  She is slightly tachycardic on 5 L of high flow oxygen but sats are above 95 percent  Her white count 12.2  Pharmacy suggesting some changes in the insulin regimen to avoid multiple sticks.  Still feeling weak on the right upper extremity.  She relates that to her prone position in the ICU.  Discussed with the family that this would be helped by physical therapy.          ROS  All other systems reviewed and negative      Objective      Vitals:   Temp:  [97.6 °F (36.4 °C)-99.7 °F (37.6 °C)] 97.7 °F (36.5 °C)  Heart Rate:  [] 105  Resp:  [18-28] 22  BP: (119-146)/(60-93) 131/72  Flow (L/min):  [5] 5    Physical Exam  Vitals and nursing note reviewed.   Constitutional:       General: She is not in acute distress.     Appearance: Normal appearance. She is well-developed. She is not ill-appearing, toxic-appearing or diaphoretic.   HENT:      Head: Normocephalic and atraumatic.      Right Ear: Ear canal and external ear  normal.      Left Ear: Ear canal and external ear normal.      Nose: Nose normal. No congestion or rhinorrhea.      Mouth/Throat:      Mouth: Mucous membranes are moist.      Pharynx: No oropharyngeal exudate.   Eyes:      General: No scleral icterus.        Right eye: No discharge.         Left eye: No discharge.      Extraocular Movements: Extraocular movements intact.      Conjunctiva/sclera: Conjunctivae normal.      Pupils: Pupils are equal, round, and reactive to light.   Neck:      Thyroid: No thyromegaly.      Vascular: No carotid bruit or JVD.      Trachea: No tracheal deviation.   Cardiovascular:      Rate and Rhythm: Normal rate and regular rhythm.      Pulses: Normal pulses.      Heart sounds: Normal heart sounds. No murmur heard.   No friction rub. No gallop.    Pulmonary:      Effort: Pulmonary effort is normal. No respiratory distress.      Breath sounds: Normal breath sounds. No stridor. No wheezing, rhonchi or rales.   Chest:      Chest wall: No tenderness.   Abdominal:      General: Bowel sounds are normal. There is no distension.      Palpations: Abdomen is soft. There is no mass.      Tenderness: There is no abdominal tenderness. There is no guarding or rebound.      Hernia: No hernia is present.   Musculoskeletal:         General: No swelling, tenderness, deformity or signs of injury. Normal range of motion.      Cervical back: Normal range of motion and neck supple. No rigidity. No muscular tenderness.      Right lower leg: No edema.      Left lower leg: No edema.   Lymphadenopathy:      Cervical: No cervical adenopathy.   Skin:     General: Skin is warm and dry.      Coloration: Skin is not jaundiced or pale.      Findings: No bruising, erythema or rash.   Neurological:      General: No focal deficit present.      Mental Status: She is alert and oriented to person, place, and time. Mental status is at baseline.      Cranial Nerves: No cranial nerve deficit.      Sensory: No sensory deficit.       Motor: No weakness or abnormal muscle tone.      Coordination: Coordination normal.   Psychiatric:         Mood and Affect: Mood normal.         Behavior: Behavior normal.         Thought Content: Thought content normal.         Judgment: Judgment normal.            Result Review    Result Review:  I have personally reviewed the results from the time of this admission to 8/13/2021 10:13 EDT and agree with these findings:  [x]  Laboratory  [x]  Microbiology  [x]  Radiology  [x]  EKG/Telemetry   [x]  Cardiology/Vascular   [x]  Pathology  []  Old records  []  Other:  Most notable findings include: As outlined above       Wounds (last 24 hours)      LDA Wound     Row Name 08/13/21 0400 08/13/21 0000 08/12/21 2000       Wound 07/26/21 2200 Left distal nose Pressure Injury    Wound - Properties Group Placement Date: 07/26/21  - Placement Time: 2200  - Present on Hospital Admission: N  - Side: Left  -MF Orientation: distal  -MF Location: nose  -MF Primary Wound Type: Pressure inj  -MF Stage, Pressure Injury : deep tissue injury  -MF    Dressing Appearance  --  --  open to air  -SS    Closure  Open to air  -SS  Open to air  -SS  Open to air  -SS    Periwound  intact;blanchable;dry;warm  -SS  intact;blanchable;dry;warm  -SS  intact;blanchable;dry;warm  -SS    Retired Wound - Properties Group Date first assessed: 07/26/21  - Time first assessed: 2200  -MF Present on Hospital Admission: N  -MF Side: Left  -MF Location: nose  -MF Primary Wound Type: Pressure inj  -MF       Wound 07/26/21 2200 midline tongue Pressure Injury    Wound - Properties Group Placement Date: 07/26/21  - Placement Time: 2200  -MF Present on Hospital Admission: N  - Orientation: midline  -MF Location: tongue  -MF Primary Wound Type: Pressure inj  -MF Stage, Pressure Injury : deep tissue injury  -MF    Dressing Appearance  --  --  open to air  -SS    Closure  Open to air  -SS  Open to air  -SS  Open to air  -SS    Periwound   intact;blanchable;warm  -SS  intact;blanchable;warm  -SS  intact;blanchable;warm  -SS    Retired Wound - Properties Group Date first assessed: 07/26/21  - Time first assessed: 2200 -MF Present on Hospital Admission: N  -MF Location: tongue  -MF Primary Wound Type: Pressure inj  -MF       Wound 07/27/21 2028 medial sacral spine Pressure Injury    Wound - Properties Group Placement Date: 07/27/21  - Placement Time: 2028 -MF Present on Hospital Admission: N  -MF Orientation: medial  -MF Location: sacral spine  -MF Primary Wound Type: Pressure inj  -MF Stage, Pressure Injury : Stage 2  -MF    Dressing Appearance  --  --  open to air  -SS    Closure  Open to air  -SS  Open to air  -SS  Open to air  -SS    Base  blanchable;dry  -SS  blanchable;dry  -SS  blanchable;dry  -SS    Retired Wound - Properties Group Date first assessed: 07/27/21  - Time first assessed: 2028 -MF Present on Hospital Admission: N  -MF Location: sacral spine  -MF Primary Wound Type: Pressure inj  -MF       Wound 08/06/21 1400 Right cheek Pressure Injury    Wound - Properties Group Placement Date: 08/06/21  -HELLEN Placement Time: 1400  -HELLEN Present on Hospital Admission: N  -HELLEN Side: Right  -HELLEN Location: cheek  -HELLEN Primary Wound Type: Pressure inj  -HELLEN Stage, Pressure Injury : Stage 1  -HELLEN    Dressing Appearance  --  --  open to air  -SS    Closure  Open to air  -SS  Open to air  -SS  Open to air  -SS    Periwound  intact;blanchable;dry;warm  -SS  intact;blanchable;dry;warm  -SS  intact;blanchable;dry;warm  -SS    Retired Wound - Properties Group Date first assessed: 08/06/21  -HELLEN Time first assessed: 1400  -HELLEN Present on Hospital Admission: N  -HELLEN Side: Right  -HELLEN Location: cheek  -HELLEN Primary Wound Type: Pressure inj  -HELLEN       Wound 08/06/21 1400 Right throat Abrasion    Wound - Properties Group Placement Date: 08/06/21  -HELLEN Placement Time: 1400  -HELLEN Present on Hospital Admission: N  -HELLEN Side: Right  -HELLEN Location: throat  -HELLEN Primary Wound  Type: Abrasion  -HELLEN    Retired Wound - Properties Group Date first assessed: 08/06/21  -HELLEN Time first assessed: 1400  -HELLEN Present on Hospital Admission: N  -HELLEN Side: Right  -HELLEN Location: throat  -HELLEN Primary Wound Type: Abrasion  -HELLEN    Row Name 08/12/21 1530             Wound 07/26/21 2200 Left distal nose Pressure Injury    Wound - Properties Group Placement Date: 07/26/21  -MF Placement Time: 2200 -MF Present on Hospital Admission: N  -MF Side: Left  -MF Orientation: distal  -MF Location: nose  -MF Primary Wound Type: Pressure inj  -MF Stage, Pressure Injury : deep tissue injury  -MF    Retired Wound - Properties Group Date first assessed: 07/26/21  -MF Time first assessed: 2200  -MF Present on Hospital Admission: N  -MF Side: Left  -MF Location: nose  -MF Primary Wound Type: Pressure inj  -MF       Wound 07/26/21 2200 midline tongue Pressure Injury    Wound - Properties Group Placement Date: 07/26/21  -MF Placement Time: 2200 -MF Present on Hospital Admission: N  -MF Orientation: midline  -MF Location: tongue  -MF Primary Wound Type: Pressure inj  -MF Stage, Pressure Injury : deep tissue injury  -MF    Retired Wound - Properties Group Date first assessed: 07/26/21  -MF Time first assessed: 2200 -MF Present on Hospital Admission: N  -MF Location: tongue  -MF Primary Wound Type: Pressure inj  -MF       Wound 07/27/21 2028 medial sacral spine Pressure Injury    Wound - Properties Group Placement Date: 07/27/21  -MF Placement Time: 2028 -MF Present on Hospital Admission: N  -MF Orientation: medial  -MF Location: sacral spine  -MF Primary Wound Type: Pressure inj  -MF Stage, Pressure Injury : Stage 2  -MF    Closure  Open to air  -KR      Periwound  pink  -KR      Drainage Amount  none  -KR      Periwound Care  -- site care  -KR      Retired Wound - Properties Group Date first assessed: 07/27/21  -MF Time first assessed: 2028  -MF Present on Hospital Admission: N  -MF Location: sacral spine  -MF Primary Wound  Type: Pressure inj  -MF       Wound 08/06/21 1400 Right cheek Pressure Injury    Wound - Properties Group Placement Date: 08/06/21  -HELLEN Placement Time: 1400  -HELLEN Present on Hospital Admission: N  -HELLEN Side: Right  -HELLEN Location: cheek  -HELLEN Primary Wound Type: Pressure inj  -HELLEN Stage, Pressure Injury : Stage 1  -HELLEN    Retired Wound - Properties Group Date first assessed: 08/06/21  -HELLEN Time first assessed: 1400  -HELLEN Present on Hospital Admission: N  -HELLEN Side: Right  -HELLEN Location: cheek  -HELLEN Primary Wound Type: Pressure inj  -HELLEN       Wound 08/06/21 1400 Right throat Abrasion    Wound - Properties Group Placement Date: 08/06/21  -HELLEN Placement Time: 1400  -HELLEN Present on Hospital Admission: N  -HELLEN Side: Right  -HELLEN Location: throat  -HELLEN Primary Wound Type: Abrasion  -HELLEN    Retired Wound - Properties Group Date first assessed: 08/06/21  -HELLEN Time first assessed: 1400  -HELLEN Present on Hospital Admission: N  -HELLEN Side: Right  -HELLEN Location: throat  -HELLEN Primary Wound Type: Abrasion  -HELLEN      User Key  (r) = Recorded By, (t) = Taken By, (c) = Cosigned By    Initials Name Provider Type    Ольга Spence RN Registered Nurse    Tonya Rodriguez RN Registered Nurse    Susana Perry RN Registered Nurse    Genna Ortega RN Registered Nurse            Assessment/Plan      Brief Patient Summary:  Leslie Harris is a 30 y.o. female who presented with pneumonia and COVID-19 infection had long hospital stay.        Acetylcysteine, 600 mg, Oral, BID  aspirin, 324 mg, Per G Tube, Daily  Barium Sulfate, 1 teaspoon(s), Oral, Once in imaging  barium sulfate, 50 mL, Oral, Once in imaging  barium sulfate, 50 mL, Oral, Once in imaging  budesonide, 0.5 mg, Nebulization, BID - RT  cefTRIAXone, 2 g, Intravenous, Q24H  cholecalciferol, 2,000 Units, Nasogastric, Daily  enoxaparin, 40 mg, Subcutaneous, Q12H  furosemide, 20 mg, Oral, Daily  guaiFENesin, 400 mg, Nasogastric, Q6H  hydrocortisone, 20 mg, Oral, BID With Meals  insulin  glargine, 45 Units, Subcutaneous, Q12H  insulin lispro, 0-14 Units, Subcutaneous, 4x Daily With Meals & Nightly  insulin lispro, 10 Units, Subcutaneous, Q6H  ipratropium-albuterol, 3 mL, Nebulization, 4x Daily - RT  lansoprazole, 30 mg, Nasogastric, BID AC  linezolid, 600 mg, Oral, Q12H  melatonin, 10 mg, Oral, Nightly  metoprolol tartrate, 25 mg, Oral, Q12H  risperiDONE, 0.5 mg, Oral, Nightly  sodium chloride, 10 mL, Intravenous, Q12H       Pharmacy to Dose enoxaparin (LOVENOX),          Active Hospital Problems:  Active Hospital Problems    Diagnosis    • **Pneumonia due to COVID-19 virus    • Critical illness polyneuropathy (CMS/HCC)    • Delirium, acute    • Dysphagia    • E. coli UTI (urinary tract infection)    • Klebsiella pneumoniae pneumonia (CMS/[facility])    • Diabetes mellitus type 2 in obese (CMS/[facility])    • MRSA pneumonia (CMS/[facility])    • Acute respiratory failure due to COVID-19 (CMS/[facility])    • Class 3 severe obesity without serious comorbidity with body mass index (BMI) of 50.0 to 59.9 in adult    • Hypertension      Plan:     Pneumonia due to COVID-19 virus  Acute respiratory failure due to COVID-19   -intubated 07/23/21  -extubated 08/10/21  -currently on O2 @ 5 L per HFNC  -titrate O2 to keep sat >92%  -pulmonology following   -completed 5 days of Remdesivir  -off Decadron, now on hydrocortisone  -on Mucinex, Pulmicort, and nebs  -VTE prophylaxis with Lovenox and aspirin   -Finished isolation..      Diabetes mellitus type 2 in obese, new onset  -A1c 6.9%  -accu checks AC&HS with SSI  -continue Lantus      MRSA pneumonia  Klebsiella pneumoniae pneumonia  -ID following  - IV ceftriaxone for a total of 7 to 10 days with p.o. linezolid for a total of 14 days     E. coli UTI (urinary tract infection)  -on Rocephin      Hypertension  -on metoprolol and Lasix      Critical illness polyneuropathy   -PT/OT following  -needs inpatient rehab at discharge      Delirium, acute  -improving  -on risperidone   -  resolved     Dysphagia   -secondary to recent intubation and paralysis  -SLP following  -started on mechanical soft diet 08/11/21     Class 3 severe obesity without serious comorbidity with body mass index (BMI) of 50.0 to 59.9 in adult  -BMI 54.37 on admission  -encourage lifestyle modifications      Disposition   anticipate DC to inpt rehab- [facility] referral sent 8/12- PENDING. Will need precert. No PASRR needed.    DVT prophylaxis:  Medical DVT prophylaxis orders are present.    CODE STATUS:    Code Status: CPR  Medical Interventions (Level of Support Prior to Arrest): Full      Disposition:  I expect patient to be discharged inpatient rehab.    This patient has been examined wearing appropriate Personal Protective Equipment and discussed with hospital infection control department. 08/13/21      Electronically signed by Juan Arguelles MD, 08/13/21, 10:13 EDT.  [facility] Hospitalist Team

## 2021-08-13 NOTE — THERAPY TREATMENT NOTE
Acute Care - Speech Language Pathology   Swallow Treatment Note  [facility]     Patient Name: Leslie Harris  : 1990  MRN: [id number]  Today's Date: 2021               Admit Date: 2021    Visit Dx:     ICD-10-CM ICD-9-CM   1. Dyspnea, unspecified type  R06.00 786.09   2. Pneumonia due to COVID-19 virus  U07.1 480.8    J12.82 079.89   3. Acute respiratory failure with hypoxia (CMS/[facility])  J96.01 518.81   4. Hypotension due to drugs  I95.2 458.8     E947.9   5. Acute respiratory failure due to COVID-19 (CMS/[facility])  U07.1 518.81    J96.00 079.89     Patient Active Problem List   Diagnosis   • Acute respiratory failure due to COVID-19 (CMS/[facility])   • Class 3 severe obesity without serious comorbidity with body mass index (BMI) of 50.0 to 59.9 in adult   • Pneumonia due to COVID-19 virus   • Diabetes mellitus type 2 in obese (CMS/[facility])   • MRSA pneumonia (CMS/[facility])   • Klebsiella pneumoniae pneumonia (CMS/[facility])   • E. coli UTI (urinary tract infection)   • Hypertension   • Critical illness polyneuropathy (CMS/[facility])   • Delirium, acute   • Dysphagia     Past Medical History:   Diagnosis Date   • Acute respiratory failure due to COVID-19 (CMS/[facility]) 2021   • Class 3 severe obesity without serious comorbidity with body mass index (BMI) of 50.0 to 59.9 in adult    • COVID-19 2021   • Critical illness polyneuropathy (CMS/HCC) 2021   • Diabetes mellitus type 2 in obese (CMS/[facility])    • E. coli UTI (urinary tract infection) 2021   • Hypertension 2021   • Klebsiella pneumoniae pneumonia (CMS/[facility]) 2021   • MRSA pneumonia (CMS/[facility]) 2021   • Pneumonia due to COVID-19 virus 2021     Past Surgical History:   Procedure Laterality Date   • BRONCHOSCOPY N/A 2021    Procedure: BRONCHOSCOPY with bilateral bronchial washing;  Surgeon: eJff Feng MD;  Location: [facility] [place];  Service: Pulmonary;  Laterality: N/A;  pre: respiratory failure, covid-19 pneumonia  post: respiratory failure,  COVID-19 pneumonia   • CHOLECYSTECTOMY     • EXPLORATORY LAPAROTOMY W/ BOWEL RESECTION  2018    Due to complication of cholecystectomy in which patient had an accidental perforation of small bowel intraoperatively.       SLP Recommendation and Plan  SLP Swallowing Diagnosis: functional oral phase, functional pharyngeal phase  SLP Diet Recommendation: regular textures, thin liquids  Recommended Precautions and Strategies: upright posture during/after eating, small bites of food and sips of liquid, general aspiration precautions, assist with feeding  SLP Rec. for Method of Medication Administration: as tolerated     Monitor for Signs of Aspiration: yes, notify SLP if any concerns  Recommended Diagnostics: reassess via clinical swallow evaluation  Swallow Criteria for Skilled Therapeutic Interventions Met: demonstrates skilled criteria  Anticipated Discharge Disposition (SLP): inpatient rehabilitation facility  Rehab Potential/Prognosis, Swallowing: good, to achieve stated therapy goals  Therapy Frequency (Swallow): PRN  Predicted Duration Therapy Intervention (Days): until discharge     Daily Summary of Progress (SLP): progress toward functional goals is good                             SWALLOW EVALUATION (last 72 hours)      SLP Adult Swallow Evaluation     Row Name 08/13/21 1500          Document Type  therapy note (daily note)  -    Subjective Information  no complaints  -    Patient Observations  alert;cooperative;agree to therapy  -    Patient/Family/Caregiver Comments/Observations  Mother at bedside.  -MF    Patient Effort  good  -          Additional Documentation  Pain Scale: FACES Pre/Post-Treatment (Group)  -          Pain: FACES Scale, Pretreatment  0-->no hurt  -    Posttreatment Pain Rating  0-->no hurt  -          Daily Summary of Progress (SLP)  progress toward functional goals is good  -    SLP Swallowing Diagnosis  functional oral phase;functional pharyngeal phase  -    Functional  Impact  risk of aspiration/pneumonia  -    Rehab Potential/Prognosis, Swallowing  good, to achieve stated therapy goals  -    Swallow Criteria for Skilled Therapeutic Interventions Met  demonstrates skilled criteria  -          Therapy Frequency (Swallow)  PRN  -    Predicted Duration Therapy Intervention (Days)  until discharge  -    SLP Diet Recommendation  regular textures;thin liquids  -    Recommended Diagnostics  reassess via clinical swallow evaluation  -    Recommended Precautions and Strategies  upright posture during/after eating;small bites of food and sips of liquid;general aspiration precautions;assist with feeding  -    Oral Care Recommendations  Oral Care BID/PRN  -    SLP Rec. for Method of Medication Administration  as tolerated  -    Monitor for Signs of Aspiration  yes;notify SLP if any concerns  -    Anticipated Discharge Disposition (SLP)  inpatient rehabilitation facility  -          Oral Nutrition/Hydration Goal Selection (SLP)  oral nutrition/hydration, SLP goal (free text);oral nutrition/hydration, SLP goal 2  -          Oral Nutrition/Hydration Goal 2, SLP  Patient will be seen at a meal within 24-48 hours to assess tolerance of current diet with further recommendations to be given as indicated  -    Time Frame (Oral Nutrition/Hydration Goal 2, SLP)  2 days  -    Barriers (Oral Nutrition/Hydration Goal 2, SLP)  Pt seen for skilled ST services targeting dysphagia. Pt currently on mech soft and thins and receiving nocturnal TFs. Mother at bedside. Pt agreeable to meal assessment at lunch. She reports that she only wants cold foods at this time because it's uncomfortable to swallow hot foods but she is having difficulty finding many options on current diet. Pt still very weak but able to bring cup with straw up to her oral cavity to take drinks. Otherwise she needs assistance for feeding. Pt accepted trials of thins via straw, mech soft and regular.     Oral phase  appears functional for all consistencies given. No oral residue observed at any time. No overt s/s of aspiration, changes in vocal quality or changes in vitals observed with any consistency. Pt stated she feels like it takes her a long time to eat and that she doesn't always have much of an appetite. She also expressed that she is ready for her NGT to be removed. REC: diet advancement to regular and thins, meds as tolerated, full feed/assistance with meals as needed.     Provided extensive education re: diet advancement, cold food items on menu or that family could bring in that, need to continue increasing oral intake, changes in environment and recent illness that are likely having an effect in her appetite, and effect of having to rely on others to feed her impacting rate/length of meals. Also informed pt and mother that SLP would contact RD to ask about when NG might be removed and other options for nutritional supplements that are not milky/creamy. Pt and mother very grateful for diet advancement and all information given.   -MF    Progress/Outcomes (Oral Nutrition/Hydration Goal 2, SLP)  goal ongoing  -MF          Oral Nutrition/Hydration Goal, SLP  Patient will tolerate safest and least restrictive diet with no complications from aspiration  -MF    Time Frame (Oral Nutrition/Hydration Goal, SLP)  by discharge  -MF    Barriers (Oral Nutrition/Hydration Goal, SLP)  [facility] Diet advanced to regular and thins. WIll continue to follow.  -MF    Progress/Outcomes (Oral Nutrition/Hydration Goal, SLP)  goal ongoing  -MF      User Key  (r) = Recorded By, (t) = Taken By, (c) = Cosigned By    Initials Name Effective Dates    MM Agnieszka Diamond, VIVIAN 06/16/21 -      Ne Romero SLP 06/16/21 -           EDUCATION  The patient has been educated in the following areas:   Dysphagia (Swallowing Impairment).       SLP GOALS     Row Name 08/13/21 1500 08/12/21 1100 08/11/21 1500       Oral  Nutrition/Hydration Goal 1 (SLP)    Oral Nutrition/Hydration Goal 1, SLP  --  --  Pt will participate in VFSS to objectively assess swallow function and safety and to determine safest/least restrictive diet.  -MM    Time Frame (Oral Nutrition/Hydration Goal 1, SLP)  --  --  1 day  -MM    Barriers (Oral Nutrition/Hydration Goal 1, SLP)  --  --  see above note  -MM    Progress/Outcomes (Oral Nutrition/Hydration Goal 1, SLP)  --  --  goal met  -MM       Oral Nutrition/Hydration Goal 2 (SLP)    Oral Nutrition/Hydration Goal 2, SLP  Patient will be seen at a meal within 24-48 hours to assess tolerance of current diet with further recommendations to be given as indicated  -MF  Patient will be seen at a meal within 24-48 hours to assess tolerance of current diet with further recommendations to be given as indicated  -MM  Patient will be seen at a meal within 24-48 hours to assess tolerance of current diet with further recommendations to be given as indicated  -MM    Time Frame (Oral Nutrition/Hydration Goal 2, SLP)  2 days  -MF  2 days  -MM  2 days  -MM    Barriers (Oral Nutrition/Hydration Goal 2, SLP)  Pt seen for skilled ST services targeting dysphagia. Pt currently on mech soft and thins and receiving nocturnal TFs. Mother at bedside. Pt agreeable to meal assessment at lunch. She reports that she only wants cold foods at this time because it's uncomfortable to swallow hot foods but she is having difficulty finding many options on current diet. Pt still very weak but able to bring cup with straw up to her oral cavity to take drinks. Otherwise she needs assistance for feeding. Pt accepted trials of thins via straw, mech soft and regular. Oral phase appears functional for all consistencies given. No oral residue observed at any time. No overt s/s of aspiration, changes in vocal quality or changes in vitals observed with any consistency. Pt stated she feels like it takes her a long time to eat and that she doesn't always  have much of an appetite. She also expressed that she is ready for her NGT to be removed. REC: diet advancement to regular and thins, meds as tolerated, full feed/assistance with meals as needed. Provided extensive education re: diet advancement, cold food items on menu or that family could bring in that, need to continue increasing oral intake, changes in environment and recent illness that are likely having an effect in her appetite, and effect of having to rely on others to feed her impacting rate/length of meals. Also informed pt and mother that SLP would contact RD to ask about when NG might be removed and other options for nutritional supplements that are not milky/creamy. Pt and mother very grateful for diet advancement and all information given.   -MF  Patient seen this date with her breakfast tray.  See above note  -MM  --    Progress/Outcomes (Oral Nutrition/Hydration Goal 2, SLP)  goal ongoing  -  goal ongoing  -MM  --       Oral Nutrition/Hydration Goal (SLP)    Oral Nutrition/Hydration Goal, SLP  Patient will tolerate safest and least restrictive diet with no complications from aspiration  -MF  Patient will tolerate safest and least restrictive diet with no complications from aspiration  -MM  Patient will tolerate safest and least restrictive diet with no complications from aspiration  -MM    Time Frame (Oral Nutrition/Hydration Goal, SLP)  by discharge  -MF  by discharge  -MM  by discharge  -MM    Barriers (Oral Nutrition/Hydration Goal, SLP)  [facility]. Diet advanced to regular and thins. WIll continue to follow.  -  Patient is tolerating current diet well  -MM  --    Progress/Outcomes (Oral Nutrition/Hydration Goal, SLP)  goal ongoing  -  goal ongoing  -MM  --    Row Name 08/11/21 1200             Oral Nutrition/Hydration Goal 1 (SLP)    Oral Nutrition/Hydration Goal 1, SLP  Pt will participate in VFSS to objectively assess swallow function and safety and to determine  safest/least restrictive diet.  -MF      Time Frame (Oral Nutrition/Hydration Goal 1, SLP)  1 day  -MF        User Key  (r) = Recorded By, (t) = Taken By, (c) = Cosigned By    Initials Name Provider Type    Agnieszka Jang SLP Speech and Language Pathologist    Ne Scott SLP Speech and Language Pathologist             Time Calculation:       Therapy Charges for Today     Code Description Service Date Service Provider Modifiers Qty    [id number] HC ST TREATMENT SWALLOW 5 8/13/2021 Ne Romero SLP GN 1               VIVIAN Boyle  8/13/2021

## 2021-08-13 NOTE — PLAN OF CARE
Goal Outcome Evaluation:         Pt seen for skilled ST services targeting dysphagia. Pt currently on mech soft and thins and receiving nocturnal TFs. She reports that she only wants cold foods at this time because it's uncomfortable to swallow hot foods but she is having difficulty finding many options on current diet. Pt still very weak but able to bring cup with straw up to her oral cavity to take drinks. Otherwise she needs assistance for feeding. Pt accepted trials of thins via straw, mech soft and regular. Oral phase appears functional for all consistencies given. No oral residue observed at any time. No overt s/s of aspiration, changes in vocal quality or changes in vitals observed with any consistency.     REC: diet advancement to regular and thins, meds as tolerated, full feed/assistance with meals as needed. See full report for further details. Will continue to follow.

## 2021-08-13 NOTE — CONSULTS
"Nutrition Services    Patient Name: Leslie Harris  YOB: 1990  MRN: [id number]  Admission date: 7/22/2021    PPE Documentation        PPE Worn By Provider Did not enter room for progress note    PPE Worn By Patient  N/A     PROGRESS NOTE      Encounter Information: 8/13: Progress note to check on TF tolerance now that pt has switched to nocturnal feeds. Per HAMMAD Ledezma, pt tolerated her first night of feedings well. Tube feed check on. Impact Peptide 1.5 documented at 65 mL/hr. Pt has eating 50-75% at the last few meals.      Addendum 13:57 -- received secure message from  that pt will be upgrading to regular solids today. Pt open to Boost Breeze -- will initiate these and reduce rate of EN in anticipation of likely transitioning off tube feeds tomorrow. RD will continue to follow closely.        Labs (reviewed below): Elevated BUN - mild, improving   Hyperglycemia - insulin in place  Elevated LFTs       GI Function:  Last BM documented on 8/10       Nutrition Intervention: Continue intermittent (nocturnal) tube feeds, reducing volume today to Impact Peptide 1.5 @ 70mL/hour x 8 hours + 10mL/hour water flush during infusion.     If pt continues to tolerate PO well, may be able to D/C TF tomorrow.       PO Diet/Supplements: Diet Texture; Mechanical Ground   EN Prescription: Impact Peptide 1.5 at 75 mL/hr + 20 mL/hr water flush x 12 hrs at night         Intake/Output:   Intake/Output Summary (Last 24 hours) at 8/13/2021 1307  Last data filed at 8/13/2021 1036  Gross per 24 hour   Intake 600 ml   Output 2000 ml   Net -1400 ml          Height: Height: 167.6 cm (66\")   Weight: Weight: (!) 152 kg (335 lb 15.7 oz) (08/13/21 0500)   BMI: Body mass index is 54.23 kg/m².     Results from last 7 days   Lab Units 08/13/21  0411 08/12/21  0331 08/11/21  1221   SODIUM mmol/L 136 143 143   POTASSIUM mmol/L 4.4 4.5 3.9   CHLORIDE mmol/L 98 104 102   CO2 mmol/L 27.0 29.0 29.0   BUN mg/dL 24* 32* 33*   CREATININE " mg/dL 0.25* 0.37* 0.45*   CALCIUM mg/dL 9.1 9.5 9.7   BILIRUBIN mg/dL 0.8 1.0 0.9   ALK PHOS U/L 93 90 88   ALT (SGPT) U/L 109* 114* 133*   AST (SGOT) U/L 39* 40* 53*   GLUCOSE mg/dL 188* 127* 190*     Results from last 7 days   Lab Units 08/13/21  0411 08/12/21  0331 08/12/21  0331 08/11/21  1221 08/11/21  1221   MAGNESIUM mg/dL 1.6  --  1.7  --  1.6   PHOSPHORUS mg/dL  --   --  4.1   < > 4.5   HEMOGLOBIN g/dL 10.9*   < > 11.1*   < > 10.5*   HEMATOCRIT % 32.9*   < > 34.8   < > 32.8*    < > = values in this interval not displayed.     COVID19   Date Value Ref Range Status   07/22/2021 Detected (C) Not Detected - Ref. Range Final     Lab Results   Component Value Date    HGBA1C 6.9 (H) 07/23/2021     Vitals:    08/13/21 1130   BP:    Pulse: 85   Resp: 18   Temp:    SpO2:      RD to follow up per protocol.    Electronically signed by:  Traci Schulz RD  08/13/21 13:07 EDT

## 2021-08-13 NOTE — CASE MANAGEMENT/SOCIAL WORK
Continued Stay Note  EMANUEL Wasserman     Patient Name: Leslie Harris  MRN: [id number]  Today's Date: 8/13/2021    Admit Date: 7/22/2021    Discharge Plan     Row Name 08/13/21 1318       Plan    Plan Comments  CM reached out to [facility] minh Ayala regarding referral. awaiting response. DC barriers: on 5L, NGT w/ nocturnal feeds, iv abx        Expected Discharge Date and Time     Expected Discharge Date Expected Discharge Time    Aug 17, 2021         Phone communication or documentation only - no physical contact with patient or family.      Amelie Harmon, RN

## 2021-08-13 NOTE — PLAN OF CARE
Goal Outcome Evaluation:  Pt on 5 L HF NC and satting in upper 90s. Pt able to take some meds PO but larger PO meds required crushing and placed in NG tube. Pt tolerating tube feeds well. Pt still weak but attempting to improve ADLs. Vitals stable. Willl continue to monitor.

## 2021-08-13 NOTE — THERAPY TREATMENT NOTE
Subjective: Pt agreeable to therapeutic plan of care. Mother at bedside, asking what exercises to help pt complete.  Pt also asking what she can to do help strengthen between PT/OT rxs.     Objective:     Bed mobility - N/A or Not attempted.  Transfers - N/A or Not attempted.  Ambulation - 0 feet N/A or Not attempted.     Placed bed in chair position to focus on antigravity movement where possible. Mother writing down exercises to be done as explained by PT (hands open/closed, elbow flex/ext in gravity eliminated position, hip IR/ER, ankle pumps and circles).     AAROM for BLEs x 10 reps - heel slides, hip abd/add, hip IR/ER, ankle pumps; BUEs elbow flex/ext in gravity eliminated position, hands open/closed x 10 reps.     Pain: 0 VAS  Education: Provided education on importance of mobility and skilled verbal / tactile cueing throughout intervention.     Assessment: Leslie Harris is extremely motivated toward recovery. She is already showing some increased strength. PT instructed pt and her mom in exercises to be done and demonstrated as well, w/ teach-back used. She presents with functional mobility impairments which indicate the need for skilled intervention. Tolerating session today without incident. Will continue to follow and progress as tolerated. Will increase freq of rx.      Plan/Recommendations:   Pt would benefit from Inpatient Rehabilitation placement at discharge from facility and requires no DME at discharge.   Pt desires Inpatient Rehabilitation placement at discharge. Pt cooperative; agreeable to therapeutic recommendations and plan of care.     Basic Mobility 6-click:  Rollin = Total, A lot = 2, A little = 3; 4 = None  Supine>Sit:   1 = Total, A lot = 2, A little = 3; 4 = None   Sit>Stand with arms:  1 = Total, A lot = 2, A little = 3; 4 = None  Bed>Chair:   1 = Total, A lot = 2, A little = 3; 4 = None  Ambulate in room:  1 = Total, A lot = 2, A little = 3; 4 = None  3-5 Steps with  railin = Total, A lot = 2, A little = 3; 4 = None  Score: 7    Modified [place]: N/A = No pre-op stroke/TIA    Post-Tx Position: Alarms activated and Call light and personal items within reach; bed in chair position; BUEs elevated; mother present.  PPE: gloves, surgical mask, eyewear protection

## 2021-08-14 LAB
ALBUMIN SERPL-MCNC: 3.8 G/DL (ref 3.5–5.2)
ALBUMIN/GLOB SERPL: 1.2 G/DL
ALP SERPL-CCNC: 95 U/L (ref 39–117)
ALT SERPL W P-5'-P-CCNC: 121 U/L (ref 1–33)
ANION GAP SERPL CALCULATED.3IONS-SCNC: 13 MMOL/L (ref 5–15)
ANISOCYTOSIS BLD QL: NORMAL
AST SERPL-CCNC: 53 U/L (ref 1–32)
BACTERIA SPEC AEROBE CULT: NORMAL
BACTERIA SPEC AEROBE CULT: NORMAL
BASOPHILS # BLD AUTO: 0.1 10*3/MM3 (ref 0–0.2)
BASOPHILS NFR BLD AUTO: 0.5 % (ref 0–1.5)
BILIRUB SERPL-MCNC: 1 MG/DL (ref 0–1.2)
BUN SERPL-MCNC: 17 MG/DL (ref 6–20)
BUN/CREAT SERPL: 53.1 (ref 7–25)
CA-I SERPL ISE-MCNC: 1.23 MMOL/L (ref 1.2–1.3)
CALCIUM SPEC-SCNC: 9.5 MG/DL (ref 8.6–10.5)
CHLORIDE SERPL-SCNC: 96 MMOL/L (ref 98–107)
CO2 SERPL-SCNC: 25 MMOL/L (ref 22–29)
CREAT SERPL-MCNC: 0.32 MG/DL (ref 0.57–1)
D DIMER PPP FEU-MCNC: 1.32 MG/L (FEU) (ref 0–0.59)
DEPRECATED RDW RBC AUTO: 55.1 FL (ref 37–54)
EOSINOPHIL # BLD AUTO: 0.3 10*3/MM3 (ref 0–0.4)
EOSINOPHIL NFR BLD AUTO: 2.2 % (ref 0.3–6.2)
ERYTHROCYTE [DISTWIDTH] IN BLOOD BY AUTOMATED COUNT: 17.5 % (ref 12.3–15.4)
FIBRINOGEN PPP-MCNC: 608 MG/DL (ref 210–450)
GFR SERPL CREATININE-BSD FRML MDRD: >150 ML/MIN/1.73
GLOBULIN UR ELPH-MCNC: 3.1 GM/DL
GLUCOSE BLDC GLUCOMTR-MCNC: 113 MG/DL (ref 70–105)
GLUCOSE BLDC GLUCOMTR-MCNC: 120 MG/DL (ref 70–105)
GLUCOSE BLDC GLUCOMTR-MCNC: 144 MG/DL (ref 70–105)
GLUCOSE BLDC GLUCOMTR-MCNC: 153 MG/DL (ref 70–105)
GLUCOSE SERPL-MCNC: 157 MG/DL (ref 65–99)
HCT VFR BLD AUTO: 35.3 % (ref 34–46.6)
HGB BLD-MCNC: 11.4 G/DL (ref 12–15.9)
LARGE PLATELETS: NORMAL
LDH SERPL-CCNC: 254 U/L (ref 135–214)
LYMPHOCYTES # BLD AUTO: 1.9 10*3/MM3 (ref 0.7–3.1)
LYMPHOCYTES NFR BLD AUTO: 15.3 % (ref 19.6–45.3)
MAGNESIUM SERPL-MCNC: 1.6 MG/DL (ref 1.6–2.6)
MCH RBC QN AUTO: 29.7 PG (ref 26.6–33)
MCHC RBC AUTO-ENTMCNC: 32.1 G/DL (ref 31.5–35.7)
MCV RBC AUTO: 92.3 FL (ref 79–97)
MONOCYTES # BLD AUTO: 0.5 10*3/MM3 (ref 0.1–0.9)
MONOCYTES NFR BLD AUTO: 3.7 % (ref 5–12)
NEUTROPHILS NFR BLD AUTO: 78.3 % (ref 42.7–76)
NEUTROPHILS NFR BLD AUTO: 9.7 10*3/MM3 (ref 1.7–7)
NEUTS VAC BLD QL SMEAR: NORMAL
NRBC BLD AUTO-RTO: 0.7 /100 WBC (ref 0–0.2)
PLATELET # BLD AUTO: 238 10*3/MM3 (ref 140–450)
PMV BLD AUTO: 8.2 FL (ref 6–12)
POLYCHROMASIA BLD QL SMEAR: NORMAL
POTASSIUM SERPL-SCNC: 4.2 MMOL/L (ref 3.5–5.2)
PROT SERPL-MCNC: 6.9 G/DL (ref 6–8.5)
RBC # BLD AUTO: 3.83 10*6/MM3 (ref 3.77–5.28)
SODIUM SERPL-SCNC: 134 MMOL/L (ref 136–145)
WBC # BLD AUTO: 12.4 10*3/MM3 (ref 3.4–10.8)

## 2021-08-14 PROCEDURE — C1751 CATH, INF, PER/CENT/MIDLINE: HCPCS

## 2021-08-14 PROCEDURE — 83615 LACTATE (LD) (LDH) ENZYME: CPT | Performed by: NURSE PRACTITIONER

## 2021-08-14 PROCEDURE — 36410 VNPNXR 3YR/> PHY/QHP DX/THER: CPT

## 2021-08-14 PROCEDURE — 83735 ASSAY OF MAGNESIUM: CPT | Performed by: NURSE PRACTITIONER

## 2021-08-14 PROCEDURE — 85379 FIBRIN DEGRADATION QUANT: CPT | Performed by: NURSE PRACTITIONER

## 2021-08-14 PROCEDURE — 63710000001 INSULIN LISPRO (HUMAN) PER 5 UNITS: Performed by: INTERNAL MEDICINE

## 2021-08-14 PROCEDURE — 02HV33Z INSERTION OF INFUSION DEVICE INTO SUPERIOR VENA CAVA, PERCUTANEOUS APPROACH: ICD-10-PCS | Performed by: INTERNAL MEDICINE

## 2021-08-14 PROCEDURE — 25010000002 ENOXAPARIN PER 10 MG: Performed by: INTERNAL MEDICINE

## 2021-08-14 PROCEDURE — 80053 COMPREHEN METABOLIC PANEL: CPT | Performed by: INTERNAL MEDICINE

## 2021-08-14 PROCEDURE — 99232 SBSQ HOSP IP/OBS MODERATE 35: CPT | Performed by: INTERNAL MEDICINE

## 2021-08-14 PROCEDURE — 85007 BL SMEAR W/DIFF WBC COUNT: CPT | Performed by: NURSE PRACTITIONER

## 2021-08-14 PROCEDURE — 63710000001 INSULIN GLARGINE PER 5 UNITS: Performed by: INTERNAL MEDICINE

## 2021-08-14 PROCEDURE — 25010000002 CEFTRIAXONE PER 250 MG: Performed by: INTERNAL MEDICINE

## 2021-08-14 PROCEDURE — 85384 FIBRINOGEN ACTIVITY: CPT | Performed by: NURSE PRACTITIONER

## 2021-08-14 PROCEDURE — 82330 ASSAY OF CALCIUM: CPT | Performed by: NURSE PRACTITIONER

## 2021-08-14 PROCEDURE — 85025 COMPLETE CBC W/AUTO DIFF WBC: CPT | Performed by: NURSE PRACTITIONER

## 2021-08-14 PROCEDURE — 94799 UNLISTED PULMONARY SVC/PX: CPT

## 2021-08-14 PROCEDURE — 82962 GLUCOSE BLOOD TEST: CPT

## 2021-08-14 PROCEDURE — 36415 COLL VENOUS BLD VENIPUNCTURE: CPT | Performed by: NURSE PRACTITIONER

## 2021-08-14 RX ORDER — SODIUM CHLORIDE 0.9 % (FLUSH) 0.9 %
20 SYRINGE (ML) INJECTION AS NEEDED
Status: DISCONTINUED | OUTPATIENT
Start: 2021-08-14 | End: 2021-08-24 | Stop reason: HOSPADM

## 2021-08-14 RX ORDER — SODIUM CHLORIDE 0.9 % (FLUSH) 0.9 %
10 SYRINGE (ML) INJECTION AS NEEDED
Status: DISCONTINUED | OUTPATIENT
Start: 2021-08-14 | End: 2021-08-24 | Stop reason: HOSPADM

## 2021-08-14 RX ORDER — SODIUM CHLORIDE 0.9 % (FLUSH) 0.9 %
10 SYRINGE (ML) INJECTION EVERY 12 HOURS SCHEDULED
Status: DISCONTINUED | OUTPATIENT
Start: 2021-08-14 | End: 2021-08-24 | Stop reason: HOSPADM

## 2021-08-14 RX ADMIN — IPRATROPIUM BROMIDE AND ALBUTEROL SULFATE 3 ML: 2.5; .5 SOLUTION RESPIRATORY (INHALATION) at 06:38

## 2021-08-14 RX ADMIN — ENOXAPARIN SODIUM 40 MG: 40 INJECTION SUBCUTANEOUS at 10:36

## 2021-08-14 RX ADMIN — GUAIFENESIN 400 MG: 100 SOLUTION ORAL at 11:52

## 2021-08-14 RX ADMIN — Medication 2000 UNITS: at 10:35

## 2021-08-14 RX ADMIN — Medication 10 MG: at 22:38

## 2021-08-14 RX ADMIN — INSULIN LISPRO 10 UNITS: 100 INJECTION, SOLUTION INTRAVENOUS; SUBCUTANEOUS at 17:20

## 2021-08-14 RX ADMIN — Medication 600 MG: at 20:10

## 2021-08-14 RX ADMIN — IPRATROPIUM BROMIDE AND ALBUTEROL SULFATE 3 ML: 2.5; .5 SOLUTION RESPIRATORY (INHALATION) at 10:47

## 2021-08-14 RX ADMIN — ACETAMINOPHEN 650 MG: 325 TABLET, FILM COATED ORAL at 22:45

## 2021-08-14 RX ADMIN — CEFTRIAXONE SODIUM 2 G: 2 INJECTION, POWDER, FOR SOLUTION INTRAMUSCULAR; INTRAVENOUS at 11:51

## 2021-08-14 RX ADMIN — GUAIFENESIN 400 MG: 100 SOLUTION ORAL at 06:56

## 2021-08-14 RX ADMIN — HYDROCORTISONE 20 MG: 10 TABLET ORAL at 10:36

## 2021-08-14 RX ADMIN — BUDESONIDE 0.5 MG: 0.5 SUSPENSION RESPIRATORY (INHALATION) at 21:13

## 2021-08-14 RX ADMIN — FUROSEMIDE 20 MG: 20 TABLET ORAL at 10:36

## 2021-08-14 RX ADMIN — BUDESONIDE 0.5 MG: 0.5 SUSPENSION RESPIRATORY (INHALATION) at 06:38

## 2021-08-14 RX ADMIN — RISPERIDONE 0.5 MG: 0.5 TABLET, ORALLY DISINTEGRATING ORAL at 20:11

## 2021-08-14 RX ADMIN — ENOXAPARIN SODIUM 40 MG: 40 INJECTION SUBCUTANEOUS at 20:11

## 2021-08-14 RX ADMIN — Medication 10 ML: at 10:37

## 2021-08-14 RX ADMIN — HYDROCORTISONE 20 MG: 10 TABLET ORAL at 17:20

## 2021-08-14 RX ADMIN — ASPIRIN 324 MG: 81 TABLET, CHEWABLE ORAL at 10:36

## 2021-08-14 RX ADMIN — Medication 600 MG: at 10:35

## 2021-08-14 RX ADMIN — GUAIFENESIN 400 MG: 100 SOLUTION ORAL at 17:19

## 2021-08-14 RX ADMIN — METOPROLOL TARTRATE 25 MG: 25 TABLET, FILM COATED ORAL at 10:35

## 2021-08-14 RX ADMIN — IPRATROPIUM BROMIDE AND ALBUTEROL SULFATE 3 ML: 2.5; .5 SOLUTION RESPIRATORY (INHALATION) at 14:47

## 2021-08-14 RX ADMIN — LANSOPRAZOLE 30 MG: KIT at 10:37

## 2021-08-14 RX ADMIN — METOPROLOL TARTRATE 25 MG: 25 TABLET, FILM COATED ORAL at 20:10

## 2021-08-14 RX ADMIN — LINEZOLID 600 MG: 600 TABLET ORAL at 20:11

## 2021-08-14 RX ADMIN — LINEZOLID 600 MG: 600 TABLET ORAL at 10:35

## 2021-08-14 RX ADMIN — IPRATROPIUM BROMIDE AND ALBUTEROL SULFATE 3 ML: 2.5; .5 SOLUTION RESPIRATORY (INHALATION) at 21:13

## 2021-08-14 RX ADMIN — GUAIFENESIN 400 MG: 100 SOLUTION ORAL at 22:40

## 2021-08-14 RX ADMIN — INSULIN GLARGINE 45 UNITS: 100 INJECTION, SOLUTION SUBCUTANEOUS at 09:00

## 2021-08-14 RX ADMIN — Medication 10 ML: at 20:12

## 2021-08-14 RX ADMIN — INSULIN GLARGINE 45 UNITS: 100 INJECTION, SOLUTION SUBCUTANEOUS at 22:39

## 2021-08-14 RX ADMIN — Medication 10 ML: at 22:45

## 2021-08-14 RX ADMIN — LANSOPRAZOLE 30 MG: KIT at 17:20

## 2021-08-14 NOTE — CONSULTS
picc team consult:    Procedure explained to patient with verbalize understanding.  Line care instructions discussed with patient and family member with verbalized understanding. SL traditional midline catheter placed LUE brachial vessel utilizing US guidance and modified seldinger technique with easily compressible, deep non pulsatile vessel without difficulty.  Dark venous blood return noted and line flushes without difficulty.  Line labeled as midline catheter and not appropriate for TPN or CT power injection.

## 2021-08-14 NOTE — PROGRESS NOTES
"PULMONARY CRITICAL CARE Progress  NOTE      PATIENT IDENTIFICATION:  Name: Leslie Harris  MRN: [id number]  :  1990     Age: 30 y.o.  Sex: female    DATE OF Note:  2021   Referring Physician: Sarah Fajardo MD                  Subjective:    Feeling better, no SOB,  On 5 L HF O2, sitting in bed visiting with family, no chest or abd pain pain,  no bowel or bladder issues, Diet advanced to Reg with thin liquids     Objective:  tMax 24 hrs: Temp (24hrs), Av.9 °F (36.6 °C), Min:97.4 °F (36.3 °C), Max:98.4 °F (36.9 °C)      Vitals Ranges:   Temp:  [97.4 °F (36.3 °C)-98.4 °F (36.9 °C)] 97.8 °F (36.6 °C)  Heart Rate:  [] 80  Resp:  [18-20] 18  BP: (123-145)/(76-86) 123/76    Intake and Output Last 3 Shifts:   I/O last 3 completed shifts:  In: 840 [P.O.:840]  Out: 2200 [Urine:2200]    Exam:  /76   Pulse 80   Temp 97.8 °F (36.6 °C) (Oral)   Resp 18   Ht 167.6 cm (66\")   Wt (!) 152 kg (335 lb 15.7 oz)   LMP 2021   SpO2 100%   BMI 54.23 kg/m²     General Appearance:  Alert   HEENT:  Normocephalic, without obvious abnormality, Conjunctiva/corneas clear,.  Normal external ear canals, Nares normal, no drainage     Neck:  Supple, symmetrical, trachea midline. No JVD.  Lungs /Chest wall:   Bilateral basal rhonchi,  symmetrical wall movement.     Heart:  Regular rate and rhythm, systolic murmur PMI left sternal border  Abdomen: Soft, non-tender, no masses, no organomegaly.    Extremities: Trace edema no clubbing or Cyanosis        Medications:    Current Facility-Administered Medications:   •  acetaminophen (TYLENOL) 160 MG/5ML solution 650 mg, 650 mg, Oral, Q6H PRN, Radha Jaquez MD, 650 mg at 21 0828  •  acetaminophen (TYLENOL) tablet 650 mg, 650 mg, Oral, Q4H PRN, 650 mg at 08/10/21 1526 **OR** acetaminophen (TYLENOL) suppository 650 mg, 650 mg, Rectal, Q4H PRN, Ryder Llanes, APRN, 650 mg at 21 0153  •  Acetylcysteine capsule 600 mg, 600 mg, Oral, BID, Ryder Llanes, " APRN, 600 mg at 08/13/21 2113  •  aluminum-magnesium hydroxide-simethicone (MAALOX MAX) 400-400-40 MG/5ML suspension 15 mL, 15 mL, Oral, Q6H PRN, Ryder Llanes APRN  •  artificial tears ophthalmic ointment, , Both Eyes, PRN, Ryder lLanes APRN, Given at 08/08/21 2001  •  artificial tears ophthalmic ointment, , Both Eyes, PRN, Jeff Feng MD  •  aspirin chewable tablet 324 mg, 324 mg, Per G Tube, Daily, Ryder Llanes APRN, 324 mg at 08/13/21 1009  •  Barium Sulfate 60 % cream 1 teaspoon(s), 1 teaspoon(s), Oral, Once in imaging, Sarah Fajardo MD  •  barium sulfate oral suspension 50 mL, 50 mL, Oral, Once in imaging, Sarah Fajardo MD  •  barium sulfate oral suspension 50 mL, 50 mL, Oral, Once in imaging, Sarah Fajardo MD  •  benzonatate (TESSALON) capsule 100 mg, 100 mg, Oral, TID PRN, Ryder Llanes APRN  •  budesonide (PULMICORT) nebulizer solution 0.5 mg, 0.5 mg, Nebulization, BID - RT, Jeff Feng MD, 0.5 mg at 08/14/21 0638  •  cefTRIAXone (ROCEPHIN) 2 g in sodium chloride 0.9 % 100 mL IVPB, 2 g, Intravenous, Q24H, Neela Alvarado MD, Last Rate: 200 mL/hr at 08/13/21 1306, 2 g at 08/13/21 1306  •  cholecalciferol (VITAMIN D3) tablet 2,000 Units, 2,000 Units, Nasogastric, Daily, Ryder Llanes APRN, 2,000 Units at 08/13/21 1008  •  dextrose (D50W) 25 g/ 50mL Intravenous Solution 25 g, 25 g, Intravenous, Q15 Min PRN, Ryder Llanes APRN  •  dextrose (GLUTOSE) oral gel 15 g, 15 g, Oral, Q15 Min PRN, Ryder Llanes APRRODOLFO  •  enoxaparin (LOVENOX) syringe 40 mg, 40 mg, Subcutaneous, Q12H, Jeff Feng MD, 40 mg at 08/13/21 2113  •  furosemide (LASIX) tablet 20 mg, 20 mg, Oral, Daily, Sarah Fajardo MD, 20 mg at 08/13/21 1009  •  glucagon (human recombinant) (GLUCAGEN DIAGNOSTIC) injection 1 mg, 1 mg, Subcutaneous, Q15 Min PRN, Ryder Llanes APRN  •  guaiFENesin solution 400 mg, 400 mg, Nasogastric, Q6H, Radha Jaquez MD, 400 mg at 08/14/21 0656  •  hydrALAZINE (APRESOLINE) injection 10 mg, 10 mg,  Intravenous, Q4H PRN, Ryder Llanes, APRN, 10 mg at 08/07/21 0051  •  hydrocortisone (CORTEF) tablet 20 mg, 20 mg, Oral, BID With Meals, Sarah Fajardo MD, 20 mg at 08/13/21 1946  •  hydrOXYzine (ATARAX) tablet 25 mg, 25 mg, Oral, TID PRN, Sheree Orantes, APRN  •  insulin glargine (LANTUS, SEMGLEE) injection 45 Units, 45 Units, Subcutaneous, Q12H, Jeff Feng MD, 45 Units at 08/13/21 2245  •  insulin lispro (ADMELOG) injection 0-14 Units, 0-14 Units, Subcutaneous, 4x Daily With Meals & Nightly, 3 Units at 08/12/21 1416 **AND** insulin lispro (ADMELOG) injection 0-14 Units, 0-14 Units, Subcutaneous, PRN, Socorro Talbert, APRN  •  insulin lispro (ADMELOG) injection 10 Units, 10 Units, Subcutaneous, TID With Meals, Blane, Juan Valdes MD, 10 Units at 08/13/21 1946  •  ipratropium-albuterol (DUO-NEB) nebulizer solution 3 mL, 3 mL, Nebulization, 4x Daily - RT, Jeff Feng MD, 3 mL at 08/14/21 0638  •  lansoprazole (FIRST) oral suspension 30 mg, 30 mg, Nasogastric, BID AC, Blane, Juan Valdes MD, 30 mg at 08/13/21 1946  •  lidocaine (XYLOCAINE) 5 % ointment, , , PRN, Jeff Feng MD, 1 application at 08/08/21 0910  •  lidocaine PF 1% (XYLOCAINE) injection, , , PRN, Jeff Feng MD, 5 mL at 08/08/21 0911  •  linezolid (ZYVOX) tablet 600 mg, 600 mg, Oral, Q12H, Neela Alvarado MD, 600 mg at 08/13/21 2113  •  Magnesium Sulfate 2 gram infusion - Mg less than or equal to 1.5 mg/dL, 2 g, Intravenous, PRN, Last Rate: 25 mL/hr at 08/10/21 1143, 2 g at 08/10/21 1143 **OR** Magnesium Sulfate 1 gram infusion - Mg 1.6-1.9 mg/dL, 1 g, Intravenous, PRN, Ryder Llanes, APRN, Last Rate: 100 mL/hr at 08/02/21 0842, 1 g at 08/02/21 0842  •  melatonin tablet 10 mg, 10 mg, Oral, Nightly, Sarah Fajardo MD, 10 mg at 08/13/21 2113  •  methocarbamol (ROBAXIN) tablet 500 mg, 500 mg, Oral, 4x Daily PRN, Juan Arguelles MD  •  metoprolol tartrate (LOPRESSOR) tablet 25 mg, 25 mg, Oral, Q12H,  Sarah Fajardo MD, 25 mg at 08/13/21 2114  •  nitroglycerin (NITROSTAT) SL tablet 0.4 mg, 0.4 mg, Sublingual, Q5 Min PRN, Ryder Llanes APRN  •  ondansetron (ZOFRAN) tablet 2 mg, 2 mg, Oral, Q4H PRN **OR** ondansetron (ZOFRAN) injection 2 mg, 2 mg, Intravenous, Q4H PRN, Marianna Navarrete APRN, 2 mg at 08/12/21 0940  •  oxyCODONE (ROXICODONE) immediate release tablet 10 mg, 10 mg, Oral, Q4H PRN, Sarah Fajardo MD, 10 mg at 08/11/21 2334  •  Pharmacy to Dose enoxaparin (LOVENOX), , Does not apply, Continuous PRN, Jeff Feng MD  •  potassium chloride (K-DUR,KLOR-CON) CR tablet 40 mEq, 40 mEq, Oral, PRN **OR** potassium chloride (KLOR-CON) packet 40 mEq, 40 mEq, Oral, PRN, 40 mEq at 08/10/21 1527 **OR** potassium chloride 10 mEq in 100 mL IVPB, 10 mEq, Intravenous, Q1H PRN, Ryder Llanes APRN  •  potassium phosphate 45 mmol in sodium chloride 0.9 % 500 mL infusion, 45 mmol, Intravenous, PRN **OR** potassium phosphate 30 mmol in sodium chloride 0.9 % 250 mL infusion, 30 mmol, Intravenous, PRN **OR** potassium phosphate 15 mmol in 0.9% sodium chloride 100 mL IVPB, 15 mmol, Intravenous, PRN **OR** sodium phosphates 45 mmol in sodium chloride 0.9 % 500 mL IVPB, 45 mmol, Intravenous, PRN **OR** sodium phosphates 30 mmol in sodium chloride 0.9 % 250 mL IVPB, 30 mmol, Intravenous, PRN **OR** sodium phosphates 15 mmol in sodium chloride 0.9 % 250 mL IVPB, 15 mmol, Intravenous, PRN, Ryder Llanes APRN  •  risperiDONE (risperDAL M-TABS) disintegrating tablet 0.5 mg, 0.5 mg, Oral, Nightly, Sarah Fajardo MD, 0.5 mg at 08/13/21 2114  •  [COMPLETED] Insert peripheral IV, , , Once **AND** sodium chloride 0.9 % flush 10 mL, 10 mL, Intravenous, PRN, Leonardo Doss MD  •  sodium chloride 0.9 % flush 10 mL, 10 mL, Intravenous, Q12H, Ryder Llanes APRN, 10 mL at 08/13/21 2117  •  sodium chloride 0.9 % flush 10 mL, 10 mL, Intravenous, PRN, Ryder Llanes APRN    Data Review:  All labs (24hrs):   Recent Results (from the past  24 hour(s))   POC Glucose Once    Collection Time: 08/13/21 10:00 AM    Specimen: Blood   Result Value Ref Range    Glucose 138 (H) 70 - 105 mg/dL   POC Glucose Once    Collection Time: 08/13/21 11:37 AM    Specimen: Blood   Result Value Ref Range    Glucose 151 (H) 70 - 105 mg/dL   POC Glucose Once    Collection Time: 08/13/21  4:33 PM    Specimen: Blood   Result Value Ref Range    Glucose 114 (H) 70 - 105 mg/dL   POC Glucose Once    Collection Time: 08/13/21  9:05 PM    Specimen: Blood   Result Value Ref Range    Glucose 90 70 - 105 mg/dL   POC Glucose Once    Collection Time: 08/14/21  7:39 AM    Specimen: Blood   Result Value Ref Range    Glucose 120 (H) 70 - 105 mg/dL        Imaging:  FL Video Swallow With Speech Single Contrast  Narrative: DATE OF EXAM:  8/11/2021 1:05 PM     PROCEDURE:  FL VIDEO SWALLOW W SPEECH SINGLE-CONTRAST-     INDICATIONS:  had COVID, on vent for 17 days, very weak; R06.00-Dyspnea, unspecified;  U07.1-COVID-19; J12.82-Pneumonia due to Coronavirus disease 2019;  J96.01-Acute respiratory failure with hypoxia; I95.2-Hypotension due to  drugs; U07.1-COVID-19; J96.00-Acute respiratory failure, unspecified  whether with hypoxia or hypercapnia     COMPARISON:  No Comparisons Available     TECHNIQUE:   This examination was performed in conjunction with speech pathology.  Lateral video fluoroscopic evaluation of the swallowing mechanism was  performed while correlate administering to the patient various  consistency food items mixed with barium.     Fluoroscopic Time:   1.5 minutes.     FINDINGS:  See impression.      Impression: For more details, please refer to the speech pathology report.     Electronically Signed By-Ad Marsh MD On:8/13/2021 1:50 PM  This report was finalized on [id number] by  Ad Marsh MD.       ASSESSMENT:  Acute respiratory failure   Pneumonia -MRSA/ Klebsiella pneumoniae  COVID-19   Morbid obesity     DM II   E. coli UTI   HTN  Polyneuropathy   Delirium,  acute  Dysphagia     PLAN:  Advance diet to Regular with thin liquids  Encourage OOB more  PT/OT to get patient OOB at least for all meals  Antibiotics through tomorrow  Bronchodilator  Inhaled corticosteroids  Mucinex/Cortef/Mucomyst tablets  Titrate O2 as tolerated  Electrolytes/ glycemic control  PT/OT/ST  DVT and GI prophylaxis    Discussed with Dr Ping Walker, APRN   8/14/2021  09:28 EDT     I personally have examined  and interviewed the patient. I have reviewed the history, data, problems, assessment and plan with our NP.  Critical care time in direct medical management (   ) minutes  Electronically signed by Jeff Feng MD, D,ABSM, 08/14/21, 11:52 PM EDT.

## 2021-08-14 NOTE — CONSULTS
"Nutrition Services    Patient Name: Leslie Harris  YOB: 1990  MRN: [id number]  Admission date: 7/22/2021    PPE Documentation        PPE Worn By Provider Mask, protective eyewear   PPE Worn By Patient  N/A     PROGRESS NOTE      Encounter Information: 8/14: Discussed nutrition with pt, who reports she has been able to eat fairly well today, but appetite is still somewhat diminished. Pt feels she will be able to eat some Magic Cups and Boost Breeze (doesn't like regular Boost) in addition to her meals. Pt excited to have feeding tube removed from her nose. Agree pt is ready to transition back to PO diet - NFPE does not reveal malnutrition - pt has been adequately nourished via nutrition support during this admission.        Labs (reviewed below): Hyperglycemia - insulin in place  Elevated LFTs - ongoing, monitor        GI Function:  Large BM documented on 8/13       Nutrition Intervention: Discontinue feeding tube and enteral nutrition support.     Transition to PO diet only: Regular Diet per ST recommendations    Utilize ONS to help meet needs as appetite is less than baseline:  · Magic Cups at lunch/dinner (Provides 580 kcals, 18 g protein if consumed)   · Boost Breeze BID (provides 500 kcals, 18 g protein if consumed)        PO Diet/Supplements: Diet Regular   EN Prescription: Impact Peptide 1.5 @ 70mL/hour x 8 hours + 10mL/hour water flush during infusion       Intake/Output:   Intake/Output Summary (Last 24 hours) at 8/14/2021 1455  Last data filed at 8/13/2021 2000  Gross per 24 hour   Intake 240 ml   Output 1000 ml   Net -760 ml          Height: Height: 167.6 cm (66\")   Weight: Weight: (!) 152 kg (335 lb 15.7 oz) (08/13/21 0500)   BMI: Body mass index is 54.23 kg/m².     Results from last 7 days   Lab Units 08/14/21  1025 08/13/21  0411 08/12/21  0331   SODIUM mmol/L 134* 136 143   POTASSIUM mmol/L 4.2 4.4 4.5   CHLORIDE mmol/L 96* 98 104   CO2 mmol/L 25.0 27.0 29.0   BUN mg/dL 17 24* 32* "   CREATININE mg/dL 0.32* 0.25* 0.37*   CALCIUM mg/dL 9.5 9.1 9.5   BILIRUBIN mg/dL 1.0 0.8 1.0   ALK PHOS U/L 95 93 90   ALT (SGPT) U/L 121* 109* 114*   AST (SGOT) U/L 53* 39* 40*   GLUCOSE mg/dL 157* 188* 127*     Results from last 7 days   Lab Units 08/14/21  1025 08/13/21  0411 08/13/21  0411 08/12/21  0331 08/12/21  0331   MAGNESIUM mg/dL 1.6  --  1.6  --  1.7   PHOSPHORUS mg/dL  --   --   --   --  4.1   HEMOGLOBIN g/dL 11.4*   < > 10.9*   < > 11.1*   HEMATOCRIT % 35.3   < > 32.9*   < > 34.8    < > = values in this interval not displayed.     COVID19   Date Value Ref Range Status   07/22/2021 Detected (C) Not Detected - Ref. Range Final     Lab Results   Component Value Date    HGBA1C 6.9 (H) 07/23/2021     Vitals:    08/14/21 1450   BP:    Pulse:    Resp: 18   Temp:    SpO2:      RD to follow up per protocol.    Electronically signed by:  Traci Schulz RD  08/14/21 14:55 EDT

## 2021-08-14 NOTE — PROGRESS NOTES
[facility] Medicine Services Daily Progress Note    Patient Name: Leslie Harris  : 1990  MRN: [id number]  Primary Care Physician:  Provider, No Known  Date of admission: 2021      Subjective      Chief Complaint:   Right arm pain    History of Present Illness: Leslie Harris is a 30 y.o. morbidly obese female who was diagnosed with COVID-19 on 2021 at the [facility]. She had not been vaccinated for Covid. She presented to the [facility] ED on 2021 complaining of shortness of breath. Workup in the ER revealed acute respiratory failure with hypoxia, pneumonia due to COVID-19, hypertension, and hyperglycemia. The patient was placed on Precision Flow with 100% FiO2 and admitted to the ICU for close monitoring and further treatment. The patient was started on Decadron and Remdesivir.     21:  Patient transitioned AVAPS with Precision Flow with 100% FiO2, but continued to have hypoxia and was intubated.  Right IJ central line inserted.  Dietitian consulted for tube feeds.     21:  Remains intubated and sedated. Chemically paralyzed due to worsening hypoxemia. On Flolan nebulized.  Prone position.  Started on empiric coverage with ceftriaxone.  Tolerating tube feeds.       21:  Remains intubated, sedated, and chemically paralyzed.  Antibiotic changed to Zosyn.  Remains prone and on Flolan.  Tolerating tube feeds.         21:  Remains intubated, sedated, and chemically paralyzed.  Patient placed in supine position.  Remains on Flolan.  Tolerating tube feeds.       21:  Remains intubated, sedated, and chemically paralyzed.  Patient tolerating supine position.  Remains on Flolan.  Tolerating tube feeds.       21:  Remains intubated, sedated, and chemically paralyzed.  Patient tolerating supine position.  Remains on Flolan.  Tolerating tube feeds.       21:  Remains intubated, sedated, and chemically  paralyzed.  Sputum culture grew MRSA.  Patient tolerating supine position.  Remains on Flolan; failed wean attempt.  Tolerating tube feeds.       07/30/21:  Remains intubated, sedated, and chemically paralyzed.  Patient returned to prone position for 16 hours.  Remains on Flolan.  Transitioned to heparin drip.  Tolerating tube feeds.       07/31/21:  Remains intubated, sedated, and chemically paralyzed.  Remains on Flolan.  Diuresis started.  On heparin drip.  Tolerating tube feeds.       08/01/21:  Remains intubated, sedated, and chemically paralyzed.  A-line inserted.  Tolerating supine position.  Remains on heparin drip.  Diuresing well.  Remains on Flolan.  Cardizem drip started for hypertension and tachycardia.  Left radial a-line discontinued.  Right brachial a-line inserted.   Tolerating tube feeds.       08/02/21:  Remains intubated, sedated, and chemically paralyzed. Tolerating supine position.  Remains on Flolan.  Remains on heparin drip.  Labile BP, alternating Cardizem and Levophed.  Fecal management system placed due to high volume liquid stool.  Tolerating tube feeds.       08/03/21:  Remains intubated, sedated, and chemically paralyzed.  Infectious disease consulted for antibiotic management.  Heparin drip discontinued due to blood-tinged sputum.  Weaning Cardizem drip.  Remains on Flolan. Switched to prone position.  Tolerating tube feeds.       08/04/21:  Remains intubated, sedated, and chemically paralyzed.  Sputum culture grew Klebsiella pneumoniae.  FMS removed.  Lovenox restarted.  Weaning Cardizem drip.  Attempted to wean Flolan, but had to be reinitiated.  Tolerating tube feeds.       08/05/21:  Remains intubated, sedated, and chemically paralyzed.  Tolerating tube feeds.       08/06/21:  Remains intubated, sedated, and chemically paralyzed.  In prone position.  Tolerating tube feeds.       08/07/21:  Remains intubated, sedated, and chemically paralyzed.  Tolerating supine position.  Urine  culture grew E. coli.  Tolerating tube feeds.       08/08/21:  Remains intubated, sedated, and chemically paralyzed.  Status post bronchoscopy with multiple mucus plugs removed.  Paralytic weaned off.  Tolerating tube feeds.       08/09/21:  Remains intubated and sedated.  Tolerating tube feeds.       08/10/21:  Patient extubated.  Tolerating tube feeds.       08/11/21:  Remains extubated.  Diagnosed with critical illness polyneuropathy muscle weakness due to paralytics and steroids.  Patient with acute delirium.  Tolerating tube feeds.  SLP consulted for video swallow.     08/12/21:  The patient was downgraded to PCU and the Hospitalist team was consulted for medical management.  Patient started on mechanical soft diet overnight.  She is complaining of some shortness of breath, but maintaining oxygen saturation on 5 liters per high flow nasal cannula.  She reports fatigue and generalized weakness.        8/13    Patient's blood glucose very acceptable  Patient had been comfortable overnight.  She is slightly tachycardic on 5 L of high flow oxygen but sats are above 95 percent  Her white count 12.2  Pharmacy suggesting some changes in the insulin regimen to avoid multiple sticks.  Still feeling weak on the right upper extremity.  She relates that to her prone position in the ICU.  Discussed with the family that this would be helped by physical therapy.      8/14  Patient had been seen by new dietitian yesterday and I will speech therapist as well  She is a 96% saturation on 4 L of high flow nasal cannula  She is afebrile   [facility]  Patient liver enzymes are fluctuating and mildly elevated  Have a PICC line placed.    ROS  All other systems reviewed and negative      Objective      Vitals:   Temp:  [97.8 °F (36.6 °C)-98.4 °F (36.9 °C)] 97.8 °F (36.6 °C)  Heart Rate:  [] 104  Resp:  [18] 18  BP: (123-145)/(76-86) 123/76  Flow (L/min):   [4-5] 4    Physical Exam  Vitals and nursing note reviewed.   Constitutional:       General: She is not in acute distress.     Appearance: Normal appearance. She is well-developed. She is not ill-appearing, toxic-appearing or diaphoretic.   HENT:      Head: Normocephalic and atraumatic.      Right Ear: Ear canal and external ear normal.      Left Ear: Ear canal and external ear normal.      Nose: Nose normal. No congestion or rhinorrhea.      Mouth/Throat:      Mouth: Mucous membranes are moist.      Pharynx: No oropharyngeal exudate.   Eyes:      General: No scleral icterus.        Right eye: No discharge.         Left eye: No discharge.      Extraocular Movements: Extraocular movements intact.      Conjunctiva/sclera: Conjunctivae normal.      Pupils: Pupils are equal, round, and reactive to light.   Neck:      Thyroid: No thyromegaly.      Vascular: No carotid bruit or JVD.      Trachea: No tracheal deviation.   Cardiovascular:      Rate and Rhythm: Normal rate and regular rhythm.      Pulses: Normal pulses.      Heart sounds: Normal heart sounds. No murmur heard.   No friction rub. No gallop.    Pulmonary:      Effort: Pulmonary effort is normal. No respiratory distress.      Breath sounds: Normal breath sounds. No stridor. No wheezing, rhonchi or rales.   Chest:      Chest wall: No tenderness.   Abdominal:      General: Bowel sounds are normal. There is no distension.      Palpations: Abdomen is soft. There is no mass.      Tenderness: There is no abdominal tenderness. There is no guarding or rebound.      Hernia: No hernia is present.   Musculoskeletal:         General: No swelling, tenderness, deformity or signs of injury. Normal range of motion.      Cervical back: Normal range of motion and neck supple. No rigidity. No muscular tenderness.      Right lower leg: No edema.      Left lower leg: No edema.   Lymphadenopathy:      Cervical: No cervical adenopathy.   Skin:     General: Skin is warm and dry.       Coloration: Skin is not jaundiced or pale.      Findings: No bruising, erythema or rash.   Neurological:      General: No focal deficit present.      Mental Status: She is alert and oriented to person, place, and time. Mental status is at baseline.      Cranial Nerves: No cranial nerve deficit.      Sensory: No sensory deficit.      Motor: No weakness or abnormal muscle tone.      Coordination: Coordination normal.   Psychiatric:         Mood and Affect: Mood normal.         Behavior: Behavior normal.         Thought Content: Thought content normal.         Judgment: Judgment normal.            Result Review    Result Review:  I have personally reviewed the results from the time of this admission to 8/14/2021 11:07 EDT and agree with these findings:  [x]  Laboratory  [x]  Microbiology  [x]  Radiology  [x]  EKG/Telemetry   [x]  Cardiology/Vascular   [x]  Pathology  []  Old records  []  Other:  Most notable findings include: As outlined above       Wounds (last 24 hours)      LDA Wound     Row Name 08/14/21 0400 08/14/21 0000 08/13/21 2000       Wound 07/26/21 2200 Left distal nose Pressure Injury    Wound - Properties Group Placement Date: 07/26/21  - Placement Time: 2200  -MF Present on Hospital Admission: N  -MF Side: Left  -MF Orientation: distal  -MF Location: nose  -MF Primary Wound Type: Pressure inj  -MF Stage, Pressure Injury : deep tissue injury  -MF    Dressing Appearance  --  --  open to air  -MS    Closure  Open to air  -MS  Open to air  -MS  Open to air  -MS    Base  red  -MS  red  -MS  red  -MS    Periwound  intact;blanchable;dry  -MS  intact;blanchable;dry  -MS  intact;blanchable;dry  -MS    Periwound Temperature  warm  -MS  warm  -MS  warm  -MS    Dressing Care  --  --  open to air  -MS    Retired Wound - Properties Group Date first assessed: 07/26/21  - Time first assessed: 2200  -MF Present on Hospital Admission: N  -MF Side: Left  -MF Location: nose  -MF Primary Wound Type: Pressure inj  -MF        Wound 07/26/21 2200 midline tongue Pressure Injury    Wound - Properties Group Placement Date: 07/26/21  -MF Placement Time: 2200 -MF Present on Hospital Admission: N  -MF Orientation: midline  -MF Location: tongue  -MF Primary Wound Type: Pressure inj  -MF Stage, Pressure Injury : deep tissue injury  -MF    Dressing Appearance  open to air  -MS  --  --    Periwound  intact;blanchable  -MS  intact;blanchable  -MS  intact;blanchable  -MS    Retired Wound - Properties Group Date first assessed: 07/26/21  - Time first assessed: 2200 -MF Present on Hospital Admission: N  -MF Location: tongue  -MF Primary Wound Type: Pressure inj  -MF       Wound 07/27/21 2028 medial sacral spine Pressure Injury    Wound - Properties Group Placement Date: 07/27/21  -MF Placement Time: 2028 -MF Present on Hospital Admission: N  -MF Orientation: medial  -MF Location: sacral spine  -MF Primary Wound Type: Pressure inj  -MF Stage, Pressure Injury : Stage 2  -MF    Dressing Appearance  --  open to air  -MS  open to air  -MS    Base  blanchable;dry  -MS  blanchable;dry  -MS  --    Retired Wound - Properties Group Date first assessed: 07/27/21  - Time first assessed: 2028  -MF Present on Hospital Admission: N  -MF Location: sacral spine  -MF Primary Wound Type: Pressure inj  -MF       Wound 08/06/21 1400 Right cheek Pressure Injury    Wound - Properties Group Placement Date: 08/06/21  -HELLEN Placement Time: 1400  -HELLEN Present on Hospital Admission: N  -HELLEN Side: Right  -HELLEN Location: cheek  -HELLEN Primary Wound Type: Pressure inj  -HELLEN Stage, Pressure Injury : Stage 1  -HELLEN    Dressing Appearance  open to air  -MS  open to air  -MS  open to air  -MS    Periwound  blanchable;intact  -MS  blanchable;intact  -MS  --    Retired Wound - Properties Group Date first assessed: 08/06/21  -HELLEN Time first assessed: 1400  -HELLEN Present on Hospital Admission: N  -HELLEN Side: Right  -HELLEN Location: cheek  -HELLEN Primary Wound Type: Pressure inj  -HELLEN       Wound 08/06/21  1400 Right throat Abrasion    Wound - Properties Group Placement Date: 08/06/21  -HELLEN Placement Time: 1400  -HELLEN Present on Hospital Admission: N  -HELLEN Side: Right  -HELLEN Location: throat  -HELLEN Primary Wound Type: Abrasion  -HELLEN    Dressing Appearance  open to air  -MS  --  open to air  -MS    Retired Wound - Properties Group Date first assessed: 08/06/21  -HELLEN Time first assessed: 1400  -HELLEN Present on Hospital Admission: N  -HELLEN Side: Right  -HELLEN Location: throat  -HELLEN Primary Wound Type: Abrasion  -HELLEN    Row Name 08/13/21 1640 08/13/21 1240          Wound 07/26/21 2200 Left distal nose Pressure Injury    Wound - Properties Group Placement Date: 07/26/21  -MF Placement Time: 2200  -MF Present on Hospital Admission: N  -MF Side: Left  -MF Orientation: distal  -MF Location: nose  -MF Primary Wound Type: Pressure inj  -MF Stage, Pressure Injury : deep tissue injury  -MF    Closure  Open to air  -DA  Open to air  -DA     Base  red  -DA  red  -DA     Periwound  intact;blanchable;dry  -DA  intact;blanchable;dry  -DA     Periwound Temperature  warm  -DA  warm  -DA     Retired Wound - Properties Group Date first assessed: 07/26/21  -MF Time first assessed: 2200  -MF Present on Hospital Admission: N  -MF Side: Left  -MF Location: nose  -MF Primary Wound Type: Pressure inj  -MF       Wound 07/26/21 2200 midline tongue Pressure Injury    Wound - Properties Group Placement Date: 07/26/21  -MF Placement Time: 2200  -MF Present on Hospital Admission: N  -MF Orientation: midline  -MF Location: tongue  -MF Primary Wound Type: Pressure inj  -MF Stage, Pressure Injury : deep tissue injury  -MF    Retired Wound - Properties Group Date first assessed: 07/26/21  -MF Time first assessed: 2200  -MF Present on Hospital Admission: N  -MF Location: tongue  -MF Primary Wound Type: Pressure inj  -MF       Wound 07/27/21 2028 medial sacral spine Pressure Injury    Wound - Properties Group Placement Date: 07/27/21  -MF Placement Time: 2028  -MF Present on  Hospital Admission: N  -MF Orientation: medial  -MF Location: sacral spine  -MF Primary Wound Type: Pressure inj  -MF Stage, Pressure Injury : Stage 2  -MF    Retired Wound - Properties Group Date first assessed: 07/27/21  -MF Time first assessed: 2028  -MF Present on Hospital Admission: N  -MF Location: sacral spine  -MF Primary Wound Type: Pressure inj  -MF       Wound 08/06/21 1400 Right cheek Pressure Injury    Wound - Properties Group Placement Date: 08/06/21  -HELLEN Placement Time: 1400  -HELLEN Present on Hospital Admission: N  -HELLEN Side: Right  -HELLEN Location: cheek  -HELLEN Primary Wound Type: Pressure inj  -HELLEN Stage, Pressure Injury : Stage 1  -HELLEN    Retired Wound - Properties Group Date first assessed: 08/06/21  -HELLEN Time first assessed: 1400  -HELLEN Present on Hospital Admission: N  -HELLEN Side: Right  -HELLEN Location: cheek  -HELLEN Primary Wound Type: Pressure inj  -HELLEN       Wound 08/06/21 1400 Right throat Abrasion    Wound - Properties Group Placement Date: 08/06/21  -HELLEN Placement Time: 1400  -HELLEN Present on Hospital Admission: N  -HELLEN Side: Right  -HELLEN Location: throat  -HELLEN Primary Wound Type: Abrasion  -HELLEN    Retired Wound - Properties Group Date first assessed: 08/06/21  -HELLEN Time first assessed: 1400  -HELLEN Present on Hospital Admission: N  -HELLEN Side: Right  -HELLEN Location: throat  -HELLEN Primary Wound Type: Abrasion  -HELLEN      User Key  (r) = Recorded By, (t) = Taken By, (c) = Cosigned By    Initials Name Provider Type    Brisa Díaz RN Registered Nurse    Vandana Christine RN Registered Nurse    Ольга Spence RN Registered Nurse    Susana Perry RN Registered Nurse            Assessment/Plan      Brief Patient Summary:  Leslie Harris is a 30 y.o. female who presented with pneumonia and COVID-19 infection had long hospital stay.        Acetylcysteine, 600 mg, Oral, BID  aspirin, 324 mg, Per G Tube, Daily  Barium Sulfate, 1 teaspoon(s), Oral, Once in imaging  barium sulfate, 50 mL, Oral, Once in imaging  barium  sulfate, 50 mL, Oral, Once in imaging  budesonide, 0.5 mg, Nebulization, BID - RT  cefTRIAXone, 2 g, Intravenous, Q24H  cholecalciferol, 2,000 Units, Nasogastric, Daily  enoxaparin, 40 mg, Subcutaneous, Q12H  furosemide, 20 mg, Oral, Daily  guaiFENesin, 400 mg, Nasogastric, Q6H  hydrocortisone, 20 mg, Oral, BID With Meals  insulin glargine, 45 Units, Subcutaneous, Q12H  insulin lispro, 0-14 Units, Subcutaneous, 4x Daily With Meals & Nightly  insulin lispro, 10 Units, Subcutaneous, TID With Meals  ipratropium-albuterol, 3 mL, Nebulization, 4x Daily - RT  lansoprazole, 30 mg, Nasogastric, BID AC  linezolid, 600 mg, Oral, Q12H  melatonin, 10 mg, Oral, Nightly  metoprolol tartrate, 25 mg, Oral, Q12H  risperiDONE, 0.5 mg, Oral, Nightly  sodium chloride, 10 mL, Intravenous, Q12H       Pharmacy to Dose enoxaparin (LOVENOX),          Active Hospital Problems:  Active Hospital Problems    Diagnosis    • **Pneumonia due to COVID-19 virus    • Critical illness polyneuropathy (CMS/[facility])    • Delirium, acute    • Dysphagia    • E. coli UTI (urinary tract infection)    • Klebsiella pneumoniae pneumonia (CMS/[facility])    • Diabetes mellitus type 2 in obese (CMS/[facility])    • MRSA pneumonia (CMS/[facility])    • Acute respiratory failure due to COVID-19 (CMS/[facility])    • Class 3 severe obesity without serious comorbidity with body mass index (BMI) of 50.0 to 59.9 in adult    • Hypertension      Plan:     Pneumonia due to COVID-19 virus  Acute respiratory failure due to COVID-19   -intubated 07/23/21  -extubated 08/10/21  -currently on O2 @ 5 L per HFNC  -titrate O2 to keep sat >92%  -pulmonology following   -completed 5 days of Remdesivir  -off Decadron, now on hydrocortisone  -on Mucinex, Pulmicort, and nebs  -VTE prophylaxis with Lovenox and aspirin   -Finished isolation..      Diabetes mellitus type 2 in obese, new onset  -A1c 6.9%  -accu checks AC&HS with SSI  -continue Lantus      MRSA pneumonia  Klebsiella pneumoniae pneumonia  -ID following  -  IV ceftriaxone for a total of 7 to 10 days with p.o. linezolid for a total of 14 days     E. coli UTI (urinary tract infection)  -on Rocephin      Hypertension  -on metoprolol and Lasix      Critical illness polyneuropathy   -PT/OT following  -needs inpatient rehab at discharge      Delirium, acute  -improving  -on risperidone   - resolved     Dysphagia   -secondary to recent intubation and paralysis  -SLP following  -started on mechanical soft diet 08/11/21     Class 3 severe obesity without serious comorbidity with body mass index (BMI) of 50.0 to 59.9 in adult  -BMI 54.37 on admission  -encourage lifestyle modifications      Disposition   anticipate DC to inpt rehab- [facility] referral sent 8/12- PENDING. Will need precert. No PASRR needed.    DVT prophylaxis:  Medical DVT prophylaxis orders are present.    CODE STATUS:    Code Status: CPR  Medical Interventions (Level of Support Prior to Arrest): Full      Disposition:  I expect patient to be discharged inpatient rehab.    This patient has been examined wearing appropriate Personal Protective Equipment and discussed with hospital infection control department. 08/14/21      Electronically signed by Juan Arguelles MD, 08/14/21, 11:07 EDT.  [facility]ik[facility] Wasserman Hospitalist Team

## 2021-08-14 NOTE — PROGRESS NOTES
Infectious Diseases Progress Note      LOS: 23 days   Patient Care Team:  Lesia, Ana Known as PCP - General    Chief Complaint: Shortness of breath    Subjective       Patient had no high-grade fever during the last 24 hours.  The patient is currently on 4 L of oxygen via nasal cannula.  The patient is awake and alert.  Her main complaint is weakness      Review of Systems:   Review of Systems   Constitutional: Positive for fatigue.   HENT: Negative.    Eyes: Negative.    Respiratory: Positive for shortness of breath.    Cardiovascular: Negative.    Gastrointestinal: Negative.    Genitourinary: Negative.    Musculoskeletal: Negative.    Skin: Negative.    Neurological: Positive for weakness.   Hematological: Negative.    Psychiatric/Behavioral: Negative.         Objective     Vital Signs  Temp:  [97.8 °F (36.6 °C)-98.6 °F (37 °C)] 98.6 °F (37 °C)  Heart Rate:  [] 110  Resp:  [18-19] 19  BP: (123-145)/(76-86) 137/79    Physical Exam:  Physical Exam  Vitals and nursing note reviewed.   Constitutional:       General: She is not in acute distress.     Appearance: Normal appearance. She is well-developed. She is obese. She is not diaphoretic.   HENT:      Head: Normocephalic and atraumatic.   Eyes:      Pupils: Pupils are equal, round, and reactive to light.   Cardiovascular:      Rate and Rhythm: Normal rate and regular rhythm.      Heart sounds: Normal heart sounds, S1 normal and S2 normal. No murmur heard.     Pulmonary:      Effort: Pulmonary effort is normal. No respiratory distress.      Breath sounds: No stridor. Rales present. No wheezing.   Chest:      Chest wall: No tenderness.   Abdominal:      General: Bowel sounds are normal. There is no distension.      Palpations: Abdomen is soft. There is no mass.      Tenderness: There is no abdominal tenderness. There is no guarding or rebound.      Comments: NG tube   Genitourinary:     Comments: External catheter  Musculoskeletal:         General: No swelling,  tenderness or deformity. Normal range of motion.      Cervical back: Normal range of motion and neck supple.   Skin:     General: Skin is warm and dry.      Coloration: Skin is not pale.      Findings: No bruising, erythema or rash.   Neurological:      Mental Status: She is alert and oriented to person, place, and time.      Cranial Nerves: No cranial nerve deficit.          Results Review:    I have reviewed all clinical data, test, lab, and imaging results.     Radiology  No Radiology Exams Resulted Within Past 24 Hours    Cardiology    Laboratory    Results from last 7 days   Lab Units 08/14/21  1025 08/13/21  0411 08/12/21  0331 08/11/21  1221 08/10/21  0411 08/09/21  0518 08/08/21  0505   WBC 10*3/mm3 12.40* 12.20* 11.50* 11.00* 8.20 10.10 16.10*   HEMOGLOBIN g/dL 11.4* 10.9* 11.1* 10.5* 9.6* 8.6* 9.9*   HEMATOCRIT % 35.3 32.9* 34.8 32.8* 28.7* 26.2* 30.5*   PLATELETS 10*3/mm3 238 291 272 260 166 172 234     Results from last 7 days   Lab Units 08/14/21  1025 08/13/21  0411 08/12/21  0331 08/11/21  1221 08/10/21  2033 08/10/21  0652 08/09/21  0753 08/08/21  0505 08/08/21  0505   SODIUM mmol/L 134* 136 143 143  --  142 143  --  140   POTASSIUM mmol/L 4.2 4.4 4.5 3.9 4.3 3.5 3.8   < > 4.6   CHLORIDE mmol/L 96* 98 104 102  --  99 101  --  98   CO2 mmol/L 25.0 27.0 29.0 29.0  --  34.0* 35.0*  --  32.0*   BUN mg/dL 17 24* 32* 33*  --  45* 54*  --  55*   CREATININE mg/dL 0.32* 0.25* 0.37* 0.45*  --  0.42* 0.61  --  0.67   GLUCOSE mg/dL 157* 188* 127* 190*  --  125* 138*  --  192*   ALBUMIN g/dL 3.80 3.50 3.80 3.80  --  3.70 3.80  --  4.00   BILIRUBIN mg/dL 1.0 0.8 1.0 0.9  --  0.8 0.9  --  0.8   ALK PHOS U/L 95 93 90 88  --  84 83  --  85   AST (SGOT) U/L 53* 39* 40* 53*  --  61* 64*  --  39*   ALT (SGPT) U/L 121* 109* 114* 133*  --  160* 180*  --  133*   CALCIUM mg/dL 9.5 9.1 9.5 9.7  --  9.9 10.1  --  10.2    < > = values in this interval not displayed.                 Microbiology   Microbiology Results (last 10  days)     Procedure Component Value - Date/Time    Blood Culture - Blood, Arm, Right [[id number]] Collected: 08/09/21 0758    Lab Status: Final result Specimen: Blood from Arm, Right Updated: 08/14/21 0815     Blood Culture No growth at 5 days    Blood Culture - Blood, Arm, Left [[id number]] Collected: 08/09/21 0753    Lab Status: Final result Specimen: Blood from Arm, Left Updated: 08/14/21 0815     Blood Culture No growth at 5 days    Fungus Culture - Wash, Bronchus [[id number]]  (Abnormal) Collected: 08/08/21 0944    Lab Status: Preliminary result Specimen: Wash from Bronchus Updated: 08/11/21 0745     Fungus Culture Candida species    AFB Culture - Wash, Bronchus [[id number]] Collected: 08/08/21 0944    Lab Status: Preliminary result Specimen: Wash from Bronchus Updated: 08/13/21 1030     AFB Culture No AFB isolated at less than 1 week     AFB Stain No acid fast bacilli seen    Respiratory Culture - Wash, Bronchus [[id number]] Collected: 08/08/21 0944    Lab Status: Final result Specimen: Wash from Bronchus Updated: 08/10/21 1209     Respiratory Culture Scant growth (1+) Normal Respiratory Sondra: NO S.aureus/MRSA or Pseudomonas aeruginosa     Gram Stain Many (4+) WBCs per low power field      Rare (1+) Epithelial cells per low power field      Few (2+) Mixed bacterial morphotypes seen on Gram Stain    Respiratory Panel, PCR (WITHOUT COVID) - Wash, Bronchus [[id number]]  (Normal) Collected: 08/08/21 0944    Lab Status: Final result Specimen: Wash from Bronchus Updated: 08/08/21 1127     ADENOVIRUS, PCR Not Detected     Coronavirus 229E Not Detected     Coronavirus HKU1 Not Detected     Coronavirus NL63 Not Detected     Coronavirus OC43 Not Detected     Human Metapneumovirus Not Detected     Human Rhinovirus/Enterovirus Not Detected     Influenza B PCR Not Detected     Parainfluenza Virus 1 Not Detected     Parainfluenza Virus 2 Not Detected     Parainfluenza Virus 3 Not Detected     Parainfluenza Virus 4 Not Detected  "    Bordetella pertussis pcr Not Detected     Chlamydophila pneumoniae PCR Not Detected     Mycoplasma pneumo by PCR Not Detected     Influenza A PCR Not Detected     RSV, PCR Not Detected     Bordetella parapertussis PCR Not Detected    Narrative:      The coronavirus on the RVP is NOT COVID-19 and is NOT indicative of infection with COVID-19.    In the setting of a positive respiratory panel with a viral infection PLUS a negative procalcitonin without other underlying concern for bacterial infection, consider observing off antibiotics or discontinuation of antibiotics and continue supportive care. If the respiratory panel is positive for atypical bacterial infection (Bordetella pertussis, Chlamydophila pneumoniae, or Mycoplasma pneumoniae), consider antibiotic de-escalation to target atypical bacterial infection.    Histoplasma Antigen, CSF or BAL - Wash, Bronchus [[id number]] Collected: 08/08/21 0944    Lab Status: Final result Specimen: Wash from Bronchus Updated: 08/12/21 0919     Result None Detected ng/mL      Comment:                                           None Detected        Interpretation Negative     Comment: Positive results reported in ng/mL from 0.20 ng/mL to 20.00 ng/mL  Positive results above 20.00 ng/mL are reported as \"Above the  Limit of Quantification\"       Narrative:      Performed at:  [address] [place] [address] [email or web address]  [address] [place] [address] [place]  [id number]  : Nicolás Garduno MD, Phone:  [phone]    Cytomegalovirus (CMV) By PCR - Wash, Bronchus [[id number]] Collected: 08/08/21 0944    Lab Status: Final result Specimen: Wash from Bronchus Updated: 08/12/21 2089     Specimen Source Comment     Comment: BRONCH WASH        Cytomegalovirus PCR Negative     Comment: -------------------ADDITIONAL INFORMATION-------------------  This test was developed and its performance characteristics  determined by [facility] in a manner consistent with CLIA  requirements. This test has not been " cleared or approved by  the [place]. Food and Drug Administration.       Narrative:      Performed at:  01 - [facility] [address]  [id number]  : Dominik Lanza , Phone:  [phone]    Pneumocystis PCR - Wash, Bronchus [[id number]] Collected: 08/08/21 0944    Lab Status: Final result Specimen: Wash from Bronchus Updated: 08/13/21 1113     Pneumocystis jiroveci DNA Negative     Comment: -------------------ADDITIONAL INFORMATION-------------------  This test was developed and its performance characteristics  determined by [facility] in a manner consistent with CLIA  requirements. This test has not been cleared or approved by  the [place].S. Food and Drug Administration.  REFERENCE RANGE: Not Applicable        Specimen Source Comment     Comment: BRONCH WASH       Narrative:      Performed at:  01 - [facility] [address]  [id number]  : Dominik Lanza , Phone:  [phone]    Urine Culture - Urine, Urine, Catheter [[id number]]  (Abnormal)  (Susceptibility) Collected: 08/07/21 0351    Lab Status: Final result Specimen: Urine, Catheter Updated: 08/09/21 1037     Urine Culture >100,000 CFU/mL Escherichia coli    Susceptibility      Escherichia coli      FITZ      Ampicillin Resistant      Ampicillin + Sulbactam Intermediate      Cefazolin Susceptible      Cefepime Susceptible      Ceftazidime Susceptible      Ceftriaxone Susceptible      Gentamicin Susceptible      Levofloxacin Susceptible      Nitrofurantoin Susceptible      Piperacillin + Tazobactam Susceptible      Tetracycline Susceptible      Trimethoprim + Sulfamethoxazole Susceptible               Linear View                         Medication Review:       Schedule Meds  Acetylcysteine, 600 mg, Oral, BID  aspirin, 324 mg, Per G Tube, Daily  budesonide, 0.5 mg, Nebulization, BID - RT  cefTRIAXone, 2 g, Intravenous, Q24H  cholecalciferol, 2,000 Units, Nasogastric,  Daily  enoxaparin, 40 mg, Subcutaneous, Q12H  furosemide, 20 mg, Oral, Daily  guaiFENesin, 400 mg, Nasogastric, Q6H  hydrocortisone, 20 mg, Oral, BID With Meals  insulin glargine, 45 Units, Subcutaneous, Q12H  insulin lispro, 0-14 Units, Subcutaneous, 4x Daily With Meals & Nightly  insulin lispro, 10 Units, Subcutaneous, TID With Meals  ipratropium-albuterol, 3 mL, Nebulization, 4x Daily - RT  lansoprazole, 30 mg, Nasogastric, BID AC  linezolid, 600 mg, Oral, Q12H  melatonin, 10 mg, Oral, Nightly  metoprolol tartrate, 25 mg, Oral, Q12H  risperiDONE, 0.5 mg, Oral, Nightly  sodium chloride, 10 mL, Intravenous, Q12H        Infusion Meds  Pharmacy to Dose enoxaparin (LOVENOX),         PRN Meds  •  acetaminophen  •  acetaminophen **OR** acetaminophen  •  aluminum-magnesium hydroxide-simethicone  •  artificial tears  •  artificial tears  •  benzonatate  •  dextrose  •  dextrose  •  glucagon (human recombinant)  •  hydrALAZINE  •  hydrOXYzine  •  insulin lispro **AND** insulin lispro  •  lidocaine  •  lidocaine PF 1%  •  magnesium sulfate **OR** magnesium sulfate in D5W 1g/100mL (PREMIX)  •  methocarbamol  •  nitroglycerin  •  ondansetron **OR** ondansetron  •  oxyCODONE  •  Pharmacy to Dose enoxaparin (LOVENOX)  •  potassium chloride **OR** potassium chloride **OR** potassium chloride  •  potassium phosphate infusion greater than 15 mMoles **OR** potassium phosphate infusion greater than 15 mMoles **OR** potassium phosphate **OR** sodium phosphate IVPB **OR** sodium phosphate IVPB **OR** sodium phosphate IVPB  •  [COMPLETED] Insert peripheral IV **AND** sodium chloride  •  sodium chloride        Assessment/Plan       Antimicrobial Therapy   1.  Zyvox      day  2.  Rocephin      day  3.      Day  4.      Day  5.      Day      Assessment     Severe COVID-19 infection and patient with significant comorbidities including diabetes mellitus and morbid obesity. Apparently did not receive vaccination for COVID-19  COVID-19 screen  on admission on July 22, 2021 was positive. Was reported that patient had positive COVID-19 diagnosis 6 days prior to admission  The patient had received 5 days of IV remdesivir     Severe respiratory failure and patient was intubated for long duration.  The patient was extubated on August 10, 2021  -Patient is now on 4 L of oxygen by nasal cannula     Probable ventilator associated pneumonia versus hospitalist acquired pneumonia. Sputum culture from July 29, 2021 grew MRSA and the organism is susceptible to linezolid and vancomycin  The patient was started on linezolid on August 1, 2021  Repeat sputum culture from August 1, 2021 is growing Klebsiella pneumoniae     Morbid obesity     Diabetes mellitus     Reactive leukocytosis secondary to steroids.  Improved    UTI, urine culture from August 7, 2021 is growing relatively susceptible strain of E. coli         Plan     Continue Zyvox 600 mg every 12 hours for a total of 14 days, discontinue after today's dose  Continue Rocephin 2 g every 24 hours for a total of 10 days  Supportive care  No need for further COVID-19 isolation          Neela Alvarado MD  08/14/21  14:45 EDT     Note is dictated utilizing voice recognition software/Dragon

## 2021-08-14 NOTE — PLAN OF CARE
Goal Outcome Evaluation:         Patient stable throughout shift; mother at bedside assisting with care.

## 2021-08-15 ENCOUNTER — APPOINTMENT (OUTPATIENT)
Dept: GENERAL RADIOLOGY | Facility: HOSPITAL | Age: 31
End: 2021-08-15

## 2021-08-15 LAB
ALBUMIN SERPL-MCNC: 3.6 G/DL (ref 3.5–5.2)
ALBUMIN/GLOB SERPL: 1.1 G/DL
ALP SERPL-CCNC: 93 U/L (ref 39–117)
ALT SERPL W P-5'-P-CCNC: 134 U/L (ref 1–33)
ANION GAP SERPL CALCULATED.3IONS-SCNC: 8 MMOL/L (ref 5–15)
AST SERPL-CCNC: 55 U/L (ref 1–32)
BASOPHILS # BLD AUTO: 0 10*3/MM3 (ref 0–0.2)
BASOPHILS NFR BLD AUTO: 0.5 % (ref 0–1.5)
BILIRUB SERPL-MCNC: 0.7 MG/DL (ref 0–1.2)
BUN SERPL-MCNC: 17 MG/DL (ref 6–20)
BUN/CREAT SERPL: 50 (ref 7–25)
CA-I SERPL ISE-MCNC: 1.31 MMOL/L (ref 1.2–1.3)
CALCIUM SPEC-SCNC: 9.5 MG/DL (ref 8.6–10.5)
CHLORIDE SERPL-SCNC: 100 MMOL/L (ref 98–107)
CO2 SERPL-SCNC: 30 MMOL/L (ref 22–29)
CREAT SERPL-MCNC: 0.34 MG/DL (ref 0.57–1)
DEPRECATED RDW RBC AUTO: 56.9 FL (ref 37–54)
EOSINOPHIL # BLD AUTO: 0.3 10*3/MM3 (ref 0–0.4)
EOSINOPHIL NFR BLD AUTO: 3.5 % (ref 0.3–6.2)
ERYTHROCYTE [DISTWIDTH] IN BLOOD BY AUTOMATED COUNT: 18.2 % (ref 12.3–15.4)
GFR SERPL CREATININE-BSD FRML MDRD: >150 ML/MIN/1.73
GLOBULIN UR ELPH-MCNC: 3.4 GM/DL
GLUCOSE BLDC GLUCOMTR-MCNC: 105 MG/DL (ref 70–105)
GLUCOSE BLDC GLUCOMTR-MCNC: 142 MG/DL (ref 70–105)
GLUCOSE BLDC GLUCOMTR-MCNC: 143 MG/DL (ref 70–105)
GLUCOSE BLDC GLUCOMTR-MCNC: 175 MG/DL (ref 70–105)
GLUCOSE SERPL-MCNC: 103 MG/DL (ref 65–99)
HCT VFR BLD AUTO: 33.2 % (ref 34–46.6)
HGB BLD-MCNC: 11.2 G/DL (ref 12–15.9)
LYMPHOCYTES # BLD AUTO: 1.5 10*3/MM3 (ref 0.7–3.1)
LYMPHOCYTES NFR BLD AUTO: 16.3 % (ref 19.6–45.3)
MAGNESIUM SERPL-MCNC: 1.8 MG/DL (ref 1.6–2.6)
MCH RBC QN AUTO: 30.3 PG (ref 26.6–33)
MCHC RBC AUTO-ENTMCNC: 33.7 G/DL (ref 31.5–35.7)
MCV RBC AUTO: 89.9 FL (ref 79–97)
MONOCYTES # BLD AUTO: 0.5 10*3/MM3 (ref 0.1–0.9)
MONOCYTES NFR BLD AUTO: 5.4 % (ref 5–12)
NEUTROPHILS NFR BLD AUTO: 6.9 10*3/MM3 (ref 1.7–7)
NEUTROPHILS NFR BLD AUTO: 74.3 % (ref 42.7–76)
NRBC BLD AUTO-RTO: 0 /100 WBC (ref 0–0.2)
PLATELET # BLD AUTO: 284 10*3/MM3 (ref 140–450)
PMV BLD AUTO: 8.2 FL (ref 6–12)
POTASSIUM SERPL-SCNC: 3.7 MMOL/L (ref 3.5–5.2)
PROT SERPL-MCNC: 7 G/DL (ref 6–8.5)
RBC # BLD AUTO: 3.69 10*6/MM3 (ref 3.77–5.28)
SODIUM SERPL-SCNC: 138 MMOL/L (ref 136–145)
WBC # BLD AUTO: 9.3 10*3/MM3 (ref 3.4–10.8)

## 2021-08-15 PROCEDURE — 25010000002 ENOXAPARIN PER 10 MG: Performed by: INTERNAL MEDICINE

## 2021-08-15 PROCEDURE — 63710000001 INSULIN LISPRO (HUMAN) PER 5 UNITS: Performed by: INTERNAL MEDICINE

## 2021-08-15 PROCEDURE — 63710000001 INSULIN GLARGINE PER 5 UNITS: Performed by: INTERNAL MEDICINE

## 2021-08-15 PROCEDURE — 82962 GLUCOSE BLOOD TEST: CPT

## 2021-08-15 PROCEDURE — 99232 SBSQ HOSP IP/OBS MODERATE 35: CPT | Performed by: INTERNAL MEDICINE

## 2021-08-15 PROCEDURE — 97110 THERAPEUTIC EXERCISES: CPT

## 2021-08-15 PROCEDURE — 82330 ASSAY OF CALCIUM: CPT | Performed by: NURSE PRACTITIONER

## 2021-08-15 PROCEDURE — 73090 X-RAY EXAM OF FOREARM: CPT

## 2021-08-15 PROCEDURE — 94799 UNLISTED PULMONARY SVC/PX: CPT

## 2021-08-15 PROCEDURE — 85025 COMPLETE CBC W/AUTO DIFF WBC: CPT | Performed by: NURSE PRACTITIONER

## 2021-08-15 PROCEDURE — 25010000002 MAGNESIUM SULFATE IN D5W 1G/100ML (PREMIX) 1-5 GM/100ML-% SOLUTION: Performed by: NURSE PRACTITIONER

## 2021-08-15 PROCEDURE — 80053 COMPREHEN METABOLIC PANEL: CPT | Performed by: INTERNAL MEDICINE

## 2021-08-15 PROCEDURE — 83735 ASSAY OF MAGNESIUM: CPT | Performed by: NURSE PRACTITIONER

## 2021-08-15 PROCEDURE — 97112 NEUROMUSCULAR REEDUCATION: CPT

## 2021-08-15 PROCEDURE — 25010000002 CEFTRIAXONE PER 250 MG: Performed by: INTERNAL MEDICINE

## 2021-08-15 RX ORDER — GUAIFENESIN 600 MG/1
600 TABLET, EXTENDED RELEASE ORAL EVERY 12 HOURS SCHEDULED
Status: DISCONTINUED | OUTPATIENT
Start: 2021-08-15 | End: 2021-08-24 | Stop reason: HOSPADM

## 2021-08-15 RX ORDER — ASPIRIN 325 MG
325 TABLET ORAL DAILY
Status: DISCONTINUED | OUTPATIENT
Start: 2021-08-16 | End: 2021-08-24 | Stop reason: HOSPADM

## 2021-08-15 RX ORDER — PANTOPRAZOLE SODIUM 40 MG/1
40 TABLET, DELAYED RELEASE ORAL
Status: DISCONTINUED | OUTPATIENT
Start: 2021-08-15 | End: 2021-08-19

## 2021-08-15 RX ORDER — MELATONIN
2000 DAILY
Status: DISCONTINUED | OUTPATIENT
Start: 2021-08-16 | End: 2021-08-24 | Stop reason: HOSPADM

## 2021-08-15 RX ADMIN — HYDROCORTISONE 20 MG: 10 TABLET ORAL at 10:37

## 2021-08-15 RX ADMIN — IPRATROPIUM BROMIDE AND ALBUTEROL SULFATE 3 ML: 2.5; .5 SOLUTION RESPIRATORY (INHALATION) at 19:42

## 2021-08-15 RX ADMIN — ENOXAPARIN SODIUM 70 MG: 80 INJECTION, SOLUTION INTRAVENOUS; SUBCUTANEOUS at 19:57

## 2021-08-15 RX ADMIN — IPRATROPIUM BROMIDE AND ALBUTEROL SULFATE 3 ML: 2.5; .5 SOLUTION RESPIRATORY (INHALATION) at 11:04

## 2021-08-15 RX ADMIN — INSULIN GLARGINE 45 UNITS: 100 INJECTION, SOLUTION SUBCUTANEOUS at 20:03

## 2021-08-15 RX ADMIN — INSULIN LISPRO 10 UNITS: 100 INJECTION, SOLUTION INTRAVENOUS; SUBCUTANEOUS at 17:21

## 2021-08-15 RX ADMIN — METOPROLOL TARTRATE 25 MG: 25 TABLET, FILM COATED ORAL at 19:56

## 2021-08-15 RX ADMIN — BUDESONIDE 0.5 MG: 0.5 SUSPENSION RESPIRATORY (INHALATION) at 19:50

## 2021-08-15 RX ADMIN — Medication 10 ML: at 22:48

## 2021-08-15 RX ADMIN — GUAIFENESIN 600 MG: 600 TABLET, EXTENDED RELEASE ORAL at 19:56

## 2021-08-15 RX ADMIN — Medication 10 ML: at 10:37

## 2021-08-15 RX ADMIN — BUDESONIDE 0.5 MG: 0.5 SUSPENSION RESPIRATORY (INHALATION) at 06:34

## 2021-08-15 RX ADMIN — Medication 10 ML: at 19:57

## 2021-08-15 RX ADMIN — Medication 10 ML: at 10:38

## 2021-08-15 RX ADMIN — IPRATROPIUM BROMIDE AND ALBUTEROL SULFATE 3 ML: 2.5; .5 SOLUTION RESPIRATORY (INHALATION) at 06:34

## 2021-08-15 RX ADMIN — MAGNESIUM SULFATE HEPTAHYDRATE 1 G: 1 INJECTION, SOLUTION INTRAVENOUS at 12:10

## 2021-08-15 RX ADMIN — Medication 10 MG: at 22:48

## 2021-08-15 RX ADMIN — ACETAMINOPHEN 650 MG: 325 TABLET, FILM COATED ORAL at 19:56

## 2021-08-15 RX ADMIN — ASPIRIN 324 MG: 81 TABLET, CHEWABLE ORAL at 10:36

## 2021-08-15 RX ADMIN — INSULIN GLARGINE 45 UNITS: 100 INJECTION, SOLUTION SUBCUTANEOUS at 10:39

## 2021-08-15 RX ADMIN — INSULIN LISPRO 10 UNITS: 100 INJECTION, SOLUTION INTRAVENOUS; SUBCUTANEOUS at 10:36

## 2021-08-15 RX ADMIN — CEFTRIAXONE SODIUM 2 G: 2 INJECTION, POWDER, FOR SOLUTION INTRAMUSCULAR; INTRAVENOUS at 11:05

## 2021-08-15 RX ADMIN — RISPERIDONE 0.5 MG: 0.5 TABLET, ORALLY DISINTEGRATING ORAL at 19:57

## 2021-08-15 RX ADMIN — IPRATROPIUM BROMIDE AND ALBUTEROL SULFATE 3 ML: 2.5; .5 SOLUTION RESPIRATORY (INHALATION) at 14:44

## 2021-08-15 RX ADMIN — PANTOPRAZOLE SODIUM 40 MG: 40 TABLET, DELAYED RELEASE ORAL at 16:22

## 2021-08-15 RX ADMIN — HYDROCORTISONE 20 MG: 10 TABLET ORAL at 16:22

## 2021-08-15 RX ADMIN — FUROSEMIDE 20 MG: 20 TABLET ORAL at 10:37

## 2021-08-15 RX ADMIN — Medication 2000 UNITS: at 10:37

## 2021-08-15 RX ADMIN — Medication 600 MG: at 10:37

## 2021-08-15 RX ADMIN — GUAIFENESIN 400 MG: 100 SOLUTION ORAL at 06:44

## 2021-08-15 RX ADMIN — GUAIFENESIN 400 MG: 100 SOLUTION ORAL at 10:37

## 2021-08-15 RX ADMIN — LANSOPRAZOLE 30 MG: KIT at 10:36

## 2021-08-15 RX ADMIN — Medication 600 MG: at 19:56

## 2021-08-15 RX ADMIN — ENOXAPARIN SODIUM 40 MG: 40 INJECTION SUBCUTANEOUS at 10:36

## 2021-08-15 RX ADMIN — METOPROLOL TARTRATE 25 MG: 25 TABLET, FILM COATED ORAL at 10:37

## 2021-08-15 NOTE — PLAN OF CARE
Goal Outcome Evaluation:         1l nc.  Only complaint is pain in her right forearm when she turns her palm to the edna.  Md aware. Continue to monitor.

## 2021-08-15 NOTE — PROGRESS NOTES
[facility] Medicine Services Daily Progress Note    Patient Name: Leslie Harris  : 1990  MRN: [id number]  Primary Care Physician:  Provider, No Known  Date of admission: 2021      Subjective      Chief Complaint:   Right arm pain    History of Present Illness: Leslie Harris is a 30 y.o. morbidly obese female who was diagnosed with COVID-19 on 2021 at the [facility]. She had not been vaccinated for Covid. She presented to the [facility] ED on 2021 complaining of shortness of breath. Workup in the ER revealed acute respiratory failure with hypoxia, pneumonia due to COVID-19, hypertension, and hyperglycemia. The patient was placed on Precision Flow with 100% FiO2 and admitted to the ICU for close monitoring and further treatment. The patient was started on Decadron and Remdesivir.     21:  Patient transitioned AVAPS with Precision Flow with 100% FiO2, but continued to have hypoxia and was intubated.  Right IJ central line inserted.  Dietitian consulted for tube feeds.     21:  Remains intubated and sedated. Chemically paralyzed due to worsening hypoxemia. On Flolan nebulized.  Prone position.  Started on empiric coverage with ceftriaxone.  Tolerating tube feeds.       21:  Remains intubated, sedated, and chemically paralyzed.  Antibiotic changed to Zosyn.  Remains prone and on Flolan.  Tolerating tube feeds.         21:  Remains intubated, sedated, and chemically paralyzed.  Patient placed in supine position.  Remains on Flolan.  Tolerating tube feeds.       21:  Remains intubated, sedated, and chemically paralyzed.  Patient tolerating supine position.  Remains on Flolan.  Tolerating tube feeds.       21:  Remains intubated, sedated, and chemically paralyzed.  Patient tolerating supine position.  Remains on Flolan.  Tolerating tube feeds.       21:  Remains intubated, sedated, and chemically  paralyzed.  Sputum culture grew MRSA.  Patient tolerating supine position.  Remains on Flolan; failed wean attempt.  Tolerating tube feeds.       07/30/21:  Remains intubated, sedated, and chemically paralyzed.  Patient returned to prone position for 16 hours.  Remains on Flolan.  Transitioned to heparin drip.  Tolerating tube feeds.       07/31/21:  Remains intubated, sedated, and chemically paralyzed.  Remains on Flolan.  Diuresis started.  On heparin drip.  Tolerating tube feeds.       08/01/21:  Remains intubated, sedated, and chemically paralyzed.  A-line inserted.  Tolerating supine position.  Remains on heparin drip.  Diuresing well.  Remains on Flolan.  Cardizem drip started for hypertension and tachycardia.  Left radial a-line discontinued.  Right brachial a-line inserted.   Tolerating tube feeds.       08/02/21:  Remains intubated, sedated, and chemically paralyzed. Tolerating supine position.  Remains on Flolan.  Remains on heparin drip.  Labile BP, alternating Cardizem and Levophed.  Fecal management system placed due to high volume liquid stool.  Tolerating tube feeds.       08/03/21:  Remains intubated, sedated, and chemically paralyzed.  Infectious disease consulted for antibiotic management.  Heparin drip discontinued due to blood-tinged sputum.  Weaning Cardizem drip.  Remains on Flolan. Switched to prone position.  Tolerating tube feeds.       08/04/21:  Remains intubated, sedated, and chemically paralyzed.  Sputum culture grew Klebsiella pneumoniae.  FMS removed.  Lovenox restarted.  Weaning Cardizem drip.  Attempted to wean Flolan, but had to be reinitiated.  Tolerating tube feeds.       08/05/21:  Remains intubated, sedated, and chemically paralyzed.  Tolerating tube feeds.       08/06/21:  Remains intubated, sedated, and chemically paralyzed.  In prone position.  Tolerating tube feeds.       08/07/21:  Remains intubated, sedated, and chemically paralyzed.  Tolerating supine position.  Urine  culture grew E. coli.  Tolerating tube feeds.       08/08/21:  Remains intubated, sedated, and chemically paralyzed.  Status post bronchoscopy with multiple mucus plugs removed.  Paralytic weaned off.  Tolerating tube feeds.       08/09/21:  Remains intubated and sedated.  Tolerating tube feeds.       08/10/21:  Patient extubated.  Tolerating tube feeds.       08/11/21:  Remains extubated.  Diagnosed with critical illness polyneuropathy muscle weakness due to paralytics and steroids.  Patient with acute delirium.  Tolerating tube feeds.  SLP consulted for video swallow.     08/12/21:  The patient was downgraded to PCU and the Hospitalist team was consulted for medical management.  Patient started on mechanical soft diet overnight.  She is complaining of some shortness of breath, but maintaining oxygen saturation on 5 liters per high flow nasal cannula.  She reports fatigue and generalized weakness.        8/13    Patient's blood glucose very acceptable  Patient had been comfortable overnight.  She is slightly tachycardic on 5 L of high flow oxygen but sats are above 95 percent  Her white count 12.2  Pharmacy suggesting some changes in the insulin regimen to avoid multiple sticks.  Still feeling weak on the right upper extremity.  She relates that to her prone position in the ICU.  Discussed with the family that this would be helped by physical therapy.      8/14  Patient had been seen by new dietitian yesterday and I will speech therapist as well  She is a 96% saturation on 4 L of high flow nasal cannula  She is afebrile   working on her transfer/discharge planning to [facility] rehab facility.  Patient liver enzymes are fluctuating and mildly elevated  Have a PICC line placed.    8/15  She denies new symptoms today  She is saturating high 90s on 3 L high flow cannula.  De-escalating oxygen support  Asking for FMLA.  For family members  Due to persistent symptoms right upper extremity we will check  venous Doppler  Check forearm x-ray as well  Advised regarding elevation and warm compresses.  She has good pulsation right radial artery.  Nasogastric tube removed    ROS  All other systems reviewed and negative      Objective      Vitals:   Temp:  [97.3 °F (36.3 °C)-98.4 °F (36.9 °C)] 98 °F (36.7 °C)  Heart Rate:  [] 116  Resp:  [17-20] 20  BP: (118-148)/(62-86) 124/82  Flow (L/min):  [3-4] 3    Physical Exam  Vitals and nursing note reviewed.   Constitutional:       General: She is not in acute distress.     Appearance: Normal appearance. She is well-developed. She is not ill-appearing, toxic-appearing or diaphoretic.   HENT:      Head: Normocephalic and atraumatic.      Right Ear: Ear canal and external ear normal.      Left Ear: Ear canal and external ear normal.      Nose: Nose normal. No congestion or rhinorrhea.      Mouth/Throat:      Mouth: Mucous membranes are moist.      Pharynx: No oropharyngeal exudate.   Eyes:      General: No scleral icterus.        Right eye: No discharge.         Left eye: No discharge.      Extraocular Movements: Extraocular movements intact.      Conjunctiva/sclera: Conjunctivae normal.      Pupils: Pupils are equal, round, and reactive to light.   Neck:      Thyroid: No thyromegaly.      Vascular: No carotid bruit or JVD.      Trachea: No tracheal deviation.   Cardiovascular:      Rate and Rhythm: Normal rate and regular rhythm.      Pulses: Normal pulses.      Heart sounds: Normal heart sounds. No murmur heard.   No friction rub. No gallop.    Pulmonary:      Effort: Pulmonary effort is normal. No respiratory distress.      Breath sounds: Normal breath sounds. No stridor. No wheezing, rhonchi or rales.   Chest:      Chest wall: No tenderness.   Abdominal:      General: Bowel sounds are normal. There is no distension.      Palpations: Abdomen is soft. There is no mass.      Tenderness: There is no abdominal tenderness. There is no guarding or rebound.      Hernia: No  hernia is present.   Musculoskeletal:         General: No swelling, tenderness, deformity or signs of injury. Normal range of motion.      Cervical back: Normal range of motion and neck supple. No rigidity. No muscular tenderness.      Right lower leg: No edema.      Left lower leg: No edema.   Lymphadenopathy:      Cervical: No cervical adenopathy.   Skin:     General: Skin is warm and dry.      Coloration: Skin is not jaundiced or pale.      Findings: No bruising, erythema or rash.  Some mild swelling and tenderness right forearm.  Mild bruising noted.  Neurological:      General: No focal deficit present.      Mental Status: She is alert and oriented to person, place, and time. Mental status is at baseline.      Cranial Nerves: No cranial nerve deficit.      Sensory: No sensory deficit.      Motor: No weakness or abnormal muscle tone.      Coordination: Coordination normal.   Psychiatric:         Mood and Affect: Mood normal.         Behavior: Behavior normal.         Thought Content: Thought content normal.         Judgment: Judgment normal.            Result Review    Result Review:  I have personally reviewed the results from the time of this admission to 8/15/2021 13:23 EDT and agree with these findings:  [x]  Laboratory  [x]  Microbiology  [x]  Radiology  [x]  EKG/Telemetry   [x]  Cardiology/Vascular   [x]  Pathology  []  Old records  []  Other:  Most notable findings include: As outlined above       Wounds (last 24 hours)      LDA Wound     Row Name 08/15/21 0400 08/15/21 0000 08/14/21 2000       Wound 07/26/21 2200 Left distal nose Pressure Injury    Wound - Properties Group Placement Date: 07/26/21  -MF Placement Time: 2200  -MF Present on Hospital Admission: N  -MF Side: Left  -MF Orientation: distal  -MF Location: nose  -MF Primary Wound Type: Pressure inj  -MF Stage, Pressure Injury : deep tissue injury  -MF    Dressing Appearance  open to air  -MS  open to air  -MS  open to air  -MS    Retired Wound -  Properties Group Date first assessed: 07/26/21  -MF Time first assessed: 2200 -MF Present on Hospital Admission: N  -MF Side: Left  -MF Location: nose  -MF Primary Wound Type: Pressure inj  -MF       Wound 07/26/21 2200 midline tongue Pressure Injury    Wound - Properties Group Placement Date: 07/26/21  -MF Placement Time: 2200 -MF Present on Hospital Admission: N  -MF Orientation: midline  -MF Location: tongue  -MF Primary Wound Type: Pressure inj  -MF Stage, Pressure Injury : deep tissue injury  -MF    Dressing Appearance  open to air  -MS  open to air  -MS  open to air  -MS    Retired Wound - Properties Group Date first assessed: 07/26/21  - Time first assessed: 2200 -MF Present on Hospital Admission: N  -MF Location: tongue  -MF Primary Wound Type: Pressure inj  -MF       Wound 07/27/21 2028 medial sacral spine Pressure Injury    Wound - Properties Group Placement Date: 07/27/21  -MF Placement Time: 2028 -MF Present on Hospital Admission: N  -MF Orientation: medial  -MF Location: sacral spine  -MF Primary Wound Type: Pressure inj  -MF Stage, Pressure Injury : Stage 2  -MF    Dressing Appearance  open to air  -MS  open to air  -MS  open to air  -MS    Retired Wound - Properties Group Date first assessed: 07/27/21  - Time first assessed: 2028 -MF Present on Hospital Admission: N  -MF Location: sacral spine  -MF Primary Wound Type: Pressure inj  -MF       Wound 08/06/21 1400 Right cheek Pressure Injury    Wound - Properties Group Placement Date: 08/06/21  -HELLEN Placement Time: 1400  -HELLEN Present on Hospital Admission: N  -HELLEN Side: Right  -HELLEN Location: cheek  -HELLEN Primary Wound Type: Pressure inj  -HELLEN Stage, Pressure Injury : Stage 1  -HELLEN    Dressing Appearance  open to air  -MS  open to air  -MS  open to air  -MS    Retired Wound - Properties Group Date first assessed: 08/06/21  -HELLEN Time first assessed: 1400  -HELLEN Present on Hospital Admission: N  -HELLEN Side: Right  -HELLEN Location: cheek  -HELLEN Primary Wound Type:  Pressure inj  -HELLEN       Wound 08/06/21 1400 Right throat Abrasion    Wound - Properties Group Placement Date: 08/06/21  -HELLEN Placement Time: 1400  -HELLEN Present on Hospital Admission: N  -HELLEN Side: Right  -HELLEN Location: throat  -HELLEN Primary Wound Type: Abrasion  -HELLEN    Dressing Appearance  open to air  -MS  open to air  -MS  open to air  -MS    Retired Wound - Properties Group Date first assessed: 08/06/21  -HELLEN Time first assessed: 1400  -HELLEN Present on Hospital Admission: N  -HELLEN Side: Right  -HELLEN Location: throat  -HELLEN Primary Wound Type: Abrasion  -HELLEN      User Key  (r) = Recorded By, (t) = Taken By, (c) = Cosigned By    Initials Name Provider Type    Vandana Christine RN Registered Nurse    Ольга Spence RN Registered Nurse    Susana Perry RN Registered Nurse            Assessment/Plan      Brief Patient Summary:  Leslie Harris is a 30 y.o. female who presented with pneumonia and COVID-19 infection had long hospital stay.        Acetylcysteine, 600 mg, Oral, BID  [START ON 8/16/2021] aspirin, 325 mg, Oral, Daily  budesonide, 0.5 mg, Nebulization, BID - RT  cefTRIAXone, 2 g, Intravenous, Q24H  [START ON 8/16/2021] cholecalciferol, 2,000 Units, Oral, Daily  enoxaparin, 40 mg, Subcutaneous, Q12H  furosemide, 20 mg, Oral, Daily  guaiFENesin, 600 mg, Oral, Q12H  hydrocortisone, 20 mg, Oral, BID With Meals  insulin glargine, 45 Units, Subcutaneous, Q12H  insulin lispro, 0-14 Units, Subcutaneous, 4x Daily With Meals & Nightly  insulin lispro, 10 Units, Subcutaneous, TID With Meals  ipratropium-albuterol, 3 mL, Nebulization, 4x Daily - RT  melatonin, 10 mg, Oral, Nightly  metoprolol tartrate, 25 mg, Oral, Q12H  pantoprazole, 40 mg, Oral, BID AC  risperiDONE, 0.5 mg, Oral, Nightly  sodium chloride, 10 mL, Intravenous, Q12H  sodium chloride, 10 mL, Intravenous, Q12H       Pharmacy to Dose enoxaparin (LOVENOX),          Active Hospital Problems:  Active Hospital Problems    Diagnosis    • **Pneumonia due to COVID-19  virus    • Critical illness polyneuropathy (CMS/HCC)    • Delirium, acute    • Dysphagia    • E. coli UTI (urinary tract infection)    • Klebsiella pneumoniae pneumonia (CMS/[facility])    • Diabetes mellitus type 2 in obese (CMS/[facility])    • MRSA pneumonia (CMS/[facility])    • Acute respiratory failure due to COVID-19 (CMS/[facility])    • Class 3 severe obesity without serious comorbidity with body mass index (BMI) of 50.0 to 59.9 in adult    • Hypertension      Plan:     Pneumonia due to COVID-19 virus  Acute respiratory failure due to COVID-19   -intubated 07/23/21  -extubated 08/10/21  -currently on O2 @ 5 L per HFNC  -titrate O2 to keep sat >92%  -pulmonology following   -completed 5 days of Remdesivir  -off Decadron, now on hydrocortisone  -on Mucinex, Pulmicort, and nebs  -VTE prophylaxis with Lovenox and aspirin   -Finished isolation..      Diabetes mellitus type 2 in obese, new onset  -A1c 6.9%  -accu checks AC&HS with SSI  -continue Lantus      MRSA pneumonia  Klebsiella pneumoniae pneumonia  -ID following  - IV ceftriaxone for a total of 7 to 10 days with p.o. linezolid for a total of 14 days     E. coli UTI (urinary tract infection)  -on Rocephin      Hypertension  -on metoprolol and Lasix      Critical illness polyneuropathy   -PT/OT following  -needs inpatient rehab at discharge      Delirium, acute  -improving  -on risperidone   - resolved     Dysphagia   -secondary to recent intubation and paralysis  -SLP following  -started on mechanical soft diet 08/11/21     Class 3 severe obesity without serious comorbidity with body mass index (BMI) of 50.0 to 59.9 in adult  -BMI 54.37 on admission  -encourage lifestyle modifications      Disposition   anticipate DC to inpt rehab- [facility] referral sent 8/12- PENDING. Will need precert. No PASRR needed.    Right forearm pain and swelling from previous IV access.  Prior to transferred from ICU  She reports some weakness at the site.  Check x-ray and venous Doppler  Might have compression  neuropathy  Supportive care and elevation.  Avoid compression.  We will do EMG/NCV  No other focal findings to suggest CNS related issues  Discussed with patient and family    DVT prophylaxis:  Medical DVT prophylaxis orders are present.    CODE STATUS:    Code Status: CPR  Medical Interventions (Level of Support Prior to Arrest): Full      Disposition:  I expect patient to be discharged inpatient rehab.    This patient has been examined wearing appropriate Personal Protective Equipment and discussed with hospital infection control department. 08/15/21      Electronically signed by Juan Arguelles MD, 08/15/21, 13:23 EDT.  [facility] Hospitalist Team

## 2021-08-15 NOTE — PROGRESS NOTES
Infectious Diseases Progress Note      LOS: 24 days   Patient Care Team:  Lesia, Ana Known as PCP - General    Chief Complaint: Shortness of breath    Subjective       Patient had no high-grade fever during the last 24 hours.  The patient is currently on 1 L of oxygen via nasal cannula.  The patient is awake and alert.  Her main complaint is weakness, the patient is having some right arm pain and intermittent vaginal pain      Review of Systems:   Review of Systems   Constitutional: Positive for fatigue.   HENT: Negative.    Eyes: Negative.    Respiratory: Positive for shortness of breath.    Cardiovascular: Negative.    Gastrointestinal: Negative.    Genitourinary: Negative.    Musculoskeletal: Negative.    Skin: Negative.    Neurological: Positive for weakness.   Hematological: Negative.    Psychiatric/Behavioral: Negative.         Objective     Vital Signs  Temp:  [97.6 °F (36.4 °C)-98.4 °F (36.9 °C)] 98.4 °F (36.9 °C)  Heart Rate:  [] 132  Resp:  [18-24] 20  BP: (119-148)/(70-89) 142/89    Physical Exam:  Physical Exam  Vitals and nursing note reviewed.   Constitutional:       General: She is not in acute distress.     Appearance: Normal appearance. She is well-developed. She is obese. She is not diaphoretic.   HENT:      Head: Normocephalic and atraumatic.   Eyes:      Pupils: Pupils are equal, round, and reactive to light.   Cardiovascular:      Rate and Rhythm: Normal rate and regular rhythm.      Heart sounds: Normal heart sounds, S1 normal and S2 normal. No murmur heard.     Pulmonary:      Effort: Pulmonary effort is normal. No respiratory distress.      Breath sounds: No stridor. Rales present. No wheezing.   Chest:      Chest wall: No tenderness.   Abdominal:      General: Bowel sounds are normal. There is no distension.      Palpations: Abdomen is soft. There is no mass.      Tenderness: There is no abdominal tenderness. There is no guarding or rebound.   Genitourinary:     Comments: External  catheter  Musculoskeletal:         General: No swelling, tenderness or deformity. Normal range of motion.      Cervical back: Normal range of motion and neck supple.   Skin:     General: Skin is warm and dry.      Coloration: Skin is not pale.      Findings: No bruising, erythema or rash.   Neurological:      Mental Status: She is alert and oriented to person, place, and time.      Cranial Nerves: No cranial nerve deficit.          Results Review:    I have reviewed all clinical data, test, lab, and imaging results.     Radiology  XR Forearm 2 View Right    Result Date: 8/15/2021  DATE OF EXAM: 8/15/2021 3:10 PM  PROCEDURE: XR FOREARM 2 VW RIGHT-  INDICATIONS: Pain and swelling; R06.00-Dyspnea, unspecified; U07.1-COVID-19; J12.82-Pneumonia due to Coronavirus disease 2019; J96.01-Acute respiratory failure with hypoxia; I95.2-Hypotension due to drugs; U07.1-COVID-19; J96.00-Acute respiratory failure, unspecified whether with hypoxia or hypercapnia  COMPARISON: No Comparisons Available  TECHNIQUE: A minimum of two routine standard radiographic views were obtained of the right forearm.   FINDINGS: The bones appear normal. There is no fracture. No periostitis or bone destruction. The elbow is not seen in an AP projection. Wrist joints appear grossly intact. There is no focal soft tissue abnormality. No soft tissue gas or opaque foreign body is identified.      Impression: 1. Negative forearm exam.  Electronically Signed By-Jo Bartholomew MD On:8/15/2021 4:46 PM This report was finalized on [id number] by  Jo Bartholomew MD.      Cardiology    Laboratory    Results from last 7 days   Lab Units 08/15/21  0623 08/14/21  1025 08/13/21  0411 08/12/21  0331 08/11/21  1221 08/10/21  0411 08/09/21  0518   WBC 10*3/mm3 9.30 12.40* 12.20* 11.50* 11.00* 8.20 10.10   HEMOGLOBIN g/dL 11.2* 11.4* 10.9* 11.1* 10.5* 9.6* 8.6*   HEMATOCRIT % 33.2* 35.3 32.9* 34.8 32.8* 28.7* 26.2*   PLATELETS 10*3/mm3 284 238 291 272 260 166 172      Results from last 7 days   Lab Units 08/15/21  0623 08/14/21  1025 08/13/21  0411 08/12/21  0331 08/11/21  1221 08/10/21  2033 08/10/21  0652 08/09/21  0753 08/09/21  0753   SODIUM mmol/L 138 134* 136 143 143  --  142  --  143   POTASSIUM mmol/L 3.7 4.2 4.4 4.5 3.9 4.3 3.5   < > 3.8   CHLORIDE mmol/L 100 96* 98 104 102  --  99  --  101   CO2 mmol/L 30.0* 25.0 27.0 29.0 29.0  --  34.0*  --  35.0*   BUN mg/dL 17 17 24* 32* 33*  --  45*  --  54*   CREATININE mg/dL 0.34* 0.32* 0.25* 0.37* 0.45*  --  0.42*  --  0.61   GLUCOSE mg/dL 103* 157* 188* 127* 190*  --  125*  --  138*   ALBUMIN g/dL 3.60 3.80 3.50 3.80 3.80  --  3.70  --  3.80   BILIRUBIN mg/dL 0.7 1.0 0.8 1.0 0.9  --  0.8  --  0.9   ALK PHOS U/L 93 95 93 90 88  --  84  --  83   AST (SGOT) U/L 55* 53* 39* 40* 53*  --  61*  --  64*   ALT (SGPT) U/L 134* 121* 109* 114* 133*  --  160*  --  180*   CALCIUM mg/dL 9.5 9.5 9.1 9.5 9.7  --  9.9  --  10.1    < > = values in this interval not displayed.                 Microbiology   Microbiology Results (last 10 days)     Procedure Component Value - Date/Time    Blood Culture - Blood, Arm, Right [[id number]] Collected: 08/09/21 0758    Lab Status: Final result Specimen: Blood from Arm, Right Updated: 08/14/21 0815     Blood Culture No growth at 5 days    Blood Culture - Blood, Arm, Left [[id number]] Collected: 08/09/21 0753    Lab Status: Final result Specimen: Blood from Arm, Left Updated: 08/14/21 0815     Blood Culture No growth at 5 days    Fungus Culture - Wash, Bronchus [[id number]]  (Abnormal) Collected: 08/08/21 0944    Lab Status: Preliminary result Specimen: Wash from Bronchus Updated: 08/11/21 0745     Fungus Culture Candida species    AFB Culture - Wash, Bronchus [[id number]] Collected: 08/08/21 0944    Lab Status: Preliminary result Specimen: Wash from Bronchus Updated: 08/15/21 1030     AFB Culture No AFB isolated at 1 week     AFB Stain No acid fast bacilli seen    Respiratory Culture - Wash, Bronchus  [[id number]] Collected: 08/08/21 0944    Lab Status: Final result Specimen: Wash from Bronchus Updated: 08/10/21 1209     Respiratory Culture Scant growth (1+) Normal Respiratory Sondra: NO S.aureus/MRSA or Pseudomonas aeruginosa     Gram Stain Many (4+) WBCs per low power field      Rare (1+) Epithelial cells per low power field      Few (2+) Mixed bacterial morphotypes seen on Gram Stain    Respiratory Panel, PCR (WITHOUT COVID) - Wash, Bronchus [[id number]]  (Normal) Collected: 08/08/21 0944    Lab Status: Final result Specimen: Wash from Bronchus Updated: 08/08/21 1127     ADENOVIRUS, PCR Not Detected     Coronavirus 229E Not Detected     Coronavirus HKU1 Not Detected     Coronavirus NL63 Not Detected     Coronavirus OC43 Not Detected     Human Metapneumovirus Not Detected     Human Rhinovirus/Enterovirus Not Detected     Influenza B PCR Not Detected     Parainfluenza Virus 1 Not Detected     Parainfluenza Virus 2 Not Detected     Parainfluenza Virus 3 Not Detected     Parainfluenza Virus 4 Not Detected     Bordetella pertussis pcr Not Detected     Chlamydophila pneumoniae PCR Not Detected     Mycoplasma pneumo by PCR Not Detected     Influenza A PCR Not Detected     RSV, PCR Not Detected     Bordetella parapertussis PCR Not Detected    Narrative:      The coronavirus on the RVP is NOT COVID-19 and is NOT indicative of infection with COVID-19.    In the setting of a positive respiratory panel with a viral infection PLUS a negative procalcitonin without other underlying concern for bacterial infection, consider observing off antibiotics or discontinuation of antibiotics and continue supportive care. If the respiratory panel is positive for atypical bacterial infection (Bordetella pertussis, Chlamydophila pneumoniae, or Mycoplasma pneumoniae), consider antibiotic de-escalation to target atypical bacterial infection.    Histoplasma Antigen, CSF or BAL - Wash, Bronchus [[id number]] Collected: 08/08/21 0944    Lab  "Status: Final result Specimen: Wash from Bronchus Updated: 08/12/21 0919     Result None Detected ng/mL      Comment:                                           None Detected        Interpretation Negative     Comment: Positive results reported in ng/mL from 0.20 ng/mL to 20.00 ng/mL  Positive results above 20.00 ng/mL are reported as \"Above the  Limit of Quantification\"       Narrative:      Performed at:  [facility]  [address] [facility] [place]  [id number]  : Nicolás Garduno MD, Phone:  [phone]    Cytomegalovirus (CMV) By PCR - Wash, Bronchus [[id number]] Collected: 08/08/21 0944    Lab Status: Final result Specimen: Wash from Bronchus Updated: 08/12/21 2307     Specimen Source Comment     Comment: BRONCH WASH        Cytomegalovirus PCR Negative     Comment: -------------------ADDITIONAL INFORMATION-------------------  This test was developed and its performance characteristics  determined by [facility] in a manner consistent with CLIA  requirements. This test has not been cleared or approved by  the [place]. Food and Drug Administration.       Narrative:      Performed at:  [address]  [id number]  : Dominik Lanza , Phone:  [phone]    Pneumocystis PCR - Wash, Bronchus [[id number]] Collected: 08/08/21 0944    Lab Status: Final result Specimen: Wash from Bronchus Updated: 08/13/21 1113     Pneumocystis jiroveci DNA Negative     Comment: -------------------ADDITIONAL INFORMATION-------------------  This test was developed and its performance characteristics  determined by [facility] in a manner consistent with CLIA  requirements. This test has not been cleared or approved by  the U.S. Food and Drug Administration.  REFERENCE RANGE: Not Applicable        Specimen Source Comment     Comment: BRONCH WASH       Narrative:      Performed at:  [address]  " [id number]  : Dominik Lanza , Phone:  [phone]    Urine Culture - Urine, Urine, Catheter [[id number]]  (Abnormal)  (Susceptibility) Collected: 08/07/21 0351    Lab Status: Final result Specimen: Urine, Catheter Updated: 08/09/21 1037     Urine Culture >100,000 CFU/mL Escherichia coli    Susceptibility      Escherichia coli      FITZ      Ampicillin Resistant      Ampicillin + Sulbactam Intermediate      Cefazolin Susceptible      Cefepime Susceptible      Ceftazidime Susceptible      Ceftriaxone Susceptible      Gentamicin Susceptible      Levofloxacin Susceptible      Nitrofurantoin Susceptible      Piperacillin + Tazobactam Susceptible      Tetracycline Susceptible      Trimethoprim + Sulfamethoxazole Susceptible               Linear View                         Medication Review:       Schedule Meds  Acetylcysteine, 600 mg, Oral, BID  [START ON 8/16/2021] aspirin, 325 mg, Oral, Daily  budesonide, 0.5 mg, Nebulization, BID - RT  cefTRIAXone, 2 g, Intravenous, Q24H  [START ON 8/16/2021] cholecalciferol, 2,000 Units, Oral, Daily  enoxaparin, 70 mg, Subcutaneous, Q12H  furosemide, 20 mg, Oral, Daily  guaiFENesin, 600 mg, Oral, Q12H  hydrocortisone, 20 mg, Oral, BID With Meals  insulin glargine, 45 Units, Subcutaneous, Q12H  insulin lispro, 0-14 Units, Subcutaneous, 4x Daily With Meals & Nightly  insulin lispro, 10 Units, Subcutaneous, TID With Meals  ipratropium-albuterol, 3 mL, Nebulization, 4x Daily - RT  melatonin, 10 mg, Oral, Nightly  metoprolol tartrate, 25 mg, Oral, Q12H  pantoprazole, 40 mg, Oral, BID AC  risperiDONE, 0.5 mg, Oral, Nightly  sodium chloride, 10 mL, Intravenous, Q12H  sodium chloride, 10 mL, Intravenous, Q12H        Infusion Meds  Pharmacy to Dose enoxaparin (LOVENOX),         PRN Meds  •  acetaminophen  •  acetaminophen **OR** acetaminophen  •  aluminum-magnesium hydroxide-simethicone  •  artificial tears  •  benzonatate  •  dextrose  •  dextrose  •  glucagon (human recombinant)  •   hydrALAZINE  •  hydrOXYzine  •  insulin lispro **AND** insulin lispro  •  lidocaine  •  lidocaine PF 1%  •  magnesium sulfate **OR** magnesium sulfate in D5W 1g/100mL (PREMIX)  •  methocarbamol  •  nitroglycerin  •  ondansetron **OR** ondansetron  •  oxyCODONE  •  Pharmacy to Dose enoxaparin (LOVENOX)  •  potassium chloride **OR** potassium chloride **OR** potassium chloride  •  potassium phosphate infusion greater than 15 mMoles **OR** potassium phosphate infusion greater than 15 mMoles **OR** potassium phosphate **OR** sodium phosphate IVPB **OR** sodium phosphate IVPB **OR** sodium phosphate IVPB  •  [COMPLETED] Insert peripheral IV **AND** sodium chloride  •  sodium chloride  •  sodium chloride  •  sodium chloride        Assessment/Plan       Antimicrobial Therapy   1.        day  2.  Rocephin      day  3.      Day  4.      Day  5.      Day      Assessment     Severe COVID-19 infection and patient with significant comorbidities including diabetes mellitus and morbid obesity. Apparently did not receive vaccination for COVID-19  COVID-19 screen on admission on July 22, 2021 was positive. Was reported that patient had positive COVID-19 diagnosis 6 days prior to admission  The patient had received 5 days of IV remdesivir     Severe respiratory failure and patient was intubated for long duration.  The patient was extubated on August 10, 2021  -Patient is now on 4 L of oxygen by nasal cannula     Probable ventilator associated pneumonia versus hospitalist acquired pneumonia. Sputum culture from July 29, 2021 grew MRSA and the organism is susceptible to linezolid and vancomycin  The patient was started on linezolid on August 1, 2021  Repeat sputum culture from August 1, 2021 is growing Klebsiella pneumoniae     Morbid obesity     Diabetes mellitus     Reactive leukocytosis secondary to steroids.  Improved    UTI, urine culture from August 7, 2021 is growing relatively susceptible strain of E.  coli         Plan     Continue Rocephin 2 g every 24 hours for a total of 10 days-we will discontinue after tomorrow's dose  Supportive care  A.m. labs  Case discussed with patient and mother at bedside  No need for further COVID-19 isolation          Albina Birmingham, NATY  08/15/21  18:56 EDT     Note is dictated utilizing voice recognition software/Dragon

## 2021-08-15 NOTE — THERAPY TREATMENT NOTE
"Subjective: Pt agreeable to therapeutic plan of care. Patient easily tearful with slow progress and inability to do anything for herself.    Objective:     Bed mobility - Max-A and Assist x 2  Transfers - N/A or Not attempted.  Ambulation -  feet N/A or Not attempted.    Pain: c/o pain RUE bhaskar with supination.   Education: Explained importance of mobility and skilled verbal / tactile cueing throughout intervention.     Assessment: Leslie Harris presents with functional mobility impairments which indicate the need for skilled intervention. Tolerating session today without incident. Pt on 3L O2, sats 95% and HR incr to 130. At rest , sats 98% and /82. Tolerated sitting EOB x 30 mins working on trunk control,, tends to lean to L with elongated trunk. AAROM all 4 exts and working with family on exs to assist with on own. Demonstrated how to sit EOB with UE\"s supported and drink from a cup. Easily fatigued and req freq rest breaks after 5 reps of each exercise.  Will continue to follow and progress as tolerated. Will need extensive rehab at [place]    Plan/Recommendations:   Pt would benefit from Inpatient Rehabilitation placement at discharge from facility and requires no DME at discharge.   Pt desires Inpatient Rehabilitation placement at discharge. Pt cooperative; agreeable to therapeutic recommendations and plan of care.     Basic Mobility 6-click:  Rollin = Total, A lot = 2, A little = 3; 4 = None  Supine>Sit:   1 = Total, A lot = 2, A little = 3; 4 = None   Sit>Stand with arms:  1 = Total, A lot = 2, A little = 3; 4 = None  Bed>Chair:   1 = Total, A lot = 2, A little = 3; 4 = None  Ambulate in room:  1 = Total, A lot = 2, A little = 3; 4 = None  3-5 Steps with railin = Total, A lot = 2, A little = 3; 4 = None  Score: 7      Post-Tx Position: Supine with HOB Elevated, Alarms activated and Call light and personal items within reach  PPE: gloves, surgical mask, eyewear protection  "

## 2021-08-15 NOTE — PLAN OF CARE
"       Assessment: Leslie Harris presents with functional mobility impairments which indicate the need for skilled intervention. Tolerating session today without incident. Pt on 3L O2, sats 95% and HR incr to 130. At rest , sats 98% and /82. Tolerated sitting EOB x 30 mins working on trunk control,, tends to lean to L with elongated trunk. AAROM all 4 exts and working with family on exs to assist with on own. Demonstrated how to sit EOB with UE\"s supported and drink from a cup. Easily fatigued and req freq rest breaks after 5 reps of each exercise.  Will continue to follow and progress as tolerated. Will need extensive rehab at [place]          "

## 2021-08-15 NOTE — PROGRESS NOTES
"PULMONARY CRITICAL CARE Progress  NOTE      PATIENT IDENTIFICATION:  Name: Leslie Harris  MRN: [id number]  :  1990     Age: 30 y.o.  Sex: female    DATE OF Note:  8/15/2021   Referring Physician: Sarah Fajardo MD                  Subjective:    Feeling better, no SOB,  On 5 L HF O2, sitting in bed visiting with family, no chest or abd pain pain,  no bowel or bladder issues, no new complaints    Objective:  tMax 24 hrs: Temp (24hrs), Av.9 °F (36.6 °C), Min:97.3 °F (36.3 °C), Max:98.6 °F (37 °C)      Vitals Ranges:   Temp:  [97.3 °F (36.3 °C)-98.6 °F (37 °C)] 98 °F (36.7 °C)  Heart Rate:  [] 120  Resp:  [17-20] 20  BP: (118-148)/(62-86) 124/82    Intake and Output Last 3 Shifts:   I/O last 3 completed shifts:  In: 720 [P.O.:720]  Out: 2800 [Urine:2800]    Exam:  /82   Pulse 120   Temp 98 °F (36.7 °C)   Resp 20   Ht 167.6 cm (66\")   Wt (!) 149 kg (328 lb 11.3 oz)   LMP 2021   SpO2 99%   BMI 53.05 kg/m²     General Appearance:  Alert   HEENT:  Normocephalic, without obvious abnormality, Conjunctiva/corneas clear,.  Normal external ear canals, Nares normal, no drainage     Neck:  Supple, symmetrical, trachea midline. No JVD.  Lungs /Chest wall:   Bilateral basal rhonchi,  symmetrical wall movement.     Heart:  Regular rate and rhythm, systolic murmur PMI left sternal border  Abdomen: Soft, non-tender, no masses, no organomegaly.    Extremities: Trace edema no clubbing or Cyanosis        Medications:    Current Facility-Administered Medications:   •  acetaminophen (TYLENOL) 160 MG/5ML solution 650 mg, 650 mg, Oral, Q6H PRN, Radha Jaquez MD, 650 mg at 21 0828  •  acetaminophen (TYLENOL) tablet 650 mg, 650 mg, Oral, Q4H PRN, 650 mg at 21 2245 **OR** acetaminophen (TYLENOL) suppository 650 mg, 650 mg, Rectal, Q4H PRN, Ryder Llanes APRN, 650 mg at 21 0153  •  Acetylcysteine capsule 600 mg, 600 mg, Oral, BID, Ryder Llanes APRN, 600 mg at 08/15/21 1037  •  " aluminum-magnesium hydroxide-simethicone (MAALOX MAX) 400-400-40 MG/5ML suspension 15 mL, 15 mL, Oral, Q6H PRN, Ryder Llanes APRN  •  artificial tears ophthalmic ointment, , Both Eyes, PRN, Ryder Llanes APRN, Given at 08/08/21 2001  •  artificial tears ophthalmic ointment, , Both Eyes, PRN, Jeff Feng MD  •  aspirin chewable tablet 324 mg, 324 mg, Per G Tube, Daily, Ryder Llanes APRN, 324 mg at 08/15/21 1036  •  benzonatate (TESSALON) capsule 100 mg, 100 mg, Oral, TID PRN, Ryder Llanes APRN  •  budesonide (PULMICORT) nebulizer solution 0.5 mg, 0.5 mg, Nebulization, BID - RT, Jeff Feng MD, 0.5 mg at 08/15/21 0634  •  cefTRIAXone (ROCEPHIN) 2 g in sodium chloride 0.9 % 100 mL IVPB, 2 g, Intravenous, Q24H, Neela Alvarado MD, Last Rate: 200 mL/hr at 08/14/21 1151, 2 g at 08/14/21 1151  •  cholecalciferol (VITAMIN D3) tablet 2,000 Units, 2,000 Units, Nasogastric, Daily, Ryder Llanes APRN, 2,000 Units at 08/15/21 1037  •  dextrose (D50W) 25 g/ 50mL Intravenous Solution 25 g, 25 g, Intravenous, Q15 Min PRN, Ryder Llanes APRN  •  dextrose (GLUTOSE) oral gel 15 g, 15 g, Oral, Q15 Min PRN, Ryder Llanes APRN  •  enoxaparin (LOVENOX) syringe 40 mg, 40 mg, Subcutaneous, Q12H, Jeff Feng MD, 40 mg at 08/15/21 1036  •  furosemide (LASIX) tablet 20 mg, 20 mg, Oral, Daily, DrawSarah MD, 20 mg at 08/15/21 1037  •  glucagon (human recombinant) (GLUCAGEN DIAGNOSTIC) injection 1 mg, 1 mg, Subcutaneous, Q15 Min PRN, Love, Ryder E, APRN  •  guaiFENesin solution 400 mg, 400 mg, Nasogastric, Q6H, Radha Jaquez MD, 400 mg at 08/15/21 1037  •  hydrALAZINE (APRESOLINE) injection 10 mg, 10 mg, Intravenous, Q4H PRN, Ryder Llanes APRN, 10 mg at 08/07/21 0051  •  hydrocortisone (CORTEF) tablet 20 mg, 20 mg, Oral, BID With Meals, Sarah Fajardo MD, 20 mg at 08/15/21 1037  •  hydrOXYzine (ATARAX) tablet 25 mg, 25 mg, Oral, TID PRN, Sheree Orantes APRN  •  insulin glargine (LANTUS, SEMGLEE)  injection 45 Units, 45 Units, Subcutaneous, Q12H, Jeff Feng MD, 45 Units at 08/15/21 1039  •  insulin lispro (ADMELOG) injection 0-14 Units, 0-14 Units, Subcutaneous, 4x Daily With Meals & Nightly, 3 Units at 08/12/21 1416 **AND** insulin lispro (ADMELOG) injection 0-14 Units, 0-14 Units, Subcutaneous, PRN, Socorro Talbert, NATY  •  insulin lispro (ADMELOG) injection 10 Units, 10 Units, Subcutaneous, TID With Meals, Blane, Juan Valdes MD, 10 Units at 08/15/21 1036  •  ipratropium-albuterol (DUO-NEB) nebulizer solution 3 mL, 3 mL, Nebulization, 4x Daily - RT, Jeff Feng MD, 3 mL at 08/15/21 0634  •  lansoprazole (FIRST) oral suspension 30 mg, 30 mg, Nasogastric, BID AC, Juan Arguelles MD, 30 mg at 08/15/21 1036  •  lidocaine (XYLOCAINE) 5 % ointment, , , PRN, Jeff Feng MD, 1 application at 08/08/21 0910  •  lidocaine PF 1% (XYLOCAINE) injection, , , PRN, Jeff Feng MD, 5 mL at 08/08/21 0911  •  Magnesium Sulfate 2 gram infusion - Mg less than or equal to 1.5 mg/dL, 2 g, Intravenous, PRN, Last Rate: 25 mL/hr at 08/10/21 1143, 2 g at 08/10/21 1143 **OR** Magnesium Sulfate 1 gram infusion - Mg 1.6-1.9 mg/dL, 1 g, Intravenous, PRN, Ryder Llanes, APRN, Last Rate: 100 mL/hr at 08/02/21 0842, 1 g at 08/02/21 0842  •  melatonin tablet 10 mg, 10 mg, Oral, Nightly, Sarah Fajardo MD, 10 mg at 08/14/21 2238  •  methocarbamol (ROBAXIN) tablet 500 mg, 500 mg, Oral, 4x Daily PRN, Juan Arguelles MD  •  metoprolol tartrate (LOPRESSOR) tablet 25 mg, 25 mg, Oral, Q12H, Sarah Fajardo MD, 25 mg at 08/15/21 1037  •  nitroglycerin (NITROSTAT) SL tablet 0.4 mg, 0.4 mg, Sublingual, Q5 Min PRN, Ryder Llanes APRN  •  ondansetron (ZOFRAN) tablet 2 mg, 2 mg, Oral, Q4H PRN **OR** ondansetron (ZOFRAN) injection 2 mg, 2 mg, Intravenous, Q4H PRN, Marianna Navarrete, NATY, 2 mg at 08/12/21 0940  •  oxyCODONE (ROXICODONE) immediate release tablet 10 mg, 10 mg, Oral, Q4H  PRN, Sarah Fajardo MD, 10 mg at 08/11/21 2334  •  Pharmacy to Dose enoxaparin (LOVENOX), , Does not apply, Continuous PRN, Jeff Feng MD  •  potassium chloride (K-DUR,KLOR-CON) CR tablet 40 mEq, 40 mEq, Oral, PRN **OR** potassium chloride (KLOR-CON) packet 40 mEq, 40 mEq, Oral, PRN, 40 mEq at 08/10/21 1527 **OR** potassium chloride 10 mEq in 100 mL IVPB, 10 mEq, Intravenous, Q1H PRN, Ryder Llanes, APRN  •  potassium phosphate 45 mmol in sodium chloride 0.9 % 500 mL infusion, 45 mmol, Intravenous, PRN **OR** potassium phosphate 30 mmol in sodium chloride 0.9 % 250 mL infusion, 30 mmol, Intravenous, PRN **OR** potassium phosphate 15 mmol in 0.9% sodium chloride 100 mL IVPB, 15 mmol, Intravenous, PRN **OR** sodium phosphates 45 mmol in sodium chloride 0.9 % 500 mL IVPB, 45 mmol, Intravenous, PRN **OR** sodium phosphates 30 mmol in sodium chloride 0.9 % 250 mL IVPB, 30 mmol, Intravenous, PRN **OR** sodium phosphates 15 mmol in sodium chloride 0.9 % 250 mL IVPB, 15 mmol, Intravenous, PRN, Ryder Llanes, APRN  •  risperiDONE (risperDAL M-TABS) disintegrating tablet 0.5 mg, 0.5 mg, Oral, Nightly, Sarah Fajardo MD, 0.5 mg at 08/14/21 2011  •  [COMPLETED] Insert peripheral IV, , , Once **AND** sodium chloride 0.9 % flush 10 mL, 10 mL, Intravenous, PRN, Leonardo Doss MD  •  sodium chloride 0.9 % flush 10 mL, 10 mL, Intravenous, Q12H, Ryder Llanes APRN, 10 mL at 08/15/21 1038  •  sodium chloride 0.9 % flush 10 mL, 10 mL, Intravenous, PRN, Ryder Llanes, APRN  •  sodium chloride 0.9 % flush 10 mL, 10 mL, Intravenous, Q12H, Neela Alvarado MD, 10 mL at 08/15/21 1037  •  sodium chloride 0.9 % flush 10 mL, 10 mL, Intravenous, Jenny SRIVASTAVA Ammar, MD  •  sodium chloride 0.9 % flush 20 mL, 20 mL, Intravenous, Jenny SRIVASTAVA Ammar, MD    Data Review:  All labs (24hrs):   Recent Results (from the past 24 hour(s))   POC Glucose Once    Collection Time: 08/14/21 11:45 AM    Specimen: Blood   Result Value Ref Range    Glucose 153  (H) 70 - 105 mg/dL   POC Glucose Once    Collection Time: 08/14/21  4:46 PM    Specimen: Blood   Result Value Ref Range    Glucose 113 (H) 70 - 105 mg/dL   POC Glucose Once    Collection Time: 08/14/21  7:36 PM    Specimen: Blood   Result Value Ref Range    Glucose 144 (H) 70 - 105 mg/dL   Magnesium    Collection Time: 08/15/21  6:23 AM    Specimen: Blood   Result Value Ref Range    Magnesium 1.8 1.6 - 2.6 mg/dL   Calcium, Ionized    Collection Time: 08/15/21  6:23 AM    Specimen: Blood   Result Value Ref Range    Ionized Calcium 1.31 (H) 1.20 - 1.30 mmol/L   Comprehensive Metabolic Panel    Collection Time: 08/15/21  6:23 AM    Specimen: Blood   Result Value Ref Range    Glucose 103 (H) 65 - 99 mg/dL    BUN 17 6 - 20 mg/dL    Creatinine 0.34 (L) 0.57 - 1.00 mg/dL    Sodium 138 136 - 145 mmol/L    Potassium 3.7 3.5 - 5.2 mmol/L    Chloride 100 98 - 107 mmol/L    CO2 30.0 (H) 22.0 - 29.0 mmol/L    Calcium 9.5 8.6 - 10.5 mg/dL    Total Protein 7.0 6.0 - 8.5 g/dL    Albumin 3.60 3.50 - 5.20 g/dL    ALT (SGPT) 134 (H) 1 - 33 U/L    AST (SGOT) 55 (H) 1 - 32 U/L    Alkaline Phosphatase 93 39 - 117 U/L    Total Bilirubin 0.7 0.0 - 1.2 mg/dL    eGFR Non African Amer >150 >60 mL/min/1.73    Globulin 3.4 gm/dL    A/G Ratio 1.1 g/dL    BUN/Creatinine Ratio 50.0 (H) 7.0 - 25.0    Anion Gap 8.0 5.0 - 15.0 mmol/L   CBC Auto Differential    Collection Time: 08/15/21  6:23 AM    Specimen: Blood   Result Value Ref Range    WBC 9.30 3.40 - 10.80 10*3/mm3    RBC 3.69 (L) 3.77 - 5.28 10*6/mm3    Hemoglobin 11.2 (L) 12.0 - 15.9 g/dL    Hematocrit 33.2 (L) 34.0 - 46.6 %    MCV 89.9 79.0 - 97.0 fL    MCH 30.3 26.6 - 33.0 pg    MCHC 33.7 31.5 - 35.7 g/dL    RDW 18.2 (H) 12.3 - 15.4 %    RDW-SD 56.9 (H) 37.0 - 54.0 fl    MPV 8.2 6.0 - 12.0 fL    Platelets 284 140 - 450 10*3/mm3    Neutrophil % 74.3 42.7 - 76.0 %    Lymphocyte % 16.3 (L) 19.6 - 45.3 %    Monocyte % 5.4 5.0 - 12.0 %    Eosinophil % 3.5 0.3 - 6.2 %    Basophil % 0.5 0.0 - 1.5  %    Neutrophils, Absolute 6.90 1.70 - 7.00 10*3/mm3    Lymphocytes, Absolute 1.50 0.70 - 3.10 10*3/mm3    Monocytes, Absolute 0.50 0.10 - 0.90 10*3/mm3    Eosinophils, Absolute 0.30 0.00 - 0.40 10*3/mm3    Basophils, Absolute 0.00 0.00 - 0.20 10*3/mm3    nRBC 0.0 0.0 - 0.2 /100 WBC   POC Glucose Once    Collection Time: 08/15/21  7:39 AM    Specimen: Blood   Result Value Ref Range    Glucose 105 70 - 105 mg/dL        Imaging:  FL Video Swallow With Speech Single Contrast  Narrative: DATE OF EXAM:  8/11/2021 1:05 PM     PROCEDURE:  FL VIDEO SWALLOW W SPEECH SINGLE-CONTRAST-     INDICATIONS:  had COVID, on vent for 17 days, very weak; R06.00-Dyspnea, unspecified;  U07.1-COVID-19; J12.82-Pneumonia due to Coronavirus disease 2019;  J96.01-Acute respiratory failure with hypoxia; I95.2-Hypotension due to  drugs; U07.1-COVID-19; J96.00-Acute respiratory failure, unspecified  whether with hypoxia or hypercapnia     COMPARISON:  No Comparisons Available     TECHNIQUE:   This examination was performed in conjunction with speech pathology.  Lateral video fluoroscopic evaluation of the swallowing mechanism was  performed while correlate administering to the patient various  consistency food items mixed with barium.     Fluoroscopic Time:   1.5 minutes.     FINDINGS:  See impression.      Impression: For more details, please refer to the speech pathology report.     Electronically Signed By-Ad Marsh MD On:8/13/2021 1:50 PM  This report was finalized on [id number] by  Ad Marsh MD.       ASSESSMENT:  Acute respiratory failure   Pneumonia -MRSA/ Klebsiella pneumoniae  COVID-19   Morbid obesity     DM II   E. coli UTI   HTN  Polyneuropathy   Delirium, acute  Dysphagia     PLAN:  Encourage OOB daily   Advance diet to Regular with thin liquids  Encourage OOB more  PT/OT to get patient OOB at least for all meals  Bronchodilator  Inhaled corticosteroids  Mucinex/Cortef/Mucomyst tablets  Titrate O2 as  tolerated  Electrolytes/ glycemic control  PT/OT/ST  DVT and GI prophylaxis    Discussed with Dr Ping Walker, APRN   8/15/2021  10:53 EDT     I personally have examined  and interviewed the patient. I have reviewed the history, data, problems, assessment and plan with our NP.  Critical care time in direct medical management (   ) minutes  Electronically signed by Jeff Feng MD, D,[facility], 08/15/21, 7:17 PM EDT.

## 2021-08-16 ENCOUNTER — APPOINTMENT (OUTPATIENT)
Dept: CARDIOLOGY | Facility: HOSPITAL | Age: 31
End: 2021-08-16

## 2021-08-16 LAB
BH CV UPPER VENOUS LEFT INTERNAL JUGULAR COMPETENT: NORMAL
BH CV UPPER VENOUS LEFT INTERNAL JUGULAR COMPRESS: NORMAL
BH CV UPPER VENOUS LEFT INTERNAL JUGULAR PHASIC: NORMAL
BH CV UPPER VENOUS LEFT INTERNAL JUGULAR SPONT: NORMAL
BH CV UPPER VENOUS LEFT SUBCLAVIAN AUGMENT: NORMAL
BH CV UPPER VENOUS LEFT SUBCLAVIAN COMPETENT: NORMAL
BH CV UPPER VENOUS LEFT SUBCLAVIAN COMPRESS: NORMAL
BH CV UPPER VENOUS LEFT SUBCLAVIAN PHASIC: NORMAL
BH CV UPPER VENOUS LEFT SUBCLAVIAN SPONT: NORMAL
BH CV UPPER VENOUS RIGHT AXILLARY AUGMENT: NORMAL
BH CV UPPER VENOUS RIGHT AXILLARY COMPETENT: NORMAL
BH CV UPPER VENOUS RIGHT AXILLARY COMPRESS: NORMAL
BH CV UPPER VENOUS RIGHT AXILLARY PHASIC: NORMAL
BH CV UPPER VENOUS RIGHT AXILLARY SPONT: NORMAL
BH CV UPPER VENOUS RIGHT BASILIC FOREARM COMPRESS: NORMAL
BH CV UPPER VENOUS RIGHT BASILIC UPPER COMPRESS: NORMAL
BH CV UPPER VENOUS RIGHT BRACHIAL COMPRESS: NORMAL
BH CV UPPER VENOUS RIGHT CEPHALIC FOREARM COMPRESS: NORMAL
BH CV UPPER VENOUS RIGHT CEPHALIC UPPER COMPRESS: NORMAL
BH CV UPPER VENOUS RIGHT INTERNAL JUGULAR COMPETENT: NORMAL
BH CV UPPER VENOUS RIGHT INTERNAL JUGULAR COMPRESS: NORMAL
BH CV UPPER VENOUS RIGHT INTERNAL JUGULAR PHASIC: NORMAL
BH CV UPPER VENOUS RIGHT INTERNAL JUGULAR SPONT: NORMAL
BH CV UPPER VENOUS RIGHT RADIAL COMPRESS: NORMAL
BH CV UPPER VENOUS RIGHT SUBCLAVIAN AUGMENT: NORMAL
BH CV UPPER VENOUS RIGHT SUBCLAVIAN COMPETENT: NORMAL
BH CV UPPER VENOUS RIGHT SUBCLAVIAN COMPRESS: NORMAL
BH CV UPPER VENOUS RIGHT SUBCLAVIAN PHASIC: NORMAL
BH CV UPPER VENOUS RIGHT SUBCLAVIAN SPONT: NORMAL
BH CV UPPER VENOUS RIGHT ULNAR COMPRESS: NORMAL
GLUCOSE BLDC GLUCOMTR-MCNC: 113 MG/DL (ref 70–105)
GLUCOSE BLDC GLUCOMTR-MCNC: 136 MG/DL (ref 70–105)
GLUCOSE BLDC GLUCOMTR-MCNC: 141 MG/DL (ref 70–105)
GLUCOSE BLDC GLUCOMTR-MCNC: 208 MG/DL (ref 70–105)
MAXIMAL PREDICTED HEART RATE: 189 BPM
STRESS TARGET HR: 161 BPM

## 2021-08-16 PROCEDURE — 63710000001 INSULIN LISPRO (HUMAN) PER 5 UNITS: Performed by: INTERNAL MEDICINE

## 2021-08-16 PROCEDURE — 94799 UNLISTED PULMONARY SVC/PX: CPT

## 2021-08-16 PROCEDURE — 82962 GLUCOSE BLOOD TEST: CPT

## 2021-08-16 PROCEDURE — 97530 THERAPEUTIC ACTIVITIES: CPT

## 2021-08-16 PROCEDURE — 92526 ORAL FUNCTION THERAPY: CPT

## 2021-08-16 PROCEDURE — 63710000001 INSULIN LISPRO (HUMAN) PER 5 UNITS: Performed by: NURSE PRACTITIONER

## 2021-08-16 PROCEDURE — 93971 EXTREMITY STUDY: CPT

## 2021-08-16 PROCEDURE — 99233 SBSQ HOSP IP/OBS HIGH 50: CPT | Performed by: FAMILY MEDICINE

## 2021-08-16 PROCEDURE — 63710000001 INSULIN GLARGINE PER 5 UNITS: Performed by: INTERNAL MEDICINE

## 2021-08-16 PROCEDURE — 97110 THERAPEUTIC EXERCISES: CPT

## 2021-08-16 PROCEDURE — 25010000002 ENOXAPARIN PER 10 MG: Performed by: INTERNAL MEDICINE

## 2021-08-16 PROCEDURE — 25010000002 CEFTRIAXONE PER 250 MG: Performed by: INTERNAL MEDICINE

## 2021-08-16 RX ORDER — FLUCONAZOLE 100 MG/1
150 TABLET ORAL ONCE
Status: COMPLETED | OUTPATIENT
Start: 2021-08-16 | End: 2021-08-16

## 2021-08-16 RX ADMIN — BUDESONIDE 0.5 MG: 0.5 SUSPENSION RESPIRATORY (INHALATION) at 10:02

## 2021-08-16 RX ADMIN — INSULIN LISPRO 5 UNITS: 100 INJECTION, SOLUTION INTRAVENOUS; SUBCUTANEOUS at 20:21

## 2021-08-16 RX ADMIN — RISPERIDONE 0.5 MG: 0.5 TABLET, ORALLY DISINTEGRATING ORAL at 20:23

## 2021-08-16 RX ADMIN — GUAIFENESIN 600 MG: 600 TABLET, EXTENDED RELEASE ORAL at 20:23

## 2021-08-16 RX ADMIN — CEFTRIAXONE SODIUM 2 G: 2 INJECTION, POWDER, FOR SOLUTION INTRAMUSCULAR; INTRAVENOUS at 12:30

## 2021-08-16 RX ADMIN — Medication 10 ML: at 11:00

## 2021-08-16 RX ADMIN — FUROSEMIDE 20 MG: 20 TABLET ORAL at 10:58

## 2021-08-16 RX ADMIN — BUDESONIDE 0.5 MG: 0.5 SUSPENSION RESPIRATORY (INHALATION) at 21:13

## 2021-08-16 RX ADMIN — Medication 2000 UNITS: at 10:59

## 2021-08-16 RX ADMIN — ENOXAPARIN SODIUM 70 MG: 80 INJECTION, SOLUTION INTRAVENOUS; SUBCUTANEOUS at 11:00

## 2021-08-16 RX ADMIN — Medication 10 MG: at 20:23

## 2021-08-16 RX ADMIN — PANTOPRAZOLE SODIUM 40 MG: 40 TABLET, DELAYED RELEASE ORAL at 18:51

## 2021-08-16 RX ADMIN — GUAIFENESIN 600 MG: 600 TABLET, EXTENDED RELEASE ORAL at 10:59

## 2021-08-16 RX ADMIN — Medication 600 MG: at 20:23

## 2021-08-16 RX ADMIN — ENOXAPARIN SODIUM 70 MG: 80 INJECTION, SOLUTION INTRAVENOUS; SUBCUTANEOUS at 20:23

## 2021-08-16 RX ADMIN — ASPIRIN 325 MG ORAL TABLET 325 MG: 325 PILL ORAL at 10:58

## 2021-08-16 RX ADMIN — HYDROCORTISONE 20 MG: 10 TABLET ORAL at 18:51

## 2021-08-16 RX ADMIN — INSULIN LISPRO 10 UNITS: 100 INJECTION, SOLUTION INTRAVENOUS; SUBCUTANEOUS at 13:47

## 2021-08-16 RX ADMIN — IPRATROPIUM BROMIDE AND ALBUTEROL SULFATE 3 ML: 2.5; .5 SOLUTION RESPIRATORY (INHALATION) at 10:02

## 2021-08-16 RX ADMIN — INSULIN GLARGINE 45 UNITS: 100 INJECTION, SOLUTION SUBCUTANEOUS at 11:12

## 2021-08-16 RX ADMIN — IPRATROPIUM BROMIDE AND ALBUTEROL SULFATE 3 ML: 2.5; .5 SOLUTION RESPIRATORY (INHALATION) at 15:25

## 2021-08-16 RX ADMIN — METOPROLOL TARTRATE 25 MG: 25 TABLET, FILM COATED ORAL at 10:59

## 2021-08-16 RX ADMIN — METOPROLOL TARTRATE 25 MG: 25 TABLET, FILM COATED ORAL at 20:23

## 2021-08-16 RX ADMIN — HYDROCORTISONE 20 MG: 10 TABLET ORAL at 10:58

## 2021-08-16 RX ADMIN — Medication 10 ML: at 20:53

## 2021-08-16 RX ADMIN — Medication 600 MG: at 10:59

## 2021-08-16 RX ADMIN — FLUCONAZOLE 150 MG: 100 TABLET ORAL at 14:53

## 2021-08-16 RX ADMIN — Medication 10 ML: at 11:13

## 2021-08-16 RX ADMIN — INSULIN LISPRO 10 UNITS: 100 INJECTION, SOLUTION INTRAVENOUS; SUBCUTANEOUS at 11:00

## 2021-08-16 RX ADMIN — INSULIN GLARGINE 45 UNITS: 100 INJECTION, SOLUTION SUBCUTANEOUS at 21:12

## 2021-08-16 RX ADMIN — INSULIN LISPRO 10 UNITS: 100 INJECTION, SOLUTION INTRAVENOUS; SUBCUTANEOUS at 18:50

## 2021-08-16 RX ADMIN — PANTOPRAZOLE SODIUM 40 MG: 40 TABLET, DELAYED RELEASE ORAL at 10:59

## 2021-08-16 RX ADMIN — IPRATROPIUM BROMIDE AND ALBUTEROL SULFATE 3 ML: 2.5; .5 SOLUTION RESPIRATORY (INHALATION) at 21:13

## 2021-08-16 NOTE — PROGRESS NOTES
Infectious Diseases Progress Note      LOS: 25 days   Patient Care Team:  Lesia, Ana Known as PCP - General    Chief Complaint: Shortness of breath    Subjective       Patient had no high-grade fever during the last 24 hours.  The patient is currently on 3 L of oxygen via nasal cannula.  The patient is awake and alert.  Her main complaint is we akness, the patient is having some right arm pain and intermittent vaginal pain      Review of Systems:   Review of Systems   Constitutional: Positive for fatigue.   HENT: Negative.    Eyes: Negative.    Respiratory: Positive for shortness of breath.    Cardiovascular: Negative.    Gastrointestinal: Negative.    Genitourinary: Negative.    Musculoskeletal: Negative.    Skin: Negative.    Neurological: Positive for weakness.   Hematological: Negative.    Psychiatric/Behavioral: Negative.         Objective     Vital Signs  Temp:  [97.6 °F (36.4 °C)-98.4 °F (36.9 °C)] 98 °F (36.7 °C)  Heart Rate:  [] 106  Resp:  [16-20] 18  BP: (120-142)/(65-89) 120/72    Physical Exam:  Physical Exam  Vitals and nursing note reviewed.   Constitutional:       General: She is not in acute distress.     Appearance: Normal appearance. She is well-developed. She is obese. She is not diaphoretic.   HENT:      Head: Normocephalic and atraumatic.   Eyes:      Pupils: Pupils are equal, round, and reactive to light.   Cardiovascular:      Rate and Rhythm: Normal rate and regular rhythm.      Heart sounds: Normal heart sounds, S1 normal and S2 normal. No murmur heard.     Pulmonary:      Effort: Pulmonary effort is normal. No respiratory distress.      Breath sounds: No stridor. Rales present. No wheezing.   Chest:      Chest wall: No tenderness.   Abdominal:      General: Bowel sounds are normal. There is no distension.      Palpations: Abdomen is soft. There is no mass.      Tenderness: There is no abdominal tenderness. There is no guarding or rebound.   Genitourinary:     Comments: External  catheter  Musculoskeletal:         General: No swelling, tenderness or deformity. Normal range of motion.      Cervical back: Normal range of motion and neck supple.   Skin:     General: Skin is warm and dry.      Coloration: Skin is not pale.      Findings: No bruising, erythema or rash.   Neurological:      Mental Status: She is alert and oriented to person, place, and time.      Cranial Nerves: No cranial nerve deficit.          Results Review:    I have reviewed all clinical data, test, lab, and imaging results.     Radiology  XR Forearm 2 View Right    Result Date: 8/15/2021  DATE OF EXAM: 8/15/2021 3:10 PM  PROCEDURE: XR FOREARM 2 VW RIGHT-  INDICATIONS: Pain and swelling; R06.00-Dyspnea, unspecified; U07.1-COVID-19; J12.82-Pneumonia due to Coronavirus disease 2019; J96.01-Acute respiratory failure with hypoxia; I95.2-Hypotension due to drugs; U07.1-COVID-19; J96.00-Acute respiratory failure, unspecified whether with hypoxia or hypercapnia  COMPARISON: No Comparisons Available  TECHNIQUE: A minimum of two routine standard radiographic views were obtained of the right forearm.   FINDINGS: The bones appear normal. There is no fracture. No periostitis or bone destruction. The elbow is not seen in an AP projection. Wrist joints appear grossly intact. There is no focal soft tissue abnormality. No soft tissue gas or opaque foreign body is identified.      Impression: 1. Negative forearm exam.  Electronically Signed By-Jo Bartholomew MD On:8/15/2021 4:46 PM This report was finalized on [id number] by  Jo Bartholomew MD.    Duplex Venous Upper Extremity - Right CAR    Result Date: 8/16/2021  · Normal right upper extremity venous duplex scan.        Cardiology    Laboratory    Results from last 7 days   Lab Units 08/15/21  0623 08/14/21  1025 08/13/21  0411 08/12/21  0331 08/11/21  1221 08/10/21  0411   WBC 10*3/mm3 9.30 12.40* 12.20* 11.50* 11.00* 8.20   HEMOGLOBIN g/dL 11.2* 11.4* 10.9* 11.1* 10.5* 9.6*   HEMATOCRIT  % 33.2* 35.3 32.9* 34.8 32.8* 28.7*   PLATELETS 10*3/mm3 284 238 291 272 260 166     Results from last 7 days   Lab Units 08/15/21  0623 08/14/21  1025 08/13/21  0411 08/12/21  0331 08/11/21  1221 08/10/21  2033 08/10/21  0652   SODIUM mmol/L 138 134* 136 143 143  --  142   POTASSIUM mmol/L 3.7 4.2 4.4 4.5 3.9 4.3 3.5   CHLORIDE mmol/L 100 96* 98 104 102  --  99   CO2 mmol/L 30.0* 25.0 27.0 29.0 29.0  --  34.0*   BUN mg/dL 17 17 24* 32* 33*  --  45*   CREATININE mg/dL 0.34* 0.32* 0.25* 0.37* 0.45*  --  0.42*   GLUCOSE mg/dL 103* 157* 188* 127* 190*  --  125*   ALBUMIN g/dL 3.60 3.80 3.50 3.80 3.80  --  3.70   BILIRUBIN mg/dL 0.7 1.0 0.8 1.0 0.9  --  0.8   ALK PHOS U/L 93 95 93 90 88  --  84   AST (SGOT) U/L 55* 53* 39* 40* 53*  --  61*   ALT (SGPT) U/L 134* 121* 109* 114* 133*  --  160*   CALCIUM mg/dL 9.5 9.5 9.1 9.5 9.7  --  9.9                 Microbiology   Microbiology Results (last 10 days)     Procedure Component Value - Date/Time    Blood Culture - Blood, Arm, Right [[id number]] Collected: 08/09/21 0758    Lab Status: Final result Specimen: Blood from Arm, Right Updated: 08/14/21 0815     Blood Culture No growth at 5 days    Blood Culture - Blood, Arm, Left [[id number]] Collected: 08/09/21 0753    Lab Status: Final result Specimen: Blood from Arm, Left Updated: 08/14/21 0815     Blood Culture No growth at 5 days    Fungus Culture - Wash, Bronchus [[id number]]  (Abnormal) Collected: 08/08/21 0944    Lab Status: Preliminary result Specimen: Wash from Bronchus Updated: 08/11/21 0745     Fungus Culture Candida species    AFB Culture - Wash, Bronchus [[id number]] Collected: 08/08/21 0944    Lab Status: Preliminary result Specimen: Wash from Bronchus Updated: 08/15/21 1030     AFB Culture No AFB isolated at 1 week     AFB Stain No acid fast bacilli seen    Respiratory Culture - Wash, Bronchus [[id number]] Collected: 08/08/21 0944    Lab Status: Final result Specimen: Wash from Bronchus Updated: 08/10/21 1202      Respiratory Culture Scant growth (1+) Normal Respiratory Sondra: NO S.aureus/MRSA or Pseudomonas aeruginosa     Gram Stain Many (4+) WBCs per low power field      Rare (1+) Epithelial cells per low power field      Few (2+) Mixed bacterial morphotypes seen on Gram Stain    Respiratory Panel, PCR (WITHOUT COVID) - Wash, Bronchus [[id number]]  (Normal) Collected: 08/08/21 0944    Lab Status: Final result Specimen: Wash from Bronchus Updated: 08/08/21 1127     ADENOVIRUS, PCR Not Detected     Coronavirus 229E Not Detected     Coronavirus HKU1 Not Detected     Coronavirus NL63 Not Detected     Coronavirus OC43 Not Detected     Human Metapneumovirus Not Detected     Human Rhinovirus/Enterovirus Not Detected     Influenza B PCR Not Detected     Parainfluenza Virus 1 Not Detected     Parainfluenza Virus 2 Not Detected     Parainfluenza Virus 3 Not Detected     Parainfluenza Virus 4 Not Detected     Bordetella pertussis pcr Not Detected     Chlamydophila pneumoniae PCR Not Detected     Mycoplasma pneumo by PCR Not Detected     Influenza A PCR Not Detected     RSV, PCR Not Detected     Bordetella parapertussis PCR Not Detected    Narrative:      The coronavirus on the RVP is NOT COVID-19 and is NOT indicative of infection with COVID-19.    In the setting of a positive respiratory panel with a viral infection PLUS a negative procalcitonin without other underlying concern for bacterial infection, consider observing off antibiotics or discontinuation of antibiotics and continue supportive care. If the respiratory panel is positive for atypical bacterial infection (Bordetella pertussis, Chlamydophila pneumoniae, or Mycoplasma pneumoniae), consider antibiotic de-escalation to target atypical bacterial infection.    Histoplasma Antigen, CSF or BAL - Wash, Bronchus [[id number]] Collected: 08/08/21 0944    Lab Status: Final result Specimen: Wash from Bronchus Updated: 08/12/21 0919     Result None Detected ng/mL      Comment:            "                                None Detected        Interpretation Negative     Comment: Positive results reported in ng/mL from 0.20 ng/mL to 20.00 ng/mL  Positive results above 20.00 ng/mL are reported as \"Above the  Limit of Quantification\"       Narrative:      Performed at:  01 - [facility]  [address] [facility] [place]  [id number]  : Nicolás Garduno MD, Phone:  [phone]    Cytomegalovirus (CMV) By PCR - Wash, Bronchus [[id number]] Collected: 08/08/21 0944    Lab Status: Final result Specimen: Wash from Bronchus Updated: 08/12/21 2307     Specimen Source Comment     Comment: BRONCH WASH        Cytomegalovirus PCR Negative     Comment: -------------------ADDITIONAL INFORMATION-------------------  This test was developed and its performance characteristics  determined by [facility] in a manner consistent with CLIA  requirements. This test has not been cleared or approved by  the [place] Food and Drug Administration.       Narrative:      Performed at:  [address] - [address]  [id number]  : Dominik Lanza , Phone:  [phone]    Pneumocystis PCR - Wash, Bronchus [[id number]] Collected: 08/08/21 0944    Lab Status: Final result Specimen: Wash from Bronchus Updated: 08/13/21 1113     Pneumocystis jiroveci DNA Negative     Comment: -------------------ADDITIONAL INFORMATION-------------------  This test was developed and its performance characteristics  determined by [facility] in a manner consistent with CLIA  requirements. This test has not been cleared or approved by  the [place]. Food and Drug Administration.  REFERENCE RANGE: Not Applicable        Specimen Source Comment     Comment: BRONCH WASH       Narrative:      Performed at:  [address]  [id number]  : Dominik Lanza , Phone:  [phone]    Urine Culture - Urine, Urine, Catheter [[id number]]  (Abnormal)  " (Susceptibility) Collected: 08/07/21 0351    Lab Status: Final result Specimen: Urine, Catheter Updated: 08/09/21 1037     Urine Culture >100,000 CFU/mL Escherichia coli    Susceptibility      Escherichia coli      FITZ      Ampicillin Resistant      Ampicillin + Sulbactam Intermediate      Cefazolin Susceptible      Cefepime Susceptible      Ceftazidime Susceptible      Ceftriaxone Susceptible      Gentamicin Susceptible      Levofloxacin Susceptible      Nitrofurantoin Susceptible      Piperacillin + Tazobactam Susceptible      Tetracycline Susceptible      Trimethoprim + Sulfamethoxazole Susceptible               Linear View                         Medication Review:       Schedule Meds  Acetylcysteine, 600 mg, Oral, BID  aspirin, 325 mg, Oral, Daily  budesonide, 0.5 mg, Nebulization, BID - RT  cholecalciferol, 2,000 Units, Oral, Daily  enoxaparin, 70 mg, Subcutaneous, Q12H  furosemide, 20 mg, Oral, Daily  guaiFENesin, 600 mg, Oral, Q12H  hydrocortisone, 20 mg, Oral, BID With Meals  insulin glargine, 45 Units, Subcutaneous, Q12H  insulin lispro, 0-14 Units, Subcutaneous, 4x Daily With Meals & Nightly  insulin lispro, 10 Units, Subcutaneous, TID With Meals  ipratropium-albuterol, 3 mL, Nebulization, 4x Daily - RT  melatonin, 10 mg, Oral, Nightly  metoprolol tartrate, 25 mg, Oral, Q12H  pantoprazole, 40 mg, Oral, BID AC  risperiDONE, 0.5 mg, Oral, Nightly  sodium chloride, 10 mL, Intravenous, Q12H  sodium chloride, 10 mL, Intravenous, Q12H        Infusion Meds  Pharmacy to Dose enoxaparin (LOVENOX),         PRN Meds  •  acetaminophen  •  acetaminophen **OR** acetaminophen  •  aluminum-magnesium hydroxide-simethicone  •  artificial tears  •  benzonatate  •  dextrose  •  dextrose  •  glucagon (human recombinant)  •  hydrALAZINE  •  hydrOXYzine  •  insulin lispro **AND** insulin lispro  •  lidocaine  •  lidocaine PF 1%  •  magnesium sulfate **OR** magnesium sulfate in D5W 1g/100mL (PREMIX)  •  methocarbamol  •   nitroglycerin  •  ondansetron **OR** ondansetron  •  Pharmacy to Dose enoxaparin (LOVENOX)  •  potassium chloride **OR** potassium chloride **OR** potassium chloride  •  potassium phosphate infusion greater than 15 mMoles **OR** potassium phosphate infusion greater than 15 mMoles **OR** potassium phosphate **OR** sodium phosphate IVPB **OR** sodium phosphate IVPB **OR** sodium phosphate IVPB  •  [COMPLETED] Insert peripheral IV **AND** sodium chloride  •  sodium chloride  •  sodium chloride  •  sodium chloride        Assessment/Plan       Antimicrobial Therapy   1.        day  2.  Rocephin      day  3.      Day  4.      Day  5.      Day      Assessment     Severe COVID-19 infection and patient with significant comorbidities including diabetes mellitus and morbid obesity. Apparently did not receive vaccination for COVID-19  COVID-19 screen on admission on July 22, 2021 was positive. Was reported that patient had positive COVID-19 diagnosis 6 days prior to admission  The patient had received 5 days of IV remdesivir     Severe respiratory failure and patient was intubated for long duration.  The patient was extubated on August 10, 2021  -Patient is now on 4 L of oxygen by nasal cannula     Probable ventilator associated pneumonia versus hospitalist acquired pneumonia. Sputum culture from July 29, 2021 grew MRSA and the organism is susceptible to linezolid and vancomycin  The patient was started on linezolid on August 1, 2021  Repeat sputum culture from August 1, 2021 is growing Klebsiella pneumoniae     Morbid obesity     Diabetes mellitus     Reactive leukocytosis secondary to steroids.  Improved    UTI, urine culture from August 7, 2021 is growing relatively susceptible strain of E. coli         Plan     Discontinue Rocephin-patient had 10 days total treatment  Supportive care  Case discussed with patient and mother at bedside  No need for further COVID-19 isolation  Not much more to add from infectious disease  standpoint-we will sign off at this time-please call with any questions.        Albina Birmingham, NATY  08/16/21  15:16 EDT     Note is dictated utilizing voice recognition software/Dragon

## 2021-08-16 NOTE — PROGRESS NOTES
"Nutrition Services    Patient Name: Leslie Harris  YOB: 1990  MRN: [id number]  Admission date: 7/22/2021    Continue PO diet as tolerated.  Will continue Boost Breeze and Magic Cups to help meet energy and protein needs.    Pt having some early satiety with meals; the above ONS will help meet protein needs.    PPE Documentation        PPE Worn By Provider RD did not enter room for this encounter   PPE Worn By Patient  -      CLINICAL NUTRITION ASSESSMENT       Reason for Assessment Follow up protocol    7/23: MST 3/TF consult      H&P:  Per H&P \"Leslie Harris is a 30 y.o. female who presented to [facility] ED on 7/22/2021 with complaint of being recently diagnosed with COVID-19 at the [facility] 6 days prior to admission.  Patient reports that she has been having increased shortness of breath over the past couple days, and has not lost her sense of smell, but has lost her sense of taste, reports frequent, nonproductive cough, myalgias, fevers, and chills\"     Past Medical History:   Diagnosis Date   • Acute respiratory failure due to COVID-19 (CMS/HCC) 7/22/2021   • Class 3 severe obesity without serious comorbidity with body mass index (BMI) of 50.0 to 59.9 in adult    • COVID-19 07/2021   • Critical illness polyneuropathy (CMS/[facility]) 8/12/2021   • Diabetes mellitus type 2 in obese (CMS/[facility])    • E. coli UTI (urinary tract infection) 8/7/2021   • Hypertension 7/22/2021   • Klebsiella pneumoniae pneumonia (CMS/[facility]) 8/4/2021   • MRSA pneumonia (CMS/[facility]) 7/29/2021   • Pneumonia due to COVID-19 virus 7/22/2021       Past Surgical History:   Procedure Laterality Date   • BRONCHOSCOPY N/A 8/8/2021    Procedure: BRONCHOSCOPY with bilateral bronchial washing;  Surgeon: Jeff Feng MD;  Location: [facility];  Service: Pulmonary;  Laterality: N/A;  pre: respiratory failure, covid-19 pneumonia  post: respiratory failure, COVID-19 pneumonia   • CHOLECYSTECTOMY     • " EXPLORATORY LAPAROTOMY W/ BOWEL RESECTION  2018    Due to complication of cholecystectomy in which patient had an accidental perforation of small bowel intraoperatively.        Current Problems:   Pneumonia due to COVID-19 virus  Acute hypoxic respiratory failure due to COVID-19  -Previously on Continuous AVAPs  -Intubated 7/23   -extubated 8/10    Hyperglycemia, with new diagnosis of T2DM  -Hgb A1c 6.9%    HTN     Nutrition/Diet History         Narrative     8/16: Pt seen for follow up. Pt is no longer on TF - mentions how her appetite/desire for food is improving, but she notices earlier satiety than prior to hospitalization. Provided counseling on monitoring weight at home, to make sure she is not losing weight too quickly, in the interest of preserving muscle. Provided RD contact information and encouraged pt to call with any questions or concerns regarding nutrition, even after D/C. Encouraged pt to try eating the protein foods first on her tray, then other items, to help assure protein needs are being met. Pt open to this strategy. Will continue to follow.     8/10: Patient discussed during AM rounds with critical care team, patient extubated during rounds. Plans to keep NGT in place as pt likely very weak from paralytics and steroids. RD attempted bedside visit however curtain drawn and multiple clinicians in room caring for patient. As K+ has remained WNL to low, will adjust TF to non K+ restricted and immune-modulating to promote wound healing    8/6: Patient discussed during AM rounds with critical care team. Plans to move pt from prone back to supine today. D/w HAMMAD Brown, TF charted at 60 mL/hr, RN reports it is at 45 mL/hr. Continue current TF regimen    8/3: Patient discussed at critical care AM rounds. Remains intubated, sedated, and paralyzed. No longer in prone position. RN did report patient with blood tinged residuals this AM, TFs were briefly held. MD okayed to resume TFs.     7/30: Patient  "discussed at critical care AM rounds, pt is now in the prone position. Continues on TF, no BM x8 days. Have been conservatively advancing d/t continuous paralytic, constipation, and now prone.    7/27: Patient discussed at critical care AM rounds. Okay to advance TF rate per discussion in rounds, still no BM yet while on bowel regimen.    7/23: Patient discussed at critical care AM rounds. Patient requiring continuous AVAPs and not able to come off to consume adequate nutrition. Tube feed consult received. At visit this AM, visually observed patient sleeping on AVAPs through glass window. At second visit patient receiving complex care from several healthcare workers. At time of documentation appears patient is now intubated, sedated on propofol, paralyzed, and on 1 pressor.      Functional Status Independent for ADLs     Food Allergies NKFA        Anthropometrics        Current Height, Weight Height: 167.6 cm (66\")  Weight: (!) 149 kg (328 lb 11.3 oz) (08/15/21 0500)        Admit Height, Weight     Flowsheet Rows      First Filed Value   Admission Height  165.1 cm (65\") Documented at 07/22/2021 1218   Admission Weight  (!) 145 kg (320 lb) Documented at 07/22/2021 1218             Ideal Body Weight (IBW) 56.8 kg (125 lb)    % Ideal Body Weight 262%        Usual Body Weight UTD   % Usual Body Weight UTD   Wt Change Observation Current Admission:  8/16: 1.4% weight loss since prior assessment  8/10: CBW trending down from last encounter but remains up for admission. Now negative 2.1 Liters since 7/27 8/6: CBW down from last encounter but trending up from admission. 4.8 Liters positive  8/3: CBW is down slightly from last review, up still up overall. + 8.2 L per I/Os.   7/30: CBW is from 7/28, c/w recent wt trend  7/27: CBW trending up, 6.2 Liters positive  7/23: Noted two admit weights of 320 & 356 lb. ? [employer].     PTA:  No weight hx available PTA    Weight Hx    Wt Readings from Last 30 Encounters:   08/15/21 " 0500 (!) 149 kg (328 lb 11.3 oz)   08/13/21 0500 (!) 152 kg (335 lb 15.7 oz)   08/12/21 0450 (!) 153 kg (336 lb 13.8 oz)   08/08/21 0100 (!) 151 kg (333 lb 12.4 oz)   08/07/21 0500 (!) 155 kg (342 lb 9.5 oz)   08/06/21 0600 (!) 159 kg (351 lb 3.1 oz)   08/04/21 0526 (!) 162 kg (356 lb 0.7 oz)   08/03/21 0542 (!) 163 kg (359 lb 12.7 oz)   07/28/21 0400 (!) 167 kg (367 lb 4.6 oz)   07/27/21 1215 (!) 167 kg (368 lb)   07/27/21 0600 (!) 167 kg (368 lb 2.7 oz)   07/26/21 0548 (!) 165 kg (363 lb 12.1 oz)   07/25/21 0500 (!) 167 kg (367 lb 8.1 oz)   07/24/21 0600 (!) 165 kg (363 lb 12.1 oz)   07/22/21 1449 (!) 162 kg (356 lb 7.7 oz)   07/22/21 1218 (!) 145 kg (320 lb)        BMI kg/m2 Body mass index is 53.05 kg/m².       Labs/Medications         Pertinent Labs    Results from last 7 days   Lab Units 08/15/21  0623 08/14/21  1025 08/13/21  0411   SODIUM mmol/L 138 134* 136   POTASSIUM mmol/L 3.7 4.2 4.4   CHLORIDE mmol/L 100 96* 98   CO2 mmol/L 30.0* 25.0 27.0   BUN mg/dL 17 17 24*   CREATININE mg/dL 0.34* 0.32* 0.25*   CALCIUM mg/dL 9.5 9.5 9.1   BILIRUBIN mg/dL 0.7 1.0 0.8   ALK PHOS U/L 93 95 93   ALT (SGPT) U/L 134* 121* 109*   AST (SGOT) U/L 55* 53* 39*   GLUCOSE mg/dL 103* 157* 188*     Results from last 7 days   Lab Units 08/15/21  0623 08/14/21  1025 08/14/21  1025 08/13/21  0411 08/13/21  0411 08/12/21  0331 08/12/21 0331   MAGNESIUM mg/dL 1.8  --  1.6  --  1.6   < > 1.7   PHOSPHORUS mg/dL  --   --   --   --   --   --  4.1   HEMOGLOBIN g/dL 11.2*   < > 11.4*   < > 10.9*   < > 11.1*   HEMATOCRIT % 33.2*   < > 35.3   < > 32.9*   < > 34.8    < > = values in this interval not displayed.     COVID19   Date Value Ref Range Status   07/22/2021 Detected (C) Not Detected - Ref. Range Final     Lab Results   Component Value Date    HGBA1C 6.9 (H) 07/23/2021         Pertinent Medications Acetylcysteine, aspirin, Vitamin D3, lovenox, lasix, mucinex, insulin, melatonin, protonix, risperdal     Physical Findings        Overall  "Physical   Appearance, MSA 8/16: Not malnourished per NFPE  8/10: Unable to assess this date  8/6: Patient remains very close to window/doorway, visually continues to appear well-nourished from what is able to be observed in prone position  8/3: Patient remains very close to window/doorway, visually continues to appear well-nourished.   7/30: observed via glass window/doorway, visually appears well-nourished  7/27: observed via glass window/doorway, visually appears well-nourished  7/23: Patient laying outside of blankets/sheets. Able to clearly visualize patient. Appears well nourished.    -  Edema  1+ generalized, face, periorbital, tongue   2+ hands, legs, ankles, feet      Gastrointestinal Last BM documented on 8/14     Tubes None     Oral/Mouth Cavity No known chewing or swallowing issues      Skin DTI on (L) distal nose  DTI on midline tongue  Stage II on sacral spine  Stage I on (R) cheek  Right throat abrasion     Estimated/Assessed Needs    Estimated 8/3/21   *remains current 8/16/2021   Energy Requirements    Height for Calculation  Height: 167.6 cm (66\")   Weight for Calculation 56.8 kg (IBW)    Method for Estimation  30-35 kcals/kg    EST Needs (kcal/day) 5046-4709 kcals     For Comparison 24-29 kcals/kg of 83 kg (AdjIBW) per COVID LEEP study= 0814-0702 kcals        Protein Requirements    Weight for Calculation 56.8 kg (125 lb)    EST Protein Needs (g/kg) 2.5 g/kg    EST Daily Needs (g/day) 142 g /day       Fluid Requirements     Estimated Needs (mL/day) 1460-7502 mL/day  (1 mL/kcal-adjust based on hydration status)        Fluid Deficit    Current Na Level (mEq/L) -   Desired Na Level (mEq/L) -   Estimated Fluid Deficit (L)  -     Current Nutrition Orders & Evaluation of Received Nutrient/Fluid Intake       Oral Nutrition     Current PO Diet Diet Regular   Supplement Boost Breeze BID  Magic Cup BID    PO Evaluation     % PO Intake 8/16: Intakes averaging 55% based on last two days of intake   7/27 to " present (8/10): 0% - NPO, on TF  7/23: 0% x 1 meal documented    -  Enteral Nutrition    Enteral Route -   TF Modular -   TF Delivery Method -   Current Ordered TF  -   Current Ordered H2O flush  -    TF Observation  -   EN Evaluation -    TF Changes -    TF Residual -    TF Tolerance -    Average EN Delivered -       Parenteral Nutrition     TPN Route -   Current Ordered TPN VOL  -   Dextrose (g/kcals)  -   Amino Acid (g/kcals) -   Lipids (mL/Concentration/FREQ)  -   MVI Frequency  -   Trace Element Frequency  -   TPN Observation  -    TPN Evaluation    Total # Days on TPN -   -  Clinical Course    Nutrition Course Details PO Diet:  7/22-7/23 Regular  7/23 NPO  8/13 Regular diet     Nutrition Support:  7/23: TF to be started today   Conservatively advanced d/t paralytic/instability  7/30 TF at goal rate   8/12: changed to nocturnal feeds   8/13: reduced volume of nocturnal feeds  8/14: TF discontinued      Nutritional Risk Screening        NRS-2002 Score   -       Nutrition Diagnosis         Nutrition Dx Problem 1 Inadequate oral intake R/t early satiety; as evidenced by intakes averaging only 55% at meals.       Nutrition Dx Problem 2 -       Intervention Goal         Intervention Goal(s) Intakes improving to at least 65% at meals   Pt accepting of ONS     Nutrition Intervention        RD/Tech Action Adding one ONS daily to help meet needs     Nutrition Prescription          Diet Prescription Regular    Supplement Prescription Boost Breeze BID (provides 500 kcals, 18 g protein if consumed)   Magic Cups at lunch/dinner (Provides 580 kcals, 18 g protein if consumed)      -  Enteral Prescription -        TPN Prescription -     Monitor/Evaluation        Monitor PO and tolerance, I/Os, Labs, BM, weight, skin and medication changes      Electronically signed by:  Traci Schulz RD  08/16/21 17:11 EDT

## 2021-08-16 NOTE — PLAN OF CARE
Goal Outcome Evaluation:            Patient stable throughout the shift; on 2-3L O2. Family at bedside.

## 2021-08-16 NOTE — PLAN OF CARE
Goal Outcome Evaluation:      Pt was unable to tolerate O2 weaned to 1L NC, and was increased back to 3L. Pt compliant with turning Q2 hours. Will continue to educate and encourage.

## 2021-08-16 NOTE — PLAN OF CARE
Goal Outcome Evaluation:                Pt very hopeful to get to chair, though unable to progress due to HR.

## 2021-08-16 NOTE — THERAPY DISCHARGE NOTE
Acute Care - Speech Language Pathology Discharge Summary   Lux       Patient Name: Leslie Harris  : 1990  MRN: [id number]    Today's Date: 2021                   Admit Date: 2021      SLP Recommendation and Plan    Visit Dx:    ICD-10-CM ICD-9-CM   1. Dyspnea, unspecified type  R06.00 786.09   2. Pneumonia due to COVID-19 virus  U07.1 480.8    J12.82 079.89   3. Acute respiratory failure with hypoxia (CMS/[facility])  J96.01 518.81   4. Hypotension due to drugs  I95.2 458.8     E947.9   5. Acute respiratory failure due to COVID-19 (CMS/[facility])  U07.1 518.81    J96.00 079.89     Patient is tolerating a regular diet.  No overt s.s of aspiration noted during todays meal assessment.  No further skilled ST services indicated at this time.  Patient will be discharged from caseload.          SLP GOALS     Row Name 21 1200       Oral Nutrition/Hydration Goal 2 (SLP)    Oral Nutrition/Hydration Goal 2, SLP  Patient will be seen at a meal within 24-48 hours to assess tolerance of current diet with further recommendations to be given as indicated  -MM    Time Frame (Oral Nutrition/Hydration Goal 2, SLP)  2 days  -MM    Barriers (Oral Nutrition/Hydration Goal 2, SLP)  Patient seen this date with for a meal assessment.  Patient is currently on a regular diet.  Mom in room assisting with feeding.  Patient assessed consuming pasta and chicken breast and drinking thin liquids by straw.  Patient reports it is so much easier eating without the feeding tube in place.  She reports no difficulties at this time  -MM    Progress/Outcomes (Oral Nutrition/Hydration Goal 2, SLP)  goal met  -MM       Oral Nutrition/Hydration Goal (SLP)    Oral Nutrition/Hydration Goal, SLP  Patient will tolerate safest and least restrictive diet with no complications from aspiration  -MM    Time Frame (Oral Nutrition/Hydration Goal, SLP)  by discharge  -MM    Barriers (Oral Nutrition/Hydration Goal, SLP)  Patient has been tolerating a  regular and thin liquid diet for several days  -MM    Progress/Outcomes (Oral Nutrition/Hydration Goal, SLP)  goal met  -MM      User Key  (r) = Recorded By, (t) = Taken By, (c) = Cosigned By    Initials Name Provider Type    Agnieszka Jang, SLP Speech and Language Pathologist    Ne Scott, VIVIAN Speech and Language Pathologist                         VIVIAN Zimmer  8/16/2021

## 2021-08-16 NOTE — CASE MANAGEMENT/SOCIAL WORK
Continued Stay Note   Lux     Patient Name: Leslie Harris  MRN: [id number]  Today's Date: 8/16/2021    Admit Date: 7/22/2021    Discharge Plan     Row Name 08/16/21 1307       Plan    Plan Comments  SW informed by ICU CM of FMLA paperwork included on pt's hard chart that needs to be taken to [employer] with [employer]. SW retrieved paperwork from hard chart and was informed by bedside RN to call pt's father (Rodrigo) to update on status of FMLA paperwork. SW delivered FMLA paperwork to [employer] with [employer] and provided information as needed. SW attempted to call Rodrigo ([phone]) to inform of delivery and to inform that the paperwork will be completed by the Hospitalist group - no answer and voicemail left with call back number.        Phone communication or documentation only - no physical contact with patient or family.    EMILE Bajwa, LSW    Office: [phone]  Cell: [phone]  Fax: [phone]  E-mail: [email or web address]

## 2021-08-16 NOTE — THERAPY TREATMENT NOTE
Subjective: Pt agreeable to therapeutic plan of care.    Objective:     Bed Mobility: Max-A  Functional Transfers: N/A or Not attempted.  Functional Ambulation: N/A or Not attempted.     Completed AAROM of bilateral elbows and knee extension in prep for mobility.  Upon sitting at EOB HR elevated from 123 bpm to 150 bpm, thus pt was returned to supine and HR improved immediately.        Pain: 0 VAS  Education: Provided education on importance of mobility and skilled verbal / tactile cueing throughout intervention.     Assessment: Leslie Harris presents with ADL impairments below baseline abilities which indicate the need for continued skilled intervention while inpatient. Pt very hopeful to get to chair, though unable to progress due to HR.  Demos general weakness with significant weakness in R shoulder.  Tolerating session today without incident. Will continue to follow and progress as tolerated.     Plan/Recommendations:   Pt would benefit from Inpatient Rehabilitation placement at discharge from facility.   Pt desires Inpatient Rehabilitation placement at discharge. Pt cooperative; agreeable to therapeutic recommendations and plan of care.     Modified Jhonny: 4 = Moderately severe disability (Unable to attend to own bodily needs without assistance, and unable to walk unassisted)     Post-Tx Position: Supine with HOB Elevated, Alarms activated and Call light and personal items within reach  PPE: gloves, surgical mask, eyewear protection

## 2021-08-16 NOTE — CASE MANAGEMENT/SOCIAL WORK
Continued Stay Note   L[facility]     Patient Name: Leslie Harris  MRN: [id number]  Today's Date: 8/16/2021    Admit Date: 7/22/2021    Discharge Plan     Row Name 08/16/21 1342       Plan    Plan  anticipate DC to inpt rehab- [facility] acute accepted 8/12. Pending precert. Precert started 8/16- PENDING. No PASRR needed. [facility] acute orders off MAR at DC.    Patient/Family in Agreement with Plan  yes    Plan Comments  CM asked [facility] liaison to start precert today. DC barriers: pending precert, on 3L, monitoring intake without tube feeds    Row Name 08/16/21 1307       Plan    Plan Comments  SW informed by ICU CM of FMLA paperwork included on pt's hard chart that needs to be taken to [employer] with [employer]. SW retrieved paperwork from hard chart and was informed by bedside RN to call pt's father (Rodrigo) to update on status of FMLA paperwork. SW delivered FMLA paperwork to [employer] with [employer] and provided information as needed. SW attempted to call Rodrigo ([phone]) to inform of delivery and to inform that the paperwork will be completed by the Hospitalist group - no answer and voicemail left with call back number.        Expected Discharge Date and Time     Expected Discharge Date Expected Discharge Time    Aug 18, 2021         Phone communication or documentation only - no physical contact with patient or family.      Amelie Harmon, RN

## 2021-08-16 NOTE — PROGRESS NOTES
[facility] Medicine Services Daily Progress Note    Patient Name: Leslie Harris  : 1990  MRN: [id number]  Primary Care Physician:  Lesia, No Known  Date of admission: 2021      Subjective      Chief Complaint: Shortness of breath      Patient Reports her right arm pain is improving.  Discussed that upper extremity Doppler negative for any DVT.  Patient's breathing improving but still requiring some supplemental oxygen.  Patient reports she has some vaginal burning today which she reports similar to when she has a yeast infection.  Awaiting placement.  Doing well after NG tube removed.    Review of Systems   Constitutional: Positive for malaise/fatigue. Negative for chills and fever.   HENT: Negative for hoarse voice and sore throat.    Eyes: Negative for double vision and photophobia.   Cardiovascular: Negative for chest pain and syncope.   Respiratory: Negative for cough and shortness of breath.    Musculoskeletal: Positive for myalgias. Negative for muscle weakness.   Gastrointestinal: Negative for nausea and vomiting.   Genitourinary: Positive for pelvic pain. Negative for genital sores.   Neurological: Positive for paresthesias and weakness. Negative for dizziness.   Psychiatric/Behavioral: Negative for altered mental status. The patient is not nervous/anxious.          Objective      Vitals:   Temp:  [97.6 °F (36.4 °C)-98.4 °F (36.9 °C)] 98.1 °F (36.7 °C)  Heart Rate:  [] 125  Resp:  [16-24] 18  BP: (119-142)/(65-89) 133/83  Flow (L/min):  [1-3] 3    Physical Exam      General: Morbidly obese female lying in bed breathing company on 3 L via nasal cannula no acute distress  HEENT: NC/AT, EOMI, mucosa moist  Heart: Regular, rate controlled  Chest: Normal work of breathing, moving air well no wheezing  Abdominal: Soft. NT/ND.   Musculoskeletal: Normal ROM.  No cyanosis. No calf tenderness.  Neurological: AAOx3, no focal deficits  Skin: Skin is warm and dry. No  rash  Psychiatric: Normal mood and affect.      Result Review    Result Review:  I have personally reviewed the results from the time of this admission to 8/16/2021 14:00 EDT and agree with these findings:  [x]  Laboratory  [x]  Microbiology  [x]  Radiology  [x]  EKG/Telemetry   []  Cardiology/Vascular   []  Pathology  []  Old records  []  Other:  Most notable findings include: Hyperglycemia       Wounds (last 24 hours)      LDA Wound     Row Name 08/16/21 0435 08/16/21 0030 08/15/21 2006       Wound 07/26/21 2200 Left distal nose Pressure Injury    Wound - Properties Group Placement Date: 07/26/21  - Placement Time: 2200  -MF Present on Hospital Admission: N  -MF Side: Left  -MF Orientation: distal  -MF Location: nose  -MF Primary Wound Type: Pressure inj  -MF Stage, Pressure Injury : deep tissue injury  -MF    Retired Wound - Properties Group Date first assessed: 07/26/21  - Time first assessed: 2200  -MF Present on Hospital Admission: N  -MF Side: Left  -MF Location: nose  -MF Primary Wound Type: Pressure inj  -MF       Wound 07/26/21 2200 midline tongue Pressure Injury    Wound - Properties Group Placement Date: 07/26/21  - Placement Time: 2200  -MF Present on Hospital Admission: N  -MF Orientation: midline  -MF Location: tongue  -MF Primary Wound Type: Pressure inj  -MF Stage, Pressure Injury : deep tissue injury  -MF    Retired Wound - Properties Group Date first assessed: 07/26/21  - Time first assessed: 2200  -MF Present on Hospital Admission: N  -MF Location: tongue  -MF Primary Wound Type: Pressure inj  -MF       Wound 07/27/21 2028 medial sacral spine Pressure Injury    Wound - Properties Group Placement Date: 07/27/21  - Placement Time: 2028 -MF Present on Hospital Admission: N  -MF Orientation: medial  -MF Location: sacral spine  -MF Primary Wound Type: Pressure inj  -MF Stage, Pressure Injury : Stage 2  -MF    Base  red;non-blanchable  -EM  blanchable;dry  -EM  blanchable;dry  -EM    Retired  Wound - Properties Group Date first assessed: 07/27/21  -MF Time first assessed: 2028  -MF Present on Hospital Admission: N  -MF Location: sacral spine  -MF Primary Wound Type: Pressure inj  -MF       Wound 08/06/21 1400 Right cheek Pressure Injury    Wound - Properties Group Placement Date: 08/06/21  -HELLEN Placement Time: 1400  -HELLEN Present on Hospital Admission: N  -HELLEN Side: Right  -HELLEN Location: cheek  -HELLEN Primary Wound Type: Pressure inj  -HELLEN Stage, Pressure Injury : Stage 1  -HELLEN    Retired Wound - Properties Group Date first assessed: 08/06/21  -HELELN Time first assessed: 1400  -HELLEN Present on Hospital Admission: N  -HELLEN Side: Right  -HELLEN Location: cheek  -HELLEN Primary Wound Type: Pressure inj  -HELLEN       Wound 08/06/21 1400 Right throat Abrasion    Wound - Properties Group Placement Date: 08/06/21  -HELLEN Placement Time: 1400  -HELLEN Present on Hospital Admission: N  -HELLEN Side: Right  -HELLEN Location: throat  -HELLEN Primary Wound Type: Abrasion  -HELLEN    Retired Wound - Properties Group Date first assessed: 08/06/21  -HELLEN Time first assessed: 1400  -HELLEN Present on Hospital Admission: N  -HELLEN Side: Right  -HELLEN Location: throat  -HELLEN Primary Wound Type: Abrasion  -HELLEN    Row Name 08/15/21 1600             Wound 07/26/21 2200 Left distal nose Pressure Injury    Wound - Properties Group Placement Date: 07/26/21  -MF Placement Time: 2200 -MF Present on Hospital Admission: N  -MF Side: Left  -MF Orientation: distal  -MF Location: nose  -MF Primary Wound Type: Pressure inj  -MF Stage, Pressure Injury : deep tissue injury  -MF    Retired Wound - Properties Group Date first assessed: 07/26/21  -MF Time first assessed: 2200  -MF Present on Hospital Admission: N  -MF Side: Left  -MF Location: nose  -MF Primary Wound Type: Pressure inj  -MF       Wound 07/26/21 2200 midline tongue Pressure Injury    Wound - Properties Group Placement Date: 07/26/21  -MF Placement Time: 2200 -MF Present on Hospital Admission: N  -MF Orientation: midline  -MF Location:  tongue  -MF Primary Wound Type: Pressure inj  -MF Stage, Pressure Injury : deep tissue injury  -MF    Retired Wound - Properties Group Date first assessed: 07/26/21  -MF Time first assessed: 2200 -MF Present on Hospital Admission: N  -MF Location: tongue  -MF Primary Wound Type: Pressure inj  -MF       Wound 07/27/21 2028 medial sacral spine Pressure Injury    Wound - Properties Group Placement Date: 07/27/21  -MF Placement Time: 2028 -MF Present on Hospital Admission: N  -MF Orientation: medial  -MF Location: sacral spine  -MF Primary Wound Type: Pressure inj  -MF Stage, Pressure Injury : Stage 2  -MF    Base  blanchable;dry  -RG      Retired Wound - Properties Group Date first assessed: 07/27/21  -MF Time first assessed: 2028  -MF Present on Hospital Admission: N  -MF Location: sacral spine  -MF Primary Wound Type: Pressure inj  -MF       Wound 08/06/21 1400 Right cheek Pressure Injury    Wound - Properties Group Placement Date: 08/06/21  -HELLEN Placement Time: 1400  -HELLEN Present on Hospital Admission: N  -HELLEN Side: Right  -HELLEN Location: cheek  -HELLEN Primary Wound Type: Pressure inj  -HELLEN Stage, Pressure Injury : Stage 1  -HELLEN    Retired Wound - Properties Group Date first assessed: 08/06/21  -HELLEN Time first assessed: 1400  -HELLEN Present on Hospital Admission: N  -HELLEN Side: Right  -HELLEN Location: cheek  -HELLEN Primary Wound Type: Pressure inj  -HELLEN       Wound 08/06/21 1400 Right throat Abrasion    Wound - Properties Group Placement Date: 08/06/21  -HELLEN Placement Time: 1400  -HELLEN Present on Hospital Admission: N  -HELLEN Side: Right  -HELLEN Location: throat  -HELLEN Primary Wound Type: Abrasion  -HELLEN    Retired Wound - Properties Group Date first assessed: 08/06/21  -HELLEN Time first assessed: 1400  -HELLEN Present on Hospital Admission: N  -HELLEN Side: Right  -HELLEN Location: throat  -HELLEN Primary Wound Type: Abrasion  -HELLEN      User Key  (r) = Recorded By, (t) = Taken By, (c) = Cosigned By    Initials Name Provider Type    Janet Paulson RN Registered  Nurse    Dafne Menchaca, RN Registered Nurse    Ольга Spence RN Registered Nurse    Susana Perry RN Registered Nurse            Assessment/Plan      Brief Patient Summary:    Leslie Harris is a 30 y.o. morbidly obese female who was diagnosed with COVID-19 on 07/16/2021 at the [facility]. She had not been vaccinated for Covid. She presented to the [facility] ED on 07/22/2021 complaining of shortness of breath. Workup in the ER revealed acute respiratory failure with hypoxia, pneumonia due to COVID-19, hypertension, and hyperglycemia. The patient was placed on Precision Flow with 100% FiO2 and admitted to the ICU for close monitoring and further treatment. The patient was started on Decadron and Remdesivir.     07/23/21:  Patient transitioned AVAPS with Precision Flow with 100% FiO2, but continued to have hypoxia and was intubated.  Right IJ central line inserted.  Dietitian consulted for tube feeds.     07/24/21:  Remains intubated and sedated. Chemically paralyzed due to worsening hypoxemia. On Flolan nebulized.  Prone position.  Started on empiric coverage with ceftriaxone.  Tolerating tube feeds.       07/25/21:  Remains intubated, sedated, and chemically paralyzed.  Antibiotic changed to Zosyn.  Remains prone and on Flolan.  Tolerating tube feeds.         07/26/21:  Remains intubated, sedated, and chemically paralyzed.  Patient placed in supine position.  Remains on Flolan.  Tolerating tube feeds.       07/27/21:  Remains intubated, sedated, and chemically paralyzed.  Patient tolerating supine position.  Remains on Flolan.  Tolerating tube feeds.       07/28/21:  Remains intubated, sedated, and chemically paralyzed.  Patient tolerating supine position.  Remains on Flolan.  Tolerating tube feeds.       07/29/21:  Remains intubated, sedated, and chemically paralyzed.  Sputum culture grew MRSA.  Patient tolerating supine position.  Remains on Flolan; failed wean  attempt.  Tolerating tube feeds.       07/30/21:  Remains intubated, sedated, and chemically paralyzed.  Patient returned to prone position for 16 hours.  Remains on Flolan.  Transitioned to heparin drip.  Tolerating tube feeds.       07/31/21:  Remains intubated, sedated, and chemically paralyzed.  Remains on Flolan.  Diuresis started.  On heparin drip.  Tolerating tube feeds.       08/01/21:  Remains intubated, sedated, and chemically paralyzed.  A-line inserted.  Tolerating supine position.  Remains on heparin drip.  Diuresing well.  Remains on Flolan.  Cardizem drip started for hypertension and tachycardia.  Left radial a-line discontinued.  Right brachial a-line inserted.   Tolerating tube feeds.       08/02/21:  Remains intubated, sedated, and chemically paralyzed. Tolerating supine position.  Remains on Flolan.  Remains on heparin drip.  Labile BP, alternating Cardizem and Levophed.  Fecal management system placed due to high volume liquid stool.  Tolerating tube feeds.       08/03/21:  Remains intubated, sedated, and chemically paralyzed.  Infectious disease consulted for antibiotic management.  Heparin drip discontinued due to blood-tinged sputum.  Weaning Cardizem drip.  Remains on Flolan. Switched to prone position.  Tolerating tube feeds.       08/04/21:  Remains intubated, sedated, and chemically paralyzed.  Sputum culture grew Klebsiella pneumoniae.  FMS removed.  Lovenox restarted.  Weaning Cardizem drip.  Attempted to wean Flolan, but had to be reinitiated.  Tolerating tube feeds.       08/05/21:  Remains intubated, sedated, and chemically paralyzed.  Tolerating tube feeds.       08/06/21:  Remains intubated, sedated, and chemically paralyzed.  In prone position.  Tolerating tube feeds.       08/07/21:  Remains intubated, sedated, and chemically paralyzed.  Tolerating supine position.  Urine culture grew E. coli.  Tolerating tube feeds.       08/08/21:  Remains intubated, sedated, and chemically  paralyzed.  Status post bronchoscopy with multiple mucus plugs removed.  Paralytic weaned off.  Tolerating tube feeds.       08/09/21:  Remains intubated and sedated.  Tolerating tube feeds.       08/10/21:  Patient extubated.  Tolerating tube feeds.       08/11/21:  Remains extubated.  Diagnosed with critical illness polyneuropathy muscle weakness due to paralytics and steroids.  Patient with acute delirium.  Tolerating tube feeds.  SLP consulted for video swallow.     08/12/21:  The patient was downgraded to PCU and the Hospitalist team was consulted for medical management.  Patient started on mechanical soft diet overnight.  She is complaining of some shortness of breath, but maintaining oxygen saturation on 5 liters per high flow nasal cannula.  She reports fatigue and generalized weakness.        8/13     Patient's blood glucose very acceptable  Patient had been comfortable overnight.  She is slightly tachycardic on 5 L of high flow oxygen but sats are above 95 percent  Her white count 12.2  Pharmacy suggesting some changes in the insulin regimen to avoid multiple sticks.  Still feeling weak on the right upper extremity.  She relates that to her prone position in the ICU.  Discussed with the family that this would be helped by physical therapy.        8/14  Patient had been seen by new dietitian yesterday and I will speech therapist as well  She is a 96% saturation on 4 L of high flow nasal cannula  She is afebrile   working on her transfer/discharge planning to [facility] rehab facility.  Patient liver enzymes are fluctuating and mildly elevated  Have a PICC line placed.     8/15  She denies new symptoms today  She is saturating high 90s on 3 L high flow cannula.  De-escalating oxygen support  Asking for FMLA.  For family members  Due to persistent symptoms right upper extremity we will check venous Doppler  Check forearm x-ray as well  Advised regarding elevation and warm compresses.  She has  good pulsation right radial artery.  Nasogastric tube removed      Acetylcysteine, 600 mg, Oral, BID  aspirin, 325 mg, Oral, Daily  budesonide, 0.5 mg, Nebulization, BID - RT  cholecalciferol, 2,000 Units, Oral, Daily  enoxaparin, 70 mg, Subcutaneous, Q12H  fluconazole, 150 mg, Oral, Once  furosemide, 20 mg, Oral, Daily  guaiFENesin, 600 mg, Oral, Q12H  hydrocortisone, 20 mg, Oral, BID With Meals  insulin glargine, 45 Units, Subcutaneous, Q12H  insulin lispro, 0-14 Units, Subcutaneous, 4x Daily With Meals & Nightly  insulin lispro, 10 Units, Subcutaneous, TID With Meals  ipratropium-albuterol, 3 mL, Nebulization, 4x Daily - RT  melatonin, 10 mg, Oral, Nightly  metoprolol tartrate, 25 mg, Oral, Q12H  pantoprazole, 40 mg, Oral, BID AC  risperiDONE, 0.5 mg, Oral, Nightly  sodium chloride, 10 mL, Intravenous, Q12H  sodium chloride, 10 mL, Intravenous, Q12H       Pharmacy to Dose enoxaparin (LOVENOX),          Active Hospital Problems:  Active Hospital Problems    Diagnosis    • **Pneumonia due to COVID-19 virus    • Critical illness polyneuropathy (CMS/[facility])    • Delirium, acute    • Dysphagia    • E. coli UTI (urinary tract infection)    • Klebsiella pneumoniae pneumonia (CMS/[facility])    • Diabetes mellitus type 2 in obese (CMS/[facility])    • MRSA pneumonia (CMS/[facility])    • Acute respiratory failure due to COVID-19 (CMS/[facility])    • Class 3 severe obesity without serious comorbidity with body mass index (BMI) of 50.0 to 59.9 in adult    • Hypertension      Plan:     Shortness of breath-secondary to COVID-19 pneumonia, patient not vaccinated, patient intubated now extubated doing well, patient with development of underlying MRSA pneumonia as well as Klebsiella previously now finishing antibiotics  -Wean oxygen as tolerated  -Out of isolation  -Bronchodilators mucolytic's  -Pulmonology monitoring  -Due to prolonged hospital course and signs of critical illness myopathy patient will need inpatient rehab    COVID-19 pneumonia-patient now  on 3 L and off isolation  -Inflammatory markers  -Off remdesivir, initially on Decadron and then hydrocortisone  -Mucolytic's    MRSA/Klebsiella pneumonia-patient seen by pulmonology 19  -Finishing Rocephin and linezolid  -ID and pulmonology consulting    Vaginal burning-patient reports similar to previous yeast infections  -Diflucan x1    Right arm pain-May have some component of neuropathy after prolonged illness  -X-ray and upper extremity Doppler unremarkable  -Monitor daily  -Can discuss neuropathic pain medication if required    E. coli cystitis-patient finished Rocephin for treatment    Critical illness polyneuropathy-patient on prolonged dose of steroids due to underlying Covid infection  -PT/OT  -Rehab on discharge    Type 2 diabetes-patient with residual hyperglycemia likely due to steroids  -Long-acting insulin  -Diabetic diet    Dysphagia-after extubation noted but now able to remove NG tube  -Monitor with diet    Morbid obesity-BMI 50  -Lifestyle modification        DVT prophylaxis:  Medical DVT prophylaxis orders are present.    CODE STATUS:    Code Status: CPR  Medical Interventions (Level of Support Prior to Arrest): Full      Disposition:  I expect patient to be discharged in 24 to 48 hours.    This patient has been examined wearing appropriate Personal Protective Equipment and discussed with hospital infection control department. 08/16/21      Electronically signed by Rodrigo Cortez MD, 08/16/21, 14:00 EDT.  [facility] Hospitalist Team

## 2021-08-16 NOTE — PROGRESS NOTES
"PULMONARY CRITICAL CARE Progress  NOTE      PATIENT IDENTIFICATION:  Name: Leslie Harris  MRN: [id number]  :  1990     Age: 30 y.o.  Sex: female    DATE OF Note:  2021   Referring Physician: Sarah Fajardo MD                  Subjective:    Feeling better, no SOB,  On 5 L O2,  no chest or abd pain pain,  no bowel or bladder issues, no new complaints    Objective:  tMax 24 hrs: Temp (24hrs), Av °F (36.7 °C), Min:97.6 °F (36.4 °C), Max:98.4 °F (36.9 °C)      Vitals Ranges:   Temp:  [97.6 °F (36.4 °C)-98.4 °F (36.9 °C)] 98.1 °F (36.7 °C)  Heart Rate:  [] 125  Resp:  [16-24] 18  BP: (119-142)/(65-89) 133/83    Intake and Output Last 3 Shifts:   I/O last 3 completed shifts:  In: 480 [P.O.:480]  Out: 3275 [Urine:3275]    Exam:  /83   Pulse (!) 125   Temp 98.1 °F (36.7 °C) (Oral)   Resp 18   Ht 167.6 cm (66\")   Wt (!) 149 kg (328 lb 11.3 oz)   LMP 2021   SpO2 97%   BMI 53.05 kg/m²     General Appearance:  Alert   HEENT:  Normocephalic, without obvious abnormality, Conjunctiva/corneas clear,.  Normal external ear canals, Nares normal, no drainage     Neck:  Supple, symmetrical, trachea midline. No JVD.  Lungs /Chest wall:   Bilateral basal rhonchi,  symmetrical wall movement.     Heart:  Regular rate and rhythm, systolic murmur PMI left sternal border  Abdomen: Soft, non-tender, no masses, no organomegaly.    Extremities: Trace edema no clubbing or Cyanosis        Medications:    Current Facility-Administered Medications:   •  acetaminophen (TYLENOL) 160 MG/5ML solution 650 mg, 650 mg, Oral, Q6H PRN, Radha Jaquez MD, 650 mg at 21 0828  •  acetaminophen (TYLENOL) tablet 650 mg, 650 mg, Oral, Q4H PRN, 650 mg at 08/15/21 1956 **OR** acetaminophen (TYLENOL) suppository 650 mg, 650 mg, Rectal, Q4H PRN, Ryder Llanes APRN, 650 mg at 21 0153  •  Acetylcysteine capsule 600 mg, 600 mg, Oral, BID, Ryder Llanes APRN, 600 mg at 21 1059  •  aluminum-magnesium " hydroxide-simethicone (MAALOX MAX) 400-400-40 MG/5ML suspension 15 mL, 15 mL, Oral, Q6H PRN, Ryder Llanes APRN  •  artificial tears ophthalmic ointment, , Both Eyes, PRN, Jeff Feng MD  •  aspirin tablet 325 mg, 325 mg, Oral, Daily, Juan Arguelles MD, 325 mg at 08/16/21 1058  •  benzonatate (TESSALON) capsule 100 mg, 100 mg, Oral, TID PRN, Ryder Llanes APRN  •  budesonide (PULMICORT) nebulizer solution 0.5 mg, 0.5 mg, Nebulization, BID - RT, Jeff Feng MD, 0.5 mg at 08/16/21 1002  •  cefTRIAXone (ROCEPHIN) 2 g in sodium chloride 0.9 % 100 mL IVPB, 2 g, Intravenous, Q24H, Neela Alvarado MD, Last Rate: 200 mL/hr at 08/16/21 1230, 2 g at 08/16/21 1230  •  cholecalciferol (VITAMIN D3) tablet 2,000 Units, 2,000 Units, Oral, Daily, Juan Arguelles MD, 2,000 Units at 08/16/21 1059  •  dextrose (D50W) 25 g/ 50mL Intravenous Solution 25 g, 25 g, Intravenous, Q15 Min PRN, Ryder Llanes APRN  •  dextrose (GLUTOSE) oral gel 15 g, 15 g, Oral, Q15 Min PRN, Ryder Llanes APRN  •  enoxaparin (LOVENOX) syringe 70 mg, 70 mg, Subcutaneous, Q12H, Jeff Feng MD, 70 mg at 08/16/21 1100  •  furosemide (LASIX) tablet 20 mg, 20 mg, Oral, Daily, Sarah Fajardo MD, 20 mg at 08/16/21 1058  •  glucagon (human recombinant) (GLUCAGEN DIAGNOSTIC) injection 1 mg, 1 mg, Subcutaneous, Q15 Min PRN, Ryder Llanes APRN  •  guaiFENesin (MUCINEX) 12 hr tablet 600 mg, 600 mg, Oral, Q12H, Juan Arguelles MD, 600 mg at 08/16/21 1059  •  hydrALAZINE (APRESOLINE) injection 10 mg, 10 mg, Intravenous, Q4H PRN, Ryder Llanes APRN, 10 mg at 08/07/21 0051  •  hydrocortisone (CORTEF) tablet 20 mg, 20 mg, Oral, BID With Meals, Sarah Fajardo MD, 20 mg at 08/16/21 1058  •  hydrOXYzine (ATARAX) tablet 25 mg, 25 mg, Oral, TID PRN, Sheree Orantes APRN  •  insulin glargine (LANTUS, SEMGLEE) injection 45 Units, 45 Units, Subcutaneous, Q12H, Jeff Feng MD, 45 Units at 08/16/21  1112  •  insulin lispro (ADMELOG) injection 0-14 Units, 0-14 Units, Subcutaneous, 4x Daily With Meals & Nightly, 3 Units at 08/12/21 1416 **AND** insulin lispro (ADMELOG) injection 0-14 Units, 0-14 Units, Subcutaneous, PRN, Socorro Talbert APRN  •  insulin lispro (ADMELOG) injection 10 Units, 10 Units, Subcutaneous, TID With Meals, Juan Arguelles MD, 10 Units at 08/16/21 1100  •  ipratropium-albuterol (DUO-NEB) nebulizer solution 3 mL, 3 mL, Nebulization, 4x Daily - RT, Jeff Feng MD, 3 mL at 08/16/21 1002  •  lidocaine (XYLOCAINE) 5 % ointment, , , PRN, Jeff Feng MD, 1 application at 08/08/21 0910  •  lidocaine PF 1% (XYLOCAINE) injection, , , PRN, Jeff Feng MD, 5 mL at 08/08/21 0911  •  Magnesium Sulfate 2 gram infusion - Mg less than or equal to 1.5 mg/dL, 2 g, Intravenous, PRN, Last Rate: 25 mL/hr at 08/10/21 1143, 2 g at 08/10/21 1143 **OR** Magnesium Sulfate 1 gram infusion - Mg 1.6-1.9 mg/dL, 1 g, Intravenous, PRN, Ryder Llanes APRN, Last Rate: 100 mL/hr at 08/15/21 1210, 1 g at 08/15/21 1210  •  melatonin tablet 10 mg, 10 mg, Oral, Nightly, Sarah Fajardo MD, 10 mg at 08/15/21 2248  •  methocarbamol (ROBAXIN) tablet 500 mg, 500 mg, Oral, 4x Daily PRN, Juan Arguelles MD  •  metoprolol tartrate (LOPRESSOR) tablet 25 mg, 25 mg, Oral, Q12H, Sarah Fajardo MD, 25 mg at 08/16/21 1059  •  nitroglycerin (NITROSTAT) SL tablet 0.4 mg, 0.4 mg, Sublingual, Q5 Min PRN, Ryder Llanes APRN  •  ondansetron (ZOFRAN) tablet 2 mg, 2 mg, Oral, Q4H PRN **OR** ondansetron (ZOFRAN) injection 2 mg, 2 mg, Intravenous, Q4H PRN, Marianna Navarrete APRN, 2 mg at 08/12/21 0940  •  pantoprazole (PROTONIX) EC tablet 40 mg, 40 mg, Oral, BID TIAN, Juan Arguelles MD, 40 mg at 08/16/21 1059  •  Pharmacy to Dose enoxaparin (LOVENOX), , Does not apply, Continuous PRN, Jeff Feng MD  •  potassium chloride (K-DUR,KLOR-CON) CR tablet 40 mEq, 40 mEq, Oral, PRN **OR**  potassium chloride (KLOR-CON) packet 40 mEq, 40 mEq, Oral, PRN, 40 mEq at 08/10/21 1527 **OR** potassium chloride 10 mEq in 100 mL IVPB, 10 mEq, Intravenous, Q1H PRN, Ryder Llanes APRN  •  potassium phosphate 45 mmol in sodium chloride 0.9 % 500 mL infusion, 45 mmol, Intravenous, PRN **OR** potassium phosphate 30 mmol in sodium chloride 0.9 % 250 mL infusion, 30 mmol, Intravenous, PRN **OR** potassium phosphate 15 mmol in 0.9% sodium chloride 100 mL IVPB, 15 mmol, Intravenous, PRN **OR** sodium phosphates 45 mmol in sodium chloride 0.9 % 500 mL IVPB, 45 mmol, Intravenous, PRN **OR** sodium phosphates 30 mmol in sodium chloride 0.9 % 250 mL IVPB, 30 mmol, Intravenous, PRN **OR** sodium phosphates 15 mmol in sodium chloride 0.9 % 250 mL IVPB, 15 mmol, Intravenous, PRN, Ryder Llanes APRN  •  risperiDONE (risperDAL M-TABS) disintegrating tablet 0.5 mg, 0.5 mg, Oral, Nightly, DrawSarah MD, 0.5 mg at 08/15/21 1957  •  [COMPLETED] Insert peripheral IV, , , Once **AND** sodium chloride 0.9 % flush 10 mL, 10 mL, Intravenous, PRN, Leonardo Doss MD  •  sodium chloride 0.9 % flush 10 mL, 10 mL, Intravenous, Q12H, Ryder Llanes APRN, 10 mL at 08/16/21 1113  •  sodium chloride 0.9 % flush 10 mL, 10 mL, Intravenous, PRN, Ryder Llanes APRN  •  sodium chloride 0.9 % flush 10 mL, 10 mL, Intravenous, Q12H, Neela Alvarado MD, 10 mL at 08/16/21 1100  •  sodium chloride 0.9 % flush 10 mL, 10 mL, Intravenous, PRJenny REYNOLDS Ammar, MD  •  sodium chloride 0.9 % flush 20 mL, 20 mL, Intravenous, PRJenny REYNOLDS Ammar, MD    Data Review:  All labs (24hrs):   Recent Results (from the past 24 hour(s))   POC Glucose Once    Collection Time: 08/15/21  4:25 PM    Specimen: Blood   Result Value Ref Range    Glucose 143 (H) 70 - 105 mg/dL   POC Glucose Once    Collection Time: 08/15/21  7:43 PM    Specimen: Blood   Result Value Ref Range    Glucose 175 (H) 70 - 105 mg/dL   POC Glucose Once    Collection Time: 08/16/21  7:15 AM     Specimen: Blood   Result Value Ref Range    Glucose 141 (H) 70 - 105 mg/dL   Duplex Venous Upper Extremity - Right CAR    Collection Time: 08/16/21  8:32 AM   Result Value Ref Range    Right Internal Jugular Competent Y     Right Internal Jugular Compress C     Right Internal Jugular Phasic Y     Right Internal Jugular Spont Y     Left Internal Jugular Competent Y     Left Internal Jugular Compress C     Left Internal Jugular Phasic Y     Left Internal Jugular Spont Y     Right Subclavian Augment Y     Right Subclavian Competent Y     Right Subclavian Compress C     Right Subclavian Phasic Y     Right Subclavian Spont Y     Left Subclavian Augment Y     Left Subclavian Competent Y     Left Subclavian Compress C     Left Subclavian Phasic Y     Left Subclavian Spont Y     Right Axillary Augment Y     Right Axillary Competent Y     Right Axillary Compress C     Right Axillary Phasic Y     Right Axillary Spont Y     Right Brachial Compress C     Right Radial Compress C     Right Ulnar Compress C     Right Basilic Upper Compress C     Right Basilic Forearm Compress C     Right Cephalic Upper Compress C     Right Cephalic Forearm Compress C     Target HR (85%) 161 bpm    Max. Pred. HR (100%) 189 bpm   POC Glucose Once    Collection Time: 08/16/21 11:14 AM    Specimen: Blood   Result Value Ref Range    Glucose 136 (H) 70 - 105 mg/dL        Imaging:  Duplex Venous Upper Extremity - Right CAR  · Normal right upper extremity venous duplex scan.          ASSESSMENT:  Acute respiratory failure   Pneumonia -MRSA/ Klebsiella pneumoniae  COVID-19   Morbid obesity     DM II   E. coli UTI   HTN  Polyneuropathy   Delirium, acute  Dysphagia     PLAN:  Encourage OOB daily to chair   Advance diet to Regular with thin liquids  Encourage OOB more  PT/OT to get patient OOB at least for all meals  Bronchodilator  Inhaled corticosteroids  Mucinex/Cortef/Mucomyst tablets  Titrate O2 as tolerated  Electrolytes/ glycemic control  PT/OT/ST  DVT  and GI prophylaxis    Discussed with Dr Ping Walker, APRN   8/16/2021  13:00 EDT     I personally have examined  and interviewed the patient. I have reviewed the history, data, problems, assessment and plan with our NP.  Critical care time in direct medical management (   ) minutes  Electronically signed by Jeff Feng MD, D,ABSM, 08/16/21, 6:05 PM EDT.

## 2021-08-17 LAB
ALBUMIN SERPL-MCNC: 3.7 G/DL (ref 3.5–5.2)
ALBUMIN/GLOB SERPL: 1.1 G/DL
ALP SERPL-CCNC: 99 U/L (ref 39–117)
ALT SERPL W P-5'-P-CCNC: 128 U/L (ref 1–33)
ANION GAP SERPL CALCULATED.3IONS-SCNC: 7 MMOL/L (ref 5–15)
AST SERPL-CCNC: 37 U/L (ref 1–32)
BASOPHILS # BLD AUTO: 0.1 10*3/MM3 (ref 0–0.2)
BASOPHILS NFR BLD AUTO: 0.9 % (ref 0–1.5)
BILIRUB SERPL-MCNC: 0.7 MG/DL (ref 0–1.2)
BUN SERPL-MCNC: 13 MG/DL (ref 6–20)
BUN/CREAT SERPL: 37.1 (ref 7–25)
CA-I SERPL ISE-MCNC: 1.27 MMOL/L (ref 1.2–1.3)
CA-I SERPL ISE-MCNC: 1.29 MMOL/L (ref 1.2–1.3)
CALCIUM SPEC-SCNC: 9.6 MG/DL (ref 8.6–10.5)
CHLORIDE SERPL-SCNC: 101 MMOL/L (ref 98–107)
CO2 SERPL-SCNC: 30 MMOL/L (ref 22–29)
CREAT SERPL-MCNC: 0.35 MG/DL (ref 0.57–1)
D DIMER PPP FEU-MCNC: 1.55 MG/L (FEU) (ref 0–0.59)
DEPRECATED RDW RBC AUTO: 60.8 FL (ref 37–54)
EOSINOPHIL # BLD AUTO: 0.2 10*3/MM3 (ref 0–0.4)
EOSINOPHIL NFR BLD AUTO: 3.1 % (ref 0.3–6.2)
ERYTHROCYTE [DISTWIDTH] IN BLOOD BY AUTOMATED COUNT: 19.4 % (ref 12.3–15.4)
FIBRINOGEN PPP-MCNC: 591 MG/DL (ref 210–450)
GFR SERPL CREATININE-BSD FRML MDRD: >150 ML/MIN/1.73
GLOBULIN UR ELPH-MCNC: 3.3 GM/DL
GLUCOSE BLDC GLUCOMTR-MCNC: 119 MG/DL (ref 70–105)
GLUCOSE BLDC GLUCOMTR-MCNC: 119 MG/DL (ref 70–105)
GLUCOSE BLDC GLUCOMTR-MCNC: 122 MG/DL (ref 70–105)
GLUCOSE BLDC GLUCOMTR-MCNC: 127 MG/DL (ref 70–105)
GLUCOSE BLDC GLUCOMTR-MCNC: 88 MG/DL (ref 70–105)
GLUCOSE SERPL-MCNC: 97 MG/DL (ref 65–99)
HCT VFR BLD AUTO: 34 % (ref 34–46.6)
HGB BLD-MCNC: 11.5 G/DL (ref 12–15.9)
HOLD SPECIMEN: NORMAL
LDH SERPL-CCNC: 246 U/L (ref 135–214)
LYMPHOCYTES # BLD AUTO: 1.2 10*3/MM3 (ref 0.7–3.1)
LYMPHOCYTES NFR BLD AUTO: 16.7 % (ref 19.6–45.3)
MAGNESIUM SERPL-MCNC: 1.9 MG/DL (ref 1.6–2.6)
MCH RBC QN AUTO: 30.7 PG (ref 26.6–33)
MCHC RBC AUTO-ENTMCNC: 33.7 G/DL (ref 31.5–35.7)
MCV RBC AUTO: 90.8 FL (ref 79–97)
MONOCYTES # BLD AUTO: 0.5 10*3/MM3 (ref 0.1–0.9)
MONOCYTES NFR BLD AUTO: 6.7 % (ref 5–12)
NEUTROPHILS NFR BLD AUTO: 5 10*3/MM3 (ref 1.7–7)
NEUTROPHILS NFR BLD AUTO: 72.6 % (ref 42.7–76)
NRBC BLD AUTO-RTO: 0.1 /100 WBC (ref 0–0.2)
PLATELET # BLD AUTO: 257 10*3/MM3 (ref 140–450)
PMV BLD AUTO: 7.7 FL (ref 6–12)
POTASSIUM SERPL-SCNC: 4.1 MMOL/L (ref 3.5–5.2)
PROT SERPL-MCNC: 7 G/DL (ref 6–8.5)
RBC # BLD AUTO: 3.75 10*6/MM3 (ref 3.77–5.28)
SODIUM SERPL-SCNC: 138 MMOL/L (ref 136–145)
WBC # BLD AUTO: 6.9 10*3/MM3 (ref 3.4–10.8)

## 2021-08-17 PROCEDURE — 80053 COMPREHEN METABOLIC PANEL: CPT | Performed by: INTERNAL MEDICINE

## 2021-08-17 PROCEDURE — 94799 UNLISTED PULMONARY SVC/PX: CPT

## 2021-08-17 PROCEDURE — 63710000001 INSULIN GLARGINE PER 5 UNITS: Performed by: INTERNAL MEDICINE

## 2021-08-17 PROCEDURE — 82330 ASSAY OF CALCIUM: CPT | Performed by: NURSE PRACTITIONER

## 2021-08-17 PROCEDURE — 25010000002 ENOXAPARIN PER 10 MG: Performed by: INTERNAL MEDICINE

## 2021-08-17 PROCEDURE — 85025 COMPLETE CBC W/AUTO DIFF WBC: CPT | Performed by: NURSE PRACTITIONER

## 2021-08-17 PROCEDURE — 36415 COLL VENOUS BLD VENIPUNCTURE: CPT | Performed by: FAMILY MEDICINE

## 2021-08-17 PROCEDURE — 97535 SELF CARE MNGMENT TRAINING: CPT

## 2021-08-17 PROCEDURE — 85384 FIBRINOGEN ACTIVITY: CPT | Performed by: NURSE PRACTITIONER

## 2021-08-17 PROCEDURE — 82962 GLUCOSE BLOOD TEST: CPT

## 2021-08-17 PROCEDURE — 83735 ASSAY OF MAGNESIUM: CPT | Performed by: NURSE PRACTITIONER

## 2021-08-17 PROCEDURE — 85379 FIBRIN DEGRADATION QUANT: CPT | Performed by: FAMILY MEDICINE

## 2021-08-17 PROCEDURE — 83615 LACTATE (LD) (LDH) ENZYME: CPT | Performed by: NURSE PRACTITIONER

## 2021-08-17 PROCEDURE — 99232 SBSQ HOSP IP/OBS MODERATE 35: CPT | Performed by: FAMILY MEDICINE

## 2021-08-17 PROCEDURE — 97530 THERAPEUTIC ACTIVITIES: CPT

## 2021-08-17 PROCEDURE — 63710000001 INSULIN LISPRO (HUMAN) PER 5 UNITS: Performed by: INTERNAL MEDICINE

## 2021-08-17 RX ADMIN — INSULIN GLARGINE 45 UNITS: 100 INJECTION, SOLUTION SUBCUTANEOUS at 08:19

## 2021-08-17 RX ADMIN — BUDESONIDE 0.5 MG: 0.5 SUSPENSION RESPIRATORY (INHALATION) at 07:04

## 2021-08-17 RX ADMIN — BUDESONIDE 0.5 MG: 0.5 SUSPENSION RESPIRATORY (INHALATION) at 19:22

## 2021-08-17 RX ADMIN — Medication 600 MG: at 20:25

## 2021-08-17 RX ADMIN — METOPROLOL TARTRATE 25 MG: 25 TABLET, FILM COATED ORAL at 08:18

## 2021-08-17 RX ADMIN — Medication 10 ML: at 08:23

## 2021-08-17 RX ADMIN — INSULIN LISPRO 10 UNITS: 100 INJECTION, SOLUTION INTRAVENOUS; SUBCUTANEOUS at 17:24

## 2021-08-17 RX ADMIN — IPRATROPIUM BROMIDE AND ALBUTEROL SULFATE 3 ML: 2.5; .5 SOLUTION RESPIRATORY (INHALATION) at 19:22

## 2021-08-17 RX ADMIN — IPRATROPIUM BROMIDE AND ALBUTEROL SULFATE 3 ML: 2.5; .5 SOLUTION RESPIRATORY (INHALATION) at 07:00

## 2021-08-17 RX ADMIN — PANTOPRAZOLE SODIUM 40 MG: 40 TABLET, DELAYED RELEASE ORAL at 08:19

## 2021-08-17 RX ADMIN — HYDROCORTISONE 20 MG: 10 TABLET ORAL at 17:24

## 2021-08-17 RX ADMIN — Medication 2000 UNITS: at 08:19

## 2021-08-17 RX ADMIN — ENOXAPARIN SODIUM 70 MG: 80 INJECTION, SOLUTION INTRAVENOUS; SUBCUTANEOUS at 20:26

## 2021-08-17 RX ADMIN — ENOXAPARIN SODIUM 70 MG: 80 INJECTION, SOLUTION INTRAVENOUS; SUBCUTANEOUS at 08:17

## 2021-08-17 RX ADMIN — GUAIFENESIN 600 MG: 600 TABLET, EXTENDED RELEASE ORAL at 08:18

## 2021-08-17 RX ADMIN — Medication 10 ML: at 22:28

## 2021-08-17 RX ADMIN — Medication 10 MG: at 22:21

## 2021-08-17 RX ADMIN — IPRATROPIUM BROMIDE AND ALBUTEROL SULFATE 3 ML: 2.5; .5 SOLUTION RESPIRATORY (INHALATION) at 11:12

## 2021-08-17 RX ADMIN — HYDROCORTISONE 20 MG: 10 TABLET ORAL at 08:19

## 2021-08-17 RX ADMIN — Medication 600 MG: at 08:19

## 2021-08-17 RX ADMIN — FUROSEMIDE 20 MG: 20 TABLET ORAL at 08:19

## 2021-08-17 RX ADMIN — ASPIRIN 325 MG ORAL TABLET 325 MG: 325 PILL ORAL at 08:19

## 2021-08-17 RX ADMIN — PANTOPRAZOLE SODIUM 40 MG: 40 TABLET, DELAYED RELEASE ORAL at 17:24

## 2021-08-17 RX ADMIN — INSULIN LISPRO 10 UNITS: 100 INJECTION, SOLUTION INTRAVENOUS; SUBCUTANEOUS at 12:28

## 2021-08-17 RX ADMIN — RISPERIDONE 0.5 MG: 0.5 TABLET, ORALLY DISINTEGRATING ORAL at 22:21

## 2021-08-17 RX ADMIN — METOPROLOL TARTRATE 25 MG: 25 TABLET, FILM COATED ORAL at 20:25

## 2021-08-17 RX ADMIN — IPRATROPIUM BROMIDE AND ALBUTEROL SULFATE 3 ML: 2.5; .5 SOLUTION RESPIRATORY (INHALATION) at 15:07

## 2021-08-17 RX ADMIN — Medication 10 ML: at 20:26

## 2021-08-17 RX ADMIN — Medication 10 ML: at 08:17

## 2021-08-17 NOTE — THERAPY TREATMENT NOTE
Subjective: Pt agreeable to therapeutic plan of care.  RN asks if we can use abrahan lift to get pt up today. PT/OT agreeable to this. Pt reports she feels great sitting up.     Objective:     Bed mobility - Dependent  Transfers - Dependent; used abrahan lift to transfer pt to chair; monitored vital signs throughout transfer; pt originally on 2LO2 w/ sats 96%; after transfer to chair, left on room air, as sats now 96%.  HR was stable at 98 bpm after sitting in chair.    Ambulation - 0 feet N/A or Not attempted.    Pain: 0 VAS; reports some paresthesias in B feet.   Education: Provided education on importance of mobility and skilled verbal / tactile cueing throughout intervention.     Assessment: Leslie Harris tolerated transfer to chair w/ abrahan lift well. PT/OT instructed RN in how to perform trasnfer safely. Respiration status improved in sitting in chair. Pt presents with functional mobility impairments which indicate the need for skilled intervention. Tolerating session today without incident. Will continue to follow and progress as tolerated.     Plan/Recommendations:   Pt would benefit from Inpatient Rehabilitation placement at discharge from facility and requires no DME at discharge.   Pt desires Inpatient Rehabilitation placement at discharge. Pt cooperative; agreeable to therapeutic recommendations and plan of care.     Basic Mobility 6-click:  Rollin = Total, A lot = 2, A little = 3; 4 = None  Supine>Sit:   1 = Total, A lot = 2, A little = 3; 4 = None   Sit>Stand with arms:  1 = Total, A lot = 2, A little = 3; 4 = None  Bed>Chair:   1 = Total, A lot = 2, A little = 3; 4 = None  Ambulate in room:  1 = Total, A lot = 2, A little = 3; 4 = None  3-5 Steps with railin = Total, A lot = 2, A little = 3; 4 = None  Score: 8    Modified Jhonny: N/A = No pre-op stroke/TIA    Post-Tx Position: Up in Chair, Alarms activated and Call light and personal items within reach; family present.  PPE: gloves,  surgical mask, eyewear protection

## 2021-08-17 NOTE — PLAN OF CARE
Problem: Adult Inpatient Plan of Care  Goal: Plan of Care Review  Outcome: Ongoing, Progressing  Goal: Patient-Specific Goal (Individualized)  Outcome: Ongoing, Progressing  Goal: Absence of Hospital-Acquired Illness or Injury  Outcome: Ongoing, Progressing  Intervention: Identify and Manage Fall Risk  Recent Flowsheet Documentation  Taken 8/16/2021 2000 by Ricky Hernandez RN  Safety Promotion/Fall Prevention:   fall prevention program maintained   safety round/check completed  Goal: Optimal Comfort and Wellbeing  Outcome: Ongoing, Progressing  Intervention: Provide Person-Centered Care  Recent Flowsheet Documentation  Taken 8/16/2021 2000 by Ricky Hernandez RN  Trust Relationship/Rapport:   care explained   choices provided   emotional support provided   empathic listening provided   questions answered   reassurance provided   questions encouraged   thoughts/feelings acknowledged  Goal: Readiness for Transition of Care  Outcome: Ongoing, Progressing     Problem: Fluid Imbalance (Pneumonia)  Goal: Fluid Balance  Outcome: Ongoing, Progressing     Problem: Infection (Pneumonia)  Goal: Resolution of Infection Signs and Symptoms  Outcome: Ongoing, Progressing     Problem: Respiratory Compromise (Pneumonia)  Goal: Effective Oxygenation and Ventilation  Outcome: Ongoing, Progressing  Intervention: Promote Airway Secretion Clearance  Recent Flowsheet Documentation  Taken 8/16/2021 2000 by Ricky Hernandez RN  Cough And Deep Breathing: done independently per patient     Problem: Skin Injury Risk Increased  Goal: Skin Health and Integrity  Outcome: Ongoing, Progressing     Problem: Aspiration (Enteral Nutrition)  Goal: Absence of Aspiration Signs and Symptoms  Outcome: Ongoing, Progressing     Problem: Device-Related Complication Risk (Enteral Nutrition)  Goal: Safe, Effective Therapy Delivery  Outcome: Ongoing, Progressing     Problem: Feeding Intolerance (Enteral Nutrition)  Goal: Feeding Tolerance  Outcome:  Ongoing, Progressing     Problem: Fall Injury Risk  Goal: Absence of Fall and Fall-Related Injury  Outcome: Ongoing, Progressing  Intervention: Identify and Manage Contributors to Fall Injury Risk  Recent Flowsheet Documentation  Taken 8/16/2021 2000 by Ricky Hernandez RN  Medication Review/Management: medications reviewed  Intervention: Promote Injury-Free Environment  Recent Flowsheet Documentation  Taken 8/16/2021 2000 by Ricky Hernandez RN  Safety Promotion/Fall Prevention:   fall prevention program maintained   safety round/check completed     Problem: Communication Impairment (Mechanical Ventilation, Invasive)  Goal: Effective Communication  Outcome: Ongoing, Progressing     Problem: Device-Related Complication Risk (Mechanical Ventilation, Invasive)  Goal: Optimal Device Function  Outcome: Ongoing, Progressing     Problem: Inability to Wean (Mechanical Ventilation, Invasive)  Goal: Mechanical Ventilation Liberation  Outcome: Ongoing, Progressing  Intervention: Promote Extubation and Mechanical Ventilation Liberation  Recent Flowsheet Documentation  Taken 8/16/2021 2000 by Ricky Hernandez RN  Sleep/Rest Enhancement:   awakenings minimized   consistent schedule promoted   family presence promoted   noise level reduced   regular sleep/rest pattern promoted   room darkened  Medication Review/Management: medications reviewed     Problem: Nutrition Impairment (Mechanical Ventilation, Invasive)  Goal: Optimal Nutrition Delivery  Outcome: Ongoing, Progressing     Problem: Skin and Tissue Injury (Mechanical Ventilation, Invasive)  Goal: Absence of Device-Related Skin and Tissue Injury  Outcome: Ongoing, Progressing     Problem: Ventilator-Induced Lung Injury (Mechanical Ventilation, Invasive)  Goal: Absence of Ventilator-Induced Lung Injury  Outcome: Ongoing, Progressing     Problem: Glycemic Control Impaired  Goal: Blood Glucose Level Within Desired Range  Outcome: Ongoing, Progressing     Problem:  Fever  Goal: Body Temperature in Desired Range  Outcome: Ongoing, Progressing   Goal Outcome Evaluation:

## 2021-08-17 NOTE — NURSING NOTE
Pt has been lifted out of the bed via abrahan lift with RN, PT and OT. Pt is siting up in the chair on room air, vitals stable at this time.

## 2021-08-17 NOTE — PROGRESS NOTES
"PULMONARY CRITICAL CARE Progress  NOTE      PATIENT IDENTIFICATION:  Name: Leslie Harris  MRN: [id number]  :  1990     Age: 30 y.o.  Sex: female    DATE OF Note:  2021   Referring Physician: Sarah Fajardo MD                  Subjective:    Feeling better, Up in a chair at bedside, off O2 currently, no SOB,   no chest or abd pain pain,  no bowel or bladder issues, no new complaints    Objective:  tMax 24 hrs: Temp (24hrs), Av.1 °F (36.7 °C), Min:97.6 °F (36.4 °C), Max:98.3 °F (36.8 °C)      Vitals Ranges:   Temp:  [97.6 °F (36.4 °C)-98.3 °F (36.8 °C)] 98.3 °F (36.8 °C)  Heart Rate:  [] 95  Resp:  [16-20] 18  BP: (114-124)/(67-77) 114/77    Intake and Output Last 3 Shifts:   I/O last 3 completed shifts:  In: 900 [P.O.:900]  Out: 4425 [Urine:4425]    Exam:  /77   Pulse 95   Temp 98.3 °F (36.8 °C) (Oral)   Resp 18   Ht 167.6 cm (66\")   Wt (!) 150 kg (330 lb 11 oz)   LMP 2021   SpO2 97%   BMI 53.37 kg/m²     General Appearance:  Alert   HEENT:  Normocephalic, without obvious abnormality, Conjunctiva/corneas clear,.  Normal external ear canals, Nares normal, no drainage     Neck:  Supple, symmetrical, trachea midline. No JVD.  Lungs /Chest wall:   Bilateral basal rhonchi,  symmetrical wall movement.     Heart:  Regular rate and rhythm, systolic murmur PMI left sternal border  Abdomen: Soft, non-tender, no masses, no organomegaly.    Extremities: Trace edema no clubbing or Cyanosis        Medications:    Current Facility-Administered Medications:   •  acetaminophen (TYLENOL) 160 MG/5ML solution 650 mg, 650 mg, Oral, Q6H PRN, Radha Jaquez MD, 650 mg at 21 0828  •  acetaminophen (TYLENOL) tablet 650 mg, 650 mg, Oral, Q4H PRN, 650 mg at 08/15/21 1956 **OR** acetaminophen (TYLENOL) suppository 650 mg, 650 mg, Rectal, Q4H PRN, Ryder Llanes, APRN, 650 mg at 21 0153  •  Acetylcysteine capsule 600 mg, 600 mg, Oral, BID, Ryder Llanes APRN, 600 mg at 21 " 0819  •  aluminum-magnesium hydroxide-simethicone (MAALOX MAX) 400-400-40 MG/5ML suspension 15 mL, 15 mL, Oral, Q6H PRN, Ryder Llanes APRN  •  artificial tears ophthalmic ointment, , Both Eyes, PRN, Jeff Feng MD  •  aspirin tablet 325 mg, 325 mg, Oral, Daily, Juan Arguelles MD, 325 mg at 08/17/21 0819  •  benzonatate (TESSALON) capsule 100 mg, 100 mg, Oral, TID PRN, Ryder Llanes APRN  •  budesonide (PULMICORT) nebulizer solution 0.5 mg, 0.5 mg, Nebulization, BID - RT, Jeff Feng MD, 0.5 mg at 08/17/21 0704  •  cholecalciferol (VITAMIN D3) tablet 2,000 Units, 2,000 Units, Oral, Daily, Juan Arguelles MD, 2,000 Units at 08/17/21 0819  •  dextrose (D50W) 25 g/ 50mL Intravenous Solution 25 g, 25 g, Intravenous, Q15 Min PRN, Ryder Llanes APRN  •  dextrose (GLUTOSE) oral gel 15 g, 15 g, Oral, Q15 Min PRN, Ryder Llanes APRN  •  enoxaparin (LOVENOX) syringe 70 mg, 70 mg, Subcutaneous, Q12H, Jeff Feng MD, 70 mg at 08/17/21 0817  •  furosemide (LASIX) tablet 20 mg, 20 mg, Oral, Daily, Sarah Fajardo MD, 20 mg at 08/17/21 0819  •  glucagon (human recombinant) (GLUCAGEN DIAGNOSTIC) injection 1 mg, 1 mg, Subcutaneous, Q15 Min PRN, Ryder Llanes APRN  •  guaiFENesin (MUCINEX) 12 hr tablet 600 mg, 600 mg, Oral, Q12H, Juan Arguelles MD, 600 mg at 08/17/21 0818  •  hydrALAZINE (APRESOLINE) injection 10 mg, 10 mg, Intravenous, Q4H PRN, Ryder Llanes APRN, 10 mg at 08/07/21 0051  •  hydrocortisone (CORTEF) tablet 20 mg, 20 mg, Oral, BID With Meals, Draw, MD Sarah, 20 mg at 08/17/21 0819  •  hydrOXYzine (ATARAX) tablet 25 mg, 25 mg, Oral, TID PRN, Sheree Orantes APRN  •  insulin glargine (LANTUS, SEMGLEE) injection 45 Units, 45 Units, Subcutaneous, Q12H, Jeff Feng MD, 45 Units at 08/17/21 0819  •  insulin lispro (ADMELOG) injection 0-14 Units, 0-14 Units, Subcutaneous, 4x Daily With Meals & Nightly, 5 Units at 08/16/21 2021 **AND**  insulin lispro (ADMELOG) injection 0-14 Units, 0-14 Units, Subcutaneous, PRN, Socorro Talbert APRN  •  insulin lispro (ADMELOG) injection 10 Units, 10 Units, Subcutaneous, TID With Meals, Blane, Juan Valdes MD, 10 Units at 08/16/21 1850  •  ipratropium-albuterol (DUO-NEB) nebulizer solution 3 mL, 3 mL, Nebulization, 4x Daily - RT, Jeff Feng MD, 3 mL at 08/17/21 1112  •  lidocaine (XYLOCAINE) 5 % ointment, , , PRN, Jeff Feng MD, 1 application at 08/08/21 0910  •  lidocaine PF 1% (XYLOCAINE) injection, , , PRN, Jeff Feng MD, 5 mL at 08/08/21 0911  •  Magnesium Sulfate 2 gram infusion - Mg less than or equal to 1.5 mg/dL, 2 g, Intravenous, PRN, Last Rate: 25 mL/hr at 08/10/21 1143, 2 g at 08/10/21 1143 **OR** Magnesium Sulfate 1 gram infusion - Mg 1.6-1.9 mg/dL, 1 g, Intravenous, PRN, Ryder Llanes APRN, Last Rate: 100 mL/hr at 08/15/21 1210, 1 g at 08/15/21 1210  •  melatonin tablet 10 mg, 10 mg, Oral, Nightly, Sarah Fajardo MD, 10 mg at 08/16/21 2023  •  methocarbamol (ROBAXIN) tablet 500 mg, 500 mg, Oral, 4x Daily PRN, Juan Arguelles MD  •  metoprolol tartrate (LOPRESSOR) tablet 25 mg, 25 mg, Oral, Q12H, Sarah Fajardo MD, 25 mg at 08/17/21 0818  •  nitroglycerin (NITROSTAT) SL tablet 0.4 mg, 0.4 mg, Sublingual, Q5 Min PRN, Ryder Llanes APRN  •  ondansetron (ZOFRAN) tablet 2 mg, 2 mg, Oral, Q4H PRN **OR** ondansetron (ZOFRAN) injection 2 mg, 2 mg, Intravenous, Q4H PRN, Marianna Navarrete APRN, 2 mg at 08/12/21 0940  •  pantoprazole (PROTONIX) EC tablet 40 mg, 40 mg, Oral, BID Blane OVERTON George Fayez Labib Youssief, MD, 40 mg at 08/17/21 0819  •  Pharmacy to Dose enoxaparin (LOVENOX), , Does not apply, Continuous PRN, Jeff Feng MD  •  potassium chloride (K-DUR,KLOR-CON) CR tablet 40 mEq, 40 mEq, Oral, PRN **OR** potassium chloride (KLOR-CON) packet 40 mEq, 40 mEq, Oral, PRN, 40 mEq at 08/10/21 1527 **OR** potassium chloride 10 mEq in 100 mL IVPB, 10 mEq,  Intravenous, Q1H PRN, Ryder Llanes APRN  •  potassium phosphate 45 mmol in sodium chloride 0.9 % 500 mL infusion, 45 mmol, Intravenous, PRN **OR** potassium phosphate 30 mmol in sodium chloride 0.9 % 250 mL infusion, 30 mmol, Intravenous, PRN **OR** potassium phosphate 15 mmol in 0.9% sodium chloride 100 mL IVPB, 15 mmol, Intravenous, PRN **OR** sodium phosphates 45 mmol in sodium chloride 0.9 % 500 mL IVPB, 45 mmol, Intravenous, PRN **OR** sodium phosphates 30 mmol in sodium chloride 0.9 % 250 mL IVPB, 30 mmol, Intravenous, PRN **OR** sodium phosphates 15 mmol in sodium chloride 0.9 % 250 mL IVPB, 15 mmol, Intravenous, PRN, Ryder Llanes APRN  •  risperiDONE (risperDAL M-TABS) disintegrating tablet 0.5 mg, 0.5 mg, Oral, Nightly, DrawSarah MD, 0.5 mg at 08/16/21 2023  •  [COMPLETED] Insert peripheral IV, , , Once **AND** sodium chloride 0.9 % flush 10 mL, 10 mL, Intravenous, PRN, Leonardo Doss MD  •  sodium chloride 0.9 % flush 10 mL, 10 mL, Intravenous, Q12H, Ryder Llanes APRN, 10 mL at 08/17/21 0823  •  sodium chloride 0.9 % flush 10 mL, 10 mL, Intravenous, PRNShraddha Phillip E, APRN  •  sodium chloride 0.9 % flush 10 mL, 10 mL, Intravenous, Q12H, Neela Alvarado MD, 10 mL at 08/17/21 0817  •  sodium chloride 0.9 % flush 10 mL, 10 mL, Intravenous, PRJenny REYNOLDS Ammar, MD  •  sodium chloride 0.9 % flush 20 mL, 20 mL, Intravenous, Jenny SRIVASTAVA Ammar, MD    Data Review:  All labs (24hrs):   Recent Results (from the past 24 hour(s))   POC Glucose Once    Collection Time: 08/16/21  5:11 PM    Specimen: Blood   Result Value Ref Range    Glucose 113 (H) 70 - 105 mg/dL   POC Glucose Once    Collection Time: 08/16/21  8:17 PM    Specimen: Blood   Result Value Ref Range    Glucose 208 (H) 70 - 105 mg/dL   POC Glucose Once    Collection Time: 08/17/21  7:00 AM    Specimen: Blood   Result Value Ref Range    Glucose 88 70 - 105 mg/dL   Fibrinogen    Collection Time: 08/17/21  8:14 AM    Specimen: Blood   Result  Value Ref Range    Fibrinogen 591 (H) 210 - 450 mg/dL   Calcium, Ionized    Collection Time: 08/17/21  8:14 AM    Specimen: Blood   Result Value Ref Range    Ionized Calcium 1.27 1.20 - 1.30 mmol/L   D-dimer, Quantitative    Collection Time: 08/17/21  8:14 AM    Specimen: Blood   Result Value Ref Range    D-Dimer, Quantitative 1.55 (H) 0.00 - 0.59 mg/L (FEU)   Magnesium    Collection Time: 08/17/21  8:31 AM    Specimen: Blood   Result Value Ref Range    Magnesium 1.9 1.6 - 2.6 mg/dL   Lactate Dehydrogenase    Collection Time: 08/17/21  8:31 AM    Specimen: Blood   Result Value Ref Range     (H) 135 - 214 U/L   Comprehensive Metabolic Panel    Collection Time: 08/17/21  8:31 AM    Specimen: Blood   Result Value Ref Range    Glucose 97 65 - 99 mg/dL    BUN 13 6 - 20 mg/dL    Creatinine 0.35 (L) 0.57 - 1.00 mg/dL    Sodium 138 136 - 145 mmol/L    Potassium 4.1 3.5 - 5.2 mmol/L    Chloride 101 98 - 107 mmol/L    CO2 30.0 (H) 22.0 - 29.0 mmol/L    Calcium 9.6 8.6 - 10.5 mg/dL    Total Protein 7.0 6.0 - 8.5 g/dL    Albumin 3.70 3.50 - 5.20 g/dL    ALT (SGPT) 128 (H) 1 - 33 U/L    AST (SGOT) 37 (H) 1 - 32 U/L    Alkaline Phosphatase 99 39 - 117 U/L    Total Bilirubin 0.7 0.0 - 1.2 mg/dL    eGFR Non African Amer >150 >60 mL/min/1.73    Globulin 3.3 gm/dL    A/G Ratio 1.1 g/dL    BUN/Creatinine Ratio 37.1 (H) 7.0 - 25.0    Anion Gap 7.0 5.0 - 15.0 mmol/L   Calcium, Ionized    Collection Time: 08/17/21  8:31 AM    Specimen: Blood   Result Value Ref Range    Ionized Calcium 1.29 1.20 - 1.30 mmol/L   CBC Auto Differential    Collection Time: 08/17/21  8:31 AM    Specimen: Blood   Result Value Ref Range    WBC 6.90 3.40 - 10.80 10*3/mm3    RBC 3.75 (L) 3.77 - 5.28 10*6/mm3    Hemoglobin 11.5 (L) 12.0 - 15.9 g/dL    Hematocrit 34.0 34.0 - 46.6 %    MCV 90.8 79.0 - 97.0 fL    MCH 30.7 26.6 - 33.0 pg    MCHC 33.7 31.5 - 35.7 g/dL    RDW 19.4 (H) 12.3 - 15.4 %    RDW-SD 60.8 (H) 37.0 - 54.0 fl    MPV 7.7 6.0 - 12.0 fL     Platelets 257 140 - 450 10*3/mm3    Neutrophil % 72.6 42.7 - 76.0 %    Lymphocyte % 16.7 (L) 19.6 - 45.3 %    Monocyte % 6.7 5.0 - 12.0 %    Eosinophil % 3.1 0.3 - 6.2 %    Basophil % 0.9 0.0 - 1.5 %    Neutrophils, Absolute 5.00 1.70 - 7.00 10*3/mm3    Lymphocytes, Absolute 1.20 0.70 - 3.10 10*3/mm3    Monocytes, Absolute 0.50 0.10 - 0.90 10*3/mm3    Eosinophils, Absolute 0.20 0.00 - 0.40 10*3/mm3    Basophils, Absolute 0.10 0.00 - 0.20 10*3/mm3    nRBC 0.1 0.0 - 0.2 /100 WBC   Green Top (Gel)    Collection Time: 08/17/21  8:31 AM   Result Value Ref Range    Extra Tube Hold for add-ons.    POC Glucose Once    Collection Time: 08/17/21 11:36 AM    Specimen: Blood   Result Value Ref Range    Glucose 119 (H) 70 - 105 mg/dL        Imaging:  Duplex Venous Upper Extremity - Right CAR  · Normal right upper extremity venous duplex scan.          ASSESSMENT:  Acute respiratory failure   Pneumonia -MRSA/ Klebsiella pneumoniae  COVID-19   Morbid obesity     DM II   E. coli UTI   HTN  Polyneuropathy   Delirium, acute  Dysphagia     PLAN:  Keep on room air as tolerated  Encourage OOB daily to chair   Advance diet to Regular with thin liquids  Encourage OOB more  PT/OT to get patient OOB at least for all meals  Bronchodilator  Inhaled corticosteroids  Mucinex/Cortef/Mucomyst tablets  Titrate O2 as tolerated  Electrolytes/ glycemic control  PT/OT/ST  DVT and GI prophylaxis    Discussed with Dr Ping Walker, APRN   8/17/2021  11:46 EDT       I personally have examined  and interviewed the patient. I have reviewed the history, data, problems, assessment and plan with our NP.  Critical care time in direct medical management (   ) minutes  Electronically signed by Jeff Feng MD, D,ABS, 08/17/21, 10:43 PM EDT.

## 2021-08-17 NOTE — THERAPY TREATMENT NOTE
Subjective: Pt agreeable to therapeutic plan of care.    Objective:     Bed Mobility: Max-A and Assist x 2- rolling to place abrahan pad.    Functional Transfers: N/A or Not attempted.  Functional Ambulation: N/A or Not attempted.    Pain: 0 VAS  Education: Provided education on importance of mobility and skilled verbal / tactile cueing throughout intervention.     Assessment: Leslie Harris presents with ADL impairments below baseline abilities which indicate the need for continued skilled intervention while inpatient. She was eager to use abrahan lift for out of bed transfer. She tolerated well, only complaint was tingling in bottom of feet where they were touching the floor.  Tolerating session today without incident. Will continue to follow and progress as tolerated.     Plan/Recommendations:   Pt would benefit from Inpatient Rehabilitation placement at discharge from facility.   Pt desires Inpatient Rehabilitation placement at discharge. Pt cooperative; agreeable to therapeutic recommendations and plan of care.     Modified [place]: 4 = Moderately severe disability (Unable to attend to own bodily needs without assistance, and unable to walk unassisted)     Post-Tx Position: Up in Chair, Alarms activated and Call light and personal items within reach  PPE: gloves, surgical mask, eyewear protection

## 2021-08-17 NOTE — CASE MANAGEMENT/SOCIAL WORK
Continued Stay Note  EMANUEL Wasserman     Patient Name: Leslie Harris  MRN: [id number]  Today's Date: 8/17/2021    Admit Date: 7/22/2021    Discharge Plan     Row Name 08/17/21 1244       Plan    Plan Comments  DC barriers: pending precert        Expected Discharge Date and Time     Expected Discharge Date Expected Discharge Time    Aug 18, 2021         Phone communication or documentation only - no physical contact with patient or family.      Amelie Harmon RN

## 2021-08-17 NOTE — PLAN OF CARE
Assessment: Leslie Harris presents with ADL impairments below baseline abilities which indicate the need for continued skilled intervention while inpatient. She was eager to use abrahan lift for out of bed transfer. She tolerated well, only complaint was tingling in bottom of feet where they were touching the floor.  Tolerating session today without incident. Will continue to follow and progress as tolerated.     Plan/Recommendations:   Pt would benefit from Inpatient Rehabilitation placement at discharge from facility.   Pt desires Inpatient Rehabilitation placement at discharge. Pt cooperative; agreeable to therapeutic recommendations and plan of care.

## 2021-08-17 NOTE — PROGRESS NOTES
[facility] Medicine Services Daily Progress Note    Patient Name: Leslie Harris  : 1990  MRN: [id number]  Primary Care Physician:  Lesia, No Known  Date of admission: 2021      Subjective      Chief Complaint: Shortness of breath    Patient continues to wean oxygen.  Yesterday attempt to get patient out of bed aborted due to transient tachycardia.  Patient reports no new symptoms.  Patient needs to be mobilized working to get patient out of bed today.      Review of Systems   Constitutional: Positive for malaise/fatigue. Negative for chills and fever.   HENT: Negative for hoarse voice and sore throat.    Eyes: Negative for double vision and photophobia.   Cardiovascular: Negative for chest pain and syncope.   Respiratory: Negative for cough and shortness of breath.    Musculoskeletal: Positive for myalgias. Negative for muscle weakness.   Gastrointestinal: Negative for nausea and vomiting.   Genitourinary: Negative for genital sores and pelvic pain.   Neurological: Positive for paresthesias and weakness. Negative for dizziness.   Psychiatric/Behavioral: Negative for altered mental status. The patient is not nervous/anxious.          Objective      Vitals:   Temp:  [97.6 °F (36.4 °C)-98.3 °F (36.8 °C)] 98.3 °F (36.8 °C)  Heart Rate:  [] 95  Resp:  [16-20] 18  BP: (114-124)/(67-77) 114/77  Flow (L/min):  [2-3] 2    Physical Exam      General: Morbidly obese female lying in bed breathing company on 2 L via nasal cannula no acute distress  HEENT: NC/AT, EOMI, mucosa moist  Heart: Regular, rate controlled  Chest: Normal work of breathing, moving air well no wheezing  Abdominal: Soft. NT/ND.   Musculoskeletal: Normal ROM.  No cyanosis. No calf tenderness.  Neurological: AAOx3, no focal deficits  Skin: Skin is warm and dry. No rash  Psychiatric: Normal mood and affect.      Result Review    Result Review:  I have personally reviewed the results from the time of this admission  to 8/17/2021 11:35 EDT and agree with these findings:  [x]  Laboratory  [x]  Microbiology  [x]  Radiology  [x]  EKG/Telemetry   []  Cardiology/Vascular   []  Pathology  []  Old records  []  Other:  Most notable findings include: Anemia, mildly elevated LDH       Wounds (last 24 hours)      LDA Wound     Row Name 08/17/21 0800 08/16/21 1653 08/16/21 1253       Wound 07/26/21 2200 Left distal nose Pressure Injury    Wound - Properties Group Placement Date: 07/26/21  - Placement Time: 2200  -MF Present on Hospital Admission: N  -MF Side: Left  -MF Orientation: distal  -MF Location: nose  -MF Primary Wound Type: Pressure inj  -MF Stage, Pressure Injury : deep tissue injury  -MF    Closure  --  Open to air  -DA  Open to air  -DA    Base  --  red  -DA  red  -DA    Periwound  --  intact;blanchable;dry  -DA  intact;blanchable;dry  -DA    Periwound Temperature  --  warm  -DA  warm  -DA    Dressing Care  open to air  -SN  --  --    Retired Wound - Properties Group Date first assessed: 07/26/21  - Time first assessed: 2200  -MF Present on Hospital Admission: N  -MF Side: Left  -MF Location: nose  -MF Primary Wound Type: Pressure inj  -MF       Wound 07/26/21 2200 midline tongue Pressure Injury    Wound - Properties Group Placement Date: 07/26/21  - Placement Time: 2200  -MF Present on Hospital Admission: N  -MF Orientation: midline  -MF Location: tongue  -MF Primary Wound Type: Pressure inj  -MF Stage, Pressure Injury : deep tissue injury  -MF    Retired Wound - Properties Group Date first assessed: 07/26/21  - Time first assessed: 2200  -MF Present on Hospital Admission: N  -MF Location: tongue  -MF Primary Wound Type: Pressure inj  -MF       Wound 07/27/21 2028 medial sacral spine Pressure Injury    Wound - Properties Group Placement Date: 07/27/21  - Placement Time: 2028  -MF Present on Hospital Admission: N  -MF Orientation: medial  -MF Location: sacral spine  -MF Primary Wound Type: Pressure inj  -MF Stage,  Pressure Injury : Stage 2  -MF    Closure  --  Open to air  -DA  Open to air  -DA    Base  --  red;non-blanchable  -DA  red;non-blanchable  -DA    Periwound  --  pink  -DA  pink  -DA    Periwound Temperature  --  warm  -DA  warm  -DA    Periwound Skin Turgor  --  soft  -DA  soft  -DA    Edges  --  open  -DA  open  -DA    Retired Wound - Properties Group Date first assessed: 07/27/21  -MF Time first assessed: 2028  -MF Present on Hospital Admission: N  -MF Location: sacral spine  -MF Primary Wound Type: Pressure inj  -MF       Wound 08/06/21 1400 Right cheek Pressure Injury    Wound - Properties Group Placement Date: 08/06/21  -HELLEN Placement Time: 1400  -HELLEN Present on Hospital Admission: N  -HELLEN Side: Right  -HELLEN Location: cheek  -HELLEN Primary Wound Type: Pressure inj  -HELLEN Stage, Pressure Injury : Stage 1  -HELLEN    Retired Wound - Properties Group Date first assessed: 08/06/21  -HELLEN Time first assessed: 1400  -HELLEN Present on Hospital Admission: N  -HELLEN Side: Right  -HELLEN Location: cheek  -HELLEN Primary Wound Type: Pressure inj  -HELLEN       Wound 08/06/21 1400 Right throat Abrasion    Wound - Properties Group Placement Date: 08/06/21  -HELLEN Placement Time: 1400  -HELLEN Present on Hospital Admission: N  -HELLEN Side: Right  -HELLEN Location: throat  -HELLEN Primary Wound Type: Abrasion  -HELLEN    Retired Wound - Properties Group Date first assessed: 08/06/21  -HELLEN Time first assessed: 1400  -HELLEN Present on Hospital Admission: N  -HELLEN Side: Right  -HELLEN Location: throat  -HELLEN Primary Wound Type: Abrasion  -HELLEN      User Key  (r) = Recorded By, (t) = Taken By, (c) = Cosigned By    Initials Name Provider Type    Jayant Trejo RN Registered Nurse    Brisa Díaz RN Registered Nurse    Ольга Spence RN Registered Nurse    Susana Perry RN Registered Nurse            Assessment/Plan      Brief Patient Summary:    Leslie Harris is a 30 y.o. morbidly obese female who was diagnosed with COVID-19 on 07/16/2021 at the [facility]  Department. She had not been vaccinated for Covid. She presented to the [facility] ED on 07/22/2021 complaining of shortness of breath. Workup in the ER revealed acute respiratory failure with hypoxia, pneumonia due to COVID-19, hypertension, and hyperglycemia. The patient was placed on Precision Flow with 100% FiO2 and admitted to the ICU for close monitoring and further treatment. The patient was started on Decadron and Remdesivir.     07/23/21:  Patient transitioned AVAPS with Precision Flow with 100% FiO2, but continued to have hypoxia and was intubated.  Right IJ central line inserted.  Dietitian consulted for tube feeds.     07/24/21:  Remains intubated and sedated. Chemically paralyzed due to worsening hypoxemia. On Flolan nebulized.  Prone position.  Started on empiric coverage with ceftriaxone.  Tolerating tube feeds.       07/25/21:  Remains intubated, sedated, and chemically paralyzed.  Antibiotic changed to Zosyn.  Remains prone and on Flolan.  Tolerating tube feeds.         07/26/21:  Remains intubated, sedated, and chemically paralyzed.  Patient placed in supine position.  Remains on Flolan.  Tolerating tube feeds.       07/27/21:  Remains intubated, sedated, and chemically paralyzed.  Patient tolerating supine position.  Remains on Flolan.  Tolerating tube feeds.       07/28/21:  Remains intubated, sedated, and chemically paralyzed.  Patient tolerating supine position.  Remains on Flolan.  Tolerating tube feeds.       07/29/21:  Remains intubated, sedated, and chemically paralyzed.  Sputum culture grew MRSA.  Patient tolerating supine position.  Remains on Flolan; failed wean attempt.  Tolerating tube feeds.       07/30/21:  Remains intubated, sedated, and chemically paralyzed.  Patient returned to prone position for 16 hours.  Remains on Flolan.  Transitioned to heparin drip.  Tolerating tube feeds.       07/31/21:  Remains intubated, sedated, and chemically paralyzed.  Remains on Flolan.   Diuresis started.  On heparin drip.  Tolerating tube feeds.       08/01/21:  Remains intubated, sedated, and chemically paralyzed.  A-line inserted.  Tolerating supine position.  Remains on heparin drip.  Diuresing well.  Remains on Flolan.  Cardizem drip started for hypertension and tachycardia.  Left radial a-line discontinued.  Right brachial a-line inserted.   Tolerating tube feeds.       08/02/21:  Remains intubated, sedated, and chemically paralyzed. Tolerating supine position.  Remains on Flolan.  Remains on heparin drip.  Labile BP, alternating Cardizem and Levophed.  Fecal management system placed due to high volume liquid stool.  Tolerating tube feeds.       08/03/21:  Remains intubated, sedated, and chemically paralyzed.  Infectious disease consulted for antibiotic management.  Heparin drip discontinued due to blood-tinged sputum.  Weaning Cardizem drip.  Remains on Flolan. Switched to prone position.  Tolerating tube feeds.       08/04/21:  Remains intubated, sedated, and chemically paralyzed.  Sputum culture grew Klebsiella pneumoniae.  FMS removed.  Lovenox restarted.  Weaning Cardizem drip.  Attempted to wean Flolan, but had to be reinitiated.  Tolerating tube feeds.       08/05/21:  Remains intubated, sedated, and chemically paralyzed.  Tolerating tube feeds.       08/06/21:  Remains intubated, sedated, and chemically paralyzed.  In prone position.  Tolerating tube feeds.       08/07/21:  Remains intubated, sedated, and chemically paralyzed.  Tolerating supine position.  Urine culture grew E. coli.  Tolerating tube feeds.       08/08/21:  Remains intubated, sedated, and chemically paralyzed.  Status post bronchoscopy with multiple mucus plugs removed.  Paralytic weaned off.  Tolerating tube feeds.       08/09/21:  Remains intubated and sedated.  Tolerating tube feeds.       08/10/21:  Patient extubated.  Tolerating tube feeds.       08/11/21:  Remains extubated.  Diagnosed with critical illness  polyneuropathy muscle weakness due to paralytics and steroids.  Patient with acute delirium.  Tolerating tube feeds.  SLP consulted for video swallow.     08/12/21:  The patient was downgraded to PCU and the Hospitalist team was consulted for medical management.  Patient started on mechanical soft diet overnight.  She is complaining of some shortness of breath, but maintaining oxygen saturation on 5 liters per high flow nasal cannula.  She reports fatigue and generalized weakness.        8/13     Patient's blood glucose very acceptable  Patient had been comfortable overnight.  She is slightly tachycardic on 5 L of high flow oxygen but sats are above 95 percent  Her white count 12.2  Pharmacy suggesting some changes in the insulin regimen to avoid multiple sticks.  Still feeling weak on the right upper extremity.  She relates that to her prone position in the ICU.  Discussed with the family that this would be helped by physical therapy.     8/14  Patient had been seen by new dietitian yesterday and I will speech therapist as well  She is a 96% saturation on 4 L of high flow nasal cannula  She is afebrile   working on her transfer/discharge planning to [facility] rehab [facility].  Patient liver enzymes are fluctuating and mildly elevated  Have a PICC line placed.     8/15  She denies new symptoms today  She is saturating high 90s on 3 L high flow cannula.  De-escalating oxygen support  Asking for FMLA.  For family members  Due to persistent symptoms right upper extremity we will check venous Doppler  Check forearm x-ray as well  Advised regarding elevation and warm compresses.  She has good pulsation right radial artery.  Nasogastric tube removed    8/16/2021: Patient Reports her right arm pain is improving.  Discussed that upper extremity Doppler negative for any DVT.  Patient's breathing improving but still requiring some supplemental oxygen.  Patient reports she has some vaginal burning today which she  reports similar to when she has a yeast infection.  Awaiting placement.  Doing well after NG tube removed.      Acetylcysteine, 600 mg, Oral, BID  aspirin, 325 mg, Oral, Daily  budesonide, 0.5 mg, Nebulization, BID - RT  cholecalciferol, 2,000 Units, Oral, Daily  enoxaparin, 70 mg, Subcutaneous, Q12H  furosemide, 20 mg, Oral, Daily  guaiFENesin, 600 mg, Oral, Q12H  hydrocortisone, 20 mg, Oral, BID With Meals  insulin glargine, 45 Units, Subcutaneous, Q12H  insulin lispro, 0-14 Units, Subcutaneous, 4x Daily With Meals & Nightly  insulin lispro, 10 Units, Subcutaneous, TID With Meals  ipratropium-albuterol, 3 mL, Nebulization, 4x Daily - RT  melatonin, 10 mg, Oral, Nightly  metoprolol tartrate, 25 mg, Oral, Q12H  pantoprazole, 40 mg, Oral, BID AC  risperiDONE, 0.5 mg, Oral, Nightly  sodium chloride, 10 mL, Intravenous, Q12H  sodium chloride, 10 mL, Intravenous, Q12H       Pharmacy to Dose enoxaparin (LOVENOX),          Active Hospital Problems:  Active Hospital Problems    Diagnosis    • **Pneumonia due to COVID-19 virus    • Critical illness polyneuropathy (CMS/[facility])    • Delirium, acute    • Dysphagia    • E. coli UTI (urinary tract infection)    • Klebsiella pneumoniae pneumonia (CMS/[facility])    • Diabetes mellitus type 2 in obese (CMS/[facility])    • MRSA pneumonia (CMS/[facility])    • Acute respiratory failure due to COVID-19 (CMS/[facility])    • Class 3 severe obesity without serious comorbidity with body mass index (BMI) of 50.0 to 59.9 in adult    • Hypertension      Plan:     Shortness of breath-secondary to COVID-19 pneumonia, patient not vaccinated, patient intubated now extubated doing well, patient with development of underlying MRSA pneumonia as well as Klebsiella previously now finishing antibiotics overall improving with significant debility secondary to prolonged immobility  -Wean oxygen as tolerated  -Out of isolation  -Bronchodilators mucolytic's  -Pulmonology monitoring  -Due to prolonged hospital course and signs of  critical illness myopathy patient will need inpatient rehab  -Out of bed 8/17/2021, tachycardia expected given patient prolonged stasis    COVID-19 pneumonia-patient now on 2 L and off isolation  -Inflammatory markers  -Off remdesivir, initially on Decadron and then hydrocortisone  -Mucolytic's    MRSA/Klebsiella pneumonia-patient seen by pulmonology 19  -Finished Rocephin and linezolid  -ID and pulmonology consulting    Vaginal burning-patient reports similar to previous yeast infections  -Diflucan x1    Right arm pain-May have some component of neuropathy after prolonged illness or due to transient external compression of forearm at some point during hospital course  -X-ray and upper extremity Doppler unremarkable  -Monitor daily  -Can discuss neuropathic pain medication if required    Anemia-mild but likely with a chronic inflammatory component from prolonged illness  -Daily CBC    E. coli cystitis-patient finished Rocephin for treatment    Critical illness polyneuropathy-patient on prolonged dose of steroids due to underlying Covid infection  -PT/OT  -Rehab on discharge  -Out of bed    Type 2 diabetes-patient with residual hyperglycemia likely due to steroids  -Long-acting insulin  -Diabetic diet    Dysphagia-after extubation noted but now able to remove NG tube  -Monitor with diet    Morbid obesity-BMI 50  -Lifestyle modification        DVT prophylaxis:  Medical DVT prophylaxis orders are present.    CODE STATUS:    Code Status: CPR  Medical Interventions (Level of Support Prior to Arrest): Full      Disposition:  I expect patient to be discharged in 24 to 48 hours.    This patient has been examined wearing appropriate Personal Protective Equipment and discussed with hospital infection control department. 08/17/21      Electronically signed by Rodrigo Cortez MD, 08/17/21, 11:35 EDT.  [facility] Hospitalist Team

## 2021-08-17 NOTE — PLAN OF CARE
Goal Outcome Evaluation:   Pt has been stable on the monitor throughout the day. Pt has been sitting up in the chair, was transferred via abrahan lift with PT OT and RN at the bedside. Pt.   Problem: Adult Inpatient Plan of Care  Goal: Plan of Care Review  Outcome: Ongoing, Progressing  Goal: Patient-Specific Goal (Individualized)  Outcome: Ongoing, Progressing  Goal: Absence of Hospital-Acquired Illness or Injury  Outcome: Ongoing, Progressing  Intervention: Identify and Manage Fall Risk  Recent Flowsheet Documentation  Taken 8/17/2021 1400 by Jayant Rahman RN  Safety Promotion/Fall Prevention:   activity supervised   assistive device/personal items within reach   clutter free environment maintained   safety round/check completed   room organization consistent   nonskid shoes/slippers when out of bed  Taken 8/17/2021 1200 by Jayant Rahman RN  Safety Promotion/Fall Prevention:   activity supervised   assistive device/personal items within reach   clutter free environment maintained   safety round/check completed   room organization consistent  Taken 8/17/2021 1000 by Jayant Rahman RN  Safety Promotion/Fall Prevention:   assistive device/personal items within reach   activity supervised   safety round/check completed   room organization consistent  Taken 8/17/2021 0800 by Jayant Rahman RN  Safety Promotion/Fall Prevention:   activity supervised   assistive device/personal items within reach   clutter free environment maintained   safety round/check completed  Intervention: Prevent Infection  Recent Flowsheet Documentation  Taken 8/17/2021 1400 by Jayant Rahman RN  Infection Prevention: cohorting utilized  Taken 8/17/2021 1200 by Jayant Rahman RN  Infection Prevention: environmental surveillance performed  Goal: Optimal Comfort and Wellbeing  Outcome: Ongoing, Progressing  Intervention: Provide Person-Centered Care  Recent Flowsheet Documentation  Taken 8/17/2021 0800 by Jayant Rahman RN  Trust  Relationship/Rapport:   care explained   choices provided   emotional support provided   empathic listening provided  Goal: Readiness for Transition of Care  Outcome: Ongoing, Progressing     Problem: Fluid Imbalance (Pneumonia)  Goal: Fluid Balance  Outcome: Ongoing, Progressing     Problem: Infection (Pneumonia)  Goal: Resolution of Infection Signs and Symptoms  Outcome: Ongoing, Progressing  Intervention: Prevent Infection Progression  Recent Flowsheet Documentation  Taken 8/17/2021 1400 by Jayant Rahman RN  Isolation Precautions: contact precautions maintained  Taken 8/17/2021 1200 by Jayant Rahman RN  Isolation Precautions: contact precautions maintained     Problem: Respiratory Compromise (Pneumonia)  Goal: Effective Oxygenation and Ventilation  Outcome: Ongoing, Progressing  Intervention: Promote Airway Secretion Clearance  Recent Flowsheet Documentation  Taken 8/17/2021 1200 by Jayant Rahman RN  Cough And Deep Breathing: done independently per patient  Taken 8/17/2021 0800 by Jayant Rahman RN  Cough And Deep Breathing: done independently per patient     Problem: Skin Injury Risk Increased  Goal: Skin Health and Integrity  Outcome: Ongoing, Progressing     Problem: Aspiration (Enteral Nutrition)  Goal: Absence of Aspiration Signs and Symptoms  Outcome: Ongoing, Progressing     Problem: Device-Related Complication Risk (Enteral Nutrition)  Goal: Safe, Effective Therapy Delivery  Outcome: Ongoing, Progressing     Problem: Feeding Intolerance (Enteral Nutrition)  Goal: Feeding Tolerance  Outcome: Ongoing, Progressing     Problem: Fall Injury Risk  Goal: Absence of Fall and Fall-Related Injury  Outcome: Ongoing, Progressing  Intervention: Identify and Manage Contributors to Fall Injury Risk  Recent Flowsheet Documentation  Taken 8/17/2021 1400 by Jayant Rahman RN  Medication Review/Management: medications reviewed  Taken 8/17/2021 1000 by Jayant Rahman RN  Medication Review/Management: medications  reviewed  Taken 8/17/2021 0800 by Jayant Rahman RN  Medication Review/Management: medications reviewed  Intervention: Promote Injury-Free Environment  Recent Flowsheet Documentation  Taken 8/17/2021 1400 by Jayant Rahman RN  Safety Promotion/Fall Prevention:   activity supervised   assistive device/personal items within reach   clutter free environment maintained   safety round/check completed   room organization consistent   nonskid shoes/slippers when out of bed  Taken 8/17/2021 1200 by Jayant Rahman RN  Safety Promotion/Fall Prevention:   activity supervised   assistive device/personal items within reach   clutter free environment maintained   safety round/check completed   room organization consistent  Taken 8/17/2021 1000 by Jayant Rahman RN  Safety Promotion/Fall Prevention:   assistive device/personal items within reach   activity supervised   safety round/check completed   room organization consistent  Taken 8/17/2021 0800 by Jayant Rahman RN  Safety Promotion/Fall Prevention:   activity supervised   assistive device/personal items within reach   clutter free environment maintained   safety round/check completed     Problem: Communication Impairment (Mechanical Ventilation, Invasive)  Goal: Effective Communication  Outcome: Ongoing, Progressing  Intervention: Ensure Effective Communication  Recent Flowsheet Documentation  Taken 8/17/2021 0800 by Jayant Rahman RN  Communication Enhancement Strategies: call light answered in person     Problem: Device-Related Complication Risk (Mechanical Ventilation, Invasive)  Goal: Optimal Device Function  Outcome: Ongoing, Progressing  Intervention: Optimize Device Care and Function  Recent Flowsheet Documentation  Taken 8/17/2021 1000 by Jayant Rahman RN  Airway Safety Measures: suction at bedside     Problem: Inability to Wean (Mechanical Ventilation, Invasive)  Goal: Mechanical Ventilation Liberation  Outcome: Ongoing, Progressing  Intervention: Promote  Extubation and Mechanical Ventilation Liberation  Recent Flowsheet Documentation  Taken 8/17/2021 1400 by Jayant Rahman, RN  Medication Review/Management: medications reviewed  Taken 8/17/2021 1000 by Jayant Rahman RN  Medication Review/Management: medications reviewed  Taken 8/17/2021 0800 by Jayant Rahman, RN  Environmental Support: calm environment promoted  Medication Review/Management: medications reviewed     Problem: Nutrition Impairment (Mechanical Ventilation, Invasive)  Goal: Optimal Nutrition Delivery  Outcome: Ongoing, Progressing     Problem: Skin and Tissue Injury (Mechanical Ventilation, Invasive)  Goal: Absence of Device-Related Skin and Tissue Injury  Outcome: Ongoing, Progressing     Problem: Ventilator-Induced Lung Injury (Mechanical Ventilation, Invasive)  Goal: Absence of Ventilator-Induced Lung Injury  Outcome: Ongoing, Progressing     Problem: Glycemic Control Impaired  Goal: Blood Glucose Level Within Desired Range  Outcome: Ongoing, Progressing     Problem: Fever  Goal: Body Temperature in Desired Range  Outcome: Ongoing, Progressing   was able to come off of oxygen and oxygen levels are staying above 90. Will continue to monitor at this time.

## 2021-08-17 NOTE — PLAN OF CARE
Goal Outcome Evaluation:      Assessment: Leslie Harris tolerated transfer to chair w/ abrahan lift well. PT/OT instructed RN in how to perform trasnfer safely. Respiration status improved in sitting in chair. Pt presents with functional mobility impairments which indicate the need for skilled intervention. Tolerating session today without incident. Will continue to follow and progress as tolerated.     Plan/Recommendations:   Pt would benefit from Inpatient Rehabilitation placement at discharge from facility and requires no DME at discharge.   Pt desires Inpatient Rehabilitation placement at discharge. Pt cooperative; agreeable to therapeutic recommendations and plan of care.

## 2021-08-18 LAB
ALBUMIN SERPL-MCNC: 3.8 G/DL (ref 3.5–5.2)
ALBUMIN/GLOB SERPL: 1.1 G/DL
ALP SERPL-CCNC: 96 U/L (ref 39–117)
ALT SERPL W P-5'-P-CCNC: 117 U/L (ref 1–33)
ANION GAP SERPL CALCULATED.3IONS-SCNC: 11 MMOL/L (ref 5–15)
AST SERPL-CCNC: 33 U/L (ref 1–32)
BASOPHILS # BLD AUTO: 0 10*3/MM3 (ref 0–0.2)
BASOPHILS NFR BLD AUTO: 0.5 % (ref 0–1.5)
BILIRUB SERPL-MCNC: 0.7 MG/DL (ref 0–1.2)
BUN SERPL-MCNC: 13 MG/DL (ref 6–20)
BUN/CREAT SERPL: 40.6 (ref 7–25)
CALCIUM SPEC-SCNC: 9.9 MG/DL (ref 8.6–10.5)
CHLORIDE SERPL-SCNC: 99 MMOL/L (ref 98–107)
CO2 SERPL-SCNC: 27 MMOL/L (ref 22–29)
CREAT SERPL-MCNC: 0.32 MG/DL (ref 0.57–1)
DEPRECATED RDW RBC AUTO: 56.4 FL (ref 37–54)
EOSINOPHIL # BLD AUTO: 0.2 10*3/MM3 (ref 0–0.4)
EOSINOPHIL NFR BLD AUTO: 2.4 % (ref 0.3–6.2)
ERYTHROCYTE [DISTWIDTH] IN BLOOD BY AUTOMATED COUNT: 18.3 % (ref 12.3–15.4)
GFR SERPL CREATININE-BSD FRML MDRD: >150 ML/MIN/1.73
GLOBULIN UR ELPH-MCNC: 3.4 GM/DL
GLUCOSE BLDC GLUCOMTR-MCNC: 109 MG/DL (ref 70–105)
GLUCOSE BLDC GLUCOMTR-MCNC: 128 MG/DL (ref 70–105)
GLUCOSE BLDC GLUCOMTR-MCNC: 145 MG/DL (ref 70–105)
GLUCOSE BLDC GLUCOMTR-MCNC: 98 MG/DL (ref 70–105)
GLUCOSE SERPL-MCNC: 109 MG/DL (ref 65–99)
HCT VFR BLD AUTO: 35.2 % (ref 34–46.6)
HGB BLD-MCNC: 11.8 G/DL (ref 12–15.9)
LYMPHOCYTES # BLD AUTO: 1.3 10*3/MM3 (ref 0.7–3.1)
LYMPHOCYTES NFR BLD AUTO: 19.4 % (ref 19.6–45.3)
MAGNESIUM SERPL-MCNC: 1.9 MG/DL (ref 1.6–2.6)
MCH RBC QN AUTO: 30.4 PG (ref 26.6–33)
MCHC RBC AUTO-ENTMCNC: 33.4 G/DL (ref 31.5–35.7)
MCV RBC AUTO: 90.8 FL (ref 79–97)
MONOCYTES # BLD AUTO: 0.5 10*3/MM3 (ref 0.1–0.9)
MONOCYTES NFR BLD AUTO: 7.7 % (ref 5–12)
NEUTROPHILS NFR BLD AUTO: 4.8 10*3/MM3 (ref 1.7–7)
NEUTROPHILS NFR BLD AUTO: 70 % (ref 42.7–76)
NRBC BLD AUTO-RTO: 0.1 /100 WBC (ref 0–0.2)
PLATELET # BLD AUTO: 247 10*3/MM3 (ref 140–450)
PMV BLD AUTO: 7.9 FL (ref 6–12)
POTASSIUM SERPL-SCNC: 4.2 MMOL/L (ref 3.5–5.2)
PROT SERPL-MCNC: 7.2 G/DL (ref 6–8.5)
RBC # BLD AUTO: 3.88 10*6/MM3 (ref 3.77–5.28)
SODIUM SERPL-SCNC: 137 MMOL/L (ref 136–145)
WBC # BLD AUTO: 6.8 10*3/MM3 (ref 3.4–10.8)

## 2021-08-18 PROCEDURE — 94799 UNLISTED PULMONARY SVC/PX: CPT

## 2021-08-18 PROCEDURE — 97110 THERAPEUTIC EXERCISES: CPT

## 2021-08-18 PROCEDURE — 85025 COMPLETE CBC W/AUTO DIFF WBC: CPT | Performed by: NURSE PRACTITIONER

## 2021-08-18 PROCEDURE — 25010000002 ENOXAPARIN PER 10 MG: Performed by: FAMILY MEDICINE

## 2021-08-18 PROCEDURE — 83735 ASSAY OF MAGNESIUM: CPT | Performed by: NURSE PRACTITIONER

## 2021-08-18 PROCEDURE — 80053 COMPREHEN METABOLIC PANEL: CPT | Performed by: INTERNAL MEDICINE

## 2021-08-18 PROCEDURE — 82962 GLUCOSE BLOOD TEST: CPT

## 2021-08-18 PROCEDURE — 97535 SELF CARE MNGMENT TRAINING: CPT

## 2021-08-18 PROCEDURE — 99232 SBSQ HOSP IP/OBS MODERATE 35: CPT | Performed by: FAMILY MEDICINE

## 2021-08-18 PROCEDURE — 25010000002 ENOXAPARIN PER 10 MG: Performed by: INTERNAL MEDICINE

## 2021-08-18 RX ORDER — HYDROCORTISONE 10 MG/1
10 TABLET ORAL 2 TIMES DAILY WITH MEALS
Status: DISCONTINUED | OUTPATIENT
Start: 2021-08-18 | End: 2021-08-20

## 2021-08-18 RX ADMIN — ENOXAPARIN SODIUM 80 MG: 80 INJECTION, SOLUTION INTRAVENOUS; SUBCUTANEOUS at 20:19

## 2021-08-18 RX ADMIN — BUDESONIDE 0.5 MG: 0.5 SUSPENSION RESPIRATORY (INHALATION) at 19:28

## 2021-08-18 RX ADMIN — GUAIFENESIN 600 MG: 600 TABLET, EXTENDED RELEASE ORAL at 20:19

## 2021-08-18 RX ADMIN — BUDESONIDE 0.5 MG: 0.5 SUSPENSION RESPIRATORY (INHALATION) at 07:48

## 2021-08-18 RX ADMIN — PANTOPRAZOLE SODIUM 40 MG: 40 TABLET, DELAYED RELEASE ORAL at 18:11

## 2021-08-18 RX ADMIN — Medication 2000 UNITS: at 09:29

## 2021-08-18 RX ADMIN — Medication 10 ML: at 09:32

## 2021-08-18 RX ADMIN — IPRATROPIUM BROMIDE AND ALBUTEROL SULFATE 3 ML: 2.5; .5 SOLUTION RESPIRATORY (INHALATION) at 10:57

## 2021-08-18 RX ADMIN — RISPERIDONE 0.5 MG: 0.5 TABLET, ORALLY DISINTEGRATING ORAL at 20:19

## 2021-08-18 RX ADMIN — Medication 600 MG: at 20:19

## 2021-08-18 RX ADMIN — GUAIFENESIN 600 MG: 600 TABLET, EXTENDED RELEASE ORAL at 09:30

## 2021-08-18 RX ADMIN — Medication 600 MG: at 09:29

## 2021-08-18 RX ADMIN — METOPROLOL TARTRATE 25 MG: 25 TABLET, FILM COATED ORAL at 20:19

## 2021-08-18 RX ADMIN — Medication 10 ML: at 20:20

## 2021-08-18 RX ADMIN — IPRATROPIUM BROMIDE AND ALBUTEROL SULFATE 3 ML: 2.5; .5 SOLUTION RESPIRATORY (INHALATION) at 14:59

## 2021-08-18 RX ADMIN — ENOXAPARIN SODIUM 70 MG: 80 INJECTION, SOLUTION INTRAVENOUS; SUBCUTANEOUS at 09:30

## 2021-08-18 RX ADMIN — HYDROCORTISONE 10 MG: 10 TABLET ORAL at 18:11

## 2021-08-18 RX ADMIN — HYDROCORTISONE 20 MG: 10 TABLET ORAL at 09:29

## 2021-08-18 RX ADMIN — Medication 10 MG: at 20:20

## 2021-08-18 RX ADMIN — PANTOPRAZOLE SODIUM 40 MG: 40 TABLET, DELAYED RELEASE ORAL at 09:30

## 2021-08-18 RX ADMIN — METOPROLOL TARTRATE 25 MG: 25 TABLET, FILM COATED ORAL at 09:30

## 2021-08-18 RX ADMIN — FUROSEMIDE 20 MG: 20 TABLET ORAL at 09:30

## 2021-08-18 RX ADMIN — IPRATROPIUM BROMIDE AND ALBUTEROL SULFATE 3 ML: 2.5; .5 SOLUTION RESPIRATORY (INHALATION) at 07:45

## 2021-08-18 RX ADMIN — IPRATROPIUM BROMIDE AND ALBUTEROL SULFATE 3 ML: 2.5; .5 SOLUTION RESPIRATORY (INHALATION) at 19:28

## 2021-08-18 RX ADMIN — Medication 10 ML: at 20:19

## 2021-08-18 RX ADMIN — ASPIRIN 325 MG ORAL TABLET 325 MG: 325 PILL ORAL at 09:30

## 2021-08-18 NOTE — CASE MANAGEMENT/SOCIAL WORK
Continued Stay Note   [facility]     Patient Name: Leslie Harris  MRN: [id number]  Today's Date: 8/18/2021    Admit Date: 7/22/2021    Discharge Plan     Row Name 08/18/21 1252       Plan    Plan  anticipate DC to inpt rehab- [facility] acute accepted 8/12- precert denied 8/18- P2P in process. [facility] acute orders off MAR at DC.    Plan Comments  Cm received notification that patients precert has been denied. P2P in process. DC barriers: precert denial- p2p        Expected Discharge Date and Time     Expected Discharge Date Expected Discharge Time    Aug 18, 2021         Phone communication or documentation only - no physical contact with patient or family.      Amelie Harmon RN

## 2021-08-18 NOTE — THERAPY TREATMENT NOTE
Subjective: Pt agreeable to therapeutic plan of care.  Pt eager to exercise.     Objective:     Bed mobility - N/A or Not attempted.  Transfers - N/A or Not attempted.  Ambulation - 0 feet N/A or Not attempted.     Exercises: heel slides in sitting (2 sets), hip IR/ER, quad/glut sets, ankle pumps (w/ manual resistance given); shoulder flexion, shoulder abduction, shoulder IR/ER, forearm pronation/supination, hands open/closed (able to now exhibit full finger extension actively), finger abd/add, thumb oppostion. All exercises for BUEs and BLEs, all x 10 reps.     Pain: 0 VAS  Education: Provided education on importance of mobility and skilled verbal / tactile cueing throughout intervention.     Assessment: Leslie Harris continues to make good gains w/ strength. She has obviously been working on her own between PT rx's. She is an excellent rehab candidate. She presents with functional mobility impairments which indicate the need for skilled intervention. Tolerating session today without incident. Will continue to follow and progress as tolerated.     Plan/Recommendations:   Pt would benefit from Inpatient Rehabilitation placement at discharge from facility and requires no DME at discharge.   Pt desires Inpatient Rehabilitation placement at discharge. Pt cooperative; agreeable to therapeutic recommendations and plan of care.     Basic Mobility 6-click:  Rollin = Total, A lot = 2, A little = 3; 4 = None  Supine>Sit:   1 = Total, A lot = 2, A little = 3; 4 = None   Sit>Stand with arms:  1 = Total, A lot = 2, A little = 3; 4 = None  Bed>Chair:   1 = Total, A lot = 2, A little = 3; 4 = None  Ambulate in room:  1 = Total, A lot = 2, A little = 3; 4 = None  3-5 Steps with railin = Total, A lot = 2, A little = 3; 4 = None  Score: 8    Modified [place]: N/A = No pre-op stroke/TIA    Post-Tx Position: Up in Chair, Alarms activated and Call light and personal items within reach; LEs elevated.   PPE: gloves,  surgical mask, eyewear protection

## 2021-08-18 NOTE — THERAPY TREATMENT NOTE
Subjective: Pt agreeable to therapeutic plan of care.  Cognition: oriented to Person, Place, Time and Situation    Objective:     Bed Mobility: N/A or Not attempted.  Functional Transfers: Dependent Pt out of bed with NSG using Davion lift.   Functional Ambulation: N/A or Not attempted.    Grooming: Max-A  ADL Position: supported sitting  ADL Comments: gravity eliminated positioning to complete. Difficulty with the coordination between  strength and shoulder ROM required.    Toileting: Dependent  ADL Position: supported sitting  ADL Comments:     Pain: 5 VAS pain in LUE shoulder and RUE elbow with ROM  Education: Provided education on importance of mobility and skilled verbal / tactile cueing throughout intervention.     Assessment: Leslie Harris presents with ADL impairments below baseline abilities which indicate the need for continued skilled intervention while inpatient. Pt able to complete small drinks of water in modified environment with Min A. Grooming Max A. Completes ROM HEP and is limited by LUE shoulder and RUE elbow pain. Pt mother present and has many questions regarding outlook.  Somewhat anasognostic regarding current status and lengthy rehab required. Patient emotional regarding current status and dependence on others. Cont to demo significant global weakness.  Demos full LUE elbow ROM and minimal shoulder AROM due to weakness. Tolerating session today without incident. Will continue to follow and progress as tolerated.     Plan/Recommendations:   Pt would benefit from Inpatient Rehabilitation placement at discharge from facility.   Pt desires Inpatient Rehabilitation placement at discharge. Pt cooperative; agreeable to therapeutic recommendations and plan of care.     Modified [place]: 5 = Severe disability (Requires constant nursing care and attention, bedridden, incontinent)    Post-Tx Position: Up in Chair and Alarms activated family present  PPE: gloves, surgical mask, eyewear protection

## 2021-08-18 NOTE — PROGRESS NOTES
"PULMONARY CRITICAL CARE Progress  NOTE      PATIENT IDENTIFICATION:  Name: Leslie Harris  MRN: [id number]  :  1990     Age: 30 y.o.  Sex: female    DATE OF Note:  2021   Referring Physician: Sarah Fajardo MD                  Subjective:    Feeling better, off O2 currently, no SOB,   no chest or abd pain pain,  no bowel or bladder issues, no new complaints    Objective:  tMax 24 hrs: Temp (24hrs), Av.9 °F (36.6 °C), Min:97.4 °F (36.3 °C), Max:98.5 °F (36.9 °C)      Vitals Ranges:   Temp:  [97.4 °F (36.3 °C)-98.5 °F (36.9 °C)] 98.5 °F (36.9 °C)  Heart Rate:  [] 113  Resp:  [18-24] 22  BP: (118-153)/(74-82) 132/79    Intake and Output Last 3 Shifts:   I/O last 3 completed shifts:  In: 1860 [P.O.:1860]  Out: 2850 [Urine:2850]    Exam:  /79   Pulse 113   Temp 98.5 °F (36.9 °C) (Oral)   Resp 22   Ht 167.6 cm (66\")   Wt (!) 150 kg (331 lb 2.1 oz)   LMP 2021   SpO2 91%   BMI 53.45 kg/m²     General Appearance:  Alert   HEENT:  Normocephalic, without obvious abnormality, Conjunctiva/corneas clear,.  Normal external ear canals, Nares normal, no drainage     Neck:  Supple, symmetrical, trachea midline. No JVD.  Lungs /Chest wall:   Bilateral basal rhonchi,  symmetrical wall movement.     Heart:  Regular rate and rhythm, systolic murmur PMI left sternal border  Abdomen: Soft, non-tender, no masses, no organomegaly.    Extremities: Trace edema no clubbing or Cyanosis        Medications:    Current Facility-Administered Medications:   •  acetaminophen (TYLENOL) 160 MG/5ML solution 650 mg, 650 mg, Oral, Q6H PRN, Radha Jaquez MD, 650 mg at 21 0828  •  acetaminophen (TYLENOL) tablet 650 mg, 650 mg, Oral, Q4H PRN, 650 mg at 08/15/21 1956 **OR** acetaminophen (TYLENOL) suppository 650 mg, 650 mg, Rectal, Q4H PRN, Ryder Llanes APRN, 650 mg at 21 0153  •  Acetylcysteine capsule 600 mg, 600 mg, Oral, BID, Ryder Llanes APRN, 600 mg at 21 0929  •  aluminum-magnesium " hydroxide-simethicone (MAALOX MAX) 400-400-40 MG/5ML suspension 15 mL, 15 mL, Oral, Q6H PRN, Ryder Llanes APRN  •  artificial tears ophthalmic ointment, , Both Eyes, PRN, Jeff Feng MD  •  aspirin tablet 325 mg, 325 mg, Oral, Daily, Juan Arguelles MD, 325 mg at 08/18/21 0930  •  benzonatate (TESSALON) capsule 100 mg, 100 mg, Oral, TID PRN, Ryder Llanes APRN  •  budesonide (PULMICORT) nebulizer solution 0.5 mg, 0.5 mg, Nebulization, BID - RT, Jeff Feng MD, 0.5 mg at 08/18/21 0748  •  cholecalciferol (VITAMIN D3) tablet 2,000 Units, 2,000 Units, Oral, Daily, Juan Arguelles MD, 2,000 Units at 08/18/21 0929  •  dextrose (D50W) 25 g/ 50mL Intravenous Solution 25 g, 25 g, Intravenous, Q15 Min PRN, Ryder Llanes APRN  •  dextrose (GLUTOSE) oral gel 15 g, 15 g, Oral, Q15 Min PRN, Ryder Llanes APRN  •  enoxaparin (LOVENOX) syringe 80 mg, 80 mg, Subcutaneous, Q12H, Rodrigo Cortez MD  •  furosemide (LASIX) tablet 20 mg, 20 mg, Oral, Daily, Sarah Fajardo MD, 20 mg at 08/18/21 0930  •  glucagon (human recombinant) (GLUCAGEN DIAGNOSTIC) injection 1 mg, 1 mg, Subcutaneous, Q15 Min PRN, Ryder Llanes APRN  •  guaiFENesin (MUCINEX) 12 hr tablet 600 mg, 600 mg, Oral, Q12H, Juan Arguelles MD, 600 mg at 08/18/21 0930  •  hydrALAZINE (APRESOLINE) injection 10 mg, 10 mg, Intravenous, Q4H PRN, Ryder Llanes APRN, 10 mg at 08/07/21 0051  •  hydrocortisone (CORTEF) tablet 10 mg, 10 mg, Oral, BID With Meals, Rodrigo Cortez MD  •  hydrOXYzine (ATARAX) tablet 25 mg, 25 mg, Oral, TID PRN, Sheree Orantes APRN  •  insulin glargine (LANTUS, SEMGLEE) injection 45 Units, 45 Units, Subcutaneous, Q12H, Jeff Feng MD, 45 Units at 08/17/21 0819  •  ipratropium-albuterol (DUO-NEB) nebulizer solution 3 mL, 3 mL, Nebulization, 4x Daily - RT, Jeff Feng MD, 3 mL at 08/18/21 1057  •  lidocaine (XYLOCAINE) 5 % ointment, , , PRN, Ping,  MD Jeff, 1 application at 08/08/21 0910  •  lidocaine PF 1% (XYLOCAINE) injection, , , PRN, Jeff Feng MD, 5 mL at 08/08/21 0911  •  Magnesium Sulfate 2 gram infusion - Mg less than or equal to 1.5 mg/dL, 2 g, Intravenous, PRN, Last Rate: 25 mL/hr at 08/10/21 1143, 2 g at 08/10/21 1143 **OR** Magnesium Sulfate 1 gram infusion - Mg 1.6-1.9 mg/dL, 1 g, Intravenous, PRN, Ryder Llanes APRN, Last Rate: 100 mL/hr at 08/15/21 1210, 1 g at 08/15/21 1210  •  melatonin tablet 10 mg, 10 mg, Oral, Nightly, Sarah Fajardo MD, 10 mg at 08/17/21 2221  •  methocarbamol (ROBAXIN) tablet 500 mg, 500 mg, Oral, 4x Daily PRN, Juan Arguelles MD  •  metoprolol tartrate (LOPRESSOR) tablet 25 mg, 25 mg, Oral, Q12H, Sarah Fajardo MD, 25 mg at 08/18/21 0930  •  nitroglycerin (NITROSTAT) SL tablet 0.4 mg, 0.4 mg, Sublingual, Q5 Min PRN, Ryder Llanes APRN  •  ondansetron (ZOFRAN) tablet 2 mg, 2 mg, Oral, Q4H PRN **OR** ondansetron (ZOFRAN) injection 2 mg, 2 mg, Intravenous, Q4H PRN, Marianna Navarrete, APRN, 2 mg at 08/12/21 0940  •  pantoprazole (PROTONIX) EC tablet 40 mg, 40 mg, Oral, BID AC, Juan Arguelles MD, 40 mg at 08/18/21 0930  •  Pharmacy to Dose enoxaparin (LOVENOX), , Does not apply, Continuous PRN, Jeff Feng MD  •  potassium chloride (K-DUR,KLOR-CON) CR tablet 40 mEq, 40 mEq, Oral, PRN **OR** potassium chloride (KLOR-CON) packet 40 mEq, 40 mEq, Oral, PRN, 40 mEq at 08/10/21 1527 **OR** potassium chloride 10 mEq in 100 mL IVPB, 10 mEq, Intravenous, Q1H PRN, Ryder Llanes, NATY  •  potassium phosphate 45 mmol in sodium chloride 0.9 % 500 mL infusion, 45 mmol, Intravenous, PRN **OR** potassium phosphate 30 mmol in sodium chloride 0.9 % 250 mL infusion, 30 mmol, Intravenous, PRN **OR** potassium phosphate 15 mmol in 0.9% sodium chloride 100 mL IVPB, 15 mmol, Intravenous, PRN **OR** sodium phosphates 45 mmol in sodium chloride 0.9 % 500 mL IVPB, 45 mmol, Intravenous, PRN **OR**  sodium phosphates 30 mmol in sodium chloride 0.9 % 250 mL IVPB, 30 mmol, Intravenous, PRN **OR** sodium phosphates 15 mmol in sodium chloride 0.9 % 250 mL IVPB, 15 mmol, Intravenous, PRN, Ryder Llanes APRN  •  risperiDONE (risperDAL M-TABS) disintegrating tablet 0.5 mg, 0.5 mg, Oral, Nightly, DrawSarah MD, 0.5 mg at 08/17/21 2221  •  [COMPLETED] Insert peripheral IV, , , Once **AND** sodium chloride 0.9 % flush 10 mL, 10 mL, Intravenous, PRN, Leonardo Doss MD  •  sodium chloride 0.9 % flush 10 mL, 10 mL, Intravenous, Q12H, Ryder Llanes APRN, 10 mL at 08/18/21 0932  •  sodium chloride 0.9 % flush 10 mL, 10 mL, Intravenous, PRN, Ryder Llanes APRN  •  sodium chloride 0.9 % flush 10 mL, 10 mL, Intravenous, Q12H, Neela Alvarado MD, 10 mL at 08/18/21 0932  •  sodium chloride 0.9 % flush 10 mL, 10 mL, Intravenous, Jenny SRIVASTAVA Ammar, MD  •  sodium chloride 0.9 % flush 20 mL, 20 mL, Intravenous, Jenny SRIVASTAVA Ammar, MD    Data Review:  All labs (24hrs):   Recent Results (from the past 24 hour(s))   POC Glucose Once    Collection Time: 08/17/21  5:11 PM    Specimen: Blood   Result Value Ref Range    Glucose 122 (H) 70 - 105 mg/dL   POC Glucose Once    Collection Time: 08/17/21  8:08 PM    Specimen: Blood   Result Value Ref Range    Glucose 127 (H) 70 - 105 mg/dL   POC Glucose Once    Collection Time: 08/17/21 10:17 PM    Specimen: Blood   Result Value Ref Range    Glucose 119 (H) 70 - 105 mg/dL   POC Glucose Once    Collection Time: 08/18/21  7:31 AM    Specimen: Blood   Result Value Ref Range    Glucose 109 (H) 70 - 105 mg/dL   Magnesium    Collection Time: 08/18/21  7:34 AM    Specimen: Blood   Result Value Ref Range    Magnesium 1.9 1.6 - 2.6 mg/dL   Comprehensive Metabolic Panel    Collection Time: 08/18/21  7:34 AM    Specimen: Blood   Result Value Ref Range    Glucose 109 (H) 65 - 99 mg/dL    BUN 13 6 - 20 mg/dL    Creatinine 0.32 (L) 0.57 - 1.00 mg/dL    Sodium 137 136 - 145 mmol/L    Potassium 4.2 3.5 -  5.2 mmol/L    Chloride 99 98 - 107 mmol/L    CO2 27.0 22.0 - 29.0 mmol/L    Calcium 9.9 8.6 - 10.5 mg/dL    Total Protein 7.2 6.0 - 8.5 g/dL    Albumin 3.80 3.50 - 5.20 g/dL    ALT (SGPT) 117 (H) 1 - 33 U/L    AST (SGOT) 33 (H) 1 - 32 U/L    Alkaline Phosphatase 96 39 - 117 U/L    Total Bilirubin 0.7 0.0 - 1.2 mg/dL    eGFR Non African Amer >150 >60 mL/min/1.73    Globulin 3.4 gm/dL    A/G Ratio 1.1 g/dL    BUN/Creatinine Ratio 40.6 (H) 7.0 - 25.0    Anion Gap 11.0 5.0 - 15.0 mmol/L   CBC Auto Differential    Collection Time: 08/18/21  7:34 AM    Specimen: Blood   Result Value Ref Range    WBC 6.80 3.40 - 10.80 10*3/mm3    RBC 3.88 3.77 - 5.28 10*6/mm3    Hemoglobin 11.8 (L) 12.0 - 15.9 g/dL    Hematocrit 35.2 34.0 - 46.6 %    MCV 90.8 79.0 - 97.0 fL    MCH 30.4 26.6 - 33.0 pg    MCHC 33.4 31.5 - 35.7 g/dL    RDW 18.3 (H) 12.3 - 15.4 %    RDW-SD 56.4 (H) 37.0 - 54.0 fl    MPV 7.9 6.0 - 12.0 fL    Platelets 247 140 - 450 10*3/mm3    Neutrophil % 70.0 42.7 - 76.0 %    Lymphocyte % 19.4 (L) 19.6 - 45.3 %    Monocyte % 7.7 5.0 - 12.0 %    Eosinophil % 2.4 0.3 - 6.2 %    Basophil % 0.5 0.0 - 1.5 %    Neutrophils, Absolute 4.80 1.70 - 7.00 10*3/mm3    Lymphocytes, Absolute 1.30 0.70 - 3.10 10*3/mm3    Monocytes, Absolute 0.50 0.10 - 0.90 10*3/mm3    Eosinophils, Absolute 0.20 0.00 - 0.40 10*3/mm3    Basophils, Absolute 0.00 0.00 - 0.20 10*3/mm3    nRBC 0.1 0.0 - 0.2 /100 WBC   POC Glucose Once    Collection Time: 08/18/21 11:34 AM    Specimen: Blood   Result Value Ref Range    Glucose 98 70 - 105 mg/dL        Imaging:  Duplex Venous Upper Extremity - Right CAR  · Normal right upper extremity venous duplex scan.          ASSESSMENT:  Acute respiratory failure   Pneumonia -MRSA/ Klebsiella pneumoniae  COVID-19   Morbid obesity     DM II   E. coli UTI   HTN  Polyneuropathy   Delirium, acute  Dysphagia     PLAN:  To rehab soon, P2 P in process for insurance approval through primary  Encourage OOB daily to chair   Advance diet  to Regular with thin liquids  Encourage OOB more  PT/OT to get patient OOB at least for all meals  Bronchodilator  Inhaled corticosteroids  Mucinex/Cortef/Mucomyst tablets  Titrate O2 as tolerated  Electrolytes/ glycemic control  PT/OT/ST  DVT and GI prophylaxis    Discussed with Dr Ping Walker, APRN   8/18/2021  14:03 EDT          I personally have examined  and interviewed the patient. I have reviewed the history, data, problems, assessment and plan with our NP.  Critical care time in direct medical management (   ) minutes  Electronically signed by Jeff Feng MD, D,ABS, 08/18/21, 10:58 PM EDT.

## 2021-08-18 NOTE — PROGRESS NOTES
[facility] Daily Progress Note    Patient Name: Leslie Harris  : 1990  MRN: [id number]  Primary Care Physician:  Lesia, No Known  Date of admission: 2021      Subjective      Chief Complaint: Shortness of breath    Patient reports overall doing well.  Patient out of bed yesterday worked with physical therapy and currently on room air.  Patient concerned about how much insulin she is getting given her relatively controlled glycemic state.  Discussed that we would try to wean what medications we could given patient's clinical improvement.  Awaiting placement.      Review of Systems   Constitutional: Positive for malaise/fatigue. Negative for chills and fever.   HENT: Negative for hoarse voice and sore throat.    Eyes: Negative for double vision and photophobia.   Cardiovascular: Negative for chest pain and syncope.   Respiratory: Negative for cough and shortness of breath.    Musculoskeletal: Positive for myalgias. Negative for muscle weakness.   Gastrointestinal: Negative for nausea and vomiting.   Genitourinary: Negative for genital sores and pelvic pain.   Neurological: Positive for paresthesias and weakness. Negative for dizziness.   Psychiatric/Behavioral: Negative for altered mental status. The patient is not nervous/anxious.          Objective      Vitals:   Temp:  [97.4 °F (36.3 °C)-98.5 °F (36.9 °C)] 98.5 °F (36.9 °C)  Heart Rate:  [] 113  Resp:  [18-24] 22  BP: (118-153)/(74-82) 132/79  Flow (L/min):  [2] 2    Physical Exam      General: Morbidly obese female lying in bed breathing company on room air no acute distress  HEENT: NC/AT, EOMI, mucosa moist  Heart: Regular, rate controlled  Chest: Normal work of breathing, moving air well no wheezing  Abdominal: Soft. NT/ND.   Musculoskeletal: Normal ROM.  No cyanosis. No calf tenderness.  Neurological: AAOx3, no focal deficits  Skin: Skin is warm and dry. No rash  Psychiatric: Normal mood and  affect.      Result Review    Result Review:  I have personally reviewed the results from the time of this admission to 8/18/2021 11:41 EDT and agree with these findings:  [x]  Laboratory  [x]  Microbiology  [x]  Radiology  [x]  EKG/Telemetry   []  Cardiology/Vascular   []  Pathology  []  Old records  []  Other:  Most notable findings include: Anemia       Wounds (last 24 hours)      LDA Wound     Row Name 08/18/21 0820             Wound 07/26/21 2200 Left distal nose Pressure Injury    Wound - Properties Group Placement Date: 07/26/21  - Placement Time: 2200 -MF Present on Hospital Admission: N  -MF Side: Left  -MF Orientation: distal  -MF Location: nose  -MF Primary Wound Type: Pressure inj  -MF Stage, Pressure Injury : deep tissue injury  -MF    Retired Wound - Properties Group Date first assessed: 07/26/21  - Time first assessed: 2200  -MF Present on Hospital Admission: N  -MF Side: Left  -MF Location: nose  -MF Primary Wound Type: Pressure inj  -MF       Wound 07/26/21 2200 midline tongue Pressure Injury    Wound - Properties Group Placement Date: 07/26/21  - Placement Time: 2200 -MF Present on Hospital Admission: N  -MF Orientation: midline  -MF Location: tongue  -MF Primary Wound Type: Pressure inj  -MF Stage, Pressure Injury : deep tissue injury  -MF    Retired Wound - Properties Group Date first assessed: 07/26/21  - Time first assessed: 2200  -MF Present on Hospital Admission: N  -MF Location: tongue  -MF Primary Wound Type: Pressure inj  -MF       Wound 07/27/21 2028 medial sacral spine Pressure Injury    Wound - Properties Group Placement Date: 07/27/21  - Placement Time: 2028  -MF Present on Hospital Admission: N  -MF Orientation: medial  -MF Location: sacral spine  -MF Primary Wound Type: Pressure inj  -MF Stage, Pressure Injury : Stage 2  -MF    Dressing Appearance  open to air  -AD      Closure  Open to air  -AD      Base  pink  -AD      Retired Wound - Properties Group Date first  assessed: 07/27/21  -MF Time first assessed: 2028  -MF Present on Hospital Admission: N  -MF Location: sacral spine  -MF Primary Wound Type: Pressure inj  -MF       Wound 08/06/21 1400 Right cheek Pressure Injury    Wound - Properties Group Placement Date: 08/06/21  -HELLEN Placement Time: 1400  -HELLEN Present on Hospital Admission: N  -HELLEN Side: Right  -HELLEN Location: cheek  -HELLEN Primary Wound Type: Pressure inj  -HELLEN Stage, Pressure Injury : Stage 1  -HELLEN    Dressing Appearance  open to air  -AD      Closure  Open to air  -AD      Base  red  -AD      Retired Wound - Properties Group Date first assessed: 08/06/21  -HELLEN Time first assessed: 1400  -HELLEN Present on Hospital Admission: N  -HELLEN Side: Right  -HELLEN Location: cheek  -HELLEN Primary Wound Type: Pressure inj  -HELLEN       Wound 08/06/21 1400 Right throat Abrasion    Wound - Properties Group Placement Date: 08/06/21  -HELLEN Placement Time: 1400  -HELLEN Present on Hospital Admission: N  -HELLEN Side: Right  -HELLEN Location: throat  -HELLEN Primary Wound Type: Abrasion  -HELLEN    Retired Wound - Properties Group Date first assessed: 08/06/21  -HELLEN Time first assessed: 1400  -HELLEN Present on Hospital Admission: N  -HELLEN Side: Right  -HELLEN Location: throat  -HELLEN Primary Wound Type: Abrasion  -HELLEN      User Key  (r) = Recorded By, (t) = Taken By, (c) = Cosigned By    Initials Name Provider Type     Ольга Carver RN Registered Nurse    Savita De Los Santos RN Registered Nurse    Susana Perry RN Registered Nurse            Assessment/Plan      Brief Patient Summary:    Leslie Harris is a 30 y.o. morbidly obese female who was diagnosed with COVID-19 on 07/16/2021 at the [facility] Department. She had not been vaccinated for Covid. She presented to the [facility] ED on 07/22/2021 complaining of shortness of breath. Workup in the ER revealed acute respiratory failure with hypoxia, pneumonia due to COVID-19, hypertension, and hyperglycemia. The patient was placed on Precision Flow with 100% FiO2  and admitted to the ICU for close monitoring and further treatment. The patient was started on Decadron and Remdesivir.     07/23/21:  Patient transitioned AVAPS with Precision Flow with 100% FiO2, but continued to have hypoxia and was intubated.  Right IJ central line inserted.  Dietitian consulted for tube feeds.     07/24/21:  Remains intubated and sedated. Chemically paralyzed due to worsening hypoxemia. On Flolan nebulized.  Prone position.  Started on empiric coverage with ceftriaxone.  Tolerating tube feeds.       07/25/21:  Remains intubated, sedated, and chemically paralyzed.  Antibiotic changed to Zosyn.  Remains prone and on Flolan.  Tolerating tube feeds.         07/26/21:  Remains intubated, sedated, and chemically paralyzed.  Patient placed in supine position.  Remains on Flolan.  Tolerating tube feeds.       07/27/21:  Remains intubated, sedated, and chemically paralyzed.  Patient tolerating supine position.  Remains on Flolan.  Tolerating tube feeds.       07/28/21:  Remains intubated, sedated, and chemically paralyzed.  Patient tolerating supine position.  Remains on Flolan.  Tolerating tube feeds.       07/29/21:  Remains intubated, sedated, and chemically paralyzed.  Sputum culture grew MRSA.  Patient tolerating supine position.  Remains on Flolan; failed wean attempt.  Tolerating tube feeds.       07/30/21:  Remains intubated, sedated, and chemically paralyzed.  Patient returned to prone position for 16 hours.  Remains on Flolan.  Transitioned to heparin drip.  Tolerating tube feeds.       07/31/21:  Remains intubated, sedated, and chemically paralyzed.  Remains on Flolan.  Diuresis started.  On heparin drip.  Tolerating tube feeds.       08/01/21:  Remains intubated, sedated, and chemically paralyzed.  A-line inserted.  Tolerating supine position.  Remains on heparin drip.  Diuresing well.  Remains on Flolan.  Cardizem drip started for hypertension and tachycardia.  Left radial a-line  discontinued.  Right brachial a-line inserted.   Tolerating tube feeds.       08/02/21:  Remains intubated, sedated, and chemically paralyzed. Tolerating supine position.  Remains on Flolan.  Remains on heparin drip.  Labile BP, alternating Cardizem and Levophed.  Fecal management system placed due to high volume liquid stool.  Tolerating tube feeds.       08/03/21:  Remains intubated, sedated, and chemically paralyzed.  Infectious disease consulted for antibiotic management.  Heparin drip discontinued due to blood-tinged sputum.  Weaning Cardizem drip.  Remains on Flolan. Switched to prone position.  Tolerating tube feeds.       08/04/21:  Remains intubated, sedated, and chemically paralyzed.  Sputum culture grew Klebsiella pneumoniae.  FMS removed.  Lovenox restarted.  Weaning Cardizem drip.  Attempted to wean Flolan, but had to be reinitiated.  Tolerating tube feeds.       08/05/21:  Remains intubated, sedated, and chemically paralyzed.  Tolerating tube feeds.       08/06/21:  Remains intubated, sedated, and chemically paralyzed.  In prone position.  Tolerating tube feeds.       08/07/21:  Remains intubated, sedated, and chemically paralyzed.  Tolerating supine position.  Urine culture grew E. coli.  Tolerating tube feeds.       08/08/21:  Remains intubated, sedated, and chemically paralyzed.  Status post bronchoscopy with multiple mucus plugs removed.  Paralytic weaned off.  Tolerating tube feeds.       08/09/21:  Remains intubated and sedated.  Tolerating tube feeds.       08/10/21:  Patient extubated.  Tolerating tube feeds.       08/11/21:  Remains extubated.  Diagnosed with critical illness polyneuropathy muscle weakness due to paralytics and steroids.  Patient with acute delirium.  Tolerating tube feeds.  SLP consulted for video swallow.     08/12/21:  The patient was downgraded to PCU and the Hospitalist team was consulted for medical management.  Patient started on mechanical soft diet overnight.  She is  complaining of some shortness of breath, but maintaining oxygen saturation on 5 liters per high flow nasal cannula.  She reports fatigue and generalized weakness.        8/13     Patient's blood glucose very acceptable  Patient had been comfortable overnight.  She is slightly tachycardic on 5 L of high flow oxygen but sats are above 95 percent  Her white count 12.2  Pharmacy suggesting some changes in the insulin regimen to avoid multiple sticks.  Still feeling weak on the right upper extremity.  She relates that to her prone position in the ICU.  Discussed with the family that this would be helped by physical therapy.     8/14  Patient had been seen by new dietitian yesterday and I will speech therapist as well  She is a 96% saturation on 4 L of high flow nasal cannula  She is afebrile   [facility].  Patient liver enzymes are fluctuating and mildly elevated  Have a PICC line placed.     8/15  She denies new symptoms today  She is saturating high 90s on 3 L high flow cannula.  De-escalating oxygen support  Asking for FMLA.  For family members  Due to persistent symptoms right upper extremity we will check venous Doppler  Check forearm x-ray as well  Advised regarding elevation and warm compresses.  She has good pulsation right radial artery.  Nasogastric tube removed    8/16/2021: Patient Reports her right arm pain is improving.  Discussed that upper extremity Doppler negative for any DVT.  Patient's breathing improving but still requiring some supplemental oxygen.  Patient reports she has some vaginal burning today which she reports similar to when she has a yeast infection.  Awaiting placement.  Doing well after NG tube removed.    August 17, 2021: Patient continues to wean oxygen.  Yesterday attempt to get patient out of bed aborted due to transient tachycardia.  Patient reports no new symptoms.  Patient needs to be mobilized working to  get patient out of bed today.      Acetylcysteine, 600 mg, Oral, BID  aspirin, 325 mg, Oral, Daily  budesonide, 0.5 mg, Nebulization, BID - RT  cholecalciferol, 2,000 Units, Oral, Daily  enoxaparin, 80 mg, Subcutaneous, Q12H  furosemide, 20 mg, Oral, Daily  guaiFENesin, 600 mg, Oral, Q12H  hydrocortisone, 10 mg, Oral, BID With Meals  insulin glargine, 45 Units, Subcutaneous, Q12H  ipratropium-albuterol, 3 mL, Nebulization, 4x Daily - RT  melatonin, 10 mg, Oral, Nightly  metoprolol tartrate, 25 mg, Oral, Q12H  pantoprazole, 40 mg, Oral, BID AC  risperiDONE, 0.5 mg, Oral, Nightly  sodium chloride, 10 mL, Intravenous, Q12H  sodium chloride, 10 mL, Intravenous, Q12H       Pharmacy to Dose enoxaparin (LOVENOX),          Active Hospital Problems:  Active Hospital Problems    Diagnosis    • **Pneumonia due to COVID-19 virus    • Critical illness polyneuropathy (CMS/[facility])    • Delirium, acute    • Dysphagia    • E. coli UTI (urinary tract infection)    • Klebsiella pneumoniae pneumonia (CMS/[facility])    • Diabetes mellitus type 2 in obese (CMS/[facility])    • MRSA pneumonia (CMS/[facility])    • Acute respiratory failure due to COVID-19 (CMS/[facility])    • Class 3 severe obesity without serious comorbidity with body mass index (BMI) of 50.0 to 59.9 in adult    • Hypertension      Plan:     Shortness of breath-secondary to COVID-19 pneumonia, patient not vaccinated, patient intubated now extubated doing well, patient with development of underlying MRSA pneumonia as well as Klebsiella previously now finishing antibiotics overall improving with significant debility secondary to prolonged immobility  -Wean oxygen as tolerated  -Out of isolation  -Bronchodilators mucolytic's  -Pulmonology monitoring  -Due to prolonged hospital course and signs of critical illness myopathy patient will need inpatient rehab  -Weaning steroids  -Patient on room air continue aggressive physical therapy measures and out of bed    COVID-19 pneumonia-patient now on room air  out of isolation  -Inflammatory markers  -Off remdesivir, initially on Decadron and then hydrocortisone  -Mucolytic's    MRSA/Klebsiella pneumonia-patient seen by pulmonology 19  -Finished Rocephin and linezolid  -ID and pulmonology consulting    Vaginal burning-patient reports similar to previous yeast infections  -Diflucan x1    Right arm pain-May have some component of neuropathy after prolonged illness or due to transient external compression of forearm at some point during hospital course  -X-ray and upper extremity Doppler unremarkable  -Monitor daily  -Can discuss neuropathic pain medication if required    Anemia-mild but likely with a chronic inflammatory component from prolonged illness  -Daily CBC    E. coli cystitis-patient finished Rocephin for treatment    Critical illness polyneuropathy-patient on prolonged dose of steroids due to underlying Covid infection  -PT/OT  -Rehab on discharge  -Out of bed    Type 2 diabetes-patient with residual hyperglycemia likely due to steroids, patient appearing well controlled now off steroids  -For patient comfort trying to wean off short acting insulin to avoid multiple injections throughout the day  -Long-acting insulin  -Diabetic diet    Dysphagia-after extubation noted but now able to remove NG tube, no further issues  -Monitor with diet    Morbid obesity-BMI 50  -Lifestyle modification        DVT prophylaxis:  Medical DVT prophylaxis orders are present.    CODE STATUS:    Code Status: CPR  Medical Interventions (Level of Support Prior to Arrest): Full      Disposition:  I expect patient to be discharged in when she has placement.  Patient is young and motivated will likely need less than 30 days of rehab and is an excellent candidate for rapid improvement.    This patient has been examined wearing appropriate Personal Protective Equipment and discussed with hospital infection control department. 08/18/21      Electronically signed by Rodrigo Cortez MD,  08/18/21, 11:41 EDT.  [facility] Hospitalist Team

## 2021-08-18 NOTE — CASE MANAGEMENT/SOCIAL WORK
Continued Stay Note  [facility]     Patient Name: Leslie Harris  MRN: [id number]  Today's Date: 8/18/2021    Admit Date: 7/22/2021    Discharge Plan     Row Name 08/18/21 1603       Plan    Plan  anticipate DC to inpt rehab- [facility] acute accepted 8/12- precert denied 8/18. P2P denied 8/18. Expedited appeal in process.    Plan Comments  update: Gregoria at [facility] already spoke with patients family and started expedited appeal.    Row Name 08/18/21 1000       Plan    Plan  anticipate DC to inpt rehab- [facility] acute accepted 8/12- precert denied 8/18. P2P denied 8/18.    Plan Comments  CM received notification that p2p was denied. Cm reached out to [facility] liaison to see if family could appeal the denial. awaiting response.        Expected Discharge Date and Time     Expected Discharge Date Expected Discharge Time    Aug 20, 2021         Phone communication or documentation only - no physical contact with patient or family.      Amelie Harmon, RN

## 2021-08-18 NOTE — PLAN OF CARE
Goal Outcome Evaluation:      Assessment: Leslie Harris continues to make good gains w/ strength. She has obviously been working on her own between PT rx's. She is an excellent rehab candidate. She presents with functional mobility impairments which indicate the need for skilled intervention. Tolerating session today without incident. Will continue to follow and progress as tolerated.     Plan/Recommendations:   Pt would benefit from Inpatient Rehabilitation placement at discharge from facility and requires no DME at discharge.   Pt desires Inpatient Rehabilitation placement at discharge. Pt cooperative; agreeable to therapeutic recommendations and plan of care.

## 2021-08-18 NOTE — CASE MANAGEMENT/SOCIAL WORK
Continued Stay Note   [facility]     Patient Name: Leslie Harris  MRN: [id number]  Today's Date: 8/18/2021    Admit Date: 7/22/2021    Discharge Plan     Row Name 08/18/21 1559       Plan    Plan  anticipate DC to inpt rehab- [facility] acute accepted 8/12- precert denied 8/18. P2P denied 8/18.    Plan Comments  CM received notification that p2p was denied. Cm reached out to [facility] liaison to see if family could appeal the denial. awaiting response.        Expected Discharge Date and Time     Expected Discharge Date Expected Discharge Time    Aug 20, 2021         Phone communication or documentation only - no physical contact with patient or family.      Amelie Harmon RN

## 2021-08-18 NOTE — PLAN OF CARE
Leslie Harris presents with ADL impairments below baseline abilities which indicate the need for continued skilled intervention while inpatient. Pt able to complete small drinks of water in modified environment with Min A. Grooming Max A. Completes ROM HEP and is limited by LUE shoulder and RUE elbow pain. Pt mother present and has many questions regarding outlook.  Somewhat anasognostic regarding current status and lengthy rehab required. Patient emotional regarding current status and dependence on others. Cont to demo significant global weakness.  Demos full LUE elbow ROM and minimal shoulder AROM due to weakness. Tolerating session today without incident. Will continue to follow and progress as tolerated.

## 2021-08-19 LAB
ALBUMIN SERPL-MCNC: 3.7 G/DL (ref 3.5–5.2)
ALBUMIN/GLOB SERPL: 1.1 G/DL
ALP SERPL-CCNC: 93 U/L (ref 39–117)
ALT SERPL W P-5'-P-CCNC: 115 U/L (ref 1–33)
ANION GAP SERPL CALCULATED.3IONS-SCNC: 11 MMOL/L (ref 5–15)
AST SERPL-CCNC: 39 U/L (ref 1–32)
BASOPHILS # BLD AUTO: 0.1 10*3/MM3 (ref 0–0.2)
BASOPHILS NFR BLD AUTO: 1 % (ref 0–1.5)
BILIRUB SERPL-MCNC: 0.7 MG/DL (ref 0–1.2)
BUN SERPL-MCNC: 13 MG/DL (ref 6–20)
BUN/CREAT SERPL: 32.5 (ref 7–25)
CALCIUM SPEC-SCNC: 9.6 MG/DL (ref 8.6–10.5)
CHLORIDE SERPL-SCNC: 99 MMOL/L (ref 98–107)
CO2 SERPL-SCNC: 26 MMOL/L (ref 22–29)
CREAT SERPL-MCNC: 0.4 MG/DL (ref 0.57–1)
D DIMER PPP FEU-MCNC: 0.96 MG/L (FEU) (ref 0–0.59)
DEPRECATED RDW RBC AUTO: 56.4 FL (ref 37–54)
EOSINOPHIL # BLD AUTO: 0.2 10*3/MM3 (ref 0–0.4)
EOSINOPHIL NFR BLD AUTO: 3 % (ref 0.3–6.2)
ERYTHROCYTE [DISTWIDTH] IN BLOOD BY AUTOMATED COUNT: 18 % (ref 12.3–15.4)
GFR SERPL CREATININE-BSD FRML MDRD: >150 ML/MIN/1.73
GLOBULIN UR ELPH-MCNC: 3.4 GM/DL
GLUCOSE BLDC GLUCOMTR-MCNC: 109 MG/DL (ref 70–105)
GLUCOSE BLDC GLUCOMTR-MCNC: 111 MG/DL (ref 70–105)
GLUCOSE BLDC GLUCOMTR-MCNC: 138 MG/DL (ref 70–105)
GLUCOSE BLDC GLUCOMTR-MCNC: 195 MG/DL (ref 70–105)
GLUCOSE SERPL-MCNC: 102 MG/DL (ref 65–99)
HCT VFR BLD AUTO: 37.1 % (ref 34–46.6)
HGB BLD-MCNC: 12.1 G/DL (ref 12–15.9)
LYMPHOCYTES # BLD AUTO: 1.5 10*3/MM3 (ref 0.7–3.1)
LYMPHOCYTES NFR BLD AUTO: 25.3 % (ref 19.6–45.3)
MAGNESIUM SERPL-MCNC: 1.9 MG/DL (ref 1.6–2.6)
MCH RBC QN AUTO: 29.2 PG (ref 26.6–33)
MCHC RBC AUTO-ENTMCNC: 32.5 G/DL (ref 31.5–35.7)
MCV RBC AUTO: 89.9 FL (ref 79–97)
MONOCYTES # BLD AUTO: 0.4 10*3/MM3 (ref 0.1–0.9)
MONOCYTES NFR BLD AUTO: 6.5 % (ref 5–12)
NEUTROPHILS NFR BLD AUTO: 3.9 10*3/MM3 (ref 1.7–7)
NEUTROPHILS NFR BLD AUTO: 64.2 % (ref 42.7–76)
NRBC BLD AUTO-RTO: 0.1 /100 WBC (ref 0–0.2)
PLATELET # BLD AUTO: 232 10*3/MM3 (ref 140–450)
PMV BLD AUTO: 7.8 FL (ref 6–12)
POTASSIUM SERPL-SCNC: 4.3 MMOL/L (ref 3.5–5.2)
PROT SERPL-MCNC: 7.1 G/DL (ref 6–8.5)
RBC # BLD AUTO: 4.12 10*6/MM3 (ref 3.77–5.28)
SODIUM SERPL-SCNC: 136 MMOL/L (ref 136–145)
WBC # BLD AUTO: 6.1 10*3/MM3 (ref 3.4–10.8)

## 2021-08-19 PROCEDURE — 94799 UNLISTED PULMONARY SVC/PX: CPT

## 2021-08-19 PROCEDURE — 25010000002 ENOXAPARIN PER 10 MG: Performed by: FAMILY MEDICINE

## 2021-08-19 PROCEDURE — 97110 THERAPEUTIC EXERCISES: CPT

## 2021-08-19 PROCEDURE — 97530 THERAPEUTIC ACTIVITIES: CPT

## 2021-08-19 PROCEDURE — 82962 GLUCOSE BLOOD TEST: CPT

## 2021-08-19 PROCEDURE — 36415 COLL VENOUS BLD VENIPUNCTURE: CPT | Performed by: INTERNAL MEDICINE

## 2021-08-19 PROCEDURE — 80053 COMPREHEN METABOLIC PANEL: CPT | Performed by: INTERNAL MEDICINE

## 2021-08-19 PROCEDURE — 99232 SBSQ HOSP IP/OBS MODERATE 35: CPT | Performed by: FAMILY MEDICINE

## 2021-08-19 PROCEDURE — 83735 ASSAY OF MAGNESIUM: CPT | Performed by: NURSE PRACTITIONER

## 2021-08-19 PROCEDURE — 97535 SELF CARE MNGMENT TRAINING: CPT

## 2021-08-19 PROCEDURE — 85025 COMPLETE CBC W/AUTO DIFF WBC: CPT | Performed by: NURSE PRACTITIONER

## 2021-08-19 PROCEDURE — 85379 FIBRIN DEGRADATION QUANT: CPT | Performed by: FAMILY MEDICINE

## 2021-08-19 RX ORDER — AMOXICILLIN 250 MG
2 CAPSULE ORAL 2 TIMES DAILY
Status: DISCONTINUED | OUTPATIENT
Start: 2021-08-19 | End: 2021-08-24 | Stop reason: HOSPADM

## 2021-08-19 RX ORDER — PANTOPRAZOLE SODIUM 40 MG/1
40 TABLET, DELAYED RELEASE ORAL
Status: DISCONTINUED | OUTPATIENT
Start: 2021-08-20 | End: 2021-08-19

## 2021-08-19 RX ORDER — ARGININE/GLUTAMINE/CALCIUM BMB 7G-7G-1.5G
1 POWDER IN PACKET (EA) ORAL 2 TIMES DAILY
Status: DISCONTINUED | OUTPATIENT
Start: 2021-08-19 | End: 2021-08-24 | Stop reason: HOSPADM

## 2021-08-19 RX ORDER — PANTOPRAZOLE SODIUM 40 MG/1
40 TABLET, DELAYED RELEASE ORAL 2 TIMES DAILY PRN
Status: DISCONTINUED | OUTPATIENT
Start: 2021-08-19 | End: 2021-08-24 | Stop reason: HOSPADM

## 2021-08-19 RX ORDER — POLYETHYLENE GLYCOL 3350 17 G/17G
17 POWDER, FOR SOLUTION ORAL 2 TIMES DAILY
Status: DISCONTINUED | OUTPATIENT
Start: 2021-08-19 | End: 2021-08-24 | Stop reason: HOSPADM

## 2021-08-19 RX ADMIN — Medication 10 ML: at 20:21

## 2021-08-19 RX ADMIN — IPRATROPIUM BROMIDE AND ALBUTEROL SULFATE 3 ML: 2.5; .5 SOLUTION RESPIRATORY (INHALATION) at 21:10

## 2021-08-19 RX ADMIN — RISPERIDONE 0.5 MG: 0.5 TABLET, ORALLY DISINTEGRATING ORAL at 20:21

## 2021-08-19 RX ADMIN — Medication 1 PACKET: at 11:42

## 2021-08-19 RX ADMIN — BUDESONIDE 0.5 MG: 0.5 SUSPENSION RESPIRATORY (INHALATION) at 07:47

## 2021-08-19 RX ADMIN — HYDROCORTISONE 10 MG: 10 TABLET ORAL at 17:58

## 2021-08-19 RX ADMIN — GUAIFENESIN 600 MG: 600 TABLET, EXTENDED RELEASE ORAL at 20:21

## 2021-08-19 RX ADMIN — POLYETHYLENE GLYCOL 3350 17 G: 17 POWDER, FOR SOLUTION ORAL at 20:21

## 2021-08-19 RX ADMIN — PANTOPRAZOLE SODIUM 40 MG: 40 TABLET, DELAYED RELEASE ORAL at 08:54

## 2021-08-19 RX ADMIN — METOPROLOL TARTRATE 25 MG: 25 TABLET, FILM COATED ORAL at 08:51

## 2021-08-19 RX ADMIN — METOPROLOL TARTRATE 25 MG: 25 TABLET, FILM COATED ORAL at 20:21

## 2021-08-19 RX ADMIN — IPRATROPIUM BROMIDE AND ALBUTEROL SULFATE 3 ML: 2.5; .5 SOLUTION RESPIRATORY (INHALATION) at 07:45

## 2021-08-19 RX ADMIN — Medication 600 MG: at 20:21

## 2021-08-19 RX ADMIN — Medication 10 ML: at 08:57

## 2021-08-19 RX ADMIN — ENOXAPARIN SODIUM 80 MG: 80 INJECTION, SOLUTION INTRAVENOUS; SUBCUTANEOUS at 08:51

## 2021-08-19 RX ADMIN — BUDESONIDE 0.5 MG: 0.5 SUSPENSION RESPIRATORY (INHALATION) at 21:10

## 2021-08-19 RX ADMIN — Medication 10 MG: at 20:21

## 2021-08-19 RX ADMIN — Medication 600 MG: at 08:51

## 2021-08-19 RX ADMIN — ASPIRIN 325 MG ORAL TABLET 325 MG: 325 PILL ORAL at 08:51

## 2021-08-19 RX ADMIN — ENOXAPARIN SODIUM 40 MG: 40 INJECTION SUBCUTANEOUS at 20:21

## 2021-08-19 RX ADMIN — Medication 10 ML: at 08:51

## 2021-08-19 RX ADMIN — DOCUSATE SODIUM 50 MG AND SENNOSIDES 8.6 MG 2 TABLET: 8.6; 5 TABLET, FILM COATED ORAL at 20:20

## 2021-08-19 RX ADMIN — IPRATROPIUM BROMIDE AND ALBUTEROL SULFATE 3 ML: 2.5; .5 SOLUTION RESPIRATORY (INHALATION) at 14:33

## 2021-08-19 RX ADMIN — Medication 2000 UNITS: at 08:51

## 2021-08-19 RX ADMIN — FUROSEMIDE 20 MG: 20 TABLET ORAL at 08:51

## 2021-08-19 RX ADMIN — GUAIFENESIN 600 MG: 600 TABLET, EXTENDED RELEASE ORAL at 08:51

## 2021-08-19 RX ADMIN — HYDROCORTISONE 10 MG: 10 TABLET ORAL at 08:51

## 2021-08-19 RX ADMIN — Medication 1 PACKET: at 21:21

## 2021-08-19 RX ADMIN — IPRATROPIUM BROMIDE AND ALBUTEROL SULFATE 3 ML: 2.5; .5 SOLUTION RESPIRATORY (INHALATION) at 11:02

## 2021-08-19 NOTE — CASE MANAGEMENT/SOCIAL WORK
Continued Stay Note  [facility]     Patient Name: Leslie Harris  MRN: [id number]  Today's Date: 8/19/2021    Admit Date: 7/22/2021    Discharge Plan     Row Name 08/19/21 1507       Plan    Plan Comments  SW delivered FMLA paperwork for pt's father to the bedside and pt stated that as of yesterday, she also has FMLA paperwork from her employer. SW looked at FMLA paperwork and none of pt's information had been completed. SW assisted pt with completing the paperwork as pt has difficulty with writing at this time. SW delivered pt's FMLA paperwork to Hospitalist Group for completion. Per Hospitalist NP, Vandana is out for the remainder of the week and completion will be delayed until next week. DANIELLE informed pt/pt's mother via phone call into pt's room.        Met with patient in room wearing PPE: mask, face shield/goggles.      Maintained distance greater than six feet and spent less than 15 minutes in the room.      Rosie Delgado, [facility] CityLUCY, W    Office: [phone]  Cell: [phone]  Fax: [phone]  E-mail: [email or web address]

## 2021-08-19 NOTE — PLAN OF CARE
Goal Outcome Evaluation:      Assessment: Leslie Harris continues to improve in her strength, though she remains weak n her trunk at this time. Was able to push w/ LEs to scoot up in bed and moves LEs toward eob w/ only min assist now. She may be ready to try to stand at next rx but will need assist of 2-3 for safety. She presents with functional mobility impairments which indicate the need for skilled intervention. Tolerating session today without incident. Will continue to follow and progress as tolerated.     Plan/Recommendations:   Pt NEEDS Inpatient Rehabilitation placement at discharge from facility and requires no DME at discharge.   Pt desires Inpatient Rehabilitation placement at discharge. Pt cooperative; agreeable to therapeutic recommendations and plan of care.

## 2021-08-19 NOTE — PROGRESS NOTES
"PULMONARY CRITICAL CARE Progress  NOTE      PATIENT IDENTIFICATION:  Name: Leslie Harris  MRN: [id number]  :  1990     Age: 30 y.o.  Sex: female    DATE OF Note:  2021   Referring Physician: Sarah Fajardo MD                  Subjective:    Feeling better, off O2, no SOB,   no chest or abd pain pain,  no bowel or bladder issues, no new complaints  Working with therapy well     Objective:  tMax 24 hrs: Temp (24hrs), Av.1 °F (36.7 °C), Min:97.8 °F (36.6 °C), Max:98.4 °F (36.9 °C)      Vitals Ranges:   Temp:  [97.8 °F (36.6 °C)-98.4 °F (36.9 °C)] 98.1 °F (36.7 °C)  Heart Rate:  [] 116  Resp:  [14-24] 22  BP: (124-137)/(76-83) 130/76    Intake and Output Last 3 Shifts:   I/O last 3 completed shifts:  In: 1650 [P.O.:1650]  Out: 2825 [Urine:2825]    Exam:  /76   Pulse 116   Temp 98.1 °F (36.7 °C) (Oral)   Resp 22   Ht 167.6 cm (66\")   Wt (!) 149 kg (327 lb 9.7 oz)   LMP 2021   SpO2 92%   BMI 52.88 kg/m²     General Appearance:  Alert   HEENT:  Normocephalic, without obvious abnormality, Conjunctiva/corneas clear,.  Normal external ear canals, Nares normal, no drainage     Neck:  Supple, symmetrical, trachea midline. No JVD.  Lungs /Chest wall:   Bilateral basal rhonchi,  symmetrical wall movement.     Heart:  Regular rate and rhythm, systolic murmur PMI left sternal border  Abdomen: Soft, non-tender, no masses, no organomegaly.    Extremities: Trace edema no clubbing or Cyanosis        Medications:    Current Facility-Administered Medications:   •  acetaminophen (TYLENOL) 160 MG/5ML solution 650 mg, 650 mg, Oral, Q6H PRN, Radha Jaquez MD, 650 mg at 21 0828  •  acetaminophen (TYLENOL) tablet 650 mg, 650 mg, Oral, Q4H PRN, 650 mg at 08/15/21 1956 **OR** acetaminophen (TYLENOL) suppository 650 mg, 650 mg, Rectal, Q4H PRN, Ryder Llanes, APRN, 650 mg at 21 0153  •  Acetylcysteine capsule 600 mg, 600 mg, Oral, BID, Ryder Llanes, APRN, 600 mg at 21 0851  •  " aluminum-magnesium hydroxide-simethicone (MAALOX MAX) 400-400-40 MG/5ML suspension 15 mL, 15 mL, Oral, Q6H PRN, Ryder Llanes APRN  •  artificial tears ophthalmic ointment, , Both Eyes, PRN, Jeff Feng MD  •  aspirin tablet 325 mg, 325 mg, Oral, Daily, Juan Arguelles MD, 325 mg at 08/19/21 0851  •  benzonatate (TESSALON) capsule 100 mg, 100 mg, Oral, TID PRN, Ryder Llanes APRN  •  budesonide (PULMICORT) nebulizer solution 0.5 mg, 0.5 mg, Nebulization, BID - RT, Jeff Feng MD, 0.5 mg at 08/19/21 0747  •  cholecalciferol (VITAMIN D3) tablet 2,000 Units, 2,000 Units, Oral, Daily, Juan Arguelles MD, 2,000 Units at 08/19/21 0851  •  dextrose (D50W) 25 g/ 50mL Intravenous Solution 25 g, 25 g, Intravenous, Q15 Min PRN, Ryder Llanes APRN  •  dextrose (GLUTOSE) oral gel 15 g, 15 g, Oral, Q15 Min PRN, Ryder Llanes APRN  •  enoxaparin (LOVENOX) syringe 40 mg, 40 mg, Subcutaneous, Q12H, Rodrigo Cortez MD  •  furosemide (LASIX) tablet 20 mg, 20 mg, Oral, Daily, Sarah Fajardo MD, 20 mg at 08/19/21 0851  •  glucagon (human recombinant) (GLUCAGEN DIAGNOSTIC) injection 1 mg, 1 mg, Subcutaneous, Q15 Min PRN, Ryder Llanes APRN  •  guaiFENesin (MUCINEX) 12 hr tablet 600 mg, 600 mg, Oral, Q12H, Juan Arguelles MD, 600 mg at 08/19/21 0851  •  hydrALAZINE (APRESOLINE) injection 10 mg, 10 mg, Intravenous, Q4H PRN, Ryder Llanes APRN, 10 mg at 08/07/21 0051  •  hydrocortisone (CORTEF) tablet 10 mg, 10 mg, Oral, BID With Meals, Rodrigo Cortez MD, 10 mg at 08/19/21 1758  •  hydrOXYzine (ATARAX) tablet 25 mg, 25 mg, Oral, TID PRN, Sheree Orantes APRN  •  ipratropium-albuterol (DUO-NEB) nebulizer solution 3 mL, 3 mL, Nebulization, 4x Daily - RT, Jeff Feng MD, 3 mL at 08/19/21 1433  •  Rangel pack 1 packet, 1 packet, Oral, BID, Ynes Warner, KATIE, 1 packet at 08/19/21 1142  •  lidocaine (XYLOCAINE) 5 % ointment, , , PRN, Ping,  MD Jeff, 1 application at 08/08/21 0910  •  lidocaine PF 1% (XYLOCAINE) injection, , , PRN, Jeff Feng MD, 5 mL at 08/08/21 0911  •  Magnesium Sulfate 2 gram infusion - Mg less than or equal to 1.5 mg/dL, 2 g, Intravenous, PRN, Last Rate: 25 mL/hr at 08/10/21 1143, 2 g at 08/10/21 1143 **OR** Magnesium Sulfate 1 gram infusion - Mg 1.6-1.9 mg/dL, 1 g, Intravenous, PRN, Ryder Llanes, APRN, Last Rate: 100 mL/hr at 08/15/21 1210, 1 g at 08/15/21 1210  •  melatonin tablet 10 mg, 10 mg, Oral, Nightly, Sarah Fajardo MD, 10 mg at 08/18/21 2020  •  methocarbamol (ROBAXIN) tablet 500 mg, 500 mg, Oral, 4x Daily PRN, Juan Arguelles MD  •  metoprolol tartrate (LOPRESSOR) tablet 25 mg, 25 mg, Oral, Q12H, Sarah Fajardo MD, 25 mg at 08/19/21 0851  •  nitroglycerin (NITROSTAT) SL tablet 0.4 mg, 0.4 mg, Sublingual, Q5 Min PRN, Ryder Llanes APRN  •  ondansetron (ZOFRAN) tablet 2 mg, 2 mg, Oral, Q4H PRN **OR** ondansetron (ZOFRAN) injection 2 mg, 2 mg, Intravenous, Q4H PRN, Marianna Navarrete, NATY, 2 mg at 08/12/21 0940  •  pantoprazole (PROTONIX) EC tablet 40 mg, 40 mg, Oral, BID PRN, Rodrigo Cortez MD  •  Pharmacy to Dose enoxaparin (LOVENOX), , Does not apply, Continuous PRN, Jeff Feng MD  •  polyethylene glycol (MIRALAX) packet 17 g, 17 g, Oral, BID, Rodrigo Cortez MD  •  potassium chloride (K-DUR,KLOR-CON) CR tablet 40 mEq, 40 mEq, Oral, PRN **OR** potassium chloride (KLOR-CON) packet 40 mEq, 40 mEq, Oral, PRN, 40 mEq at 08/10/21 1527 **OR** potassium chloride 10 mEq in 100 mL IVPB, 10 mEq, Intravenous, Q1H PRN, Ryder Llanes, APRN  •  potassium phosphate 45 mmol in sodium chloride 0.9 % 500 mL infusion, 45 mmol, Intravenous, PRN **OR** potassium phosphate 30 mmol in sodium chloride 0.9 % 250 mL infusion, 30 mmol, Intravenous, PRN **OR** potassium phosphate 15 mmol in 0.9% sodium chloride 100 mL IVPB, 15 mmol, Intravenous, PRN **OR** sodium phosphates 45 mmol in sodium  chloride 0.9 % 500 mL IVPB, 45 mmol, Intravenous, PRN **OR** sodium phosphates 30 mmol in sodium chloride 0.9 % 250 mL IVPB, 30 mmol, Intravenous, PRN **OR** sodium phosphates 15 mmol in sodium chloride 0.9 % 250 mL IVPB, 15 mmol, Intravenous, PRN, Ryder Llanes APRN  •  risperiDONE (risperDAL M-TABS) disintegrating tablet 0.5 mg, 0.5 mg, Oral, Nightly, Draw, MD Sarah, 0.5 mg at 08/18/21 2019  •  sennosides-docusate (PERICOLACE) 8.6-50 MG per tablet 2 tablet, 2 tablet, Oral, BID, Rodrigo Cortez MD  •  [COMPLETED] Insert peripheral IV, , , Once **AND** sodium chloride 0.9 % flush 10 mL, 10 mL, Intravenous, PRN, Leonardo Dsos MD  •  sodium chloride 0.9 % flush 10 mL, 10 mL, Intravenous, Q12H, Ryder Llanes APRN, 10 mL at 08/19/21 0857  •  sodium chloride 0.9 % flush 10 mL, 10 mL, Intravenous, PRN, Ryder Llanes APRN  •  sodium chloride 0.9 % flush 10 mL, 10 mL, Intravenous, Q12H, Neela Alvarado MD, 10 mL at 08/19/21 0851  •  sodium chloride 0.9 % flush 10 mL, 10 mL, IntravenousCAROLA Tayara, Ammar, MD  •  sodium chloride 0.9 % flush 20 mL, 20 mL, Intravenous, Jenny SRIVASTAVA Ammar, MD    Data Review:  All labs (24hrs):   Recent Results (from the past 24 hour(s))   POC Glucose Once    Collection Time: 08/18/21  8:04 PM    Specimen: Blood   Result Value Ref Range    Glucose 145 (H) 70 - 105 mg/dL   Magnesium    Collection Time: 08/19/21  5:56 AM    Specimen: Blood   Result Value Ref Range    Magnesium 1.9 1.6 - 2.6 mg/dL   Comprehensive Metabolic Panel    Collection Time: 08/19/21  5:56 AM    Specimen: Blood   Result Value Ref Range    Glucose 102 (H) 65 - 99 mg/dL    BUN 13 6 - 20 mg/dL    Creatinine 0.40 (L) 0.57 - 1.00 mg/dL    Sodium 136 136 - 145 mmol/L    Potassium 4.3 3.5 - 5.2 mmol/L    Chloride 99 98 - 107 mmol/L    CO2 26.0 22.0 - 29.0 mmol/L    Calcium 9.6 8.6 - 10.5 mg/dL    Total Protein 7.1 6.0 - 8.5 g/dL    Albumin 3.70 3.50 - 5.20 g/dL    ALT (SGPT) 115 (H) 1 - 33 U/L    AST (SGOT) 39 (H)  1 - 32 U/L    Alkaline Phosphatase 93 39 - 117 U/L    Total Bilirubin 0.7 0.0 - 1.2 mg/dL    eGFR Non African Amer >150 >60 mL/min/1.73    Globulin 3.4 gm/dL    A/G Ratio 1.1 g/dL    BUN/Creatinine Ratio 32.5 (H) 7.0 - 25.0    Anion Gap 11.0 5.0 - 15.0 mmol/L   CBC Auto Differential    Collection Time: 08/19/21  5:56 AM    Specimen: Blood   Result Value Ref Range    WBC 6.10 3.40 - 10.80 10*3/mm3    RBC 4.12 3.77 - 5.28 10*6/mm3    Hemoglobin 12.1 12.0 - 15.9 g/dL    Hematocrit 37.1 34.0 - 46.6 %    MCV 89.9 79.0 - 97.0 fL    MCH 29.2 26.6 - 33.0 pg    MCHC 32.5 31.5 - 35.7 g/dL    RDW 18.0 (H) 12.3 - 15.4 %    RDW-SD 56.4 (H) 37.0 - 54.0 fl    MPV 7.8 6.0 - 12.0 fL    Platelets 232 140 - 450 10*3/mm3    Neutrophil % 64.2 42.7 - 76.0 %    Lymphocyte % 25.3 19.6 - 45.3 %    Monocyte % 6.5 5.0 - 12.0 %    Eosinophil % 3.0 0.3 - 6.2 %    Basophil % 1.0 0.0 - 1.5 %    Neutrophils, Absolute 3.90 1.70 - 7.00 10*3/mm3    Lymphocytes, Absolute 1.50 0.70 - 3.10 10*3/mm3    Monocytes, Absolute 0.40 0.10 - 0.90 10*3/mm3    Eosinophils, Absolute 0.20 0.00 - 0.40 10*3/mm3    Basophils, Absolute 0.10 0.00 - 0.20 10*3/mm3    nRBC 0.1 0.0 - 0.2 /100 WBC   POC Glucose Once    Collection Time: 08/19/21  7:07 AM    Specimen: Blood   Result Value Ref Range    Glucose 111 (H) 70 - 105 mg/dL   POC Glucose Once    Collection Time: 08/19/21 11:45 AM    Specimen: Blood   Result Value Ref Range    Glucose 109 (H) 70 - 105 mg/dL   D-dimer, Quantitative    Collection Time: 08/19/21 12:45 PM    Specimen: Blood   Result Value Ref Range    D-Dimer, Quantitative 0.96 (H) 0.00 - 0.59 mg/L (FEU)   POC Glucose Once    Collection Time: 08/19/21  4:15 PM    Specimen: Blood   Result Value Ref Range    Glucose 138 (H) 70 - 105 mg/dL        Imaging:  Duplex Venous Upper Extremity - Right CAR  · Normal right upper extremity venous duplex scan.          ASSESSMENT:  Acute respiratory failure   Pneumonia -MRSA/ Klebsiella pneumoniae  COVID-19   Morbid  obesity     DM II   E. coli UTI   HTN  Polyneuropathy   Delirium, acute  Dysphagia     PLAN:  Continue working with therapy and getting stronger and   To rehab soon, P2 P in process for insurance approval through primary  Encourage OOB daily to chair   Advance diet to Regular with thin liquids  Encourage OOB more  PT/OT to get patient OOB at least for all meals  Bronchodilator  Inhaled corticosteroids  Mucinex/Cortef/Mucomyst tablets  Titrate O2 as tolerated  Electrolytes/ glycemic control  PT/OT/ST  DVT and GI prophylaxis    Discussed with Dr Ping Walker, APRN   8/19/2021  19:15 EDT          I personally have examined  and interviewed the patient. I have reviewed the history, data, problems, assessment and plan with our NP.  Critical care time in direct medical management (   ) minutes  Electronically signed by Jeff Feng MD, D,ABS, 08/19/21, 9:01 PM EDT.

## 2021-08-19 NOTE — THERAPY TREATMENT NOTE
Subjective: Pt agreeable to therapeutic plan of care.  Cognition: Pt conversational.      Objective:     Bed Mobility: Dependent- Scooting toward HOB.  Pt unable to assist with LE due to position in bed.    Functional Transfers: N/A or Not attempted.  Functional Ambulation: N/A or Not attempted.    Grooming: Supervision  ADL Position: supine  ADL Comments: Therapist prepared rag for pt to wash face.  She used LUE to bring rag to face, though also lowered face to hand to compensate for UE weakness.      Feeding: Supervision  ADL Position: sitting up in bed  ADL Comments: Pt able to reach drink placed on tray in close proximity and bring straw to mouth for a drink.  She reports feeding herself toast this morning, though had increased difficulty as it became smaller.      Completed AAROM of bilateral shoulders, elbows, wrists, hands.  Reports feeling of stretching with elbow and wrist movement in L hand.  Pt used mother's cane as a dowel fab and completed AAROM press.  Significant difficulty noted due to weakness, though pt very motivated to participate in exercises.      Pain: 0 VAS- no pain at rest, though pt reports pain in R elbow with stretching into extension.      Education: Provided education on importance of mobility and skilled verbal / tactile cueing throughout intervention.     Assessment: Leslie Harris presents with ADL impairments below baseline abilities which indicate the need for continued skilled intervention while inpatient. She continues to demo global weakness.  She is continuing to show progress with ADLs such as grooming and bathing due to improvement in UE strength/ROM.  Will continue to attempt to progress.  Pt very motivated and mother very supportive.  Tolerating session today without incident. Will continue to follow and progress as tolerated.     Plan/Recommendations:   Pt would benefit from Inpatient Rehabilitation placement at discharge from facility.   Pt desires Inpatient  Rehabilitation placement at discharge. Pt cooperative; agreeable to therapeutic recommendations and plan of care.     Modified [place]: 4 = Moderately severe disability (Unable to attend to own bodily needs without assistance, and unable to walk unassisted)     Post-Tx Position: Supine with HOB Elevated, Alarms activated and Call light and personal items within reach  PPE: gloves, surgical mask, eyewear protection

## 2021-08-19 NOTE — PLAN OF CARE
Problem: Adult Inpatient Plan of Care  Goal: Absence of Hospital-Acquired Illness or Injury  Intervention: Identify and Manage Fall Risk  Recent Flowsheet Documentation  Taken 8/19/2021 1806 by Eleni Bailon RN  Safety Promotion/Fall Prevention:   activity supervised   fall prevention program maintained   nonskid shoes/slippers when out of bed   safety round/check completed  Taken 8/19/2021 1615 by Eleni Bailon RN  Safety Promotion/Fall Prevention:   activity supervised   fall prevention program maintained   nonskid shoes/slippers when out of bed   safety round/check completed  Taken 8/19/2021 1418 by Eleni Bailon RN  Safety Promotion/Fall Prevention:   activity supervised   fall prevention program maintained   nonskid shoes/slippers when out of bed   safety round/check completed  Taken 8/19/2021 1219 by Eleni Bailon RN  Safety Promotion/Fall Prevention:   activity supervised   fall prevention program maintained   nonskid shoes/slippers when out of bed   safety round/check completed  Taken 8/19/2021 1030 by Eleni Bailon RN  Safety Promotion/Fall Prevention:   nonskid shoes/slippers when out of bed   safety round/check completed  Taken 8/19/2021 0850 by Eleni Bailon RN  Safety Promotion/Fall Prevention:   activity supervised   fall prevention program maintained   nonskid shoes/slippers when out of bed   safety round/check completed  Intervention: Prevent Skin Injury  Recent Flowsheet Documentation  Taken 8/19/2021 1615 by Eleni Bailon RN  Body Position: weight shift assistance provided  Skin Protection: incontinence pads utilized  Taken 8/19/2021 1219 by Eleni Bailon RN  Body Position: weight shift assistance provided  Taken 8/19/2021 0850 by Eleni Bailon RN  Body Position: position maintained  Skin Protection: incontinence pads utilized  Intervention: Prevent Infection  Recent Flowsheet Documentation  Taken 8/19/2021 1806 by Eleni Bailon RN  Infection Prevention:    hand hygiene promoted   personal protective equipment utilized  Taken 8/19/2021 1615 by Eleni Bailon RN  Infection Prevention:   hand hygiene promoted   personal protective equipment utilized  Taken 8/19/2021 1418 by Eleni Bailon RN  Infection Prevention:   hand hygiene promoted   personal protective equipment utilized  Taken 8/19/2021 1030 by Eleni Bailon RN  Infection Prevention:   hand hygiene promoted   personal protective equipment utilized  Taken 8/19/2021 0850 by Eleni Bailon RN  Infection Prevention:   hand hygiene promoted   personal protective equipment utilized  Goal: Optimal Comfort and Wellbeing  Intervention: Provide Person-Centered Care  Recent Flowsheet Documentation  Taken 8/19/2021 1615 by Eleni Bailon RN  Trust Relationship/Rapport:   care explained   questions answered   questions encouraged  Taken 8/19/2021 1219 by Eleni Bailon RN  Trust Relationship/Rapport: care explained  Taken 8/19/2021 0850 by Eleni Bailon RN  Trust Relationship/Rapport:   care explained   questions answered   questions encouraged     Problem: Respiratory Compromise (Pneumonia)  Goal: Effective Oxygenation and Ventilation  Intervention: Optimize Oxygenation and Ventilation  Recent Flowsheet Documentation  Taken 8/19/2021 1615 by Eleni Bailon RN  Head of Bed (HOB): HOB elevated  Taken 8/19/2021 1219 by Eleni Bailon RN  Head of Bed (HOB): HOB elevated  Taken 8/19/2021 0850 by Eleni Bailon RN  Head of Bed (HOB): HOB elevated     Problem: Skin Injury Risk Increased  Goal: Skin Health and Integrity  Intervention: Optimize Skin Protection  Recent Flowsheet Documentation  Taken 8/19/2021 1615 by Eleni Bailon RN  Pressure Reduction Techniques: weight shift assistance provided  Head of Bed (HOB): HOB elevated  Pressure Reduction Devices: specialty bed utilized  Skin Protection: incontinence pads utilized  Taken 8/19/2021 1219 by Eleni Bailon RN  Head of Bed (HOB): [facility]  elevated  Taken 8/19/2021 0850 by Eleni Bailon RN  Pressure Reduction Techniques: weight shift assistance provided  Head of Bed (HOB): HOB elevated  Pressure Reduction Devices: specialty bed utilized  Skin Protection: incontinence pads utilized     Problem: Aspiration (Enteral Nutrition)  Goal: Absence of Aspiration Signs and Symptoms  Intervention: Minimize Aspiration Risk  Recent Flowsheet Documentation  Taken 8/19/2021 1615 by Eleni Bailon RN  Head of Bed (HOB): HOB elevated  Taken 8/19/2021 1219 by Eleni Bailon RN  Head of Bed (HOB): HOB elevated  Taken 8/19/2021 0850 by Eleni Bailon RN  Head of Bed (HOB): HOB elevated     Problem: Fall Injury Risk  Goal: Absence of Fall and Fall-Related Injury  Intervention: Identify and Manage Contributors to Fall Injury Risk  Recent Flowsheet Documentation  Taken 8/19/2021 1806 by Eleni Bailon RN  Medication Review/Management: medications reviewed  Taken 8/19/2021 1615 by Eleni Bailon RN  Medication Review/Management: medications reviewed  Taken 8/19/2021 1418 by Eleni Bailon RN  Medication Review/Management: medications reviewed  Taken 8/19/2021 1219 by Eleni Bailon RN  Medication Review/Management: medications reviewed  Taken 8/19/2021 1030 by Eleni Bailon RN  Medication Review/Management: medications reviewed  Taken 8/19/2021 0850 by Eleni Bailon RN  Medication Review/Management: medications reviewed  Intervention: Promote Injury-Free Environment  Recent Flowsheet Documentation  Taken 8/19/2021 1806 by Eleni Bailon RN  Safety Promotion/Fall Prevention:   activity supervised   fall prevention program maintained   nonskid shoes/slippers when out of bed   safety round/check completed  Taken 8/19/2021 1615 by Eleni Bailon RN  Safety Promotion/Fall Prevention:   activity supervised   fall prevention program maintained   nonskid shoes/slippers when out of bed   safety round/check completed  Taken 8/19/2021 1418 by  Blasdel, Eleni G, RN  Safety Promotion/Fall Prevention:   activity supervised   fall prevention program maintained   nonskid shoes/slippers when out of bed   safety round/check completed  Taken 8/19/2021 1219 by Eleni Bailon RN  Safety Promotion/Fall Prevention:   activity supervised   fall prevention program maintained   nonskid shoes/slippers when out of bed   safety round/check completed  Taken 8/19/2021 1030 by Eleni Bailon RN  Safety Promotion/Fall Prevention:   nonskid shoes/slippers when out of bed   safety round/check completed  Taken 8/19/2021 0850 by Eleni Bailon RN  Safety Promotion/Fall Prevention:   activity supervised   fall prevention program maintained   nonskid shoes/slippers when out of bed   safety round/check completed     Problem: Communication Impairment (Mechanical Ventilation, Invasive)  Goal: Effective Communication  Intervention: Ensure Effective Communication  Recent Flowsheet Documentation  Taken 8/19/2021 1615 by Eleni Bailon RN  Communication Enhancement Strategies: call light answered in person  Taken 8/19/2021 1219 by Eleni Bailon RN  Communication Enhancement Strategies: call light answered in person  Taken 8/19/2021 0850 by Eleni aBilon RN  Communication Enhancement Strategies: call light answered in person     Problem: Device-Related Complication Risk (Mechanical Ventilation, Invasive)  Goal: Optimal Device Function  Intervention: Optimize Device Care and Function  Recent Flowsheet Documentation  Taken 8/19/2021 1615 by Eleni Bailon RN  Aspiration Precautions: upright posture maintained  Taken 8/19/2021 1219 by Eleni Bailon RN  Aspiration Precautions: upright posture maintained  Taken 8/19/2021 0850 by Eleni Bailon RN  Aspiration Precautions: upright posture maintained     Problem: Inability to Wean (Mechanical Ventilation, Invasive)  Goal: Mechanical Ventilation Liberation  Intervention: Promote Extubation and Mechanical Ventilation  Liberation  Recent Flowsheet Documentation  Taken 8/19/2021 1806 by Eleni Bailon RN  Medication Review/Management: medications reviewed  Taken 8/19/2021 1615 by Eleni Bailon RN  Medication Review/Management: medications reviewed  Taken 8/19/2021 1418 by Eleni Bailon RN  Medication Review/Management: medications reviewed  Taken 8/19/2021 1219 by Eleni Bailon RN  Environmental Support: calm environment promoted  Sleep/Rest Enhancement: awakenings minimized  Medication Review/Management: medications reviewed  Taken 8/19/2021 1030 by Eleni Bailon RN  Medication Review/Management: medications reviewed  Taken 8/19/2021 0850 by Eleni Bailon RN  Environmental Support:   calm environment promoted   distractions minimized  Medication Review/Management: medications reviewed     Problem: Skin and Tissue Injury (Mechanical Ventilation, Invasive)  Goal: Absence of Device-Related Skin and Tissue Injury  Intervention: Maintain Skin and Tissue Health  Recent Flowsheet Documentation  Taken 8/19/2021 1615 by Eleni Bailon RN  Device Skin Pressure Protection: positioning supports utilized  Taken 8/19/2021 0850 by Eleni Bailon RN  Device Skin Pressure Protection: positioning supports utilized     Problem: Ventilator-Induced Lung Injury (Mechanical Ventilation, Invasive)  Goal: Absence of Ventilator-Induced Lung Injury  Intervention: Prevent Ventilator-Associated Pneumonia  Recent Flowsheet Documentation  Taken 8/19/2021 1615 by Eleni Bailon RN  Head of Bed (HOB): HOB elevated  Taken 8/19/2021 1219 by Eleni Bailon RN  Head of Bed (HOB): HOB elevated  Taken 8/19/2021 0850 by Eleni Bailon RN  Head of Bed (HOB): HOB elevated     Problem: Glycemic Control Impaired  Goal: Blood Glucose Level Within Desired Range  Intervention: Optimize Glycemic Control  Recent Flowsheet Documentation  Taken 8/19/2021 1615 by Eleni Bailon RN  Glycemic Management: blood glucose monitoring  Taken  8/19/2021 1219 by Eleni Bailon, RN  Glycemic Management: blood glucose monitoring  Taken 8/19/2021 0850 by Eleni Bailon, RN  Glycemic Management: blood glucose monitoring   Goal Outcome Evaluation:   Pts BS have been stable without insulin coverage.  Lantus was discontinued per MD. Pt on room air but uses 2L HS. Pts transfer to [facility] was denied per insurance, peer review done and still denied. [facility] is appealing denial.  Pt had no bowel movement since 8/15 medication ordered and will be administered on the next shift.

## 2021-08-19 NOTE — PROGRESS NOTES
Nutrition Services    Patient Name: Leslie Harris  YOB: 1990  MRN: [id number]  Admission date: 7/22/2021    PPE Documentation        PPE Worn By Provider Did not enter room on this encounter    PPE Worn By Patient  -      PROGRESS NOTE      Encounter Information: Progress note to monitor PO intakes        PO Diet: Diet Regular   PO Supplements: Boost Breeze BID  Magic Cups L/D    PO Intake:  % of meals, at times ordering full meals and other times lighter meals. Given prolonged catabolic state of COVID-19 infection, will continue with ONS. Noted pressure injuries, will also add Rangel.        Nutrition support orders: -    Nutrition support review: -       Labs (reviewed below): Reviewed         GI Function:  + BM 8/15 x 4 days ago        Nutrition Intervention: Adding Rangel BID for wound healing, continue with other oral nutritional supplements as ordered.        Results from last 7 days   Lab Units 08/19/21  0556 08/18/21  0734 08/17/21  0831   SODIUM mmol/L 136 137 138   POTASSIUM mmol/L 4.3 4.2 4.1   CHLORIDE mmol/L 99 99 101   CO2 mmol/L 26.0 27.0 30.0*   BUN mg/dL 13 13 13   CREATININE mg/dL 0.40* 0.32* 0.35*   CALCIUM mg/dL 9.6 9.9 9.6   BILIRUBIN mg/dL 0.7 0.7 0.7   ALK PHOS U/L 93 96 99   ALT (SGPT) U/L 115* 117* 128*   AST (SGOT) U/L 39* 33* 37*   GLUCOSE mg/dL 102* 109* 97     Results from last 7 days   Lab Units 08/19/21  0556 08/18/21  0734 08/18/21  0734 08/17/21  0831 08/17/21  0831   MAGNESIUM mg/dL 1.9  --  1.9  --  1.9   HEMOGLOBIN g/dL 12.1   < > 11.8*   < > 11.5*   HEMATOCRIT % 37.1   < > 35.2   < > 34.0    < > = values in this interval not displayed.     COVID19   Date Value Ref Range Status   07/22/2021 Detected (C) Not Detected - Ref. Range Final     Lab Results   Component Value Date    HGBA1C 6.9 (H) 07/23/2021       RD to follow up per protocol.    Electronically signed by:  Ynes Warner RD  08/19/21 08:28 EDT

## 2021-08-19 NOTE — PROGRESS NOTES
[facility] Medicine Services Daily Progress Note    Patient Name: Leslie Harris  : 1990  MRN: [id number]  Primary Care Physician:  Lesia, No Known  Date of admission: 2021      Subjective      Chief Complaint: Shortness of breath    Patient reports overall doing well.  Patient continues to be motivated and working with PT/OT.  Patient has deferred Lantus for the last few days and blood sugars have been between 100s to 120s will be able to DC insulin at this time.  Continue continuing to try to find placement.      Review of Systems   Constitutional: Positive for malaise/fatigue. Negative for chills and fever.   HENT: Negative for hoarse voice and sore throat.    Eyes: Negative for double vision and photophobia.   Cardiovascular: Negative for chest pain and syncope.   Respiratory: Negative for cough and shortness of breath.    Musculoskeletal: Positive for myalgias. Negative for muscle weakness.   Gastrointestinal: Negative for nausea and vomiting.   Genitourinary: Negative for genital sores and pelvic pain.   Neurological: Positive for paresthesias and weakness. Negative for dizziness.   Psychiatric/Behavioral: Negative for altered mental status. The patient is not nervous/anxious.          Objective      Vitals:   Temp:  [97.8 °F (36.6 °C)-98.4 °F (36.9 °C)] 97.8 °F (36.6 °C)  Heart Rate:  [] 97  Resp:  [14-22] 16  BP: (124-140)/(81-95) 124/81  Flow (L/min):  [2] 2    Physical Exam      General: Morbidly obese female lying in bed breathing company on room air no acute distress  HEENT: NC/AT, EOMI, mucosa moist  Heart: Regular, rate controlled  Chest: Normal work of breathing, moving air well no wheezing  Abdominal: Soft. NT/ND.   Musculoskeletal: Normal ROM.  No cyanosis. No calf tenderness.  Neurological: AAOx3, no focal deficits  Skin: Skin is warm and dry. No rash  Psychiatric: Normal mood and affect.      Result Review    Result Review:  I have personally reviewed  the results from the time of this admission to 8/19/2021 12:29 EDT and agree with these findings:  [x]  Laboratory  [x]  Microbiology  [x]  Radiology  [x]  EKG/Telemetry   []  Cardiology/Vascular   []  Pathology  []  Old records  []  Other:  Most notable findings include: Anemia transaminitis       Wounds (last 24 hours)      LDA Wound     Row Name 08/19/21 1219 08/19/21 0850 08/18/21 1939       Wound 07/26/21 2200 Left distal nose Pressure Injury    Wound - Properties Group Placement Date: 07/26/21  - Placement Time: 2200  -MF Present on Hospital Admission: N  -MF Side: Left  -MF Orientation: distal  -MF Location: nose  -MF Primary Wound Type: Pressure inj  -MF Stage, Pressure Injury : deep tissue injury  -MF    Dressing Appearance  --  open to air  -DB  --    Base  pink  -DB  pink  -DB  --    Dressing Care  --  open to air  -DB  --    Retired Wound - Properties Group Date first assessed: 07/26/21  - Time first assessed: 2200  -MF Present on Hospital Admission: N  -MF Side: Left  -MF Location: nose  -MF Primary Wound Type: Pressure inj  -MF       Wound 07/26/21 2200 midline tongue Pressure Injury    Wound - Properties Group Placement Date: 07/26/21  - Placement Time: 2200  -MF Present on Hospital Admission: N  -MF Orientation: midline  -MF Location: tongue  -MF Primary Wound Type: Pressure inj  -MF Stage, Pressure Injury : deep tissue injury  -MF    Retired Wound - Properties Group Date first assessed: 07/26/21  - Time first assessed: 2200  -MF Present on Hospital Admission: N  -MF Location: tongue  -MF Primary Wound Type: Pressure inj  -MF       Wound 07/27/21 2028 medial sacral spine Pressure Injury    Wound - Properties Group Placement Date: 07/27/21  -MF Placement Time: 2028  -MF Present on Hospital Admission: N  -MF Orientation: medial  -MF Location: sacral spine  -MF Primary Wound Type: Pressure inj  -MF Stage, Pressure Injury : Stage 2  -MF    Dressing Appearance  --  open to air  -DB  --    Closure   --  --  Open to air  -RH    Base  pink  -DB  pink  -DB  pink  -RH    Retired Wound - Properties Group Date first assessed: 07/27/21  -MF Time first assessed: 2028  -MF Present on Hospital Admission: N  -MF Location: sacral spine  -MF Primary Wound Type: Pressure inj  -MF       Wound 08/06/21 1400 Right cheek Pressure Injury    Wound - Properties Group Placement Date: 08/06/21  -HELLEN Placement Time: 1400  -EHLLEN Present on Hospital Admission: N  -HELLEN Side: Right  -HELLEN Location: cheek  -HELLEN Primary Wound Type: Pressure inj  -HELLEN Stage, Pressure Injury : Stage 1  -HELLEN    Dressing Appearance  --  open to air  -DB  --    Closure  --  --  Open to air  -RH    Base  pink  -DB  pink  -DB  red  -RH    Periwound  blanchable;intact  -DB  blanchable;intact  -DB  --    Retired Wound - Properties Group Date first assessed: 08/06/21  -HELLEN Time first assessed: 1400  -HELLEN Present on Hospital Admission: N  -HELLEN Side: Right  -HELLEN Location: cheek  -HELLEN Primary Wound Type: Pressure inj  -HELLEN       Wound 08/06/21 1400 Right throat Abrasion    Wound - Properties Group Placement Date: 08/06/21  -HELLEN Placement Time: 1400  -HELLEN Present on Hospital Admission: N  -HELLEN Side: Right  -HELLEN Location: throat  -HELLEN Primary Wound Type: Abrasion  -HELLEN    Retired Wound - Properties Group Date first assessed: 08/06/21  -HELLEN Time first assessed: 1400  -HELLEN Present on Hospital Admission: N  -HELLEN Side: Right  -HELLEN Location: throat  -HELLEN Primary Wound Type: Abrasion  -HELLEN    Row Name 08/18/21 1500             Wound 07/26/21 2200 Left distal nose Pressure Injury    Wound - Properties Group Placement Date: 07/26/21  -MF Placement Time: 2200  -MF Present on Hospital Admission: N  -MF Side: Left  -MF Orientation: distal  -MF Location: nose  -MF Primary Wound Type: Pressure inj  -MF Stage, Pressure Injury : deep tissue injury  -MF    Retired Wound - Properties Group Date first assessed: 07/26/21  -MF Time first assessed: 2200  -MF Present on Hospital Admission: N  -MF Side: Left  -MF  Location: nose  -MF Primary Wound Type: Pressure inj  -MF       Wound 07/26/21 2200 midline tongue Pressure Injury    Wound - Properties Group Placement Date: 07/26/21  -MF Placement Time: 2200 -MF Present on Hospital Admission: N  -MF Orientation: midline  -MF Location: tongue  -MF Primary Wound Type: Pressure inj  -MF Stage, Pressure Injury : deep tissue injury  -MF    Retired Wound - Properties Group Date first assessed: 07/26/21  -MF Time first assessed: 2200 -MF Present on Hospital Admission: N  -MF Location: tongue  -MF Primary Wound Type: Pressure inj  -MF       Wound 07/27/21 2028 medial sacral spine Pressure Injury    Wound - Properties Group Placement Date: 07/27/21  -MF Placement Time: 2028 -MF Present on Hospital Admission: N  -MF Orientation: medial  -MF Location: sacral spine  -MF Primary Wound Type: Pressure inj  -MF Stage, Pressure Injury : Stage 2  -MF    Closure  Open to air  -KA      Base  pink  -KA      Retired Wound - Properties Group Date first assessed: 07/27/21  -MF Time first assessed: 2028 -MF Present on Hospital Admission: N  -MF Location: sacral spine  -MF Primary Wound Type: Pressure inj  -MF       Wound 08/06/21 1400 Right cheek Pressure Injury    Wound - Properties Group Placement Date: 08/06/21  -HELLEN Placement Time: 1400  -HELLEN Present on Hospital Admission: N  -HELLEN Side: Right  -HELLEN Location: cheek  -HELLEN Primary Wound Type: Pressure inj  -HELLEN Stage, Pressure Injury : Stage 1  -HELLEN    Closure  Open to air  -KA      Base  red  -KA      Retired Wound - Properties Group Date first assessed: 08/06/21  -HELLEN Time first assessed: 1400  -HELLEN Present on Hospital Admission: N  -HELLEN Side: Right  -HELLEN Location: cheek  -HELLEN Primary Wound Type: Pressure inj  -HELLEN       Wound 08/06/21 1400 Right throat Abrasion    Wound - Properties Group Placement Date: 08/06/21  -HELLEN Placement Time: 1400  -HELLEN Present on Hospital Admission: N  -HELLEN Side: Right  -HELLEN Location: throat  -HELLEN Primary Wound Type: Abrasion  -HELLEN     Retired Wound - Properties Group Date first assessed: 08/06/21  -HELLEN Time first assessed: 1400  -HELLEN Present on Hospital Admission: N  -HELLEN Side: Right  -HELLEN Location: throat  -HELLEN Primary Wound Type: Abrasion  -HELLEN      User Key  (r) = Recorded By, (t) = Taken By, (c) = Cosigned By    Initials Name Provider Type    Mariann Rodríguez, RN Registered Nurse    Mónica Mallory RN Registered Nurse    Eleni Hoskins RN Registered Nurse    Ольга Spence RN Registered Nurse    Susana Perry RN Registered Nurse            Assessment/Plan      Brief Patient Summary:    Leslie Harris is a 30 y.o. morbidly obese female who was diagnosed with COVID-19 on 07/16/2021 at the [facility]. She had not been vaccinated for Covid. She presented to the [facility] ED on 07/22/2021 complaining of shortness of breath. Workup in the ER revealed acute respiratory failure with hypoxia, pneumonia due to COVID-19, hypertension, and hyperglycemia. The patient was placed on Precision Flow with 100% FiO2 and admitted to the ICU for close monitoring and further treatment. The patient was started on Decadron and Remdesivir.     07/23/21:  Patient transitioned AVAPS with Precision Flow with 100% FiO2, but continued to have hypoxia and was intubated.  Right IJ central line inserted.  Dietitian consulted for tube feeds.     07/24/21:  Remains intubated and sedated. Chemically paralyzed due to worsening hypoxemia. On Flolan nebulized.  Prone position.  Started on empiric coverage with ceftriaxone.  Tolerating tube feeds.       07/25/21:  Remains intubated, sedated, and chemically paralyzed.  Antibiotic changed to Zosyn.  Remains prone and on Flolan.  Tolerating tube feeds.         07/26/21:  Remains intubated, sedated, and chemically paralyzed.  Patient placed in supine position.  Remains on Flolan.  Tolerating tube feeds.       07/27/21:  Remains intubated, sedated, and chemically paralyzed.  Patient  tolerating supine position.  Remains on Flolan.  Tolerating tube feeds.       07/28/21:  Remains intubated, sedated, and chemically paralyzed.  Patient tolerating supine position.  Remains on Flolan.  Tolerating tube feeds.       07/29/21:  Remains intubated, sedated, and chemically paralyzed.  Sputum culture grew MRSA.  Patient tolerating supine position.  Remains on Flolan; failed wean attempt.  Tolerating tube feeds.       07/30/21:  Remains intubated, sedated, and chemically paralyzed.  Patient returned to prone position for 16 hours.  Remains on Flolan.  Transitioned to heparin drip.  Tolerating tube feeds.       07/31/21:  Remains intubated, sedated, and chemically paralyzed.  Remains on Flolan.  Diuresis started.  On heparin drip.  Tolerating tube feeds.       08/01/21:  Remains intubated, sedated, and chemically paralyzed.  A-line inserted.  Tolerating supine position.  Remains on heparin drip.  Diuresing well.  Remains on Flolan.  Cardizem drip started for hypertension and tachycardia.  Left radial a-line discontinued.  Right brachial a-line inserted.   Tolerating tube feeds.       08/02/21:  Remains intubated, sedated, and chemically paralyzed. Tolerating supine position.  Remains on Flolan.  Remains on heparin drip.  Labile BP, alternating Cardizem and Levophed.  Fecal management system placed due to high volume liquid stool.  Tolerating tube feeds.       08/03/21:  Remains intubated, sedated, and chemically paralyzed.  Infectious disease consulted for antibiotic management.  Heparin drip discontinued due to blood-tinged sputum.  Weaning Cardizem drip.  Remains on Flolan. Switched to prone position.  Tolerating tube feeds.       08/04/21:  Remains intubated, sedated, and chemically paralyzed.  Sputum culture grew Klebsiella pneumoniae.  FMS removed.  Lovenox restarted.  Weaning Cardizem drip.  Attempted to wean Flolan, but had to be reinitiated.  Tolerating tube feeds.       08/05/21:  Remains intubated,  sedated, and chemically paralyzed.  Tolerating tube feeds.       08/06/21:  Remains intubated, sedated, and chemically paralyzed.  In prone position.  Tolerating tube feeds.       08/07/21:  Remains intubated, sedated, and chemically paralyzed.  Tolerating supine position.  Urine culture grew E. coli.  Tolerating tube feeds.       08/08/21:  Remains intubated, sedated, and chemically paralyzed.  Status post bronchoscopy with multiple mucus plugs removed.  Paralytic weaned off.  Tolerating tube feeds.       08/09/21:  Remains intubated and sedated.  Tolerating tube feeds.       08/10/21:  Patient extubated.  Tolerating tube feeds.       08/11/21:  Remains extubated.  Diagnosed with critical illness polyneuropathy muscle weakness due to paralytics and steroids.  Patient with acute delirium.  Tolerating tube feeds.  SLP consulted for video swallow.     08/12/21:  The patient was downgraded to PCU and the Hospitalist team was consulted for medical management.  Patient started on mechanical soft diet overnight.  She is complaining of some shortness of breath, but maintaining oxygen saturation on 5 liters per high flow nasal cannula.  She reports fatigue and generalized weakness.        8/13     Patient's blood glucose very acceptable  Patient had been comfortable overnight.  She is slightly tachycardic on 5 L of high flow oxygen but sats are above 95 percent  Her white count 12.2  Pharmacy suggesting some changes in the insulin regimen to avoid multiple sticks.  Still feeling weak on the right upper extremity.  She relates that to her prone position in the ICU.  Discussed with the family that this would be helped by physical therapy.     8/14  Patient had been seen by new dietitian yesterday and I will speech therapist as well  She is a 96% saturation on 4 L of high flow nasal cannula  She is afebrile   working on her transfer/discharge planning to [facility] facility.  Patient liver enzymes are  fluctuating and mildly elevated  Have a PICC line placed.     8/15  She denies new symptoms today  She is saturating high 90s on 3 L high flow cannula.  De-escalating oxygen support  Asking for FMLA.  For family members  Due to persistent symptoms right upper extremity we will check venous Doppler  Check forearm x-ray as well  Advised regarding elevation and warm compresses.  She has good pulsation right radial artery.  Nasogastric tube removed    8/16/2021: Patient Reports her right arm pain is improving.  Discussed that upper extremity Doppler negative for any DVT.  Patient's breathing improving but still requiring some supplemental oxygen.  Patient reports she has some vaginal burning today which she reports similar to when she has a yeast infection.  Awaiting placement.  Doing well after NG tube removed.    August 17, 2021: Patient continues to wean oxygen.  Yesterday attempt to get patient out of bed aborted due to transient tachycardia.  Patient reports no new symptoms.  Patient needs to be mobilized working to get patient out of bed today.    8/18/2021: Patient reports overall doing well.  Patient out of bed yesterday worked with physical therapy and currently on room air.  Patient concerned about how much insulin she is getting given her relatively controlled glycemic state.  Discussed that we would try to wean what medications we could given patient's clinical improvement.  Awaiting placement.    Acetylcysteine, 600 mg, Oral, BID  aspirin, 325 mg, Oral, Daily  budesonide, 0.5 mg, Nebulization, BID - RT  cholecalciferol, 2,000 Units, Oral, Daily  enoxaparin, 80 mg, Subcutaneous, Q12H  furosemide, 20 mg, Oral, Daily  guaiFENesin, 600 mg, Oral, Q12H  hydrocortisone, 10 mg, Oral, BID With Meals  ipratropium-albuterol, 3 mL, Nebulization, 4x Daily - RT  Rangel, 1 packet, Oral, BID  melatonin, 10 mg, Oral, Nightly  metoprolol tartrate, 25 mg, Oral, Q12H  pantoprazole, 40 mg, Oral, BID AC  risperiDONE, 0.5 mg,  Oral, Nightly  sodium chloride, 10 mL, Intravenous, Q12H  sodium chloride, 10 mL, Intravenous, Q12H       Pharmacy to Dose enoxaparin (LOVENOX),          Active Hospital Problems:  Active Hospital Problems    Diagnosis    • **Pneumonia due to COVID-19 virus    • Critical illness polyneuropathy (CMS/[facility])    • Delirium, acute    • Dysphagia    • E. coli UTI (urinary tract infection)    • Klebsiella pneumoniae pneumonia (CMS/[facility])    • Diabetes mellitus type 2 in obese (CMS/[facility])    • MRSA pneumonia (CMS/[facility])    • Acute respiratory failure due to COVID-19 (CMS/[facility])    • Class 3 severe obesity without serious comorbidity with body mass index (BMI) of 50.0 to 59.9 in adult    • Hypertension      Plan:     Shortness of breath-secondary to COVID-19 pneumonia, patient not vaccinated, patient intubated now extubated doing well, patient with development of underlying MRSA pneumonia as well as Klebsiella previously now finishing antibiotics overall improving with significant debility secondary to prolonged immobility, overall resolved  -Wean oxygen as tolerated  -Out of isolation  -Bronchodilators mucolytic's  -Pulmonology monitoring  -Due to prolonged hospital course and signs of critical illness myopathy patient will need inpatient rehab  -Weaning steroids  -Patient on room air continue aggressive physical therapy measures and out of bed    COVID-19 pneumonia-patient now on room air out of isolation  -Inflammatory markers  -Off remdesivir, initially on Decadron and then hydrocortisone weaning  -Mucolytic's    MRSA/Klebsiella pneumonia-patient seen by pulmonology  -Finished Rocephin and linezolid  -ID and pulmonology consulting    Vaginal burning-patient reports similar to previous yeast infections  -Diflucan x1    Right arm pain-May have some component of neuropathy after prolonged illness or due to transient external compression of forearm at some point during hospital course  -X-ray and upper extremity Doppler  unremarkable  -Monitor daily  -Can discuss neuropathic pain medication if required    Anemia-mild but likely with a chronic inflammatory component from prolonged illness  -Daily CBC    Transaminitis-decreasing from previously noted likely from COVID-19 and possible some component of early fatty liver  -Can monitor every few days while hospitalized    E. coli cystitis-patient finished Rocephin for treatment    Critical illness polyneuropathy-patient on prolonged dose of steroids due to underlying Covid infection  -PT/OT  -Rehab on discharge  -Out of bed    Type 2 diabetes-patient with residual hyperglycemia likely due to steroids, patient appearing well controlled now off steroids  -For patient comfort trying to wean off short acting insulin to avoid multiple injections throughout the day  -Patient has been deferring long-acting insulin for the last few days and blood sugars well controlled able to DC insulin at this time  -Diabetic diet    Dysphagia-after extubation noted but now able to remove NG tube, no further issues  -Monitor with diet    Morbid obesity-BMI 50  -Lifestyle modification        DVT prophylaxis:  Medical DVT prophylaxis orders are present.    CODE STATUS:    Code Status: CPR  Medical Interventions (Level of Support Prior to Arrest): Full      Disposition:  I expect patient to be discharged in when she has placement.  Patient is young and motivated will likely need less than 30 days of rehab and is an excellent candidate for rapid improvement.    This patient has been examined wearing appropriate Personal Protective Equipment and discussed with hospital infection control department. 08/19/21      Electronically signed by Rodrigo Cortez MD, 08/19/21, 12:29 EDT.  [facility] Hospitalist Team

## 2021-08-19 NOTE — PLAN OF CARE
Goal Outcome Evaluation:       Assessment: Leslie Harris presents with ADL impairments below baseline abilities which indicate the need for continued skilled intervention while inpatient. She continues to demo global weakness.  She is continuing to show progress with ADLs such as grooming and bathing due to improvement in UE strength/ROM.  Will continue to attempt to progress.  Pt very motivated and mother very supportive.  Tolerating session today without incident. Will continue to follow and progress as tolerated.     Plan/Recommendations:   Pt would benefit from Inpatient Rehabilitation placement at discharge from facility.   Pt desires Inpatient Rehabilitation placement at discharge. Pt cooperative; agreeable to therapeutic recommendations and plan of care.

## 2021-08-19 NOTE — CASE MANAGEMENT/SOCIAL WORK
Continued Stay Note  [facility]     Patient Name: Leslie Harris  MRN: [id number]  Today's Date: 8/19/2021    Admit Date: 7/22/2021    Discharge Plan     Row Name 08/19/21 1520       Plan    Plan Comments  DC barriers: expedited appeal in process    Row Name 08/19/21 1507       Plan    Plan Comments  SW delivered FMLA paperwork for pt's father to the bedside and pt stated that as of yesterday, she also has FMLA paperwork from her employer. SW looked at FMLA paperwork and none of pt's information had been completed. SW assisted pt with completing the paperwork as pt has difficulty with writing at this time. DANIELLE delivered pt's FMLA paperwork to Hospitalist Group for completion. Per Hospitalist NP, Vandana is out for the remainder of the week and completion will be delayed until next week. DANIELLE informed pt/pt's mother via phone call into pt's room.        Expected Discharge Date and Time     Expected Discharge Date Expected Discharge Time    Aug 20, 2021         Phone communication or documentation only - no physical contact with patient or family.      Amelie Harmon, RN

## 2021-08-19 NOTE — PLAN OF CARE
Goal Outcome Evaluation:  Plan of Care Reviewed With: patient, mother        Progress: improving  Outcome Summary: discharge to rehab soon

## 2021-08-19 NOTE — THERAPY TREATMENT NOTE
Subjective: Pt agreeable to therapeutic plan of care.    Objective:     Bed mobility - Mod-A and Max-A; able ot bring LEs toward eob w/ min assist; needs mod to max assist to come to sitting w/ rolling toward R side; has difficulty pushing w/ RUE but reaches to assist w/ LUE. Needs max of 2 to scoot up in bed, but pt now able to bend knees and put feet flat on bed to assist by pushing w/ LEs.   Transfers - N/A or Not attempted. Plan to attempt at next rx w/ assist of 2. Shows 4-/5 quad strength in BLEs now.   Ambulation - 0 feet N/A or Not attempted.     Exercise in sitting at eob. Pt unable to manage sitting balance w/o mod assist, and she becomes very anxious. HR increases from 122 to 150 in sitting. Worked on relaxation techniques to decrease anxiety in sitting. HR drops to 108 once moved back to supine posiiton. Lateral wt shift as well as trunk control exercise at eob.     Pain: 0 VAS  Education: Provided education on importance of mobility and skilled verbal / tactile cueing throughout intervention.     Assessment: Leslie Harris continues to improve in her strength, though she remains weak n her trunk at this time. Was able to push w/ LEs to scoot up in bed and moves LEs toward eob w/ only min assist now. She may be ready to try to stand at next rx but will need assist of 2-3 for safety. She presents with functional mobility impairments which indicate the need for skilled intervention. Tolerating session today without incident. Will continue to follow and progress as tolerated.     Plan/Recommendations:   Pt NEEDS Inpatient Rehabilitation placement at discharge from facility and requires no DME at discharge.   Pt desires Inpatient Rehabilitation placement at discharge. Pt cooperative; agreeable to therapeutic recommendations and plan of care.     Basic Mobility 6-click:  Rollin = Total, A lot = 2, A little = 3; 4 = None  Supine>Sit:   1 = Total, A lot = 2, A little = 3; 4 = None   Sit>Stand with  arms:  1 = Total, A lot = 2, A little = 3; 4 = None  Bed>Chair:   1 = Total, A lot = 2, A little = 3; 4 = None  Ambulate in room:  1 = Total, A lot = 2, A little = 3; 4 = None  3-5 Steps with railin = Total, A lot = 2, A little = 3; 4 = None  Score: 9    Modified [place]: N/A = No pre-op stroke/TIA    Post-Tx Position: Supine with HOB Elevated, Alarms activated and Call light and personal items within reach; mother at eob; pt rolled to R side.   PPE: gloves, surgical mask, eyewear protection

## 2021-08-20 ENCOUNTER — APPOINTMENT (OUTPATIENT)
Dept: GENERAL RADIOLOGY | Facility: HOSPITAL | Age: 31
End: 2021-08-20

## 2021-08-20 LAB
ANION GAP SERPL CALCULATED.3IONS-SCNC: 12 MMOL/L (ref 5–15)
BUN SERPL-MCNC: 14 MG/DL (ref 6–20)
BUN/CREAT SERPL: 35.9 (ref 7–25)
CALCIUM SPEC-SCNC: 9.4 MG/DL (ref 8.6–10.5)
CHLORIDE SERPL-SCNC: 99 MMOL/L (ref 98–107)
CO2 SERPL-SCNC: 26 MMOL/L (ref 22–29)
CREAT SERPL-MCNC: 0.39 MG/DL (ref 0.57–1)
GFR SERPL CREATININE-BSD FRML MDRD: >150 ML/MIN/1.73
GLUCOSE BLDC GLUCOMTR-MCNC: 133 MG/DL (ref 70–105)
GLUCOSE BLDC GLUCOMTR-MCNC: 142 MG/DL (ref 70–105)
GLUCOSE BLDC GLUCOMTR-MCNC: 169 MG/DL (ref 70–105)
GLUCOSE BLDC GLUCOMTR-MCNC: 174 MG/DL (ref 70–105)
GLUCOSE SERPL-MCNC: 115 MG/DL (ref 65–99)
HOLD SPECIMEN: NORMAL
MAGNESIUM SERPL-MCNC: 1.8 MG/DL (ref 1.6–2.6)
POTASSIUM SERPL-SCNC: 4 MMOL/L (ref 3.5–5.2)
SODIUM SERPL-SCNC: 137 MMOL/L (ref 136–145)

## 2021-08-20 PROCEDURE — 94799 UNLISTED PULMONARY SVC/PX: CPT

## 2021-08-20 PROCEDURE — 80048 BASIC METABOLIC PNL TOTAL CA: CPT | Performed by: FAMILY MEDICINE

## 2021-08-20 PROCEDURE — 97110 THERAPEUTIC EXERCISES: CPT

## 2021-08-20 PROCEDURE — 83735 ASSAY OF MAGNESIUM: CPT | Performed by: NURSE PRACTITIONER

## 2021-08-20 PROCEDURE — 82962 GLUCOSE BLOOD TEST: CPT

## 2021-08-20 PROCEDURE — 97530 THERAPEUTIC ACTIVITIES: CPT

## 2021-08-20 PROCEDURE — 99233 SBSQ HOSP IP/OBS HIGH 50: CPT | Performed by: FAMILY MEDICINE

## 2021-08-20 PROCEDURE — 71045 X-RAY EXAM CHEST 1 VIEW: CPT

## 2021-08-20 PROCEDURE — 25010000002 ENOXAPARIN PER 10 MG: Performed by: FAMILY MEDICINE

## 2021-08-20 RX ORDER — HYDROCORTISONE 10 MG/1
10 TABLET ORAL
Status: DISCONTINUED | OUTPATIENT
Start: 2021-08-21 | End: 2021-08-24 | Stop reason: HOSPADM

## 2021-08-20 RX ADMIN — BUDESONIDE 0.5 MG: 0.5 SUSPENSION RESPIRATORY (INHALATION) at 07:07

## 2021-08-20 RX ADMIN — BUDESONIDE 0.5 MG: 0.5 SUSPENSION RESPIRATORY (INHALATION) at 18:36

## 2021-08-20 RX ADMIN — RISPERIDONE 0.5 MG: 0.5 TABLET, ORALLY DISINTEGRATING ORAL at 21:11

## 2021-08-20 RX ADMIN — DOCUSATE SODIUM 50 MG AND SENNOSIDES 8.6 MG 2 TABLET: 8.6; 5 TABLET, FILM COATED ORAL at 09:33

## 2021-08-20 RX ADMIN — POLYETHYLENE GLYCOL 3350 17 G: 17 POWDER, FOR SOLUTION ORAL at 09:30

## 2021-08-20 RX ADMIN — GUAIFENESIN 600 MG: 600 TABLET, EXTENDED RELEASE ORAL at 21:11

## 2021-08-20 RX ADMIN — Medication 10 ML: at 09:38

## 2021-08-20 RX ADMIN — Medication 10 MG: at 21:11

## 2021-08-20 RX ADMIN — HYDROCORTISONE 10 MG: 10 TABLET ORAL at 09:32

## 2021-08-20 RX ADMIN — DOCUSATE SODIUM 50 MG AND SENNOSIDES 8.6 MG 2 TABLET: 8.6; 5 TABLET, FILM COATED ORAL at 21:11

## 2021-08-20 RX ADMIN — Medication 1 PACKET: at 09:38

## 2021-08-20 RX ADMIN — Medication 10 ML: at 20:16

## 2021-08-20 RX ADMIN — Medication 600 MG: at 21:11

## 2021-08-20 RX ADMIN — Medication 600 MG: at 09:32

## 2021-08-20 RX ADMIN — Medication 2000 UNITS: at 09:31

## 2021-08-20 RX ADMIN — IPRATROPIUM BROMIDE AND ALBUTEROL SULFATE 3 ML: 2.5; .5 SOLUTION RESPIRATORY (INHALATION) at 18:43

## 2021-08-20 RX ADMIN — IPRATROPIUM BROMIDE AND ALBUTEROL SULFATE 3 ML: 2.5; .5 SOLUTION RESPIRATORY (INHALATION) at 07:07

## 2021-08-20 RX ADMIN — GUAIFENESIN 600 MG: 600 TABLET, EXTENDED RELEASE ORAL at 09:32

## 2021-08-20 RX ADMIN — ENOXAPARIN SODIUM 40 MG: 40 INJECTION SUBCUTANEOUS at 09:31

## 2021-08-20 RX ADMIN — IPRATROPIUM BROMIDE AND ALBUTEROL SULFATE 3 ML: 2.5; .5 SOLUTION RESPIRATORY (INHALATION) at 15:20

## 2021-08-20 RX ADMIN — ASPIRIN 325 MG ORAL TABLET 325 MG: 325 PILL ORAL at 09:32

## 2021-08-20 RX ADMIN — FUROSEMIDE 20 MG: 20 TABLET ORAL at 09:32

## 2021-08-20 RX ADMIN — METOPROLOL TARTRATE 25 MG: 25 TABLET, FILM COATED ORAL at 09:33

## 2021-08-20 RX ADMIN — ENOXAPARIN SODIUM 40 MG: 40 INJECTION SUBCUTANEOUS at 21:11

## 2021-08-20 RX ADMIN — Medication 1 PACKET: at 21:11

## 2021-08-20 RX ADMIN — IPRATROPIUM BROMIDE AND ALBUTEROL SULFATE 3 ML: 2.5; .5 SOLUTION RESPIRATORY (INHALATION) at 11:00

## 2021-08-20 RX ADMIN — POLYETHYLENE GLYCOL 3350 17 G: 17 POWDER, FOR SOLUTION ORAL at 21:10

## 2021-08-20 RX ADMIN — METOPROLOL TARTRATE 25 MG: 25 TABLET, FILM COATED ORAL at 21:11

## 2021-08-20 NOTE — PROGRESS NOTES
[facility] Medicine Services Daily Progress Note    Patient Name: Leslie Harris  : 1990  MRN: [id number]  Primary Care Physician:  Lesia, No Known  Date of admission: 2021      Subjective      Chief Complaint: Shortness of breath    No new issues.  No bowel movement yet.  Still awaiting placement.      Review of Systems   Constitutional: Positive for malaise/fatigue. Negative for chills and fever.   HENT: Negative for hoarse voice and sore throat.    Eyes: Negative for double vision and photophobia.   Cardiovascular: Negative for chest pain and syncope.   Respiratory: Negative for cough and shortness of breath.    Musculoskeletal: Positive for myalgias. Negative for muscle weakness.   Gastrointestinal: Negative for nausea and vomiting.   Genitourinary: Negative for genital sores and pelvic pain.   Neurological: Positive for paresthesias and weakness. Negative for dizziness.   Psychiatric/Behavioral: Negative for altered mental status. The patient is not nervous/anxious.          Objective      Vitals:   Temp:  [97.9 °F (36.6 °C)-98.7 °F (37.1 °C)] 98.7 °F (37.1 °C)  Heart Rate:  [] 106  Resp:  [16-24] 18  BP: (128-149)/(76-88) 128/80  Flow (L/min):  [2] 2    Physical Exam      General: Morbidly obese female lying in bed breathing company on room air no acute distress  HEENT: NC/AT, EOMI, mucosa moist  Heart: Regular, rate controlled  Chest: Normal work of breathing, moving air well no wheezing  Abdominal: Soft. NT/ND.   Musculoskeletal: Normal ROM.  No cyanosis. No calf tenderness.  Neurological: AAOx3, no focal deficits  Skin: Skin is warm and dry. No rash  Psychiatric: Normal mood and affect.      Result Review    Result Review:  I have personally reviewed the results from the time of this admission to 2021 12:24 EDT and agree with these findings:  [x]  Laboratory  [x]  Microbiology  [x]  Radiology  [x]  EKG/Telemetry   []  Cardiology/Vascular   []  Pathology  []   Old records  []  Other:  Most notable findings include: Hyperglycemia       Wounds (last 24 hours)      LDA Wound     Row Name 08/20/21 0800 08/19/21 2015 08/19/21 1900       Wound 07/26/21 2200 Left distal nose Pressure Injury    Wound - Properties Group Placement Date: 07/26/21  - Placement Time: 2200 -MF Present on Hospital Admission: N  -MF Side: Left  -MF Orientation: distal  -MF Location: nose  -MF Primary Wound Type: Pressure inj  -MF Stage, Pressure Injury : deep tissue injury  -MF    Closure  --  Open to air  -LM  --    Base  --  pink  -LM  --    Retired Wound - Properties Group Date first assessed: 07/26/21  - Time first assessed: 2200  -MF Present on Hospital Admission: N  -MF Side: Left  -MF Location: nose  -MF Primary Wound Type: Pressure inj  -MF       Wound 07/26/21 2200 midline tongue Pressure Injury    Wound - Properties Group Placement Date: 07/26/21  - Placement Time: 2200 -MF Present on Hospital Admission: N  -MF Orientation: midline  -MF Location: tongue  -MF Primary Wound Type: Pressure inj  -MF Stage, Pressure Injury : deep tissue injury  -MF    Retired Wound - Properties Group Date first assessed: 07/26/21  - Time first assessed: 2200  -MF Present on Hospital Admission: N  -MF Location: tongue  -MF Primary Wound Type: Pressure inj  -MF       Wound 07/27/21 2028 medial sacral spine Pressure Injury    Wound - Properties Group Placement Date: 07/27/21  - Placement Time: 2028 -MF Present on Hospital Admission: N  -MF Orientation: medial  -MF Location: sacral spine  -MF Primary Wound Type: Pressure inj  -MF Stage, Pressure Injury : Stage 2  -MF    Closure  --  Open to air  -LM  --    Base  pink  -DB  pink  -LM  --    Retired Wound - Properties Group Date first assessed: 07/27/21  - Time first assessed: 2028  -MF Present on Hospital Admission: N  -MF Location: sacral spine  -MF Primary Wound Type: Pressure inj  -MF       Wound 08/06/21 1400 Right cheek Pressure Injury    Wound -  Properties Group Placement Date: 08/06/21  -HELLEN Placement Time: 1400  -HELLEN Present on Hospital Admission: N  -HELLEN Side: Right  -HELLEN Location: cheek  -HELLEN Primary Wound Type: Pressure inj  -HELLEN Stage, Pressure Injury : Stage 1  -HELLEN    Closure  --  --  Open to air  -LM    Base  --  --  pink  -LM    Periwound  --  --  blanchable;intact  -LM    Retired Wound - Properties Group Date first assessed: 08/06/21  -HELLEN Time first assessed: 1400  -HELLEN Present on Hospital Admission: N  -HELLEN Side: Right  -HELLEN Location: cheek  -HELLEN Primary Wound Type: Pressure inj  -HELLEN       Wound 08/06/21 1400 Right throat Abrasion    Wound - Properties Group Placement Date: 08/06/21  -HELLEN Placement Time: 1400  -HELLEN Present on Hospital Admission: N  -HELLEN Side: Right  -HELLEN Location: throat  -HELLEN Primary Wound Type: Abrasion  -HELLEN    Retired Wound - Properties Group Date first assessed: 08/06/21  -HELLEN Time first assessed: 1400  -HELLEN Present on Hospital Admission: N  -HELLEN Side: Right  -HELLEN Location: throat  -HELLEN Primary Wound Type: Abrasion  -HELLEN    Row Name 08/19/21 1615             Wound 07/26/21 2200 Left distal nose Pressure Injury    Wound - Properties Group Placement Date: 07/26/21  -MF Placement Time: 2200  -MF Present on Hospital Admission: N  -MF Side: Left  -MF Orientation: distal  -MF Location: nose  -MF Primary Wound Type: Pressure inj  -MF Stage, Pressure Injury : deep tissue injury  -MF    Base  pink  -DB      Retired Wound - Properties Group Date first assessed: 07/26/21  -MF Time first assessed: 2200  -MF Present on Hospital Admission: N  -MF Side: Left  -MF Location: nose  -MF Primary Wound Type: Pressure inj  -MF       Wound 07/26/21 2200 midline tongue Pressure Injury    Wound - Properties Group Placement Date: 07/26/21  -MF Placement Time: 2200  -MF Present on Hospital Admission: N  -MF Orientation: midline  -MF Location: tongue  -MF Primary Wound Type: Pressure inj  -MF Stage, Pressure Injury : deep tissue injury  -MF    Retired Wound - Properties  Group Date first assessed: 07/26/21  - Time first assessed: 2200 -MF Present on Hospital Admission: N  -MF Location: tongue  -MF Primary Wound Type: Pressure inj  -MF       Wound 07/27/21 2028 medial sacral spine Pressure Injury    Wound - Properties Group Placement Date: 07/27/21  -MF Placement Time: 2028 -MF Present on Hospital Admission: N  -MF Orientation: medial  -MF Location: sacral spine  -MF Primary Wound Type: Pressure inj  -MF Stage, Pressure Injury : Stage 2  -MF    Base  pink  -DB      Retired Wound - Properties Group Date first assessed: 07/27/21  - Time first assessed: 2028 -MF Present on Hospital Admission: N  -MF Location: sacral spine  -MF Primary Wound Type: Pressure inj  -MF       Wound 08/06/21 1400 Right cheek Pressure Injury    Wound - Properties Group Placement Date: 08/06/21  -HELLEN Placement Time: 1400  -HELLEN Present on Hospital Admission: N  -HELLEN Side: Right  -HELLEN Location: cheek  -HELLEN Primary Wound Type: Pressure inj  -HELLEN Stage, Pressure Injury : Stage 1  -HELLEN    Base  pink  -DB      Periwound  blanchable;intact  -DB      Retired Wound - Properties Group Date first assessed: 08/06/21  -HELLEN Time first assessed: 1400  -HELLEN Present on Hospital Admission: N  -HELLEN Side: Right  -HELLEN Location: cheek  -HELLEN Primary Wound Type: Pressure inj  -HELLEN       Wound 08/06/21 1400 Right throat Abrasion    Wound - Properties Group Placement Date: 08/06/21  -HELLEN Placement Time: 1400  -HELLEN Present on Hospital Admission: N  -HELLEN Side: Right  -HELLEN Location: throat  -HELLEN Primary Wound Type: Abrasion  -HELLEN    Retired Wound - Properties Group Date first assessed: 08/06/21  -HELLEN Time first assessed: 1400  -HELLEN Present on Hospital Admission: N  -HELLEN Side: Right  -HELLEN Location: throat  -HELLEN Primary Wound Type: Abrasion  -HELLEN      User Key  (r) = Recorded By, (t) = Taken By, (c) = Cosigned By    Initials Name Provider Type    Eleni Hoskins RN Registered Nurse    Ольга Spence RN Registered Nurse    Eric Abbott RN  Registered Nurse    Susana Perry RN Registered Nurse            Assessment/Plan      Brief Patient Summary:    Leslie Harris is a 30 y.o. morbidly obese female who was diagnosed with COVID-19 on 07/16/2021 at the [facility]. She had not been vaccinated for Covid. She presented to the [facility] ED on 07/22/2021 complaining of shortness of breath. Workup in the ER revealed acute respiratory failure with hypoxia, pneumonia due to COVID-19, hypertension, and hyperglycemia. The patient was placed on Precision Flow with 100% FiO2 and admitted to the ICU for close monitoring and further treatment. The patient was started on Decadron and Remdesivir.     07/23/21:  Patient transitioned AVAPS with Precision Flow with 100% FiO2, but continued to have hypoxia and was intubated.  Right IJ central line inserted.  Dietitian consulted for tube feeds.     07/24/21:  Remains intubated and sedated. Chemically paralyzed due to worsening hypoxemia. On Flolan nebulized.  Prone position.  Started on empiric coverage with ceftriaxone.  Tolerating tube feeds.       07/25/21:  Remains intubated, sedated, and chemically paralyzed.  Antibiotic changed to Zosyn.  Remains prone and on Flolan.  Tolerating tube feeds.         07/26/21:  Remains intubated, sedated, and chemically paralyzed.  Patient placed in supine position.  Remains on Flolan.  Tolerating tube feeds.       07/27/21:  Remains intubated, sedated, and chemically paralyzed.  Patient tolerating supine position.  Remains on Flolan.  Tolerating tube feeds.       07/28/21:  Remains intubated, sedated, and chemically paralyzed.  Patient tolerating supine position.  Remains on Flolan.  Tolerating tube feeds.       07/29/21:  Remains intubated, sedated, and chemically paralyzed.  Sputum culture grew MRSA.  Patient tolerating supine position.  Remains on Flolan; failed wean attempt.  Tolerating tube feeds.       07/30/21:  Remains intubated, sedated, and  chemically paralyzed.  Patient returned to prone position for 16 hours.  Remains on Flolan.  Transitioned to heparin drip.  Tolerating tube feeds.       07/31/21:  Remains intubated, sedated, and chemically paralyzed.  Remains on Flolan.  Diuresis started.  On heparin drip.  Tolerating tube feeds.       08/01/21:  Remains intubated, sedated, and chemically paralyzed.  A-line inserted.  Tolerating supine position.  Remains on heparin drip.  Diuresing well.  Remains on Flolan.  Cardizem drip started for hypertension and tachycardia.  Left radial a-line discontinued.  Right brachial a-line inserted.   Tolerating tube feeds.       08/02/21:  Remains intubated, sedated, and chemically paralyzed. Tolerating supine position.  Remains on Flolan.  Remains on heparin drip.  Labile BP, alternating Cardizem and Levophed.  Fecal management system placed due to high volume liquid stool.  Tolerating tube feeds.       08/03/21:  Remains intubated, sedated, and chemically paralyzed.  Infectious disease consulted for antibiotic management.  Heparin drip discontinued due to blood-tinged sputum.  Weaning Cardizem drip.  Remains on Flolan. Switched to prone position.  Tolerating tube feeds.       08/04/21:  Remains intubated, sedated, and chemically paralyzed.  Sputum culture grew Klebsiella pneumoniae.  FMS removed.  Lovenox restarted.  Weaning Cardizem drip.  Attempted to wean Flolan, but had to be reinitiated.  Tolerating tube feeds.       08/05/21:  Remains intubated, sedated, and chemically paralyzed.  Tolerating tube feeds.       08/06/21:  Remains intubated, sedated, and chemically paralyzed.  In prone position.  Tolerating tube feeds.       08/07/21:  Remains intubated, sedated, and chemically paralyzed.  Tolerating supine position.  Urine culture grew E. coli.  Tolerating tube feeds.       08/08/21:  Remains intubated, sedated, and chemically paralyzed.  Status post bronchoscopy with multiple mucus plugs removed.  Paralytic  weaned off.  Tolerating tube feeds.       08/09/21:  Remains intubated and sedated.  Tolerating tube feeds.       08/10/21:  Patient extubated.  Tolerating tube feeds.       08/11/21:  Remains extubated.  Diagnosed with critical illness polyneuropathy muscle weakness due to paralytics and steroids.  Patient with acute delirium.  Tolerating tube feeds.  SLP consulted for video swallow.     08/12/21:  The patient was downgraded to PCU and the Hospitalist team was consulted for medical management.  Patient started on mechanical soft diet overnight.  She is complaining of some shortness of breath, but maintaining oxygen saturation on 5 liters per high flow nasal cannula.  She reports fatigue and generalized weakness.        8/13     Patient's blood glucose very acceptable  Patient had been comfortable overnight.  She is slightly tachycardic on 5 L of high flow oxygen but sats are above 95 percent  Her white count 12.2  Pharmacy suggesting some changes in the insulin regimen to avoid multiple sticks.  Still feeling weak on the right upper extremity.  She relates that to her prone position in the ICU.  Discussed with the family that this would be helped by physical therapy.     8/14  Patient had been seen by new dietitian yesterday and I will speech therapist as well  She is a 96% saturation on 4 L of high flow nasal cannula  She is afebrile   [facility]  Patient liver enzymes are fluctuating and mildly elevated  Have a PICC line placed.     8/15  She denies new symptoms today  She is saturating high 90s on 3 L high flow cannula.  De-escalating oxygen support  Asking for FMLA.  For family members  Due to persistent symptoms right upper extremity we will check venous Doppler  Check forearm x-ray as well  Advised regarding elevation and warm compresses.  She has good pulsation right radial artery.  Nasogastric tube removed    8/16/2021: Patient  Reports her right arm pain is improving.  Discussed that upper extremity Doppler negative for any DVT.  Patient's breathing improving but still requiring some supplemental oxygen.  Patient reports she has some vaginal burning today which she reports similar to when she has a yeast infection.  Awaiting placement.  Doing well after NG tube removed.    August 17, 2021: Patient continues to wean oxygen.  Yesterday attempt to get patient out of bed aborted due to transient tachycardia.  Patient reports no new symptoms.  Patient needs to be mobilized working to get patient out of bed today.    8/18/2021: Patient reports overall doing well.  Patient out of bed yesterday worked with physical therapy and currently on room air.  Patient concerned about how much insulin she is getting given her relatively controlled glycemic state.  Discussed that we would try to wean what medications we could given patient's clinical improvement.  Awaiting placement.    8/19/2021: Patient reports overall doing well.  Patient continues to be motivated and working with PT/OT.  Patient has deferred Lantus for the last few days and blood sugars have been between 100s to 120s will be able to DC insulin at this time.  Continue continuing to try to find placement.  Patient asking for bowel regimen.    Acetylcysteine, 600 mg, Oral, BID  aspirin, 325 mg, Oral, Daily  budesonide, 0.5 mg, Nebulization, BID - RT  cholecalciferol, 2,000 Units, Oral, Daily  enoxaparin, 40 mg, Subcutaneous, Q12H  furosemide, 20 mg, Oral, Daily  guaiFENesin, 600 mg, Oral, Q12H  hydrocortisone, 10 mg, Oral, BID With Meals  ipratropium-albuterol, 3 mL, Nebulization, 4x Daily - RT  Rangel, 1 packet, Oral, BID  melatonin, 10 mg, Oral, Nightly  metoprolol tartrate, 25 mg, Oral, Q12H  polyethylene glycol, 17 g, Oral, BID  risperiDONE, 0.5 mg, Oral, Nightly  senna-docusate sodium, 2 tablet, Oral, BID  sodium chloride, 10 mL, Intravenous, Q12H  sodium chloride, 10 mL, Intravenous,  Q12H       Pharmacy to Dose enoxaparin (LOVENOX),          Active Hospital Problems:  Active Hospital Problems    Diagnosis    • **Pneumonia due to COVID-19 virus    • Critical illness polyneuropathy (CMS/[facility])    • Delirium, acute    • Dysphagia    • E. coli UTI (urinary tract infection)    • Klebsiella pneumoniae pneumonia (CMS/[facility])    • Diabetes mellitus type 2 in obese (CMS/[facility])    • MRSA pneumonia (CMS/[facility])    • Acute respiratory failure due to COVID-19 (CMS/[facility])    • Class 3 severe obesity without serious comorbidity with body mass index (BMI) of 50.0 to 59.9 in adult    • Hypertension      Plan:     Shortness of breath-secondary to COVID-19 pneumonia, patient not vaccinated, patient intubated now extubated doing well, patient with development of underlying MRSA pneumonia as well as Klebsiella previously now finishing antibiotics overall improving with significant debility secondary to prolonged immobility, overall resolved  -Wean oxygen as tolerated  -Out of isolation  -Bronchodilators mucolytic's  -Pulmonology monitoring  -Due to prolonged hospital course and signs of critical illness myopathy patient will need inpatient rehab  -Weaning steroids  -Patient on room air continue aggressive physical therapy measures and out of bed    COVID-19 pneumonia-patient now on room air out of isolation  -Inflammatory markers  -Off remdesivir, initially on Decadron and then hydrocortisone weaning  -Mucolytic's    MRSA/Klebsiella pneumonia-patient seen by pulmonology  -Finished Rocephin and linezolid  -ID and pulmonology consulting    Vaginal burning-patient reports similar to previous yeast infections  -Diflucan x1    Right arm pain-May have some component of neuropathy after prolonged illness or due to transient external compression of forearm at some point during hospital course  -X-ray and upper extremity Doppler unremarkable  -Monitor daily  -Can discuss neuropathic pain medication if required    Anemia-mild but likely  with a chronic inflammatory component from prolonged illness  -Daily CBC    Transaminitis-decreasing from previously noted likely from COVID-19 and possible some component of early fatty liver  -Can monitor every few days while hospitalized    E. coli cystitis-patient finished Rocephin for treatment    Critical illness polyneuropathy-patient on prolonged dose of steroids due to underlying Covid infection  -PT/OT  -Rehab on discharge  -Out of bed    Type 2 diabetes-patient with residual hyperglycemia likely due to steroids, patient appearing well controlled now off steroids  -For patient comfort trying to wean off short acting insulin to avoid multiple injections throughout the day  -Patient has been deferring long-acting insulin for the last few days and blood sugars well controlled able to DC insulin at this time  -Diabetic diet    Dysphagia-after extubation noted but now able to remove NG tube, no further issues  -Monitor with diet  -Started on bowel regimen    Morbid obesity-BMI 50  -Lifestyle modification        DVT prophylaxis:  Medical DVT prophylaxis orders are present.    CODE STATUS:    Code Status: CPR  Medical Interventions (Level of Support Prior to Arrest): Full      Disposition:  I expect patient to be discharged in when she has placement.  Patient is young and motivated will likely need less than 30 days of rehab and is an excellent candidate for rapid improvement.    This patient has been examined wearing appropriate Personal Protective Equipment and discussed with hospital infection control department. 08/20/21      Electronically signed by Rodrigo Cortez MD, 08/20/21, 12:24 EDT.  [facility] Hospitalist Team

## 2021-08-20 NOTE — PROGRESS NOTES
Daily Progress Note        Pneumonia due to COVID-19 virus    Acute respiratory failure due to COVID-19 (CMS/[facility])    Class 3 severe obesity without serious comorbidity with body mass index (BMI) of 50.0 to 59.9 in adult    Diabetes mellitus type 2 in obese (CMS/[facility])    MRSA pneumonia (CMS/[facility])    Klebsiella pneumoniae pneumonia (CMS/[facility])    E. coli UTI (urinary tract infection)    Hypertension    Critical illness polyneuropathy (CMS/[facility])    Delirium, acute    Dysphagia      Assessment    Pneumonia due to COVID-19  Acute Respiratory Failure  -Failed NIPPV and required intubation 7/23    Pneumonia  -8/1/21 sputum culture growing Klebsiella pneumoniae    UTI-E.Coli (ID following)    Elevated Liver Enzymes  Hypertension  Diabetes Mellitus  Obesity    ECHO 7/24/21  EF 55-60%    Plan    CXR today    Titrate oxygen  Encourage OOB daily to chair   Advance diet to Regular with thin liquids  Mucinex  Diuretic-Lasix  Cortef 10 mg BID  Bronchodilator/Inhaled corticosteroid  DVT prophylaxis-Lovenox  Glycemic control  PT/OT/speech    Continue working with therapy and getting stronger and   -pending discharge    Completed Remdesivr 7/26/21         LOS: 29 days     Subjective         Objective     Vital signs for last 24 hours:  Vitals:    08/19/21 2113 08/20/21 0100 08/20/21 0140 08/20/21 0525   BP:    149/88   BP Location:    Right arm   Patient Position:    Lying   Pulse: 103 96 99 95   Resp: 20  16 16   Temp:   98.6 °F (37 °C) 98.5 °F (36.9 °C)   TempSrc:   Oral Oral   SpO2:  94% 99% 100%   Weight:    (!) 147 kg (324 lb 1.2 oz)   Height:           Intake/Output last 3 shifts:  I/O last 3 completed shifts:  In: 1650 [P.O.:1650]  Out: 2825 [Urine:2825]  Intake/Output this shift:  I/O this shift:  In: 480 [P.O.:480]  Out: 700 [Urine:700]      Radiology  Imaging Results (Last 24 Hours)     ** No results found for the last 24 hours. **          Labs:  Results from last 7 days   Lab Units 08/19/21  0556   WBC 10*3/mm3 6.10    HEMOGLOBIN g/dL 12.1   HEMATOCRIT % 37.1   PLATELETS 10*3/mm3 232     Results from last 7 days   Lab Units 08/20/21  0230 08/19/21  0556 08/19/21  0556   SODIUM mmol/L 137   < > 136   POTASSIUM mmol/L 4.0   < > 4.3   CHLORIDE mmol/L 99   < > 99   CO2 mmol/L 26.0   < > 26.0   BUN mg/dL 14   < > 13   CREATININE mg/dL 0.39*   < > 0.40*   CALCIUM mg/dL 9.4   < > 9.6   BILIRUBIN mg/dL  --   --  0.7   ALK PHOS U/L  --   --  93   ALT (SGPT) U/L  --   --  115*   AST (SGOT) U/L  --   --  39*   GLUCOSE mg/dL 115*   < > 102*    < > = values in this interval not displayed.         Results from last 7 days   Lab Units 08/19/21  0556 08/18/21  0734 08/17/21  0831   ALBUMIN g/dL 3.70 3.80 3.70             Results from last 7 days   Lab Units 08/20/21  0230   MAGNESIUM mg/dL 1.8                   Meds:   SCHEDULE  Acetylcysteine, 600 mg, Oral, BID  aspirin, 325 mg, Oral, Daily  budesonide, 0.5 mg, Nebulization, BID - RT  cholecalciferol, 2,000 Units, Oral, Daily  enoxaparin, 40 mg, Subcutaneous, Q12H  furosemide, 20 mg, Oral, Daily  guaiFENesin, 600 mg, Oral, Q12H  hydrocortisone, 10 mg, Oral, BID With Meals  ipratropium-albuterol, 3 mL, Nebulization, 4x Daily - RT  Rangel, 1 packet, Oral, BID  melatonin, 10 mg, Oral, Nightly  metoprolol tartrate, 25 mg, Oral, Q12H  polyethylene glycol, 17 g, Oral, BID  risperiDONE, 0.5 mg, Oral, Nightly  senna-docusate sodium, 2 tablet, Oral, BID  sodium chloride, 10 mL, Intravenous, Q12H  sodium chloride, 10 mL, Intravenous, Q12H      Infusions  Pharmacy to Dose enoxaparin (LOVENOX),       PRNs  •  acetaminophen  •  acetaminophen **OR** acetaminophen  •  aluminum-magnesium hydroxide-simethicone  •  artificial tears  •  benzonatate  •  dextrose  •  dextrose  •  glucagon (human recombinant)  •  hydrALAZINE  •  hydrOXYzine  •  lidocaine  •  lidocaine PF 1%  •  magnesium sulfate **OR** magnesium sulfate in D5W 1g/100mL (PREMIX)  •  methocarbamol  •  nitroglycerin  •  ondansetron **OR**  ondansetron  •  pantoprazole  •  Pharmacy to Dose enoxaparin (LOVENOX)  •  potassium chloride **OR** potassium chloride **OR** potassium chloride  •  potassium phosphate infusion greater than 15 mMoles **OR** potassium phosphate infusion greater than 15 mMoles **OR** potassium phosphate **OR** sodium phosphate IVPB **OR** sodium phosphate IVPB **OR** sodium phosphate IVPB  •  [COMPLETED] Insert peripheral IV **AND** sodium chloride  •  sodium chloride  •  sodium chloride  •  sodium chloride    Physical Exam:  Physical Exam  Vitals reviewed.   Constitutional:       Appearance: She is obese.   Pulmonary:      Breath sounds: Decreased breath sounds and rhonchi present.   Musculoskeletal:      Right lower leg: Edema present.      Left lower leg: Edema present.   Skin:     General: Skin is warm and dry.   Neurological:      Mental Status: She is alert and oriented to person, place, and time.         ROS  Review of Systems   Constitutional: Positive for activity change, appetite change and fatigue.   Respiratory: Positive for cough and shortness of breath.    Cardiovascular: Positive for leg swelling.   Neurological: Positive for weakness.         I have reviewed the patients new clinical results    Electronically signed by NATY Evans

## 2021-08-20 NOTE — PLAN OF CARE
Goal Outcome Evaluation:        Bed mobility - Min-A  Rolling left to right to left.  Pt needed assist to reach the rail and then pt assisted well.   Transfers - Max-A, Assist x 2 and Dependent  Attempted sit to stand from bs chair 4 times.  Pt unable to lift her bottom off the seat first 3 times but with 4th attempt pt lift slightly and got some weight to her feet.   Pt is a abrahan lift from chair to the bed.   Ambulation -  N/A  Therapeutic exercises - laq's, sitting marches, quad sets, glute sets, ankle pumps.      Pt would benefit from Inpatient Rehabilitation placement at discharge from facility and requires no DME at discharge.   Pt desires Inpatient Rehabilitation placement at discharge. Pt cooperative; agreeable to therapeutic recommendations and plan of care.

## 2021-08-20 NOTE — CASE MANAGEMENT/SOCIAL WORK
Continued Stay Note   [facility]     Patient Name: Leslie Harris  MRN: [id number]  Today's Date: 8/20/2021    Admit Date: 7/22/2021    Discharge Plan     Row Name 08/20/21 1325       Plan    Plan  anticipate DC to inpt rehab- [facility] actue accepted 8/12. Precert denied 8/18. P2P denied 8/18. Expedited appeal denied 8/20. Liaison Gregoria at [facility] trying to get patient subacute bed.    Patient/Family in Agreement with Plan  yes    Plan Comments  CM received notification that patients expedited appeal was denied by her insurance. Cm called gregoria at [facility] and gregoria is calling patients mother to update and let her know they are trying to get her a subacute bed. MD updated. DC barriers: pending placement        Expected Discharge Date and Time     Expected Discharge Date Expected Discharge Time    Aug 20, 2021         Phone communication or documentation only - no physical contact with patient or family.      Amelie Harmon, RN

## 2021-08-20 NOTE — PLAN OF CARE
Goal Outcome Evaluation:  Plan of Care Reviewed With: patient        Progress: no change  Outcome Summary: Pt weak total assist required. Pt up to chair via abrahan lift and staff assist x 2.

## 2021-08-21 LAB
ANION GAP SERPL CALCULATED.3IONS-SCNC: 12 MMOL/L (ref 5–15)
BUN SERPL-MCNC: 14 MG/DL (ref 6–20)
BUN/CREAT SERPL: 37.8 (ref 7–25)
CALCIUM SPEC-SCNC: 9.8 MG/DL (ref 8.6–10.5)
CHLORIDE SERPL-SCNC: 99 MMOL/L (ref 98–107)
CO2 SERPL-SCNC: 28 MMOL/L (ref 22–29)
CREAT SERPL-MCNC: 0.37 MG/DL (ref 0.57–1)
D DIMER PPP FEU-MCNC: 1.42 MG/L (FEU) (ref 0–0.59)
GFR SERPL CREATININE-BSD FRML MDRD: >150 ML/MIN/1.73
GLUCOSE BLDC GLUCOMTR-MCNC: 112 MG/DL (ref 70–105)
GLUCOSE BLDC GLUCOMTR-MCNC: 125 MG/DL (ref 70–105)
GLUCOSE BLDC GLUCOMTR-MCNC: 144 MG/DL (ref 70–105)
GLUCOSE SERPL-MCNC: 111 MG/DL (ref 65–99)
HOLD SPECIMEN: NORMAL
MAGNESIUM SERPL-MCNC: 1.9 MG/DL (ref 1.6–2.6)
POTASSIUM SERPL-SCNC: 4.3 MMOL/L (ref 3.5–5.2)
SODIUM SERPL-SCNC: 139 MMOL/L (ref 136–145)

## 2021-08-21 PROCEDURE — 97112 NEUROMUSCULAR REEDUCATION: CPT

## 2021-08-21 PROCEDURE — 97110 THERAPEUTIC EXERCISES: CPT

## 2021-08-21 PROCEDURE — 25010000002 ENOXAPARIN PER 10 MG: Performed by: FAMILY MEDICINE

## 2021-08-21 PROCEDURE — 85379 FIBRIN DEGRADATION QUANT: CPT | Performed by: FAMILY MEDICINE

## 2021-08-21 PROCEDURE — 99232 SBSQ HOSP IP/OBS MODERATE 35: CPT | Performed by: FAMILY MEDICINE

## 2021-08-21 PROCEDURE — 25010000002 ENOXAPARIN PER 10 MG: Performed by: INTERNAL MEDICINE

## 2021-08-21 PROCEDURE — 94799 UNLISTED PULMONARY SVC/PX: CPT

## 2021-08-21 PROCEDURE — 82962 GLUCOSE BLOOD TEST: CPT

## 2021-08-21 PROCEDURE — 80048 BASIC METABOLIC PNL TOTAL CA: CPT | Performed by: FAMILY MEDICINE

## 2021-08-21 PROCEDURE — 83735 ASSAY OF MAGNESIUM: CPT | Performed by: NURSE PRACTITIONER

## 2021-08-21 PROCEDURE — 36415 COLL VENOUS BLD VENIPUNCTURE: CPT | Performed by: FAMILY MEDICINE

## 2021-08-21 RX ORDER — SORBITOL SOLUTION 70 %
30 SOLUTION, ORAL MISCELLANEOUS ONCE
Status: COMPLETED | OUTPATIENT
Start: 2021-08-21 | End: 2021-08-21

## 2021-08-21 RX ADMIN — IPRATROPIUM BROMIDE AND ALBUTEROL SULFATE 3 ML: 2.5; .5 SOLUTION RESPIRATORY (INHALATION) at 12:32

## 2021-08-21 RX ADMIN — SORBITOL SOLUTION (BULK) 30 ML: 70 SOLUTION at 14:31

## 2021-08-21 RX ADMIN — BUDESONIDE 0.5 MG: 0.5 SUSPENSION RESPIRATORY (INHALATION) at 08:02

## 2021-08-21 RX ADMIN — FUROSEMIDE 20 MG: 20 TABLET ORAL at 08:51

## 2021-08-21 RX ADMIN — Medication 600 MG: at 08:51

## 2021-08-21 RX ADMIN — METOPROLOL TARTRATE 25 MG: 25 TABLET, FILM COATED ORAL at 08:51

## 2021-08-21 RX ADMIN — ENOXAPARIN SODIUM 70 MG: 80 INJECTION, SOLUTION INTRAVENOUS; SUBCUTANEOUS at 21:32

## 2021-08-21 RX ADMIN — IPRATROPIUM BROMIDE AND ALBUTEROL SULFATE 3 ML: 2.5; .5 SOLUTION RESPIRATORY (INHALATION) at 16:46

## 2021-08-21 RX ADMIN — Medication 10 ML: at 09:17

## 2021-08-21 RX ADMIN — DOCUSATE SODIUM 50 MG AND SENNOSIDES 8.6 MG 2 TABLET: 8.6; 5 TABLET, FILM COATED ORAL at 08:51

## 2021-08-21 RX ADMIN — ASPIRIN 325 MG ORAL TABLET 325 MG: 325 PILL ORAL at 08:51

## 2021-08-21 RX ADMIN — METOPROLOL TARTRATE 25 MG: 25 TABLET, FILM COATED ORAL at 21:31

## 2021-08-21 RX ADMIN — DOCUSATE SODIUM 50 MG AND SENNOSIDES 8.6 MG 2 TABLET: 8.6; 5 TABLET, FILM COATED ORAL at 21:31

## 2021-08-21 RX ADMIN — BUDESONIDE 0.5 MG: 0.5 SUSPENSION RESPIRATORY (INHALATION) at 19:09

## 2021-08-21 RX ADMIN — POLYETHYLENE GLYCOL 3350 17 G: 17 POWDER, FOR SOLUTION ORAL at 21:32

## 2021-08-21 RX ADMIN — Medication 10 ML: at 08:51

## 2021-08-21 RX ADMIN — RISPERIDONE 0.5 MG: 0.5 TABLET, ORALLY DISINTEGRATING ORAL at 21:32

## 2021-08-21 RX ADMIN — HYDROCORTISONE 10 MG: 10 TABLET ORAL at 11:19

## 2021-08-21 RX ADMIN — Medication 2000 UNITS: at 08:51

## 2021-08-21 RX ADMIN — POLYETHYLENE GLYCOL 3350 17 G: 17 POWDER, FOR SOLUTION ORAL at 08:51

## 2021-08-21 RX ADMIN — GUAIFENESIN 600 MG: 600 TABLET, EXTENDED RELEASE ORAL at 08:51

## 2021-08-21 RX ADMIN — ENOXAPARIN SODIUM 40 MG: 40 INJECTION SUBCUTANEOUS at 08:51

## 2021-08-21 RX ADMIN — GUAIFENESIN 600 MG: 600 TABLET, EXTENDED RELEASE ORAL at 21:31

## 2021-08-21 RX ADMIN — Medication 10 ML: at 21:33

## 2021-08-21 RX ADMIN — Medication 1 PACKET: at 09:24

## 2021-08-21 RX ADMIN — IPRATROPIUM BROMIDE AND ALBUTEROL SULFATE 3 ML: 2.5; .5 SOLUTION RESPIRATORY (INHALATION) at 08:02

## 2021-08-21 RX ADMIN — Medication 10 MG: at 21:31

## 2021-08-21 NOTE — PLAN OF CARE
Goal Outcome Evaluation:  Plan of Care Reviewed With: patient, spouse   Pt remains afebrile overnight with vital signs stable. Remains alert and oriented x4. Placed on 2LNC overnight to maintain oxygen saturation. Pt bathed/linens changed. External urinary catheter remains in place to wall suction. Repositioned every two hours. No acute changes overnight. Patient denies shortness of breath/angina at present. Will continue to monitor.      Respectfully,   Rashawn Handley RN           Problem: Adult Inpatient Plan of Care  Goal: Absence of Hospital-Acquired Illness or Injury  Intervention: Prevent Skin Injury  Description: Assess skin risk on admission and at regular intervals throughout hospital stay.  Keep all areas of skin (especially folds) clean and dry.  Maintain adequate skin hydration.  Relieve and redistribute pressure and protect bony prominences; implement measures based on patient-specific risk factors.  Match turning and repositioning schedule to clinical condition.  Encourage weight shift frequently; assist with reposition if unable to complete independently.  Float heels off bed. Avoid pressure on the Achilles tendon.  Keep skin free from extended contact with medical devices.  Use aids (e.g., slide boards, mechanical lift) during transfer.  Recent Flowsheet Documentation  Taken 8/20/2021 1930 by Rashawn Handley, RN  Body Position:   turned   side-lying, right  Skin Protection:   adhesive use limited   tubing/devices free from skin contact     Problem: Adult Inpatient Plan of Care  Goal: Absence of Hospital-Acquired Illness or Injury  Intervention: Prevent Infection  Description: Maintain skin and mucous membrane integrity; promote hand, oral and pulmonary hygiene.  Optimize fluid balance, nutrition, sleep and glycemic control to maximize infection resistance.  Identify potential sources of infection early to prevent or mitigate progression of infection (e.g., wound, lines, devices).  Evaluate ongoing need  for invasive devices; remove promptly when no longer indicated.  Recent Flowsheet Documentation  Taken 8/21/2021 0024 by Rashawn Handley RN  Infection Prevention:   hand hygiene promoted   rest/sleep promoted   single patient room provided  Taken 8/20/2021 2200 by Rashawn Handley RN  Infection Prevention:   hand hygiene promoted   rest/sleep promoted   single patient room provided  Taken 8/20/2021 2000 by Rashawn Handley RN  Infection Prevention:   hand hygiene promoted   rest/sleep promoted   single patient room provided     Problem: Adult Inpatient Plan of Care  Goal: Optimal Comfort and Wellbeing  Intervention: Provide Person-Centered Care  Description: Use a family-focused approach to care.  Develop trust and rapport by proactively providing information, encouraging questions, addressing concerns and offering reassurance.  Acknowledge emotional response to hospitalization.  Recognize and utilize personal coping strategies.  Honor spiritual and cultural preferences.  Recent Flowsheet Documentation  Taken 8/20/2021 1930 by Rashawn Handley RN  Trust Relationship/Rapport:   care explained   choices provided   emotional support provided   empathic listening provided   questions answered   questions encouraged   reassurance provided   thoughts/feelings acknowledged     Problem: Respiratory Compromise (Pneumonia)  Goal: Effective Oxygenation and Ventilation  Intervention: Promote Airway Secretion Clearance  Description: Assess the effectiveness of pulmonary hygiene and ability to perform airway clearance techniques.  Promote early mobility/ambulation; match to ability and tolerance.  Encourage deep breathing and lung expansion therapy to prevent atelectasis (e.g., incentive spirometry, positive airway pressure).  Anticipate the need to splint chest or abdominal wall with cough to minimize discomfort; assist if needed.  Initiate cough-enhancement and airway-clearance techniques with instruction (e.g., active cycle  breathing, positive expiratory pressure, suction); consider mechanical insufflation-exsufflation in the presence of neuromuscular weakness.  Consider pharmacologic therapy (e.g., systemic corticosteroid, beta-2 agonist, mucolytic, antimicrobial) to improve mucus clearance, inflammation, cough response and air flow.  Initiate inspiratory muscle training to decrease the risk of pulmonary complications.  Recent Flowsheet Documentation  Taken 8/20/2021 1930 by Rashawn Handley RN  Cough And Deep Breathing: done independently per patient  Intervention: Optimize Oxygenation and Ventilation  Description: Establish oxygenation and ventilation parameters and goals; consider home baseline values for chronic cardiac and lung conditions.  Anticipate noninvasive and invasive monitoring (e.g., pulse oximetry, end-tidal carbon dioxide, blood gases, cardiovascular).  Maintain optimal position to relieve discomfort, breathlessness and ventilation/perfusion mismatch.  Provide oxygen therapy judiciously to avoid hyperoxemia; adjust to achieve oxygenation goal.  Monitor fluid balance closely to minimize the risk of fluid overload.  Implement noninvasive or invasive positive pressure to enhance alveolar ventilation.  Recent Flowsheet Documentation  Taken 8/20/2021 1930 by Rashawn Handley, RN  Head of Bed (HOB): HOB elevated

## 2021-08-21 NOTE — PLAN OF CARE
Goal Outcome Evaluation:           Progress: no change  Outcome Summary: pt is currently resting in bed with no complaints of pain at this time. pt is showing improvement with physical therapy but movement is still severly compromised. pt has not had a BM since 8/15 even after recieving doses of miralax. MD was notified and new order was placed. pt has been encuraged and assisted to turn to help prevent further skin breakdown.

## 2021-08-21 NOTE — THERAPY TREATMENT NOTE
Subjective: Pt agreeable to therapeutic plan of care. Patient stated that her butt and legs are like jello and afraid her legs won't hold her up and will fall.     Objective:     Bed mobility - Max-A  Transfers - max x2 to attempt to stand 3x at bs. On 3rd stance was able to get to 1/2 stand and some good weight thru LE's.   Ambulation - eet N/A or Not attempted.    Pain: hurts all over  Education: Provided education on importance of mobility and skilled verbal / tactile cueing throughout intervention.     Assessment: Leslie Harris presents with functional mobility impairments which indicate the need for skilled intervention. Tolerating session today without incident. Tolerated sitting EOB x20 mins with less propping with LUE. Req knees to be blocked and assist to bring hips over feet. Seemed to do better with her arms on our shoulders while blocking her knees. Placed pillows under UE's and wedge at foot of bed to prevent foot drop. Will continue to follow and progress as tolerated. Should do well at rehab once approved. On 2L, sats 97% and >143. Patient gets very anxious and SOA, req assist to calm down and PLB    Plan/Recommendations:   Pt would benefit from Inpatient Rehabilitation placement at discharge from facility and requires no DME at discharge.   Pt desires Inpatient Rehabilitation placement at discharge. Pt cooperative; agreeable to therapeutic recommendations and plan of care.     Basic Mobility 6-click:  Rollin = Total, A lot = 2, A little = 3; 4 = None  Supine>Sit:   1 = Total, A lot = 2, A little = 3; 4 = None   Sit>Stand with arms:  1 = Total, A lot = 2, A little = 3; 4 = None  Bed>Chair:   1 = Total, A lot = 2, A little = 3; 4 = None  Ambulate in room:  1 = Total, A lot = 2, A little = 3; 4 = None  3-5 Steps with railin = Total, A lot = 2, A little = 3; 4 = None  Score: 9      Post-Tx Position: Supine with HOB Elevated and Call light and personal items within reach  PPE:  gloves, surgical mask, eyewear protection

## 2021-08-21 NOTE — PLAN OF CARE
Assessment: Leslie Harris presents with functional mobility impairments which indicate the need for skilled intervention. Tolerating session today without incident. Tolerated sitting EOB x20 mins with less propping with LUE. Req knees to be blocked and assist to bring hips over feet. Seemed to do better with her arms on our shoulders while blocking her knees. Placed pillows under UE's and wedge at foot of bed to prevent foot drop. Will continue to follow and progress as tolerated. Should do well at rehab once approved. On 2L, sats 97% and >143. Patient gets very anxious and SOA, req assist to calm down and PLB

## 2021-08-21 NOTE — PROGRESS NOTES
Daily Progress Note        Pneumonia due to COVID-19 virus    Acute respiratory failure due to COVID-19 (CMS/[facility])    Class 3 severe obesity without serious comorbidity with body mass index (BMI) of 50.0 to 59.9 in adult    Diabetes mellitus type 2 in obese (CMS/[facility])    MRSA pneumonia (CMS/[facility])    Klebsiella pneumoniae pneumonia (CMS/[facility])    E. coli UTI (urinary tract infection)    Hypertension    Critical illness polyneuropathy (CMS/[facility])    Delirium, acute    Dysphagia      Assessment    Pneumonia due to COVID-19  Acute Respiratory Failure  -Failed NIPPV and required intubation 7/23    Pneumonia  -8/1/21 sputum culture growing Klebsiella pneumoniae    UTI-E.Coli (ID following)    Elevated Liver Enzymes  Hypertension  Diabetes Mellitus  Obesity    ECHO 7/24/21  EF 55-60%    Plan    Titrate oxygen  Encourage OOB daily to chair   Advance diet to Regular with thin liquids  Mucinex  Diuretic-Lasix  Cortef 10 mg BID  Bronchodilator/Inhaled corticosteroid  DVT prophylaxis-Lovenox  Glycemic control  PT/OT/speech    Continue working with therapy and getting stronger and   -pending discharge    Completed Remdesivr 7/26/21         LOS: 30 days     Subjective         Objective     Vital signs for last 24 hours:  Vitals:    08/21/21 0050 08/21/21 0245 08/21/21 0428 08/21/21 0500   BP:  133/80 135/94    BP Location:  Right arm Right arm    Patient Position:  Lying Lying    Pulse: 99 102 110    Resp:  19 20    Temp:  98.8 °F (37.1 °C) 99 °F (37.2 °C)    TempSrc:  Oral Oral    SpO2: 96% 98% 100%    Weight:    (!) 149 kg (328 lb 0.7 oz)   Height:           Intake/Output last 3 shifts:  I/O last 3 completed shifts:  In: 1820 [P.O.:1820]  Out: 3100 [Urine:3100]  Intake/Output this shift:  No intake/output data recorded.      Radiology  Imaging Results (Last 24 Hours)     Procedure Component Value Units Date/Time    XR Chest 1 View [[id number]] Collected: 08/20/21 1209     Updated: 08/20/21 1212    Narrative:      DATE OF  EXAM:  8/20/2021 11:30 AM     PROCEDURE:  XR CHEST 1 VW-     INDICATIONS:  dyspnea; R06.00-Dyspnea, unspecified; U07.1-COVID-19; J12.82-Pneumonia  due to Coronavirus disease 2019; J96.01-Acute respiratory failure with  hypoxia; I95.2-Hypotension due to drugs; U07.1-COVID-19; J96.00-Acute  respiratory failure, unspecified whether with hypoxia or hypercapnia     COMPARISON:  08/09/2021     TECHNIQUE:   Single radiographic AP view of the chest was obtained.     FINDINGS:  There is cardiac enlargement with low lung volumes present. The  infiltrates described in the lungs are improving. No obvious pleural  fluid is seen. No new infiltrates are identified.        Impression:      Cardiomegaly without evidence of acute cardiac decompensation. Overall  improvement in the pulmonary infiltrates compared with the last exam.     Electronically Signed By-Bello Busby MD On:8/20/2021 12:10 PM  This report was finalized on [id number] by  Bello Busby MD.          Labs:  Results from last 7 days   Lab Units 08/19/21  0556   WBC 10*3/mm3 6.10   HEMOGLOBIN g/dL 12.1   HEMATOCRIT % 37.1   PLATELETS 10*3/mm3 232     Results from last 7 days   Lab Units 08/20/21  0230 08/19/21  0556 08/19/21  0556   SODIUM mmol/L 137   < > 136   POTASSIUM mmol/L 4.0   < > 4.3   CHLORIDE mmol/L 99   < > 99   CO2 mmol/L 26.0   < > 26.0   BUN mg/dL 14   < > 13   CREATININE mg/dL 0.39*   < > 0.40*   CALCIUM mg/dL 9.4   < > 9.6   BILIRUBIN mg/dL  --   --  0.7   ALK PHOS U/L  --   --  93   ALT (SGPT) U/L  --   --  115*   AST (SGOT) U/L  --   --  39*   GLUCOSE mg/dL 115*   < > 102*    < > = values in this interval not displayed.         Results from last 7 days   Lab Units 08/19/21  0556 08/18/21  0734 08/17/21  0831   ALBUMIN g/dL 3.70 3.80 3.70             Results from last 7 days   Lab Units 08/21/21  0431   MAGNESIUM mg/dL 1.9                   Meds:   SCHEDULE  Acetylcysteine, 600 mg, Oral, BID  aspirin, 325 mg, Oral, Daily  budesonide, 0.5 mg,  Nebulization, BID - RT  cholecalciferol, 2,000 Units, Oral, Daily  enoxaparin, 40 mg, Subcutaneous, Q12H  furosemide, 20 mg, Oral, Daily  guaiFENesin, 600 mg, Oral, Q12H  hydrocortisone, 10 mg, Oral, Daily With Lunch  ipratropium-albuterol, 3 mL, Nebulization, 4x Daily - RT  Rangel, 1 packet, Oral, BID  melatonin, 10 mg, Oral, Nightly  metoprolol tartrate, 25 mg, Oral, Q12H  polyethylene glycol, 17 g, Oral, BID  risperiDONE, 0.5 mg, Oral, Nightly  senna-docusate sodium, 2 tablet, Oral, BID  sodium chloride, 10 mL, Intravenous, Q12H  sodium chloride, 10 mL, Intravenous, Q12H      Infusions  Pharmacy to Dose enoxaparin (LOVENOX),       PRNs  •  acetaminophen  •  acetaminophen **OR** acetaminophen  •  aluminum-magnesium hydroxide-simethicone  •  artificial tears  •  benzonatate  •  dextrose  •  dextrose  •  glucagon (human recombinant)  •  hydrALAZINE  •  hydrOXYzine  •  lidocaine  •  lidocaine PF 1%  •  magnesium sulfate **OR** magnesium sulfate in D5W 1g/100mL (PREMIX)  •  methocarbamol  •  nitroglycerin  •  ondansetron **OR** ondansetron  •  pantoprazole  •  Pharmacy to Dose enoxaparin (LOVENOX)  •  potassium chloride **OR** potassium chloride **OR** potassium chloride  •  potassium phosphate infusion greater than 15 mMoles **OR** potassium phosphate infusion greater than 15 mMoles **OR** potassium phosphate **OR** sodium phosphate IVPB **OR** sodium phosphate IVPB **OR** sodium phosphate IVPB  •  [COMPLETED] Insert peripheral IV **AND** sodium chloride  •  sodium chloride  •  sodium chloride  •  sodium chloride    Physical Exam:  Physical Exam  Vitals reviewed.   Constitutional:       Appearance: She is obese.   Pulmonary:      Breath sounds: Decreased breath sounds and rhonchi present.   Musculoskeletal:      Right lower leg: Edema present.      Left lower leg: Edema present.   Skin:     General: Skin is warm and dry.   Neurological:      Mental Status: She is alert and oriented to person, place, and time.          ROS  Review of Systems   Constitutional: Positive for activity change, appetite change and fatigue.   Respiratory: Positive for cough and shortness of breath.    Cardiovascular: Positive for leg swelling.   Neurological: Positive for weakness.         I have reviewed the patients new clinical results    Electronically signed by NATY Evans

## 2021-08-21 NOTE — PROGRESS NOTES
[facility] Medicine Services Daily Progress Note    Patient Name: Leslie Harris  : 1990  MRN: [id number]  Primary Care Physician:  Lesia, No Known  Date of admission: 2021      Subjective      Chief Complaint: Shortness of breath    No new issues.  No shortness of breath.  Awaiting placement.    Review of Systems   Constitutional: Positive for malaise/fatigue. Negative for chills and fever.   HENT: Negative for hoarse voice and sore throat.    Eyes: Negative for double vision and photophobia.   Cardiovascular: Negative for chest pain and syncope.   Respiratory: Negative for cough and shortness of breath.    Musculoskeletal: Positive for myalgias. Negative for muscle weakness.   Gastrointestinal: Negative for nausea and vomiting.   Genitourinary: Negative for genital sores and pelvic pain.   Neurological: Positive for paresthesias and weakness. Negative for dizziness.   Psychiatric/Behavioral: Negative for altered mental status. The patient is not nervous/anxious.          Objective      Vitals:   Temp:  [98.1 °F (36.7 °C)-99 °F (37.2 °C)] 98.2 °F (36.8 °C)  Heart Rate:  [] 115  Resp:  [12-20] 12  BP: (119-138)/(65-94) 119/70  Flow (L/min):  [2] 2    Physical Exam      General: Morbidly obese female lying in bed breathing company on 2 L via nasal cannula no acute distress  HEENT: NC/AT, EOMI, mucosa moist  Heart: Regular, rate controlled  Chest: Normal work of breathing, moving air well no wheezing  Abdominal: Soft. NT/ND.   Musculoskeletal: Normal ROM.  No cyanosis. No calf tenderness.  Neurological: AAOx3, no focal deficits  Skin: Skin is warm and dry. No rash  Psychiatric: Normal mood and affect.      Result Review    Result Review:  I have personally reviewed the results from the time of this admission to 2021 12:34 EDT and agree with these findings:  [x]  Laboratory  [x]  Microbiology  [x]  Radiology  [x]  EKG/Telemetry   []  Cardiology/Vascular   []   Pathology  []  Old records  []  Other:  Most notable findings include: Hyperglycemia       Wounds (last 24 hours)      LDA Wound     Row Name 08/21/21 0720 08/20/21 1600          [REMOVED] Wound 07/26/21 2200 Left distal nose Pressure Injury    Wound - Properties Group Placement Date: 07/26/21  -MF Placement Time: 2200 -MF Present on Hospital Admission: N  -MF Side: Left  -MF Orientation: distal  -MF Location: nose  -MF Primary Wound Type: Pressure inj  -MF Stage, Pressure Injury : deep tissue injury  -MF Removal Date: 08/20/21  -JE Removal Time: 1831 -JE Wound Outcome: Healed  -JE    Retired Wound - Properties Group Date first assessed: 07/26/21  -MF Time first assessed: 2200  -MF Present on Hospital Admission: N  -MF Side: Left  -MF Location: nose  -MF Primary Wound Type: Pressure inj  -MF Resolution Date: 08/20/21  -JE Resolution Time: 1831 -JE Wound Outcome: Healed  -JE       [REMOVED] Wound 07/26/21 2200 midline tongue Pressure Injury    Wound - Properties Group Placement Date: 07/26/21  -MF Placement Time: 2200 -MF Present on Hospital Admission: N  -MF Orientation: midline  -MF Location: tongue  -MF Primary Wound Type: Pressure inj  -MF Stage, Pressure Injury : deep tissue injury  -MF Removal Date: 08/20/21  -JE Removal Time: 1830 -JE Wound Outcome: Healed  -JE    Retired Wound - Properties Group Date first assessed: 07/26/21  -MF Time first assessed: 2200  -MF Present on Hospital Admission: N  -MF Location: tongue  -MF Primary Wound Type: Pressure inj  -MF Resolution Date: 08/20/21  -JE Resolution Time: 1830 -JE Wound Outcome: Healed  -JE       Wound 07/27/21 2028 medial sacral spine Pressure Injury    Wound - Properties Group Placement Date: 07/27/21  -MF Placement Time: 2028  -MF Present on Hospital Admission: N  -MF Orientation: medial  -MF Location: sacral spine  -MF Primary Wound Type: Pressure inj  -MF Stage, Pressure Injury : Stage 2  -MF    Dressing Appearance  no drainage;open to air  -MW  --      Closure  Open to air  -MW  --     Base  pink  -MW  pink  -JE     Periwound  pink  -MW  --     Periwound Temperature  warm  -MW  --     Edges  open  -MW  --     Retired Wound - Properties Group Date first assessed: 07/27/21  -MF Time first assessed: 2028 -MF Present on Hospital Admission: N  -MF Location: sacral spine  -MF Primary Wound Type: Pressure inj  -MF       [REMOVED] Wound 08/06/21 1400 Right cheek Pressure Injury    Wound - Properties Group Placement Date: 08/06/21  -HELLEN Placement Time: 1400  -HELLEN Present on Hospital Admission: N  -HELLEN Side: Right  -HELLEN Location: cheek  -HELLEN Primary Wound Type: Pressure inj  -HELLEN Stage, Pressure Injury : Stage 1  -HELLEN Removal Date: 08/20/21  -JE Removal Time: 1831 -JE Wound Outcome: Healed  -JE    Retired Wound - Properties Group Date first assessed: 08/06/21  -HELLEN Time first assessed: 1400  -HELLEN Present on Hospital Admission: N  -HELLEN Side: Right  -HELLEN Location: cheek  -HELLEN Primary Wound Type: Pressure inj  -HELLEN Resolution Date: 08/20/21  -JE Resolution Time: 1831 -JE Wound Outcome: Healed  -JE       [REMOVED] Wound 08/06/21 1400 Right throat Abrasion    Wound - Properties Group Placement Date: 08/06/21  -HELLEN Placement Time: 1400  -HELLEN Present on Hospital Admission: N  -HELLEN Side: Right  -HELLEN Location: throat  -HELLEN Primary Wound Type: Abrasion  -HELLEN Removal Date: 08/20/21 -JE Removal Time: 1831 -JE    Retired Wound - Properties Group Date first assessed: 08/06/21  -HELLEN Time first assessed: 1400  -HELLEN Present on Hospital Admission: N  -HELLEN Side: Right  -HELLEN Location: throat  -HELLEN Primary Wound Type: Abrasion  -HELLEN Resolution Date: 08/20/21 -JE Resolution Time: 1831 -JE      User Key  (r) = Recorded By, (t) = Taken By, (c) = Cosigned By    Initials Name Provider Type    Chana Marx LPN Registered Nurse    Blanca Scott RN Registered Nurse    Ольга Spence RN Registered Nurse    Susana Perry RN Registered Nurse            Assessment/Plan      Brief Patient  Summary:    Leslie Harris is a 30 y.o. morbidly obese female who was diagnosed with COVID-19 on 07/16/2021 at the [facility] Department. She had not been vaccinated for Covid. She presented to the [facility] ED on 07/22/2021 complaining of shortness of breath. Workup in the ER revealed acute respiratory failure with hypoxia, pneumonia due to COVID-19, hypertension, and hyperglycemia. The patient was placed on Precision Flow with 100% FiO2 and admitted to the ICU for close monitoring and further treatment. The patient was started on Decadron and Remdesivir.     07/23/21:  Patient transitioned AVAPS with Precision Flow with 100% FiO2, but continued to have hypoxia and was intubated.  Right IJ central line inserted.  Dietitian consulted for tube feeds.     07/24/21:  Remains intubated and sedated. Chemically paralyzed due to worsening hypoxemia. On Flolan nebulized.  Prone position.  Started on empiric coverage with ceftriaxone.  Tolerating tube feeds.       07/25/21:  Remains intubated, sedated, and chemically paralyzed.  Antibiotic changed to Zosyn.  Remains prone and on Flolan.  Tolerating tube feeds.         07/26/21:  Remains intubated, sedated, and chemically paralyzed.  Patient placed in supine position.  Remains on Flolan.  Tolerating tube feeds.       07/27/21:  Remains intubated, sedated, and chemically paralyzed.  Patient tolerating supine position.  Remains on Flolan.  Tolerating tube feeds.       07/28/21:  Remains intubated, sedated, and chemically paralyzed.  Patient tolerating supine position.  Remains on Flolan.  Tolerating tube feeds.       07/29/21:  Remains intubated, sedated, and chemically paralyzed.  Sputum culture grew MRSA.  Patient tolerating supine position.  Remains on Flolan; failed wean attempt.  Tolerating tube feeds.       07/30/21:  Remains intubated, sedated, and chemically paralyzed.  Patient returned to prone position for 16 hours.  Remains on Flolan.   Transitioned to heparin drip.  Tolerating tube feeds.       07/31/21:  Remains intubated, sedated, and chemically paralyzed.  Remains on Flolan.  Diuresis started.  On heparin drip.  Tolerating tube feeds.       08/01/21:  Remains intubated, sedated, and chemically paralyzed.  A-line inserted.  Tolerating supine position.  Remains on heparin drip.  Diuresing well.  Remains on Flolan.  Cardizem drip started for hypertension and tachycardia.  Left radial a-line discontinued.  Right brachial a-line inserted.   Tolerating tube feeds.       08/02/21:  Remains intubated, sedated, and chemically paralyzed. Tolerating supine position.  Remains on Flolan.  Remains on heparin drip.  Labile BP, alternating Cardizem and Levophed.  Fecal management system placed due to high volume liquid stool.  Tolerating tube feeds.       08/03/21:  Remains intubated, sedated, and chemically paralyzed.  Infectious disease consulted for antibiotic management.  Heparin drip discontinued due to blood-tinged sputum.  Weaning Cardizem drip.  Remains on Flolan. Switched to prone position.  Tolerating tube feeds.       08/04/21:  Remains intubated, sedated, and chemically paralyzed.  Sputum culture grew Klebsiella pneumoniae.  FMS removed.  Lovenox restarted.  Weaning Cardizem drip.  Attempted to wean Flolan, but had to be reinitiated.  Tolerating tube feeds.       08/05/21:  Remains intubated, sedated, and chemically paralyzed.  Tolerating tube feeds.       08/06/21:  Remains intubated, sedated, and chemically paralyzed.  In prone position.  Tolerating tube feeds.       08/07/21:  Remains intubated, sedated, and chemically paralyzed.  Tolerating supine position.  Urine culture grew E. coli.  Tolerating tube feeds.       08/08/21:  Remains intubated, sedated, and chemically paralyzed.  Status post bronchoscopy with multiple mucus plugs removed.  Paralytic weaned off.  Tolerating tube feeds.       08/09/21:  Remains intubated and sedated.  Tolerating  tube feeds.       08/10/21:  Patient extubated.  Tolerating tube feeds.       08/11/21:  Remains extubated.  Diagnosed with critical illness polyneuropathy muscle weakness due to paralytics and steroids.  Patient with acute delirium.  Tolerating tube feeds.  SLP consulted for video swallow.     08/12/21:  The patient was downgraded to PCU and the Hospitalist team was consulted for medical management.  Patient started on mechanical soft diet overnight.  She is complaining of some shortness of breath, but maintaining oxygen saturation on 5 liters per high flow nasal cannula.  She reports fatigue and generalized weakness.        8/13     Patient's blood glucose very acceptable  Patient had been comfortable overnight.  She is slightly tachycardic on 5 L of high flow oxygen but sats are above 95 percent  Her white count 12.2  Pharmacy suggesting some changes in the insulin regimen to avoid multiple sticks.  Still feeling weak on the right upper extremity.  She relates that to her prone position in the ICU.  Discussed with the family that this would be helped by physical therapy.     8/14  Patient had been seen by new dietitian yesterday and I will speech therapist as well  She is a 96% saturation on 4 L of high flow nasal cannula  She is afebrile   working on her transfer/discharge planning to [facility] rehab facility.  Patient liver enzymes are fluctuating and mildly elevated  Have a PICC line placed.     8/15  She denies new symptoms today  She is saturating high 90s on 3 L high flow cannula.  De-escalating oxygen support  Asking for FMLA.  For family members  Due to persistent symptoms right upper extremity we will check venous Doppler  Check forearm x-ray as well  Advised regarding elevation and warm compresses.  She has good pulsation right radial artery.  Nasogastric tube removed    8/16/2021: Patient Reports her right arm pain is improving.  Discussed that upper extremity Doppler negative for any  DVT.  Patient's breathing improving but still requiring some supplemental oxygen.  Patient reports she has some vaginal burning today which she reports similar to when she has a yeast infection.  Awaiting placement.  Doing well after NG tube removed.    August 17, 2021: Patient continues to wean oxygen.  Yesterday attempt to get patient out of bed aborted due to transient tachycardia.  Patient reports no new symptoms.  Patient needs to be mobilized working to get patient out of bed today.    8/18/2021: Patient reports overall doing well.  Patient out of bed yesterday worked with physical therapy and currently on room air.  Patient concerned about how much insulin she is getting given her relatively controlled glycemic state.  Discussed that we would try to wean what medications we could given patient's clinical improvement.  Awaiting placement.    8/19/2021: Patient reports overall doing well.  Patient continues to be motivated and working with PT/OT.  Patient has deferred Lantus for the last few days and blood sugars have been between 100s to 120s will be able to DC insulin at this time.  Continue continuing to try to find placement.  Patient asking for bowel regimen.    8/20/2021: No new issues.  No bowel movement yet.  Still awaiting placement.    Acetylcysteine, 600 mg, Oral, BID  aspirin, 325 mg, Oral, Daily  budesonide, 0.5 mg, Nebulization, BID - RT  cholecalciferol, 2,000 Units, Oral, Daily  enoxaparin, 70 mg, Subcutaneous, Q12H  furosemide, 20 mg, Oral, Daily  guaiFENesin, 600 mg, Oral, Q12H  hydrocortisone, 10 mg, Oral, Daily With Lunch  ipratropium-albuterol, 3 mL, Nebulization, 4x Daily - RT  Rangel, 1 packet, Oral, BID  melatonin, 10 mg, Oral, Nightly  metoprolol tartrate, 25 mg, Oral, Q12H  polyethylene glycol, 17 g, Oral, BID  risperiDONE, 0.5 mg, Oral, Nightly  senna-docusate sodium, 2 tablet, Oral, BID  sodium chloride, 10 mL, Intravenous, Q12H  sodium chloride, 10 mL, Intravenous, Q12H        Pharmacy to Dose enoxaparin (LOVENOX),          Active Hospital Problems:  Active Hospital Problems    Diagnosis    • **Pneumonia due to COVID-19 virus    • Critical illness polyneuropathy (CMS/[facility])    • Delirium, acute    • Dysphagia    • E. coli UTI (urinary tract infection)    • Klebsiella pneumoniae pneumonia (CMS/[facility])    • Diabetes mellitus type 2 in obese (CMS/[facility])    • MRSA pneumonia (CMS/[facility])    • Acute respiratory failure due to COVID-19 (CMS/[facility])    • Class 3 severe obesity without serious comorbidity with body mass index (BMI) of 50.0 to 59.9 in adult    • Hypertension      Plan:     Shortness of breath-secondary to COVID-19 pneumonia, patient not vaccinated, patient intubated now extubated doing well, patient with development of underlying MRSA pneumonia as well as Klebsiella previously now finishing antibiotics overall improving with significant debility secondary to prolonged immobility, overall resolved  -Wean oxygen as tolerated  -Out of isolation  -Bronchodilators mucolytic's  -Pulmonology monitoring  -Due to prolonged hospital course and signs of critical illness myopathy patient will need inpatient rehab  -Weaning steroids  -Patient on room air intermittently continue aggressive physical therapy measures and out of bed    COVID-19 pneumonia-patient now on room air out of isolation  -Inflammatory markers  -Off remdesivir, initially on Decadron and then hydrocortisone weaning  -Mucolytic's    MRSA/Klebsiella pneumonia-patient seen by pulmonology  -Finished Rocephin and linezolid  -ID and pulmonology consulting    Vaginal burning-patient reports similar to previous yeast infections  -Diflucan x1    Right arm pain-May have some component of neuropathy after prolonged illness or due to transient external compression of forearm at some point during hospital course  -X-ray and upper extremity Doppler unremarkable  -Monitor daily  -Can discuss neuropathic pain medication if required    Anemia-mild but  likely with a chronic inflammatory component from prolonged illness  -Daily CBC    Transaminitis-decreasing from previously noted likely from COVID-19 and possible some component of early fatty liver  -Can monitor every few days while hospitalized    E. coli cystitis-patient finished Rocephin for treatment    Critical illness polyneuropathy-patient on prolonged dose of steroids due to underlying Covid infection  -PT/OT  -Rehab on discharge  -Out of bed    Type 2 diabetes-patient with residual hyperglycemia likely due to steroids, patient appearing well controlled now off steroids  -For patient comfort trying to wean off short acting insulin to avoid multiple injections throughout the day  -Patient has been deferring long-acting insulin for the last few days and blood sugars well controlled able to DC insulin at this time  -Diabetic diet    Dysphagia-after extubation noted but now able to remove NG tube, no further issues  -Monitor with diet  -Started on bowel regimen    Morbid obesity-BMI 50  -Lifestyle modification        DVT prophylaxis:  Medical DVT prophylaxis orders are present.    CODE STATUS:    Code Status: CPR  Medical Interventions (Level of Support Prior to Arrest): Full      Disposition:  I expect patient to be discharged in when she has placement.  Patient is young and motivated will likely need less than 30 days of rehab and is an excellent candidate for rapid improvement.    This patient has been examined wearing appropriate Personal Protective Equipment and discussed with hospital infection control department. 08/21/21      Electronically signed by Rodrigo Cortez MD, 08/21/21, 12:34 EDT.  [facility] Hospitalist Team

## 2021-08-22 LAB
ALBUMIN SERPL-MCNC: 3.8 G/DL (ref 3.5–5.2)
ALBUMIN/GLOB SERPL: 1.4 G/DL
ALP SERPL-CCNC: 91 U/L (ref 39–117)
ALT SERPL W P-5'-P-CCNC: 73 U/L (ref 1–33)
ANION GAP SERPL CALCULATED.3IONS-SCNC: 11 MMOL/L (ref 5–15)
AST SERPL-CCNC: 22 U/L (ref 1–32)
BILIRUB SERPL-MCNC: 0.7 MG/DL (ref 0–1.2)
BUN SERPL-MCNC: 12 MG/DL (ref 6–20)
BUN/CREAT SERPL: 33.3 (ref 7–25)
CALCIUM SPEC-SCNC: 9.5 MG/DL (ref 8.6–10.5)
CHLORIDE SERPL-SCNC: 99 MMOL/L (ref 98–107)
CO2 SERPL-SCNC: 28 MMOL/L (ref 22–29)
CREAT SERPL-MCNC: 0.36 MG/DL (ref 0.57–1)
DEPRECATED RDW RBC AUTO: 55.1 FL (ref 37–54)
ERYTHROCYTE [DISTWIDTH] IN BLOOD BY AUTOMATED COUNT: 17.9 % (ref 12.3–15.4)
GFR SERPL CREATININE-BSD FRML MDRD: >150 ML/MIN/1.73
GLOBULIN UR ELPH-MCNC: 2.8 GM/DL
GLUCOSE BLDC GLUCOMTR-MCNC: 110 MG/DL (ref 70–105)
GLUCOSE BLDC GLUCOMTR-MCNC: 122 MG/DL (ref 70–105)
GLUCOSE BLDC GLUCOMTR-MCNC: 93 MG/DL (ref 70–105)
GLUCOSE SERPL-MCNC: 169 MG/DL (ref 65–99)
HCT VFR BLD AUTO: 35.8 % (ref 34–46.6)
HGB BLD-MCNC: 11.9 G/DL (ref 12–15.9)
MAGNESIUM SERPL-MCNC: 2.2 MG/DL (ref 1.6–2.6)
MCH RBC QN AUTO: 29.7 PG (ref 26.6–33)
MCHC RBC AUTO-ENTMCNC: 33.2 G/DL (ref 31.5–35.7)
MCV RBC AUTO: 89.3 FL (ref 79–97)
PLATELET # BLD AUTO: 248 10*3/MM3 (ref 140–450)
PMV BLD AUTO: 7.7 FL (ref 6–12)
POTASSIUM SERPL-SCNC: 4.3 MMOL/L (ref 3.5–5.2)
PROT SERPL-MCNC: 6.6 G/DL (ref 6–8.5)
RBC # BLD AUTO: 4.01 10*6/MM3 (ref 3.77–5.28)
SODIUM SERPL-SCNC: 138 MMOL/L (ref 136–145)
WBC # BLD AUTO: 7.7 10*3/MM3 (ref 3.4–10.8)

## 2021-08-22 PROCEDURE — 83735 ASSAY OF MAGNESIUM: CPT | Performed by: NURSE PRACTITIONER

## 2021-08-22 PROCEDURE — 99232 SBSQ HOSP IP/OBS MODERATE 35: CPT | Performed by: FAMILY MEDICINE

## 2021-08-22 PROCEDURE — 94799 UNLISTED PULMONARY SVC/PX: CPT

## 2021-08-22 PROCEDURE — 82962 GLUCOSE BLOOD TEST: CPT

## 2021-08-22 PROCEDURE — 80053 COMPREHEN METABOLIC PANEL: CPT | Performed by: FAMILY MEDICINE

## 2021-08-22 PROCEDURE — 25010000002 ENOXAPARIN PER 10 MG: Performed by: INTERNAL MEDICINE

## 2021-08-22 PROCEDURE — 85027 COMPLETE CBC AUTOMATED: CPT | Performed by: FAMILY MEDICINE

## 2021-08-22 RX ADMIN — GUAIFENESIN 600 MG: 600 TABLET, EXTENDED RELEASE ORAL at 21:33

## 2021-08-22 RX ADMIN — ENOXAPARIN SODIUM 70 MG: 80 INJECTION, SOLUTION INTRAVENOUS; SUBCUTANEOUS at 21:33

## 2021-08-22 RX ADMIN — IPRATROPIUM BROMIDE AND ALBUTEROL SULFATE 3 ML: 2.5; .5 SOLUTION RESPIRATORY (INHALATION) at 11:10

## 2021-08-22 RX ADMIN — POLYETHYLENE GLYCOL 3350 17 G: 17 POWDER, FOR SOLUTION ORAL at 21:33

## 2021-08-22 RX ADMIN — DOCUSATE SODIUM 50 MG AND SENNOSIDES 8.6 MG 2 TABLET: 8.6; 5 TABLET, FILM COATED ORAL at 21:32

## 2021-08-22 RX ADMIN — Medication 10 ML: at 21:34

## 2021-08-22 RX ADMIN — METOPROLOL TARTRATE 25 MG: 25 TABLET, FILM COATED ORAL at 21:33

## 2021-08-22 RX ADMIN — Medication 1 PACKET: at 21:33

## 2021-08-22 RX ADMIN — Medication 10 ML: at 09:00

## 2021-08-22 RX ADMIN — ENOXAPARIN SODIUM 70 MG: 80 INJECTION, SOLUTION INTRAVENOUS; SUBCUTANEOUS at 08:27

## 2021-08-22 RX ADMIN — RISPERIDONE 0.5 MG: 0.5 TABLET, ORALLY DISINTEGRATING ORAL at 21:33

## 2021-08-22 RX ADMIN — HYDROCORTISONE 10 MG: 10 TABLET ORAL at 11:57

## 2021-08-22 RX ADMIN — POLYETHYLENE GLYCOL 3350 17 G: 17 POWDER, FOR SOLUTION ORAL at 08:28

## 2021-08-22 RX ADMIN — Medication 1 PACKET: at 11:57

## 2021-08-22 RX ADMIN — METOPROLOL TARTRATE 25 MG: 25 TABLET, FILM COATED ORAL at 08:27

## 2021-08-22 RX ADMIN — FUROSEMIDE 20 MG: 20 TABLET ORAL at 08:27

## 2021-08-22 RX ADMIN — BUDESONIDE 0.5 MG: 0.5 SUSPENSION RESPIRATORY (INHALATION) at 18:25

## 2021-08-22 RX ADMIN — Medication 10 ML: at 08:28

## 2021-08-22 RX ADMIN — IPRATROPIUM BROMIDE AND ALBUTEROL SULFATE 3 ML: 2.5; .5 SOLUTION RESPIRATORY (INHALATION) at 15:00

## 2021-08-22 RX ADMIN — GUAIFENESIN 600 MG: 600 TABLET, EXTENDED RELEASE ORAL at 08:27

## 2021-08-22 RX ADMIN — BUDESONIDE 0.5 MG: 0.5 SUSPENSION RESPIRATORY (INHALATION) at 07:25

## 2021-08-22 RX ADMIN — ASPIRIN 325 MG ORAL TABLET 325 MG: 325 PILL ORAL at 08:27

## 2021-08-22 RX ADMIN — Medication 10 MG: at 21:33

## 2021-08-22 RX ADMIN — Medication 2000 UNITS: at 08:27

## 2021-08-22 RX ADMIN — Medication 10 ML: at 21:33

## 2021-08-22 RX ADMIN — IPRATROPIUM BROMIDE AND ALBUTEROL SULFATE 3 ML: 2.5; .5 SOLUTION RESPIRATORY (INHALATION) at 07:25

## 2021-08-22 RX ADMIN — DOCUSATE SODIUM 50 MG AND SENNOSIDES 8.6 MG 2 TABLET: 8.6; 5 TABLET, FILM COATED ORAL at 08:27

## 2021-08-22 NOTE — PROGRESS NOTES
Daily Progress Note        Pneumonia due to COVID-19 virus    Acute respiratory failure due to COVID-19 (CMS/[facility])    Class 3 severe obesity without serious comorbidity with body mass index (BMI) of 50.0 to 59.9 in adult    Diabetes mellitus type 2 in obese (CMS/[facility])    MRSA pneumonia (CMS/[facility])    Klebsiella pneumoniae pneumonia (CMS/[facility])    E. coli UTI (urinary tract infection)    Hypertension    Critical illness polyneuropathy (CMS/[facility])    Delirium, acute    Dysphagia      Assessment    Pneumonia due to COVID-19  Acute Respiratory Failure  -Failed NIPPV and required intubation 7/23    Pneumonia  -8/1/21 sputum culture growing Klebsiella pneumoniae    UTI-E.Coli (ID following)    Elevated Liver Enzymes  Hypertension  Diabetes Mellitus  Obesity    ECHO 7/24/21  EF 55-60%    Plan     oxygen wean  -Currently on room air, wears 2 L at at bedtime    Encourage OOB daily to chair   Advance diet to Regular with thin liquids  Mucinex  Diuretic-Lasix  Cortef 10 mg BID  Bronchodilator/Inhaled corticosteroid  DVT prophylaxis-Lovenox  Glycemic control  PT/OT/speech    Continue working with therapy and getting stronger and   -pending discharge    Completed Remdesivr 7/26/21         LOS: 31 days     Subjective         Objective     Vital signs for last 24 hours:  Vitals:    08/21/21 1915 08/21/21 2130 08/22/21 0150 08/22/21 0510   BP:  138/97  125/85   BP Location:  Right arm  Right arm   Patient Position:  Lying  Lying   Pulse: (!) 122 119 110 111   Resp: 20 19 20 16   Temp:  97.5 °F (36.4 °C) 98 °F (36.7 °C) 97.7 °F (36.5 °C)   TempSrc:  Oral Oral Oral   SpO2: 100% 94% (!) 88%    Weight:       Height:           Intake/Output last 3 shifts:  I/O last 3 completed shifts:  In: 2000 [P.O.:2000]  Out: 3250 [Urine:3250]  Intake/Output this shift:  No intake/output data recorded.      Radiology  Imaging Results (Last 24 Hours)     ** No results found for the last 24 hours. **          Labs:  Results from last 7 days   Lab Units  08/22/21  0245   WBC 10*3/mm3 7.70   HEMOGLOBIN g/dL 11.9*   HEMATOCRIT % 35.8   PLATELETS 10*3/mm3 248     Results from last 7 days   Lab Units 08/22/21  0245   SODIUM mmol/L 138   POTASSIUM mmol/L 4.3   CHLORIDE mmol/L 99   CO2 mmol/L 28.0   BUN mg/dL 12   CREATININE mg/dL 0.36*   CALCIUM mg/dL 9.5   BILIRUBIN mg/dL 0.7   ALK PHOS U/L 91   ALT (SGPT) U/L 73*   AST (SGOT) U/L 22   GLUCOSE mg/dL 169*         Results from last 7 days   Lab Units 08/22/21  0245 08/19/21  0556 08/18/21  0734   ALBUMIN g/dL 3.80 3.70 3.80             Results from last 7 days   Lab Units 08/22/21  0245   MAGNESIUM mg/dL 2.2                   Meds:   SCHEDULE  aspirin, 325 mg, Oral, Daily  budesonide, 0.5 mg, Nebulization, BID - RT  cholecalciferol, 2,000 Units, Oral, Daily  enoxaparin, 70 mg, Subcutaneous, Q12H  furosemide, 20 mg, Oral, Daily  guaiFENesin, 600 mg, Oral, Q12H  hydrocortisone, 10 mg, Oral, Daily With Lunch  ipratropium-albuterol, 3 mL, Nebulization, 4x Daily - RT  Rangel, 1 packet, Oral, BID  melatonin, 10 mg, Oral, Nightly  metoprolol tartrate, 25 mg, Oral, Q12H  polyethylene glycol, 17 g, Oral, BID  risperiDONE, 0.5 mg, Oral, Nightly  senna-docusate sodium, 2 tablet, Oral, BID  sodium chloride, 10 mL, Intravenous, Q12H  sodium chloride, 10 mL, Intravenous, Q12H      Infusions  Pharmacy to Dose enoxaparin (LOVENOX),       PRNs  •  acetaminophen  •  acetaminophen **OR** acetaminophen  •  aluminum-magnesium hydroxide-simethicone  •  artificial tears  •  benzonatate  •  dextrose  •  dextrose  •  glucagon (human recombinant)  •  hydrALAZINE  •  hydrOXYzine  •  lidocaine  •  lidocaine PF 1%  •  magnesium sulfate **OR** magnesium sulfate in D5W 1g/100mL (PREMIX)  •  methocarbamol  •  nitroglycerin  •  ondansetron **OR** ondansetron  •  pantoprazole  •  Pharmacy to Dose enoxaparin (LOVENOX)  •  potassium chloride **OR** potassium chloride **OR** potassium chloride  •  potassium phosphate infusion greater than 15 mMoles **OR**  potassium phosphate infusion greater than 15 mMoles **OR** potassium phosphate **OR** sodium phosphate IVPB **OR** sodium phosphate IVPB **OR** sodium phosphate IVPB  •  [COMPLETED] Insert peripheral IV **AND** sodium chloride  •  sodium chloride  •  sodium chloride  •  sodium chloride    Physical Exam:  Physical Exam  Vitals reviewed.   Constitutional:       Appearance: She is obese.   Pulmonary:      Breath sounds: Decreased breath sounds and rhonchi present.   Musculoskeletal:      Right lower leg: Edema present.      Left lower leg: Edema present.   Skin:     General: Skin is warm and dry.   Neurological:      Mental Status: She is alert and oriented to person, place, and time.         ROS  Review of Systems   Constitutional: Positive for activity change, appetite change and fatigue.   Respiratory: Positive for cough and shortness of breath.    Cardiovascular: Positive for leg swelling.   Neurological: Positive for weakness.         I have reviewed the patients new clinical results    Electronically signed by NATY Evans

## 2021-08-22 NOTE — PLAN OF CARE
Goal Outcome Evaluation:      Pt stable for discharge but has been denied by insurance. Pt continues to have weakness in BUE/BLE. Able to eat and drink with left arm but unable to lift with right arm. Awaiting  approval for subacute. Will continue to monitor.

## 2021-08-22 NOTE — PROGRESS NOTES
[facility] Medicine Services Daily Progress Note    Patient Name: Leslie Harris  : 1990  MRN: [id number]  Primary Care Physician:  Provider, No Known  Date of admission: 2021      Subjective      Chief Complaint: Shortness of breath    Patient reports no new concerns.  Had a bowel movement overnight.  No shortness of breath.  Awaiting placement.    Review of Systems   Constitutional: Positive for malaise/fatigue. Negative for chills and fever.   HENT: Negative for hoarse voice and sore throat.    Eyes: Negative for double vision and photophobia.   Cardiovascular: Negative for chest pain and syncope.   Respiratory: Negative for cough and shortness of breath.    Musculoskeletal: Positive for myalgias. Negative for muscle weakness.   Gastrointestinal: Negative for nausea and vomiting.   Genitourinary: Negative for genital sores and pelvic pain.   Neurological: Positive for paresthesias and weakness. Negative for dizziness.   Psychiatric/Behavioral: Negative for altered mental status. The patient is not nervous/anxious.          Objective      Vitals:   Temp:  [97.5 °F (36.4 °C)-98.2 °F (36.8 °C)] 97.7 °F (36.5 °C)  Heart Rate:  [] 109  Resp:  [12-20] 16  BP: (119-138)/(70-97) 125/85  Flow (L/min):  [2] 2    Physical Exam      General: Morbidly obese female lying in bed breathing company on 2 L via nasal cannula no acute distress  HEENT: NC/AT, EOMI, mucosa moist  Heart: Regular, rate controlled  Chest: Normal work of breathing, moving air well no wheezing  Abdominal: Soft. NT/ND.   Musculoskeletal: Normal ROM.  No cyanosis. No calf tenderness.  Neurological: AAOx3, no focal deficits  Skin: Skin is warm and dry. No rash  Psychiatric: Normal mood and affect.      Result Review    Result Review:  I have personally reviewed the results from the time of this admission to 2021 11:37 EDT and agree with these findings:  [x]  Laboratory  [x]  Microbiology  [x]  Radiology  [x]   EKG/Telemetry   []  Cardiology/Vascular   []  Pathology  []  Old records  []  Other:  Most notable findings include: Hyperglycemia       Wounds (last 24 hours)      LDA Wound     Row Name 08/22/21 0815 08/22/21 0443 08/22/21 0038       Wound 07/27/21 2028 medial sacral spine Pressure Injury    Wound - Properties Group Placement Date: 07/27/21  - Placement Time: 2028 -MF Present on Hospital Admission: N  -MF Orientation: medial  -MF Location: sacral spine  -MF Primary Wound Type: Pressure inj  -MF Stage, Pressure Injury : Stage 2  -MF    Closure  Open to air  -ER  Open to air  -EM  Open to air  -EM    Base  pink  -ER  pink  -EM  pink  -EM    Periwound  pink  -ER  pink  -EM  pink  -EM    Periwound Temperature  warm  -ER  warm  -EM  warm  -EM    Edges  open  -ER  open  -EM  open  -EM    Retired Wound - Properties Group Date first assessed: 07/27/21  - Time first assessed: 2028  -MF Present on Hospital Admission: N  -MF Location: sacral spine  -MF Primary Wound Type: Pressure inj  -MF       Wound 08/21/21 1709 Right distal thigh Other (comment)    Wound - Properties Group Placement Date: 08/21/21  -JE Placement Time: 1709 -JE Side: Right  -JE Orientation: distal  -JE Location: thigh  -JE Primary Wound Type: Other  -JE, abrasion     Retired Wound - Properties Group Date first assessed: 08/21/21  - Time first assessed: 1709 -JE Side: Right  -JE Location: thigh  -JE Primary Wound Type: Other  -JE, abrasion     Row Name 08/21/21 2046             Wound 07/27/21 2028 medial sacral spine Pressure Injury    Wound - Properties Group Placement Date: 07/27/21  - Placement Time: 2028  -MF Present on Hospital Admission: N  -MF Orientation: medial  -MF Location: sacral spine  -MF Primary Wound Type: Pressure inj  -MF Stage, Pressure Injury : Stage 2  -MF    Closure  Open to air  -EM      Base  pink  -EM      Periwound  pink  -EM      Periwound Temperature  warm  -EM      Edges  open  -EM      Retired Wound - Properties  Group Date first assessed: 07/27/21  -MF Time first assessed: 2028  -MF Present on Hospital Admission: N  -MF Location: sacral spine  -MF Primary Wound Type: Pressure inj  -MF       Wound 08/21/21 1709 Right distal thigh Other (comment)    Wound - Properties Group Placement Date: 08/21/21  -JE Placement Time: 1709 -JE Side: Right  -JE Orientation: distal  -JE Location: thigh  -JE Primary Wound Type: Other  -JE, abrasion     Retired Wound - Properties Group Date first assessed: 08/21/21  -JE Time first assessed: 1709  -JE Side: Right  -JE Location: thigh  -JE Primary Wound Type: Other  -JE, abrasion       User Key  (r) = Recorded By, (t) = Taken By, (c) = Cosigned By    Initials Name Provider Type    Blanca Scott RN Registered Nurse    Dafne Menchaca RN Registered Nurse    Ольга Spence RN Registered Nurse    Dafne Arredondo RN Registered Nurse            Assessment/Plan      Brief Patient Summary:    Leslie Harris is a 30 y.o. morbidly obese female who was diagnosed with COVID-19 on 07/16/2021 at the [facility]. She had not been vaccinated for Covid. She presented to the [facility] ED on 07/22/2021 complaining of shortness of breath. Workup in the ER revealed acute respiratory failure with hypoxia, pneumonia due to COVID-19, hypertension, and hyperglycemia. The patient was placed on Precision Flow with 100% FiO2 and admitted to the ICU for close monitoring and further treatment. The patient was started on Decadron and Remdesivir.     07/23/21:  Patient transitioned AVAPS with Precision Flow with 100% FiO2, but continued to have hypoxia and was intubated.  Right IJ central line inserted.  Dietitian consulted for tube feeds.     07/24/21:  Remains intubated and sedated. Chemically paralyzed due to worsening hypoxemia. On Flolan nebulized.  Prone position.  Started on empiric coverage with ceftriaxone.  Tolerating tube feeds.       07/25/21:  Remains  intubated, sedated, and chemically paralyzed.  Antibiotic changed to Zosyn.  Remains prone and on Flolan.  Tolerating tube feeds.         07/26/21:  Remains intubated, sedated, and chemically paralyzed.  Patient placed in supine position.  Remains on Flolan.  Tolerating tube feeds.       07/27/21:  Remains intubated, sedated, and chemically paralyzed.  Patient tolerating supine position.  Remains on Flolan.  Tolerating tube feeds.       07/28/21:  Remains intubated, sedated, and chemically paralyzed.  Patient tolerating supine position.  Remains on Flolan.  Tolerating tube feeds.       07/29/21:  Remains intubated, sedated, and chemically paralyzed.  Sputum culture grew MRSA.  Patient tolerating supine position.  Remains on Flolan; failed wean attempt.  Tolerating tube feeds.       07/30/21:  Remains intubated, sedated, and chemically paralyzed.  Patient returned to prone position for 16 hours.  Remains on Flolan.  Transitioned to heparin drip.  Tolerating tube feeds.       07/31/21:  Remains intubated, sedated, and chemically paralyzed.  Remains on Flolan.  Diuresis started.  On heparin drip.  Tolerating tube feeds.       08/01/21:  Remains intubated, sedated, and chemically paralyzed.  A-line inserted.  Tolerating supine position.  Remains on heparin drip.  Diuresing well.  Remains on Flolan.  Cardizem drip started for hypertension and tachycardia.  Left radial a-line discontinued.  Right brachial a-line inserted.   Tolerating tube feeds.       08/02/21:  Remains intubated, sedated, and chemically paralyzed. Tolerating supine position.  Remains on Flolan.  Remains on heparin drip.  Labile BP, alternating Cardizem and Levophed.  Fecal management system placed due to high volume liquid stool.  Tolerating tube feeds.       08/03/21:  Remains intubated, sedated, and chemically paralyzed.  Infectious disease consulted for antibiotic management.  Heparin drip discontinued due to blood-tinged sputum.  Weaning Cardizem  drip.  Remains on Flolan. Switched to prone position.  Tolerating tube feeds.       08/04/21:  Remains intubated, sedated, and chemically paralyzed.  Sputum culture grew Klebsiella pneumoniae.  FMS removed.  Lovenox restarted.  Weaning Cardizem drip.  Attempted to wean Flolan, but had to be reinitiated.  Tolerating tube feeds.       08/05/21:  Remains intubated, sedated, and chemically paralyzed.  Tolerating tube feeds.       08/06/21:  Remains intubated, sedated, and chemically paralyzed.  In prone position.  Tolerating tube feeds.       08/07/21:  Remains intubated, sedated, and chemically paralyzed.  Tolerating supine position.  Urine culture grew E. coli.  Tolerating tube feeds.       08/08/21:  Remains intubated, sedated, and chemically paralyzed.  Status post bronchoscopy with multiple mucus plugs removed.  Paralytic weaned off.  Tolerating tube feeds.       08/09/21:  Remains intubated and sedated.  Tolerating tube feeds.       08/10/21:  Patient extubated.  Tolerating tube feeds.       08/11/21:  Remains extubated.  Diagnosed with critical illness polyneuropathy muscle weakness due to paralytics and steroids.  Patient with acute delirium.  Tolerating tube feeds.  SLP consulted for video swallow.     08/12/21:  The patient was downgraded to PCU and the Hospitalist team was consulted for medical management.  Patient started on mechanical soft diet overnight.  She is complaining of some shortness of breath, but maintaining oxygen saturation on 5 liters per high flow nasal cannula.  She reports fatigue and generalized weakness.        8/13     Patient's blood glucose very acceptable  Patient had been comfortable overnight.  She is slightly tachycardic on 5 L of high flow oxygen but sats are above 95 percent  Her white count 12.2  Pharmacy suggesting some changes in the insulin regimen to avoid multiple sticks.  Still feeling weak on the right upper extremity.  She relates that to her prone position in the  ICU.  Discussed with the family that this would be helped by physical therapy.     8/14  Patient had been seen by new dietitian yesterday and I will speech therapist as well  She is a 96% saturation on 4 L of high flow nasal cannula  She is afebrile   working on her transfer[facility].  Patient liver enzymes are fluctuating and mildly elevated  Have a PICC line placed.     8/15  She denies new symptoms today  She is saturating high 90s on 3 L high flow cannula.  De-escalating oxygen support  Asking for FMLA.  For family members  Due to persistent symptoms right upper extremity we will check venous Doppler  Check forearm x-ray as well  Advised regarding elevation and warm compresses.  She has good pulsation right radial artery.  Nasogastric tube removed    8/16/2021: Patient Reports her right arm pain is improving.  Discussed that upper extremity Doppler negative for any DVT.  Patient's breathing improving but still requiring some supplemental oxygen.  Patient reports she has some vaginal burning today which she reports similar to when she has a yeast infection.  Awaiting placement.  Doing well after NG tube removed.    August 17, 2021: Patient continues to wean oxygen.  Yesterday attempt to get patient out of bed aborted due to transient tachycardia.  Patient reports no new symptoms.  Patient needs to be mobilized working to get patient out of bed today.    8/18/2021: Patient reports overall doing well.  Patient out of bed yesterday worked with physical therapy and currently on room air.  Patient concerned about how much insulin she is getting given her relatively controlled glycemic state.  Discussed that we would try to wean what medications we could given patient's clinical improvement.  Awaiting placement.    8/19/2021: Patient reports overall doing well.  Patient continues to be motivated and working with PT/OT.  Patient has deferred Lantus for the last few  days and blood sugars have been between 100s to 120s will be able to DC insulin at this time.  Continue continuing to try to find placement.  Patient asking for bowel regimen.    8/20/2021: No new issues.  No bowel movement yet.  Still awaiting placement.    2021-08-21: No new issues.  No shortness of breath.  Awaiting placement.  Patient has not had a bowel movement in multiple days, adding sorbitol.    aspirin, 325 mg, Oral, Daily  budesonide, 0.5 mg, Nebulization, BID - RT  cholecalciferol, 2,000 Units, Oral, Daily  enoxaparin, 70 mg, Subcutaneous, Q12H  furosemide, 20 mg, Oral, Daily  guaiFENesin, 600 mg, Oral, Q12H  hydrocortisone, 10 mg, Oral, Daily With Lunch  ipratropium-albuterol, 3 mL, Nebulization, 4x Daily - RT  Rangel, 1 packet, Oral, BID  melatonin, 10 mg, Oral, Nightly  metoprolol tartrate, 25 mg, Oral, Q12H  polyethylene glycol, 17 g, Oral, BID  risperiDONE, 0.5 mg, Oral, Nightly  senna-docusate sodium, 2 tablet, Oral, BID  sodium chloride, 10 mL, Intravenous, Q12H  sodium chloride, 10 mL, Intravenous, Q12H       Pharmacy to Dose enoxaparin (LOVENOX),          Active Hospital Problems:  Active Hospital Problems    Diagnosis    • **Pneumonia due to COVID-19 virus    • Critical illness polyneuropathy (CMS/[facility])    • Delirium, acute    • Dysphagia    • E. coli UTI (urinary tract infection)    • Klebsiella pneumoniae pneumonia (CMS/[facility])    • Diabetes mellitus type 2 in obese (CMS/[facility])    • MRSA pneumonia (CMS/[facility])    • Acute respiratory failure due to COVID-19 (CMS/[facility])    • Class 3 severe obesity without serious comorbidity with body mass index (BMI) of 50.0 to 59.9 in adult    • Hypertension      Plan:     Shortness of breath-secondary to COVID-19 pneumonia, patient not vaccinated, patient intubated now extubated doing well, patient with development of underlying MRSA pneumonia as well as Klebsiella previously now finishing antibiotics overall improving with significant debility secondary to prolonged  immobility, overall resolved  -Wean oxygen as tolerated  -Out of isolation  -Bronchodilators mucolytic's  -Pulmonology monitoring  -Due to prolonged hospital course and signs of critical illness myopathy patient will need inpatient rehab  -Weaning steroids  -Patient on room air intermittently continue aggressive physical therapy measures and out of bed    COVID-19 pneumonia-patient now on room air out of isolation  -Inflammatory markers  -Off remdesivir, initially on Decadron and then hydrocortisone weaning  -Mucolytic's    MRSA/Klebsiella pneumonia-patient seen by pulmonology  -Finished Rocephin and linezolid  -ID and pulmonology consulting    Vaginal burning-patient reports similar to previous yeast infections  -Diflucan x1    Right arm pain-May have some component of neuropathy after prolonged illness or due to transient external compression of forearm at some point during hospital course  -X-ray and upper extremity Doppler unremarkable  -Monitor daily  -Can discuss neuropathic pain medication if required    Anemia-mild but likely with a chronic inflammatory component from prolonged illness  -Daily CBC    Transaminitis-decreasing from previously noted likely from COVID-19 and possible some component of early fatty liver  -Can monitor every few days while hospitalized    E. coli cystitis-patient finished Rocephin for treatment    Critical illness polyneuropathy-patient on prolonged dose of steroids due to underlying Covid infection  -PT/OT  -Rehab on discharge  -Out of bed    Type 2 diabetes-patient with residual hyperglycemia likely due to steroids, patient appearing well controlled now off steroids  -For patient comfort trying to wean off short acting insulin to avoid multiple injections throughout the day  -Patient has been deferring long-acting insulin for the last few days and blood sugars well controlled able to DC insulin at this time  -Diabetic diet    Dysphagia-after extubation noted but now able to  remove NG tube, no further issues  -Monitor with diet  -Started on bowel regimen    Morbid obesity-BMI 50  -Lifestyle modification        DVT prophylaxis:  Medical DVT prophylaxis orders are present.    CODE STATUS:    Code Status: CPR  Medical Interventions (Level of Support Prior to Arrest): Full      Disposition:  I expect patient to be discharged in when she has placement.  Patient is young and motivated will likely need less than 30 days of rehab and is an excellent candidate for rapid improvement.    This patient has been examined wearing appropriate Personal Protective Equipment and discussed with hospital infection control department. 08/22/21      Electronically signed by Rodrigo Cortez MD, 08/22/21, 11:37 EDT.  [facility] Hospitalist Team

## 2021-08-22 NOTE — PLAN OF CARE
Goal Outcome Evaluation:     Pt was able to have successful  bowel movements. No complaints of pain at this time. Was placed on 2L NC at night. Pt compliant with turning Q2 hours. Will continue to educate an encourage.

## 2021-08-22 NOTE — PROGRESS NOTES
Discharge Planning Assessment   [facility]     Patient Name: Leslie Harris  MRN: [id number]  Today's Date: 8/22/2021    Admit Date: 7/22/2021      Discharge Plan     Row Name 08/22/21 1844       Plan    Plan  DC plan: [facility] subacute pending acceptance (acute denied). Will require precert. No PASRR required.    Plan Comments  Received update from Gregoria with [facility]. Pt was denied for acute. Potential bed availability for subacute on 8/23/21 if approved by . Will require precert if accepted. No PASRR required.        Continued Care and Services - Admitted Since 7/22/2021     Destination     Service Provider Request Status Selected Services Address Phone Fax Patient Preferred    [facility]  Pending - Request Sent N/A [address] [place] [address] [phone] --    [facility]  Declined  Patient Insurance Not Accepted N/A [address] [place] [address] [phone] --              Expected Discharge Date and Time     Expected Discharge Date Expected Discharge Time    Aug 20, 2021         Mireya Waters RN

## 2021-08-23 PROBLEM — U07.1 PNEUMONIA DUE TO COVID-19 VIRUS: Status: RESOLVED | Noted: 2021-07-22 | Resolved: 2021-08-23

## 2021-08-23 PROBLEM — J12.82 PNEUMONIA DUE TO COVID-19 VIRUS: Status: RESOLVED | Noted: 2021-07-22 | Resolved: 2021-08-23

## 2021-08-23 LAB
ANION GAP SERPL CALCULATED.3IONS-SCNC: 9 MMOL/L (ref 5–15)
BUN SERPL-MCNC: 9 MG/DL (ref 6–20)
BUN/CREAT SERPL: 25 (ref 7–25)
CALCIUM SPEC-SCNC: 9.7 MG/DL (ref 8.6–10.5)
CHLORIDE SERPL-SCNC: 100 MMOL/L (ref 98–107)
CO2 SERPL-SCNC: 30 MMOL/L (ref 22–29)
CREAT SERPL-MCNC: 0.36 MG/DL (ref 0.57–1)
D DIMER PPP FEU-MCNC: 1.8 MG/L (FEU) (ref 0–0.59)
GFR SERPL CREATININE-BSD FRML MDRD: >150 ML/MIN/1.73
GLUCOSE BLDC GLUCOMTR-MCNC: 112 MG/DL (ref 70–105)
GLUCOSE BLDC GLUCOMTR-MCNC: 143 MG/DL (ref 70–105)
GLUCOSE BLDC GLUCOMTR-MCNC: 90 MG/DL (ref 70–105)
GLUCOSE SERPL-MCNC: 123 MG/DL (ref 65–99)
MAGNESIUM SERPL-MCNC: 2 MG/DL (ref 1.6–2.6)
POTASSIUM SERPL-SCNC: 4.1 MMOL/L (ref 3.5–5.2)
SODIUM SERPL-SCNC: 139 MMOL/L (ref 136–145)

## 2021-08-23 PROCEDURE — 94799 UNLISTED PULMONARY SVC/PX: CPT

## 2021-08-23 PROCEDURE — 63710000001 ONDANSETRON PER 8 MG: Performed by: NURSE PRACTITIONER

## 2021-08-23 PROCEDURE — 99231 SBSQ HOSP IP/OBS SF/LOW 25: CPT | Performed by: INTERNAL MEDICINE

## 2021-08-23 PROCEDURE — 82962 GLUCOSE BLOOD TEST: CPT

## 2021-08-23 PROCEDURE — 80048 BASIC METABOLIC PNL TOTAL CA: CPT | Performed by: FAMILY MEDICINE

## 2021-08-23 PROCEDURE — 97530 THERAPEUTIC ACTIVITIES: CPT

## 2021-08-23 PROCEDURE — 83735 ASSAY OF MAGNESIUM: CPT | Performed by: NURSE PRACTITIONER

## 2021-08-23 PROCEDURE — 36415 COLL VENOUS BLD VENIPUNCTURE: CPT | Performed by: FAMILY MEDICINE

## 2021-08-23 PROCEDURE — 25010000002 ENOXAPARIN PER 10 MG: Performed by: INTERNAL MEDICINE

## 2021-08-23 PROCEDURE — 97535 SELF CARE MNGMENT TRAINING: CPT

## 2021-08-23 PROCEDURE — 85379 FIBRIN DEGRADATION QUANT: CPT | Performed by: FAMILY MEDICINE

## 2021-08-23 RX ORDER — AMOXICILLIN 250 MG
2 CAPSULE ORAL 2 TIMES DAILY
Start: 2021-08-23 | End: 2021-10-20

## 2021-08-23 RX ORDER — HYDROXYZINE HYDROCHLORIDE 25 MG/1
25 TABLET, FILM COATED ORAL 3 TIMES DAILY PRN
Start: 2021-08-23 | End: 2021-10-20

## 2021-08-23 RX ORDER — ASPIRIN 325 MG
325 TABLET ORAL DAILY
Start: 2021-08-24 | End: 2021-09-29

## 2021-08-23 RX ORDER — FUROSEMIDE 20 MG/1
20 TABLET ORAL DAILY
Start: 2021-08-24 | End: 2021-10-20

## 2021-08-23 RX ORDER — BENZONATATE 100 MG/1
100 CAPSULE ORAL 3 TIMES DAILY PRN
Start: 2021-08-23 | End: 2021-09-29

## 2021-08-23 RX ORDER — MELATONIN
2000 DAILY
Start: 2021-08-24 | End: 2021-10-20

## 2021-08-23 RX ORDER — POLYETHYLENE GLYCOL 3350 17 G/17G
17 POWDER, FOR SOLUTION ORAL 2 TIMES DAILY
Start: 2021-08-23 | End: 2021-10-20

## 2021-08-23 RX ORDER — HYDROCORTISONE 10 MG/1
10 TABLET ORAL
Start: 2021-08-24 | End: 2021-10-20

## 2021-08-23 RX ORDER — PANTOPRAZOLE SODIUM 40 MG/1
40 TABLET, DELAYED RELEASE ORAL 2 TIMES DAILY PRN
Start: 2021-08-23 | End: 2021-10-20

## 2021-08-23 RX ORDER — GUAIFENESIN 600 MG/1
600 TABLET, EXTENDED RELEASE ORAL EVERY 12 HOURS SCHEDULED
Start: 2021-08-23 | End: 2021-09-29

## 2021-08-23 RX ADMIN — ENOXAPARIN SODIUM 70 MG: 80 INJECTION, SOLUTION INTRAVENOUS; SUBCUTANEOUS at 08:13

## 2021-08-23 RX ADMIN — Medication 2000 UNITS: at 08:14

## 2021-08-23 RX ADMIN — IPRATROPIUM BROMIDE AND ALBUTEROL SULFATE 3 ML: 2.5; .5 SOLUTION RESPIRATORY (INHALATION) at 19:06

## 2021-08-23 RX ADMIN — ENOXAPARIN SODIUM 70 MG: 80 INJECTION, SOLUTION INTRAVENOUS; SUBCUTANEOUS at 20:57

## 2021-08-23 RX ADMIN — Medication 10 ML: at 22:13

## 2021-08-23 RX ADMIN — POLYETHYLENE GLYCOL 3350 17 G: 17 POWDER, FOR SOLUTION ORAL at 20:57

## 2021-08-23 RX ADMIN — ASPIRIN 325 MG ORAL TABLET 325 MG: 325 PILL ORAL at 08:13

## 2021-08-23 RX ADMIN — POLYETHYLENE GLYCOL 3350 17 G: 17 POWDER, FOR SOLUTION ORAL at 08:13

## 2021-08-23 RX ADMIN — IPRATROPIUM BROMIDE AND ALBUTEROL SULFATE 3 ML: 2.5; .5 SOLUTION RESPIRATORY (INHALATION) at 15:50

## 2021-08-23 RX ADMIN — ONDANSETRON HYDROCHLORIDE 2 MG: 4 TABLET, FILM COATED ORAL at 11:58

## 2021-08-23 RX ADMIN — BUDESONIDE 0.5 MG: 0.5 SUSPENSION RESPIRATORY (INHALATION) at 19:06

## 2021-08-23 RX ADMIN — Medication 1 PACKET: at 08:13

## 2021-08-23 RX ADMIN — Medication 10 ML: at 08:13

## 2021-08-23 RX ADMIN — RISPERIDONE 0.5 MG: 0.5 TABLET, ORALLY DISINTEGRATING ORAL at 20:57

## 2021-08-23 RX ADMIN — Medication 10 MG: at 20:57

## 2021-08-23 RX ADMIN — METOPROLOL TARTRATE 25 MG: 25 TABLET, FILM COATED ORAL at 20:57

## 2021-08-23 RX ADMIN — FUROSEMIDE 20 MG: 20 TABLET ORAL at 08:14

## 2021-08-23 RX ADMIN — Medication 10 ML: at 20:58

## 2021-08-23 RX ADMIN — METOPROLOL TARTRATE 25 MG: 25 TABLET, FILM COATED ORAL at 08:14

## 2021-08-23 RX ADMIN — IPRATROPIUM BROMIDE AND ALBUTEROL SULFATE 3 ML: 2.5; .5 SOLUTION RESPIRATORY (INHALATION) at 08:32

## 2021-08-23 RX ADMIN — HYDROCORTISONE 10 MG: 10 TABLET ORAL at 11:58

## 2021-08-23 RX ADMIN — BUDESONIDE 0.5 MG: 0.5 SUSPENSION RESPIRATORY (INHALATION) at 08:33

## 2021-08-23 RX ADMIN — Medication 1 PACKET: at 22:13

## 2021-08-23 RX ADMIN — DOCUSATE SODIUM 50 MG AND SENNOSIDES 8.6 MG 2 TABLET: 8.6; 5 TABLET, FILM COATED ORAL at 20:57

## 2021-08-23 RX ADMIN — GUAIFENESIN 600 MG: 600 TABLET, EXTENDED RELEASE ORAL at 08:14

## 2021-08-23 RX ADMIN — DOCUSATE SODIUM 50 MG AND SENNOSIDES 8.6 MG 2 TABLET: 8.6; 5 TABLET, FILM COATED ORAL at 08:14

## 2021-08-23 RX ADMIN — GUAIFENESIN 600 MG: 600 TABLET, EXTENDED RELEASE ORAL at 20:57

## 2021-08-23 RX ADMIN — IPRATROPIUM BROMIDE AND ALBUTEROL SULFATE 3 ML: 2.5; .5 SOLUTION RESPIRATORY (INHALATION) at 11:35

## 2021-08-23 NOTE — PLAN OF CARE
Assessment: Leslie Harris presents with ADL impairments below baseline abilities which indicate the need for continued skilled intervention while inpatient. Continues to demo weakness in BUE causing difficulty with ADLs, though improving.  She tolerated standing well, though HR elevated to 160 bpm and required extended rest break to improve to the 120s.  Tolerating session today without incident. Will continue to follow and progress as tolerated.     Plan/Recommendations:   Pt would benefit from Inpatient Rehabilitation placement at discharge from facility.   Pt desires Inpatient Rehabilitation placement at discharge. Pt cooperative; agreeable to therapeutic recommendations and plan of care.

## 2021-08-23 NOTE — PROGRESS NOTES
Nutrition Services    Patient Name: Leslie Harris  YOB: 1990  MRN: [id number]  Admission date: 7/22/2021    PPE Documentation        PPE Worn By Provider Did not enter room for progress review.    PPE Worn By Patient  -      PROGRESS NOTE      Encounter Information: 8/23: Progress note to check on PO intake and diet tolerance. Pt remains able to self-feed, per documentation, and is eating/tolerating current diet and ONS as ordered. Pt now eating consistently % at meals. Preparing to discharge to rehabilitation facility later today. No additional nutrition interventions indicated presently.        PO Diet: Diet Regular   PO Supplements: Boost Breeze BID  Magic Cups L/D   Rangel BID   PO Intake:  % of meals and accepting ONS       Nutrition support orders: -    Nutrition support review: -       Labs (reviewed below): Reviewed and C/W clinical course        GI Function:  BM 8/23       Nutrition Intervention: Continue current diet and ONS, which are meeting needs.       Results from last 7 days   Lab Units 08/23/21  0435 08/22/21  0245 08/21/21  0648 08/20/21  0230 08/19/21  0556 08/18/21  0734 08/18/21  0734   SODIUM mmol/L 139 138 139   < > 136   < > 137   POTASSIUM mmol/L 4.1 4.3 4.3   < > 4.3   < > 4.2   CHLORIDE mmol/L 100 99 99   < > 99   < > 99   CO2 mmol/L 30.0* 28.0 28.0   < > 26.0   < > 27.0   BUN mg/dL 9 12 14   < > 13   < > 13   CREATININE mg/dL 0.36* 0.36* 0.37*   < > 0.40*   < > 0.32*   CALCIUM mg/dL 9.7 9.5 9.8   < > 9.6   < > 9.9   BILIRUBIN mg/dL  --  0.7  --   --  0.7  --  0.7   ALK PHOS U/L  --  91  --   --  93  --  96   ALT (SGPT) U/L  --  73*  --   --  115*  --  117*   AST (SGOT) U/L  --  22  --   --  39*  --  33*   GLUCOSE mg/dL 123* 169* 111*   < > 102*   < > 109*    < > = values in this interval not displayed.     Results from last 7 days   Lab Units 08/23/21  0435 08/22/21  0245 08/21/21  0431 08/19/21  0556   MAGNESIUM mg/dL 2.0 2.2 1.9  --    HEMOGLOBIN g/dL  --   11.9*  --    < >   HEMATOCRIT %  --  35.8  --    < >    < > = values in this interval not displayed.     COVID19   Date Value Ref Range Status   07/22/2021 Detected (C) Not Detected - Ref. Range Final     Lab Results   Component Value Date    HGBA1C 6.9 (H) 07/23/2021     RD to follow up per protocol.    Electronically signed by:  Traci Schulz RD  08/23/21 15:24 EDT

## 2021-08-23 NOTE — THERAPY TREATMENT NOTE
Subjective: Pt agreeable to therapeutic plan of care.    Objective:     Bed mobility - Mod-A and Assist x 2  Pt initially sitting at eob upon entry to the room.  Mod of one for rolling left to right to left  Transfers - Mod-A, Assist x 2 and with rolling walker  Sit to stand off eob up to roll walker.  Pt stood for about 1 minute. Attempted to stand again and pt was only able to stand partially.    Ambulation - pt unable to take any steps with roll walker  Upper extrem rom.  Pt was instructed for bridging exercises in the bed.      Pain: 5 VAS right shoulder, and bilateral feet in standing.    Education: Provided education on importance of mobility and skilled verbal / tactile cueing throughout intervention.     Assessment: Leslie Harris presents with functional mobility impairments which indicate the need for skilled intervention. Tolerating session today without incident. Pt making very good effort to stand from eob (bed height elevated) pt stood with roll walker for about 1 minute.  Pt's heart rate gets tachy (briefly 150's) with mobility.  Anticipate pt taking steps with roll walker soon.  Pt has very good potential to recover her mobility with intense IP rehab.  Pt is highly motivated and has very good family support. Will continue to follow and progress as tolerated.     Plan/Recommendations:   Pt would benefit from Inpatient Rehabilitation placement at discharge from facility and requires no DME at discharge.   Pt desires Inpatient Rehabilitation placement at discharge. Pt cooperative; agreeable to therapeutic recommendations and plan of care.     Basic Mobility 6-click:  Rollin = Total, A lot = 2, A little = 3; 4 = None  Supine>Sit:   1 = Total, A lot = 2, A little = 3; 4 = None   Sit>Stand with arms:  1 = Total, A lot = 2, A little = 3; 4 = None  Bed>Chair:   1 = Total, A lot = 2, A little = 3; 4 = None  Ambulate in room:  1 = Total, A lot = 2, A little = 3; 4 = None  3-5 Steps with railin  = Total, A lot = 2, A little = 3; 4 = None  Score: 9    Modified [place]: N/A = No pre-op stroke/TIA    Post-Tx Position: Supine with HOB Elevated, Alarms activated and Call light and personal items within reach  Pt's Mom at bedside  PPE: gloves, surgical mask, eyewear protection

## 2021-08-23 NOTE — CASE MANAGEMENT/SOCIAL WORK
Social Work Assessment  [facility]     Patient Name: Leslie Harris  MRN: [id number]  Today's Date: 8/23/2021    Admit Date: 7/22/2021    Discharge Plan     Row Name 08/23/21 1657       Plan    Plan  DC Plan: [facility] subacute accepted pending precert. Precert started 8/23, PENDING. No PASRR required for [facility].    Plan Comments  Received text from liaison (Gregoria) that precert was being initiated 8/23, pending.     Phone communication or documentation only - no physical contact with patient or family.    ARIC Jim    Phone: [phone]  Cell: [phone]  Fax: [phone]  [email or web address]

## 2021-08-23 NOTE — PLAN OF CARE
Goal Outcome Evaluation:     Bed mobility - Mod-A and Assist x 2  Pt initially sitting at eob upon entry to the room.  Mod of one for rolling left to right to left  Transfers - Mod-A, Assist x 2 and with rolling walker  Sit to stand off eob up to roll walker.  Pt stood for about 1 minute. Attempted to stand again and pt was only able to stand partially.    Ambulation - pt unable to take any steps with roll walker  Upper extrem rom.  Pt was instructed for bridging exercises in the bed.        Pt would benefit from Inpatient Rehabilitation placement at discharge from facility and requires no DME at discharge.   Pt desires Inpatient Rehabilitation placement at discharge. Pt cooperative; agreeable to therapeutic recommendations and plan of care.

## 2021-08-23 NOTE — PROGRESS NOTES
Daily Progress Note        Pneumonia due to COVID-19 virus    Acute respiratory failure due to COVID-19 (CMS/[facility])    Class 3 severe obesity without serious comorbidity with body mass index (BMI) of 50.0 to 59.9 in adult    Diabetes mellitus type 2 in obese (CMS/[facility])    MRSA pneumonia (CMS/[facility])    Klebsiella pneumoniae pneumonia (CMS/[facility])    E. coli UTI (urinary tract infection)    Hypertension    Critical illness polyneuropathy (CMS/[facility])    Delirium, acute    Dysphagia      Assessment    Pneumonia due to COVID-19  Acute Respiratory Failure  -Failed NIPPV and required intubation 7/23    Pneumonia  -8/1/21 sputum culture growing Klebsiella pneumoniae    UTI-E.Coli (ID following)    Elevated Liver Enzymes  Hypertension  Diabetes Mellitus  Obesity    ECHO 7/24/21  EF 55-60%    Plan     oxygen wean  -Currently on room air, wears 2 L OI2 at bedtime    Encourage OOB daily to chair   Mucinex  Diuretic-Lasix  Cortef 10 mg BID  Bronchodilator/Inhaled corticosteroid  DVT prophylaxis-Lovenox  Glycemic control  PT/OT    Continue working with therapy and getting stronger and   -pending discharge    Completed Remdesivr 7/26/21         LOS: 32 days     Subjective         Objective     Vital signs for last 24 hours:  Vitals:    08/22/21 1827 08/22/21 1831 08/22/21 2200 08/23/21 0500   BP:   136/79    BP Location:   Right arm    Patient Position:   Lying    Pulse: (!) 126 (!) 122  106   Resp: 24 24 18 12   Temp:   98.5 °F (36.9 °C) 98.6 °F (37 °C)   TempSrc:   Axillary Axillary   SpO2: 93% 100% 92%    Weight:       Height:           Intake/Output last 3 shifts:  I/O last 3 completed shifts:  In: 1920 [P.O.:1920]  Out: 4550 [Urine:4550]  Intake/Output this shift:  I/O this shift:  In: -   Out: 900 [Urine:900]      Radiology  Imaging Results (Last 24 Hours)     ** No results found for the last 24 hours. **          Labs:  Results from last 7 days   Lab Units 08/22/21  0245   WBC 10*3/mm3 7.70   HEMOGLOBIN g/dL 11.9*   HEMATOCRIT % 35.8    PLATELETS 10*3/mm3 248     Results from last 7 days   Lab Units 08/23/21  0435 08/22/21  0245 08/22/21  0245   SODIUM mmol/L 139   < > 138   POTASSIUM mmol/L 4.1   < > 4.3   CHLORIDE mmol/L 100   < > 99   CO2 mmol/L 30.0*   < > 28.0   BUN mg/dL 9   < > 12   CREATININE mg/dL 0.36*   < > 0.36*   CALCIUM mg/dL 9.7   < > 9.5   BILIRUBIN mg/dL  --   --  0.7   ALK PHOS U/L  --   --  91   ALT (SGPT) U/L  --   --  73*   AST (SGOT) U/L  --   --  22   GLUCOSE mg/dL 123*   < > 169*    < > = values in this interval not displayed.         Results from last 7 days   Lab Units 08/22/21  0245 08/19/21  0556 08/18/21  0734   ALBUMIN g/dL 3.80 3.70 3.80             Results from last 7 days   Lab Units 08/23/21  0435   MAGNESIUM mg/dL 2.0                   Meds:   SCHEDULE  aspirin, 325 mg, Oral, Daily  budesonide, 0.5 mg, Nebulization, BID - RT  cholecalciferol, 2,000 Units, Oral, Daily  enoxaparin, 70 mg, Subcutaneous, Q12H  furosemide, 20 mg, Oral, Daily  guaiFENesin, 600 mg, Oral, Q12H  hydrocortisone, 10 mg, Oral, Daily With Lunch  ipratropium-albuterol, 3 mL, Nebulization, 4x Daily - RT  Rangel, 1 packet, Oral, BID  melatonin, 10 mg, Oral, Nightly  metoprolol tartrate, 25 mg, Oral, Q12H  polyethylene glycol, 17 g, Oral, BID  risperiDONE, 0.5 mg, Oral, Nightly  senna-docusate sodium, 2 tablet, Oral, BID  sodium chloride, 10 mL, Intravenous, Q12H  sodium chloride, 10 mL, Intravenous, Q12H      Infusions  Pharmacy to Dose enoxaparin (LOVENOX),       PRNs  •  acetaminophen  •  acetaminophen **OR** acetaminophen  •  aluminum-magnesium hydroxide-simethicone  •  artificial tears  •  benzonatate  •  dextrose  •  dextrose  •  glucagon (human recombinant)  •  hydrALAZINE  •  hydrOXYzine  •  lidocaine  •  lidocaine PF 1%  •  magnesium sulfate **OR** magnesium sulfate in D5W 1g/100mL (PREMIX)  •  methocarbamol  •  nitroglycerin  •  ondansetron **OR** ondansetron  •  pantoprazole  •  Pharmacy to Dose enoxaparin (LOVENOX)  •  potassium  chloride **OR** potassium chloride **OR** potassium chloride  •  potassium phosphate infusion greater than 15 mMoles **OR** potassium phosphate infusion greater than 15 mMoles **OR** potassium phosphate **OR** sodium phosphate IVPB **OR** sodium phosphate IVPB **OR** sodium phosphate IVPB  •  [COMPLETED] Insert peripheral IV **AND** sodium chloride  •  sodium chloride  •  sodium chloride  •  sodium chloride    Physical Exam:  Physical Exam  Vitals reviewed.   Constitutional:       Appearance: She is obese.   Pulmonary:      Breath sounds: Decreased breath sounds present. No wheezing, rhonchi or rales.   Musculoskeletal:      Right lower leg: Edema present.      Left lower leg: Edema present.   Skin:     General: Skin is warm and dry.   Neurological:      Mental Status: She is alert and oriented to person, place, and time.         ROS  Review of Systems   Constitutional: Positive for activity change, appetite change and fatigue.   Neurological: Positive for weakness.         I have reviewed the patients new clinical results    Electronically signed by NATY Evans

## 2021-08-23 NOTE — DISCHARGE SUMMARY
[facility] Medicine Services  DISCHARGE SUMMARY    Patient Name: Leslie Harris  : 1990  MRN: [id number]    Date of Admission: 2021  Date of Discharge:  2021  Condition on dc: stable  Primary Care Physician: Provider, No Known      Presenting Problem:   Acute respiratory failure with hypoxia (CMS/[facility]) [J96.01]  Dyspnea, unspecified type [R06.00]  Acute respiratory failure due to COVID-19 (CMS/[facility]) [U07.1, J96.00]  Pneumonia due to COVID-19 virus [U07.1, J12.82]    Active and Resolved Hospital Problems:  Active Hospital Problems    Diagnosis POA   • Critical illness polyneuropathy (CMS/[facility]) [G62.81] No   • Delirium, acute [R41.0] No   • Dysphagia [R13.10] No   • E. coli UTI (urinary tract infection) [N39.0, B96.20] No   • Klebsiella pneumoniae pneumonia (CMS/[facility]) [J15.0] No   • Diabetes mellitus type 2 in obese (CMS/[facility]) [E11.69, E66.9] Yes   • MRSA pneumonia (CMS/[facility]) [J15.212] No   • Acute respiratory failure due to COVID-19 (CMS/[facility]) [U07.1, J96.00] Yes   • Class 3 severe obesity without serious comorbidity with body mass index (BMI) of 50.0 to 59.9 in adult [E66.01, Z68.43] Not Applicable   • Hypertension [I10] Yes      Resolved Hospital Problems    Diagnosis POA   • **Pneumonia due to COVID-19 virus [U07.1, J12.82] Yes     Shortness of breath-secondary to COVID-19 pneumonia, patient not vaccinated, patient intubated now extubated doing well, patient with development of underlying MRSA pneumonia as well as Klebsiella previously now finishing antibiotics overall improving with significant debility secondary to prolonged immobility, overall resolved  -Wean oxygen as tolerated  -Out of isolation  -Bronchodilators mucolytic's  -Pulmonology monitoring  -Due to prolonged hospital course and signs of critical illness myopathy patient will need inpatient rehab  -Weaning steroids  -Patient on room air intermittently continue aggressive physical therapy measures and out of  bed     COVID-19 pneumonia-patient now on room air out of isolation  -Inflammatory markers  -Off remdesivir, initially on Decadron and then hydrocortisone weaning  -Mucolytic's     MRSA/Klebsiella pneumonia-patient seen by pulmonology  -Finished Rocephin and linezolid  -ID and pulmonology consulting     Vaginal burning-patient reports similar to previous yeast infections  -Diflucan x1     Right arm pain-May have some component of neuropathy after prolonged illness or due to transient external compression of forearm at some point during hospital course  -X-ray and upper extremity Doppler unremarkable  -Monitor daily  -Can discuss neuropathic pain medication if required     Anemia-mild but likely with a chronic inflammatory component from prolonged illness  -Daily CBC     Transaminitis-decreasing from previously noted likely from COVID-19 and possible some component of early fatty liver  -Can monitor every few days while hospitalized     E. coli cystitis-patient finished Rocephin for treatment     Critical illness polyneuropathy-patient on prolonged dose of steroids due to underlying Covid infection  -PT/OT  -Rehab on discharge  -Out of bed     Type 2 diabetes-patient with residual hyperglycemia likely due to steroids, patient appearing well controlled now off steroids  -For patient comfort trying to wean off short acting insulin to avoid multiple injections throughout the day  -Patient has been deferring long-acting insulin for the last few days and blood sugars well controlled able to DC insulin at this time  -Diabetic diet     Dysphagia-after extubation noted but now able to remove NG tube, no further issues  -Monitor with diet  -Started on bowel regimen     Morbid obesity-BMI 50  -Lifestyle modification          Hospital Course     Hospital Course:  Leslie Harris is a 30 y.o. female       Patient had extensive stay of more than a month in the hospital.  Its not possible by all means to include every detail that  the patient had during hospitalization.  The following is a very concise and targeted approach to help understand the patient condition.        History of Present Illness: Leslie Harris is a 30 y.o. morbidly obese female who was diagnosed with COVID-19 on 07/16/2021 at the [facility]. She had not been vaccinated for Covid. She presented to the [facility] ED on 07/22/2021 complaining of shortness of breath. Workup in the ER revealed acute respiratory failure with hypoxia, pneumonia due to COVID-19, hypertension, and hyperglycemia. The patient was placed on Precision Flow with 100% FiO2 and admitted to the ICU for close monitoring and further treatment. The patient was started on Decadron and Remdesivir.     07/23/21:  Patient transitioned AVAPS with Precision Flow with 100% FiO2, but continued to have hypoxia and was intubated.  Right IJ central line inserted.  Dietitian consulted for tube feeds.     07/24/21:  Remains intubated and sedated. Chemically paralyzed due to worsening hypoxemia. On Flolan nebulized.  Prone position.  Started on empiric coverage with ceftriaxone.  Tolerating tube feeds.       07/25/21:  Remains intubated, sedated, and chemically paralyzed.  Antibiotic changed to Zosyn.  Remains prone and on Flolan.  Tolerating tube feeds.         07/26/21:  Remains intubated, sedated, and chemically paralyzed.  Patient placed in supine position.  Remains on Flolan.  Tolerating tube feeds.       07/27/21:  Remains intubated, sedated, and chemically paralyzed.  Patient tolerating supine position.  Remains on Flolan.  Tolerating tube feeds.       07/28/21:  Remains intubated, sedated, and chemically paralyzed.  Patient tolerating supine position.  Remains on Flolan.  Tolerating tube feeds.       07/29/21:  Remains intubated, sedated, and chemically paralyzed.  Sputum culture grew MRSA.  Patient tolerating supine position.  Remains on Flolan; failed wean attempt.  Tolerating tube  feeds.       07/30/21:  Remains intubated, sedated, and chemically paralyzed.  Patient returned to prone position for 16 hours.  Remains on Flolan.  Transitioned to heparin drip.  Tolerating tube feeds.       07/31/21:  Remains intubated, sedated, and chemically paralyzed.  Remains on Flolan.  Diuresis started.  On heparin drip.  Tolerating tube feeds.       08/01/21:  Remains intubated, sedated, and chemically paralyzed.  A-line inserted.  Tolerating supine position.  Remains on heparin drip.  Diuresing well.  Remains on Flolan.  Cardizem drip started for hypertension and tachycardia.  Left radial a-line discontinued.  Right brachial a-line inserted.   Tolerating tube feeds.       08/02/21:  Remains intubated, sedated, and chemically paralyzed. Tolerating supine position.  Remains on Flolan.  Remains on heparin drip.  Labile BP, alternating Cardizem and Levophed.  Fecal management system placed due to high volume liquid stool.  Tolerating tube feeds.       08/03/21:  Remains intubated, sedated, and chemically paralyzed.  Infectious disease consulted for antibiotic management.  Heparin drip discontinued due to blood-tinged sputum.  Weaning Cardizem drip.  Remains on Flolan. Switched to prone position.  Tolerating tube feeds.       08/04/21:  Remains intubated, sedated, and chemically paralyzed.  Sputum culture grew Klebsiella pneumoniae.  FMS removed.  Lovenox restarted.  Weaning Cardizem drip.  Attempted to wean Flolan, but had to be reinitiated.  Tolerating tube feeds.       08/05/21:  Remains intubated, sedated, and chemically paralyzed.  Tolerating tube feeds.       08/06/21:  Remains intubated, sedated, and chemically paralyzed.  In prone position.  Tolerating tube feeds.       08/07/21:  Remains intubated, sedated, and chemically paralyzed.  Tolerating supine position.  Urine culture grew E. coli.  Tolerating tube feeds.       08/08/21:  Remains intubated, sedated, and chemically paralyzed.  Status post  bronchoscopy with multiple mucus plugs removed.  Paralytic weaned off.  Tolerating tube feeds.       08/09/21:  Remains intubated and sedated.  Tolerating tube feeds.       08/10/21:  Patient extubated.  Tolerating tube feeds.       08/11/21:  Remains extubated.  Diagnosed with critical illness polyneuropathy muscle weakness due to paralytics and steroids.  Patient with acute delirium.  Tolerating tube feeds.  SLP consulted for video swallow.     08/12/21:  The patient was downgraded to PCU and the Hospitalist team was consulted for medical management.  Patient started on mechanical soft diet overnight.  She is complaining of some shortness of breath, but maintaining oxygen saturation on 5 liters per high flow nasal cannula.  She reports fatigue and generalized weakness.        8/13     Patient's blood glucose very acceptable  Patient had been comfortable overnight.  She is slightly tachycardic on 5 L of high flow oxygen but sats are above 95 percent  Her white count 12.2  Pharmacy suggesting some changes in the insulin regimen to avoid multiple sticks.  Still feeling weak on the right upper extremity.  She relates that to her prone position in the ICU.  Discussed with the family that this would be helped by physical therapy.        8/14  Patient had been seen by new dietitian yesterday and I will speech therapist as well  She is a 96% saturation on 4 L of high flow nasal cannula  She is afebrile   working on her transfer/discharge planning to [facility] rehab facility.  Patient liver enzymes are fluctuating and mildly elevated  Have a PICC line placed.     8/15  She denies new symptoms today  She is saturating high 90s on 3 L high flow cannula.  De-escalating oxygen support  Asking for FMLA.  For family members  Due to persistent symptoms right upper extremity we will check venous Doppler  Check forearm x-ray as well  Advised regarding elevation and warm compresses.  She has good pulsation right  radial artery.  Nasogastric tube removed    Between 8/15-8/24 2021 patient had slow progressively improvement in her overall condition  Now she does not require oxygen.  Have ability has improved with help of physical therapy  She has improved lower extremity movements and right upper extremity has improved but less prominently.  She still has difficulty with lifting her right upper extremity.  She will definitely need neurologic evaluation within few weeks and may need also orthopedic evaluation if she continues to have trouble with right shoulder pain and right shoulder abduction/flexion.        DISCHARGE Follow Up Recommendations for labs and diagnostics:     She will definitely need neurologic evaluation within few weeks and may need also orthopedic evaluation if she continues to have trouble with right shoulder pain and right shoulder abduction/flexion.  Follow-up with pulmonary in 1 month  Follow-up with PCP 1 week  Continue physical therapy as tolerated      Reasons For Change In Medications and Indications for New Medications:    Patient now on smaller dose of steroids with hydrocortisone.  Consider tapering  She is on prophylactic dose of Lovenox  We will discontinue risperidone on discharge.  May need smaller dose if she has struggled with anxiety or delirium but seems to be stable at this time    Day of Discharge     Vital Signs:  Temp:  [98 °F (36.7 °C)-98.7 °F (37.1 °C)] 98.2 °F (36.8 °C)  Heart Rate:  [106-126] 110  Resp:  [12-24] 18  BP: (129-145)/(71-93) 145/91  Flow (L/min):  [2] 2    Physical Exam:  Physical Exam  Awake alert oriented x3  Improved right upper extremity movement  Much better improved lower extremity movement  No acute distress  Distant S1 and S2 without extra sounds or murmurs  Adequate air entry bilaterally        Pertinent  and/or Most Recent Results     LAB RESULTS:      Lab 08/22/21  0245 08/19/21  0556 08/18/21  0734 08/17/21  0831   WBC 7.70 6.10 6.80 6.90   HEMOGLOBIN 11.9*  12.1 11.8* 11.5*   HEMATOCRIT 35.8 37.1 35.2 34.0   PLATELETS 248 232 247 257   NEUTROS ABS  --  3.90 4.80 5.00   LYMPHS ABS  --  1.50 1.30 1.20   MONOS ABS  --  0.40 0.50 0.50   EOS ABS  --  0.20 0.20 0.20   MCV 89.3 89.9 90.8 90.8   LDH  --   --   --  246*         Lab 08/23/21  0435 08/22/21  0245 08/21/21  0648 08/21/21  0431 08/20/21  0230 08/19/21  0556 08/18/21  0734 08/17/21  0831 08/17/21  0814   SODIUM 139 138 139  --  137 136   < > 138  --    POTASSIUM 4.1 4.3 4.3  --  4.0 4.3   < > 4.1  --    CHLORIDE 100 99 99  --  99 99   < > 101  --    CO2 30.0* 28.0 28.0  --  26.0 26.0   < > 30.0*  --    ANION GAP 9.0 11.0 12.0  --  12.0 11.0   < > 7.0  --    BUN 9 12 14  --  14 13   < > 13  --    CREATININE 0.36* 0.36* 0.37*  --  0.39* 0.40*   < > 0.35*  --    GLUCOSE 123* 169* 111*  --  115* 102*   < > 97  --    CALCIUM 9.7 9.5 9.8  --  9.4 9.6   < > 9.6  --    IONIZED CALCIUM  --   --   --   --   --   --   --  1.29 1.27   MAGNESIUM 2.0 2.2  --  1.9 1.8 1.9   < > 1.9  --     < > = values in this interval not displayed.         Lab 08/22/21 0245 08/19/21  0556 08/18/21  0734 08/17/21  0831   TOTAL PROTEIN 6.6 7.1 7.2 7.0   ALBUMIN 3.80 3.70 3.80 3.70   GLOBULIN 2.8 3.4 3.4 3.3   ALT (SGPT) 73* 115* 117* 128*   AST (SGOT) 22 39* 33* 37*   BILIRUBIN 0.7 0.7 0.7 0.7   ALK PHOS 91 93 96 99                     Brief Urine Lab Results  (Last result in the past 365 days)      Color   Clarity   Blood   Leuk Est   Nitrite   Protein   CREAT   Urine HCG        08/07/21 0351 Red  Comment:  Result checked  Cloudy  Comment:  Result checked  Large (3+) Moderate (2+) Negative 100 mg/dL (2+)             Microbiology Results (last 10 days)     ** No results found for the last 240 hours. **          XR Forearm 2 View Right    Result Date: 8/15/2021  Impression: Impression: 1. Negative forearm exam.  Electronically Signed By-oJ Bartholomew MD On:8/15/2021 4:46 PM This report was finalized on [id number] by  Jo Bartholomew MD.    FL  Video Swallow With Speech Single Contrast    Result Date: 8/13/2021  Impression: For more details, please refer to the speech pathology report.  Electronically Signed By-Ad Marsh MD On:8/13/2021 1:50 PM This report was finalized on [id number] by  Ad Marsh MD.    XR Chest 1 View    Result Date: 8/20/2021  Impression: Cardiomegaly without evidence of acute cardiac decompensation. Overall improvement in the pulmonary infiltrates compared with the last exam.  Electronically Signed By-Bello Busby MD On:8/20/2021 12:10 PM This report was finalized on [id number] by  Bello Busby MD.    XR Chest 1 View    Result Date: 8/9/2021  Impression:  1. Interstitial and alveolar disease changes diffusely within both lungs, in keeping with this patient history of Covid pneumonia. The findings are not thought to be significantly changed compared 1 day prior. 2. Support tubes appear similarly positioned. 3. No visible pneumothorax.  Electronically Signed By-Gisela Kimble MD On:8/9/2021 8:22 AM This report was finalized on [id number] by  Gisela Kimble MD.    XR Chest 1 View    Result Date: 8/8/2021  Impression: Slight improvement in the left lung airspace opacity. Low lung volumes. Right infrahilar airspace opacity persists. Support devices have appropriate position.  Electronically Signed By-Opal Massey MD On:8/8/2021 8:36 AM This report was finalized on [id number] by  Opal Massey MD.    XR Chest 1 View    Result Date: 8/6/2021  Impression: 1. Endotracheal tube and gastric suction tube in expected position. 2. No change in the diffuse bilateral airspace opacities.  Electronically Signed By-Ishaan Caceres MD On:8/6/2021 5:26 PM This report was finalized on [id number] by  Ishaan Caceres MD.    XR Chest 1 View    Result Date: 8/5/2021  Impression:  1. Patchy multifocal airspace opacities concerning for multifocal pneumonia. 2. Persistent elevation of the right hemidiaphragm which is more prominent than  7/28/2021  Electronically Signed By-Girma Weinberg MD On:8/5/2021 8:50 AM This report was finalized on [id number] by  Girma Weinberg MD.    XR Chest 1 View    Result Date: 8/4/2021  Impression: 1. Stable endotracheal tube and right IJ central venous catheter. 2. Multifocal pneumonia not significantly changed from 8/1/2021. 3. No pneumothorax.  Electronically Signed By-Refugio Hansen MD On:8/4/2021 10:33 AM This report was finalized on [id number] by  Refugio Hansen MD.    XR Chest 1 View    Result Date: 8/1/2021  Impression:  1. Improved aeration of both lungs as described. 2. Lines and support tubes appear stable. 3. Borderline cardiomegaly.  Electronically Signed By-Adebayo Watson MD On:8/1/2021 11:56 AM This report was finalized on [id number] by  Adebayo Watson MD.    XR Chest 1 View    Result Date: 7/29/2021  Impression:   1.  No interval tube or line change 2.  Bilateral airspace disease as detailed above   Electronically Signed By-Balta Patricio MD On:7/29/2021 11:30 AM This report was finalized on [id number] by  Balta Patricio MD.    XR Chest 1 View    Result Date: 7/28/2021  Impression: 1.Low lung volumes with increasing airspace opacities throughout both lungs, concerning for multifocal pneumonia. 2.Position of support tubes and lines, as above.  Electronically Signed By-Olive Lugo MD On:7/28/2021 10:57 AM This report was finalized on [id number] by  Olive Lugo MD.    XR Chest 1 View    Result Date: 7/27/2021  Impression:  1. Large left lower lobe pulmonary infiltrate and possibly pleural effusion. 2. Small right hilar and right upper lobe pulmonary infiltrate. 3. Inferior tip of endotracheal tube lies 5 mm by the bhavesh and needs to be pulled upward by approximately 2 or 3 cm  Electronically Signed By-Mu Sanches MD On:7/27/2021 8:34 PM This report was finalized on [id number] by  Mu Sanches MD.    XR Chest 1 View    Result Date: 7/27/2021  Impression:  1. Extensive  interstitial and alveolar disease changes in both lungs, left greater right, not thought to be significantly changed. 2. Support line and tube placement as described. No visible pneumothorax.  Electronically Signed By-Gisela Kimble MD On:7/27/2021 9:16 AM This report was finalized on [id number] by  Gisela Kimble MD.    XR Chest 1 View    Result Date: 7/25/2021  Impression: Suboptimal imaging due to patient's very large body habitus and patient was being ventilated in prone position.  There is some improvement in the peripheral opacities with persistent upper lung opacities which obscure the mediastinal contours, not significant changed. Support lines and tubes are in good position.  Electronically Signed By-Hermilo Ramirez DO On:7/25/2021 11:02 AM This report was finalized on [id number] by  Hermilo Ramirez DO.      Results for orders placed during the hospital encounter of 07/22/21    Duplex Venous Upper Extremity - Right CAR    Interpretation Summary  · Normal right upper extremity venous duplex scan.      Results for orders placed during the hospital encounter of 07/22/21    Duplex Venous Upper Extremity - Right CAR    Interpretation Summary  · Normal right upper extremity venous duplex scan.      Results for orders placed during the hospital encounter of 07/22/21    Adult Transthoracic Echo Limited W/ Cont if Necessary Per Protocol    Interpretation Summary  · Left ventricular systolic function is normal.  · Left ventricular ejection fraction is 55 to 60%      Labs Pending at Discharge:  Pending Labs     Order Current Status    AFB Culture - Wash, Bronchus Preliminary result    Fungus Culture - Wash, Bronchus Preliminary result          Procedures Performed  Procedure(s):  BRONCHOSCOPY with bilateral bronchial washing         Consults:   Consults     Date and Time Order Name Status Description    8/12/2021  6:26 AM Inpatient Hospitalist Consult Completed     8/3/2021  9:43 AM Inpatient Infectious Diseases  Consult Completed             Discharge Details        Discharge Medications      New Medications      Instructions Start Date   aspirin 325 MG tablet   325 mg, Oral, Daily   Start Date: August 24, 2021     benzonatate 100 MG capsule  Commonly known as: TESSALON   100 mg, Oral, 3 Times Daily PRN      cholecalciferol 25 MCG (1000 UT) tablet  Commonly known as: VITAMIN D3   2,000 Units, Oral, Daily   Start Date: August 24, 2021     enoxaparin 80 MG/0.8ML solution syringe  Commonly known as: LOVENOX   40 mg, Subcutaneous, Every 12 Hours Scheduled      furosemide 20 MG tablet  Commonly known as: LASIX   20 mg, Oral, Daily   Start Date: August 24, 2021     guaiFENesin 600 MG 12 hr tablet  Commonly known as: MUCINEX   600 mg, Oral, Every 12 Hours Scheduled      hydrocortisone 10 MG tablet  Commonly known as: CORTEF   10 mg, Oral, Daily With Lunch   Start Date: August 24, 2021     hydrOXYzine 25 MG tablet  Commonly known as: ATARAX   25 mg, Oral, 3 Times Daily PRN      metoprolol tartrate 25 MG tablet  Commonly known as: LOPRESSOR   25 mg, Oral, Every 12 Hours Scheduled      pantoprazole 40 MG EC tablet  Commonly known as: PROTONIX   40 mg, Oral, 2 Times Daily PRN      polyethylene glycol 17 g packet  Commonly known as: MIRALAX   17 g, Oral, 2 Times Daily      sennosides-docusate 8.6-50 MG per tablet  Commonly known as: PERICOLACE   2 tablets, Oral, 2 Times Daily         Continue These Medications      Instructions Start Date   acetaminophen 500 MG tablet  Commonly known as: TYLENOL   500 mg, Oral, Every 6 Hours PRN      famotidine 20 MG tablet  Commonly known as: PEPCID   20 mg, Oral, Daily PRN      methocarbamol 500 MG tablet  Commonly known as: ROBAXIN   500 mg, Oral, 4 Times Daily PRN      ondansetron ODT 4 MG disintegrating tablet  Commonly known as: ZOFRAN-ODT   4 mg, Translingual, Every 6 Hours PRN      promethazine 12.5 MG tablet  Commonly known as: PHENERGAN   12.5 mg, Oral, Every 6 Hours PRN         Stop These  Medications    ibuprofen 200 MG tablet  Commonly known as: ADVIL,MOTRIN            Allergies   Allergen Reactions   • Sulfa Antibiotics Hives         Discharge Disposition:     Rehab Facility or Unit (DC - External)    Diet:  Hospital:  Diet Order   Procedures   • Diet Regular         Discharge Activity:         CODE STATUS:  Code Status and Medical Interventions:   Ordered at: 07/22/21 1423     Code Status:    CPR     Medical Interventions (Level of Support Prior to Arrest):    Full         No future appointments.        Time spent on Discharge including face to face service: 40 minutes    This patient has been examined wearing appropriate Personal Protective Equipment and discussed with hospital infection control department. 08/23/21      Signature: Electronically signed by Juan Arguelles MD, 08/24/21, 12:26 PM EDT.  [facility] Hospitalist Team

## 2021-08-23 NOTE — PROGRESS NOTES
[facility] Medicine Services Daily Progress Note    Patient Name: Leslie Harris  : 1990  MRN: [id number]  Primary Care Physician:  Lesia, No Known  Date of admission: 2021      Subjective      Chief Complaint: Shortness of breath    Patient reports no new concerns.  Had a bowel movement overnight.  No shortness of breath.  Awaiting placement.    Review of Systems   Constitutional: Positive for malaise/fatigue. Negative for chills and fever.   HENT: Negative for hoarse voice and sore throat.    Eyes: Negative for double vision and photophobia.   Cardiovascular: Negative for chest pain and syncope.   Respiratory: Negative for cough and shortness of breath.    Musculoskeletal: Positive for myalgias. Negative for muscle weakness.   Gastrointestinal: Negative for nausea and vomiting.   Genitourinary: Negative for genital sores and pelvic pain.   Neurological: Positive for paresthesias and weakness. Negative for dizziness.   Psychiatric/Behavioral: Negative for altered mental status. The patient is not nervous/anxious.          Objective      Vitals:   Temp:  [98 °F (36.7 °C)-98.7 °F (37.1 °C)] 98.7 °F (37.1 °C)  Heart Rate:  [106-126] 117  Resp:  [12-24] 16  BP: (129-142)/(71-93) 142/93  Flow (L/min):  [2] 2    Physical Exam      General: Morbidly obese female lying in bed breathing company on 2 L via nasal cannula no acute distress  HEENT: NC/AT, EOMI, mucosa moist  Heart: Regular, rate controlled  Chest: Normal work of breathing, moving air well no wheezing  Abdominal: Soft. NT/ND.   Musculoskeletal: Normal ROM.  No cyanosis. No calf tenderness.  Neurological: AAOx3, no focal deficits  Skin: Skin is warm and dry. No rash  Psychiatric: Normal mood and affect.      Result Review    Result Review:  I have personally reviewed the results from the time of this admission to 2021 10:52 EDT and agree with these findings:  [x]  Laboratory  [x]  Microbiology  [x]  Radiology  [x]   EKG/Telemetry   []  Cardiology/Vascular   []  Pathology  []  Old records  []  Other:  Most notable findings include: Hyperglycemia       Wounds (last 24 hours)      LDA Wound     Row Name 08/23/21 0800 08/23/21 0502 08/23/21 0018       Wound 07/27/21 2028 medial sacral spine Pressure Injury    Wound - Properties Group Placement Date: 07/27/21  -MF Placement Time: 2028  -MF Present on Hospital Admission: N  -MF Orientation: medial  -MF Location: sacral spine  -MF Primary Wound Type: Pressure inj  -MF Stage, Pressure Injury : Stage 2  -MF    Closure  Open to air  -ER  Open to air  -EM  Open to air  -EM    Base  pink  -ER  pink  -EM  pink  -EM    Periwound  pink  -ER  pink  -EM  pink  -EM    Periwound Temperature  warm  -ER  warm  -EM  warm  -EM    Edges  open  -ER  open  -EM  open  -EM    Retired Wound - Properties Group Date first assessed: 07/27/21  -MF Time first assessed: 2028  -MF Present on Hospital Admission: N  -MF Location: sacral spine  -MF Primary Wound Type: Pressure inj  -MF       Wound 08/21/21 1709 Right distal thigh Other (comment)    Wound - Properties Group Placement Date: 08/21/21  -JE Placement Time: 1709 -JE Side: Right  -JE Orientation: distal  -JE Location: thigh  -JE Primary Wound Type: Other  -JE, abrasion     Retired Wound - Properties Group Date first assessed: 08/21/21  -JE Time first assessed: 1709  -JE Side: Right  -JE Location: thigh  -JE Primary Wound Type: Other  -JE, abrasion     Row Name 08/22/21 2036 08/22/21 1600 08/22/21 1200       Wound 07/27/21 2028 medial sacral spine Pressure Injury    Wound - Properties Group Placement Date: 07/27/21  -MF Placement Time: 2028  -MF Present on Hospital Admission: N  -MF Orientation: medial  -MF Location: sacral spine  -MF Primary Wound Type: Pressure inj  -MF Stage, Pressure Injury : Stage 2  -MF    Closure  Open to air  -EM  Open to air  -ER  Open to air  -ER    Base  pink  -EM  pink  -ER  pink  -ER    Periwound  pink  -EM  pink  -ER  pink   -ER    Periwound Temperature  warm  -EM  warm  -ER  warm  -ER    Edges  open  -EM  open  -ER  open  -ER    Retired Wound - Properties Group Date first assessed: 07/27/21  -MF Time first assessed: 2028  -MF Present on Hospital Admission: N  -MF Location: sacral spine  -MF Primary Wound Type: Pressure inj  -MF       Wound 08/21/21 1709 Right distal thigh Other (comment)    Wound - Properties Group Placement Date: 08/21/21  -JE Placement Time: 1709  -JE Side: Right  -JE Orientation: distal  -JE Location: thigh  -JE Primary Wound Type: Other  -JE, abrasion     Retired Wound - Properties Group Date first assessed: 08/21/21  -JE Time first assessed: 1709  -JE Side: Right  -JE Location: thigh  -JE Primary Wound Type: Other  -JE, abrasion       User Key  (r) = Recorded By, (t) = Taken By, (c) = Cosigned By    Initials Name Provider Type    Blanca Scott RN Registered Nurse    Dafne Menchaca RN Registered Nurse     Ольга Carver RN Registered Nurse    Dafne Arredondo RN Registered Nurse            Assessment/Plan      Brief Patient Summary:    Leslie Harris is a 30 y.o. morbidly obese female who was diagnosed with COVID-19 on 07/16/2021 at the [facility]. She had not been vaccinated for Covid. She presented to the [facility] ED on 07/22/2021 complaining of shortness of breath. Workup in the ER revealed acute respiratory failure with hypoxia, pneumonia due to COVID-19, hypertension, and hyperglycemia. The patient was placed on Precision Flow with 100% FiO2 and admitted to the ICU for close monitoring and further treatment. The patient was started on Decadron and Remdesivir.     07/23/21:  Patient transitioned AVAPS with Precision Flow with 100% FiO2, but continued to have hypoxia and was intubated.  Right IJ central line inserted.  Dietitian consulted for tube feeds.     07/24/21:  Remains intubated and sedated. Chemically paralyzed due to worsening hypoxemia.  On Flolan nebulized.  Prone position.  Started on empiric coverage with ceftriaxone.  Tolerating tube feeds.       07/25/21:  Remains intubated, sedated, and chemically paralyzed.  Antibiotic changed to Zosyn.  Remains prone and on Flolan.  Tolerating tube feeds.         07/26/21:  Remains intubated, sedated, and chemically paralyzed.  Patient placed in supine position.  Remains on Flolan.  Tolerating tube feeds.       07/27/21:  Remains intubated, sedated, and chemically paralyzed.  Patient tolerating supine position.  Remains on Flolan.  Tolerating tube feeds.       07/28/21:  Remains intubated, sedated, and chemically paralyzed.  Patient tolerating supine position.  Remains on Flolan.  Tolerating tube feeds.       07/29/21:  Remains intubated, sedated, and chemically paralyzed.  Sputum culture grew MRSA.  Patient tolerating supine position.  Remains on Flolan; failed wean attempt.  Tolerating tube feeds.       07/30/21:  Remains intubated, sedated, and chemically paralyzed.  Patient returned to prone position for 16 hours.  Remains on Flolan.  Transitioned to heparin drip.  Tolerating tube feeds.       07/31/21:  Remains intubated, sedated, and chemically paralyzed.  Remains on Flolan.  Diuresis started.  On heparin drip.  Tolerating tube feeds.       08/01/21:  Remains intubated, sedated, and chemically paralyzed.  A-line inserted.  Tolerating supine position.  Remains on heparin drip.  Diuresing well.  Remains on Flolan.  Cardizem drip started for hypertension and tachycardia.  Left radial a-line discontinued.  Right brachial a-line inserted.   Tolerating tube feeds.       08/02/21:  Remains intubated, sedated, and chemically paralyzed. Tolerating supine position.  Remains on Flolan.  Remains on heparin drip.  Labile BP, alternating Cardizem and Levophed.  Fecal management system placed due to high volume liquid stool.  Tolerating tube feeds.       08/03/21:  Remains intubated, sedated, and chemically  paralyzed.  Infectious disease consulted for antibiotic management.  Heparin drip discontinued due to blood-tinged sputum.  Weaning Cardizem drip.  Remains on Flolan. Switched to prone position.  Tolerating tube feeds.       08/04/21:  Remains intubated, sedated, and chemically paralyzed.  Sputum culture grew Klebsiella pneumoniae.  FMS removed.  Lovenox restarted.  Weaning Cardizem drip.  Attempted to wean Flolan, but had to be reinitiated.  Tolerating tube feeds.       08/05/21:  Remains intubated, sedated, and chemically paralyzed.  Tolerating tube feeds.       08/06/21:  Remains intubated, sedated, and chemically paralyzed.  In prone position.  Tolerating tube feeds.       08/07/21:  Remains intubated, sedated, and chemically paralyzed.  Tolerating supine position.  Urine culture grew E. coli.  Tolerating tube feeds.       08/08/21:  Remains intubated, sedated, and chemically paralyzed.  Status post bronchoscopy with multiple mucus plugs removed.  Paralytic weaned off.  Tolerating tube feeds.       08/09/21:  Remains intubated and sedated.  Tolerating tube feeds.       08/10/21:  Patient extubated.  Tolerating tube feeds.       08/11/21:  Remains extubated.  Diagnosed with critical illness polyneuropathy muscle weakness due to paralytics and steroids.  Patient with acute delirium.  Tolerating tube feeds.  SLP consulted for video swallow.     08/12/21:  The patient was downgraded to PCU and the Hospitalist team was consulted for medical management.  Patient started on mechanical soft diet overnight.  She is complaining of some shortness of breath, but maintaining oxygen saturation on 5 liters per high flow nasal cannula.  She reports fatigue and generalized weakness.        8/13     Patient's blood glucose very acceptable  Patient had been comfortable overnight.  She is slightly tachycardic on 5 L of high flow oxygen but sats are above 95 percent  Her white count 12.2  Pharmacy suggesting some changes in the  insulin regimen to avoid multiple sticks.  Still feeling weak on the right upper extremity.  She relates that to her prone position in the ICU.  Discussed with the family that this would be helped by physical therapy.     8/14  Patient had been seen by new dietitian yesterday and I will speech therapist as well  She is a 96% saturation on 4 L of high flow nasal cannula  She is afebrile   working on her transfer/discharge planning to [facility] facility.  Patient liver enzymes are fluctuating and mildly elevated  Have a PICC line placed.     8/15  She denies new symptoms today  She is saturating high 90s on 3 L high flow cannula.  De-escalating oxygen support  Asking for FMLA.  For family members  Due to persistent symptoms right upper extremity we will check venous Doppler  Check forearm x-ray as well  Advised regarding elevation and warm compresses.  She has good pulsation right radial artery.  Nasogastric tube removed    8/16/2021: Patient Reports her right arm pain is improving.  Discussed that upper extremity Doppler negative for any DVT.  Patient's breathing improving but still requiring some supplemental oxygen.  Patient reports she has some vaginal burning today which she reports similar to when she has a yeast infection.  Awaiting placement.  Doing well after NG tube removed.    August 17, 2021: Patient continues to wean oxygen.  Yesterday attempt to get patient out of bed aborted due to transient tachycardia.  Patient reports no new symptoms.  Patient needs to be mobilized working to get patient out of bed today.    8/18/2021: Patient reports overall doing well.  Patient out of bed yesterday worked with physical therapy and currently on room air.  Patient concerned about how much insulin she is getting given her relatively controlled glycemic state.  Discussed that we would try to wean what medications we could given patient's clinical improvement.  Awaiting placement.    8/19/2021:  Patient reports overall doing well.  Patient continues to be motivated and working with PT/OT.  Patient has deferred Lantus for the last few days and blood sugars have been between 100s to 120s will be able to DC insulin at this time.  Continue continuing to try to find placement.  Patient asking for bowel regimen.    8/20/2021: No new issues.  No bowel movement yet.  Still awaiting placement.    2021-08-21: No new issues.  No shortness of breath.  Awaiting placement.  Patient has not had a bowel movement in multiple days, adding sorbitol.      8/23  Patient is much better today  She is up with physical therapy  Waiting on placement    aspirin, 325 mg, Oral, Daily  budesonide, 0.5 mg, Nebulization, BID - RT  cholecalciferol, 2,000 Units, Oral, Daily  enoxaparin, 70 mg, Subcutaneous, Q12H  furosemide, 20 mg, Oral, Daily  guaiFENesin, 600 mg, Oral, Q12H  hydrocortisone, 10 mg, Oral, Daily With Lunch  ipratropium-albuterol, 3 mL, Nebulization, 4x Daily - RT  Rangel, 1 packet, Oral, BID  melatonin, 10 mg, Oral, Nightly  metoprolol tartrate, 25 mg, Oral, Q12H  polyethylene glycol, 17 g, Oral, BID  risperiDONE, 0.5 mg, Oral, Nightly  senna-docusate sodium, 2 tablet, Oral, BID  sodium chloride, 10 mL, Intravenous, Q12H  sodium chloride, 10 mL, Intravenous, Q12H       Pharmacy to Dose enoxaparin (LOVENOX),          Active Hospital Problems:  Active Hospital Problems    Diagnosis    • **Pneumonia due to COVID-19 virus    • Critical illness polyneuropathy (CMS/[facility])    • Delirium, acute    • Dysphagia    • E. coli UTI (urinary tract infection)    • Klebsiella pneumoniae pneumonia (CMS/[facility])    • Diabetes mellitus type 2 in obese (CMS/[facility])    • MRSA pneumonia (CMS/[facility])    • Acute respiratory failure due to COVID-19 (CMS/[facility])    • Class 3 severe obesity without serious comorbidity with body mass index (BMI) of 50.0 to 59.9 in adult    • Hypertension      Plan:     Shortness of breath-secondary to COVID-19 pneumonia, patient not  vaccinated, patient intubated now extubated doing well, patient with development of underlying MRSA pneumonia as well as Klebsiella previously now finishing antibiotics overall improving with significant debility secondary to prolonged immobility, overall resolved  -Wean oxygen as tolerated  -Out of isolation  -Bronchodilators mucolytic's  -Pulmonology monitoring  -Due to prolonged hospital course and signs of critical illness myopathy patient will need inpatient rehab  -Weaning steroids  -Patient on room air intermittently continue aggressive physical therapy measures and out of bed    COVID-19 pneumonia-patient now on room air out of isolation  -Inflammatory markers  -Off remdesivir, initially on Decadron and then hydrocortisone weaning  -Mucolytic's    MRSA/Klebsiella pneumonia-patient seen by pulmonology  -Finished Rocephin and linezolid  -ID and pulmonology consulting    Vaginal burning-patient reports similar to previous yeast infections  -Diflucan x1    Right arm pain-May have some component of neuropathy after prolonged illness or due to transient external compression of forearm at some point during hospital course  -X-ray and upper extremity Doppler unremarkable  -Monitor daily  -Can discuss neuropathic pain medication if required    Anemia-mild but likely with a chronic inflammatory component from prolonged illness  -Daily CBC    Transaminitis-decreasing from previously noted likely from COVID-19 and possible some component of early fatty liver  -Can monitor every few days while hospitalized    E. coli cystitis-patient finished Rocephin for treatment    Critical illness polyneuropathy-patient on prolonged dose of steroids due to underlying Covid infection  -PT/OT  -Rehab on discharge  -Out of bed    Type 2 diabetes-patient with residual hyperglycemia likely due to steroids, patient appearing well controlled now off steroids  -For patient comfort trying to wean off short acting insulin to avoid multiple  injections throughout the day  -Patient has been deferring long-acting insulin for the last few days and blood sugars well controlled able to DC insulin at this time  -Diabetic diet    Dysphagia-after extubation noted but now able to remove NG tube, no further issues  -Monitor with diet  -Started on bowel regimen    Morbid obesity-BMI 50  -Lifestyle modification        DVT prophylaxis:  Medical DVT prophylaxis orders are present.    CODE STATUS:    Code Status: CPR  Medical Interventions (Level of Support Prior to Arrest): Full      Disposition:  I expect patient to be discharged in when she has placement.  Patient is young and motivated will likely need less than 30 days of rehab and is an excellent candidate for rapid improvement.    This patient has been examined wearing appropriate Personal Protective Equipment and discussed with hospital infection control department. 08/23/21      Electronically signed by Juan Arguelles MD, 08/23/21, 10:52 EDT.  [facility] Hospitalist Team

## 2021-08-23 NOTE — THERAPY TREATMENT NOTE
Subjective: Pt agreeable to therapeutic plan of care.    Objective:     Bed Mobility: Mod-A and Assist x 2  Functional Transfers: Max-A, Assist x 2 and with rolling walker  Functional Ambulation: N/A or Not attempted.    Feeding: Supervision  ADL Position: sitting up in bed  ADL Comments: Pt reports she was able to feed herself breakfast using LUE.  Continues to have severe pain and weakness in R shoulder/elbow.     Pain: 0 VAS  Education: Provided education on importance of mobility and skilled verbal / tactile cueing throughout intervention.     Assessment: Leslie Harris presents with ADL impairments below baseline abilities which indicate the need for continued skilled intervention while inpatient. Continues to demo weakness in BUE causing difficulty with ADLs, though improving.  She tolerated standing well, though HR elevated to 160 bpm and required extended rest break to improve to the 120s.  Tolerating session today without incident. Will continue to follow and progress as tolerated.     Plan/Recommendations:   Pt would benefit from Inpatient Rehabilitation placement at discharge from facility.   Pt desires Inpatient Rehabilitation placement at discharge. Pt cooperative; agreeable to therapeutic recommendations and plan of care.     Modified [place]: 4 = Moderately severe disability (Unable to attend to own bodily needs without assistance, and unable to walk unassisted)     Post-Tx Position: Supine with HOB Elevated, Alarms activated and Call light and personal items within reach  PPE: gloves, surgical mask, eyewear protection

## 2021-08-23 NOTE — PLAN OF CARE
Goal Outcome Evaluation:      Pt remains extremely weak, but is eager and willing to work with PT. Pt eager to go to [facility], and wants to get stronger. Complaint with turning Q2 hrs

## 2021-08-24 VITALS
DIASTOLIC BLOOD PRESSURE: 56 MMHG | HEART RATE: 117 BPM | BODY MASS INDEX: 47.09 KG/M2 | TEMPERATURE: 98.6 F | RESPIRATION RATE: 16 BRPM | SYSTOLIC BLOOD PRESSURE: 128 MMHG | OXYGEN SATURATION: 96 % | HEIGHT: 66 IN | WEIGHT: 293 LBS

## 2021-08-24 PROBLEM — U07.1 ACUTE RESPIRATORY FAILURE DUE TO COVID-19: Status: RESOLVED | Noted: 2021-07-22 | Resolved: 2021-08-24

## 2021-08-24 PROBLEM — J96.00 ACUTE RESPIRATORY FAILURE DUE TO COVID-19: Status: RESOLVED | Noted: 2021-07-22 | Resolved: 2021-08-24

## 2021-08-24 LAB
GLUCOSE BLDC GLUCOMTR-MCNC: 106 MG/DL (ref 70–105)
GLUCOSE BLDC GLUCOMTR-MCNC: 117 MG/DL (ref 70–105)
GLUCOSE BLDC GLUCOMTR-MCNC: 125 MG/DL (ref 70–105)

## 2021-08-24 PROCEDURE — 97110 THERAPEUTIC EXERCISES: CPT

## 2021-08-24 PROCEDURE — 97530 THERAPEUTIC ACTIVITIES: CPT

## 2021-08-24 PROCEDURE — 99239 HOSP IP/OBS DSCHRG MGMT >30: CPT | Performed by: INTERNAL MEDICINE

## 2021-08-24 PROCEDURE — 97535 SELF CARE MNGMENT TRAINING: CPT

## 2021-08-24 PROCEDURE — 25010000002 ENOXAPARIN PER 10 MG: Performed by: INTERNAL MEDICINE

## 2021-08-24 PROCEDURE — 82962 GLUCOSE BLOOD TEST: CPT

## 2021-08-24 RX ORDER — IPRATROPIUM BROMIDE AND ALBUTEROL SULFATE 2.5; .5 MG/3ML; MG/3ML
3 SOLUTION RESPIRATORY (INHALATION) EVERY 4 HOURS PRN
Status: DISCONTINUED | OUTPATIENT
Start: 2021-08-24 | End: 2021-08-24 | Stop reason: HOSPADM

## 2021-08-24 RX ADMIN — FUROSEMIDE 20 MG: 20 TABLET ORAL at 08:09

## 2021-08-24 RX ADMIN — DOCUSATE SODIUM 50 MG AND SENNOSIDES 8.6 MG 2 TABLET: 8.6; 5 TABLET, FILM COATED ORAL at 08:08

## 2021-08-24 RX ADMIN — METOPROLOL TARTRATE 25 MG: 25 TABLET, FILM COATED ORAL at 08:09

## 2021-08-24 RX ADMIN — HYDROCORTISONE 10 MG: 10 TABLET ORAL at 12:18

## 2021-08-24 RX ADMIN — ENOXAPARIN SODIUM 70 MG: 80 INJECTION, SOLUTION INTRAVENOUS; SUBCUTANEOUS at 08:10

## 2021-08-24 RX ADMIN — ASPIRIN 325 MG ORAL TABLET 325 MG: 325 PILL ORAL at 08:09

## 2021-08-24 RX ADMIN — POLYETHYLENE GLYCOL 3350 17 G: 17 POWDER, FOR SOLUTION ORAL at 08:08

## 2021-08-24 RX ADMIN — Medication 2000 UNITS: at 10:16

## 2021-08-24 RX ADMIN — GUAIFENESIN 600 MG: 600 TABLET, EXTENDED RELEASE ORAL at 08:09

## 2021-08-24 RX ADMIN — Medication 10 ML: at 08:09

## 2021-08-24 NOTE — CASE MANAGEMENT/SOCIAL WORK
Social Work Assessment  [facility]     Patient Name: Leslie Harris  MRN: [id number]  Today's Date: 8/24/2021    Admit Date: 7/22/2021    Discharge Plan     Row Name 08/24/21 0900       Plan    Plan  [facility] subacute accepted at d/c, bed available 8/24. Precert approved 8/24. No PASRR required for [facility]. Pharm updated to [facility] for [facility].    Plan Comments  Notified by liaison (Gregoria) that precert was approved 8/24, bed is ready. Secure chat to MD/RN/CM regarding discharge plan.     Phone communication or documentation only - no physical contact with patient or family.  ARIC Jim    Phone: [phone]  Cell: [phone]  Fax: [phone]  [email or web address]

## 2021-08-24 NOTE — PROGRESS NOTES
Daily Progress Note        Acute respiratory failure due to COVID-19 (CMS/[facility])    Class 3 severe obesity without serious comorbidity with body mass index (BMI) of 50.0 to 59.9 in adult    Diabetes mellitus type 2 in obese (CMS/[facility])    MRSA pneumonia (CMS/[facility])    Klebsiella pneumoniae pneumonia (CMS/[facility])    E. coli UTI (urinary tract infection)    Hypertension    Critical illness polyneuropathy (CMS/[facility])    Delirium, acute    Dysphagia      Assessment    Pneumonia due to COVID-19  Acute Respiratory Failure  -Failed NIPPV and required intubation 7/23    Pneumonia  -8/1/21 sputum culture growing Klebsiella pneumoniae    UTI-E.Coli (ID following)    Elevated Liver Enzymes  Hypertension  Diabetes Mellitus  Obesity    ECHO 7/24/21  EF 55-60%    Plan    Currently on room air, wears 2 L OI2 at bedtime    Encourage OOB daily to chair   Mucinex  Diuretic-Lasix  Cortef 10 mg BID    DVT prophylaxis-Lovenox  Glycemic control  PT/OT  Completed Remdesivr 7/26/21         LOS: 33 days     Subjective         Objective     Vital signs for last 24 hours:  Vitals:    08/23/21 1900 08/23/21 2252 08/24/21 0301 08/24/21 0553   BP:  134/83 121/73 129/87   BP Location:  Right arm Right arm Right arm   Patient Position:  Lying Lying Lying   Pulse: (!) 128 111 105 105   Resp:  20 20 20   Temp:  98.3 °F (36.8 °C) 98.1 °F (36.7 °C) 98 °F (36.7 °C)   TempSrc:  Oral Oral Oral   SpO2: 95% 95% 94% 94%   Weight:    (!) 150 kg (331 lb 9.2 oz)   Height:           Intake/Output last 3 shifts:  I/O last 3 completed shifts:  In: 1440 [P.O.:1440]  Out: 3700 [Urine:3700]  Intake/Output this shift:  No intake/output data recorded.      Radiology  Imaging Results (Last 24 Hours)     ** No results found for the last 24 hours. **          Labs:  Results from last 7 days   Lab Units 08/22/21  0245   WBC 10*3/mm3 7.70   HEMOGLOBIN g/dL 11.9*   HEMATOCRIT % 35.8   PLATELETS 10*3/mm3 248     Results from last 7 days   Lab Units 08/23/21  0435 08/22/21  0245  08/22/21  0245   SODIUM mmol/L 139   < > 138   POTASSIUM mmol/L 4.1   < > 4.3   CHLORIDE mmol/L 100   < > 99   CO2 mmol/L 30.0*   < > 28.0   BUN mg/dL 9   < > 12   CREATININE mg/dL 0.36*   < > 0.36*   CALCIUM mg/dL 9.7   < > 9.5   BILIRUBIN mg/dL  --   --  0.7   ALK PHOS U/L  --   --  91   ALT (SGPT) U/L  --   --  73*   AST (SGOT) U/L  --   --  22   GLUCOSE mg/dL 123*   < > 169*    < > = values in this interval not displayed.         Results from last 7 days   Lab Units 08/22/21  0245 08/19/21  0556 08/18/21  0734   ALBUMIN g/dL 3.80 3.70 3.80             Results from last 7 days   Lab Units 08/23/21  0435   MAGNESIUM mg/dL 2.0                   Meds:   SCHEDULE  aspirin, 325 mg, Oral, Daily  budesonide, 0.5 mg, Nebulization, BID - RT  cholecalciferol, 2,000 Units, Oral, Daily  enoxaparin, 70 mg, Subcutaneous, Q12H  furosemide, 20 mg, Oral, Daily  guaiFENesin, 600 mg, Oral, Q12H  hydrocortisone, 10 mg, Oral, Daily With Lunch  ipratropium-albuterol, 3 mL, Nebulization, 4x Daily - RT  Rangel, 1 packet, Oral, BID  melatonin, 10 mg, Oral, Nightly  metoprolol tartrate, 25 mg, Oral, Q12H  polyethylene glycol, 17 g, Oral, BID  risperiDONE, 0.5 mg, Oral, Nightly  senna-docusate sodium, 2 tablet, Oral, BID  sodium chloride, 10 mL, Intravenous, Q12H  sodium chloride, 10 mL, Intravenous, Q12H      Infusions  Pharmacy to Dose enoxaparin (LOVENOX),       PRNs  •  acetaminophen  •  acetaminophen **OR** acetaminophen  •  aluminum-magnesium hydroxide-simethicone  •  artificial tears  •  benzonatate  •  dextrose  •  dextrose  •  glucagon (human recombinant)  •  hydrALAZINE  •  hydrOXYzine  •  lidocaine  •  lidocaine PF 1%  •  magnesium sulfate **OR** magnesium sulfate in D5W 1g/100mL (PREMIX)  •  methocarbamol  •  nitroglycerin  •  ondansetron **OR** ondansetron  •  pantoprazole  •  Pharmacy to Dose enoxaparin (LOVENOX)  •  potassium chloride **OR** potassium chloride **OR** potassium chloride  •  potassium phosphate infusion greater  than 15 mMoles **OR** potassium phosphate infusion greater than 15 mMoles **OR** potassium phosphate **OR** sodium phosphate IVPB **OR** sodium phosphate IVPB **OR** sodium phosphate IVPB  •  [COMPLETED] Insert peripheral IV **AND** sodium chloride  •  sodium chloride  •  sodium chloride  •  sodium chloride    Physical Exam:  Physical Exam  Vitals reviewed.   Constitutional:       Appearance: She is obese.   Pulmonary:      Breath sounds: Decreased breath sounds present. No wheezing, rhonchi or rales.   Musculoskeletal:      Right lower leg: Edema present.      Left lower leg: Edema present.   Skin:     General: Skin is warm and dry.   Neurological:      Mental Status: She is alert and oriented to person, place, and time.         ROS  Review of Systems   Constitutional: Positive for activity change, appetite change and fatigue.   Neurological: Positive for weakness.

## 2021-08-24 NOTE — DISCHARGE INSTRUCTIONS
[facility]uan [facility] rosas neurology and possibly ortho for rt ue weakness for ? Rt shoulder pain and ? mononeuropathy

## 2021-08-24 NOTE — SIGNIFICANT NOTE
Pt Dc'd to [facility] via ambulance. Pt's mother at bedside at time of departure. Report given to Rosalva at [facility]. All belongings sent with mother.

## 2021-08-24 NOTE — PLAN OF CARE
Goal Outcome Evaluation:  Plan of Care Reviewed With: patient            Patient rested well all night without concerns.

## 2021-08-24 NOTE — THERAPY TREATMENT NOTE
Subjective: Pt agreeable to therapeutic plan of care. Pt excited about standing again.  Reports she has been able to sit at eob and eat breakfast and lunch w/ LUE.    Objective:     Bed mobility - Mod-A and Assist x 2; able to sit eob independently w/ good balance in midline x 3min at minimum.   Transfers - Mod-A, Assist x 2 and with rolling walker; pulls on rw to stand; good forward wt shift; maintained standing x 2.5 min, 5 min rest in sitting, then again standing x 1.5 min. Able to wt shift laterally in standing in prep for stepping. RLE begins to spasm w/ fatigue.  Ambulation - 0 feet N/A or Not attempted.    Pain: 0 VAS  Education: Provided education on importance of mobility and skilled verbal / tactile cueing throughout intervention.     Assessment: Leslie Harris continues to show marked improvement. She can now come to stand w/ mod assist of 2 and can maintain standing for 2.5 min. She can laterally wt shift in standing as well. She maintains standing w/ min assist of 2 at rw. She presents with functional mobility impairments which indicate the need for skilled intervention. Tolerating session today without incident. Will continue to follow and progress as tolerated.     Plan/Recommendations:   Pt would benefit from Inpatient Rehabilitation placement at discharge from facility and requires no DME at discharge.   Pt desires Inpatient Rehabilitation placement at discharge. Pt cooperative; agreeable to therapeutic recommendations and plan of care.     Basic Mobility 6-click:  Rollin = Total, A lot = 2, A little = 3; 4 = None  Supine>Sit:   1 = Total, A lot = 2, A little = 3; 4 = None   Sit>Stand with arms:  1 = Total, A lot = 2, A little = 3; 4 = None  Bed>Chair:   1 = Total, A lot = 2, A little = 3; 4 = None  Ambulate in room:  1 = Total, A lot = 2, A little = 3; 4 = None  3-5 Steps with railin = Total, A lot = 2, A little = 3; 4 = None  Score: 9    Modified [place]: N/A = No pre-op  stroke/TIA    Post-Tx Position: Supine with HOB Elevated, Alarms activated and Call light and personal items within reach; mother at bedside  PPE: gloves, surgical mask, eyewear protection

## 2021-08-24 NOTE — PLAN OF CARE
Goal Outcome Evaluation:         Assessment: Leslie Harris continues to show marked improvement. She can now come to stand w/ mod assist of 2 and can maintain standing for 2.5 min. She can laterally wt shift in standing as well. She maintains standing w/ min assist of 2 at rw. She presents with functional mobility impairments which indicate the need for skilled intervention. Tolerating session today without incident. Will continue to follow and progress as tolerated.     Plan/Recommendations:   Pt would benefit from Inpatient Rehabilitation placement at discharge from facility and requires no DME at discharge.   Pt desires Inpatient Rehabilitation placement at discharge. Pt cooperative; agreeable to therapeutic recommendations and plan of care.

## 2021-08-24 NOTE — CASE MANAGEMENT/SOCIAL WORK
Continued Stay Note   [facility]     Patient Name: Leslie Harris  MRN: [id number]  Today's Date: 8/24/2021    Admit Date: 7/22/2021    Discharge Plan     Row Name 08/24/21 1326       Plan    Final Discharge Disposition Code  03 - skilled nursing facility (SNF)    Final Note  [facility] subacute        Expected Discharge Date and Time     Expected Discharge Date Expected Discharge Time    Aug 23, 2021         Phone communication or documentation only - no physical contact with patient or family.      Amelie Harmon RN

## 2021-08-24 NOTE — PLAN OF CARE
Goal Outcome Evaluation:                Assessment: Leslie Harris presents with ADL impairments below baseline abilities which indicate the need for continued skilled intervention while inpatient. Pt able to tolerate standing for 3 mins first attempt and 1:30 second attempt.  She reports trembling in BLE.  Pt completed AAROM of R shoulder and elbow with min pain reported.  Tolerating session today without incident. Will continue to follow and progress as tolerated.     Plan/Recommendations:   Pt would benefit from Inpatient Rehabilitation placement at discharge from facility.   Pt desires Inpatient Rehabilitation placement at discharge. Pt cooperative; agreeable to therapeutic recommendations and plan of care.

## 2021-08-31 LAB — FUNGUS WND CULT: ABNORMAL

## 2021-09-19 LAB
MYCOBACTERIUM SPEC CULT: NORMAL
NIGHT BLUE STAIN TISS: NORMAL

## 2021-09-29 ENCOUNTER — OFFICE VISIT (OUTPATIENT)
Dept: FAMILY MEDICINE CLINIC | Facility: CLINIC | Age: 31
End: 2021-09-29

## 2021-09-29 VITALS
SYSTOLIC BLOOD PRESSURE: 119 MMHG | HEIGHT: 66 IN | WEIGHT: 293 LBS | BODY MASS INDEX: 47.09 KG/M2 | TEMPERATURE: 98 F | DIASTOLIC BLOOD PRESSURE: 91 MMHG | HEART RATE: 112 BPM | OXYGEN SATURATION: 97 %

## 2021-09-29 DIAGNOSIS — E13.9 OTHER SPECIFIED DIABETES MELLITUS WITHOUT COMPLICATION, WITHOUT LONG-TERM CURRENT USE OF INSULIN (HCC): ICD-10-CM

## 2021-09-29 DIAGNOSIS — Z87.898 HISTORY OF PREDIABETES: Primary | ICD-10-CM

## 2021-09-29 DIAGNOSIS — G93.31 POSTVIRAL FATIGUE SYNDROME: ICD-10-CM

## 2021-09-29 DIAGNOSIS — M25.511 CHRONIC RIGHT SHOULDER PAIN: ICD-10-CM

## 2021-09-29 DIAGNOSIS — I51.7 CARDIOMEGALY: ICD-10-CM

## 2021-09-29 DIAGNOSIS — G89.29 CHRONIC RIGHT SHOULDER PAIN: ICD-10-CM

## 2021-09-29 DIAGNOSIS — E66.01 MORBID (SEVERE) OBESITY DUE TO EXCESS CALORIES (HCC): ICD-10-CM

## 2021-09-29 DIAGNOSIS — R20.2 NUMBNESS AND TINGLING OF LEFT LEG: ICD-10-CM

## 2021-09-29 DIAGNOSIS — R06.09 OTHER FORM OF DYSPNEA: ICD-10-CM

## 2021-09-29 DIAGNOSIS — E55.9 VITAMIN D DEFICIENCY: ICD-10-CM

## 2021-09-29 DIAGNOSIS — R20.0 RIGHT ARM NUMBNESS: ICD-10-CM

## 2021-09-29 DIAGNOSIS — R00.0 TACHYCARDIA: ICD-10-CM

## 2021-09-29 DIAGNOSIS — U07.1 PNEUMONIA DUE TO COVID-19 VIRUS: ICD-10-CM

## 2021-09-29 DIAGNOSIS — J12.82 PNEUMONIA DUE TO COVID-19 VIRUS: ICD-10-CM

## 2021-09-29 DIAGNOSIS — R53.82 CHRONIC FATIGUE: ICD-10-CM

## 2021-09-29 DIAGNOSIS — U07.1 COVID-19 VIRUS INFECTION: ICD-10-CM

## 2021-09-29 DIAGNOSIS — R20.0 NUMBNESS AND TINGLING OF LEFT LEG: ICD-10-CM

## 2021-09-29 LAB
25(OH)D3 SERPL-MCNC: 20 NG/ML (ref 30–100)
ALBUMIN SERPL-MCNC: 4.1 G/DL (ref 3.5–5.2)
ALBUMIN/GLOB SERPL: 1.4 G/DL
ALP SERPL-CCNC: 114 U/L (ref 39–117)
ALT SERPL W P-5'-P-CCNC: 69 U/L (ref 1–33)
ANION GAP SERPL CALCULATED.3IONS-SCNC: 14.2 MMOL/L (ref 5–15)
AST SERPL-CCNC: 63 U/L (ref 1–32)
BASOPHILS # BLD AUTO: 0.06 10*3/MM3 (ref 0–0.2)
BASOPHILS NFR BLD AUTO: 0.6 % (ref 0–1.5)
BILIRUB SERPL-MCNC: 0.4 MG/DL (ref 0–1.2)
BUN SERPL-MCNC: 4 MG/DL (ref 6–20)
BUN/CREAT SERPL: 10 (ref 7–25)
CALCIUM SPEC-SCNC: 10.2 MG/DL (ref 8.6–10.5)
CHLORIDE SERPL-SCNC: 101 MMOL/L (ref 98–107)
CO2 SERPL-SCNC: 22.8 MMOL/L (ref 22–29)
CREAT SERPL-MCNC: 0.4 MG/DL (ref 0.57–1)
DEPRECATED RDW RBC AUTO: 43.4 FL (ref 37–54)
EOSINOPHIL # BLD AUTO: 0.15 10*3/MM3 (ref 0–0.4)
EOSINOPHIL NFR BLD AUTO: 1.5 % (ref 0.3–6.2)
ERYTHROCYTE [DISTWIDTH] IN BLOOD BY AUTOMATED COUNT: 14.6 % (ref 12.3–15.4)
GFR SERPL CREATININE-BSD FRML MDRD: >150 ML/MIN/1.73
GLOBULIN UR ELPH-MCNC: 3 GM/DL
GLUCOSE BLDC GLUCOMTR-MCNC: 133 MG/DL (ref 70–130)
GLUCOSE SERPL-MCNC: 116 MG/DL (ref 65–99)
HCT VFR BLD AUTO: 41 % (ref 34–46.6)
HGB BLD-MCNC: 13.6 G/DL (ref 12–15.9)
LYMPHOCYTES # BLD AUTO: 1.78 10*3/MM3 (ref 0.7–3.1)
LYMPHOCYTES NFR BLD AUTO: 18.4 % (ref 19.6–45.3)
MCH RBC QN AUTO: 27.6 PG (ref 26.6–33)
MCHC RBC AUTO-ENTMCNC: 33.2 G/DL (ref 31.5–35.7)
MCV RBC AUTO: 83.3 FL (ref 79–97)
MONOCYTES # BLD AUTO: 0.52 10*3/MM3 (ref 0.1–0.9)
MONOCYTES NFR BLD AUTO: 5.4 % (ref 5–12)
NEUTROPHILS NFR BLD AUTO: 7.12 10*3/MM3 (ref 1.7–7)
NEUTROPHILS NFR BLD AUTO: 73.6 % (ref 42.7–76)
NRBC BLD AUTO-RTO: 0 /100 WBC (ref 0–0.2)
PLATELET # BLD AUTO: 388 10*3/MM3 (ref 140–450)
PMV BLD AUTO: 10.6 FL (ref 6–12)
POTASSIUM SERPL-SCNC: 4.2 MMOL/L (ref 3.5–5.2)
PROT SERPL-MCNC: 7.1 G/DL (ref 6–8.5)
RBC # BLD AUTO: 4.92 10*6/MM3 (ref 3.77–5.28)
SODIUM SERPL-SCNC: 138 MMOL/L (ref 136–145)
T4 FREE SERPL-MCNC: 1.38 NG/DL (ref 0.93–1.7)
TSH SERPL DL<=0.05 MIU/L-ACNC: 1.39 UIU/ML (ref 0.27–4.2)
WBC # BLD AUTO: 9.68 10*3/MM3 (ref 3.4–10.8)

## 2021-09-29 PROCEDURE — 36415 COLL VENOUS BLD VENIPUNCTURE: CPT | Performed by: FAMILY MEDICINE

## 2021-09-29 PROCEDURE — 84681 ASSAY OF C-PEPTIDE: CPT | Performed by: FAMILY MEDICINE

## 2021-09-29 PROCEDURE — 83036 HEMOGLOBIN GLYCOSYLATED A1C: CPT | Performed by: FAMILY MEDICINE

## 2021-09-29 PROCEDURE — 99214 OFFICE O/P EST MOD 30 MIN: CPT | Performed by: FAMILY MEDICINE

## 2021-09-29 PROCEDURE — 85025 COMPLETE CBC W/AUTO DIFF WBC: CPT | Performed by: FAMILY MEDICINE

## 2021-09-29 PROCEDURE — 80053 COMPREHEN METABOLIC PANEL: CPT | Performed by: FAMILY MEDICINE

## 2021-09-29 PROCEDURE — 84443 ASSAY THYROID STIM HORMONE: CPT | Performed by: FAMILY MEDICINE

## 2021-09-29 PROCEDURE — 82306 VITAMIN D 25 HYDROXY: CPT | Performed by: FAMILY MEDICINE

## 2021-09-29 PROCEDURE — 84439 ASSAY OF FREE THYROXINE: CPT | Performed by: FAMILY MEDICINE

## 2021-09-29 PROCEDURE — 86341 ISLET CELL ANTIBODY: CPT | Performed by: FAMILY MEDICINE

## 2021-09-30 LAB — C PEPTIDE SERPL-MCNC: 8.9 NG/ML (ref 1.1–4.4)

## 2021-10-01 ENCOUNTER — HOSPITAL ENCOUNTER (OUTPATIENT)
Dept: NEUROLOGY | Facility: HOSPITAL | Age: 31
Discharge: HOME | End: 2021-10-01
Admitting: FAMILY MEDICINE

## 2021-10-01 DIAGNOSIS — R20.0 RIGHT ARM NUMBNESS: ICD-10-CM

## 2021-10-01 LAB
GAD65 AB SER IA-ACNC: <5 U/ML (ref 0–5)
HBA1C MFR BLD: 5.7 % (ref 4.8–5.6)

## 2021-10-01 PROCEDURE — 95910 NRV CNDJ TEST 7-8 STUDIES: CPT

## 2021-10-01 PROCEDURE — 95886 MUSC TEST DONE W/N TEST COMP: CPT | Performed by: PSYCHIATRY & NEUROLOGY

## 2021-10-01 PROCEDURE — 95910 NRV CNDJ TEST 7-8 STUDIES: CPT | Performed by: PSYCHIATRY & NEUROLOGY

## 2021-10-01 PROCEDURE — 95886 MUSC TEST DONE W/N TEST COMP: CPT

## 2021-10-04 ENCOUNTER — HOSPITAL ENCOUNTER (OUTPATIENT)
Dept: GENERAL RADIOLOGY | Facility: HOSPITAL | Age: 31
Discharge: HOME | End: 2021-10-04

## 2021-10-04 ENCOUNTER — HOSPITAL ENCOUNTER (OUTPATIENT)
Dept: CT IMAGING | Facility: HOSPITAL | Age: 31
Discharge: HOME | End: 2021-10-04

## 2021-10-04 DIAGNOSIS — U07.1 PNEUMONIA DUE TO COVID-19 VIRUS: ICD-10-CM

## 2021-10-04 DIAGNOSIS — G89.29 CHRONIC RIGHT SHOULDER PAIN: ICD-10-CM

## 2021-10-04 DIAGNOSIS — M25.511 CHRONIC RIGHT SHOULDER PAIN: ICD-10-CM

## 2021-10-04 DIAGNOSIS — J12.82 PNEUMONIA DUE TO COVID-19 VIRUS: ICD-10-CM

## 2021-10-04 PROCEDURE — 71250 CT THORAX DX C-: CPT

## 2021-10-04 PROCEDURE — 73030 X-RAY EXAM OF SHOULDER: CPT

## 2021-10-06 ENCOUNTER — PATIENT ROUNDING (BHMG ONLY) (OUTPATIENT)
Dept: FAMILY MEDICINE CLINIC | Facility: CLINIC | Age: 31
End: 2021-10-06

## 2021-10-06 NOTE — PROGRESS NOTES
October 6, 2021    Hello, may I speak with Leslie Harris?    My name is Sharmaine Cutler       I am  with [facility]  [address] [facility] [place] [address].    Before we get started may I verify your date of birth? 1990    I am calling to officially welcome you to our practice and ask about your recent visit. Is this a good time to talk? yes    Tell me about your visit with us. What things went well?  Dr. Brumfield is very attentive. Her and her mother were both very pleased with the visit.        We're always looking for ways to make our patients' experiences even better. Do you have recommendations on ways we may improve?  no    Overall were you satisfied with your first visit to our practice? yes       I appreciate you taking the time to speak with me today. Is there anything else I can do for you? no      Thank you, and have a great day.

## 2021-10-07 ENCOUNTER — TELEPHONE (OUTPATIENT)
Dept: FAMILY MEDICINE CLINIC | Facility: CLINIC | Age: 31
End: 2021-10-07

## 2021-10-07 NOTE — TELEPHONE ENCOUNTER
----- Message from Dona Brumfield MD sent at 10/5/2021 12:51 PM EDT -----  CT chest showed lung scarring in both lungs  Fatty liver

## 2021-10-07 NOTE — TELEPHONE ENCOUNTER
----- Message from Dona Brumfield MD sent at 10/5/2021 12:51 PM EDT -----  Neuropathy noted in left LE  Right UE normal

## 2021-10-07 NOTE — TELEPHONE ENCOUNTER
----- Message from Dona Brumfield MD sent at 10/5/2021 12:53 PM EDT -----  Normal shoulder xray, fu with ortho

## 2021-10-20 ENCOUNTER — OFFICE VISIT (OUTPATIENT)
Dept: ORTHOPEDIC SURGERY | Facility: CLINIC | Age: 31
End: 2021-10-20

## 2021-10-20 VITALS — WEIGHT: 293 LBS | BODY MASS INDEX: 47.09 KG/M2 | HEIGHT: 66 IN

## 2021-10-20 DIAGNOSIS — M25.511 ACUTE PAIN OF RIGHT SHOULDER: Primary | ICD-10-CM

## 2021-10-20 DIAGNOSIS — M75.81 TENDINITIS OF RIGHT ROTATOR CUFF: ICD-10-CM

## 2021-10-20 DIAGNOSIS — M75.51 SUBACROMIAL BURSITIS OF RIGHT SHOULDER JOINT: ICD-10-CM

## 2021-10-20 PROCEDURE — 99203 OFFICE O/P NEW LOW 30 MIN: CPT | Performed by: FAMILY MEDICINE

## 2021-10-20 RX ORDER — MELOXICAM 15 MG/1
15 TABLET ORAL DAILY
Qty: 30 TABLET | Refills: 3 | Status: SHIPPED | OUTPATIENT
Start: 2021-10-20 | End: 2022-03-08

## 2021-10-20 NOTE — PROGRESS NOTES
Primary Care Sports Medicine Office Visit Note     Patient ID: Leslie Harris is a 30 y.o. female.    Chief Complaint:  Chief Complaint   Patient presents with   • Right Shoulder - Pain     NKI, pain X2 months     HPI:    Ms. Leslie Harris is a 30 y.o. female who presents to the clinic today for evaluation or R shoulder pain. She was hospitalized with COVID in July of this year. Was in bed, prone, on ventilator x19d. When she awoke she was significantly weak, to near all large muscle groups of core, BUE and BLE. She was in rehab for 16d, for strengthening and improvement in normal ADL's. Since, she has had difficulty with  strength and use of her RUE. That improved with PT but shoulder pain persists. She was diagnosed with critical illness polyneuropathy s/p her infection and has done moderate amounts of PT since then. Otherwise, she has attempted IBU once daily. Continues to note achy constant pain. Has had RUE EMG that was negative.     Past Medical History:   Diagnosis Date   • Acute respiratory failure due to COVID-19 ([facility]) 7/22/2021   • Allergic    • Class 3 severe obesity without serious comorbidity with body mass index (BMI) of 50.0 to 59.9 in adult    • COVID-19 07/2021   • Critical illness polyneuropathy ([facility]) 8/12/2021   • E. coli UTI (urinary tract infection) 8/7/2021   • Hypertension 7/22/2021   • Injury of back    • Klebsiella pneumoniae pneumonia ([facility]) 8/4/2021   • MRSA pneumonia ([facility]) 7/29/2021   • Pneumonia due to COVID-19 virus 7/22/2021       Past Surgical History:   Procedure Laterality Date   • BRONCHOSCOPY N/A 8/8/2021    Procedure: BRONCHOSCOPY with bilateral bronchial washing;  Surgeon: Jeff Feng MD;  Location: [facility] ENDOSCOPY;  Service: Pulmonary;  Laterality: N/A;  pre: respiratory failure, covid-19 pneumonia  post: respiratory failure, COVID-19 pneumonia   • CHOLECYSTECTOMY     • EXPLORATORY LAPAROTOMY W/ BOWEL RESECTION  2018    Due to complication of cholecystectomy in which  "patient had an accidental perforation of small bowel intraoperatively.       Family History   Problem Relation Age of Onset   • Diabetes Mother    • No Known Problems Father      Social History     Occupational History   • Not on file   Tobacco Use   • Smoking status: Never Smoker   • Smokeless tobacco: Never Used   Vaping Use   • Vaping Use: Never used   Substance and Sexual Activity   • Alcohol use: Not Currently   • Drug use: Never   • Sexual activity: Defer      Review of Systems   Constitutional: Negative for activity change and fever.   Respiratory: Negative for cough and shortness of breath.    Cardiovascular: Negative for chest pain.   Gastrointestinal: Negative for constipation, diarrhea, nausea and vomiting.   Musculoskeletal: Positive for arthralgias.   Skin: Negative for color change and rash.   Neurological: Negative for weakness.   Hematological: Does not bruise/bleed easily.     Objective:    Ht 166.4 cm (65.5\")   Wt (!) 149 kg (328 lb)   BMI 53.75 kg/m²     Physical Examination:  Physical Exam  Vitals and nursing note reviewed.   Constitutional:       General: She is not in acute distress.     Appearance: She is well-developed. She is not diaphoretic.   HENT:      Head: Normocephalic and atraumatic.   Eyes:      Conjunctiva/sclera: Conjunctivae normal.   Pulmonary:      Effort: Pulmonary effort is normal. No respiratory distress.   Skin:     General: Skin is warm.      Capillary Refill: Capillary refill takes less than 2 seconds.   Neurological:      Mental Status: She is alert.       Right Shoulder Exam     Range of Motion   Active abduction: 120   Passive abduction: normal   Extension: normal   External rotation: normal   Forward flexion: 120   Internal rotation 0 degrees: normal     Muscle Strength   Abduction: 5/5   External rotation: 4/5   Supraspinatus: 4/5   Subscapularis: 5/5   Biceps: 5/5     Tests   Deleon test: positive  Drop arm: negative    Other   Erythema: absent  Sensation: " "normal  Pulse: present    Comments:  Mildly positive Corrie for pain, positive resisted external rotation for pain as well, negative liftoff.  Negative scarf.  Positive Neer.  Negative speeds/Yergason's.      Cervical range of motion is full with no tenderness to palpation to the bony midline or paraspinal cervical musculature.  Spurling's maneuver to the right is negative.             Imaging and other tests:  2V XR R shoulder today yields    EMG/NCS dated 10/1/21:   Impression:  This is an abnormal study of the left lower extremity.  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  This is a normal study of the right upper extremity showing no evidence of focal median or ulnar neuropathy or neurogenic changes in the muscles examined in the C5-T1 myotomes.    Assessment and Plan:    1. Acute pain of right shoulder  - meloxicam (Mobic) 15 MG tablet; Take 1 tablet by mouth Daily.  Dispense: 30 tablet; Refill: 3  - Ambulatory Referral to Physical Therapy Evaluate and treat; Stretching (RTC tendonitis), ROM, Strengthening    2. Tendinitis of right rotator cuff    3. Subacromial bursitis of right shoulder joint    I discussed pathology and treatment options with the patient today.  I feel she has moderate neuropathy/myelopathy from chronic bedridden status.  She has done physical therapy, but seems to be continuing to improve.  I recommend she continue with stretching strength as directed to cuff musculature.  She was also given prescription today for meloxicam for anti-inflammatory benefit.  RTC in 2 to 3 months if no improvement.    Dominik MACIAS \"Chance\" David SHORT DO, CAQSM  10/25/21  17:13 EDT    Disclaimer: Please note that areas of this note were completed with computer voice recognition software.  Quite often unanticipated grammatical, syntax, homophones, and other interpretive errors are inadvertently transcribed by the computer software. Please excuse any errors " that have escaped final proofreading.

## 2021-10-25 ENCOUNTER — TREATMENT (OUTPATIENT)
Dept: PHYSICAL THERAPY | Facility: CLINIC | Age: 31
End: 2021-10-25

## 2021-10-25 DIAGNOSIS — M62.81 MUSCLE WEAKNESS OF RIGHT UPPER EXTREMITY: ICD-10-CM

## 2021-10-25 DIAGNOSIS — M25.511 ACUTE PAIN OF RIGHT SHOULDER: Primary | ICD-10-CM

## 2021-10-25 PROCEDURE — 97110 THERAPEUTIC EXERCISES: CPT | Performed by: PHYSICAL THERAPIST

## 2021-10-25 PROCEDURE — 97162 PT EVAL MOD COMPLEX 30 MIN: CPT | Performed by: PHYSICAL THERAPIST

## 2021-10-25 NOTE — PROGRESS NOTES
Physical Therapy Initial Evaluation and Plan of Care    Patient: Leslie Harris   : 1990  Diagnosis/ICD-10 Code:  Acute pain of right shoulder [M25.511]  Referring practitioner: Dominik Hernandez I*  Date of Initial Visit: 10/25/2021  Today's Date: 10/25/2021  Patient seen for 1 session         Visit Diagnoses:     ICD-10-CM ICD-9-CM   1. Acute pain of right shoulder  M25.511 719.41   2. Muscle weakness of right upper extremity  M62.81 728.87       Subjective Questionnaire: QuickDASH: 40.91 points    Subjective Evaluation    History of Present Illness  Date of onset: 2021  Mechanism of injury: Patient presents to physical therapy with orders to address acute shoulder pain. She states that she contracted Covid in July and ended up really sick.  She was on the ventilator for 19 days.  When she woke up, she states that she couldn't move well.  She could barely move her arms and her R hand was fisted.  She was able to get her hand and lower arm working better but couldn't raise her shoulder very high.  She ended up at [facility] for inpatient rehab and then went to home health.  She was just discharged from home health therapy for mobility, balance and endurance last week.  She states that they didn't address her R arm because it hadn't been properly evaluated by a physician. She states that her orthopedic visit last week showed a negative x-ray and good findings. She was told that she was really weak and that probably aggravated her shoulder to create the pain.  She just recently resumed driving short distances but she will require further distance with driving to return to work.      She states that she can dress independently but washing and styling her hair are a big challenge.  She doesn't have the strength and endurance in her arms to hold them up long enough to wash or to make a pony tail.       Patient Occupation: [employer] - on medical leave Pain  Current pain ratin  At best  pain ratin  At worst pain ratin  Location: R shoulder  Quality: dull ache  Relieving factors: rest  Aggravating factors: outstretched reach, repetitive movement, movement, lifting, sleeping and overhead activity    Social Support  Lives with: family.    Hand dominance: right    Diagnostic Tests  X-ray: normal    Treatments  Previous treatment: physical therapy and home therapy  Current treatment: physical therapy  Discharged from (in last 30 days): home health care  Patient Goals  Patient goals for therapy: decreased pain, increased motion, increased strength, independence with ADLs/IADLs and return to work           Objective          Static Posture     Shoulders  Rounded.    Scapulae  Left protracted and right protracted.    Neurological Testing     Sensation     Shoulder   Left Shoulder   Intact: light touch    Right Shoulder   Intact: light touch    Active Range of Motion   Left Shoulder   Flexion: 160 degrees   Abduction: 160 degrees   External rotation 0°: 65 degrees   Internal rotation BTB: T9     Right Shoulder   Flexion: 140 degrees   Abduction: 104 degrees   External rotation 0°: 48 degrees   Internal rotation BTB: T11     Passive Range of Motion   Left Shoulder   Normal passive range of motion    Right Shoulder   Normal passive range of motion    Strength/Myotome Testing     Left Shoulder     Planes of Motion   Flexion: 4+   Abduction: 4+   External rotation at 0°: 4+   Internal rotation at 0°: 4+     Right Shoulder     Planes of Motion   Flexion: 3+   Abduction: 4   External rotation at 0°: 3+   Internal rotation at 0°: 4     Left Elbow   Flexion: 4  Extension: 4    Right Elbow   Flexion: 4+  Extension: 4+    Left Wrist/Hand      (2nd hand position)    Trial 1: 31 lbs    Trial 2: 38 lbs    Trial 3: 34 lbs    Average: 34.33 lbs    Right Wrist/Hand      (2nd hand position)     Trial 1: 56 lbs    Trial 2: 59 lbs    Trial 3: 61 lbs    Average: 58.67 lbs        Patient Education: Educated on  initial HEP and given handouts    Assessment & Plan     Assessment  Impairments: abnormal coordination, abnormal muscle firing, abnormal or restricted ROM, activity intolerance, impaired physical strength, lacks appropriate home exercise program and pain with function  Assessment details: The patient is a 30 y.o. female who presents to physical therapy today for acute R shoulder pain. Upon initial evaluation, the patient demonstrates the impairments listed above. Due to these impairments, the patient is unable to use her R arm for daily functions and work tasks.  Due to her recent Covid illness and hospitalization, she is significantly deconditioned and has overall weakness and poor endurance. The patient would benefit from skilled PT services to address functional limitations and impairments and to improve patient quality of life.    Barriers to therapy: Recent illness and hospitalization with poor endurance and deconditioning  Prognosis: fair  Functional Limitations: carrying objects, lifting, sleeping, pulling, pushing, uncomfortable because of pain, moving in bed, reaching behind back, reaching overhead and unable to perform repetitive tasks  Goals  Plan Goals: STG's: 3 weeks   Patient will report a reduction in pain by >30%  Patient will be able to perform HEP with minimal verbal cues     LTG's: By discharge   Patient will report a reduction in pain by >65%  Patient will be able to write without increased shoulder pain  Patient will have a >8 point improvement on the QuickDash to demonstrate overall improvement in daily functional level  Patient will be able to reach into overhead cabinet to get a dish without pain or difficulty  Patient will be independent with undergarment dressing without pain   Patient will be able to open a jar without pain or difficulty  Patient will be able to sleep > 5 hours without waking in pain   Patient will be independent with HEP      Plan  Therapy options: will be seen for  skilled physical therapy services  Planned modality interventions: cryotherapy, electrical stimulation/Russian stimulation, TENS and thermotherapy (hydrocollator packs)  Planned therapy interventions: body mechanics training, flexibility, functional ROM exercises, home exercise program, joint mobilization, manual therapy, neuromuscular re-education, postural training, soft tissue mobilization, strengthening, stretching and therapeutic activities  Frequency: 2x week  Duration in weeks: 8  Treatment plan discussed with: patient        History # of Personal Factors and/or Comorbidities: MODERATE (1-2)  Examination of Body System(s): # of elements: MODERATE (3)  Clinical Presentation: EVOLVING  Clinical Decision Making: MODERATE      Timed:         Manual Therapy:         mins  80640;     Therapeutic Exercise:    15     mins  37551;     Neuromuscular Bernadette:        mins  48937;    Therapeutic Activity:          mins  06989;     Gait Training:           mins  28996;     Ultrasound:          mins  35884;    Ionto                                   mins   74193  Self Care                            mins   24982  Canalith Repos         mins 01242      Un-Timed:  Electrical Stimulation:         mins  26412 ( );  Dry Needling          mins 86867/66506  Traction          mins 19756  Low Eval          Mins  97056  Mod Eval     30     Mins  83090  High Eval                            Mins  42661        Timed Treatment:   15   mins   Total Treatment:     45   mins        PT SIGNATURE: Eleni Talamantes PT   [place] License: [id number]  DATE TREATMENT INITIATED: 10/25/2021    Initial Certification  Certification Period: 10/25/2021 thru 1/22/2022  I certify that the therapy services are furnished while this patient is under my care.  The services outlined above are required by this patient, and will be reviewed every 90 days.      Physician Signature: ________________________________________   PHYSICIAN: Dominik Hernandez II,  DO        DATE: ____________________________________________________    Please sign and return via fax to [phone]. Thank you, [facility].

## 2021-10-27 ENCOUNTER — OFFICE VISIT (OUTPATIENT)
Dept: FAMILY MEDICINE CLINIC | Facility: CLINIC | Age: 31
End: 2021-10-27

## 2021-10-27 VITALS
OXYGEN SATURATION: 97 % | WEIGHT: 293 LBS | TEMPERATURE: 97.8 F | SYSTOLIC BLOOD PRESSURE: 136 MMHG | DIASTOLIC BLOOD PRESSURE: 92 MMHG | HEIGHT: 66 IN | BODY MASS INDEX: 47.09 KG/M2 | HEART RATE: 90 BPM

## 2021-10-27 DIAGNOSIS — M62.3 IMMOBILITY SYNDROME: ICD-10-CM

## 2021-10-27 DIAGNOSIS — R53.82 CHRONIC FATIGUE: ICD-10-CM

## 2021-10-27 DIAGNOSIS — G62.9 NEUROPATHY: ICD-10-CM

## 2021-10-27 DIAGNOSIS — Z23 IMMUNIZATION DUE: ICD-10-CM

## 2021-10-27 DIAGNOSIS — E78.5 DYSLIPIDEMIA: ICD-10-CM

## 2021-10-27 DIAGNOSIS — R79.89 ELEVATED LFTS: Primary | ICD-10-CM

## 2021-10-27 DIAGNOSIS — Z11.59 NEED FOR HEPATITIS C SCREENING TEST: ICD-10-CM

## 2021-10-27 DIAGNOSIS — R73.03 PREDIABETES: ICD-10-CM

## 2021-10-27 DIAGNOSIS — U09.9 POST-COVID-19 CONDITION: ICD-10-CM

## 2021-10-27 PROCEDURE — 90686 IIV4 VACC NO PRSV 0.5 ML IM: CPT | Performed by: FAMILY MEDICINE

## 2021-10-27 PROCEDURE — 90471 IMMUNIZATION ADMIN: CPT | Performed by: FAMILY MEDICINE

## 2021-10-27 PROCEDURE — 80053 COMPREHEN METABOLIC PANEL: CPT | Performed by: FAMILY MEDICINE

## 2021-10-27 PROCEDURE — 80061 LIPID PANEL: CPT | Performed by: FAMILY MEDICINE

## 2021-10-27 PROCEDURE — 36415 COLL VENOUS BLD VENIPUNCTURE: CPT | Performed by: FAMILY MEDICINE

## 2021-10-27 PROCEDURE — 82043 UR ALBUMIN QUANTITATIVE: CPT | Performed by: FAMILY MEDICINE

## 2021-10-27 PROCEDURE — 99214 OFFICE O/P EST MOD 30 MIN: CPT | Performed by: FAMILY MEDICINE

## 2021-10-27 RX ORDER — ORAL SEMAGLUTIDE 3 MG/1
1 TABLET ORAL DAILY
Qty: 30 TABLET | Refills: 0 | COMMUNITY
Start: 2021-10-27 | End: 2021-12-08 | Stop reason: SINTOL

## 2021-10-27 RX ORDER — PANTOPRAZOLE SODIUM 40 MG/1
40 TABLET, DELAYED RELEASE ORAL DAILY
Qty: 30 TABLET | Refills: 2 | Status: SHIPPED | OUTPATIENT
Start: 2021-10-27 | End: 2021-12-08 | Stop reason: SDUPTHER

## 2021-10-28 LAB
ALBUMIN SERPL-MCNC: 4.5 G/DL (ref 3.5–5.2)
ALBUMIN UR-MCNC: 4.1 MG/DL
ALBUMIN/GLOB SERPL: 1.7 G/DL
ALP SERPL-CCNC: 92 U/L (ref 39–117)
ALT SERPL W P-5'-P-CCNC: 69 U/L (ref 1–33)
ANION GAP SERPL CALCULATED.3IONS-SCNC: 15.6 MMOL/L (ref 5–15)
AST SERPL-CCNC: 64 U/L (ref 1–32)
BILIRUB SERPL-MCNC: 0.5 MG/DL (ref 0–1.2)
BUN SERPL-MCNC: 4 MG/DL (ref 6–20)
BUN/CREAT SERPL: 10.5 (ref 7–25)
CALCIUM SPEC-SCNC: 9.8 MG/DL (ref 8.6–10.5)
CHLORIDE SERPL-SCNC: 102 MMOL/L (ref 98–107)
CHOLEST SERPL-MCNC: 204 MG/DL (ref 0–200)
CO2 SERPL-SCNC: 22.4 MMOL/L (ref 22–29)
CREAT SERPL-MCNC: 0.38 MG/DL (ref 0.57–1)
GFR SERPL CREATININE-BSD FRML MDRD: >150 ML/MIN/1.73
GLOBULIN UR ELPH-MCNC: 2.7 GM/DL
GLUCOSE SERPL-MCNC: 129 MG/DL (ref 65–99)
HDLC SERPL-MCNC: 34 MG/DL (ref 40–60)
LDLC SERPL CALC-MCNC: 129 MG/DL (ref 0–100)
LDLC/HDLC SERPL: 3.66 {RATIO}
POTASSIUM SERPL-SCNC: 3.9 MMOL/L (ref 3.5–5.2)
PROT SERPL-MCNC: 7.2 G/DL (ref 6–8.5)
SODIUM SERPL-SCNC: 140 MMOL/L (ref 136–145)
TRIGL SERPL-MCNC: 228 MG/DL (ref 0–150)
VLDLC SERPL-MCNC: 41 MG/DL (ref 5–40)

## 2021-11-01 ENCOUNTER — TREATMENT (OUTPATIENT)
Dept: PHYSICAL THERAPY | Facility: CLINIC | Age: 31
End: 2021-11-01

## 2021-11-01 DIAGNOSIS — M25.511 ACUTE PAIN OF RIGHT SHOULDER: Primary | ICD-10-CM

## 2021-11-01 DIAGNOSIS — M62.81 MUSCLE WEAKNESS OF RIGHT UPPER EXTREMITY: ICD-10-CM

## 2021-11-01 PROCEDURE — 97110 THERAPEUTIC EXERCISES: CPT | Performed by: PHYSICAL THERAPIST

## 2021-11-01 PROCEDURE — 97530 THERAPEUTIC ACTIVITIES: CPT | Performed by: PHYSICAL THERAPIST

## 2021-11-01 NOTE — PROGRESS NOTES
Physical Therapy Daily Treatment Note    Patient: Leslie Harris  : 1990  Referring Practitioner: Dominik Hernandez I*  Date of Initial Visit: Type: THERAPY  Noted: 10/25/2021  Today's Date: 2021  Patient seen for: 2 sessions      Visit Diagnoses:     ICD-10-CM ICD-9-CM   1. Acute pain of right shoulder  M25.511 719.41   2. Muscle weakness of right upper extremity  M62.81 728.87       Subjective   Leslie Harris reports: that her ability to move her arm has seemingly improved over the last several weeks, but is still very weak on the R as compared to the L.     Pain Level (0-10): 4 with reaching back to the side     Objective     See Exercise, Manual, and Modality Logs for complete treatment.     Patient Education: addition of bicep curls (all 3 positions) with and without weight and wall slides to her HEP.      Assessment & Plan     Assessment  Assessment details: Leslie does well with demonstration of initial HEP and progression of therex within the clinic. Minimal UT compensations noted throughout, but does require 1 VC with modification of supinated bicep curl due to this. She requires seated rest break following ~ 5 mins of standing activity, but is able to resume seated activity with no significant issues.       Progress per Plan of Care           Timed:         Manual Therapy:         mins  21452;     Therapeutic Exercise:    23     mins  55768;     Neuromuscular Bernadette:        mins  65510;    Therapeutic Activity:     12     mins  57164;     Gait Training:           mins  30265;     Ultrasound:          mins  77024;    Ionto                                   mins   96965  Self Care                            mins   53578      Un-Timed:  Electrical Stimulation:         mins  99066 ( );  Traction          mins 29213      Timed Treatment:   35   mins   Total Treatment:     35   mins      Cristina Rainey PTA  Physical Therapist Assistant  [place] License: [id number]

## 2021-11-05 ENCOUNTER — CONSULT (OUTPATIENT)
Dept: CARDIOLOGY | Facility: CLINIC | Age: 31
End: 2021-11-05

## 2021-11-05 VITALS
BODY MASS INDEX: 48.82 KG/M2 | HEART RATE: 109 BPM | RESPIRATION RATE: 18 BRPM | HEIGHT: 65 IN | SYSTOLIC BLOOD PRESSURE: 121 MMHG | WEIGHT: 293 LBS | DIASTOLIC BLOOD PRESSURE: 84 MMHG | OXYGEN SATURATION: 97 %

## 2021-11-05 DIAGNOSIS — R06.09 DYSPNEA ON EXERTION: Primary | ICD-10-CM

## 2021-11-05 DIAGNOSIS — I10 PRIMARY HYPERTENSION: ICD-10-CM

## 2021-11-05 DIAGNOSIS — R00.0 REGULAR SINUS TACHYCARDIA: ICD-10-CM

## 2021-11-05 DIAGNOSIS — E11.69 DIABETES MELLITUS TYPE 2 IN OBESE (HCC): Chronic | ICD-10-CM

## 2021-11-05 DIAGNOSIS — E66.01 CLASS 3 SEVERE OBESITY DUE TO EXCESS CALORIES WITHOUT SERIOUS COMORBIDITY WITH BODY MASS INDEX (BMI) OF 50.0 TO 59.9 IN ADULT (HCC): Chronic | ICD-10-CM

## 2021-11-05 DIAGNOSIS — E66.9 DIABETES MELLITUS TYPE 2 IN OBESE (HCC): Chronic | ICD-10-CM

## 2021-11-05 DIAGNOSIS — U07.1 ACUTE RESPIRATORY FAILURE DUE TO COVID-19 (HCC): ICD-10-CM

## 2021-11-05 DIAGNOSIS — J96.00 ACUTE RESPIRATORY FAILURE DUE TO COVID-19 (HCC): ICD-10-CM

## 2021-11-05 PROCEDURE — 93000 ELECTROCARDIOGRAM COMPLETE: CPT | Performed by: INTERNAL MEDICINE

## 2021-11-05 PROCEDURE — 99203 OFFICE O/P NEW LOW 30 MIN: CPT | Performed by: INTERNAL MEDICINE

## 2021-11-05 NOTE — PROGRESS NOTES
Cardiology Office Visit      Encounter Date:  11/05/2021    Patient ID:   Leslie Harris is a 30 y.o. female.    Reason For Followup:  Sinus tachycardia  Shortness of breath    Brief Clinical History:  Dear Dona Villarreal MD    I had the pleasure of seeing Leslie Harris today. As you are well aware, this is a 30 y.o. female recent hospitalization with a COVID-19 pneumonia patient was sick in ICU intubated for several days with complete recovery here for evaluation and treatment options for tachycardia shortness of breath and also abnormal echocardiogram that was noted during the hospital admission    Interval History:  Patient had severe sepsis and COVID-19 infection with severe hypoxia requiring intubation and ICU stay on vent for more than a week  No prior cardiac history no prior cardiac diagnosis  Morbid obesity  Impaired fasting glucose and early diabetes  Complaining of some mild shortness of breath and dyspnea on exertion and increased heart rate  Complaining of some nonspecific palpitations  No syncope    Assessment & Plan    Impressions:  Sinus tachycardia  Shortness of breath  Palpitations  Recent COVID-19 infection with scarred lungs  Critical illness polyneuropathy  Hypertension  Shortness of breath and dyspnea on exertion  Diabetes mellitus type 2 /impaired fasting glucose  Morbid obesity/Body mass index is 55.08 kg/m².     recommendations:  Reviewed echocardiogram to assess the LV systolic function  Continue current dose of beta-blocker for inappropriate tachycardia and sinus tachycardia  Continue supportive care  Need for regular exercise and weight loss reviewed and discussed with patient  Continue aggressive risk factor modification  Continue current dose of beta-blockers  Regular course work-up and evaluation during the patient hospital stay with active COVID-19 infection reviewed and discussed with the patient  Follow-up in office in 3 months  Objective:    Vitals:  Vitals:    11/05/21 0825  "  BP: 121/84   BP Location: Left arm   Patient Position: Sitting   Cuff Size: Large Adult   Pulse: 109   Resp: 18   SpO2: 97%   Weight: (!) 150 kg (331 lb)   Height: 165.1 cm (65\")       Physical Exam:  General: Alert, cooperative, no distress, appears stated age  Head:  Normocephalic, atraumatic, mucous membranes moist  Eyes:  Conjunctiva/corneas clear, EOM's intact     Neck:  Supple,  no adenopathy;      Lungs: Clear to auscultation bilaterally, no wheezes rhonchi rales are noted  Chest wall: No tenderness  Heart::  Regular rate and rhythm, S1 and S2 normal, no murmur, rub or gallop  Abdomen: Soft, non-tender, nondistended bowel sounds active  Extremities: No cyanosis, clubbing, or edema  Pulses: 2+ and symmetric all extremities  Skin:  No rashes or lesions  Neuro/psych: A&O x3. CN II through XII are grossly intact with appropriate affect      Allergies:  Allergies   Allergen Reactions   • Sulfa Antibiotics Hives       Medication Review:     Current Outpatient Medications:   •  meloxicam (Mobic) 15 MG tablet, Take 1 tablet by mouth Daily., Disp: 30 tablet, Rfl: 3  •  metoprolol tartrate (LOPRESSOR) 25 MG tablet, Take 1 tablet by mouth Every 12 (Twelve) Hours., Disp: , Rfl:   •  pantoprazole (Protonix) 40 MG EC tablet, Take 1 tablet by mouth Daily., Disp: 30 tablet, Rfl: 2  •  Semaglutide (Rybelsus) 3 MG tablet, Take 1 tablet by mouth Daily., Disp: 30 tablet, Rfl: 0    Family History:  Family History   Problem Relation Age of Onset   • Diabetes Mother    • No Known Problems Father        Past Medical History:  Past Medical History:   Diagnosis Date   • Acute respiratory failure due to COVID-19 ([facility]) 7/22/2021   • Allergic    • Class 3 severe obesity without serious comorbidity with body mass index (BMI) of 50.0 to 59.9 in adult    • COVID-19 07/2021   • Critical illness polyneuropathy ([facility]) 8/12/2021   • E. coli UTI (urinary tract infection) 8/7/2021   • Hypertension 7/22/2021   • Injury of back    • Klebsiella " pneumoniae pneumonia ([facility]) 8/4/2021   • MRSA pneumonia ([facility]) 7/29/2021   • Pneumonia due to COVID-19 virus 7/22/2021       Past surgical History:  Past Surgical History:   Procedure Laterality Date   • BRONCHOSCOPY N/A 8/8/2021    Procedure: BRONCHOSCOPY with bilateral bronchial washing;  Surgeon: Jeff Feng MD;  Location: [facility];  Service: Pulmonary;  Laterality: N/A;  pre: respiratory failure, covid-19 pneumonia  post: respiratory failure, COVID-19 pneumonia   • CHOLECYSTECTOMY     • EXPLORATORY LAPAROTOMY W/ BOWEL RESECTION  2018    Due to complication of cholecystectomy in which patient had an accidental perforation of small bowel intraoperatively.       Social History:  Social History     Socioeconomic History   • Marital status: Single   Tobacco Use   • Smoking status: Never Smoker   • Smokeless tobacco: Never Used   Vaping Use   • Vaping Use: Never used   Substance and Sexual Activity   • Alcohol use: Not Currently   • Drug use: Never   • Sexual activity: Defer       Review of Systems:  The following systems were reviewed as they relate to the cardiovascular system: Constitutional, Eyes, ENT, Cardiovascular, Respiratory, Gastrointestinal, Integumentary, Neurological, Psychiatric, Hematologic, Endocrine, Musculoskeletal, and Genitourinary. The pertinent cardiovascular findings are reported above with all other cardiovascular points within those systems being negative.    Diagnostic Study Review:     Current Electrocardiogram:    ECG 12 Lead    Date/Time: 11/5/2021 2:14 PM  Performed by: Steven Hirsch MD  Authorized by: Steven Hirsch MD   Comparison: compared with previous ECG   Similar to previous ECG  Rhythm: sinus rhythm and sinus tachycardia  Rate: normal  BPM: 109  Conduction: conduction normal  QRS axis: normal  Other findings: non-specific ST-T wave changes    Clinical impression: abnormal EKG              NOTE: The following portions of the patient's history were  reviewed and updated this visit as appropriate: allergies, current medications, past family history, past medical history, past social history, past surgical history and problem list.

## 2021-11-08 ENCOUNTER — TREATMENT (OUTPATIENT)
Dept: PHYSICAL THERAPY | Facility: CLINIC | Age: 31
End: 2021-11-08

## 2021-11-08 DIAGNOSIS — M25.511 ACUTE PAIN OF RIGHT SHOULDER: Primary | ICD-10-CM

## 2021-11-08 DIAGNOSIS — M62.81 MUSCLE WEAKNESS OF RIGHT UPPER EXTREMITY: ICD-10-CM

## 2021-11-08 PROCEDURE — 97530 THERAPEUTIC ACTIVITIES: CPT | Performed by: PHYSICAL THERAPIST

## 2021-11-08 PROCEDURE — 97110 THERAPEUTIC EXERCISES: CPT | Performed by: PHYSICAL THERAPIST

## 2021-11-08 NOTE — PROGRESS NOTES
Physical Therapy Daily Treatment Note    Patient: Leslie Harris  : 1990  Referring Practitioner: Dominik Hernandez I*  Date of Initial Visit: Type: THERAPY  Noted: 10/25/2021  Today's Date: 2021  Patient seen for: 3 sessions      Visit Diagnoses:     ICD-10-CM ICD-9-CM   1. Acute pain of right shoulder  M25.511 719.41   2. Muscle weakness of right upper extremity  M62.81 728.87       Subjective   Leslie Harris reports: that she continues to see improvement daily, but does still struggle with using her R arm.  IS going to return to work next week on a PT basis ( and ).     Pain Level (0-10): reports pain with scaption.abduction motions. Does not rate.      Objective     See Exercise, Manual, and Modality Logs for complete treatment.     Patient Education: YTB for progression of resisted scap retraction.     Assessment & Plan     Assessment  Assessment details: Leslie is able to perform HEP with progressions without UT/scapular compensations noted this session. Progressed with resisted rows, ER, and scaption motions- requires 1 VC to avoid compensation with new ex's. Educated on expansion of HEP.       Progress per Plan of Care           Timed:         Manual Therapy:         mins  23620;     Therapeutic Exercise:    25     mins  48632;     Neuromuscular Bernadette:        mins  21225;    Therapeutic Activity:     13     mins  07843;     Gait Training:           mins  33578;     Ultrasound:          mins  38902;    Ionto                                   mins   31861  Self Care                            mins   97512      Un-Timed:  Electrical Stimulation:         mins  71299 ( );  Traction          mins 88942      Timed Treatment:   38   mins   Total Treatment:     38   mins      Cristina Rainey PTA  Physical Therapist Assistant  [place] License: [id number]

## 2021-11-11 ENCOUNTER — PATIENT ROUNDING (BHMG ONLY) (OUTPATIENT)
Dept: CARDIOLOGY | Facility: CLINIC | Age: 31
End: 2021-11-11

## 2021-11-11 NOTE — PROGRESS NOTES
November 5,2021    Hello, may I speak with Leslie Harris?    My name is Meena     I am  with [facility] [address] [place] [address].    Before we get started may I verify your date of birth? 1990    I am calling to officially welcome you to our practice and ask about your recent visit. Is this a good time to talk? No      Tell me about your visit with us. What things went well? Left Voicemail          Thank you, and have a great day.

## 2021-11-15 ENCOUNTER — TREATMENT (OUTPATIENT)
Dept: PHYSICAL THERAPY | Facility: CLINIC | Age: 31
End: 2021-11-15

## 2021-11-15 DIAGNOSIS — M62.81 MUSCLE WEAKNESS OF RIGHT UPPER EXTREMITY: ICD-10-CM

## 2021-11-15 DIAGNOSIS — M25.511 ACUTE PAIN OF RIGHT SHOULDER: Primary | ICD-10-CM

## 2021-11-15 PROCEDURE — 97110 THERAPEUTIC EXERCISES: CPT | Performed by: PHYSICAL THERAPIST

## 2021-11-15 PROCEDURE — 97530 THERAPEUTIC ACTIVITIES: CPT | Performed by: PHYSICAL THERAPIST

## 2021-11-15 NOTE — PROGRESS NOTES
Physical Therapy Daily Treatment Note    Patient: Leslie Harris  : 1990  Referring Practitioner: Dominik Hernandez I*  Date of Initial Visit: Type: THERAPY  Noted: 10/25/2021  Today's Date: 11/15/2021  Patient seen for: 4 sessions      Visit Diagnoses:     ICD-10-CM ICD-9-CM   1. Acute pain of right shoulder  M25.511 719.41   2. Muscle weakness of right upper extremity  M62.81 728.87       Subjective   Leslie Harris reports: that she is noticing improvements with using her L arm and shoulder. Lifting a pitcher of tea wasn't as difficult. HEP has become really easy. She feels as though she is regaining her energy as well, but does still have her days that she needs more rest. Is eager to return to work.     Pain Level (0-10): minimal.      Objective     See Exercise, Manual, and Modality Logs for complete treatment.     Patient Education: RTB for progression of scap ex's.     Assessment & Plan     Assessment  Assessment details: Leslie continues to do well with therex, has increased tolerance to standing ex's and does not need breaks between. Progressed with supine ex's and does have difficulty with horz. Add in gravity resisted position. Ended session with UE ergometer for endurance training. Reports fatigue at end of session but no increase in pain.     Goals  Plan Goals: Plan Goals: STG's: 3 weeks   Patient will report a reduction in pain by >30% - MET  Patient will be able to perform HEP with minimal verbal cues - MET    LTG's: By discharge   Patient will report a reduction in pain by >65%  Patient will be able to write without increased shoulder pain  Patient will have a >8 point improvement on the QuickDash to demonstrate overall improvement in daily functional level  Patient will be able to reach into overhead cabinet to get a dish without pain or difficulty  Patient will be independent with undergarment dressing without pain   Patient will be able to open a jar without pain or difficulty  Patient  will be able to sleep > 5 hours without waking in pain   Patient will be independent with HEP      Progress per Plan of Care           Timed:         Manual Therapy:         mins  90547;     Therapeutic Exercise:    25     mins  45197;     Neuromuscular Bernadette:        mins  06666;    Therapeutic Activity:     16     mins  38892;     Gait Training:           mins  99668;     Ultrasound:          mins  84072;    Ionto                                   mins   40827  Self Care                            mins   12420      Un-Timed:  Electrical Stimulation:         mins  21807 ( );  Traction          mins 70734      Timed Treatment:   41   mins   Total Treatment:     41   mins      Cristina Rainey PTA  Physical Therapist Assistant  [place] License: [id number]

## 2021-11-22 ENCOUNTER — TREATMENT (OUTPATIENT)
Dept: PHYSICAL THERAPY | Facility: CLINIC | Age: 31
End: 2021-11-22

## 2021-11-22 DIAGNOSIS — M62.81 MUSCLE WEAKNESS OF RIGHT UPPER EXTREMITY: ICD-10-CM

## 2021-11-22 DIAGNOSIS — M25.511 ACUTE PAIN OF RIGHT SHOULDER: Primary | ICD-10-CM

## 2021-11-22 PROCEDURE — 97110 THERAPEUTIC EXERCISES: CPT | Performed by: PHYSICAL THERAPIST

## 2021-11-22 PROCEDURE — 97530 THERAPEUTIC ACTIVITIES: CPT | Performed by: PHYSICAL THERAPIST

## 2021-11-22 NOTE — PROGRESS NOTES
Physical Therapy Daily Treatment Note    Patient: Leslie Harris  : 1990  Referring Practitioner: Dominik Hernandez I*  Date of Initial Visit: Type: THERAPY  Noted: 10/25/2021  Today's Date: 2021  Patient seen for: 5 sessions      Visit Diagnoses:     ICD-10-CM ICD-9-CM   1. Acute pain of right shoulder  M25.511 719.41   2. Muscle weakness of right upper extremity  M62.81 728.87       Subjective   Leslie Harris reports that she did well with return to work last week, did get fatigued by the end of the day. Continues to work on HEP with progression to RTB for resistance training.     Pain Level (0-10): minimal.      Objective     See Exercise, Manual, and Modality Logs for complete treatment.     Patient Education: RTB for progression of scap ex's.     Assessment & Plan     Assessment  Assessment details: Leslie is, again, able to progress with resisted exercises in therapy and is able to increase time on UBE for endurance training. 2 cues for reduction of UT recruitment with ex's, but otherwise demonstrates good form. Muscle fasciculations with forearm strengthening indicating weakness. Reports fatigue at end of session, but denies pain.     Goals  Plan Goals: Plan Goals: STG's: 3 weeks   Patient will report a reduction in pain by >30% - MET  Patient will be able to perform HEP with minimal verbal cues - MET    LTG's: By discharge   Patient will report a reduction in pain by >65%  Patient will be able to write without increased shoulder pain  Patient will have a >8 point improvement on the QuickDash to demonstrate overall improvement in daily functional level  Patient will be able to reach into overhead cabinet to get a dish without pain or difficulty  Patient will be independent with undergarment dressing without pain   Patient will be able to open a jar without pain or difficulty  Patient will be able to sleep > 5 hours without waking in pain   Patient will be independent with HEP      Progress  per Plan of Care           Timed:         Manual Therapy:         mins  30403;     Therapeutic Exercise:    23     mins  71145;     Neuromuscular Bernadette:        mins  02647;    Therapeutic Activity:     10     mins  09073;     Gait Training:           mins  67911;     Ultrasound:          mins  75573;    Ionto                                   mins   83737  Self Care                            mins   02484      Un-Timed:  Electrical Stimulation:         mins  92945 ( );  Traction          mins 99983      Timed Treatment:   33   mins   Total Treatment:     33   mins      Cristina Rainey PTA  Physical Therapist Assistant  [place] License: [id number]

## 2021-11-29 ENCOUNTER — HOSPITAL ENCOUNTER (OUTPATIENT)
Dept: CARDIOLOGY | Facility: HOSPITAL | Age: 31
Discharge: HOME | End: 2021-11-29
Admitting: INTERNAL MEDICINE

## 2021-11-29 VITALS
WEIGHT: 293 LBS | HEART RATE: 114 BPM | HEIGHT: 65 IN | DIASTOLIC BLOOD PRESSURE: 104 MMHG | BODY MASS INDEX: 48.82 KG/M2 | SYSTOLIC BLOOD PRESSURE: 159 MMHG

## 2021-11-29 DIAGNOSIS — R00.0 REGULAR SINUS TACHYCARDIA: ICD-10-CM

## 2021-11-29 DIAGNOSIS — R06.09 DYSPNEA ON EXERTION: ICD-10-CM

## 2021-11-29 PROCEDURE — 93306 TTE W/DOPPLER COMPLETE: CPT | Performed by: INTERNAL MEDICINE

## 2021-11-29 PROCEDURE — 93306 TTE W/DOPPLER COMPLETE: CPT

## 2021-11-30 ENCOUNTER — TELEPHONE (OUTPATIENT)
Dept: CARDIOLOGY | Facility: CLINIC | Age: 31
End: 2021-11-30

## 2021-11-30 LAB
ASCENDING AORTA: 3.3 CM
BH CV ECHO MEAS - AO MAX PG (FULL): 3 MMHG
BH CV ECHO MEAS - AO MAX PG: 5.9 MMHG
BH CV ECHO MEAS - AO MEAN PG (FULL): 1.4 MMHG
BH CV ECHO MEAS - AO MEAN PG: 3.3 MMHG
BH CV ECHO MEAS - AO ROOT AREA (BSA CORRECTED): 1.3
BH CV ECHO MEAS - AO ROOT AREA: 8.2 CM^2
BH CV ECHO MEAS - AO ROOT DIAM: 3.2 CM
BH CV ECHO MEAS - AO V2 MAX: 121.3 CM/SEC
BH CV ECHO MEAS - AO V2 MEAN: 86.6 CM/SEC
BH CV ECHO MEAS - AO V2 VTI: 19.6 CM
BH CV ECHO MEAS - ASC AORTA: 3.3 CM
BH CV ECHO MEAS - AVA(I,A): 2.7 CM^2
BH CV ECHO MEAS - AVA(I,D): 2.7 CM^2
BH CV ECHO MEAS - AVA(V,A): 2.5 CM^2
BH CV ECHO MEAS - AVA(V,D): 2.5 CM^2
BH CV ECHO MEAS - BSA(HAYCOCK): 2.7 M^2
BH CV ECHO MEAS - BSA: 2.5 M^2
BH CV ECHO MEAS - BZI_BMI: 55.1 KILOGRAMS/M^2
BH CV ECHO MEAS - BZI_METRIC_HEIGHT: 165.1 CM
BH CV ECHO MEAS - BZI_METRIC_WEIGHT: 150.1 KG
BH CV ECHO MEAS - EDV(CUBED): 88.7 ML
BH CV ECHO MEAS - EDV(MOD-SP2): 95.4 ML
BH CV ECHO MEAS - EDV(MOD-SP4): 99.9 ML
BH CV ECHO MEAS - EDV(TEICH): 90.5 ML
BH CV ECHO MEAS - EF(CUBED): 64.4 %
BH CV ECHO MEAS - EF(MOD-BP): 56 %
BH CV ECHO MEAS - EF(MOD-SP2): 53 %
BH CV ECHO MEAS - EF(MOD-SP4): 54.8 %
BH CV ECHO MEAS - EF(TEICH): 56.1 %
BH CV ECHO MEAS - ESV(CUBED): 31.6 ML
BH CV ECHO MEAS - ESV(MOD-SP2): 44.9 ML
BH CV ECHO MEAS - ESV(MOD-SP4): 45.2 ML
BH CV ECHO MEAS - ESV(TEICH): 39.8 ML
BH CV ECHO MEAS - FS: 29.1 %
BH CV ECHO MEAS - IVS/LVPW: 1.1
BH CV ECHO MEAS - IVSD: 1.4 CM
BH CV ECHO MEAS - LA DIMENSION(2D): 3.9 CM
BH CV ECHO MEAS - LA DIMENSION: 3.8 CM
BH CV ECHO MEAS - LA/AO: 1.2
BH CV ECHO MEAS - LAT PEAK E' VEL: 7 CM/SEC
BH CV ECHO MEAS - LV DIASTOLIC VOL/BSA (35-75): 40.8 ML/M^2
BH CV ECHO MEAS - LV IVRT: 0.08 SEC
BH CV ECHO MEAS - LV MASS(C)D: 230.8 GRAMS
BH CV ECHO MEAS - LV MASS(C)DI: 94.2 GRAMS/M^2
BH CV ECHO MEAS - LV MAX PG: 2.9 MMHG
BH CV ECHO MEAS - LV MEAN PG: 1.9 MMHG
BH CV ECHO MEAS - LV SYSTOLIC VOL/BSA (12-30): 18.4 ML/M^2
BH CV ECHO MEAS - LV V1 MAX: 85.6 CM/SEC
BH CV ECHO MEAS - LV V1 MEAN: 65.7 CM/SEC
BH CV ECHO MEAS - LV V1 VTI: 15.4 CM
BH CV ECHO MEAS - LVIDD: 4.5 CM
BH CV ECHO MEAS - LVIDS: 3.2 CM
BH CV ECHO MEAS - LVOT AREA: 3.5 CM^2
BH CV ECHO MEAS - LVOT DIAM: 2.1 CM
BH CV ECHO MEAS - LVPWD: 1.3 CM
BH CV ECHO MEAS - MED PEAK E' VEL: 8 CM/SEC
BH CV ECHO MEAS - MV A MAX VEL: 84.9 CM/SEC
BH CV ECHO MEAS - MV DEC SLOPE: 260.8 CM/SEC^2
BH CV ECHO MEAS - MV DEC TIME: 0.26 SEC
BH CV ECHO MEAS - MV E MAX VEL: 68.3 CM/SEC
BH CV ECHO MEAS - MV E/A: 0.8
BH CV ECHO MEAS - MV MAX PG: 3.8 MMHG
BH CV ECHO MEAS - MV MEAN PG: 1.9 MMHG
BH CV ECHO MEAS - MV P1/2T: 49 MSEC
BH CV ECHO MEAS - MV V2 MAX: 96.9 CM/SEC
BH CV ECHO MEAS - MV V2 MEAN: 65.8 CM/SEC
BH CV ECHO MEAS - MV V2 VTI: 11.8 CM
BH CV ECHO MEAS - MVA(P1/2T): 4.5 CM2
BH CV ECHO MEAS - MVA(VTI): 4.6 CM^2
BH CV ECHO MEAS - PA ACC SLOPE: 892 CM/SEC2
BH CV ECHO MEAS - PA ACC TIME: 0.08 SEC
BH CV ECHO MEAS - PA MAX PG (FULL): 0.97 MMHG
BH CV ECHO MEAS - PA MAX PG: 3.4 MMHG
BH CV ECHO MEAS - PA MEAN PG (FULL): 0.65 MMHG
BH CV ECHO MEAS - PA MEAN PG: 1.8 MMHG
BH CV ECHO MEAS - PA PR(ACCEL): 44.5 MMHG
BH CV ECHO MEAS - PA V2 MAX: 91.8 CM/SEC
BH CV ECHO MEAS - PA V2 MEAN: 65.1 CM/SEC
BH CV ECHO MEAS - PA V2 VTI: 16.1 CM
BH CV ECHO MEAS - RV MAX PG: 2.4 MMHG
BH CV ECHO MEAS - RV MEAN PG: 1.2 MMHG
BH CV ECHO MEAS - RV V1 MAX: 77.5 CM/SEC
BH CV ECHO MEAS - RV V1 MEAN: 51.8 CM/SEC
BH CV ECHO MEAS - RV V1 VTI: 12.8 CM
BH CV ECHO MEAS - SI(AO): 65.5 ML/M^2
BH CV ECHO MEAS - SI(CUBED): 23.3 ML/M^2
BH CV ECHO MEAS - SI(LVOT): 21.9 ML/M^2
BH CV ECHO MEAS - SI(MOD-SP2): 20.6 ML/M^2
BH CV ECHO MEAS - SI(MOD-SP4): 22.3 ML/M^2
BH CV ECHO MEAS - SI(TEICH): 20.7 ML/M^2
BH CV ECHO MEAS - SUP REN AO DIAM: 2 CM
BH CV ECHO MEAS - SV(AO): 160.5 ML
BH CV ECHO MEAS - SV(CUBED): 57.1 ML
BH CV ECHO MEAS - SV(LVOT): 53.6 ML
BH CV ECHO MEAS - SV(MOD-SP2): 50.5 ML
BH CV ECHO MEAS - SV(MOD-SP4): 54.7 ML
BH CV ECHO MEAS - SV(TEICH): 50.8 ML
BH CV ECHO MEAS - TAPSE (>1.6): 1.4 CM
BH CV ECHO MEASUREMENTS AVERAGE E/E' RATIO: 9.11
BH CV VAS BP LEFT ARM: NORMAL MMHG
BH CV XLRA - RV BASE: 2.8 CM
BH CV XLRA - RV MID: 1.7 CM
BH CV XLRA - TDI S': 13 CM/SEC
IVRT: 81 MSEC
LEFT ATRIUM VOLUME INDEX: 17 ML/M2
LEFT ATRIUM VOLUME: 41 CM3
LV EF 2D ECHO EST: 55 %

## 2021-11-30 NOTE — TELEPHONE ENCOUNTER
Called and notified, patient verbalized understanding.    -----------------------    Steven Hirsch MD Trent, Melissa, MA    Normal echocardiogram

## 2021-12-01 ENCOUNTER — TREATMENT (OUTPATIENT)
Dept: PHYSICAL THERAPY | Facility: CLINIC | Age: 31
End: 2021-12-01

## 2021-12-01 DIAGNOSIS — M62.81 MUSCLE WEAKNESS OF RIGHT UPPER EXTREMITY: ICD-10-CM

## 2021-12-01 DIAGNOSIS — M25.511 ACUTE PAIN OF RIGHT SHOULDER: Primary | ICD-10-CM

## 2021-12-01 PROCEDURE — 97530 THERAPEUTIC ACTIVITIES: CPT | Performed by: PHYSICAL THERAPIST

## 2021-12-01 PROCEDURE — 97110 THERAPEUTIC EXERCISES: CPT | Performed by: PHYSICAL THERAPIST

## 2021-12-01 NOTE — PROGRESS NOTES
"Physical Therapy Daily Treatment Note    Patient: Leslie Harris  : 1990  Referring Practitioner: Dominik Hernandez I*  Date of Initial Visit: Type: THERAPY  Noted: 10/25/2021  Today's Date: 2021  Patient seen for: 6 sessions      Visit Diagnoses:     ICD-10-CM ICD-9-CM   1. Acute pain of right shoulder  M25.511 719.41   2. Muscle weakness of right upper extremity  M62.81 728.87       Subjective   Leslie Harris reports that is doing really well, has had little to no pain and daily activities are becoming easier. Energy is also improving. States that she actually forgot to do her exercises for several days - due to the  holiday and her shoulder \"didn't remind her\" that she needed it. In the past her pain would remind her to do her ex's.     Pain Level (0-10): 0      Objective     See Exercise, Manual, and Modality Logs for complete treatment.       Assessment & Plan     Assessment    Assessment details: Leslie voices readiness to return to work full time and intends on speaking with her PCP about this next week. States that end of the year holidays will still limit this to 3-4 day work weeks, but will be a 5 day week after . With this she also voices readiness to transition to HEP management of symptoms. ROM is visually assessed to be WNL and she feels that strength is improving. She feels 3-5 core strengthening exercises would be manageable at home. Progressed all ex's to standing and with resistance, good tolerance noted, no pain, but does fatigue with reps. 1 sitting rest break required at end of session due to feeling of nausea (pt relates this to new med she has been taking and not to exercise fatigue/post covid symptoms).    Goals  Plan Goals: Plan Goals: STG's: 3 weeks   Patient will report a reduction in pain by >30% - MET  Patient will be able to perform HEP with minimal verbal cues - MET    LTG's: By discharge   Patient will report a reduction in pain by >65% - MET  Patient " will be able to write without increased shoulder pain - MET  Patient will have a >8 point improvement on the QuickDash to demonstrate overall improvement in daily functional level  Patient will be able to reach into overhead cabinet to get a dish without pain or difficulty - Progressing  Patient will be independent with undergarment dressing without pain - MET  Patient will be able to open a jar without pain or difficulty  Patient will be able to sleep > 5 hours without waking in pain - MET  Patient will be independent with HEP    Plan  Plan details: Pt to have 30 day progress note at next session. Would like to discuss potential d/c to HEP with 5 exercises for strengthening to perform 3-5x per week.       Progress per Plan of Care           Timed:         Manual Therapy:         mins  41773;     Therapeutic Exercise:    28     mins  84824;     Neuromuscular Bernadette:        mins  59250;    Therapeutic Activity:     10     mins  49167;     Gait Training:           mins  00367;     Ultrasound:          mins  62059;    Ionto                                   mins   76555  Self Care                            mins   93198      Un-Timed:  Electrical Stimulation:         mins  24216 ( );  Traction          mins 21819      Timed Treatment:   38   mins   Total Treatment:     38   mins      Cristina Rainey PTA  Physical Therapist Assistant  [place] License: [id number]

## 2021-12-07 ENCOUNTER — TREATMENT (OUTPATIENT)
Dept: PHYSICAL THERAPY | Facility: CLINIC | Age: 31
End: 2021-12-07

## 2021-12-07 DIAGNOSIS — M62.81 MUSCLE WEAKNESS OF RIGHT UPPER EXTREMITY: ICD-10-CM

## 2021-12-07 DIAGNOSIS — M25.511 ACUTE PAIN OF RIGHT SHOULDER: Primary | ICD-10-CM

## 2021-12-07 PROCEDURE — 97530 THERAPEUTIC ACTIVITIES: CPT | Performed by: PHYSICAL THERAPIST

## 2021-12-07 PROCEDURE — 97110 THERAPEUTIC EXERCISES: CPT | Performed by: PHYSICAL THERAPIST

## 2021-12-07 NOTE — PROGRESS NOTES
Physical Therapy Daily Progress Note / Discharge Summary    Patient: Leslie Harris  : 1990  Referring practitioner: Dominik Hernandez I*  Date of Initial Visit: Type: THERAPY  Noted: 10/25/2021  Today's Date: 2021  Patient seen for 7 sessions      Visit Diagnoses:    ICD-10-CM ICD-9-CM   1. Acute pain of right shoulder  M25.511 719.41   2. Muscle weakness of right upper extremity  M62.81 728.87       VISIT#: 7    Subjective   Leslie Harris reports that she hasn't really been haivng any pain.  She states that she is mainly concerned about lingering weakness.  She states that her pain is gone so sometimes she forgets to do her exercises.  She has also returned to work.  She is doing well at work and not complaining of shoulder issues.  Pain Rating (0-10): 0  Quick DASH: 6.82 points (previously 40.91)    Objective          Static Posture     Shoulders  Rounded.    Scapulae  Left protracted and right protracted.    Neurological Testing     Sensation     Shoulder     Right Shoulder   Intact: light touch    Active Range of Motion     Right Shoulder   Flexion: 174 degrees   Abduction: 175 degrees   External rotation 0°: 70 degrees   Internal rotation BTB: T7     Passive Range of Motion     Right Shoulder   Normal passive range of motion    Strength/Myotome Testing     Right Shoulder     Planes of Motion   Flexion: 5   Abduction: 5   External rotation at 0°: 4+   Internal rotation at 0°: 5     Left Elbow   Flexion: 4  Extension: 4    Right Elbow   Flexion: 4+  Extension: 4+    Left Wrist/Hand      (2nd hand position)     Trial 1: 73 lbs    Trial 2: 71.6 lbs    Trial 3: 72 lbs    Average: 72.2 lbs    Right Wrist/Hand      (2nd hand position)     Trial 1: 65.4 lbs    Trial 2: 63.2 lbs    Trial 3: 64.4 lbs    Average: 64.33 lbs        See Exercise, Manual, and Modality Logs for complete treatment.     Patient Education: Continue current HEP.  Issued updated handout and given green t-band for home  use.    Assessment & Plan     Assessment    Assessment details: Patient has attended 7 visits to date to address R shoulder pain.  She has met all goals at this time and has returned to work. She should do well with discharge and home exercise compliance at this time.     Goals  Plan Goals: Plan Goals: STG's: 3 weeks   Patient will report a reduction in pain by >30% - MET  Patient will be able to perform HEP with minimal verbal cues - MET    LTG's: By discharge   Patient will report a reduction in pain by >65% - MET  Patient will be able to write without increased shoulder pain - MET  Patient will have a >8 point improvement on the QuickDash to demonstrate overall improvement in daily functional level - MET  Patient will be able to reach into overhead cabinet to get a dish without pain or difficulty - MET  Patient will be independent with undergarment dressing without pain - MET  Patient will be able to open a jar without pain or difficulty- PARTIALLY MET  Patient will be able to sleep > 5 hours without waking in pain - MET  Patient will be independent with HEP - MET    Plan  Treatment plan discussed with: patient  Plan details: Discharge to final HEP at this time              Timed:         Manual Therapy:         mins  36331;     Therapeutic Exercise:    28     mins  91820;     Neuromuscular Bernadette:        mins  31528;    Therapeutic Activity:     10     mins  44106;     Gait Training:           mins  42063;     Ultrasound:          mins  62876;    Ionto                                   mins   18809  Self Care                            mins   59785  Canalith Repos                   mins  78078    Un-Timed:  Electrical Stimulation:         mins  72322 ( );  Dry Needling          mins 20103/11838  Traction          mins 55221  Re-Eval                               mins  57140    Timed Treatment:   38   mins   Total Treatment:     38   mins        Eleni Talamantes PT  Physical Therapist  [place] License:  [id number]

## 2021-12-08 ENCOUNTER — OFFICE VISIT (OUTPATIENT)
Dept: FAMILY MEDICINE CLINIC | Facility: CLINIC | Age: 31
End: 2021-12-08

## 2021-12-08 VITALS
HEART RATE: 90 BPM | TEMPERATURE: 98.2 F | DIASTOLIC BLOOD PRESSURE: 96 MMHG | SYSTOLIC BLOOD PRESSURE: 142 MMHG | HEIGHT: 65 IN | WEIGHT: 293 LBS | OXYGEN SATURATION: 97 % | BODY MASS INDEX: 48.82 KG/M2

## 2021-12-08 DIAGNOSIS — E11.69 TYPE 2 DIABETES MELLITUS WITH OTHER SPECIFIED COMPLICATION, WITHOUT LONG-TERM CURRENT USE OF INSULIN (HCC): ICD-10-CM

## 2021-12-08 DIAGNOSIS — U09.9 POST-COVID-19 CONDITION: ICD-10-CM

## 2021-12-08 DIAGNOSIS — Z00.00 ENCOUNTER FOR ANNUAL PHYSICAL EXAM: ICD-10-CM

## 2021-12-08 DIAGNOSIS — K76.0 HEPATIC STEATOSIS: Primary | ICD-10-CM

## 2021-12-08 DIAGNOSIS — R06.09 OTHER FORM OF DYSPNEA: ICD-10-CM

## 2021-12-08 PROCEDURE — 99395 PREV VISIT EST AGE 18-39: CPT | Performed by: FAMILY MEDICINE

## 2021-12-08 RX ORDER — PANTOPRAZOLE SODIUM 40 MG/1
40 TABLET, DELAYED RELEASE ORAL DAILY
Qty: 90 TABLET | Refills: 1 | Status: SHIPPED | OUTPATIENT
Start: 2021-12-08 | End: 2022-03-08 | Stop reason: SDUPTHER

## 2021-12-08 RX ORDER — TOPIRAMATE 25 MG/1
TABLET ORAL
Qty: 60 TABLET | Refills: 2 | Status: SHIPPED | OUTPATIENT
Start: 2021-12-08 | End: 2022-02-07

## 2021-12-08 RX ORDER — MULTIPLE VITAMINS W/ MINERALS TAB 9MG-400MCG
1 TAB ORAL DAILY
COMMUNITY

## 2021-12-08 RX ORDER — ANTIARTHRITIC COMBINATION NO.2 900 MG
TABLET ORAL DAILY
COMMUNITY

## 2021-12-08 RX ORDER — EZETIMIBE 10 MG/1
10 TABLET ORAL DAILY
Qty: 90 TABLET | Refills: 0 | Status: SHIPPED | OUTPATIENT
Start: 2021-12-08 | End: 2022-03-15 | Stop reason: SDUPTHER

## 2021-12-10 ENCOUNTER — PATIENT ROUNDING (BHMG ONLY) (OUTPATIENT)
Dept: FAMILY MEDICINE CLINIC | Facility: CLINIC | Age: 31
End: 2021-12-10

## 2021-12-10 NOTE — PROGRESS NOTES
December 10, 2021    Hello, may I speak with Leslie Harris?    My name is OTTO MORGAN    I am  with [facility]  [address] [facility] [place] [address].    Before we get started may I verify your date of birth? 1990    I am calling to officially welcome you to our practice and ask about your recent visit. Is this a good time to talk?     Tell me about your visit with us. What things went well?         We're always looking for ways to make our patients' experiences even better. Do you have recommendations on ways we may improve?      Overall were you satisfied with your first visit to our practice?        I appreciate you taking the time to speak with me today. Is there anything else I can do for you?    Thank you, and have a great day.    LEFT A VOICEMAIL ASKING PATIENT HOW HER APPOINTMENT WENT WITH US ON Wednesday. TOLD HER SHE CAN CONTACT ME IF SHE HAS CONCERNS OR IF SHE WOULD LET ME KNOW HOW HER VISIT WENT.

## 2021-12-14 ENCOUNTER — TRANSCRIBE ORDERS (OUTPATIENT)
Dept: FAMILY MEDICINE CLINIC | Facility: CLINIC | Age: 31
End: 2021-12-14

## 2021-12-14 DIAGNOSIS — Z01.818 OTHER SPECIFIED PRE-OPERATIVE EXAMINATION: Primary | ICD-10-CM

## 2021-12-26 NOTE — PROGRESS NOTES
Subjective   Leslie Harris is a 31 y.o. female.     Chief Complaint   Patient presents with   • Annual Exam       History of Present Illness   Pt here for annual exam  Current hx HTN,GERD,HLD, Morbid Obesity, DM  Labs and meds [id number]  PMHX Covid 19 with prolonged illness, had critical illness polyneuropathy, Acute respiratory failure, PNA due to Covid, Klebsiella, MrSALabs show hepatic steatosis  meds reviewed  She has returned to work and doing well    The following portions of the patient's history were reviewed and updated as appropriate: allergies, current medications, past family history, past medical history, past social history, past surgical history and problem list.    Past Medical History:   Diagnosis Date   • Acute respiratory failure due to COVID-19 (HCC) 7/22/2021   • Allergic    • Class 3 severe obesity without serious comorbidity with body mass index (BMI) of 50.0 to 59.9 in adult    • COVID-19 07/2021   • Critical illness polyneuropathy (HCC) 8/12/2021   • E. coli UTI (urinary tract infection) 8/7/2021   • Hypertension 7/22/2021   • Injury of back    • Klebsiella pneumoniae pneumonia (HCC) 8/4/2021   • MRSA pneumonia (HCC) 7/29/2021   • Pneumonia due to COVID-19 virus 7/22/2021       Past Surgical History:   Procedure Laterality Date   • BRONCHOSCOPY N/A 8/8/2021    Procedure: BRONCHOSCOPY with bilateral bronchial washing;  Surgeon: Jeff Feng MD;  Location: [facility] ENDOSCOPY;  Service: Pulmonary;  Laterality: N/A;  pre: respiratory failure, covid-19 pneumonia  post: respiratory failure, COVID-19 pneumonia   • CHOLECYSTECTOMY     • EXPLORATORY LAPAROTOMY W/ BOWEL RESECTION  2018    Due to complication of cholecystectomy in which patient had an accidental perforation of small bowel intraoperatively.       Family History   Problem Relation Age of Onset   • Diabetes Mother    • No Known Problems Father        Review of Systems   Respiratory: Positive for shortness of breath.        Objective    Physical Exam  Vitals and nursing note reviewed.   Constitutional:       General: She is not in acute distress.     Appearance: She is well-developed. She is obese. She is not diaphoretic.   HENT:      Head: Normocephalic and atraumatic.      Right Ear: External ear normal.      Left Ear: External ear normal.      Nose: Nose normal.   Eyes:      Conjunctiva/sclera: Conjunctivae normal.      Pupils: Pupils are equal, round, and reactive to light.   Neck:      Thyroid: No thyromegaly.   Cardiovascular:      Rate and Rhythm: Normal rate and regular rhythm.      Heart sounds: Normal heart sounds. No murmur heard.  No friction rub. No gallop.    Pulmonary:      Effort: Pulmonary effort is normal.      Breath sounds: Normal breath sounds. No wheezing.   Abdominal:      General: Bowel sounds are normal.      Palpations: Abdomen is soft.      Tenderness: There is no abdominal tenderness.   Musculoskeletal:         General: No tenderness or deformity. Normal range of motion.      Cervical back: Normal range of motion and neck supple.   Lymphadenopathy:      Cervical: No cervical adenopathy.   Skin:     General: Skin is warm and dry.      Capillary Refill: Capillary refill takes less than 2 seconds.      Findings: No rash.   Neurological:      Mental Status: She is alert and oriented to person, place, and time.      Cranial Nerves: No cranial nerve deficit.   Psychiatric:         Behavior: Behavior normal.         Thought Content: Thought content normal.         Judgment: Judgment normal.         Vitals:    12/08/21 1103   BP: 142/96   Pulse:    Temp:    SpO2:      Body mass index is 56.08 kg/m².    Hemoglobin A1C   Date Value Ref Range Status   09/29/2021 5.70 (H) 4.80 - 5.60 % Final   07/23/2021 6.9 (H) 3.5 - 5.6 % Final     Glucose   Date Value Ref Range Status   09/29/2021 133 (A) 70 - 130 mg/dL Final   08/24/2021 106 (H) 70 - 105 mg/dL Final     Comment:     Serial Number: [id number]:  [id number]   08/24/2021 117  (H) 70 - 105 mg/dL Final     Comment:     Serial Number: [id number]:  [id number]     LDL Cholesterol    Date Value Ref Range Status   10/27/2021 129 (H) 0 - 100 mg/dL Final   07/23/2021 41 0 - 100 mg/dL Final     TSH   Date Value Ref Range Status   09/29/2021 1.390 0.270 - 4.200 uIU/mL Final     Results for orders placed or performed in visit on 12/31/21   COVID-19,APTIMA PANTHER(BRYANT),[facility], NP/OP SWAB IN UTM/VTM/SALINE TRANSPORT MEDIA,24 HR TAT - Swab, Nasopharynx    Specimen: Nasopharynx; Swab   Result Value Ref Range    COVID19 Not Detected Not Detected - Ref. Range   Results for orders placed or performed during the hospital encounter of 11/29/21   Adult Transthoracic Echo Complete W/ Cont if Necessary Per Protocol   Result Value Ref Range    BSA 2.5 m^2    IVSd 1.4 cm    LVIDd 4.5 cm    LVIDs 3.2 cm    LVPWd 1.3 cm    IVS/LVPW 1.1     FS 29.1 %    EDV(Teich) 90.5 ml    ESV(Teich) 39.8 ml    EF(Teich) 56.1 %    EDV(cubed) 88.7 ml    ESV(cubed) 31.6 ml    EF(cubed) 64.4 %    LV mass(C)d 230.8 grams    LV mass(C)dI 94.2 grams/m^2    SV(Teich) 50.8 ml    SI(Teich) 20.7 ml/m^2    SV(cubed) 57.1 ml    SI(cubed) 23.3 ml/m^2    Ao root diam 3.2 cm    Ao root area 8.2 cm^2    LA dimension 3.8 cm    asc Aorta Diam 3.3 cm    LA/Ao 1.2     LVOT diam 2.1 cm    LVOT area 3.5 cm^2    EDV(MOD-sp4) 99.9 ml    ESV(MOD-sp4) 45.2 ml    EF(MOD-sp4) 54.8 %    EDV(MOD-sp2) 95.4 ml    ESV(MOD-sp2) 44.9 ml    EF(MOD-sp2) 53.0 %    SV(MOD-sp4) 54.7 ml    SI(MOD-sp4) 22.3 ml/m^2    SV(MOD-sp2) 50.5 ml    SI(MOD-sp2) 20.6 ml/m^2    Ao root area (BSA corrected) 1.3     LV Hernandez Vol (BSA corrected) 40.8 ml/m^2    LV Sys Vol (BSA corrected) 18.4 ml/m^2    MV E max quentin 68.3 cm/sec    MV A max quentin 84.9 cm/sec    MV E/A 0.8     LV IVRT 0.08 sec    MV V2 max 96.9 cm/sec    MV max PG 3.8 mmHg    MV V2 mean 65.8 cm/sec    MV mean PG 1.9 mmHg    MV V2 VTI 11.8 cm    MVA(VTI) 4.6 cm^2    MV dec slope 260.8 cm/sec^2    MV dec time 0.26  sec    Ao pk marty 121.3 cm/sec    Ao max PG 5.9 mmHg    Ao max PG (full) 3.0 mmHg    Ao V2 mean 86.6 cm/sec    Ao mean PG 3.3 mmHg    Ao mean PG (full) 1.4 mmHg    Ao V2 VTI 19.6 cm    JEFFRY(I,A) 2.7 cm^2    JEFFRY(I,D) 2.7 cm^2    JEFFRY(V,A) 2.5 cm^2    JEFFRY(V,D) 2.5 cm^2    LV V1 max PG 2.9 mmHg    LV V1 mean PG 1.9 mmHg    LV V1 max 85.6 cm/sec    LV V1 mean 65.7 cm/sec    LV V1 VTI 15.4 cm    SV(Ao) 160.5 ml    SI(Ao) 65.5 ml/m^2    SV(LVOT) 53.6 ml    SI(LVOT) 21.9 ml/m^2    PA V2 max 91.8 cm/sec    PA max PG 3.4 mmHg    PA max PG (full) 0.97 mmHg    PA V2 mean 65.1 cm/sec    PA mean PG 1.8 mmHg    PA mean PG (full) 0.65 mmHg    PA V2 VTI 16.1 cm    PA acc time 0.08 sec    RV V1 max PG 2.4 mmHg    RV V1 mean PG 1.2 mmHg    RV V1 max 77.5 cm/sec    RV V1 mean 51.8 cm/sec    RV V1 VTI 12.8 cm    PA pr(Accel) 44.5 mmHg     CV ECHO KALYANI - BZI_BMI 55.1 kilograms/m^2     CV ECHO KALYANI - BSA([facility]) 2.7 m^2     CV ECHO KALYANI - BZI_METRIC_WEIGHT 150.1 kg     CV ECHO KALYANI - BZI_METRIC_HEIGHT 165.1 cm     CV VAS BP LEFT /104 mmHg    RV S' 13.00 cm/sec    RV Base 2.80 cm    RV Mid 1.70 cm    LA volume 41.0 cm3    Ascending aorta 3.3 cm    IVRT 81.0 msec    LA Volume Index 17.0 mL/m2    Avg E/e' ratio 9.11     EF(MOD-bp) 56.0 %    Lat Peak E' Marty 7.0 cm/sec    Med Peak E' Marty 8.00 cm/sec    MV P1/2t 49 msec    PA acc slope 892 cm/sec2    Abdo Ao Diam 2.0 cm    LA dimension(2D) 3.9 cm    MVA(P1/2t) 4.5 cm2    TAPSE (>1.6) 1.40 cm    Echo EF Estimated 55 %     *Note: Due to a large number of results and/or encounters for the requested time period, some results have not been displayed. A complete set of results can be found in Results Review.       Assessment/Plan   Diagnoses and all orders for this visit:    1. Hepatic steatosis (Primary)  -     ezetimibe (Zetia) 10 MG tablet; Take 1 tablet by mouth Daily.  Dispense: 90 tablet; Refill: 0    2. 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 BMI 50.0-59.9, adult (HCC)    Other orders  -     metoprolol tartrate (LOPRESSOR) 25 MG tablet; Take 1 tablet by mouth Daily.  Dispense: 90 tablet; Refill: 0  -     topiramate (TOPAMAX) 25 MG tablet; Take 1 tablet daily for 7 days, then take 2 daily  Dispense: 60 tablet; Refill: 2  -     pantoprazole (Protonix) 40 MG EC tablet; Take 1 tablet by mouth Daily.  Dispense: 90 tablet; Refill: 1      Schedule PFT  Weight loss recommended for good health  Low carb, low cholesterol diet  Increase exercise  Trial topiramate for help with weight loss  Consider bariatrc clinic referral  zetia for HLD, hepatioc steatosis  Rx metoprolol for HTN  Recommend Covid vaccine

## 2021-12-29 ENCOUNTER — TELEPHONE (OUTPATIENT)
Dept: FAMILY MEDICINE CLINIC | Facility: CLINIC | Age: 31
End: 2021-12-29

## 2021-12-29 NOTE — TELEPHONE ENCOUNTER
DOMINGO WITH [employer] CALLED AND CALLING TO GET ORDER FOR COVID TEST. THERE IS AN ORDER IN THERE BUT IT NEEDS TO BE SIGNED. OR A NEW ORDER SENT. PATIENT IS SCHEDULED FOR PFT ON 1/4/22    CALL BACK NUMBER [phone]

## 2021-12-31 ENCOUNTER — LAB (OUTPATIENT)
Dept: LAB | Facility: HOSPITAL | Age: 31
End: 2021-12-31

## 2021-12-31 DIAGNOSIS — Z01.818 OTHER SPECIFIED PRE-OPERATIVE EXAMINATION: ICD-10-CM

## 2021-12-31 PROCEDURE — U0004 COV-19 TEST NON-CDC HGH THRU: HCPCS

## 2021-12-31 PROCEDURE — C9803 HOPD COVID-19 SPEC COLLECT: HCPCS

## 2022-01-01 ENCOUNTER — APPOINTMENT (OUTPATIENT)
Dept: LAB | Facility: HOSPITAL | Age: 32
End: 2022-01-01

## 2022-01-01 LAB — SARS-COV-2 ORF1AB RESP QL NAA+PROBE: NOT DETECTED

## 2022-01-04 ENCOUNTER — HOSPITAL ENCOUNTER (OUTPATIENT)
Dept: RESPIRATORY THERAPY | Facility: HOSPITAL | Age: 32
Discharge: HOME | End: 2022-01-04
Admitting: FAMILY MEDICINE

## 2022-01-04 DIAGNOSIS — R06.09 OTHER FORM OF DYSPNEA: ICD-10-CM

## 2022-01-04 DIAGNOSIS — U09.9 POST-COVID-19 CONDITION: ICD-10-CM

## 2022-01-04 PROCEDURE — 94729 DIFFUSING CAPACITY: CPT

## 2022-01-04 PROCEDURE — 94727 GAS DIL/WSHOT DETER LNG VOL: CPT

## 2022-01-04 PROCEDURE — 94060 EVALUATION OF WHEEZING: CPT

## 2022-01-04 RX ORDER — ALBUTEROL SULFATE 90 UG/1
2 AEROSOL, METERED RESPIRATORY (INHALATION) ONCE
Status: COMPLETED | OUTPATIENT
Start: 2022-01-04 | End: 2022-01-04

## 2022-01-04 RX ADMIN — ALBUTEROL SULFATE 2 PUFF: 108 INHALANT RESPIRATORY (INHALATION) at 08:44

## 2022-01-26 ENCOUNTER — TELEPHONE (OUTPATIENT)
Dept: FAMILY MEDICINE CLINIC | Facility: CLINIC | Age: 32
End: 2022-01-26

## 2022-01-26 NOTE — TELEPHONE ENCOUNTER
----- Message from RT Shira sent at 1/25/2022  9:19 AM EST -----    ----- Message -----  From: Dona Brumfield MD  Sent: 1/25/2022   7:03 AM EST  To: [facility]    Mild restrictive lung disease on PFT

## 2022-02-01 ENCOUNTER — TELEPHONE (OUTPATIENT)
Dept: FAMILY MEDICINE CLINIC | Facility: CLINIC | Age: 32
End: 2022-02-01

## 2022-02-07 RX ORDER — TOPIRAMATE 25 MG/1
25 TABLET ORAL 2 TIMES DAILY
Qty: 180 TABLET | Refills: 0 | Status: SHIPPED | OUTPATIENT
Start: 2022-02-07 | End: 2022-05-04

## 2022-03-08 ENCOUNTER — OFFICE VISIT (OUTPATIENT)
Dept: FAMILY MEDICINE CLINIC | Facility: CLINIC | Age: 32
End: 2022-03-08

## 2022-03-08 VITALS
HEART RATE: 84 BPM | OXYGEN SATURATION: 98 % | SYSTOLIC BLOOD PRESSURE: 132 MMHG | TEMPERATURE: 98.9 F | HEIGHT: 65 IN | WEIGHT: 293 LBS | DIASTOLIC BLOOD PRESSURE: 88 MMHG | BODY MASS INDEX: 48.82 KG/M2

## 2022-03-08 DIAGNOSIS — R79.89 ELEVATED LFTS: Primary | ICD-10-CM

## 2022-03-08 DIAGNOSIS — E11.69 TYPE 2 DIABETES MELLITUS WITH OTHER SPECIFIED COMPLICATION, WITHOUT LONG-TERM CURRENT USE OF INSULIN: ICD-10-CM

## 2022-03-08 DIAGNOSIS — E78.5 DYSLIPIDEMIA: ICD-10-CM

## 2022-03-08 DIAGNOSIS — K21.9 GASTROESOPHAGEAL REFLUX DISEASE, UNSPECIFIED WHETHER ESOPHAGITIS PRESENT: ICD-10-CM

## 2022-03-08 LAB — HBA1C MFR BLD: 6.5 % (ref 3.5–5.6)

## 2022-03-08 PROCEDURE — 85025 COMPLETE CBC W/AUTO DIFF WBC: CPT | Performed by: FAMILY MEDICINE

## 2022-03-08 PROCEDURE — 36415 COLL VENOUS BLD VENIPUNCTURE: CPT | Performed by: FAMILY MEDICINE

## 2022-03-08 PROCEDURE — 99214 OFFICE O/P EST MOD 30 MIN: CPT | Performed by: FAMILY MEDICINE

## 2022-03-08 PROCEDURE — 83036 HEMOGLOBIN GLYCOSYLATED A1C: CPT | Performed by: FAMILY MEDICINE

## 2022-03-08 PROCEDURE — 80053 COMPREHEN METABOLIC PANEL: CPT | Performed by: FAMILY MEDICINE

## 2022-03-08 PROCEDURE — 80061 LIPID PANEL: CPT | Performed by: FAMILY MEDICINE

## 2022-03-08 RX ORDER — DAPAGLIFLOZIN AND METFORMIN HYDROCHLORIDE 2.5; 1 MG/1; MG/1
1 TABLET, FILM COATED, EXTENDED RELEASE ORAL DAILY
Qty: 14 TABLET | Refills: 0 | COMMUNITY
Start: 2022-03-08 | End: 2022-09-12

## 2022-03-08 RX ORDER — PANTOPRAZOLE SODIUM 40 MG/1
40 TABLET, DELAYED RELEASE ORAL DAILY
Qty: 90 TABLET | Refills: 1 | Status: SHIPPED | OUTPATIENT
Start: 2022-03-08 | End: 2022-05-18 | Stop reason: SDUPTHER

## 2022-03-08 NOTE — PROGRESS NOTES
Venipuncture Blood Specimen Collection  Venipuncture performed in R gina by Noble Dudley CMA with good hemostasis. Patient tolerated the procedure well without complications.   03/08/22   Noble Dudley CMA

## 2022-03-09 LAB
ALBUMIN SERPL-MCNC: 4.6 G/DL (ref 3.5–5.2)
ALBUMIN/GLOB SERPL: 1.5 G/DL
ALP SERPL-CCNC: 116 U/L (ref 39–117)
ALT SERPL W P-5'-P-CCNC: 59 U/L (ref 1–33)
ANION GAP SERPL CALCULATED.3IONS-SCNC: 10 MMOL/L (ref 5–15)
AST SERPL-CCNC: 47 U/L (ref 1–32)
BASOPHILS # BLD AUTO: 0.04 10*3/MM3 (ref 0–0.2)
BASOPHILS NFR BLD AUTO: 0.4 % (ref 0–1.5)
BILIRUB SERPL-MCNC: 0.6 MG/DL (ref 0–1.2)
BUN SERPL-MCNC: 9 MG/DL (ref 6–20)
BUN/CREAT SERPL: 15.3 (ref 7–25)
CALCIUM SPEC-SCNC: 9.9 MG/DL (ref 8.6–10.5)
CHLORIDE SERPL-SCNC: 102 MMOL/L (ref 98–107)
CHOLEST SERPL-MCNC: 174 MG/DL (ref 0–200)
CO2 SERPL-SCNC: 27 MMOL/L (ref 22–29)
CREAT SERPL-MCNC: 0.59 MG/DL (ref 0.57–1)
DEPRECATED RDW RBC AUTO: 43 FL (ref 37–54)
EGFRCR SERPLBLD CKD-EPI 2021: 123 ML/MIN/1.73
EOSINOPHIL # BLD AUTO: 0.17 10*3/MM3 (ref 0–0.4)
EOSINOPHIL NFR BLD AUTO: 1.7 % (ref 0.3–6.2)
ERYTHROCYTE [DISTWIDTH] IN BLOOD BY AUTOMATED COUNT: 13.5 % (ref 12.3–15.4)
GLOBULIN UR ELPH-MCNC: 3.1 GM/DL
GLUCOSE SERPL-MCNC: 84 MG/DL (ref 65–99)
HCT VFR BLD AUTO: 41.4 % (ref 34–46.6)
HDLC SERPL-MCNC: 40 MG/DL (ref 40–60)
HGB BLD-MCNC: 13.4 G/DL (ref 12–15.9)
IMM GRANULOCYTES # BLD AUTO: 0.02 10*3/MM3 (ref 0–0.05)
IMM GRANULOCYTES NFR BLD AUTO: 0.2 % (ref 0–0.5)
LDLC SERPL CALC-MCNC: 108 MG/DL (ref 0–100)
LDLC/HDLC SERPL: 2.62 {RATIO}
LYMPHOCYTES # BLD AUTO: 2.19 10*3/MM3 (ref 0.7–3.1)
LYMPHOCYTES NFR BLD AUTO: 21.9 % (ref 19.6–45.3)
MCH RBC QN AUTO: 28.2 PG (ref 26.6–33)
MCHC RBC AUTO-ENTMCNC: 32.4 G/DL (ref 31.5–35.7)
MCV RBC AUTO: 87.2 FL (ref 79–97)
MONOCYTES # BLD AUTO: 0.46 10*3/MM3 (ref 0.1–0.9)
MONOCYTES NFR BLD AUTO: 4.6 % (ref 5–12)
NEUTROPHILS NFR BLD AUTO: 7.13 10*3/MM3 (ref 1.7–7)
NEUTROPHILS NFR BLD AUTO: 71.2 % (ref 42.7–76)
NRBC BLD AUTO-RTO: 0 /100 WBC (ref 0–0.2)
PLATELET # BLD AUTO: 250 10*3/MM3 (ref 140–450)
PMV BLD AUTO: 10.7 FL (ref 6–12)
POTASSIUM SERPL-SCNC: 4.3 MMOL/L (ref 3.5–5.2)
PROT SERPL-MCNC: 7.7 G/DL (ref 6–8.5)
RBC # BLD AUTO: 4.75 10*6/MM3 (ref 3.77–5.28)
SODIUM SERPL-SCNC: 139 MMOL/L (ref 136–145)
TRIGL SERPL-MCNC: 147 MG/DL (ref 0–150)
VLDLC SERPL-MCNC: 26 MG/DL (ref 5–40)
WBC NRBC COR # BLD: 10.01 10*3/MM3 (ref 3.4–10.8)

## 2022-03-09 NOTE — PROGRESS NOTES
Subjective   Leslie Harris is a 31 y.o. female.     Chief Complaint   Patient presents with   • Heartburn   • Hypertension   • Follow-up       History of Present Illness   The patient provided verbal consent to the use of MARCY services during today's visit.    The patient presents today for follow up.    The patient is taking metoprolol and Topamax as prescribed. She states that she is eating less; however, she has never been having issues with portions before she eats a large amount. The patient reports that she took the Topamax once daily for 1 week to make sure she could tolerate it. She states that she then went to 2 tablets daily. The patient reports that when she ran out of the Topamax, she got a new prescription that told her to take 1 tablet in the morning and 1 tablet at night. She states that she had been taking 2 tablets in the morning. The patient reports that when she was taking 2 tablets at once, it worked better for her, but she did have headaches. She states that she prefers to take all of the medications in the morning as she forgets her evening dose.     She states that she has never taken metformin. The patient reports that she has tried Rybelsus in the past; however, it made her sick. She says that she does eat a lot of carbohydrates. The patient notes that she was walking for exercise; however, she has not continued this due to the cold weather. She states that she is going to try to start walking again. The patient reports that she does intermittent fasting because she does not eat breakfast regularly.     The patient is taking Zetia daily. She denies eating a lot of red meat. She is not taking fish oil.     The patient reports that she has a vein that is raised after having a midline placed. The patient reports that it is not painful.     The patient reports that she has a large scar on her face from a previous procedure. She states that is bothersome to her due to pruritus.   The following  portions of the patient's history were reviewed and updated as appropriate: allergies, current medications, past family history, past medical history, past social history, past surgical history and problem list.    Past Medical History:   Diagnosis Date   • Acute respiratory failure due to COVID-19 (HCC) 7/22/2021   • Allergic    • Class 3 severe obesity without serious comorbidity with body mass index (BMI) of 50.0 to 59.9 in adult    • COVID-19 07/2021   • Critical illness polyneuropathy (HCC) 8/12/2021   • E. coli UTI (urinary tract infection) 8/7/2021   • Hypertension 7/22/2021   • Injury of back    • Klebsiella pneumoniae pneumonia (HCC) 8/4/2021   • MRSA pneumonia ([facility]) 7/29/2021   • Pneumonia due to COVID-19 virus 7/22/2021       Past Surgical History:   Procedure Laterality Date   • BRONCHOSCOPY N/A 8/8/2021    Procedure: BRONCHOSCOPY with bilateral bronchial washing;  Surgeon: Jeff Feng MD;  Location: [facility] ENDOSCOPY;  Service: Pulmonary;  Laterality: N/A;  pre: respiratory failure, covid-19 pneumonia  post: respiratory failure, COVID-19 pneumonia   • CHOLECYSTECTOMY     • EXPLORATORY LAPAROTOMY W/ BOWEL RESECTION  2018    Due to complication of cholecystectomy in which patient had an accidental perforation of small bowel intraoperatively.       Family History   Problem Relation Age of Onset   • Diabetes Mother    • No Known Problems Father        Review of Systems  All systems were reviewed and are negative otherwise what is listed in the HPI.  Objective   Physical Exam  General Appearance: alert, oriented x 3, no acute distress.  Skin: warm and dry.  HEENT: Atraumatic. pupils round and reactive to light and accommodation, oral mucosa pink and moist. Nares patent without epistaxis. External auditory canals are patent tympanic membranes intact.  Neck: supple, no JVD, trachea midline. No thyromegaly  Lungs: CTA, unlabored breathing effort.  Heart: RRR, normal S1 and S2, no S3, no rub.  Abdomen: soft,  non-tender, no palpable bladder, present bowel sounds to auscultation ×4. No guarding or rigidity.  Extremities: no clubbing, cyanosis or edema. Good range of motion actively and passively. Symmetric muscle strength and development  Neuro: normal speech and mental status. Cranial nerves II through XII intact. No anosmia. DTR 2+; proprioception intact. No focal motor/sensory deficits.    Vitals:    03/08/22 1104   BP: 132/88   Pulse: 84   Temp: 98.9 °F (37.2 °C)   SpO2: 98%     Body mass index is 58.58 kg/m².    Hemoglobin A1C   Date Value Ref Range Status   03/08/2022 6.5 (H) 3.5 - 5.6 % Final   09/29/2021 5.70 (H) 4.80 - 5.60 % Final   07/23/2021 6.9 (H) 3.5 - 5.6 % Final     Glucose   Date Value Ref Range Status   09/29/2021 133 (A) 70 - 130 mg/dL Final   08/24/2021 106 (H) 70 - 105 mg/dL Final     Comment:     Serial Number: [id number]:  [id number]   08/24/2021 117 (H) 70 - 105 mg/dL Final     Comment:     Serial Number: [id number]:  [id number]     LDL Cholesterol    Date Value Ref Range Status   03/08/2022 108 (H) 0 - 100 mg/dL Final   10/27/2021 129 (H) 0 - 100 mg/dL Final   07/23/2021 41 0 - 100 mg/dL Final     TSH   Date Value Ref Range Status   09/29/2021 1.390 0.270 - 4.200 uIU/mL Final     Results for orders placed or performed in visit on 03/08/22   CBC Auto Differential    Specimen: Arm, Right; Blood   Result Value Ref Range    WBC 10.01 3.40 - 10.80 10*3/mm3    RBC 4.75 3.77 - 5.28 10*6/mm3    Hemoglobin 13.4 12.0 - 15.9 g/dL    Hematocrit 41.4 34.0 - 46.6 %    MCV 87.2 79.0 - 97.0 fL    MCH 28.2 26.6 - 33.0 pg    MCHC 32.4 31.5 - 35.7 g/dL    RDW 13.5 12.3 - 15.4 %    RDW-SD 43.0 37.0 - 54.0 fl    MPV 10.7 6.0 - 12.0 fL    Platelets 250 140 - 450 10*3/mm3    Neutrophil % 71.2 42.7 - 76.0 %    Lymphocyte % 21.9 19.6 - 45.3 %    Monocyte % 4.6 (L) 5.0 - 12.0 %    Eosinophil % 1.7 0.3 - 6.2 %    Basophil % 0.4 0.0 - 1.5 %    Immature Grans % 0.2 0.0 - 0.5 %    Neutrophils, Absolute 7.13 (H)  1.70 - 7.00 10*3/mm3    Lymphocytes, Absolute 2.19 0.70 - 3.10 10*3/mm3    Monocytes, Absolute 0.46 0.10 - 0.90 10*3/mm3    Eosinophils, Absolute 0.17 0.00 - 0.40 10*3/mm3    Basophils, Absolute 0.04 0.00 - 0.20 10*3/mm3    Immature Grans, Absolute 0.02 0.00 - 0.05 10*3/mm3    nRBC 0.0 0.0 - 0.2 /100 WBC   Hemoglobin A1c    Specimen: Arm, Right; Blood   Result Value Ref Range    Hemoglobin A1C 6.5 (H) 3.5 - 5.6 %   Lipid Panel    Specimen: Arm, Right; Blood   Result Value Ref Range    Total Cholesterol 174 0 - 200 mg/dL    Triglycerides 147 0 - 150 mg/dL    HDL Cholesterol 40 40 - 60 mg/dL    LDL Cholesterol  108 (H) 0 - 100 mg/dL    VLDL Cholesterol 26 5 - 40 mg/dL    LDL/HDL Ratio 2.62    Comprehensive metabolic panel    Specimen: Arm, Right; Blood   Result Value Ref Range    Glucose 84 65 - 99 mg/dL    BUN 9 6 - 20 mg/dL    Creatinine 0.59 0.57 - 1.00 mg/dL    Sodium 139 136 - 145 mmol/L    Potassium 4.3 3.5 - 5.2 mmol/L    Chloride 102 98 - 107 mmol/L    CO2 27.0 22.0 - 29.0 mmol/L    Calcium 9.9 8.6 - 10.5 mg/dL    Total Protein 7.7 6.0 - 8.5 g/dL    Albumin 4.60 3.50 - 5.20 g/dL    ALT (SGPT) 59 (H) 1 - 33 U/L    AST (SGOT) 47 (H) 1 - 32 U/L    Alkaline Phosphatase 116 39 - 117 U/L    Total Bilirubin 0.6 0.0 - 1.2 mg/dL    Globulin 3.1 gm/dL    A/G Ratio 1.5 g/dL    BUN/Creatinine Ratio 15.3 7.0 - 25.0    Anion Gap 10.0 5.0 - 15.0 mmol/L    eGFR 123.0 >60.0 mL/min/1.73   Results for orders placed or performed in visit on 12/31/21   COVID-19,APTIMA PANTHER(BRYANT),[facility] MELISSA/ FRED, NP/OP SWAB IN UTM/VTM/SALINE TRANSPORT MEDIA,24 HR TAT - Swab, Nasopharynx    Specimen: Nasopharynx; Swab   Result Value Ref Range    COVID19 Not Detected Not Detected - Ref. Range   Results for orders placed or performed during the hospital encounter of 11/29/21   Adult Transthoracic Echo Complete W/ Cont if Necessary Per Protocol   Result Value Ref Range    BSA 2.5 m^2    IVSd 1.4 cm    LVIDd 4.5 cm    LVIDs 3.2 cm    LVPWd 1.3 cm     IVS/LVPW 1.1     FS 29.1 %    EDV(Teich) 90.5 ml    ESV(Teich) 39.8 ml    EF(Teich) 56.1 %    EDV(cubed) 88.7 ml    ESV(cubed) 31.6 ml    EF(cubed) 64.4 %    LV mass(C)d 230.8 grams    LV mass(C)dI 94.2 grams/m^2    SV(Teich) 50.8 ml    SI(Teich) 20.7 ml/m^2    SV(cubed) 57.1 ml    SI(cubed) 23.3 ml/m^2    Ao root diam 3.2 cm    Ao root area 8.2 cm^2    LA dimension 3.8 cm    asc Aorta Diam 3.3 cm    LA/Ao 1.2     LVOT diam 2.1 cm    LVOT area 3.5 cm^2    EDV(MOD-sp4) 99.9 ml    ESV(MOD-sp4) 45.2 ml    EF(MOD-sp4) 54.8 %    EDV(MOD-sp2) 95.4 ml    ESV(MOD-sp2) 44.9 ml    EF(MOD-sp2) 53.0 %    SV(MOD-sp4) 54.7 ml    SI(MOD-sp4) 22.3 ml/m^2    SV(MOD-sp2) 50.5 ml    SI(MOD-sp2) 20.6 ml/m^2    Ao root area (BSA corrected) 1.3     LV Hernandez Vol (BSA corrected) 40.8 ml/m^2    LV Sys Vol (BSA corrected) 18.4 ml/m^2    MV E max quentin 68.3 cm/sec    MV A max quentin 84.9 cm/sec    MV E/A 0.8     LV IVRT 0.08 sec    MV V2 max 96.9 cm/sec    MV max PG 3.8 mmHg    MV V2 mean 65.8 cm/sec    MV mean PG 1.9 mmHg    MV V2 VTI 11.8 cm    MVA(VTI) 4.6 cm^2    MV dec slope 260.8 cm/sec^2    MV dec time 0.26 sec    Ao pk quentin 121.3 cm/sec    Ao max PG 5.9 mmHg    Ao max PG (full) 3.0 mmHg    Ao V2 mean 86.6 cm/sec    Ao mean PG 3.3 mmHg    Ao mean PG (full) 1.4 mmHg    Ao V2 VTI 19.6 cm    JEFFRY(I,A) 2.7 cm^2    JEFFRY(I,D) 2.7 cm^2    JEFFRY(V,A) 2.5 cm^2    JEFFRY(V,D) 2.5 cm^2    LV V1 max PG 2.9 mmHg    LV V1 mean PG 1.9 mmHg    LV V1 max 85.6 cm/sec    LV V1 mean 65.7 cm/sec    LV V1 VTI 15.4 cm    SV(Ao) 160.5 ml    SI(Ao) 65.5 ml/m^2    SV(LVOT) 53.6 ml    SI(LVOT) 21.9 ml/m^2    PA V2 max 91.8 cm/sec    PA max PG 3.4 mmHg    PA max PG (full) 0.97 mmHg    PA V2 mean 65.1 cm/sec    PA mean PG 1.8 mmHg    PA mean PG (full) 0.65 mmHg    PA V2 VTI 16.1 cm    PA acc time 0.08 sec    RV V1 max PG 2.4 mmHg    RV V1 mean PG 1.2 mmHg    RV V1 max 77.5 cm/sec    RV V1 mean 51.8 cm/sec    RV V1 VTI 12.8 cm    PA pr(Accel) 44.5 mmHg    BH CV ECHO KALYANI -  BZI_BMI 55.1 kilograms/m^2     CV ECHO KALYANI - BSA(HAYCOCK) 2.7 m^2    BH CV ECHO KALYANI - BZI_METRIC_WEIGHT 150.1 kg    BH CV ECHO KALYANI - BZI_METRIC_HEIGHT 165.1 cm    BH CV VAS BP LEFT /104 mmHg    RV S' 13.00 cm/sec    RV Base 2.80 cm    RV Mid 1.70 cm    LA volume 41.0 cm3    Ascending aorta 3.3 cm    IVRT 81.0 msec    LA Volume Index 17.0 mL/m2    Avg E/e' ratio 9.11     EF(MOD-bp) 56.0 %    Lat Peak E' Marty 7.0 cm/sec    Med Peak E' Marty 8.00 cm/sec    MV P1/2t 49 msec    PA acc slope 892 cm/sec2    Abdo Ao Diam 2.0 cm    LA dimension(2D) 3.9 cm    MVA(P1/2t) 4.5 cm2    TAPSE (>1.6) 1.40 cm    Echo EF Estimated 55 %     *Note: Due to a large number of results and/or encounters for the requested time period, some results have not been displayed. A complete set of results can be found in Results Review.       Assessment/Plan   Diagnoses and all orders for this visit:    1. Elevated LFTs (Primary)  -     Comprehensive metabolic panel; Future  -     Comprehensive metabolic panel    2. Type 2 diabetes mellitus with other specified complication, without long-term current use of insulin (HCC)  -     CBC & Differential  -     Comprehensive metabolic panel; Future  -     Hemoglobin A1c  -     Comprehensive metabolic panel    3. Dyslipidemia  -     Lipid Panel; Future  -     Lipid Panel    4. Gastroesophageal reflux disease, unspecified whether esophagitis present  -     pantoprazole (Protonix) 40 MG EC tablet; Take 1 tablet by mouth Daily.  Dispense: 90 tablet; Refill: 1    Other orders  -     Dapagliflozin-metFORMIN HCl ER (Xigduo XR) 2.5-1000 MG tablet sustained-release 24 hour; Take 1 tablet by mouth Daily.  Dispense: 14 tablet; Refill: 0      1. Prediabetes.  Patient to begin taking metformin XR 1000 mg daily. A prescription was sent to her preferred pharmacy today. Encouraged that she continue to monitor her carbohydrate intake and increase her activity level. I recommended the Sagge Pal yocasta. Her previous  A1c was 5.7.     2. Hypertension.  Patient to continue taking metoprolol as prescribed. A refill was sent to her preferred pharmacy today.    3. Hyperlipidemia.  Patient to continue taking Zetia as prescribed. I recommended that she start taking fish oil. We will order labs today.     4. Vitamin D deficiency.  Patient to continue taking vitamin D as prescribed. A prescription was sent to her preferred Pharmacy today.    5. Facial lesion.  I recommended vitamin E oil or Mederma scar treatment to the area.     The patient will follow up in 3 months.    Transcribed from ambient dictation for Dona Brumfield MD by Lzi Paulson.  03/09/22   15:39 EST    Patient verbalized consent to the visit recording.

## 2022-03-15 DIAGNOSIS — K76.0 HEPATIC STEATOSIS: ICD-10-CM

## 2022-03-15 RX ORDER — EZETIMIBE 10 MG/1
10 TABLET ORAL DAILY
Qty: 90 TABLET | Refills: 0 | Status: SHIPPED | OUTPATIENT
Start: 2022-03-15 | End: 2022-05-18 | Stop reason: SDUPTHER

## 2022-05-04 RX ORDER — TOPIRAMATE 25 MG/1
TABLET ORAL
Qty: 180 TABLET | Refills: 0 | Status: SHIPPED | OUTPATIENT
Start: 2022-05-04 | End: 2022-09-12

## 2022-05-18 DIAGNOSIS — K21.9 GASTROESOPHAGEAL REFLUX DISEASE, UNSPECIFIED WHETHER ESOPHAGITIS PRESENT: ICD-10-CM

## 2022-05-18 DIAGNOSIS — K76.0 HEPATIC STEATOSIS: ICD-10-CM

## 2022-05-18 RX ORDER — PANTOPRAZOLE SODIUM 40 MG/1
40 TABLET, DELAYED RELEASE ORAL DAILY
Qty: 90 TABLET | Refills: 0 | Status: SHIPPED | OUTPATIENT
Start: 2022-05-18 | End: 2022-09-12 | Stop reason: SDUPTHER

## 2022-05-18 RX ORDER — EZETIMIBE 10 MG/1
10 TABLET ORAL DAILY
Qty: 90 TABLET | Refills: 0 | Status: SHIPPED | OUTPATIENT
Start: 2022-05-18 | End: 2022-09-12 | Stop reason: SDUPTHER

## 2022-05-18 NOTE — TELEPHONE ENCOUNTER
Caller: Leslie Harris    Relationship: Self    Best call back number: [phone]    Requested Prescriptions:   Requested Prescriptions     Pending Prescriptions Disp Refills   • metoprolol tartrate (LOPRESSOR) 25 MG tablet 90 tablet 0     Sig: Take 1 tablet by mouth Daily.   • ezetimibe (Zetia) 10 MG tablet 90 tablet 0     Sig: Take 1 tablet by mouth Daily.   • pantoprazole (Protonix) 40 MG EC tablet 90 tablet 1     Sig: Take 1 tablet by mouth Daily.        Pharmacy where request should be sent: [facility]/PHARMACY #[id number] - [place], [place] - [address] [place] [address] [phone] [facility] [phone]      Additional details provided by patient: PATIENT WAS ONE OF DR. SOTO'S PATIENTS.  SHE SCHEDULED THE FIRST AVAILABLE WITH CRISTOBAL DAVILA BUT WILL BE OUT OF MEDICATION BEFORE THEN.  THEY TOLD HER TO CALL HERE AND REQUEST REFILLS.  IF SHE CAN GET A 90 DAY SUPPLY IT WOULD CARRY HER TO ALMOST HER APPOINTMENT DATE.     Does the patient have less than a 3 day supply:  [x] Yes  [] No    Madeline Gaona Rep   05/18/22 10:52 EDT

## 2022-09-12 ENCOUNTER — OFFICE VISIT (OUTPATIENT)
Dept: FAMILY MEDICINE CLINIC | Facility: CLINIC | Age: 32
End: 2022-09-12

## 2022-09-12 VITALS
DIASTOLIC BLOOD PRESSURE: 83 MMHG | TEMPERATURE: 98.6 F | BODY MASS INDEX: 48.82 KG/M2 | HEART RATE: 100 BPM | OXYGEN SATURATION: 98 % | WEIGHT: 293 LBS | RESPIRATION RATE: 20 BRPM | HEIGHT: 65 IN | SYSTOLIC BLOOD PRESSURE: 171 MMHG

## 2022-09-12 DIAGNOSIS — K21.9 GASTROESOPHAGEAL REFLUX DISEASE, UNSPECIFIED WHETHER ESOPHAGITIS PRESENT: ICD-10-CM

## 2022-09-12 DIAGNOSIS — E66.9 DIABETES MELLITUS TYPE 2 IN OBESE: Primary | Chronic | ICD-10-CM

## 2022-09-12 DIAGNOSIS — I10 ELEVATED BLOOD PRESSURE READING WITH DIAGNOSIS OF HYPERTENSION: ICD-10-CM

## 2022-09-12 DIAGNOSIS — E11.69 DIABETES MELLITUS TYPE 2 IN OBESE: Primary | Chronic | ICD-10-CM

## 2022-09-12 DIAGNOSIS — K76.0 HEPATIC STEATOSIS: ICD-10-CM

## 2022-09-12 DIAGNOSIS — E66.01 CLASS 3 SEVERE OBESITY DUE TO EXCESS CALORIES WITHOUT SERIOUS COMORBIDITY WITH BODY MASS INDEX (BMI) OF 60.0 TO 69.9 IN ADULT: ICD-10-CM

## 2022-09-12 LAB
EXPIRATION DATE: ABNORMAL
HBA1C MFR BLD: 7.4 %
Lab: ABNORMAL

## 2022-09-12 PROCEDURE — 83036 HEMOGLOBIN GLYCOSYLATED A1C: CPT | Performed by: FAMILY MEDICINE

## 2022-09-12 PROCEDURE — 99214 OFFICE O/P EST MOD 30 MIN: CPT | Performed by: FAMILY MEDICINE

## 2022-09-12 RX ORDER — DAPAGLIFLOZIN 10 MG/1
1 TABLET, FILM COATED ORAL
Qty: 30 TABLET | Refills: 3 | Status: SHIPPED | OUTPATIENT
Start: 2022-09-12 | End: 2022-10-24 | Stop reason: SDUPTHER

## 2022-09-12 RX ORDER — CHLORAL HYDRATE 500 MG
1000 CAPSULE ORAL
COMMUNITY
End: 2023-03-21 | Stop reason: HOSPADM

## 2022-09-12 RX ORDER — LANCETS 23 GAUGE
1 EACH MISCELLANEOUS DAILY PRN
Qty: 100 EACH | Refills: 1 | Status: SHIPPED | OUTPATIENT
Start: 2022-09-12

## 2022-09-12 RX ORDER — PANTOPRAZOLE SODIUM 40 MG/1
40 TABLET, DELAYED RELEASE ORAL DAILY
Qty: 90 TABLET | Refills: 1 | Status: SHIPPED | OUTPATIENT
Start: 2022-09-12

## 2022-09-12 RX ORDER — DAPAGLIFLOZIN 10 MG/1
1 TABLET, FILM COATED ORAL
Qty: 30 TABLET | Refills: 3 | COMMUNITY
Start: 2022-09-12 | End: 2022-09-12 | Stop reason: SDUPTHER

## 2022-09-12 RX ORDER — BLOOD-GLUCOSE METER
1 EACH MISCELLANEOUS DAILY PRN
Qty: 1 KIT | Refills: 0 | Status: SHIPPED | OUTPATIENT
Start: 2022-09-12

## 2022-09-12 RX ORDER — BENAZEPRIL HYDROCHLORIDE AND HYDROCHLOROTHIAZIDE 20; 12.5 MG/1; MG/1
1 TABLET ORAL DAILY
Qty: 30 TABLET | Refills: 1 | Status: SHIPPED | OUTPATIENT
Start: 2022-09-12 | End: 2022-10-06

## 2022-09-12 RX ORDER — EZETIMIBE 10 MG/1
10 TABLET ORAL DAILY
Qty: 90 TABLET | Refills: 2 | Status: SHIPPED | OUTPATIENT
Start: 2022-09-12

## 2022-09-12 RX ORDER — MULTIVIT-MIN/IRON/FOLIC ACID/K 18-600-40
2000 CAPSULE ORAL DAILY
Qty: 30 CAPSULE
Start: 2022-09-12

## 2022-09-13 ENCOUNTER — TELEPHONE (OUTPATIENT)
Dept: FAMILY MEDICINE CLINIC | Facility: CLINIC | Age: 32
End: 2022-09-13

## 2022-09-13 ENCOUNTER — PATIENT MESSAGE (OUTPATIENT)
Dept: FAMILY MEDICINE CLINIC | Facility: CLINIC | Age: 32
End: 2022-09-13

## 2022-09-13 NOTE — TELEPHONE ENCOUNTER
HUB to read    PA was approved for Farxiga 10mg    Request Reference Number: [id number]. FARXIGA TAB 10MG is approved through 09/13/2023. Your patient may now fill this prescription and it will be covered.    [employer], on behalf of [employer], is responsible for reviewing pharmacy services provided to  [facility] members. [employer] received a request on 09/13/2022 from your prescriber for  coverage of Farxiga Tab 10mg.   Your request for Farxiga Tab 10mg has been approved.  How long does this approval last?  FARXIGA TAB 10MG, use as directed, is approved through 09/13/2023 or until coverage for the  medication is no longer available under your benefit plan or the medication becomes subject to a  pharmacy benefit coverage requirement, such as supply limits or notification, whichever occurs first as  allowed by law. Please note: Doses/quantities above plan limits and/or maximum Food and Drug  Administration (FDA) approved dosing may be subject to further review. Reviewed by: System  What happens when the authorization expires?  It is recommended that your prescriber contact [employer] for continued authorization before your  authorization expires so that you do not experience any disruption in therapy.  Thank you for your patience and understanding during the review process. If you have an active  prescription for this medication, you may now fill.  If you have any additional questions, please call [employer] toll-free at [phone].

## 2022-09-15 ENCOUNTER — PATIENT ROUNDING (BHMG ONLY) (OUTPATIENT)
Dept: FAMILY MEDICINE CLINIC | Facility: CLINIC | Age: 32
End: 2022-09-15

## 2022-09-26 ENCOUNTER — TELEPHONE (OUTPATIENT)
Dept: FAMILY MEDICINE CLINIC | Facility: CLINIC | Age: 32
End: 2022-09-26

## 2022-09-26 RX ORDER — FLUCONAZOLE 150 MG/1
TABLET ORAL
Qty: 2 TABLET | Refills: 0 | Status: SHIPPED | OUTPATIENT
Start: 2022-09-26 | End: 2023-02-06 | Stop reason: SDUPTHER

## 2022-09-26 NOTE — TELEPHONE ENCOUNTER
Caller: Leslie Harris    Relationship: Self    Best call back number: [phone]    What medication are you requesting: MEDICATION FOR YEAST INFECTION    What are your current symptoms: ITCHING, BURNING    How long have you been experiencing symptoms: 3 DAYS    Have you had these symptoms before:    [x] Yes  [] No    Have you been treated for these symptoms before:   [] Yes  [x] No    If a prescription is needed, what is your preferred pharmacy and phone number: [facility]/PHARMACY #[id number] - [address] [phone] [facility] [phone]      Additional notes: DR DAVILA TOLD PATIENT HER NEW MEDICATION COULD CAUSE YEAST INFECTION, PATIENT STARTED HAVING SYMPTOMS ON Friday. PLEASE ADVISE.

## 2022-10-06 RX ORDER — BENAZEPRIL HYDROCHLORIDE AND HYDROCHLOROTHIAZIDE 20; 12.5 MG/1; MG/1
TABLET ORAL
Qty: 30 TABLET | Refills: 1 | Status: SHIPPED | OUTPATIENT
Start: 2022-10-06 | End: 2022-11-07

## 2022-10-06 NOTE — TELEPHONE ENCOUNTER
Rx Refill Note  Requested Prescriptions     Pending Prescriptions Disp Refills   • benazepril-hydrochlorthiazide (LOTENSIN HCT) 20-12.5 MG per tablet [Pharmacy Med Name: BENAZEPRIL-HCTZ 20-12.5 MG TAB] 30 tablet 1     Sig: TAKE 1 TABLET BY MOUTH EVERY DAY      Last office visit with prescribing clinician: 9/12/2022      Next office visit with prescribing clinician: Visit date not found     POC Glycosylated Hemoglobin (Hb A1C) (09/12/2022 14:29)  Lipid Panel (03/08/2022 12:14)         Adelia Trevino CMA  10/06/22, 12:48 EDT

## 2022-10-13 ENCOUNTER — CLINICAL SUPPORT (OUTPATIENT)
Dept: FAMILY MEDICINE CLINIC | Facility: CLINIC | Age: 32
End: 2022-10-13

## 2022-10-13 VITALS — HEART RATE: 104 BPM | SYSTOLIC BLOOD PRESSURE: 145 MMHG | DIASTOLIC BLOOD PRESSURE: 76 MMHG

## 2022-10-13 PROBLEM — R41.0 DELIRIUM, ACUTE: Status: RESOLVED | Noted: 2021-08-12 | Resolved: 2022-10-13

## 2022-10-13 PROBLEM — J15.0 KLEBSIELLA PNEUMONIAE PNEUMONIA: Status: RESOLVED | Noted: 2021-08-04 | Resolved: 2022-10-13

## 2022-10-13 PROBLEM — R13.10 DYSPHAGIA: Status: RESOLVED | Noted: 2021-08-10 | Resolved: 2022-10-13

## 2022-10-13 RX ORDER — BLOOD-GLUCOSE METER
EACH MISCELLANEOUS
COMMUNITY
Start: 2022-09-12

## 2022-10-13 NOTE — PROGRESS NOTES
"145/76 hr 104    Pt states she feels a lot better on new BP med x 1 mon. That \"bubble\" in chest has resolved and she is not currently having any sypmtoms of HA, etc.    Ok to pickup refill that's ready at pharm?  "

## 2022-10-24 NOTE — TELEPHONE ENCOUNTER
Rx Refill Note  Requested Prescriptions     Pending Prescriptions Disp Refills   • dapagliflozin Propanediol (Farxiga) 10 MG tablet 30 tablet 3     Sig: Take 10 mg by mouth Daily With Breakfast.      Last office visit with prescribing clinician: 9/12/2022      Next office visit with prescribing clinician: Visit date not found     POC Glycosylated Hemoglobin (Hb A1C) (09/12/2022 14:29)         Brisa De La Paz, RT  10/24/22, 15:33 EDT

## 2022-10-25 RX ORDER — DAPAGLIFLOZIN 10 MG/1
1 TABLET, FILM COATED ORAL
Qty: 90 TABLET | Refills: 0 | Status: SHIPPED | OUTPATIENT
Start: 2022-10-25 | End: 2023-01-23

## 2022-11-07 RX ORDER — BENAZEPRIL HYDROCHLORIDE AND HYDROCHLOROTHIAZIDE 20; 12.5 MG/1; MG/1
TABLET ORAL
Qty: 90 TABLET | Refills: 0 | Status: SHIPPED | OUTPATIENT
Start: 2022-11-07 | End: 2023-02-03

## 2022-11-14 NOTE — THERAPY TREATMENT NOTE
Chief Complaint   Patient presents with     Conjunctivitis         Medication Reconciliation: hector Osman     Subjective: Pt agreeable to therapeutic plan of care.    Objective:     Bed Mobility: Mod-A  Functional Transfers: Mod-A, Assist x 2 and with rolling walker  Functional Ambulation: N/A or Not attempted.    Lower Body Dressing: Dependent  ADL Position: edge of bed sitting  ADL Comments: Donning socks     Pain: 2 VAS  Education: Provided education on importance of mobility and skilled verbal / tactile cueing throughout intervention.     Assessment: Leslie Harris presents with ADL impairments below baseline abilities which indicate the need for continued skilled intervention while inpatient. Pt able to tolerate standing for 3 mins first attempt and 1:30 second attempt.  She reports trembling in BLE.  Pt completed AAROM of R shoulder and elbow with min pain reported.  Tolerating session today without incident. Will continue to follow and progress as tolerated.     Plan/Recommendations:   Pt would benefit from Inpatient Rehabilitation placement at discharge from facility.   Pt desires Inpatient Rehabilitation placement at discharge. Pt cooperative; agreeable to therapeutic recommendations and plan of care.     Modified [place]: 4 = Moderately severe disability (Unable to attend to own bodily needs without assistance, and unable to walk unassisted)     Post-Tx Position: Supine with HOB Elevated, Alarms activated and Call light and personal items within reach  PPE: gloves, surgical mask, eyewear protection

## 2022-12-15 ENCOUNTER — CLINICAL SUPPORT (OUTPATIENT)
Dept: FAMILY MEDICINE CLINIC | Facility: CLINIC | Age: 32
End: 2022-12-15

## 2022-12-15 DIAGNOSIS — E66.9 DIABETES MELLITUS TYPE 2 IN OBESE: Primary | Chronic | ICD-10-CM

## 2022-12-15 DIAGNOSIS — E11.69 DIABETES MELLITUS TYPE 2 IN OBESE: Primary | Chronic | ICD-10-CM

## 2022-12-15 LAB
EXPIRATION DATE: ABNORMAL
HBA1C MFR BLD: 6.6 %
Lab: ABNORMAL

## 2022-12-15 PROCEDURE — 83036 HEMOGLOBIN GLYCOSYLATED A1C: CPT | Performed by: FAMILY MEDICINE

## 2022-12-15 NOTE — PROGRESS NOTES
Fingerstick performed in L -3rd finger by RT Shira  with good hemostasis. Patient tolerated well. 12/15/22 Brisa De La Paz, RT

## 2022-12-23 ENCOUNTER — HOSPITAL ENCOUNTER (OUTPATIENT)
Facility: HOSPITAL | Age: 32
Discharge: HOME | End: 2022-12-23
Attending: EMERGENCY MEDICINE
Payer: COMMERCIAL

## 2022-12-23 ENCOUNTER — APPOINTMENT (OUTPATIENT)
Dept: CT IMAGING | Facility: HOSPITAL | Age: 32
End: 2022-12-23
Payer: COMMERCIAL

## 2022-12-23 VITALS
BODY MASS INDEX: 47.09 KG/M2 | SYSTOLIC BLOOD PRESSURE: 149 MMHG | HEART RATE: 93 BPM | DIASTOLIC BLOOD PRESSURE: 91 MMHG | WEIGHT: 293 LBS | TEMPERATURE: 98.1 F | RESPIRATION RATE: 20 BRPM | OXYGEN SATURATION: 99 % | HEIGHT: 66 IN

## 2022-12-23 DIAGNOSIS — K42.9 UMBILICAL HERNIA WITHOUT OBSTRUCTION AND WITHOUT GANGRENE: Primary | ICD-10-CM

## 2022-12-23 PROCEDURE — 99214 OFFICE O/P EST MOD 30 MIN: CPT | Performed by: NURSE PRACTITIONER

## 2022-12-23 PROCEDURE — EDLOS: Performed by: NURSE PRACTITIONER

## 2022-12-23 PROCEDURE — G0463 HOSPITAL OUTPT CLINIC VISIT: HCPCS | Performed by: NURSE PRACTITIONER

## 2022-12-23 RX ORDER — SODIUM CHLORIDE 0.9 % (FLUSH) 0.9 %
10 SYRINGE (ML) INJECTION AS NEEDED
Status: DISCONTINUED | OUTPATIENT
Start: 2022-12-23 | End: 2022-12-23

## 2022-12-23 NOTE — FSED PROVIDER NOTE
Subjective   History of Present Illness Leslie Harris is a 32 year old female who presents with central abdominal pain described as cramping with n/v that started this am.  It is not made better/worse by anything.  She did take a home dose of Promethazine which helped.  She has h/o cholecystectomy and subsequent partial small bowel resection.  Denies any heavy lifting.  States she has felt a mass in umbilicus area since the prior surgery.     Review of Systems   Constitutional: Negative for activity change and appetite change.   HENT: Negative.    Respiratory: Negative.  Negative for shortness of breath.    Cardiovascular: Negative for chest pain.   Gastrointestinal: Positive for abdominal pain, nausea and vomiting. Negative for blood in stool, constipation and diarrhea.   Genitourinary: Negative.        Past Medical History:   Diagnosis Date   • Allergic 2006   • Class 3 severe obesity without serious comorbidity with body mass index (BMI) of 50.0 to 59.9 in adult    • COVID-19 07/2021    w/ Resp Failure on vent x 19 days   • Critical illness polyneuropathy 08/12/2021    from COVID   • DMII    • GERD    • Hypertension    • Injury of back    • PAP     NEG = 2016   • Pneumonia 8/4/2021   • Seasonal allergies    • Urinary tract infection 8/7/2021       Allergies   Allergen Reactions   • Sulfa Antibiotics Hives   • Semaglutide GI Intolerance     Severe abd cramping       Past Surgical History:   Procedure Laterality Date   • BRONCHOSCOPY N/A 08/08/2021    Procedure: BRONCHOSCOPY with bilateral bronchial washing   • CHOLECYSTECTOMY     • EXPLORATORY LAPAROTOMY W/ BOWEL RESECTION  2018    Sm Bowel perf w/ BAHMAN   • TONSILLECTOMY         Family History   Problem Relation Age of Onset   • Diabetes Mother    • Fibromyalgia Mother    • Anxiety disorder Mother    • Depression Mother    • Heart disease Father         CABG x 2   • No Known Problems Brother    • Asperger's syndrome Brother    • Cancer Maternal Grandmother          Lung Cncer   • Cancer Paternal Grandmother         Pancreatic Cancer   • Heart disease Paternal Grandfather    • Liver disease Maternal Aunt         Non-Alcoholic       Social History     Socioeconomic History   • Marital status: Single   Tobacco Use   • Smoking status: Never   • Smokeless tobacco: Never   Vaping Use   • Vaping Use: Never used   Substance and Sexual Activity   • Alcohol use: Not Currently   • Drug use: Never   • Sexual activity: Not Currently     Partners: Male           Objective   Physical Exam  Vitals reviewed.   Constitutional:       Appearance: She is well-developed.   Cardiovascular:      Rate and Rhythm: Normal rate and regular rhythm.   Pulmonary:      Effort: Pulmonary effort is normal.      Breath sounds: Normal breath sounds.   Abdominal:      General: Bowel sounds are normal.      Palpations: Abdomen is soft. There is no mass.      Tenderness: There is abdominal tenderness in the periumbilical area. There is no guarding or rebound.      Hernia: A hernia is present. Hernia is present in the umbilical area (easily reducible with palpation).      Comments: Obese   Neurological:      Mental Status: She is alert.         Procedures           ED Course  ED Course as of 12/23/22 1338   Fri Dec 23, 2022   1228 During exam of patient I noted an area @ umbilicus that seemed like an umbilical hernia.  I was able to easily reduce this and the patient's pain went completely away.  However, given her h/o cholecystectomy and SBR I will check CT scan and labs.   [EW]   1258 Pt feels completely better.  She would like to be discharged without imaging.  Given how much better she feels and the fact that I reduced the umbilical hernia I agree.  However, the patient was given strong RTER precautions and general surgery follow up.  I discussed warning signs for bowel obstruction, hernia incarceration etc.  She is well appearing and abdominal re-exam is benign.   [EW]      ED Course User Index  [EW] Gustavo  NATY Santos               Differential diagnosis includes but is not limited to:  - hepatobiliary pathology such as cholecystitis, cholangitis, and symptomatic cholelithiasis  - Pancreatitis  - Dyspepsia  - Small bowel obstruction  - Appendicitis  - Diverticulitis  - UTI including pyelonephritis  - Ureteral stone  - Zoster  - Colitis, including infectious and ischemic         Final diagnoses:   Umbilical hernia without obstruction and without gangrene       ED Disposition  ED Disposition     ED Disposition   Discharge    Condition   Stable    Comment   --             Hamilotn Biggs MD  [address] [place] [address]  [phone]    Schedule an appointment as soon as possible for a visit   for evaluation of umbilical hernia         Medication List      No changes were made to your prescriptions during this visit.

## 2022-12-23 NOTE — DISCHARGE INSTRUCTIONS
If you develop the pain again, use your finger to \"push\" the hernia back into it's place.  You will feel it move and feel better.    Return for abdominal swelling, fullness at the site of hernia, severe nausea vomiting or any other concerns.    Recommend you follow-up with general surgery for further evaluation of this    Return Precautions    Although you are being discharged from the ED today, I encourage you to return for worsening symptoms.  Things can, and do, change such that treatment at home with medication may not be adequate.      Specifically, return for any of the following:    Chest pain, shortness of breath, pain or nausea and vomiting not controlled by medications provided.    Please make a follow up with your Primary Care Provider for a blood pressure recheck.

## 2023-01-23 RX ORDER — DAPAGLIFLOZIN 10 MG/1
1 TABLET, FILM COATED ORAL
Qty: 90 TABLET | Refills: 0 | Status: SHIPPED | OUTPATIENT
Start: 2023-01-23

## 2023-02-03 RX ORDER — BENAZEPRIL HYDROCHLORIDE AND HYDROCHLOROTHIAZIDE 20; 12.5 MG/1; MG/1
TABLET ORAL
Qty: 90 TABLET | Refills: 1 | Status: SHIPPED | OUTPATIENT
Start: 2023-02-03

## 2023-02-03 RX ORDER — FLUCONAZOLE 150 MG/1
TABLET ORAL
Qty: 2 TABLET | Refills: 0 | OUTPATIENT
Start: 2023-02-03

## 2023-02-03 NOTE — TELEPHONE ENCOUNTER
Patient states she believes she has a vaginal yeast infection.  She has had increased burning and itching.  Requesting RX.

## 2023-02-03 NOTE — TELEPHONE ENCOUNTER
Rx Refill Note  Requested Prescriptions     Pending Prescriptions Disp Refills   • fluconazole (Diflucan) 150 MG tablet 2 tablet 0     Si po today and may repeat x 1 in 4days if needed      Last office visit with prescribing clinician: 2022   Last telemedicine visit with prescribing clinician: 3/21/2023   Next office visit with prescribing clinician: Visit date not found                       Lipid Panel (2022 12:14)    Would you like a call back once the refill request has been completed: [] Yes [] No    If the office needs to give you a call back, can they leave a voicemail: [] Yes [] No    Brisa De La Paz, RT  23, 08:36 EST

## 2023-02-06 RX ORDER — FLUCONAZOLE 150 MG/1
TABLET ORAL
Qty: 2 TABLET | Refills: 0 | Status: SHIPPED | OUTPATIENT
Start: 2023-02-06 | End: 2023-03-21 | Stop reason: HOSPADM

## 2023-02-06 NOTE — TELEPHONE ENCOUNTER
Patient scheduled appt for 2/27/2023.  Patient states she tried some otc medication over the weekend.  Her symptoms improved some but patient states she is still having symptoms.  Requesting RX for medication.

## 2023-03-08 ENCOUNTER — OFFICE VISIT (OUTPATIENT)
Dept: SURGERY | Facility: CLINIC | Age: 33
End: 2023-03-08
Payer: COMMERCIAL

## 2023-03-08 VITALS
HEIGHT: 66 IN | BODY MASS INDEX: 47.09 KG/M2 | OXYGEN SATURATION: 98 % | HEART RATE: 115 BPM | WEIGHT: 293 LBS | DIASTOLIC BLOOD PRESSURE: 92 MMHG | SYSTOLIC BLOOD PRESSURE: 138 MMHG | TEMPERATURE: 98.6 F

## 2023-03-08 DIAGNOSIS — K43.2 INCISIONAL HERNIA, WITHOUT OBSTRUCTION OR GANGRENE: Primary | ICD-10-CM

## 2023-03-08 PROCEDURE — 99204 OFFICE O/P NEW MOD 45 MIN: CPT | Performed by: SURGERY

## 2023-03-08 NOTE — PROGRESS NOTES
CHIEF COMPLAINT:    Hernia    HISTORY OF PRESENT ILLNESS:    Leslie Harris is a 32 y.o. female who is referred today for a symptomatic incisional hernia in the periumbilical region.  The patient states that in 2008 she underwent laparoscopic cholecystectomy in [facility].  She apparently had an undetected small bowel injury at that time and underwent urgent laparotomy a few days later.  This resulted in a periumbilical midline incision.  She had not noted any issues with this incision until fairly recently.  While recovering from fairly severe COVID last year she began noticing a bulge in the area.  She states that on 1 occasion she did go to urgent care due to acute pain in the area of her hernia which at that time was apparently a reducible.  She also had nausea and vomiting associated with that occasion.  She states that at urgent care they reduce the hernia and told her that she should see a surgeon.    Currently, she has a reducible bulge on the abdomen most days.  She states that she has pain in the area particularly with increased activity.  She works as a .    Past Medical History:   Diagnosis Date   • Allergic 2006   • Class 3 severe obesity without serious comorbidity with body mass index (BMI) of 50.0 to 59.9 in adult    • COVID-19 07/2021    w/ Resp Failure on vent x 19 days   • Critical illness polyneuropathy 08/12/2021    from COVID   • DMII    • GERD    • Hypertension    • Injury of back    • PAP     NEG = 2016   • Pneumonia 8/4/2021   • Seasonal allergies    • Urinary tract infection 8/7/2021       Past Surgical History:   Procedure Laterality Date   • BRONCHOSCOPY N/A 08/08/2021    Procedure: BRONCHOSCOPY with bilateral bronchial washing   • CHOLECYSTECTOMY     • EXPLORATORY LAPAROTOMY W/ BOWEL RESECTION  2018     Bowel perf w/ BAHMAN   • TONSILLECTOMY         Prior to Admission medications    Medication Sig Start Date End Date Taking? Authorizing Provider    benazepril-hydrochlorthiazide (LOTENSIN HCT) 20-12.5 MG per tablet TAKE 1 TABLET BY MOUTH EVERY DAY 2/3/23  Yes Ara Coronado DO   Biotin 5000 MCG tablet Take  by mouth Daily.   Yes Kaya Graf MD   Blood Glucose Monitoring Suppl (Accu-Chek Guide Me) w/Device kit 1 kit Daily As Needed (for BS monitoring). 9/12/22  Yes Ara Coronado DO   Blood Glucose Monitoring Suppl (Accu-Chek Guide Me) w/Device kit  9/12/22  Yes Kaya Graf MD   Cholecalciferol (Vitamin D) 50 MCG (2000 UT) capsule Take 1 capsule by mouth Daily. 9/12/22  Yes Ara Coronado DO   ezetimibe (Zetia) 10 MG tablet Take 1 tablet by mouth Daily. 9/12/22  Yes Ara Coronado DO   Farxiga 10 MG tablet TAKE 10 MG BY MOUTH DAILY WITH BREAKFAST. 1/23/23  Yes Ara Coronado DO   fluconazole (Diflucan) 150 MG tablet 1 po today and may repeat x 1 in 4days if needed 2/6/23  Yes Ara Coronado DO   glucose blood (Accu-Chek Guide) test strip 1 each by Other route Daily As Needed (for BS control/ monitoring). 9/12/22  Yes Ara Coronado DO   Lancets (accu-chek safe-t pro) lancets 1 each by Other route Daily As Needed (for BS control). 9/12/22  Yes Ara Coronado DO   multivitamin with minerals tablet tablet Take 1 tablet by mouth Daily.   Yes Kaya Graf MD   Omega-3 Fatty Acids (fish oil) 1000 MG capsule capsule Take 1 capsule by mouth Daily With Breakfast.   Yes Kaya Graf MD   pantoprazole (Protonix) 40 MG EC tablet Take 1 tablet by mouth Daily. 9/12/22  Yes Ara Coronado DO       Allergies   Allergen Reactions   • Sulfa Antibiotics Hives   • Semaglutide GI Intolerance     Severe abd cramping       Family History   Problem Relation Age of Onset   • Diabetes Mother    • Fibromyalgia Mother    • Anxiety disorder Mother    • Depression Mother    • Heart disease Father         CABG x 2   • No Known Problems Brother    • Asperger's syndrome Brother    • Cancer Maternal Grandmother         Lung Cncer   •  "Cancer Paternal Grandmother         Pancreatic Cancer   • Heart disease Paternal Grandfather    • Liver disease Maternal Aunt         Non-Alcoholic       Social History     Socioeconomic History   • Marital status: Single   Tobacco Use   • Smoking status: Never   • Smokeless tobacco: Never   Vaping Use   • Vaping Use: Never used   Substance and Sexual Activity   • Alcohol use: Not Currently   • Drug use: Never   • Sexual activity: Not Currently     Partners: Male       Review of Systems   Gastrointestinal: Positive for abdominal pain, nausea and vomiting.       Objective     /92   Pulse 115   Temp 98.6 °F (37 °C) (Infrared)   Ht 167.6 cm (66\")   Wt (!) 155 kg (342 lb)   SpO2 98%   BMI 55.20 kg/m²     Physical Exam  Constitutional:       General: She is not in acute distress.     Appearance: Normal appearance. She is obese. She is not ill-appearing, toxic-appearing or diaphoretic.   HENT:      Head: Normocephalic and atraumatic.   Eyes:      General:         Right eye: No discharge.         Left eye: No discharge.      Extraocular Movements: Extraocular movements intact.      Conjunctiva/sclera: Conjunctivae normal.   Pulmonary:      Effort: Pulmonary effort is normal. No respiratory distress.   Abdominal:      Hernia: A hernia is present.       Skin:     General: Skin is warm.      Coloration: Skin is not jaundiced.   Neurological:      General: No focal deficit present.      Mental Status: She is alert and oriented to person, place, and time.   Psychiatric:         Mood and Affect: Mood normal.         Behavior: Behavior normal.         Thought Content: Thought content normal.         Judgment: Judgment normal.           ASSESSMENT:    Symptomatic incisional hernia with history of prior incarceration of the hernia    PLAN:    I discussed with the patient that her obesity does put her at risk of developing hernias particularly from incision such as when she had previously.  We also discussed that she " would be at high risk of complications from hernia surgery including recurrence of the hernia as well as infection.  Despite this, I do believe that her hernia should be repaired given her history of what sounds like incarceration of small bowel within the hernia previously.  I recommended her that we approach her hernia repair minimally invasively.  She is agreeable to this.  The risks and benefits of robotic assisted laparoscopic incisional hernia repair with mesh placement were discussed with her.  Additionally, we did discuss that if scar tissue precluded safe repair in this way she may need open repair.  She is agreeable with proceeding.  She has been scheduled.          This document has been electronically signed by Hamilton Biggs MD on March 8, 2023 14:54 EST

## 2023-03-08 NOTE — H&P (VIEW-ONLY)
CHIEF COMPLAINT:    Hernia    HISTORY OF PRESENT ILLNESS:    Leslie Harris is a 32 y.o. female who is referred today for a symptomatic incisional hernia in the periumbilical region.  The patient states that in 2008 she underwent laparoscopic cholecystectomy in [facility].  She apparently had an undetected small bowel injury at that time and underwent urgent laparotomy a few days later.  This resulted in a periumbilical midline incision.  She had not noted any issues with this incision until fairly recently.  While recovering from fairly severe COVID last year she began noticing a bulge in the area.  She states that on 1 occasion she did go to urgent care due to acute pain in the area of her hernia which at that time was apparently a reducible.  She also had nausea and vomiting associated with that occasion.  She states that at urgent care they reduce the hernia and told her that she should see a surgeon.    Currently, she has a reducible bulge on the abdomen most days.  She states that she has pain in the area particularly with increased activity.  She works as a .    Past Medical History:   Diagnosis Date   • Allergic 2006   • Class 3 severe obesity without serious comorbidity with body mass index (BMI) of 50.0 to 59.9 in adult    • COVID-19 07/2021    w/ Resp Failure on vent x 19 days   • Critical illness polyneuropathy 08/12/2021    from COVID   • DMII    • GERD    • Hypertension    • Injury of back    • PAP     NEG = 2016   • Pneumonia 8/4/2021   • Seasonal allergies    • Urinary tract infection 8/7/2021       Past Surgical History:   Procedure Laterality Date   • BRONCHOSCOPY N/A 08/08/2021    Procedure: BRONCHOSCOPY with bilateral bronchial washing   • CHOLECYSTECTOMY     • EXPLORATORY LAPAROTOMY W/ BOWEL RESECTION  2018     Bowel perf w/ BAHMAN   • TONSILLECTOMY         Prior to Admission medications    Medication Sig Start Date End Date Taking? Authorizing Provider    benazepril-hydrochlorthiazide (LOTENSIN HCT) 20-12.5 MG per tablet TAKE 1 TABLET BY MOUTH EVERY DAY 2/3/23  Yes Ara Coronado DO   Biotin 5000 MCG tablet Take  by mouth Daily.   Yes Kaya Graf MD   Blood Glucose Monitoring Suppl (Accu-Chek Guide Me) w/Device kit 1 kit Daily As Needed (for BS monitoring). 9/12/22  Yes Ara Coronado DO   Blood Glucose Monitoring Suppl (Accu-Chek Guide Me) w/Device kit  9/12/22  Yes Kaya Graf MD   Cholecalciferol (Vitamin D) 50 MCG (2000 UT) capsule Take 1 capsule by mouth Daily. 9/12/22  Yes Ara Coronado DO   ezetimibe (Zetia) 10 MG tablet Take 1 tablet by mouth Daily. 9/12/22  Yes Ara Coronado DO   Farxiga 10 MG tablet TAKE 10 MG BY MOUTH DAILY WITH BREAKFAST. 1/23/23  Yes Ara Coronado DO   fluconazole (Diflucan) 150 MG tablet 1 po today and may repeat x 1 in 4days if needed 2/6/23  Yes Ara Coronado DO   glucose blood (Accu-Chek Guide) test strip 1 each by Other route Daily As Needed (for BS control/ monitoring). 9/12/22  Yes Ara Coronado DO   Lancets (accu-chek safe-t pro) lancets 1 each by Other route Daily As Needed (for BS control). 9/12/22  Yes Ara Coronado DO   multivitamin with minerals tablet tablet Take 1 tablet by mouth Daily.   Yes Kaya Graf MD   Omega-3 Fatty Acids (fish oil) 1000 MG capsule capsule Take 1 capsule by mouth Daily With Breakfast.   Yes Kaya Graf MD   pantoprazole (Protonix) 40 MG EC tablet Take 1 tablet by mouth Daily. 9/12/22  Yes Ara Coronado DO       Allergies   Allergen Reactions   • Sulfa Antibiotics Hives   • Semaglutide GI Intolerance     Severe abd cramping       Family History   Problem Relation Age of Onset   • Diabetes Mother    • Fibromyalgia Mother    • Anxiety disorder Mother    • Depression Mother    • Heart disease Father         CABG x 2   • No Known Problems Brother    • Asperger's syndrome Brother    • Cancer Maternal Grandmother         Lung Cncer   •  "Cancer Paternal Grandmother         Pancreatic Cancer   • Heart disease Paternal Grandfather    • Liver disease Maternal Aunt         Non-Alcoholic       Social History     Socioeconomic History   • Marital status: Single   Tobacco Use   • Smoking status: Never   • Smokeless tobacco: Never   Vaping Use   • Vaping Use: Never used   Substance and Sexual Activity   • Alcohol use: Not Currently   • Drug use: Never   • Sexual activity: Not Currently     Partners: Male       Review of Systems   Gastrointestinal: Positive for abdominal pain, nausea and vomiting.       Objective     /92   Pulse 115   Temp 98.6 °F (37 °C) (Infrared)   Ht 167.6 cm (66\")   Wt (!) 155 kg (342 lb)   SpO2 98%   BMI 55.20 kg/m²     Physical Exam  Constitutional:       General: She is not in acute distress.     Appearance: Normal appearance. She is obese. She is not ill-appearing, toxic-appearing or diaphoretic.   HENT:      Head: Normocephalic and atraumatic.   Eyes:      General:         Right eye: No discharge.         Left eye: No discharge.      Extraocular Movements: Extraocular movements intact.      Conjunctiva/sclera: Conjunctivae normal.   Pulmonary:      Effort: Pulmonary effort is normal. No respiratory distress.   Abdominal:      Hernia: A hernia is present.       Skin:     General: Skin is warm.      Coloration: Skin is not jaundiced.   Neurological:      General: No focal deficit present.      Mental Status: She is alert and oriented to person, place, and time.   Psychiatric:         Mood and Affect: Mood normal.         Behavior: Behavior normal.         Thought Content: Thought content normal.         Judgment: Judgment normal.           ASSESSMENT:    Symptomatic incisional hernia with history of prior incarceration of the hernia    PLAN:    I discussed with the patient that her obesity does put her at risk of developing hernias particularly from incision such as when she had previously.  We also discussed that she " would be at high risk of complications from hernia surgery including recurrence of the hernia as well as infection.  Despite this, I do believe that her hernia should be repaired given her history of what sounds like incarceration of small bowel within the hernia previously.  I recommended her that we approach her hernia repair minimally invasively.  She is agreeable to this.  The risks and benefits of robotic assisted laparoscopic incisional hernia repair with mesh placement were discussed with her.  Additionally, we did discuss that if scar tissue precluded safe repair in this way she may need open repair.  She is agreeable with proceeding.  She has been scheduled.          This document has been electronically signed by Hamilton Biggs MD on March 8, 2023 14:54 EST

## 2023-03-11 ENCOUNTER — HOSPITAL ENCOUNTER (OUTPATIENT)
Dept: CARDIOLOGY | Facility: HOSPITAL | Age: 33
Discharge: HOME | End: 2023-03-11
Payer: COMMERCIAL

## 2023-03-11 ENCOUNTER — LAB (OUTPATIENT)
Dept: LAB | Facility: HOSPITAL | Age: 33
End: 2023-03-11
Payer: COMMERCIAL

## 2023-03-11 DIAGNOSIS — K43.2 INCISIONAL HERNIA, WITHOUT OBSTRUCTION OR GANGRENE: ICD-10-CM

## 2023-03-11 LAB
ANION GAP SERPL CALCULATED.3IONS-SCNC: 11 MMOL/L (ref 5–15)
BUN SERPL-MCNC: 8 MG/DL (ref 6–20)
BUN/CREAT SERPL: 11.6 (ref 7–25)
CALCIUM SPEC-SCNC: 9.9 MG/DL (ref 8.6–10.5)
CHLORIDE SERPL-SCNC: 102 MMOL/L (ref 98–107)
CO2 SERPL-SCNC: 27 MMOL/L (ref 22–29)
CREAT SERPL-MCNC: 0.69 MG/DL (ref 0.57–1)
DEPRECATED RDW RBC AUTO: 41.5 FL (ref 37–54)
EGFRCR SERPLBLD CKD-EPI 2021: 117.7 ML/MIN/1.73
ERYTHROCYTE [DISTWIDTH] IN BLOOD BY AUTOMATED COUNT: 13.8 % (ref 12.3–15.4)
GLUCOSE SERPL-MCNC: 96 MG/DL (ref 65–99)
HCT VFR BLD AUTO: 41.1 % (ref 34–46.6)
HGB BLD-MCNC: 13.7 G/DL (ref 12–15.9)
MCH RBC QN AUTO: 27.8 PG (ref 26.6–33)
MCHC RBC AUTO-ENTMCNC: 33.3 G/DL (ref 31.5–35.7)
MCV RBC AUTO: 83.5 FL (ref 79–97)
MRSA DNA SPEC QL NAA+PROBE: NORMAL
PLATELET # BLD AUTO: 238 10*3/MM3 (ref 140–450)
PMV BLD AUTO: 9.8 FL (ref 6–12)
POTASSIUM SERPL-SCNC: 4.4 MMOL/L (ref 3.5–5.2)
RBC # BLD AUTO: 4.92 10*6/MM3 (ref 3.77–5.28)
SODIUM SERPL-SCNC: 140 MMOL/L (ref 136–145)
WBC NRBC COR # BLD: 9.3 10*3/MM3 (ref 3.4–10.8)

## 2023-03-11 PROCEDURE — 93005 ELECTROCARDIOGRAM TRACING: CPT | Performed by: SURGERY

## 2023-03-11 PROCEDURE — 87641 MR-STAPH DNA AMP PROBE: CPT

## 2023-03-11 PROCEDURE — 85027 COMPLETE CBC AUTOMATED: CPT

## 2023-03-11 PROCEDURE — 93010 ELECTROCARDIOGRAM REPORT: CPT | Performed by: INTERNAL MEDICINE

## 2023-03-11 PROCEDURE — 80048 BASIC METABOLIC PNL TOTAL CA: CPT

## 2023-03-12 LAB — QT INTERVAL: 371 MS

## 2023-03-21 ENCOUNTER — ANESTHESIA EVENT (OUTPATIENT)
Dept: PERIOP | Facility: HOSPITAL | Age: 33
End: 2023-03-21
Payer: COMMERCIAL

## 2023-03-21 ENCOUNTER — HOSPITAL ENCOUNTER (OUTPATIENT)
Facility: HOSPITAL | Age: 33
Discharge: HOME | End: 2023-03-21
Attending: SURGERY | Admitting: SURGERY
Payer: COMMERCIAL

## 2023-03-21 ENCOUNTER — ANESTHESIA (OUTPATIENT)
Dept: PERIOP | Facility: HOSPITAL | Age: 33
End: 2023-03-21
Payer: COMMERCIAL

## 2023-03-21 ENCOUNTER — READMISSION MANAGEMENT (OUTPATIENT)
Dept: CALL CENTER | Facility: HOSPITAL | Age: 33
End: 2023-03-21
Payer: COMMERCIAL

## 2023-03-21 VITALS
DIASTOLIC BLOOD PRESSURE: 65 MMHG | HEIGHT: 66 IN | TEMPERATURE: 96.7 F | OXYGEN SATURATION: 95 % | SYSTOLIC BLOOD PRESSURE: 120 MMHG | WEIGHT: 293 LBS | RESPIRATION RATE: 16 BRPM | HEART RATE: 78 BPM | BODY MASS INDEX: 47.09 KG/M2

## 2023-03-21 DIAGNOSIS — K43.2 INCISIONAL HERNIA, WITHOUT OBSTRUCTION OR GANGRENE: ICD-10-CM

## 2023-03-21 LAB
B-HCG UR QL: NEGATIVE
GLUCOSE BLDC GLUCOMTR-MCNC: 115 MG/DL (ref 70–105)
GLUCOSE BLDC GLUCOMTR-MCNC: 136 MG/DL (ref 70–105)

## 2023-03-21 PROCEDURE — 25010000002 PROPOFOL 200 MG/20ML EMULSION: Performed by: NURSE ANESTHETIST, CERTIFIED REGISTERED

## 2023-03-21 PROCEDURE — 25010000002 KETOROLAC TROMETHAMINE PER 15 MG: Performed by: NURSE ANESTHETIST, CERTIFIED REGISTERED

## 2023-03-21 PROCEDURE — G0378 HOSPITAL OBSERVATION PER HR: HCPCS

## 2023-03-21 PROCEDURE — 25010000002 FENTANYL CITRATE (PF) 100 MCG/2ML SOLUTION: Performed by: NURSE ANESTHETIST, CERTIFIED REGISTERED

## 2023-03-21 PROCEDURE — 25010000002 PROCHLORPERAZINE 10 MG/2ML SOLUTION: Performed by: NURSE ANESTHETIST, CERTIFIED REGISTERED

## 2023-03-21 PROCEDURE — 81025 URINE PREGNANCY TEST: CPT | Performed by: ANESTHESIOLOGY

## 2023-03-21 PROCEDURE — 25010000002 ONDANSETRON PER 1 MG: Performed by: NURSE ANESTHETIST, CERTIFIED REGISTERED

## 2023-03-21 PROCEDURE — 25010000002 HYDROMORPHONE 1 MG/ML SOLUTION: Performed by: NURSE ANESTHETIST, CERTIFIED REGISTERED

## 2023-03-21 PROCEDURE — 49592 RPR AA HRN 1ST < 3 NCR/STRN: CPT | Performed by: SURGERY

## 2023-03-21 PROCEDURE — 82962 GLUCOSE BLOOD TEST: CPT

## 2023-03-21 PROCEDURE — 25010000002 DEXAMETHASONE PER 1 MG: Performed by: NURSE ANESTHETIST, CERTIFIED REGISTERED

## 2023-03-21 PROCEDURE — C1781 MESH (IMPLANTABLE): HCPCS | Performed by: SURGERY

## 2023-03-21 PROCEDURE — 25010000002 SUCCINYLCHOLINE PER 20 MG: Performed by: NURSE ANESTHETIST, CERTIFIED REGISTERED

## 2023-03-21 DEVICE — IMPLANTABLE DEVICE: Type: IMPLANTABLE DEVICE | Site: ABDOMEN | Status: FUNCTIONAL

## 2023-03-21 DEVICE — ABSORBABLE WOUND CLOSURE DEVICE
Type: IMPLANTABLE DEVICE | Site: ABDOMEN | Status: FUNCTIONAL
Brand: V-LOC 180

## 2023-03-21 DEVICE — VENTRALIGHT ST MESH WITH ECHO 2 POSITIONING SYSTEM 11 CM (4.5)" CIRCLE
Type: IMPLANTABLE DEVICE | Site: ABDOMEN | Status: FUNCTIONAL
Brand: VENTRALIGHT ST MESH WITH ECHO 2 POSITIONING SYSTEM

## 2023-03-21 RX ORDER — ONDANSETRON 2 MG/ML
4 INJECTION INTRAMUSCULAR; INTRAVENOUS ONCE AS NEEDED
Status: COMPLETED | OUTPATIENT
Start: 2023-03-21 | End: 2023-03-21

## 2023-03-21 RX ORDER — PROMETHAZINE HYDROCHLORIDE 25 MG/1
25 SUPPOSITORY RECTAL ONCE AS NEEDED
Status: DISCONTINUED | OUTPATIENT
Start: 2023-03-21 | End: 2023-03-21 | Stop reason: HOSPADM

## 2023-03-21 RX ORDER — DEXAMETHASONE SODIUM PHOSPHATE 4 MG/ML
INJECTION, SOLUTION INTRA-ARTICULAR; INTRALESIONAL; INTRAMUSCULAR; INTRAVENOUS; SOFT TISSUE AS NEEDED
Status: DISCONTINUED | OUTPATIENT
Start: 2023-03-21 | End: 2023-03-21 | Stop reason: SURG

## 2023-03-21 RX ORDER — FENTANYL CITRATE 50 UG/ML
INJECTION, SOLUTION INTRAMUSCULAR; INTRAVENOUS AS NEEDED
Status: DISCONTINUED | OUTPATIENT
Start: 2023-03-21 | End: 2023-03-21 | Stop reason: SURG

## 2023-03-21 RX ORDER — PROMETHAZINE HYDROCHLORIDE 25 MG/1
25 TABLET ORAL ONCE AS NEEDED
Status: DISCONTINUED | OUTPATIENT
Start: 2023-03-21 | End: 2023-03-21 | Stop reason: HOSPADM

## 2023-03-21 RX ORDER — CEFAZOLIN SODIUM IN 0.9 % NACL 3 G/100 ML
3 INTRAVENOUS SOLUTION, PIGGYBACK (ML) INTRAVENOUS ONCE
Status: COMPLETED | OUTPATIENT
Start: 2023-03-21 | End: 2023-03-21

## 2023-03-21 RX ORDER — PROCHLORPERAZINE EDISYLATE 5 MG/ML
10 INJECTION INTRAMUSCULAR; INTRAVENOUS ONCE AS NEEDED
Status: COMPLETED | OUTPATIENT
Start: 2023-03-21 | End: 2023-03-21

## 2023-03-21 RX ORDER — BUPIVACAINE HYDROCHLORIDE 2.5 MG/ML
INJECTION, SOLUTION EPIDURAL; INFILTRATION; INTRACAUDAL AS NEEDED
Status: DISCONTINUED | OUTPATIENT
Start: 2023-03-21 | End: 2023-03-21 | Stop reason: HOSPADM

## 2023-03-21 RX ORDER — PROPOFOL 10 MG/ML
INJECTION, EMULSION INTRAVENOUS AS NEEDED
Status: DISCONTINUED | OUTPATIENT
Start: 2023-03-21 | End: 2023-03-21 | Stop reason: SURG

## 2023-03-21 RX ORDER — LIDOCAINE HYDROCHLORIDE 20 MG/ML
INJECTION, SOLUTION EPIDURAL; INFILTRATION; INTRACAUDAL; PERINEURAL AS NEEDED
Status: DISCONTINUED | OUTPATIENT
Start: 2023-03-21 | End: 2023-03-21 | Stop reason: SURG

## 2023-03-21 RX ORDER — SODIUM CHLORIDE 0.9 % (FLUSH) 0.9 %
10 SYRINGE (ML) INJECTION AS NEEDED
Status: DISCONTINUED | OUTPATIENT
Start: 2023-03-21 | End: 2023-03-21 | Stop reason: HOSPADM

## 2023-03-21 RX ORDER — SODIUM CHLORIDE, SODIUM LACTATE, POTASSIUM CHLORIDE, CALCIUM CHLORIDE 600; 310; 30; 20 MG/100ML; MG/100ML; MG/100ML; MG/100ML
INJECTION, SOLUTION INTRAVENOUS CONTINUOUS PRN
Status: DISCONTINUED | OUTPATIENT
Start: 2023-03-21 | End: 2023-03-21 | Stop reason: SURG

## 2023-03-21 RX ORDER — HYDROCODONE BITARTRATE AND ACETAMINOPHEN 7.5; 325 MG/1; MG/1
1 TABLET ORAL EVERY 6 HOURS PRN
Qty: 24 TABLET | Refills: 0 | Status: SHIPPED | OUTPATIENT
Start: 2023-03-21

## 2023-03-21 RX ORDER — ROCURONIUM BROMIDE 10 MG/ML
INJECTION, SOLUTION INTRAVENOUS AS NEEDED
Status: DISCONTINUED | OUTPATIENT
Start: 2023-03-21 | End: 2023-03-21 | Stop reason: SURG

## 2023-03-21 RX ORDER — FLUCONAZOLE 150 MG/1
150 TABLET ORAL ONCE
Qty: 1 TABLET | Refills: 0 | Status: SHIPPED | OUTPATIENT
Start: 2023-03-21 | End: 2023-03-21

## 2023-03-21 RX ORDER — LIDOCAINE HYDROCHLORIDE 10 MG/ML
0.5 INJECTION, SOLUTION INFILTRATION; PERINEURAL ONCE AS NEEDED
Status: DISCONTINUED | OUTPATIENT
Start: 2023-03-21 | End: 2023-03-21 | Stop reason: HOSPADM

## 2023-03-21 RX ORDER — HYDROCODONE BITARTRATE AND ACETAMINOPHEN 7.5; 325 MG/1; MG/1
1 TABLET ORAL ONCE AS NEEDED
Status: COMPLETED | OUTPATIENT
Start: 2023-03-21 | End: 2023-03-21

## 2023-03-21 RX ORDER — KETOROLAC TROMETHAMINE 30 MG/ML
INJECTION, SOLUTION INTRAMUSCULAR; INTRAVENOUS AS NEEDED
Status: DISCONTINUED | OUTPATIENT
Start: 2023-03-21 | End: 2023-03-21 | Stop reason: SURG

## 2023-03-21 RX ORDER — ONDANSETRON 2 MG/ML
INJECTION INTRAMUSCULAR; INTRAVENOUS AS NEEDED
Status: DISCONTINUED | OUTPATIENT
Start: 2023-03-21 | End: 2023-03-21 | Stop reason: SURG

## 2023-03-21 RX ORDER — SUCCINYLCHOLINE CHLORIDE 20 MG/ML
INJECTION INTRAMUSCULAR; INTRAVENOUS AS NEEDED
Status: DISCONTINUED | OUTPATIENT
Start: 2023-03-21 | End: 2023-03-21 | Stop reason: SURG

## 2023-03-21 RX ORDER — SODIUM CHLORIDE, SODIUM LACTATE, POTASSIUM CHLORIDE, CALCIUM CHLORIDE 600; 310; 30; 20 MG/100ML; MG/100ML; MG/100ML; MG/100ML
1000 INJECTION, SOLUTION INTRAVENOUS CONTINUOUS
Status: DISCONTINUED | OUTPATIENT
Start: 2023-03-21 | End: 2023-03-21 | Stop reason: HOSPADM

## 2023-03-21 RX ADMIN — HYDROMORPHONE HYDROCHLORIDE 0.5 MG: 1 INJECTION, SOLUTION INTRAMUSCULAR; INTRAVENOUS; SUBCUTANEOUS at 16:09

## 2023-03-21 RX ADMIN — KETOROLAC TROMETHAMINE 30 MG: 30 INJECTION, SOLUTION INTRAMUSCULAR at 16:16

## 2023-03-21 RX ADMIN — HYDROCODONE BITARTRATE AND ACETAMINOPHEN 1 TABLET: 7.5; 325 TABLET ORAL at 17:12

## 2023-03-21 RX ADMIN — LIDOCAINE HYDROCHLORIDE 100 MG: 20 INJECTION, SOLUTION EPIDURAL; INFILTRATION; INTRACAUDAL; PERINEURAL at 14:49

## 2023-03-21 RX ADMIN — ONDANSETRON 4 MG: 2 INJECTION INTRAMUSCULAR; INTRAVENOUS at 17:13

## 2023-03-21 RX ADMIN — HYDROMORPHONE HYDROCHLORIDE 0.5 MG: 1 INJECTION, SOLUTION INTRAMUSCULAR; INTRAVENOUS; SUBCUTANEOUS at 17:13

## 2023-03-21 RX ADMIN — SUCCINYLCHOLINE CHLORIDE 160 MG: 20 INJECTION, SOLUTION INTRAMUSCULAR; INTRAVENOUS at 14:49

## 2023-03-21 RX ADMIN — SUGAMMADEX 400 MG: 100 INJECTION, SOLUTION INTRAVENOUS at 16:26

## 2023-03-21 RX ADMIN — HYDROMORPHONE HYDROCHLORIDE 0.5 MG: 1 INJECTION, SOLUTION INTRAMUSCULAR; INTRAVENOUS; SUBCUTANEOUS at 17:34

## 2023-03-21 RX ADMIN — PROCHLORPERAZINE EDISYLATE 10 MG: 5 INJECTION INTRAMUSCULAR; INTRAVENOUS at 18:28

## 2023-03-21 RX ADMIN — ROCURONIUM BROMIDE 45 MG: 10 INJECTION, SOLUTION INTRAVENOUS at 14:54

## 2023-03-21 RX ADMIN — SODIUM CHLORIDE, SODIUM LACTATE, POTASSIUM CHLORIDE, AND CALCIUM CHLORIDE: .6; .31; .03; .02 INJECTION, SOLUTION INTRAVENOUS at 14:44

## 2023-03-21 RX ADMIN — FENTANYL CITRATE 50 MCG: 50 INJECTION, SOLUTION INTRAMUSCULAR; INTRAVENOUS at 14:49

## 2023-03-21 RX ADMIN — FENTANYL CITRATE 50 MCG: 50 INJECTION, SOLUTION INTRAMUSCULAR; INTRAVENOUS at 15:29

## 2023-03-21 RX ADMIN — ONDANSETRON 4 MG: 2 INJECTION INTRAMUSCULAR; INTRAVENOUS at 16:16

## 2023-03-21 RX ADMIN — PROPOFOL 200 MG: 10 INJECTION, EMULSION INTRAVENOUS at 14:49

## 2023-03-21 RX ADMIN — Medication 3 G: at 14:54

## 2023-03-21 RX ADMIN — SODIUM CHLORIDE, SODIUM LACTATE, POTASSIUM CHLORIDE, AND CALCIUM CHLORIDE 1000 ML: .6; .31; .03; .02 INJECTION, SOLUTION INTRAVENOUS at 10:43

## 2023-03-21 RX ADMIN — DEXAMETHASONE SODIUM PHOSPHATE 4 MG: 4 INJECTION, SOLUTION INTRAMUSCULAR; INTRAVENOUS at 15:34

## 2023-03-21 RX ADMIN — HYDROMORPHONE HYDROCHLORIDE 0.5 MG: 1 INJECTION, SOLUTION INTRAMUSCULAR; INTRAVENOUS; SUBCUTANEOUS at 15:43

## 2023-03-21 RX ADMIN — ROCURONIUM BROMIDE 30 MG: 10 INJECTION, SOLUTION INTRAVENOUS at 15:29

## 2023-03-21 RX ADMIN — ROCURONIUM BROMIDE 5 MG: 10 INJECTION, SOLUTION INTRAVENOUS at 14:49

## 2023-03-21 RX ADMIN — HYDROMORPHONE HYDROCHLORIDE 0.5 MG: 1 INJECTION, SOLUTION INTRAMUSCULAR; INTRAVENOUS; SUBCUTANEOUS at 16:54

## 2023-03-21 NOTE — ANESTHESIA PROCEDURE NOTES
Airway  Urgency: elective    Date/Time: 3/21/2023 2:50 PM  Airway not difficult    General Information and Staff    Patient location during procedure: OR  CRNA/CAA: Chelsea Portillo CRNA    Indications and Patient Condition  Indications for airway management: airway protection    Preoxygenated: yes  Mask difficulty assessment: 0 - not attempted    Final Airway Details  Final airway type: endotracheal airway      Successful airway: ETT  Cuffed: yes   Successful intubation technique: video laryngoscopy  Facilitating devices/methods: intubating stylet  Endotracheal tube insertion site: oral  Blade: Christensen  Blade size: 3  ETT size (mm): 7.0  Cormack-Lehane Classification: grade I - full view of glottis  Placement verified by: capnometry   Cuff volume (mL): 12  Measured from: lips  ETT/EBT  to lips (cm): 22  Number of attempts at approach: 1  Assessment: lips, teeth, and gum same as pre-op and atraumatic intubation

## 2023-03-21 NOTE — OP NOTE
Operative Note    Leslie Harris  3/21/2023    Pre-op Diagnosis:   Incisional hernia, without obstruction or gangrene [K43.2], 2.3cm defect with incarcerated fat    Post-op Diagnosis:     Post-Op Diagnosis Codes:     * Incisional hernia, without obstruction or gangrene [K43.2], 2.3cm defect with incarcerated fat    Procedure/CPT® Codes:      Procedure(s):  VENTRAL / INCISIONAL HERNIA REPAIR LAPAROSCOPIC WITH DAVINCI ROBOT, 2.3cm defect with incarcerated fat, mesh plaement    Surgeon(s):  Hamilton Biggs MD    Anesthesia: General    Staff:   Circulator: Tiffanie Feng RN; Yocasta Matute RN  Scrub Person: Tiffanie Feng RN; Oswald Mednez RN  Assistant: Sheree Locke    Estimated Blood Loss: minimal    Specimens:                None      Drains:   External Urinary Catheter (Active)       Findings: 2.3cm defect with incarcerated fat    Implant: 11.4cm round Ventralex mesh    Complications: none    Indication: Symptomatic incisional hernia with recent apparent bowel incarceration    Operative Note:    The patient was seen and consent was obtained preoperatively.  Following this she was brought to the operating room and placed in supine position on the OR table.  General anesthetic was administered and the patient was orotracheally intubated without incident.  A preoperative briefing was performed.  The abdomen was prepped and draped in normal sterile fashion.  A timeout was then performed.    Following timeout local anesthetic was injected in the left upper quadrant of the abdomen where a small skin incision was then made.  An 8 mm optical viewing trocar was then used into the abdomen without incident.  The abdomen was then insufflated without issue.  On initial inspection of the abdomen there was obvious omental fat going up into the hernia defect in the periumbilical region of the abdomen related to the patient's prior incision.  2 additional ports were then placed, both under direct  visualization and after injection of local anesthetic.  An 8 mm port placed in the left mid abdomen and a 12 mm bariatric port placed in the left lower quadrant.  The robot was then docked and instrumentation was inserted.     Cautery janee were then used to clear the fascia of preperitoneal fat surrounding the defect.  Combination of blunt dissection as well as sharp dissection with cautery janee was then used to reduce a large amount of incarcerated omental fat from the hernia defect.  In order to do so the hernia defect was enlarged slightly using the cautery janee to allow removal of the incarcerated fat.  Once all fat have been reduced out of the hernia defect attention was turned to closure of the defect.  The intra-abdominal pressure was reduced to 12 mmHg from 15 mmHg.    0 STRATAFIX was then used to close the defect which measured approximately 2.3 cm in diameter.  Continuous running suture was placed and there was good closure of the defect.  A few bites of the suture incorporated pieces of the hernia sac to help reduce seroma formation.  Once the defect was closed an 11.4 cm round Ventralex mesh was placed into abdominally through the 12 mm port.  V-Loc sutures were passed of the abdomen as well.  Suture passer was placed through the skin overlying the defect and brought out through the middle of the defect.  This was used to retrieve the string on the mesh to pull it up into position.  The mesh was then sutured into position using running 3-0 V-Loc suture and appeared to provide appropriate coverage of the defect with good overlap of normal surrounding tissue.    The mesh deployment system was then removed as were the remaining sutures.  A suture passer was then used to close the 12 mm port site fascial defect with a 0 Vicryl stitch.  The abdomen was then desufflated and instrumentation and ports removed.  The skin was closed using 4-0 Vicryl and skin glue was placed.  The patient was then awakened  and returned to recovery.    Assistant: Sheree Locke was responsible for performing the following activities: Insertion and exchange of robotic instrumentation, suture material, mesh, assistance and fascial closure, closure of skin, placement of dressings and their skilled assistance was necessary for the success of this case.          This document has been electronically signed by Hamilton Biggs MD on March 21, 2023 16:37 EDT      Hamilton Biggs MD     Date: 3/21/2023  Time: 16:31 EDT

## 2023-03-21 NOTE — ANESTHESIA POSTPROCEDURE EVALUATION
Patient: Leslie Harris    Procedure Summary     Date: 03/21/23 Room / Location: [facility] OR 09 / [facility] MAIN OR    Anesthesia Start: 1444 Anesthesia Stop: 1636    Procedure: VENTRAL / INCISIONAL HERNIA REPAIR LAPAROSCOPIC WITH DAVINCI ROBOT (Abdomen) Diagnosis:       Incisional hernia, without obstruction or gangrene      (Incisional hernia, without obstruction or gangrene [K43.2])    Surgeons: Hamilton Biggs MD Provider: Alex Beavers MD    Anesthesia Type: general ASA Status: 3          Anesthesia Type: general    Vitals  Vitals Value Taken Time   /84 03/21/23 1656   Temp 98 °F (36.7 °C) 03/21/23 1638   Pulse 75 03/21/23 1656   Resp 22 03/21/23 1653   SpO2 100 % 03/21/23 1656   Vitals shown include unvalidated device data.        Post Anesthesia Care and Evaluation    Patient location during evaluation: PACU  Patient participation: complete - patient participated  Level of consciousness: awake  Pain scale: See nurse's notes for pain score.  Pain management: adequate    Airway patency: patent  Anesthetic complications: No anesthetic complications  PONV Status: none  Cardiovascular status: acceptable  Respiratory status: acceptable and spontaneous ventilation  Hydration status: acceptable    Comments: Patient seen and examined postoperatively; vital signs stable; SpO2 greater than or equal to 90%; cardiopulmonary status stable; nausea/vomiting adequately controlled; pain adequately controlled; no apparent anesthesia complications; patient discharged from anesthesia care when discharge criteria were met

## 2023-03-21 NOTE — OUTREACH NOTE
Prep Survey    Flowsheet Row Responses   [facility] patient discharged from? [facility]   Is LACE score < 7 ? Yes   Eligibility [facility]   Date of Admission 03/21/23   Date of Discharge 03/21/23   Discharge Disposition Home or Self Care   Discharge diagnosis VENTRAL / INCISIONAL HERNIA REPAIR LAPAROSCOPIC WITH DAVINCI ROBOT   Does the patient have one of the following disease processes/diagnoses(primary or secondary)? General Surgery   Does the patient have Home health ordered? No   Is there a DME ordered? No   Prep survey completed? Yes          Sandra JARA - Registered Nurse

## 2023-03-21 NOTE — ANESTHESIA PREPROCEDURE EVALUATION
Anesthesia Evaluation     Patient summary reviewed and Nursing notes reviewed   NPO Solid Status: > 8 hours  NPO Liquid Status: > 2 hours           Airway   Mallampati: I  TM distance: >3 FB  Neck ROM: full  No difficulty expected  Dental - normal exam     Pulmonary - negative pulmonary ROS and normal exam   Cardiovascular - normal exam    (+) hypertension,       Neuro/Psych- negative ROS  GI/Hepatic/Renal/Endo    (+) morbid obesity, GERD,  diabetes mellitus type 2,     Musculoskeletal (-) negative ROS    Abdominal  - normal exam    Bowel sounds: normal.   Substance History - negative use     OB/GYN negative ob/gyn ROS         Other                        Anesthesia Plan    ASA 3     general     intravenous induction     Anesthetic plan, risks, benefits, and alternatives have been provided, discussed and informed consent has been obtained with: patient.  Pre-procedure education provided  Plan discussed with CRNA.        CODE STATUS:

## 2023-03-21 NOTE — INTERVAL H&P NOTE
H&P reviewed. The patient was examined and there are no changes to the H&P.          This document has been electronically signed by Hamilton Biggs MD on March 21, 2023 14:37 EDT

## 2023-03-22 ENCOUNTER — TELEPHONE (OUTPATIENT)
Dept: SURGERY | Facility: CLINIC | Age: 33
End: 2023-03-22
Payer: COMMERCIAL

## 2023-03-22 ENCOUNTER — TRANSITIONAL CARE MANAGEMENT TELEPHONE ENCOUNTER (OUTPATIENT)
Dept: CALL CENTER | Facility: HOSPITAL | Age: 33
End: 2023-03-22
Payer: COMMERCIAL

## 2023-03-22 NOTE — OUTREACH NOTE
Call Center TCM Note    Flowsheet Row Responses   [facility] patient discharged from? [facility]   Does the patient have one of the following disease processes/diagnoses(primary or secondary)? General Surgery   TCM attempt successful? Yes   Call start time 1304   Call end time 1314   Discharge diagnosis VENTRAL / INCISIONAL HERNIA REPAIR LAPAROSCOPIC WITH DAVINCI ROBOT   Person spoke with today (if not patient) and relationship Patient   Meds reviewed with patient/caregiver? Yes   Does the patient have all medications related to this admission filled (includes all antibiotics, pain medications, etc.) Yes   Is the patient taking all medications as directed (includes completed medication regime)? Yes   Comments PCP Dr Coronado. Hospital follow up scheduled for 4/4/2023  4:00 PM with Dr Coronado.    Does the patient have an appointment with their PCP within 7 days of discharge? No   Nursing Interventions Assisted patient with making appointment per protocol   Has home health visited the patient within 72 hours of discharge? N/A   Psychosocial issues? No   Did the patient receive a copy of their discharge instructions? Yes   Nursing interventions Reviewed instructions with patient  [No driving while taking narcotics, May shower, no tub bath or submerging incisions, Do not lift/push/pull more than 10#]   What is the patient's perception of their health status since discharge? Improving   Nursing interventions Nurse provided patient education   Is the patient /caregiver able to teach back basic post-op care? No tub bath, swimming, or hot tub until instructed by MD, Take showers only when approved by MD-sponge bathe until then, Drive as instructed by MD in discharge instructions, Keep incision areas clean,dry and protected, Lifting as instructed by MD in discharge instructions   Is the patient/caregiver able to teach back signs and symptoms of incisional infection? Increased redness, swelling or pain at the incisonal site,  Increased drainage or bleeding, Pus or odor from incision, Fever   Is the patient/caregiver able to teach back steps to recovery at home? Set small, achievable goals for return to baseline health, Rest and rebuild strength, gradually increase activity, Eat a well-balance diet   If the patient is a current smoker, are they able to teach back resources for cessation? Not a smoker   Is the patient/caregiver able to teach back the hierarchy of who to call/visit for symptoms/problems? PCP, Specialist, Home health nurse, Urgent Care, ED, 911 Yes   TCM call completed? Yes   Wrap up additional comments Dr Biggs / surgeon appt 4/5/23  1245pm   Call end time 1314   Would this patient benefit from a Referral to [facility] Social Work? No   Is the patient interested in additional calls from an ambulatory ?  NOTE:  applies to high risk patients requiring additional follow-up. No          Maddi Gallardo, RN    3/22/2023, 13:15 EDT

## 2023-03-27 DIAGNOSIS — B96.20 E. COLI UTI (URINARY TRACT INFECTION): Primary | ICD-10-CM

## 2023-03-27 DIAGNOSIS — N39.0 E. COLI UTI (URINARY TRACT INFECTION): Primary | ICD-10-CM

## 2023-03-27 DIAGNOSIS — K43.2 INCISIONAL HERNIA, WITHOUT OBSTRUCTION OR GANGRENE: ICD-10-CM

## 2023-03-27 RX ORDER — FLUCONAZOLE 150 MG/1
150 TABLET ORAL ONCE
COMMUNITY
Start: 2023-03-21 | End: 2023-03-27 | Stop reason: SDUPTHER

## 2023-03-27 RX ORDER — FLUCONAZOLE 150 MG/1
150 TABLET ORAL ONCE
Qty: 1 TABLET | Refills: 0 | Status: SHIPPED | OUTPATIENT
Start: 2023-03-27 | End: 2023-03-27

## 2023-04-05 ENCOUNTER — OFFICE VISIT (OUTPATIENT)
Dept: SURGERY | Facility: CLINIC | Age: 33
End: 2023-04-05
Payer: COMMERCIAL

## 2023-04-05 VITALS
TEMPERATURE: 97.7 F | BODY MASS INDEX: 47.09 KG/M2 | HEART RATE: 104 BPM | WEIGHT: 293 LBS | DIASTOLIC BLOOD PRESSURE: 87 MMHG | HEIGHT: 66 IN | OXYGEN SATURATION: 96 % | SYSTOLIC BLOOD PRESSURE: 124 MMHG

## 2023-04-05 DIAGNOSIS — Z09 ENCOUNTER FOR FOLLOW-UP: Primary | ICD-10-CM

## 2023-04-05 PROCEDURE — 99213 OFFICE O/P EST LOW 20 MIN: CPT | Performed by: SURGERY

## 2023-04-05 NOTE — PROGRESS NOTES
CHIEF COMPLAINT:    Chief Complaint   Patient presents with   • Follow-up     Umbilical Hernia Repair 3/21/23       HISTORY OF PRESENT ILLNESS:    Leslie Harris is a 32 y.o. female who underwent incisional hernia repair on 3/21/2023.  She returns today for follow-up.  Other than some incisional pain she has no significant complaints today.  She has returned to work.  She is eating and drinking without issue.    EXAM:  Vitals:    04/05/23 1247   BP: 124/87   Pulse: 104   Temp: 97.7 °F (36.5 °C)   SpO2:          Abdomen soft, incisions healing appropriately, no palpable hernia    ASSESSMENT:    Status post robotic repair of incisional hernia    PLAN:    Overall appears to be doing well.  Continue no heavy lifting.  Follow-up in 1 month for recheck.          This document has been electronically signed by Hamilton Biggs MD on April 5, 2023 13:02 EDT       Pre-Excision Curettage Text (Leave Blank If You Do Not Want): Prior to drawing the surgical margin the visible lesion was removed with electrodesiccation and curettage to clearly define the lesion size.

## 2023-04-25 ENCOUNTER — OFFICE VISIT (OUTPATIENT)
Dept: FAMILY MEDICINE CLINIC | Facility: CLINIC | Age: 33
End: 2023-04-25
Payer: COMMERCIAL

## 2023-04-25 VITALS
TEMPERATURE: 98.6 F | HEART RATE: 104 BPM | RESPIRATION RATE: 18 BRPM | DIASTOLIC BLOOD PRESSURE: 80 MMHG | WEIGHT: 293 LBS | BODY MASS INDEX: 47.09 KG/M2 | OXYGEN SATURATION: 97 % | HEIGHT: 66 IN | SYSTOLIC BLOOD PRESSURE: 153 MMHG

## 2023-04-25 DIAGNOSIS — L91.0 KELOID: ICD-10-CM

## 2023-04-25 DIAGNOSIS — K21.9 GASTROESOPHAGEAL REFLUX DISEASE, UNSPECIFIED WHETHER ESOPHAGITIS PRESENT: ICD-10-CM

## 2023-04-25 DIAGNOSIS — I10 UNCONTROLLED HYPERTENSION: ICD-10-CM

## 2023-04-25 DIAGNOSIS — E66.9 DIABETES MELLITUS TYPE 2 IN OBESE: Primary | ICD-10-CM

## 2023-04-25 DIAGNOSIS — E11.69 DIABETES MELLITUS TYPE 2 IN OBESE: Primary | ICD-10-CM

## 2023-04-25 DIAGNOSIS — E78.5 DYSLIPIDEMIA: ICD-10-CM

## 2023-04-25 PROBLEM — E11.9 TYPE 2 DIABETES MELLITUS WITHOUT COMPLICATIONS: Status: ACTIVE | Noted: 2021-09-10

## 2023-04-25 PROBLEM — J96.21 ACUTE AND CHRONIC RESPIRATORY FAILURE WITH HYPOXIA: Status: ACTIVE | Noted: 2021-09-10

## 2023-04-25 PROBLEM — R53.1 WEAKNESS: Status: ACTIVE | Noted: 2021-09-10

## 2023-04-25 PROBLEM — J12.82 PNEUMONIA DUE TO CORONAVIRUS DISEASE 2019: Status: ACTIVE | Noted: 2021-07-16

## 2023-04-25 PROBLEM — U07.1 COVID-19: Status: ACTIVE | Noted: 2021-07-16

## 2023-04-25 PROBLEM — G62.81 CRITICAL ILLNESS POLYNEUROPATHY: Status: ACTIVE | Noted: 2021-09-10

## 2023-04-25 PROBLEM — U07.1 PNEUMONIA DUE TO CORONAVIRUS DISEASE 2019: Status: ACTIVE | Noted: 2021-07-16

## 2023-04-25 PROBLEM — E66.01 MORBID (SEVERE) OBESITY DUE TO EXCESS CALORIES: Status: ACTIVE | Noted: 2021-09-10

## 2023-04-25 LAB
EXPIRATION DATE: ABNORMAL
HBA1C MFR BLD: 6 %
Lab: ABNORMAL

## 2023-04-25 RX ORDER — PANTOPRAZOLE SODIUM 40 MG/1
40 TABLET, DELAYED RELEASE ORAL DAILY
Qty: 180 TABLET | Refills: 1 | Status: SHIPPED | OUTPATIENT
Start: 2023-04-25

## 2023-04-25 RX ORDER — DAPAGLIFLOZIN 10 MG/1
1 TABLET, FILM COATED ORAL
Qty: 30 TABLET | Refills: 5 | Status: SHIPPED | OUTPATIENT
Start: 2023-04-25

## 2023-04-25 NOTE — PROGRESS NOTES
Fingerstick performed in right middle finger by Adelia Singh CMA  with good hemostasis. Patient tolerated well. 04/25/23 Adelia Singh CMA

## 2023-04-25 NOTE — PROGRESS NOTES
Subjective   Leslie Harris is a 32 y.o. female.     Chief Complaint   Patient presents with   • Diabetes     Patients HgbA1c is 6.0% while in the office today.    • Heartburn     Patients pantoprazole is $300 and she would like it switched to something else.    • Hypertension         Current Outpatient Medications:   •  benazepril-hydrochlorthiazide (LOTENSIN HCT) 20-12.5 MG per tablet, TAKE 1 TABLET BY MOUTH EVERY DAY (Patient taking differently: Take 1 tablet by mouth Daily. HOLD 24 hours before surgery), Disp: 90 tablet, Rfl: 1  •  Biotin 5000 MCG tablet, Take  by mouth Daily. HOLD 5-7 days for surgery, Disp: , Rfl:   •  Cholecalciferol (Vitamin D) 50 MCG (2000 UT) capsule, Take 1 capsule by mouth Daily., Disp: 30 capsule, Rfl:   •  dapagliflozin Propanediol (Farxiga) 10 MG tablet, Take 10 mg by mouth Daily With Breakfast., Disp: 30 tablet, Rfl: 5  •  ezetimibe (Zetia) 10 MG tablet, Take 1 tablet by mouth Daily., Disp: 90 tablet, Rfl: 2  •  glucose blood (Accu-Chek Guide) test strip, 1 each by Other route Daily As Needed (for BS control/ monitoring)., Disp: 100 each, Rfl: 1  •  Lancets (accu-chek safe-t pro) lancets, 1 each by Other route Daily As Needed (for BS control)., Disp: 100 each, Rfl: 1  •  multivitamin with minerals tablet tablet, Take 1 tablet by mouth Daily. HOLD 5-7 days, Disp: , Rfl:   •  pantoprazole (Protonix) 40 MG EC tablet, Take 1 tablet by mouth Daily., Disp: 180 tablet, Rfl: 1    Past Medical History:   Diagnosis Date   • Class 3 severe obesity without serious comorbidity with body mass index (BMI) of 50.0 to 59.9 in adult    • COVID-19 07/2021    w/ Resp Failure on vent x 19 days   • Critical illness polyneuropathy 08/12/2021    from COVID   • DMII    • GERD    • Hypertension    • PAP     NEG = 2016   • Seasonal allergies        Past Surgical History:   Procedure Laterality Date   • BRONCHOSCOPY N/A 08/08/2021    Procedure: BRONCHOSCOPY with bilateral bronchial washing   • CHOLECYSTECTOMY    "  • EXPLORATORY LAPAROTOMY W/ BOWEL RESECTION  2018    Sm Bowel perf w/ BAHMAN   • TONSILLECTOMY     • VENTRAL HERNIA REPAIR N/A 3/21/2023    Procedure: VENTRAL / INCISIONAL HERNIA REPAIR LAPAROSCOPIC WITH DAVINCI ROBOT;  Surgeon: Hamilton Biggs MD;  Location: [facility] MAIN OR;  Service: Robotics - DaVinci;  Laterality: N/A;       Family History   Problem Relation Age of Onset   • Diabetes Mother    • Fibromyalgia Mother    • Anxiety disorder Mother    • Depression Mother    • Heart disease Father         CABG x 2   • No Known Problems Brother    • Asperger's syndrome Brother    • Cancer Maternal Grandmother         Lung Cncer   • Cancer Paternal Grandmother         Pancreatic Cancer   • Heart disease Paternal Grandfather    • Liver disease Maternal Aunt         Non-Alcoholic       Social History     Socioeconomic History   • Marital status: Single   Tobacco Use   • Smoking status: Never     Passive exposure: Past   • Smokeless tobacco: Never   Vaping Use   • Vaping Use: Never used   Substance and Sexual Activity   • Alcohol use: Not Currently   • Drug use: Never   • Sexual activity: Not Currently     Partners: Male       History of Present Illness  31 y/o C female here for f/u on HTN/ DMII/ GERD     The patient reports she drinks soda, tea, and \"a lot\" of water.  When she is not drinking water she drinks soda or sweet tea. She has switched to using Splenda in her swet tea.     Her health insurance was changed at the beginnning of this year, 2023, which caused her to be unable to afford her Protonix prescription. She confirms that discontinuing drinking soda has helped reduce her acid reflux. She notes she has lost 30 pounds since 10/2023. She mentions that she weighed 333 pounds at her max. She denies receiving a glucometer. She notes her mother, who she lives with, has a glucomter. She did not have laboratory work done prior to her abd hernia surgery.     Her blood pressure is elevated because she has " "forgotten to take her blood pressure pill for \"a few days\". She states, \"she can tell when she does not take it\". She has a medicine divider. She states she feels like she should not take her medications too late in the day and then take it again the next day. She notes this is the first time she has had an elevated blood pressure. She mentions that she recently had surgery and had a normal blood pressure at the follow-up appointment. She agrees to have fasting laboratory work done in 3 months at [facility]. She states her ventral hernai was \"like a bulge'. She mentions a lump on her neck, which is a scar from a surgery where a tube was stitched to her neck.     She does not get her eyes examined annually. She has had LASIK surgery in the past.     She denies any leg cramps or cough associated with taking benazepril or her water pill. She notes that her ankles were previously swelling severely.  She was not checking her blood pressure when she was taking her BP medicine. She confirms she is taking Zetia.     The following portions of the patient's history were reviewed and updated as appropriate: allergies, current medications, past family history, past medical history, past social history, past surgical history and problem list.    Review of Systems   Constitutional: Negative for activity change, appetite change, fatigue, unexpected weight gain and unexpected weight loss.   Eyes: Negative for blurred vision, double vision, pain and visual disturbance.   Respiratory: Negative for cough, chest tightness and shortness of breath.    Cardiovascular: Negative for chest pain, palpitations and leg swelling.   Gastrointestinal: Positive for GERD. Negative for abdominal distention, abdominal pain, constipation, diarrhea, nausea and vomiting.   Endocrine: Negative for polydipsia, polyphagia and polyuria.   Genitourinary: Negative for frequency, urgency and urinary incontinence.   Musculoskeletal: Negative for arthralgias, " gait problem and myalgias.   Skin: Negative for color change, dry skin, rash and skin lesions.   Neurological: Negative for dizziness, facial asymmetry, speech difficulty, weakness, light-headedness, numbness, headache, memory problem and confusion.       Vitals:    04/25/23 1317   BP: 153/80   Pulse: 104   Resp: 18   Temp: 98.6 °F (37 °C)   SpO2: 97%       Objective   Physical Exam  Vitals and nursing note reviewed.   Constitutional:       General: She is not in acute distress.     Appearance: Normal appearance. She is well-developed. She is not ill-appearing or toxic-appearing.   HENT:      Head:        Comments: 1/2 cm oval skin colored smooth sessile mass at Rt zygomatic angle  Cardiovascular:      Rate and Rhythm: Normal rate and regular rhythm.      Pulses:           Dorsalis pedis pulses are 2+ on the right side and 2+ on the left side.      Heart sounds: Normal heart sounds. No murmur heard.  Pulmonary:      Effort: Pulmonary effort is normal. No respiratory distress.      Breath sounds: Normal breath sounds. No stridor. No wheezing, rhonchi or rales.   Musculoskeletal:         General: No tenderness or deformity.      Right foot: No bunion, Charcot foot, foot drop or prominent metatarsal heads.      Left foot: No bunion, Charcot foot, foot drop or prominent metatarsal heads.   Feet:      Right foot:      Skin integrity: Skin integrity normal. No ulcer, blister, skin breakdown, erythema, warmth, callus, dry skin or fissure.      Toenail Condition: Right toenails are normal. ingrown     Left foot:      Skin integrity: Skin integrity normal. No ulcer, blister, skin breakdown, erythema, warmth, callus, dry skin or fissure.      Toenail Condition: Left toenails are normal. ingrown     Comments: DM foot exam done  Skin:     General: Skin is warm and dry.      Capillary Refill: Capillary refill takes less than 2 seconds.      Findings: No erythema or rash.   Neurological:      Mental Status: She is alert and  oriented to person, place, and time.      Cranial Nerves: No cranial nerve deficit.   Psychiatric:         Attention and Perception: Attention normal.         Mood and Affect: Mood and affect normal.         Speech: Speech normal.         Behavior: Behavior normal. Behavior is cooperative.         Thought Content: Thought content normal.         Cognition and Memory: Cognition and memory normal.         Judgment: Judgment normal.           Assessment & Plan   Diagnoses and all orders for this visit:    1. Diabetes mellitus type 2 in obese (Primary)  -     POC Glycosylated Hemoglobin (Hb A1C)  -     Cancel: MicroAlbumin, Urine, Random - Urine, Clean Catch; Future  -     Cancel: Hemoglobin A1c; Future  -     MicroAlbumin, Urine, Random - Urine, Clean Catch; Future  -     Hemoglobin A1c; Future    2. Uncontrolled hypertension    3. Gastroesophageal reflux disease, unspecified whether esophagitis present  -     pantoprazole (Protonix) 40 MG EC tablet; Take 1 tablet by mouth Daily.  Dispense: 180 tablet; Refill: 1    4. Dyslipidemia  -     Cancel: Lipid Panel; Future  -     Cancel: Comprehensive Metabolic Panel; Future  -     Lipid Panel; Future  -     Comprehensive Metabolic Panel; Future    5. Keloid  -     Ambulatory Referral to Dermatology    Other orders  -     dapagliflozin Propanediol (Farxiga) 10 MG tablet; Take 10 mg by mouth Daily With Breakfast.  Dispense: 30 tablet; Refill: 5      - The patient's blood pressure is 153/80 mmHg.   - The patient is informed that it is acceptable to take her blood pressure medication  late in the day if she forgets to take it earlier in the day.     - The patient is informed she can buy a 6-month supply of Protonix with good Rx for $14.76 dollars.  - Protonix prescription sent.    - The patient has lost 31 pounds since 9/2023.   - She will come in to have fasting laboratory work at [facility] in 7/2023.     - The patient is informed that she can buy a glucometer  over-the-counter.  - The patient's blood glucose is normal---A1C=6.0     Transcribed from ambient dictation for Ara Coronado DO by Pamela Schmidt.  04/25/23   18:08 EDT    Patient or patient representative verbalized consent to the visit recording.  I have personally performed the services described in this document as transcribed by the above individual, and it is both accurate and complete.

## 2023-05-06 PROBLEM — R53.1 WEAKNESS: Status: RESOLVED | Noted: 2021-09-10 | Resolved: 2023-05-06

## 2023-05-06 PROBLEM — J96.21 ACUTE AND CHRONIC RESPIRATORY FAILURE WITH HYPOXIA: Status: RESOLVED | Noted: 2021-09-10 | Resolved: 2023-05-06

## 2023-05-06 PROBLEM — G62.81 CRITICAL ILLNESS POLYNEUROPATHY: Status: RESOLVED | Noted: 2021-09-10 | Resolved: 2023-05-06

## 2023-05-10 ENCOUNTER — OFFICE VISIT (OUTPATIENT)
Dept: SURGERY | Facility: CLINIC | Age: 33
End: 2023-05-10
Payer: COMMERCIAL

## 2023-05-10 VITALS
HEIGHT: 66 IN | DIASTOLIC BLOOD PRESSURE: 93 MMHG | OXYGEN SATURATION: 98 % | BODY MASS INDEX: 47.09 KG/M2 | TEMPERATURE: 98.2 F | WEIGHT: 293 LBS | SYSTOLIC BLOOD PRESSURE: 155 MMHG | HEART RATE: 87 BPM

## 2023-05-10 DIAGNOSIS — Z09 ENCOUNTER FOR FOLLOW-UP: Primary | ICD-10-CM

## 2023-05-10 NOTE — PROGRESS NOTES
CHIEF COMPLAINT:    Chief Complaint   Patient presents with   • Follow-up     Umbilical Hernia Repair on 3/21/23       HISTORY OF PRESENT ILLNESS:    Leslie Harris is a 32 y.o. female who underwent incisional hernia repair on 3/21/2023.  She returns today for additional follow-up.  Overall she is doing well.  She reports no abdominal pain.    EXAM:  Vitals:    05/10/23 1303   BP: 155/93   Pulse:    Temp:    SpO2:          Abdomen soft, incisions well-healed, some firmness in area of prior hernia without evidence of hernia recurrence    ASSESSMENT:    Status post incisional hernia repair    PLAN:    Overall she appears to be well-healed.  At this point she can begin resuming all of her normal activities.  She can see us as needed.          This document has been electronically signed by Hamilton Biggs MD on May 10, 2023 16:23 EDT

## 2023-05-27 DIAGNOSIS — K76.0 HEPATIC STEATOSIS: ICD-10-CM

## 2023-05-30 RX ORDER — EZETIMIBE 10 MG/1
TABLET ORAL
Qty: 90 TABLET | Refills: 0 | Status: SHIPPED | OUTPATIENT
Start: 2023-05-30

## 2023-05-30 NOTE — TELEPHONE ENCOUNTER
Rx Refill Note  Requested Prescriptions     Pending Prescriptions Disp Refills   • ezetimibe (ZETIA) 10 MG tablet [Pharmacy Med Name: EZETIMIBE 10 MG TABLET] 90 tablet 2     Sig: TAKE 1 TABLET BY MOUTH EVERY DAY      Last office visit with prescribing clinician: 4/25/2023   Last telemedicine visit with prescribing clinician: Visit date not found   Next office visit with prescribing clinician: Visit date not found                       Lipid Panel (03/08/2022 12:14)    Would you like a call back once the refill request has been completed: [] Yes [] No    If the office needs to give you a call back, can they leave a voicemail: [] Yes [] No    Brisa De La Paz, RT  05/30/23, 09:48 EDT

## 2023-07-24 ENCOUNTER — CLINICAL SUPPORT (OUTPATIENT)
Dept: FAMILY MEDICINE CLINIC | Facility: CLINIC | Age: 33
End: 2023-07-24
Payer: COMMERCIAL

## 2023-07-24 DIAGNOSIS — E11.69 DIABETES MELLITUS TYPE 2 IN OBESE: ICD-10-CM

## 2023-07-24 DIAGNOSIS — E78.5 DYSLIPIDEMIA: ICD-10-CM

## 2023-07-24 DIAGNOSIS — E66.9 DIABETES MELLITUS TYPE 2 IN OBESE: ICD-10-CM

## 2023-07-24 PROCEDURE — 83036 HEMOGLOBIN GLYCOSYLATED A1C: CPT | Performed by: FAMILY MEDICINE

## 2023-07-24 PROCEDURE — 82043 UR ALBUMIN QUANTITATIVE: CPT | Performed by: FAMILY MEDICINE

## 2023-07-24 PROCEDURE — 80061 LIPID PANEL: CPT | Performed by: FAMILY MEDICINE

## 2023-07-24 PROCEDURE — 36415 COLL VENOUS BLD VENIPUNCTURE: CPT | Performed by: FAMILY MEDICINE

## 2023-07-24 PROCEDURE — 80053 COMPREHEN METABOLIC PANEL: CPT | Performed by: FAMILY MEDICINE

## 2023-07-24 NOTE — PROGRESS NOTES
Venipuncture performed in L ARM by RT Shira  with good hemostasis. Patient tolerated well. 07/24/23 Brisa eD La Paz, RT

## 2023-07-25 LAB
ALBUMIN SERPL-MCNC: 4.4 G/DL (ref 3.5–5.2)
ALBUMIN UR-MCNC: 1.3 MG/DL
ALBUMIN/GLOB SERPL: 1.6 G/DL
ALP SERPL-CCNC: 100 U/L (ref 39–117)
ALT SERPL W P-5'-P-CCNC: 28 U/L (ref 1–33)
ANION GAP SERPL CALCULATED.3IONS-SCNC: 12.1 MMOL/L (ref 5–15)
AST SERPL-CCNC: 19 U/L (ref 1–32)
BILIRUB SERPL-MCNC: 0.6 MG/DL (ref 0–1.2)
BUN SERPL-MCNC: 14 MG/DL (ref 6–20)
BUN/CREAT SERPL: 19.2 (ref 7–25)
CALCIUM SPEC-SCNC: 9.6 MG/DL (ref 8.6–10.5)
CHLORIDE SERPL-SCNC: 105 MMOL/L (ref 98–107)
CHOLEST SERPL-MCNC: 173 MG/DL (ref 0–200)
CO2 SERPL-SCNC: 23.9 MMOL/L (ref 22–29)
CREAT SERPL-MCNC: 0.73 MG/DL (ref 0.57–1)
EGFRCR SERPLBLD CKD-EPI 2021: 111.5 ML/MIN/1.73
GLOBULIN UR ELPH-MCNC: 2.7 GM/DL
GLUCOSE SERPL-MCNC: 115 MG/DL (ref 65–99)
HBA1C MFR BLD: 6.5 % (ref 4.8–5.6)
HDLC SERPL-MCNC: 40 MG/DL (ref 40–60)
LDLC SERPL CALC-MCNC: 108 MG/DL (ref 0–100)
LDLC/HDLC SERPL: 2.62 {RATIO}
POTASSIUM SERPL-SCNC: 4.4 MMOL/L (ref 3.5–5.2)
PROT SERPL-MCNC: 7.1 G/DL (ref 6–8.5)
SODIUM SERPL-SCNC: 141 MMOL/L (ref 136–145)
TRIGL SERPL-MCNC: 142 MG/DL (ref 0–150)
VLDLC SERPL-MCNC: 25 MG/DL (ref 5–40)

## 2023-09-03 DIAGNOSIS — K76.0 HEPATIC STEATOSIS: ICD-10-CM

## 2023-09-05 RX ORDER — DAPAGLIFLOZIN 10 MG/1
TABLET, FILM COATED ORAL
Qty: 90 TABLET | Refills: 1 | Status: SHIPPED | OUTPATIENT
Start: 2023-09-05

## 2023-09-05 RX ORDER — EZETIMIBE 10 MG/1
TABLET ORAL
Qty: 90 TABLET | Refills: 0 | Status: SHIPPED | OUTPATIENT
Start: 2023-09-05

## 2023-09-11 RX ORDER — BENAZEPRIL HYDROCHLORIDE AND HYDROCHLOROTHIAZIDE 20; 12.5 MG/1; MG/1
TABLET ORAL
Qty: 90 TABLET | Refills: 1 | Status: SHIPPED | OUTPATIENT
Start: 2023-09-11

## 2023-11-16 ENCOUNTER — OFFICE VISIT (OUTPATIENT)
Dept: FAMILY MEDICINE CLINIC | Facility: CLINIC | Age: 33
End: 2023-11-16
Payer: COMMERCIAL

## 2023-11-16 ENCOUNTER — TELEPHONE (OUTPATIENT)
Dept: FAMILY MEDICINE CLINIC | Facility: CLINIC | Age: 33
End: 2023-11-16

## 2023-11-16 VITALS
DIASTOLIC BLOOD PRESSURE: 73 MMHG | TEMPERATURE: 98 F | OXYGEN SATURATION: 96 % | HEART RATE: 93 BPM | SYSTOLIC BLOOD PRESSURE: 125 MMHG | BODY MASS INDEX: 48.82 KG/M2 | HEIGHT: 65 IN | WEIGHT: 293 LBS | RESPIRATION RATE: 18 BRPM

## 2023-11-16 DIAGNOSIS — K76.0 HEPATIC STEATOSIS: ICD-10-CM

## 2023-11-16 DIAGNOSIS — E66.9 DIABETES MELLITUS TYPE 2 IN OBESE: Primary | ICD-10-CM

## 2023-11-16 DIAGNOSIS — E11.69 DIABETES MELLITUS TYPE 2 IN OBESE: Primary | ICD-10-CM

## 2023-11-16 DIAGNOSIS — N93.8 DUB (DYSFUNCTIONAL UTERINE BLEEDING): ICD-10-CM

## 2023-11-16 DIAGNOSIS — E55.9 VITAMIN D DEFICIENCY: ICD-10-CM

## 2023-11-16 DIAGNOSIS — K21.9 GASTROESOPHAGEAL REFLUX DISEASE, UNSPECIFIED WHETHER ESOPHAGITIS PRESENT: ICD-10-CM

## 2023-11-16 DIAGNOSIS — N76.0 RECURRENT VAGINITIS: ICD-10-CM

## 2023-11-16 LAB
EXPIRATION DATE: ABNORMAL
HBA1C MFR BLD: 6.3 % (ref 4.5–5.7)
Lab: ABNORMAL

## 2023-11-16 RX ORDER — EZETIMIBE 10 MG/1
10 TABLET ORAL DAILY
Qty: 90 TABLET | Refills: 1 | Status: SHIPPED | OUTPATIENT
Start: 2023-11-16

## 2023-11-16 RX ORDER — CHLORAL HYDRATE 500 MG
1000 CAPSULE ORAL
COMMUNITY

## 2023-11-16 RX ORDER — FLUCONAZOLE 150 MG/1
150 TABLET ORAL ONCE
Qty: 2 TABLET | Refills: 0 | Status: SHIPPED | OUTPATIENT
Start: 2023-11-16 | End: 2023-11-16 | Stop reason: SDUPTHER

## 2023-11-16 RX ORDER — PANTOPRAZOLE SODIUM 40 MG/1
40 TABLET, DELAYED RELEASE ORAL DAILY
Qty: 180 TABLET | Refills: 1 | Status: SHIPPED | OUTPATIENT
Start: 2023-11-16

## 2023-11-16 RX ORDER — TIRZEPATIDE 2.5 MG/.5ML
2.5 INJECTION, SOLUTION SUBCUTANEOUS
Qty: 2 ML | Refills: 0 | Status: SHIPPED | OUTPATIENT
Start: 2023-11-16 | End: 2023-12-06

## 2023-11-16 RX ORDER — DROSPIRENONE AND ETHINYL ESTRADIOL 0.02-3(28)
1 KIT ORAL DAILY
Qty: 30 TABLET | Refills: 2 | Status: SHIPPED | OUTPATIENT
Start: 2023-11-16 | End: 2023-12-07 | Stop reason: SDUPTHER

## 2023-11-16 RX ORDER — FLUCONAZOLE 200 MG/1
200 TABLET ORAL ONCE
Qty: 2 TABLET | Refills: 0 | Status: SHIPPED | OUTPATIENT
Start: 2023-11-16 | End: 2023-11-16

## 2023-11-16 NOTE — PROGRESS NOTES
Subjective   Leslie Harris is a 33 y.o. female who is here for follow-up on diabetes.    Since 8/2023, she has had a period every 3 weeks and after them, it leads to a light yeast infection. Her periods were irregular in high school and she had to get on birth control.   Once she stopped it, she has been completely regular in the last 6 years until 3 months ago. The last time she had a 2-week break and then she got a period again. She gets a week of a period and a week of a yeast infection and then she gets a break and it starts all over. She has tried a couple of different birth controls. The first one worked for a month and then she bled for 4 weeks straight. She ended up getting the Mirena and it was awful. She has not had a period for 2 weeks.     She has tried metformin, and it made her stomach cramp really bad. She has not tried weight loss injections. When she has a yeast infection, she takes a round of probiotics. She has been treated formally with Diflucan. It does work, but then she gets one every 3 weeks and she does not have enough pills. She has not seen a gynecologist in 6 years. She has never had an abnormal Pap smear.    She had blood work where her vitamin D was low. She got a bottle of vitamin D and finished off the bottle. She has been off of it for a couple of months. She is taking a multivitamin, fish oil, and Protonix 40 mg.    Chief Complaint   Patient presents with    Diabetes     Patients HgbA1c while in the office today is 6.3    Menstrual Problem         Current Outpatient Medications:     benazepril-hydrochlorthiazide (LOTENSIN HCT) 20-12.5 MG per tablet, TAKE 1 TABLET BY MOUTH EVERY DAY, Disp: 90 tablet, Rfl: 1    ezetimibe (ZETIA) 10 MG tablet, Take 1 tablet by mouth Daily., Disp: 90 tablet, Rfl: 1    glucose blood (Accu-Chek Guide) test strip, 1 each by Other route Daily As Needed (for BS control/ monitoring)., Disp: 100 each, Rfl: 1    Lancets (accu-chek safe-t pro) lancets, 1 each  by Other route Daily As Needed (for BS control)., Disp: 100 each, Rfl: 1    multivitamin with minerals tablet tablet, Take 1 tablet by mouth Daily. HOLD 5-7 days, Disp: , Rfl:     Omega-3 Fatty Acids (fish oil) 1000 MG capsule capsule, Take 1 capsule by mouth Daily With Breakfast., Disp: , Rfl:     pantoprazole (Protonix) 40 MG EC tablet, Take 1 tablet by mouth Daily., Disp: 180 tablet, Rfl: 1    drospirenone-ethinyl estradiol (PATO) 3-0.02 MG per tablet, Take 1 tablet by mouth Daily., Disp: 30 tablet, Rfl: 2    Tirzepatide (Mounjaro) 5 MG/0.5ML solution pen-injector, Inject 0.5 mL under the skin into the appropriate area as directed Every 7 (Seven) Days., Disp: 6 mL, Rfl: 0    Past Medical History:   Diagnosis Date    Class 3 severe obesity without serious comorbidity with body mass index (BMI) of 50.0 to 59.9 in adult     COVID-19 07/2021    w/ Resp Failure on vent x 19 days    Critical illness polyneuropathy 08/12/2021    from COVID    DMII     GERD     Hypertension     PAP     NEG = 2016    Seasonal allergies        Past Surgical History:   Procedure Laterality Date    BRONCHOSCOPY N/A 08/08/2021    Procedure: BRONCHOSCOPY with bilateral bronchial washing    CHOLECYSTECTOMY      EXPLORATORY LAPAROTOMY W/ BOWEL RESECTION  2018     Bowel perf w/ BAHMAN    TONSILLECTOMY      VENTRAL HERNIA REPAIR N/A 3/21/2023    Procedure: VENTRAL / INCISIONAL HERNIA REPAIR LAPAROSCOPIC WITH AudibaseINCI ROBOT;  Surgeon: Hamilton Biggs MD;  Location: [facility] MAIN OR;  Service: Robotics - DaVinci;  Laterality: N/A;       Family History   Problem Relation Age of Onset    Diabetes Mother     Fibromyalgia Mother     Anxiety disorder Mother     Depression Mother     Heart disease Father         CABG x 2    No Known Problems Brother     Asperger's syndrome Brother     Cancer Maternal Grandmother         Lung Cncer    Cancer Paternal Grandmother         Pancreatic Cancer    Heart disease Paternal Grandfather     Liver disease  Maternal Aunt         Non-Alcoholic       Social History     Socioeconomic History    Marital status: Single   Tobacco Use    Smoking status: Never     Passive exposure: Past    Smokeless tobacco: Never   Vaping Use    Vaping Use: Never used   Substance and Sexual Activity    Alcohol use: Not Currently    Drug use: Never    Sexual activity: Not Currently     Partners: Male       Diabetes  She presents for her follow-up diabetic visit. She has type 2 diabetes mellitus. Her disease course has been stable. There are no hypoglycemic associated symptoms. Pertinent negatives for diabetes include no blurred vision, no polydipsia, no polyphagia, no polyuria, no weakness and no weight loss. Symptoms are stable. Risk factors for coronary artery disease include obesity and diabetes mellitus. Current diabetic treatment includes diet.   Menstrual Problem  This is a new problem. The current episode started more than 1 month ago. The problem has been unchanged. Pertinent negatives include no abdominal pain, coughing, nausea, numbness, rash, vomiting or weakness.        The following portions of the patient's history were reviewed and updated as appropriate: allergies, current medications, past family history, past medical history, past social history, past surgical history and problem list.    Review of Systems   Constitutional:  Negative for activity change, appetite change, unexpected weight gain and unexpected weight loss.   Eyes:  Negative for blurred vision, double vision, pain and visual disturbance.   Respiratory:  Negative for cough, shortness of breath and wheezing.    Cardiovascular:  Negative for leg swelling.   Gastrointestinal:  Negative for abdominal distention, abdominal pain, constipation, diarrhea, nausea and vomiting.   Endocrine: Negative for polydipsia, polyphagia and polyuria.   Genitourinary:  Positive for menstrual problem. Negative for amenorrhea, frequency and vaginal discharge.   Musculoskeletal:   Negative for gait problem.   Skin:  Negative for color change, dry skin, rash and skin lesions.   Neurological:  Negative for weakness and numbness.       Vitals:    11/16/23 0829   BP: 125/73   Pulse: 93   Resp: 18   Temp: 98 °F (36.7 °C)   SpO2: 96%       Objective   Physical Exam  Vitals and nursing note reviewed.   Constitutional:       General: She is not in acute distress.     Appearance: Normal appearance. She is well-developed. She is not ill-appearing or toxic-appearing.   Cardiovascular:      Rate and Rhythm: Normal rate and regular rhythm.      Pulses:           Dorsalis pedis pulses are 2+ on the right side and 2+ on the left side.      Heart sounds: Normal heart sounds. No murmur heard.  Pulmonary:      Effort: Pulmonary effort is normal. No respiratory distress.      Breath sounds: Normal breath sounds. No stridor. No wheezing, rhonchi or rales.   Musculoskeletal:         General: No tenderness or deformity.      Right foot: No bunion, Charcot foot, foot drop or prominent metatarsal heads.      Left foot: No bunion, Charcot foot, foot drop or prominent metatarsal heads.   Feet:      Right foot:      Skin integrity: Skin integrity normal. No ulcer, blister, skin breakdown, erythema, warmth, callus, dry skin or fissure.      Toenail Condition: Right toenails are normal. ingrown     Left foot:      Skin integrity: Skin integrity normal. No ulcer, blister, skin breakdown, erythema, warmth, callus, dry skin or fissure.      Toenail Condition: Left toenails are normal. ingrown     Comments: DM foot exam done    Skin:     General: Skin is warm and dry.      Capillary Refill: Capillary refill takes less than 2 seconds.      Findings: No erythema or rash.   Neurological:      Mental Status: She is alert and oriented to person, place, and time.      Cranial Nerves: No cranial nerve deficit.   Psychiatric:         Attention and Perception: Attention normal.         Mood and Affect: Mood and affect normal.          Speech: Speech normal.         Behavior: Behavior normal. Behavior is cooperative.         Thought Content: Thought content normal.         Cognition and Memory: Cognition and memory normal.         Judgment: Judgment normal.         Class 3 Severe Obesity (BMI >=40). Obesity-related health conditions include the following: hypertension, diabetes mellitus, and GERD. Obesity is unchanged. BMI is is above average; BMI management plan is completed. We discussed portion control and increasing exercise.      Assessment & Plan   Diagnoses and all orders for this visit:    1. Diabetes mellitus type 2 in obese (Primary)  -     POC Glycosylated Hemoglobin (Hb A1C)  -     Hemoglobin A1c; Future    2. DUB (dysfunctional uterine bleeding)    3. Recurrent vaginitis    4. Vitamin D deficiency  -     Vitamin D,25-Hydroxy; Future    5. Hepatic steatosis  -     ezetimibe (ZETIA) 10 MG tablet; Take 1 tablet by mouth Daily.  Dispense: 90 tablet; Refill: 1    6. Gastroesophageal reflux disease, unspecified whether esophagitis present  -     pantoprazole (Protonix) 40 MG EC tablet; Take 1 tablet by mouth Daily.  Dispense: 180 tablet; Refill: 1    Other orders  -     Discontinue: drospirenone-ethinyl estradiol (PATO) 3-0.02 MG per tablet; Take 1 tablet by mouth Daily.  Dispense: 30 tablet; Refill: 2  -     Discontinue: Tirzepatide (Mounjaro) 2.5 MG/0.5ML solution pen-injector; Inject 0.5 mL under the skin into the appropriate area as directed Every 7 (Seven) Days.  Dispense: 2 mL; Refill: 0  -     Discontinue: fluconazole (Diflucan) 150 MG tablet; Take 1 tablet by mouth 1 (One) Time for 1 dose. May repeat in 4 days if still symptomatic  Dispense: 2 tablet; Refill: 0  -     fluconazole (Diflucan) 200 MG tablet; Take 1 tablet by mouth 1 (One) Time for 1 dose. May repeat in 4 days if still symptomatic  Dispense: 2 tablet; Refill: 0      1. Diabetes.  - The patient's hemoglobin A1c today is 6.3 percent. I will recheck her hemoglobin A1c in 3  months. She was recommended to see an ophthalmologist once a year.    2. Vitamin D deficiency.  - She was recommended to maintain her vitamin D level. I will recheck her vitamin D level in 3 months.    3. Yeast infection.  - She is prone to yeast infections due to her diabetes. I will discontinue Farxiga. I will start her on Tessy 1 tablet with the first day of her next cycle. I will give her 3 months' worth of Tessy.    4. Obesity/ DMII.  - I will start her on Mounjaro once a week injection.             Transcribed from ambient dictation for Ara Coronado DO by Beth Garcia.  11/16/23   10:20 EST    Patient or patient representative verbalized consent to the visit recording.  I have personally performed the services described in this document as transcribed by the above individual, and it is both accurate and complete.

## 2023-11-16 NOTE — TELEPHONE ENCOUNTER
We received a fax from [facility] in [place] stating that the Fluconazole 150mg tablet is on backorder/unavailable.

## 2023-11-16 NOTE — PROGRESS NOTES
Fingerstick performed in right middle finger by Adelia Singh CMA  with good hemostasis. Patient tolerated well. 11/16/23 Adelia Singh CMA

## 2023-11-17 ENCOUNTER — PATIENT ROUNDING (BHMG ONLY) (OUTPATIENT)
Dept: FAMILY MEDICINE CLINIC | Facility: CLINIC | Age: 33
End: 2023-11-17
Payer: COMMERCIAL

## 2023-12-07 ENCOUNTER — TELEPHONE (OUTPATIENT)
Dept: FAMILY MEDICINE CLINIC | Facility: CLINIC | Age: 33
End: 2023-12-07
Payer: COMMERCIAL

## 2023-12-07 RX ORDER — DROSPIRENONE AND ETHINYL ESTRADIOL 0.02-3(28)
1 KIT ORAL DAILY
Qty: 30 TABLET | Refills: 2 | Status: SHIPPED | OUTPATIENT
Start: 2023-12-07

## 2023-12-07 NOTE — TELEPHONE ENCOUNTER
Incoming Refill Request      Medication requested (name and dose): Vestura 3mg - 0.02mg (not on med list)    Pharmacy where request should be sent: [facility]    Additional details provided by patient: [facility]    Best call back number: n/a    Does the patient have less than a 3 day supply:  [] Yes  [x] No    Madeline Mcguire Rep  12/07/23, 12:58 EST

## 2023-12-11 RX ORDER — BENAZEPRIL HYDROCHLORIDE AND HYDROCHLOROTHIAZIDE 20; 12.5 MG/1; MG/1
TABLET ORAL
Qty: 90 TABLET | Refills: 1 | Status: SHIPPED | OUTPATIENT
Start: 2023-12-11

## 2024-02-16 ENCOUNTER — CLINICAL SUPPORT (OUTPATIENT)
Dept: FAMILY MEDICINE CLINIC | Facility: CLINIC | Age: 34
End: 2024-02-16
Payer: COMMERCIAL

## 2024-02-16 DIAGNOSIS — E55.9 VITAMIN D DEFICIENCY: ICD-10-CM

## 2024-02-16 DIAGNOSIS — E11.69 DIABETES MELLITUS TYPE 2 IN OBESE: ICD-10-CM

## 2024-02-16 DIAGNOSIS — E66.9 DIABETES MELLITUS TYPE 2 IN OBESE: ICD-10-CM

## 2024-02-16 PROCEDURE — 82306 VITAMIN D 25 HYDROXY: CPT | Performed by: FAMILY MEDICINE

## 2024-02-16 PROCEDURE — 36415 COLL VENOUS BLD VENIPUNCTURE: CPT | Performed by: FAMILY MEDICINE

## 2024-02-16 PROCEDURE — 83036 HEMOGLOBIN GLYCOSYLATED A1C: CPT | Performed by: FAMILY MEDICINE

## 2024-02-17 LAB
25(OH)D3 SERPL-MCNC: 38.3 NG/ML (ref 30–100)
HBA1C MFR BLD: 6 % (ref 4.8–5.6)

## 2024-02-26 RX ORDER — DROSPIRENONE AND ETHINYL ESTRADIOL 0.02-3(28)
1 KIT ORAL DAILY
Qty: 84 TABLET | Refills: 0 | Status: SHIPPED | OUTPATIENT
Start: 2024-02-26

## 2024-02-26 NOTE — TELEPHONE ENCOUNTER
Rx Refill Note  Requested Prescriptions     Pending Prescriptions Disp Refills    Vestura 3-0.02 MG per tablet [Pharmacy Med Name: VESTURA 3 MG-0.02 MG TABLET] 84 tablet      Sig: TAKE 1 TABLET BY MOUTH EVERY DAY      Last office visit with prescribing clinician: 11/16/2023   Last telemedicine visit with prescribing clinician: Visit date not found   Next office visit with prescribing clinician: 5/16/2024        Lipid Panel (07/24/2023 08:39)                  Would you like a call back once the refill request has been completed: [] Yes [] No    If the office needs to give you a call back, can they leave a voicemail: [] Yes [] No    Brisa De La Paz, RT  02/26/24, 10:18 EST

## 2024-03-11 RX ORDER — DAPAGLIFLOZIN 10 MG/1
TABLET, FILM COATED ORAL
Qty: 90 TABLET | Refills: 1 | OUTPATIENT
Start: 2024-03-11

## 2024-03-11 NOTE — TELEPHONE ENCOUNTER
The original prescription was discontinued on 11/16/2023 by Ara Coronado DO. Renewing this prescription may not be appropriate.

## 2024-03-25 RX ORDER — NORGESTIMATE AND ETHINYL ESTRADIOL 0.25-0.035
1 KIT ORAL DAILY
Qty: 28 TABLET | Refills: 2 | Status: SHIPPED | OUTPATIENT
Start: 2024-03-25

## 2024-04-15 ENCOUNTER — TELEPHONE (OUTPATIENT)
Dept: FAMILY MEDICINE CLINIC | Facility: CLINIC | Age: 34
End: 2024-04-15
Payer: COMMERCIAL

## 2024-04-15 RX ORDER — NORGESTIMATE AND ETHINYL ESTRADIOL 0.25-0.035
1 KIT ORAL DAILY
Qty: 90 TABLET | Refills: 0 | Status: SHIPPED | OUTPATIENT
Start: 2024-04-15

## 2024-04-15 NOTE — TELEPHONE ENCOUNTER
Incoming Refill Request      Medication requested (name and dose): SAL 0.25-0.035 MG (NOT ON ACTIVE MED LIST)    Pharmacy where request should be sent: [facility]    Additional details provided by patient: 90 DAY SUPPLY REQUEST    Best call back number:     Does the patient have less than a 3 day supply:  [] Yes  [x] No    Madeline Chatterjee Rep  04/15/24, 08:05 EDT

## 2024-05-16 ENCOUNTER — OFFICE VISIT (OUTPATIENT)
Dept: FAMILY MEDICINE CLINIC | Facility: CLINIC | Age: 34
End: 2024-05-16
Payer: COMMERCIAL

## 2024-05-16 VITALS
DIASTOLIC BLOOD PRESSURE: 79 MMHG | SYSTOLIC BLOOD PRESSURE: 129 MMHG | RESPIRATION RATE: 14 BRPM | HEART RATE: 97 BPM | TEMPERATURE: 97.8 F | HEIGHT: 65 IN | WEIGHT: 293 LBS | BODY MASS INDEX: 48.82 KG/M2 | OXYGEN SATURATION: 100 %

## 2024-05-16 DIAGNOSIS — E11.69 TYPE 2 DIABETES MELLITUS WITH OBESITY: Chronic | ICD-10-CM

## 2024-05-16 DIAGNOSIS — I10 ESSENTIAL (PRIMARY) HYPERTENSION: ICD-10-CM

## 2024-05-16 DIAGNOSIS — Z00.00 ANNUAL PHYSICAL EXAM: Primary | ICD-10-CM

## 2024-05-16 DIAGNOSIS — E78.2 MIXED HYPERLIPIDEMIA: ICD-10-CM

## 2024-05-16 DIAGNOSIS — E66.9 TYPE 2 DIABETES MELLITUS WITH OBESITY: Chronic | ICD-10-CM

## 2024-05-16 DIAGNOSIS — E11.9 TYPE 2 DIABETES MELLITUS WITHOUT COMPLICATION, WITHOUT LONG-TERM CURRENT USE OF INSULIN: ICD-10-CM

## 2024-05-16 LAB
BILIRUB BLD-MCNC: NEGATIVE MG/DL
CLARITY, POC: CLEAR
COLOR UR: YELLOW
EXPIRATION DATE: ABNORMAL
EXPIRATION DATE: NORMAL
GLUCOSE UR STRIP-MCNC: NEGATIVE MG/DL
HBA1C MFR BLD: 5.5 % (ref 4.5–5.7)
KETONES UR QL: NEGATIVE
LEUKOCYTE EST, POC: NEGATIVE
Lab: ABNORMAL
Lab: NORMAL
NITRITE UR-MCNC: NEGATIVE MG/ML
PH UR: 7 [PH] (ref 5–8)
PROT UR STRIP-MCNC: NEGATIVE MG/DL
RBC # UR STRIP: NEGATIVE /UL
SP GR UR: 1.02 (ref 1–1.03)
UROBILINOGEN UR QL: NORMAL

## 2024-05-16 PROCEDURE — 83036 HEMOGLOBIN GLYCOSYLATED A1C: CPT | Performed by: FAMILY MEDICINE

## 2024-05-16 PROCEDURE — 81003 URINALYSIS AUTO W/O SCOPE: CPT | Performed by: FAMILY MEDICINE

## 2024-05-16 PROCEDURE — 99395 PREV VISIT EST AGE 18-39: CPT | Performed by: FAMILY MEDICINE

## 2024-05-16 RX ORDER — BENAZEPRIL HYDROCHLORIDE AND HYDROCHLOROTHIAZIDE 20; 12.5 MG/1; MG/1
1 TABLET ORAL DAILY
Qty: 90 TABLET | Refills: 1 | Status: SHIPPED | OUTPATIENT
Start: 2024-05-16

## 2024-05-16 RX ORDER — NORGESTIMATE AND ETHINYL ESTRADIOL 0.25-0.035
1 KIT ORAL DAILY
Qty: 90 TABLET | Refills: 0 | Status: SHIPPED | OUTPATIENT
Start: 2024-05-16

## 2024-05-16 NOTE — PROGRESS NOTES
Subjective   Leslie Harris is a 33 y.o. female.     Chief Complaint   Patient presents with    Annual Exam     Physical without papsmear. Patient is on her menstrual cycle today. Patients HgbA1c while in the office today is 5.5%.         Current Outpatient Medications:     benazepril-hydrochlorthiazide (LOTENSIN HCT) 20-12.5 MG per tablet, Take 1 tablet by mouth Daily., Disp: 90 tablet, Rfl: 1    glucose blood (Accu-Chek Guide) test strip, 1 each by Other route Daily As Needed (for BS control/ monitoring)., Disp: 100 each, Rfl: 1    Lancets (accu-chek safe-t pro) lancets, 1 each by Other route Daily As Needed (for BS control)., Disp: 100 each, Rfl: 1    multivitamin with minerals tablet tablet, Take 1 tablet by mouth Daily. HOLD 5-7 days, Disp: , Rfl:     norgestimate-ethinyl estradiol (ORTHO-CYCLEN) 0.25-35 MG-MCG per tablet, Take 1 tablet by mouth Daily., Disp: 90 tablet, Rfl: 0    Omega-3 Fatty Acids (fish oil) 1000 MG capsule capsule, Take 1 capsule by mouth Daily With Breakfast., Disp: , Rfl:     pantoprazole (Protonix) 40 MG EC tablet, Take 1 tablet by mouth Daily., Disp: 180 tablet, Rfl: 1    vitamin D3 125 MCG (5000 UT) capsule capsule, Take 1 capsule by mouth Every Other Day., Disp: , Rfl:     Tirzepatide (MOUNJARO) 7.5 MG/0.5ML solution pen-injector pen, Inject 0.5 mL under the skin into the appropriate area as directed 1 (One) Time Per Week., Disp: 6 mL, Rfl: 0    Past Medical History:   Diagnosis Date    Class 3 severe obesity without serious comorbidity with body mass index (BMI) of 50.0 to 59.9 in adult     COVID-19 07/2021    w/ Resp Failure on vent x 19 days    Critical illness polyneuropathy 08/12/2021    from COVID    DMII     GERD     Hypertension     PAP     NEG = 2016    Seasonal allergies        Past Surgical History:   Procedure Laterality Date    BRONCHOSCOPY N/A 08/08/2021    Procedure: BRONCHOSCOPY with bilateral bronchial washing    CHOLECYSTECTOMY      EXPLORATORY LAPAROTOMY W/ BOWEL  RESECTION  2018    Sm Bowel perf w/ BAHMAN    LASIK Bilateral     TONSILLECTOMY      VENTRAL HERNIA REPAIR N/A 03/21/2023    Procedure: VENTRAL / INCISIONAL HERNIA REPAIR LAPAROSCOPIC WITH DAVINCI ROBOT;  Surgeon: Hamilton Biggs MD;  Location: [facility] MAIN OR;  Service: Robotics - DaVinci;  Laterality: N/A;       Family History   Problem Relation Age of Onset    Diabetes Mother     Fibromyalgia Mother     Anxiety disorder Mother     Depression Mother     Heart disease Father         CABG x 2    No Known Problems Brother     Asperger's syndrome Brother     Lung cancer Maternal Grandmother     Pancreatic cancer Paternal Grandmother     Heart disease Paternal Grandfather     Liver disease Maternal Aunt         Non-Alcoholic       Social History     Socioeconomic History    Marital status: Single   Tobacco Use    Smoking status: Never     Passive exposure: Past    Smokeless tobacco: Never   Vaping Use    Vaping status: Never Used   Substance and Sexual Activity    Alcohol use: Not Currently    Drug use: Never    Sexual activity: Not Currently     Partners: Male       History of Present Illness  The patient is a 33-year-old female who is here for annual physical/ no pap    The patient has recently initiated a regimen of vitamin D 5000 international units, supplemented by fish oil a few times a week or every other day. She reports experiencing mild side effects from her Mounjaro injections, including nausea and stomach upset approximately an hour post-administration, which subsides spontaneously. She does not wish for anti-nausea medication at this time. These side effects were present for several weeks post-injection, but have since improved.     She has made dietary modifications, including the elimination of sugary sodas and sugar from her tea. She maintains adequate hydration and has not weighed herself recently. She does not require refills for lancets or strips. Her last cholesterol check was conducted a few  years ago while on Zetia. She has been advised to take zinc.     She has not consulted an ophthalmologist in a few years, having undergone LASIK surgery at the age of 25. She has a history of tonsillectomy, ventral abdominal hernia, exploratory laparotomy with bowel resection, and gallbladder removal. She occasionally experiences boils, which she manages by popping and applying hot compresses. She has a small bump on the side of her thigh and has a history of anemia.     She is a non-smoker, does not drink alcohol, and does not use drugs.   Her mother recently passed away from suicide at the age of 53. Her brother has Asperger's. Her father is living and has heart disease. Her paternal grandmother had lung cancer and pancreatic cancer.   She is allergic to SULFA, FARXIGA, METFORMIN.        The following portions of the patient's history were reviewed and updated as appropriate: allergies, current medications, past family history, past medical history, past social history, past surgical history and problem list.    Review of Systems   Constitutional:  Negative for activity change, appetite change, fatigue, unexpected weight gain and unexpected weight loss.   HENT:  Negative for congestion, ear pain, hearing loss, nosebleeds, postnasal drip, rhinorrhea, sinus pressure, sneezing, sore throat, tinnitus and trouble swallowing.    Eyes:  Negative for blurred vision and double vision.   Respiratory:  Negative for cough, shortness of breath and wheezing.    Cardiovascular:  Negative for chest pain and palpitations.   Gastrointestinal:  Positive for nausea. Negative for abdominal pain, constipation, diarrhea, vomiting, GERD and indigestion.   Genitourinary:  Negative for difficulty urinating, dysuria, flank pain, frequency, urgency and urinary incontinence.   Musculoskeletal:  Negative for arthralgias and myalgias.   Skin:  Negative for rash and skin lesions.   Neurological:  Negative for weakness, memory problem and  confusion.   Psychiatric/Behavioral:  Negative for agitation, self-injury, suicidal ideas, depressed mood and stress. The patient is not nervous/anxious.        Vitals:    05/16/24 1007   BP: 129/79   Pulse: 97   Resp: 14   Temp: 97.8 °F (36.6 °C)   SpO2: 100%       Objective   Physical Exam  Vitals and nursing note reviewed.   Constitutional:       General: She is not in acute distress.     Appearance: Normal appearance. She is well-developed. She is obese. She is not ill-appearing, toxic-appearing or diaphoretic.   HENT:      Head: Normocephalic and atraumatic.      Right Ear: Tympanic membrane, ear canal and external ear normal. There is no impacted cerumen.      Left Ear: Tympanic membrane, ear canal and external ear normal. There is no impacted cerumen.      Nose: Nose normal. No congestion or rhinorrhea.      Mouth/Throat:      Mouth: Mucous membranes are moist.      Pharynx: No oropharyngeal exudate or posterior oropharyngeal erythema.   Eyes:      Extraocular Movements: Extraocular movements intact.      Conjunctiva/sclera: Conjunctivae normal.      Pupils: Pupils are equal, round, and reactive to light.   Cardiovascular:      Rate and Rhythm: Normal rate and regular rhythm.      Heart sounds: Normal heart sounds. No murmur heard.  Pulmonary:      Effort: Pulmonary effort is normal. No respiratory distress.      Breath sounds: Normal breath sounds. No wheezing, rhonchi or rales.   Abdominal:      General: Bowel sounds are normal. There is no distension.      Palpations: Abdomen is soft. There is no mass.      Tenderness: There is no abdominal tenderness. There is no guarding or rebound.      Hernia: No hernia is present.   Musculoskeletal:         General: Normal range of motion.      Cervical back: Normal range of motion and neck supple.      Right lower leg: No edema.      Left lower leg: No edema.   Lymphadenopathy:      Cervical: No cervical adenopathy.   Skin:     General: Skin is warm and dry.       Findings: No rash.   Neurological:      General: No focal deficit present.      Mental Status: She is alert and oriented to person, place, and time.      Cranial Nerves: No cranial nerve deficit.   Psychiatric:         Attention and Perception: Attention and perception normal.         Mood and Affect: Mood and affect normal.         Speech: Speech normal.         Behavior: Behavior normal. Behavior is cooperative.         Thought Content: Thought content normal.         Cognition and Memory: Cognition and memory normal.         Judgment: Judgment normal.       Physical Exam  Vital Signs  XQ=985/79.     Assessment & Plan   Diagnoses and all orders for this visit:    1. Annual physical exam (Primary)  -     POC Urinalysis Dipstick, Automated    2. Diabetes mellitus type 2 in obese  -     POC Glycosylated Hemoglobin (Hb A1C)    3. Essential (primary) hypertension  -     Comprehensive Metabolic Panel; Future    4. Mixed hyperlipidemia  -     Lipid Panel; Future    5. Body mass index (BMI) 50.0-59.9, adult    6. Type 2 diabetes mellitus without complication, without long-term current use of insulin  -     MicroAlbumin, Urine, Random - Urine, Clean Catch; Future  -     Hemoglobin A1c; Future    Other orders  -     vitamin D3 125 MCG (5000 UT) capsule capsule; Take 1 capsule by mouth Every Other Day.  -     norgestimate-ethinyl estradiol (ORTHO-CYCLEN) 0.25-35 MG-MCG per tablet; Take 1 tablet by mouth Daily.  Dispense: 90 tablet; Refill: 0  -     Tirzepatide (MOUNJARO) 7.5 MG/0.5ML solution pen-injector pen; Inject 0.5 mL under the skin into the appropriate area as directed 1 (One) Time Per Week.  Dispense: 6 mL; Refill: 0  -     benazepril-hydrochlorthiazide (LOTENSIN HCT) 20-12.5 MG per tablet; Take 1 tablet by mouth Daily.  Dispense: 90 tablet; Refill: 1      Assessment & Plan  1. Annual physical examination.  The patient is advised to continue her current regimen of vitamin D 5000 international units every other day. A  cholesterol check is scheduled for 08/2024. She is encouraged to maintain her current regimen of Zetia. Fasting labs have been ordered to be conducted in 3 months.     Results  Laboratory Studies  A1C= 5.5.          Patient or patient representative verbalized consent for the use of Ambient Listening during the visit with  Ara Coronado DO for chart documentation. 5/22/2024  10:40 EDT

## 2024-05-16 NOTE — PROGRESS NOTES
Fingerstick performed in right middle finger by Adelia Lopez CMA  with good hemostasis. Patient tolerated well. 05/16/24 Adelia Lopez CMA

## 2024-05-24 ENCOUNTER — PRIOR AUTHORIZATION (OUTPATIENT)
Dept: FAMILY MEDICINE CLINIC | Facility: CLINIC | Age: 34
End: 2024-05-24
Payer: COMMERCIAL

## 2024-05-24 NOTE — TELEPHONE ENCOUNTER
HUB to read    PA approved for Mounjaro 7.5MG/0.5ML pen-injectors    PA approval was faxed to [facility] in [place]     Outcome  Approved today  CaseId:[id number];Status:Approved;Review Type:Prior Auth;Coverage Start Date:05/24/2024;Coverage End Date:05/24/2025;  Authorization Expiration Date: 5/23/2025

## 2024-06-07 ENCOUNTER — OFFICE VISIT (OUTPATIENT)
Dept: FAMILY MEDICINE CLINIC | Facility: CLINIC | Age: 34
End: 2024-06-07
Payer: COMMERCIAL

## 2024-06-07 VITALS
DIASTOLIC BLOOD PRESSURE: 74 MMHG | TEMPERATURE: 98.4 F | HEIGHT: 66 IN | BODY MASS INDEX: 47.09 KG/M2 | OXYGEN SATURATION: 96 % | SYSTOLIC BLOOD PRESSURE: 120 MMHG | WEIGHT: 293 LBS | HEART RATE: 110 BPM | RESPIRATION RATE: 18 BRPM

## 2024-06-07 DIAGNOSIS — L24.7 IRRITANT CONTACT DERMATITIS DUE TO PLANTS, EXCEPT FOOD: Primary | ICD-10-CM

## 2024-06-07 PROCEDURE — 99213 OFFICE O/P EST LOW 20 MIN: CPT | Performed by: FAMILY MEDICINE

## 2024-06-07 RX ORDER — TRIAMCINOLONE ACETONIDE 1 MG/G
1 CREAM TOPICAL 3 TIMES DAILY PRN
Qty: 80 G | Refills: 0 | Status: SHIPPED | OUTPATIENT
Start: 2024-06-07

## 2024-06-07 RX ORDER — HYDROXYZINE HYDROCHLORIDE 25 MG/1
TABLET, FILM COATED ORAL
Qty: 60 TABLET | Refills: 0 | Status: SHIPPED | OUTPATIENT
Start: 2024-06-07

## 2024-06-07 NOTE — PROGRESS NOTES
Subjective   Leslie Harris is a 33 y.o. female.     Chief Complaint   Patient presents with    Rash     [place] [facility] [place] with contact dermatitis/poison ivy.Patient was given a steriod shot on Monday and 2 rounds of prednisone.         Current Outpatient Medications:     benazepril-hydrochlorthiazide (LOTENSIN HCT) 20-12.5 MG per tablet, Take 1 tablet by mouth Daily., Disp: 90 tablet, Rfl: 1    glucose blood (Accu-Chek Guide) test strip, 1 each by Other route Daily As Needed (for BS control/ monitoring)., Disp: 100 each, Rfl: 1    Lancets (accu-chek safe-t pro) lancets, 1 each by Other route Daily As Needed (for BS control)., Disp: 100 each, Rfl: 1    multivitamin with minerals tablet tablet, Take 1 tablet by mouth Daily. HOLD 5-7 days, Disp: , Rfl:     norgestimate-ethinyl estradiol (ORTHO-CYCLEN) 0.25-35 MG-MCG per tablet, Take 1 tablet by mouth Daily., Disp: 90 tablet, Rfl: 0    Omega-3 Fatty Acids (fish oil) 1000 MG capsule capsule, Take 1 capsule by mouth Daily With Breakfast., Disp: , Rfl:     pantoprazole (Protonix) 40 MG EC tablet, Take 1 tablet by mouth Daily., Disp: 180 tablet, Rfl: 1    Tirzepatide (MOUNJARO) 7.5 MG/0.5ML solution pen-injector pen, Inject 0.5 mL under the skin into the appropriate area as directed 1 (One) Time Per Week., Disp: 6 mL, Rfl: 0    vitamin D3 125 MCG (5000 UT) capsule capsule, Take 1 capsule by mouth Every Other Day., Disp: , Rfl:     hydrOXYzine (ATARAX) 25 MG tablet, 1-2 po q6hrs prn pruitis, Disp: 60 tablet, Rfl: 0    triamcinolone (KENALOG) 0.1 % cream, Apply 1 Application topically to the appropriate area as directed 3 (Three) Times a Day As Needed for Rash., Disp: 80 g, Rfl: 0    Past Medical History:   Diagnosis Date    Class 3 severe obesity without serious comorbidity with body mass index (BMI) of 50.0 to 59.9 in adult     COVID-19 07/2021    w/ Resp Failure on vent x 19 days    Critical illness polyneuropathy 08/12/2021    from COVID     [FreeTextEntry1] : Familial hypercholesterolemia - E78.01 (Primary), Continue med. Check lipid profile and lft's. Low chol diet. Last LDL at goal 122\par \par Hypertension- will continue to monitor blood pressure. I discussed with patient the need for blood pressure medication as 9 of last 10 bp's not at goal. Blood pressure control has been FAIR. She declines medications understanding the risk of that decision.\par \par Abnl mammo- D/w pt.  Path results were benign and she has f/u mammos scheduled for March 26, 2021.\par \par BDS- worse for osteoporosis 9/9/2020. She again refuses meds and understands risks of that decision.\par \par  DMII     GERD     Hypertension     PAP     NEG = 2016    Seasonal allergies        Past Surgical History:   Procedure Laterality Date    BRONCHOSCOPY N/A 08/08/2021    Procedure: BRONCHOSCOPY with bilateral bronchial washing    CHOLECYSTECTOMY      EXPLORATORY LAPAROTOMY W/ BOWEL RESECTION  2018    Sm Bowel perf w/ BAHMAN    LASIK Bilateral     TONSILLECTOMY      VENTRAL HERNIA REPAIR N/A 03/21/2023    Procedure: VENTRAL / INCISIONAL HERNIA REPAIR LAPAROSCOPIC WITH DAVINCI ROBOT;  Surgeon: Hamilton Biggs MD;  Location: [facility] MAIN OR;  Service: Robotics - DaVinci;  Laterality: N/A;       Family History   Problem Relation Age of Onset    Diabetes Mother     Fibromyalgia Mother     Anxiety disorder Mother     Depression Mother     Heart disease Father         CABG x 2    No Known Problems Brother     Asperger's syndrome Brother     Lung cancer Maternal Grandmother     Pancreatic cancer Paternal Grandmother     Heart disease Paternal Grandfather     Liver disease Maternal Aunt         Non-Alcoholic       Social History     Socioeconomic History    Marital status: Single   Tobacco Use    Smoking status: Never     Passive exposure: Past    Smokeless tobacco: Never   Vaping Use    Vaping status: Never Used   Substance and Sexual Activity    Alcohol use: Not Currently    Drug use: Never    Sexual activity: Not Currently     Partners: Male       History of Present Illness  The patient is a 33-year-old  female here with persistent recurrent rash.    The patient reports the onset of the rash approximately 1.5 weeks ago. Despite attempts at self-treatment with over-the-counter medications for several days, the rash persisted. Despite the administration of Benadryl and prednisone, the rash persisted. She returned to the ER on Monday, where she was prescribed a Medrol Dosepak and an injection. Currently on day 4 of the steroid treatment, she awoke with a bloomed rash around her torso, accompanied by itching.  The rash was initially present on her arms, but no blisters were present. The rash was triggered by her yard work, specifically digging a chainlink fence. She has pets at home, but they do not go outside. She does not have children. She washed her bedding and has tried 4 or 5 different over-the-counter medications, but no one else in the household has contracted the rash. Her father was working in the yard with her, but he did not contract the rash. She lives with her mother and brother, who live with her. The rash is spreading, resembling a drug rash. The rash is located between her fingers, but not in her genital area. The rash was initially treated with methylprednisolone, then prednisone, which exacerbated the rash on her face and around her eyes. The rash continues to grow daily, initially on her thighs and spreading around her eyes. She has been taking Benadryl 325 mg 2 tablets every 6 hours, which does not cause sedation. The rash was hot and puffy, but it is not as bad now. The rash started on her feet today. She denies any bladder issues.   She is allergic to SULFA, FARXIGA, METFORMIN.        The following portions of the patient's history were reviewed and updated as appropriate: allergies, current medications, past family history, past medical history, past social history, past surgical history and problem list.    Review of Systems   Constitutional:  Positive for activity change. Negative for appetite change, fever, unexpected weight gain and unexpected weight loss.   Respiratory:  Negative for shortness of breath and wheezing.    Skin:  Positive for color change and rash. Negative for dry skin and wound.   Psychiatric/Behavioral:  Positive for sleep disturbance.        Vitals:    06/07/24 1406   BP: 120/74   Pulse: 110   Resp: 18   Temp: 98.4 °F (36.9 °C)   SpO2: 96%       Objective   Physical Exam  Vitals and nursing note reviewed.   Constitutional:       General: She is not in acute distress.      Appearance: She is obese. She is not ill-appearing or toxic-appearing.   Pulmonary:      Effort: Pulmonary effort is normal.   Skin:     Findings: Erythema and rash present. No bruising, ecchymosis, petechiae or wound. Rash is macular and papular. Rash is not crusting, nodular, purpuric, pustular, scaling, urticarial or vesicular.      Comments: +erythematous maculo-papular rash on B/l forearms/ lower abd/ gabriela-ocular   Neurological:      Mental Status: She is alert and oriented to person, place, and time.      Cranial Nerves: No cranial nerve deficit.   Psychiatric:         Attention and Perception: Attention and perception normal.         Mood and Affect: Mood and affect normal.         Speech: Speech normal.         Behavior: Behavior normal.         Thought Content: Thought content normal.         Cognition and Memory: Cognition and memory normal.         Judgment: Judgment normal.       Physical Exam  Vital Signs  Vitals show a blood pressure of 120/74.            Assessment & Plan   Diagnoses and all orders for this visit:    1. Irritant contact dermatitis due to plants, except food (Primary)    Other orders  -     hydrOXYzine (ATARAX) 25 MG tablet; 1-2 po q6hrs prn pruitis  Dispense: 60 tablet; Refill: 0  -     triamcinolone (KENALOG) 0.1 % cream; Apply 1 Application topically to the appropriate area as directed 3 (Three) Times a Day As Needed for Rash.  Dispense: 80 g; Refill: 0      Assessment & Plan  1. Irritant nonfood plant.  The patient's rash appears to be an allergic reaction. Hydroxyzine 25 mg, 60 tablets, will be prescribed. A steroid cream will be applied 3x daily as needed. The patient is advised to apply cold packs to the eyes if it becomes itchy. Pt to finish her oral steroid Rx     Results            Patient or patient representative verbalized consent for the use of Ambient Listening during the visit with  Ara Coronado DO for chart documentation. 6/16/2024  13:47 EDT

## 2024-06-18 RX ORDER — HYDROXYZINE HYDROCHLORIDE 25 MG/1
TABLET, FILM COATED ORAL
Qty: 60 TABLET | Refills: 0 | Status: CANCELLED | OUTPATIENT
Start: 2024-06-18

## 2024-06-18 NOTE — TELEPHONE ENCOUNTER
Incoming Refill Request      Medication requested (name and dose): HYDROXYZINE 25MG    Pharmacy where request should be sent: [facility]    Additional details provided by patient: 90 DAY SUPPLY REQUEST    Best call back number: N/A    Does the patient have less than a 3 day supply:  [] Yes  [x] No    Madeline Chatterjee Rep  06/18/24, 11:21 EDT

## 2024-08-15 ENCOUNTER — CLINICAL SUPPORT (OUTPATIENT)
Dept: FAMILY MEDICINE CLINIC | Facility: CLINIC | Age: 34
End: 2024-08-15
Payer: COMMERCIAL

## 2024-08-15 DIAGNOSIS — I10 ESSENTIAL (PRIMARY) HYPERTENSION: ICD-10-CM

## 2024-08-15 DIAGNOSIS — E11.9 TYPE 2 DIABETES MELLITUS WITHOUT COMPLICATION, WITHOUT LONG-TERM CURRENT USE OF INSULIN: Primary | ICD-10-CM

## 2024-08-15 DIAGNOSIS — E78.2 MIXED HYPERLIPIDEMIA: ICD-10-CM

## 2024-08-15 LAB
EXPIRATION DATE: ABNORMAL
HBA1C MFR BLD: 5.8 % (ref 4.5–5.7)
Lab: ABNORMAL

## 2024-08-15 PROCEDURE — 80061 LIPID PANEL: CPT | Performed by: FAMILY MEDICINE

## 2024-08-15 PROCEDURE — 80053 COMPREHEN METABOLIC PANEL: CPT | Performed by: FAMILY MEDICINE

## 2024-08-15 PROCEDURE — 36415 COLL VENOUS BLD VENIPUNCTURE: CPT | Performed by: FAMILY MEDICINE

## 2024-08-15 PROCEDURE — 82043 UR ALBUMIN QUANTITATIVE: CPT | Performed by: FAMILY MEDICINE

## 2024-08-15 PROCEDURE — 83036 HEMOGLOBIN GLYCOSYLATED A1C: CPT | Performed by: FAMILY MEDICINE

## 2024-08-15 NOTE — PROGRESS NOTES
Venipuncture performed in left arm by Adelia Lopez CMA  with good hemostasis. Patient tolerated well. 08/15/24 Adelia Lopez CMA      Fingerstick performed in right middle finger by Adelia Lopez CMA  with good hemostasis. Patient tolerated well. 08/15/24 Adelia Lopez CMA

## 2024-08-16 LAB
ALBUMIN SERPL-MCNC: 3.7 G/DL (ref 3.5–5.2)
ALBUMIN UR-MCNC: <1.2 MG/DL
ALBUMIN/GLOB SERPL: 1.2 G/DL
ALP SERPL-CCNC: 76 U/L (ref 39–117)
ALT SERPL W P-5'-P-CCNC: 14 U/L (ref 1–33)
ANION GAP SERPL CALCULATED.3IONS-SCNC: 17.3 MMOL/L (ref 5–15)
AST SERPL-CCNC: 24 U/L (ref 1–32)
BILIRUB SERPL-MCNC: 0.3 MG/DL (ref 0–1.2)
BUN SERPL-MCNC: 9 MG/DL (ref 6–20)
BUN/CREAT SERPL: 14.5 (ref 7–25)
CALCIUM SPEC-SCNC: 9.4 MG/DL (ref 8.6–10.5)
CHLORIDE SERPL-SCNC: 101 MMOL/L (ref 98–107)
CHOLEST SERPL-MCNC: 192 MG/DL (ref 0–200)
CO2 SERPL-SCNC: 16.7 MMOL/L (ref 22–29)
CREAT SERPL-MCNC: 0.62 MG/DL (ref 0.57–1)
EGFRCR SERPLBLD CKD-EPI 2021: 120 ML/MIN/1.73
GLOBULIN UR ELPH-MCNC: 3.1 GM/DL
GLUCOSE SERPL-MCNC: 109 MG/DL (ref 65–99)
HDLC SERPL-MCNC: 48 MG/DL (ref 40–60)
LDLC SERPL CALC-MCNC: 112 MG/DL (ref 0–100)
LDLC/HDLC SERPL: 2.23 {RATIO}
POTASSIUM SERPL-SCNC: 4.6 MMOL/L (ref 3.5–5.2)
PROT SERPL-MCNC: 6.8 G/DL (ref 6–8.5)
SODIUM SERPL-SCNC: 135 MMOL/L (ref 136–145)
TRIGL SERPL-MCNC: 184 MG/DL (ref 0–150)
VLDLC SERPL-MCNC: 32 MG/DL (ref 5–40)

## 2024-09-27 RX ORDER — NORGESTIMATE AND ETHINYL ESTRADIOL 0.25-0.035
1 KIT ORAL DAILY
Qty: 30 TABLET | Refills: 0 | Status: SHIPPED | OUTPATIENT
Start: 2024-09-27

## 2024-10-11 ENCOUNTER — PROCEDURE VISIT (OUTPATIENT)
Dept: FAMILY MEDICINE CLINIC | Facility: CLINIC | Age: 34
End: 2024-10-11
Payer: COMMERCIAL

## 2024-10-11 VITALS
DIASTOLIC BLOOD PRESSURE: 76 MMHG | HEART RATE: 93 BPM | WEIGHT: 293 LBS | HEIGHT: 66 IN | SYSTOLIC BLOOD PRESSURE: 125 MMHG | BODY MASS INDEX: 47.09 KG/M2 | OXYGEN SATURATION: 98 % | RESPIRATION RATE: 16 BRPM | TEMPERATURE: 98 F

## 2024-10-11 DIAGNOSIS — K21.9 GASTROESOPHAGEAL REFLUX DISEASE, UNSPECIFIED WHETHER ESOPHAGITIS PRESENT: ICD-10-CM

## 2024-10-11 DIAGNOSIS — I10 PRIMARY HYPERTENSION: ICD-10-CM

## 2024-10-11 DIAGNOSIS — Z12.4 ENCOUNTER FOR SCREENING FOR CERVICAL CANCER: Primary | ICD-10-CM

## 2024-10-11 RX ORDER — BENAZEPRIL HYDROCHLORIDE AND HYDROCHLOROTHIAZIDE 20; 12.5 MG/1; MG/1
1 TABLET ORAL DAILY
Qty: 90 TABLET | Refills: 1 | Status: SHIPPED | OUTPATIENT
Start: 2024-10-11

## 2024-10-11 RX ORDER — NORGESTIMATE AND ETHINYL ESTRADIOL 0.25-0.035
1 KIT ORAL DAILY
Qty: 90 TABLET | Refills: 2 | Status: SHIPPED | OUTPATIENT
Start: 2024-10-11

## 2024-10-11 RX ORDER — NORGESTIMATE AND ETHINYL ESTRADIOL 0.25-0.035
1 KIT ORAL DAILY
Qty: 90 TABLET | Refills: 0 | Status: SHIPPED | OUTPATIENT
Start: 2024-10-11 | End: 2024-10-11 | Stop reason: SDUPTHER

## 2024-10-11 RX ORDER — PANTOPRAZOLE SODIUM 40 MG/1
40 TABLET, DELAYED RELEASE ORAL DAILY
Qty: 180 TABLET | Refills: 1 | Status: SHIPPED | OUTPATIENT
Start: 2024-10-11

## 2024-10-11 RX ORDER — BENAZEPRIL HYDROCHLORIDE AND HYDROCHLOROTHIAZIDE 20; 12.5 MG/1; MG/1
1 TABLET ORAL DAILY
Qty: 90 TABLET | Refills: 1 | Status: SHIPPED | OUTPATIENT
Start: 2024-10-11 | End: 2024-10-11 | Stop reason: SDUPTHER

## 2024-10-11 NOTE — TELEPHONE ENCOUNTER
Incoming Refill Request      Medication requested (name and dose): SAL 0.25-0.035    Pharmacy where request should be sent: [facility] [address]    Additional details provided by patient: REQUESTING 90 DAY SUPPLY    Best call back number:     Does the patient have less than a 3 day supply:  [] Yes  [x] No    Madeline Mcguire Rep  10/11/24, 08:18 EDT

## 2024-10-11 NOTE — PROGRESS NOTES
Subjective   Leslie Harris is a 33 y.o. female.     Chief Complaint   Patient presents with    Gynecologic Exam     Papsmear only         Current Outpatient Medications:     benazepril-hydrochlorthiazide (LOTENSIN HCT) 20-12.5 MG per tablet, Take 1 tablet by mouth Daily., Disp: 90 tablet, Rfl: 1    glucose blood (Accu-Chek Guide) test strip, 1 each by Other route Daily As Needed (for BS control/ monitoring)., Disp: 100 each, Rfl: 1    Lancets (accu-chek safe-t pro) lancets, 1 each by Other route Daily As Needed (for BS control)., Disp: 100 each, Rfl: 1    multivitamin with minerals tablet tablet, Take 1 tablet by mouth Daily. HOLD 5-7 days, Disp: , Rfl:     norgestimate-ethinyl estradiol (Maria Teresa) 0.25-35 MG-MCG per tablet, Take 1 tablet by mouth Daily., Disp: 90 tablet, Rfl: 2    pantoprazole (Protonix) 40 MG EC tablet, Take 1 tablet by mouth Daily., Disp: 180 tablet, Rfl: 1    vitamin D3 125 MCG (5000 UT) capsule capsule, Take 1 capsule by mouth Every Other Day., Disp: , Rfl:     Tirzepatide (Mounjaro) 2.5 MG/0.5ML solution pen-injector pen, Inject 0.5 mL under the skin into the appropriate area as directed Every 7 (Seven) Days., Disp: 2 mL, Rfl: 0    Past Medical History:   Diagnosis Date    Class 3 severe obesity without serious comorbidity with body mass index (BMI) of 50.0 to 59.9 in adult     COVID-19 07/2021    w/ Resp Failure on vent x 19 days    Critical illness polyneuropathy 08/12/2021    from COVID    DMII     GERD     Hypertension     PAP     NEG = 2016; 2024    Seasonal allergies        Past Surgical History:   Procedure Laterality Date    BRONCHOSCOPY N/A 08/08/2021    Procedure: BRONCHOSCOPY with bilateral bronchial washing    CHOLECYSTECTOMY      EXPLORATORY LAPAROTOMY W/ BOWEL RESECTION  2018    Sm Bowel perf w/ BAHMAN    LASIK Bilateral     TONSILLECTOMY      VENTRAL HERNIA REPAIR N/A 03/21/2023    Procedure: VENTRAL / INCISIONAL HERNIA REPAIR LAPAROSCOPIC WITH NuvosunINCI ROBOT;  Surgeon: Dian  Hamilton Ayala MD;  Location: [facility] MAIN OR;  Service: Robotics - DaVinci;  Laterality: N/A;       Family History   Problem Relation Age of Onset    Diabetes Mother     Fibromyalgia Mother     Anxiety disorder Mother     Depression Mother     Heart disease Father         CABG x 2    No Known Problems Brother     Asperger's syndrome Brother     Lung cancer Maternal Grandmother     Pancreatic cancer Paternal Grandmother     Heart disease Paternal Grandfather     Liver disease Maternal Aunt         Non-Alcoholic       Social History     Socioeconomic History    Marital status: Single   Tobacco Use    Smoking status: Never     Passive exposure: Past    Smokeless tobacco: Never   Vaping Use    Vaping status: Never Used   Substance and Sexual Activity    Alcohol use: Not Currently    Drug use: Never    Sexual activity: Not Currently     Partners: Male       History of Present Illness  The patient is a 33-year-old female who presents for a 6-month follow-up and Pap smear.    She has been experiencing consistent bleeding, almost daily spotting, which led her to discontinue Mounjaro in an attempt to regulate her cycle. This seemed to have a positive effect. She was previously on birth control to manage irregular bleeding and is now considering resuming Mounjaro while continuing her birth control. She started Mounjaro in October 2023 and used it for six months before increasing the dosage. She has been off Mounjaro for three months. Her A1c levels were satisfactory at her last check, and she found that Mounjaro helped with weight loss.    She has been on various forms of birth control for several years, particularly when her periods were irregular. She occasionally uses condoms during sexual activity. She recently became sexually active and has experienced discomfort during intercourse. She had a Mirena IUD inserted, which caused severe pain and worsened her cramps, leading to its removal. She has not had a Pap smear in  nearly a decade but has never had an abnormal result.    She has no issues with her blood pressure medication and has not had a diabetic eye exam. Her current medications include benazepril water pill combo 20/12.5, a multivitamin, Cherise birth control, and pantoprazole 40. She has no issues with her bowel movements.    Supplemental Information:  She had a COVID-19 infection. She had hernia surgery done by Dr. Biggs.    ALLERGIES  She is allergic to FARXIGA, METFORMIN, and SULFA.        The following portions of the patient's history were reviewed and updated as appropriate: allergies, current medications, past family history, past medical history, past social history, past surgical history and problem list.    Review of Systems   Constitutional:  Negative for activity change, fatigue and unexpected weight gain.   Respiratory:  Negative for cough, chest tightness and shortness of breath.    Cardiovascular:  Negative for chest pain, palpitations and leg swelling.   Gastrointestinal:  Negative for GERD.   Genitourinary:  Negative for frequency, menstrual problem, urgency and urinary incontinence.   Musculoskeletal:  Negative for arthralgias and myalgias.   Neurological:  Negative for dizziness, facial asymmetry, speech difficulty, weakness, light-headedness, headache, memory problem and confusion.       Vitals:    10/11/24 1058   BP: 125/76   Pulse: 93   Resp: 16   Temp: 98 °F (36.7 °C)   SpO2: 98%       Objective   Physical Exam  Vitals and nursing note reviewed.   Constitutional:       General: She is not in acute distress.     Appearance: She is not ill-appearing or toxic-appearing.   Genitourinary:     Exam position: Supine.      Pubic Area: No rash.       Labia:         Right: No rash, tenderness, lesion or injury.         Left: No rash, tenderness, lesion or injury.       Vagina: Normal.      Cervix: Normal. Eversion present. No cervical motion tenderness.      Uterus: Normal.       Adnexa: Right adnexa  normal and left adnexa normal.   Neurological:      Mental Status: She is alert and oriented to person, place, and time.      Cranial Nerves: No cranial nerve deficit.   Psychiatric:         Mood and Affect: Mood normal.         Behavior: Behavior normal.         Thought Content: Thought content normal.         Judgment: Judgment normal.       Physical Exam  Vital Signs  TX=327/76.     Assessment & Plan   Diagnoses and all orders for this visit:    1. Encounter for screening for cervical cancer (Primary)  -     IGP,Aptima HPV,Age Gdln  -     IGP, Aptima HPV, Rfx 16 / 18,45  -     PDF Report    2. Gastroesophageal reflux disease, unspecified whether esophagitis present  -     pantoprazole (Protonix) 40 MG EC tablet; Take 1 tablet by mouth Daily.  Dispense: 180 tablet; Refill: 1    3. Primary hypertension    Other orders  -     Tirzepatide (Mounjaro) 2.5 MG/0.5ML solution pen-injector pen; Inject 0.5 mL under the skin into the appropriate area as directed Every 7 (Seven) Days.  Dispense: 2 mL; Refill: 0  -     Discontinue: benazepril-hydrochlorthiazide (LOTENSIN HCT) 20-12.5 MG per tablet; Take 1 tablet by mouth Daily.  Dispense: 90 tablet; Refill: 1  -     norgestimate-ethinyl estradiol (Maria Teresa) 0.25-35 MG-MCG per tablet; Take 1 tablet by mouth Daily.  Dispense: 90 tablet; Refill: 2  -     benazepril-hydrochlorthiazide (LOTENSIN HCT) 20-12.5 MG per tablet; Take 1 tablet by mouth Daily.  Dispense: 90 tablet; Refill: 1      Assessment & Plan  1. Type 2 Diabetes Mellitus.  The patient has been off Mounjaro for 3 months and wishes to restart it for weight loss. She will begin with a starting dose of 2.5 mg for a month, then increase to 5 mg, and finally to 7.5 mg, pending insurance approval. Her A1c was good at the last check.    2. Hypertension.  Her blood pressure is currently well-controlled at 125/76. She will continue her current medication regimen of benazepril 20/12.5 mg. A refill for her blood pressure medication  will be sent to [facility].    3. Gastroesophageal Reflux Disease (GERD).  She will continue taking pantoprazole 40 mg. A refill for pantoprazole will be sent to [facility].    4. Contraceptive Management.  She is currently taking Cherise birth control to regulate her periods and for contraception. A refill for her birth control will be sent to [facility].    5. Health Maintenance.  She is due for a diabetic eye exam and will contact [facility] for an appointment with Dr. Cardenas. She declined the flu shot and additional COVID-19 vaccinations.       Results            Patient or patient representative verbalized consent for the use of Ambient Listening during the visit with  Ara Coronado DO for chart documentation. 10/23/2024  12:15 EDT

## 2024-10-16 ENCOUNTER — TELEPHONE (OUTPATIENT)
Dept: FAMILY MEDICINE CLINIC | Facility: CLINIC | Age: 34
End: 2024-10-16
Payer: COMMERCIAL

## 2024-10-16 NOTE — TELEPHONE ENCOUNTER
HUB to read    PA approved for Mounjaro 2.5mg    Outcome:  Information regarding your request  Clinical Override not needed    PA approval was faxed to [facility] in [place]

## 2024-10-17 LAB
AGE GDLN ACOG TESTING: NORMAL
CYTOLOGIST CVX/VAG CYTO: NORMAL
CYTOLOGY CVX/VAG DOC CYTO: NORMAL
CYTOLOGY CVX/VAG DOC THIN PREP: NORMAL
DX ICD CODE: NORMAL
HPV GENOTYPE REFLEX: NORMAL
HPV I/H RISK 4 DNA CVX QL PROBE+SIG AMP: NEGATIVE
Lab: NORMAL
OTHER STN SPEC: NORMAL
STAT OF ADQ CVX/VAG CYTO-IMP: NORMAL

## 2025-01-05 RX ORDER — NORGESTIMATE AND ETHINYL ESTRADIOL 0.25-0.035
1 KIT ORAL DAILY
Qty: 84 TABLET | Refills: 2 | Status: SHIPPED | OUTPATIENT
Start: 2025-01-05

## 2025-01-05 NOTE — TELEPHONE ENCOUNTER
Rx Refill Note  Requested Prescriptions     Pending Prescriptions Disp Refills    Sal 0.25-35 MG-MCG per tablet [Pharmacy Med Name: SAL 0.25-0.035 MG TABLET] 84 tablet      Sig: TAKE 1 TABLET BY MOUTH EVERY DAY      Last office visit with prescribing clinician: 6/7/2024   Last telemedicine visit with prescribing clinician: Visit date not found   Next office visit with prescribing clinician: 4/11/2025     Lipid Panel (08/15/2024 08:23)                     Would you like a call back once the refill request has been completed: [] Yes [] No    If the office needs to give you a call back, can they leave a voicemail: [] Yes [] No    Hermilo Bradley  01/05/25, 18:51 EST

## 2025-04-04 ENCOUNTER — OFFICE VISIT (OUTPATIENT)
Dept: FAMILY MEDICINE CLINIC | Facility: CLINIC | Age: 35
End: 2025-04-04
Payer: COMMERCIAL

## 2025-04-04 VITALS
DIASTOLIC BLOOD PRESSURE: 77 MMHG | HEIGHT: 66 IN | RESPIRATION RATE: 18 BRPM | OXYGEN SATURATION: 98 % | WEIGHT: 293 LBS | BODY MASS INDEX: 47.09 KG/M2 | HEART RATE: 89 BPM | TEMPERATURE: 98 F | SYSTOLIC BLOOD PRESSURE: 128 MMHG

## 2025-04-04 DIAGNOSIS — E11.9 TYPE 2 DIABETES MELLITUS WITHOUT COMPLICATION, WITHOUT LONG-TERM CURRENT USE OF INSULIN: Primary | ICD-10-CM

## 2025-04-04 DIAGNOSIS — I10 PRIMARY HYPERTENSION: ICD-10-CM

## 2025-04-04 DIAGNOSIS — K21.9 GASTROESOPHAGEAL REFLUX DISEASE WITHOUT ESOPHAGITIS: ICD-10-CM

## 2025-04-04 LAB
EXPIRATION DATE: ABNORMAL
HBA1C MFR BLD: 6.5 % (ref 4.5–5.7)
Lab: ABNORMAL

## 2025-04-04 NOTE — PROGRESS NOTES
Fingerstick performed in right middle finger by Adelia Lopez CMA  with good hemostasis. Patient tolerated well. 04/04/25 Adelia Lopez CMA

## 2025-04-04 NOTE — PROGRESS NOTES
Subjective   Leslie Harris is a 34 y.o. female.     Chief Complaint   Patient presents with    Diabetes     Patients HgbA1c while in the office today is 6.5%    Hypertension         Current Outpatient Medications:     benazepril-hydrochlorthiazide (LOTENSIN HCT) 20-12.5 MG per tablet, Take 1 tablet by mouth Daily., Disp: 90 tablet, Rfl: 1    glucose blood (Accu-Chek Guide) test strip, 1 each by Other route Daily As Needed (for BS control/ monitoring)., Disp: 100 each, Rfl: 1    Lancets (accu-chek safe-t pro) lancets, 1 each by Other route Daily As Needed (for BS control)., Disp: 100 each, Rfl: 1    norgestimate-ethinyl estradiol (Maria Teresa) 0.25-35 MG-MCG per tablet, TAKE 1 TABLET BY MOUTH EVERY DAY, Disp: 84 tablet, Rfl: 2    pantoprazole (Protonix) 40 MG EC tablet, Take 1 tablet by mouth Daily., Disp: 180 tablet, Rfl: 1    Tirzepatide 5 MG/0.5ML solution auto-injector, Inject 5 mg under the skin into the appropriate area as directed Every 7 (Seven) Days., Disp: , Rfl:     Past Medical History:   Diagnosis Date    Class 3 severe obesity without serious comorbidity with body mass index (BMI) of 50.0 to 59.9 in adult     COVID-19 07/2021    w/ Resp Failure on vent x 19 days    Critical illness polyneuropathy 08/12/2021    from COVID    DMII     GERD     Hypertension     PAP     NEG = 2016; 2024    Seasonal allergies        Past Surgical History:   Procedure Laterality Date    BRONCHOSCOPY N/A 08/08/2021    Procedure: BRONCHOSCOPY with bilateral bronchial washing    CHOLECYSTECTOMY      EXPLORATORY LAPAROTOMY W/ BOWEL RESECTION  2018    Sm Bowel perf w/ BAHMAN    LASIK Bilateral     TONSILLECTOMY      VENTRAL HERNIA REPAIR N/A 03/21/2023    Procedure: VENTRAL / INCISIONAL HERNIA REPAIR LAPAROSCOPIC WITH [employer]INCI ROBOT;  Surgeon: Hamilton Biggs MD;  Location: [facility] MAIN OR;  Service: [employer];  Laterality: N/A;       Family History   Problem Relation Age of Onset    Diabetes Mother     Fibromyalgia  Mother     Anxiety disorder Mother     Depression Mother     Heart disease Father         CABG x 2    No Known Problems Brother     Asperger's syndrome Brother     Lung cancer Maternal Grandmother     Pancreatic cancer Paternal Grandmother     Heart disease Paternal Grandfather     Liver disease Maternal Aunt         Non-Alcoholic       Social History     Socioeconomic History    Marital status: Single   Tobacco Use    Smoking status: Never     Passive exposure: Past    Smokeless tobacco: Never   Vaping Use    Vaping status: Never Used   Substance and Sexual Activity    Alcohol use: Not Currently    Drug use: Never    Sexual activity: Not Currently     Partners: Male       History of Present Illness  The patient presents for a follow-up on type 2 diabetes/ HTN.    She reports that her A1c levels were elevated prior to her last visit in August, but they have since decreased following the initiation of Mounjaro therapy. She has been off Mounjaro for some time due to insurance coverage issues. She experienced persistent vaginal bleeding as a side effect of Mounjaro, which was not resolved by taking birth control. She discontinued Mounjaro to see if it would regulate her menstrual cycle, and for about 3 to 5 months, her cycles were regular. She is currently on birth control and has been very regular with it.     She is considering trying Ozempic instead of Mounjaro. She has struggled with weight her whole life and was losing weight with Mounjaro. She was on Mounjaro 2.5 mg for several months and then increased to 5 mg. She was on 5 mg for a while and then increased to 7.5 mg, which is when she had more VB issues. She reports no numbness or tingling in her fingers or toes. She has not had an eye check from the optometrist.    SOCIAL HISTORY  She does not smoke.    MEDICATIONS  Current: pantoprazole, Cherise birth control, benazepril 20/12.5        The following portions of the patient's history were reviewed and updated  as appropriate: allergies, current medications, past family history, past medical history, past social history, past surgical history and problem list.    Review of Systems   Constitutional:  Positive for appetite change and unexpected weight gain. Negative for activity change and unexpected weight loss.   Eyes:  Negative for blurred vision, double vision, pain and visual disturbance.   Cardiovascular:  Negative for leg swelling.   Gastrointestinal:  Negative for abdominal distention, abdominal pain, constipation, diarrhea, nausea and vomiting.   Endocrine: Negative for polydipsia, polyphagia and polyuria.   Genitourinary:  Negative for frequency.   Musculoskeletal:  Negative for gait problem.   Skin:  Negative for color change, dry skin, rash and skin lesions.   Neurological:  Negative for weakness and numbness.       Vitals:    04/04/25 1356   BP: 128/77   Pulse: 89   Resp: 18   Temp: 98 °F (36.7 °C)   SpO2: 98%       Objective   Physical Exam  Vitals and nursing note reviewed.   Constitutional:       General: She is not in acute distress.     Appearance: Normal appearance. She is well-developed. She is not ill-appearing or toxic-appearing.   Cardiovascular:      Rate and Rhythm: Normal rate and regular rhythm.      Pulses:           Dorsalis pedis pulses are 2+ on the right side and 2+ on the left side.      Heart sounds: Normal heart sounds. No murmur heard.  Pulmonary:      Effort: Pulmonary effort is normal. No respiratory distress.      Breath sounds: Normal breath sounds. No stridor. No wheezing, rhonchi or rales.   Musculoskeletal:         General: No tenderness or deformity.      Right foot: No bunion, Charcot foot, foot drop or prominent metatarsal heads.      Left foot: No bunion, Charcot foot, foot drop or prominent metatarsal heads.   Feet:      Right foot:      Skin integrity: Skin integrity normal. No ulcer, blister, skin breakdown, erythema, warmth, callus, dry skin or fissure.      Toenail  Condition: Right toenails are normal. ingrown     Left foot:      Skin integrity: Skin integrity normal. No ulcer, blister, skin breakdown, erythema, warmth, callus, dry skin or fissure.      Toenail Condition: Left toenails are normal. ingrown     Comments: DM foot exam done    Skin:     General: Skin is warm and dry.      Capillary Refill: Capillary refill takes less than 2 seconds.      Findings: No erythema or rash.   Neurological:      Mental Status: She is alert and oriented to person, place, and time.      Cranial Nerves: No cranial nerve deficit.   Psychiatric:         Attention and Perception: Attention and perception normal.         Mood and Affect: Mood and affect normal.         Speech: Speech normal.         Behavior: Behavior normal. Behavior is cooperative.         Thought Content: Thought content normal.         Cognition and Memory: Cognition and memory normal.         Judgment: Judgment normal.       Physical Exam  Lungs were auscultated.    Vital Signs  Blood pressure is normal.     Class 3 Severe Obesity (BMI >=40). Obesity-related health conditions include the following: hypertension, diabetes mellitus, and GERD. Obesity is worsening. BMI is is above average; BMI management plan is completed. We discussed portion control, increasing exercise, and pharmacologic options including Mounjaro/ Ozempic .      Assessment & Plan   Diagnoses and all orders for this visit:    1. Type 2 diabetes mellitus without complication, without long-term current use of insulin (Primary)  -     POC Glycosylated Hemoglobin (Hb A1C)    2. Primary hypertension    3. Gastroesophageal reflux disease without esophagitis    Other orders  -     Tirzepatide 5 MG/0.5ML solution auto-injector; Inject 5 mg under the skin into the appropriate area as directed Every 7 (Seven) Days.      Assessment & Plan  1. Type 2 Diabetes Mellitus.  Her A1c level is currently at 6.5, indicating a need for medication adjustment. She will be  provided with a sample of Mounjaro 2.5 mg for one month. If she tolerates this well, she will inform us via [employer], and a prescription for Mounjaro 5 mg will be issued. If she experiences any adverse effects such as bleeding, she will notify us promptly. A fingerstick glucose test will be conducted in 3 months, followed by fasting labs 3 months thereafter.    2. Vaginal Bleeding.  She experienced vaginal bleeding while on Mounjaro previously. The bleeding did not resolve with birth control. If bleeding recurs with the reintroduction of Mounjaro, alternative medications such as Ozempic will be considered but disc'd that it could be a class effect.    3. Weight Management.  She reported significant weight loss while on Mounjaro. She is encouraged to continue making healthier dietary choices and to monitor her weight. If Mounjaro is not tolerated, other weight management options will be explored.    4. Hypertension.  Her blood pressure is well-controlled on the current regimen of benazepril 20/12.5 mg. She should continue her current medication.    5. Gastroesophageal Reflux Disease (GERD).  She is currently on pantoprazole. No changes to her GERD management were discussed.    6. Health Maintenance.  She is advised to have an annual eye examination by an optometrist. A non-Pap physical examination will be scheduled for the fall.     Results  Laboratory Studies  A1c is 6.5.          Patient or patient representative verbalized consent for the use of Ambient Listening during the visit with  Ara Coronado DO for chart documentation. 4/6/2025  11:16 EDT

## 2025-04-06 PROBLEM — I10 ESSENTIAL (PRIMARY) HYPERTENSION: Status: RESOLVED | Noted: 2021-09-10 | Resolved: 2025-04-06

## 2025-04-11 ENCOUNTER — PATIENT ROUNDING (BHMG ONLY) (OUTPATIENT)
Dept: FAMILY MEDICINE CLINIC | Facility: CLINIC | Age: 35
End: 2025-04-11

## 2025-04-28 ENCOUNTER — PRIOR AUTHORIZATION (OUTPATIENT)
Dept: FAMILY MEDICINE CLINIC | Facility: CLINIC | Age: 35
End: 2025-04-28
Payer: COMMERCIAL

## 2025-04-28 NOTE — TELEPHONE ENCOUNTER
HUB to read    PA approved for Mounjaro 7.5MG/0.5ML auto-injectors    PA approval was faxed to the patients pharmacy     Your request has been approved  CaseId:[id number];Status:Approved;Review Type:Prior Auth;Coverage Start Date:04/28/2025;Coverage End Date:04/28/2026;

## 2025-07-01 ENCOUNTER — CLINICAL SUPPORT (OUTPATIENT)
Dept: FAMILY MEDICINE CLINIC | Facility: CLINIC | Age: 35
End: 2025-07-01
Payer: COMMERCIAL

## 2025-07-01 DIAGNOSIS — E11.9 TYPE 2 DIABETES MELLITUS WITHOUT COMPLICATION, WITHOUT LONG-TERM CURRENT USE OF INSULIN: Primary | ICD-10-CM

## 2025-07-01 LAB
EXPIRATION DATE: ABNORMAL
HBA1C MFR BLD: 6 % (ref 4.5–5.7)
Lab: ABNORMAL

## 2025-07-01 PROCEDURE — 83036 HEMOGLOBIN GLYCOSYLATED A1C: CPT | Performed by: FAMILY MEDICINE

## 2025-07-01 NOTE — PROGRESS NOTES
Fingerstick performed in L -3rd finger by RT Shira  with good hemostasis. Patient tolerated well. 07/01/25 Brisa De La Paz, RT

## 2025-07-16 RX ORDER — BENAZEPRIL HYDROCHLORIDE AND HYDROCHLOROTHIAZIDE 20; 12.5 MG/1; MG/1
1 TABLET ORAL DAILY
Qty: 90 TABLET | Refills: 0 | Status: SHIPPED | OUTPATIENT
Start: 2025-07-16

## 2025-07-21 RX ORDER — TIRZEPATIDE 5 MG/.5ML
INJECTION, SOLUTION SUBCUTANEOUS
Qty: 2 ML | Refills: 2 | Status: SHIPPED | OUTPATIENT
Start: 2025-07-21

## (undated) DEVICE — ST TBG AIRSEAL BIF FLTR W/ACT/CHARCOAL/FLTR

## (undated) DEVICE — SOLUTION,WATER,IRRIGATION,1000ML,STERILE: Brand: MEDLINE

## (undated) DEVICE — BLANKT WARM UPPR/BDY ARM/OUT 57X196CM

## (undated) DEVICE — SUT VIC 0 UR6 27IN VCP603H

## (undated) DEVICE — COLUMN DRAPE

## (undated) DEVICE — SLV SCD CALF HEMOFORCE DVT THERP REPROC MD

## (undated) DEVICE — ARM DRAPE

## (undated) DEVICE — SEAL

## (undated) DEVICE — TIP COVER ACCESSORY

## (undated) DEVICE — BAPTIST FLOYD BRONCHOSCOPY: Brand: MEDLINE INDUSTRIES, INC.

## (undated) DEVICE — LAPAROVUE VISIBILITY SYSTEM LAPAROSCOPIC SOLUTIONS: Brand: LAPAROVUE

## (undated) DEVICE — BLADELESS OBTURATOR: Brand: WECK VISTA

## (undated) DEVICE — REDUCER: Brand: ENDOWRIST

## (undated) DEVICE — ANTIBACTERIAL UNDYED BRAIDED (POLYGLACTIN 910), SYNTHETIC ABSORBABLE SUTURE: Brand: COATED VICRYL

## (undated) DEVICE — TBG PENCL TELESCP MEGADYNE SMOKE EVAC 15FT

## (undated) DEVICE — UNDERGLV SURG BIOGEL INDICATOR LF PF 7.5

## (undated) DEVICE — NDL HYPO PRECISIONGLIDE/REG 18G 11/2 PNK

## (undated) DEVICE — ENDOPATH XCEL WITH OPTIVIEW TECHNOLOGY BLADELESS TROCARS WITH STABILITY SLEEVES: Brand: ENDOPATH XCEL OPTIVIEW

## (undated) DEVICE — GLV SURG BIOGEL LTX PF 7

## (undated) DEVICE — CANNULA SEAL

## (undated) DEVICE — KT SURG TURNOVER 050

## (undated) DEVICE — SOL IRRIG NACL 1000ML

## (undated) DEVICE — GENERAL LAPAROSCOPY CDS: Brand: MEDLINE INDUSTRIES, INC.

## (undated) DEVICE — ELECTRD BLD EZ CLN MOD XLNG 2.75IN

## (undated) DEVICE — COVER,MAYO STAND,XL,STERILE: Brand: MEDLINE

## (undated) DEVICE — CVR HNDL LT SURG ACCSSRY BLU STRL

## (undated) DEVICE — ENDOPATH XCEL UNIVERSAL TROCAR STABLILITY SLEEVES: Brand: ENDOPATH XCEL

## (undated) DEVICE — ADHS SKIN PREMIERPRO EXOFIN TOPICAL HI/VISC .5ML

## (undated) DEVICE — SYR LUERLOK 30CC

## (undated) DEVICE — Device